# Patient Record
Sex: FEMALE | Race: WHITE | NOT HISPANIC OR LATINO | Employment: UNEMPLOYED | URBAN - METROPOLITAN AREA
[De-identification: names, ages, dates, MRNs, and addresses within clinical notes are randomized per-mention and may not be internally consistent; named-entity substitution may affect disease eponyms.]

---

## 2017-02-02 ENCOUNTER — TRANSCRIBE ORDERS (OUTPATIENT)
Dept: ADMINISTRATIVE | Facility: HOSPITAL | Age: 62
End: 2017-02-02

## 2017-02-02 ENCOUNTER — ALLSCRIPTS OFFICE VISIT (OUTPATIENT)
Dept: OTHER | Facility: OTHER | Age: 62
End: 2017-02-02

## 2017-02-02 DIAGNOSIS — D32.0 BENIGN NEOPLASM OF CEREBRAL MENINGES (HCC): Primary | ICD-10-CM

## 2018-01-10 NOTE — PROGRESS NOTES
Assessment   1  Meningioma (225 2) (D32 9)  2  Aftercare following surgery (V58 89) (Z48 89)    Plan     · * MRI CERVICAL SPINE W WO CONTRAST; Status:Need Information - Financial  Authorization; Requested ETIENNE:[id number]; Perform:[facility] Radiology; Order Comments:attention lower clivus to C2 region  Follow up s/p cervicomedullary region meningioma resection  sherrill 15 2018 8:15am Gifi -ba; IXA:[id number]; Last Updated   By:Pat Mckenzie; 2/2/2017 4:32:23 PM;Ordered; For:Aftercare following surgery,   Meningioma; Ordered By:Wagner Puga;    · (Q) BUN/CREATININE RATIO; Status:Active; Requested for:22Jan2018; Perform:[facility]; RXJ:[id number];ZSYXMNP; For:Aftercare following surgery, Meningioma,   Preprocedural examination; Ordered By:Wagner Puga; Follow up with PCP, Dr Dolores Malcolm / Dr Gayathri Hewitt regarding parotid findings  Follow-up visit in 1 year Evaluation and Treatment  Follow-up sovl / snplx Dr Chacho Brambila with C-spine MRI  Status: Hold For - Scheduling  Requested for: [id number]  Ordered; For: Aftercare following surgery, Meningioma; Ordered By: Rakan Navas  Performed:   Due: [id number]     Discussion/Summary    This is a 60year old lady who is approximately 4 1/2 years s/p left retromastoid and transcondylar approach and 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 preformed by Dr Chacho Barmbila on 6/18/2012 [ Final pathology Psammomatous meningioma grade 1]  Dr Chacho Brambila reviewed with Ms  Rody and her  result of MRI Brain/C-spine which demonstrates scar tissue and possibly residual tumor around upper cervical canal although this appears smaller then in comparison to MRI from 1/2016  There is no mass effect  She has degenerative changes of mid cervical spine  She has reports of stable bilateral cystic lesions throughout both parotid glands  Neurosurgical intervention is not indicated at this juncture  Continued surveillance in 1 year with MRI C-spine w/wo contrast is advised for follow up of residual meningoma / postoperative treatment changes  She is to have BUN/Creatinine completed a few days prior to MRI  Patient advised to follow up with her PCP / Dr Monique Moreno / Dr Sherine Apple regarding Sjogren disease and parotic cystic lesion findings  Patient was offered referral to Physical therapy but she declined  Patient is encouraged to incorporate regular walking / exercise into her lifestyle to maintain as active lifestyle and functional mobility as possible  Patient advised to contact our office with neurological change  Patient and her  expressed understanding and agreement  The patient has the current Goals: Review MRI brain/c-spine result  The patent has the current Barriers: None  Patient is able to Self-Care  The treatment plan was reviewed with the patient/guardian  The patient/guardian understands and agrees with the treatment plan   The patient, patient's family () was counseled regarding diagnostic results, instructions for management, impressions  Chief Complaint  Patient presents for 1 year f/u with MRI Brain/C-spine  History of Present Illness  This is a 60year old lady who is approximately 4 1/2 years s/p left retromastoid and transcondylar approach and 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 preformed by Dr Kane Khan on 6/18/2012 [ Final pathology Psammomatous meningioma grade 1 (WHO 2007) ]  She presents for routine follow up s/p MRI Brain/C-spine  She is accompanied with her   Ms Lilliam Gillette was last seen in our office 2/4/16 -- she was noted to have known residual left ventral craniocervical meningioma (~4mm in thickness) which was believed to be stable to prior imaging and she was recommended to follow up in 1 year with MRI Brain   Additionally she was advised to follow up with Dr Monique Moreno and Dr Sherine Apple regarding persistent multicystic changes of bilateral parotid glands  Patient denies headache or neck pain  She reports her surgical incision is well healed  She denies nausea or vomiting  She denies visual complaints or hearing dysfunction  She has history of short term memory difficulty but denies any changes in interval since visit last year  She denies pain, numbness, tingling, or weakness of her upper extremities  She denies any difficulty with fine motor function  She denies torso sensory change  She denies numbness, tingling, or weakness of her lower extremities  She ambulates independent  Patient reports during nicer weather she and her  take walks at a park  She reports not exercising much recently with the winter  They have a treadmill at home which  reports he has to fix  She denies bladder or bowel dysfunction  Patient reports she has followed with Dr Dolores Malcolm (Rheumatology) and Dr Gayathri Hewitt (ENT) regarding Sjogrens disease w/ history of enlargement of parotid glands  She denies any coughing or choking w/ eating  She denies waking up in AM with gag-like sensation  She reports she has next upcoming appointment w/ Dr Dolores Malcolm in March 2017  Of note -- she reports last year she was experiencing tremors so her PCP moved her MRIs that we requested for this appointment sooner  Patient reports PCP had her stopped Reglan and her tremors resolved  Review of Systems    Constitutional: no fever, not feeling poorly, no recent weight gain, no chills, not feeling tired and no recent weight loss  Eyes: no eye pain, no eyesight problems, no dryness of the eyes, eyes not red, no purulent discharge from the eyes and no itching of the eyes  ENT: no earache, no nosebleeds, no sore throat, no hearing loss, no nasal discharge and no hoarseness  Cardiovascular: the heart rate was not slow, no chest pain, the heart rate was not fast and no palpitations     Respiratory: no shortness of breath, no cough, no wheezing and no shortness of breath during exertion  Gastrointestinal: no abdominal pain, no nausea, no vomiting, no constipation, no diarrhea and no blood in stools  Genitourinary: no dysuria and no incontinence  Musculoskeletal: no joint swelling, no limb pain, no joint stiffness and no limb swelling  Integumentary: no rashes, no itching, no skin lesions and no skin wound  Neurological: no headache, no numbness, no tingling, no confusion, no dizziness, no limb weakness, no convulsions, no fainting and no difficulty walking  Psychiatric: anxiety, but no sleep disturbances and no depression  Hematologic/Lymphatic: no tendency for easy bleeding and no tendency for easy bruising  ROS reviewed  Active Problems   1  Aftercare following surgery (V58 89) (Z48 89)  2  Anxiety (300 00) (F41 9)  3  Arthritis (716 90) (M19 90)  4  Attention and concentration deficit (799 51) (R41 840)  5  Dry Skin (782 9)  6  Encounter for screening mammogram for malignant neoplasm of breast (V76 12)   (Z12 31)  7  Hypertension (401 9) (I10)  8  Hypochromic/Microcytic Thalassemia (282 40)  9  Lesion of parotid gland (527 8) (K11 8)  10  Memory Lapses Or Loss (780 93)  11  Meningioma (225 2) (D32 9)  12  Osteopenia (733 90) (M85 80)  13  Preprocedural examination (V72 84) (Z01 818)  14  Pre-procedure lab exam (V72 63) (Z01 812)  15  Routine Gynecological Exam With Cervical Pap Smear (V72 31)  16  Sjogren's syndrome (710 2) (M35 00)  17  Sleep disorder (780 50) (G47 9)  18  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)  19  Spondylosis of cervical region without myelopathy or radiculopathy (721 0) (M47 812)  20  Thyroid disorder (246 9) (E07 9)    Past Medical History   1  History of Alpha Thalassemia (282 43)  2  History of Birth History  3  History of Currently Wearing Eyeglasses  4  History of Encounter for screening mammogram for malignant neoplasm of breast   (V76 12) (Z12 31)  5   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) (Z86 69)  6  History of thyroid disease (V12 29) (Z86 39)  7  History of Routine Gynecological Exam With Cervical Pap Smear (V72 31)    The active problems and past medical history were reviewed and updated today  Surgical History   1  History of Biopsy Parotid  2  History of Brain Surgery  3  History of  Section  4  History of Gallbladder Surgery    The surgical history was reviewed and updated today  Family History  Mother   1  Family history of Cardiac Failure  Father   2  Family history of Suicide Completion  Sister   3  Family history of Thyroid Disorder (V18 19)  Maternal Grandfather   4  Family history of Bone Cancer  Family History   5  Family history of Family Health Status 2  Children Living  6  Family history of Family Health Status Of Mother -   9  Family history of Family Health Status Siblings 4  Living  8  Family history of Maternal Grandfather Is   5  Family history of Maternal Grandmother Is   8  Family history of Paternal Grandfather Is   6  Family history of Paternal Grandmother Is     The family history was reviewed and updated today  Social History    · Being A Social Drinker   · Denied: History of Drug Use   · Former smoker (V15 82) (W87 603)   · Living Independently With Spouse   · Marital History - Currently    · Sexual Activity Denied  The social history was reviewed and updated today  Current Meds  1  ALPRAZolam 2 MG Oral Tablet; 1 tablet daily; Therapy: (Recorded:75Bzl9389) to Recorded  2  AmLODIPine Besylate 2 5 MG Oral Tablet; TAKE 1 TAB AT BEDTIME; Therapy: (Recorded:04Zdj8929) to Recorded  3  Atenolol 50 MG Oral Tablet; 1 tab 2x daily; Therapy: (Recorded:51Iqz5987) to Recorded  4  Folic Acid 1 MG Oral Tablet; Take 1 tab in the morning; Therapy: (Recorded:35Bna3786) to Recorded  5  Levothyroxine Sodium 75 MCG Oral Tablet;  Take 1 tab in the morning; Therapy: (Recorded:64Nks3470) to Recorded  6  Linzess 145 MCG Oral Capsule; take 1 capsule daily; Therapy: 39BHW4670 to Recorded  7  Methotrexate 2 5 MG Oral Tablet; take 1 tab weekly on wednesday; Therapy: (Recorded:19Wkp7316) to Recorded    The medication list was reviewed and updated today  Allergies   1  TiZANidine HCl CAPS  Denied   2  Hydroxychloroquine Sulfate TABS    Vitals  Vital Signs    Recorded: 28JZK6699 03:01PM   Temperature 99 2 F   Heart Rate 74   Respiration 16   Systolic 323   Diastolic 64   Height 4 ft 10 in   Weight 130 lb    BMI Calculated 27 17   BSA Calculated 1 52     Physical Exam     Constitutional Patient appears healthy and well developed  No signs of acute distress present  Skin Maturely healed left retromastoid region scar  Neurologic - Mental Status: Alert and Oriented x3  Mood and affect: Affect is normal   Attention is WNL  Andrew Goody Speech is articulated and fluent  Knowledge and vocabulary consistent with education  Grossly nonfocal   Judgment and insight: Normal     Cranial Nerve Exam:  2nd CN: PERRLA  3rd, 4th, and 6th cranial nerves: Normal with no deficit  5th CN: Sensation to LT intact b/l V1-V3  Masseter intact b/l  7th cranial nerve: Face symmetrical at grimace and at rest  8th cranial nerve: Grossly intact to finger rub bilaterally  9th and 10th cranial nerves: Uvula is midline  the gag was present  11th cranial nerve: Shoulder shrug equal bilaterally  12th cranial nerve: Tongue mideline, no atrophy present  Motor System - Upper Extremities: Normal to inspection and palpation  Strength: Deltoids 5/5 bilaterally  Biceps 5/5 bilaterally  Triceps 5/5 bilaterally  Extensor carpi radials is 5/5 bilaterally  Extensor digitorum 5/5 bilaterally  Intrinsic 5/5 bilaterally   5/5 bilaterally  Motor System - Lower Extremities: Normal to inspection and palpation  Strength: iliopsoas 5/5 bilaterally  Quadriceps 5/5 bilaterally  Hamstrings 5/5 bilaterally  Gastrocnemius 5/5 bilaterally  Reflexes: Biceps reflexes are 2+ bilaterally  Triceps reflexes are 2+ bilaterally  Achilles reflexes are 2+ bilaterally  Babinski's reflex is 2+ down going bilaterally  Ankle clonus is absent bilaterally   (toes unequivocal left)  Deep tendon reflexes: no ankle clonus on the right and no ankle clonus on the left  Superficial/Primitive Reflexes: Babinski reflex absent on the right  Coordination: No pronator drift  Finger to nose intact b/l  GOSIA intact fingers  Involuntary movements: None   Sensory: Sensation grossly intact to light touch  Sensation grossly intact to light touch  Gait and Station: Routine gait intact independent although appears hesitant  She is unsteady with attempting tandem walk -- ie  side steps on 3rd stride  Results/Data  Diagnostic Studies Reviewed Neuro[facility]:   I personally reviewed the mri brain/c-spine result in detail with the patient  * MRI BRAIN W WO CONTRAST [id number] 07:26AM Paul Thomas     Test Name Result Flag Reference   MRI BRAIN W WO CONTRAST (Report)     This is a summary report  The complete report is available in the patient's medical record  If you cannot access the medical record, please contact the sending organization for a detailed fax or copy  MRI BRAIN WITH AND WITHOUT CONTRAST     INDICATION: Evaluate meningioma  Left arm weakness  Tremors  COMPARISON: 1/13/2016     TECHNIQUE:   Sagittal T1, axial T2, axial FLAIR, axial T1, axial [place], axial diffusion  Sagittal, axial and coronal T1 postcontrast  Axial BRAVO post contrast     IV Contrast: Gadobutrol injection (SINGLE-DOSE) SOLN 5 mL Note: (SINGLE DOSE/MULTI DOSE) information refers to the container from which the contrast was acquired  Contrast was injected one time intravenously without immediate complication  IMAGE QUALITY:  Diagnostic       FINDINGS:     BRAIN PARENCHYMA: A few periventricular white matter lesions in the right frontal region are stable  No new signal abnormality  Along the anterior aspect of the foramen magnum abutting the posterior margin of the clivus eccentrically to the left there   is a small sliver of extra-axial dural enhancement compatible with residual meningioma, measuring perhaps 9 mm in craniocaudal dimension x 3 mm maximal AP dimension  The appearance is stable  No significant mass effect  There is no diffusion restriction  There is no extra-axial fluid collection  Cerebellar tonsils are normally positioned  VENTRICLES: Normal      SELLA AND PITUITARY GLAND: Normal      ORBITS: Normal      PARANASAL SINUSES: Normal      VASCULATURE: Evaluation of the major intracranial vasculature demonstrates appropriate flow voids  CALVARIUM AND SKULL BASE: Normal      EXTRACRANIAL SOFT TISSUES: Normal        IMPRESSION:       1  Stable small amount of residual dural enhancement along the anterior aspect of the foramen magnum to left of midline compatible with a trace amount of residual meningioma versus reactive granulation tissue  No significant mass effect  2  Mild, chronic microangiopathy  3  No acute infarction, intracranial hemorrhage or new mass lesion  Workstation performed: [id number]     Signed by: Dorothy Hand MD   12/22/16     * MRI CERVICAL SPINE W WO CONTRAST [id number] 07:26AM EPIC, Provider   Test ordered by: Kaya Galo     Test Name Result Flag Reference   MRI CERVICAL SPINE W [address] (Report)     This is a summary report  The complete report is available in the patient's medical record  If you cannot access the medical record, please contact the sending organization for a detailed fax or copy  MRI CERVICAL SPINE WITH AND WITHOUT CONTRAST     INDICATION: Follow-up meningioma  Left arm weakness  COMPARISON: 12/3/2012     TECHNIQUE: Sagittal T1, sagittal T2, sagittal inversion recovery, axial 2D merge and axial T2         Sagittal T1 and axial T1 postcontrast     IV Contrast: Gadobutrol injection (SINGLE-DOSE) SOLN 5 mL Note: (SINGLE DOSE/MULTI DOSE) information refers to the container from which the contrast was acquired  Contrast was injected one time intravenously without immediate complication  IMAGE QUALITY: Diagnostic  FINDINGS:     ALIGNMENT: Grade 1 retrolisthesis of C5 on C6 is slightly progressed from the prior study  MARROW SIGNAL: Scattered degenerative endplate changes  No focally suspicious marrow lesions  No bone marrow edema or compression abnormality  CERVICAL AND VISUALIZED UPPER THORACIC CORD: Small amount of residual dural enhancement anteriorly in the upper cervical spinal canal seen on image 5, series 16, measuring approximately 11 mm craniocaudal dimension x 4 mm AP compatible with residual    meningioma  No significant mass effect or interval growth  PREVERTEBRAL AND PARASPINAL SOFT TISSUES: Numerous small bilateral cystic lesions throughout the parotid glands bilaterally retrospectively stable  VISUALIZED POSTERIOR FOSSA: The visualized posterior fossa demonstrates no abnormal signal      CERVICAL DISC SPACES:        C2-C3: Normal      C3-C4: Normal      C4-C5: Uncovering the intervertebral disc space noted  Mild tricompartmental narrowing  This level is similar to the prior study  C5-C6: There is a disc osteophyte complex with a superimposed left neural foraminal disc protrusion  Moderate central canal narrowing  Moderate to severe left neural foraminal narrowing  Mild right neural foraminal narrowing  This level is similar    to the prior study  C6-C7: There is a disc osteophyte complex with a superimposed right paracentral disc protrusion  Moderate right neural foraminal narrowing  Mild central canal narrowing  Left neural foramen patent  This level is similar to the prior study       C7-T1: Normal      UPPER THORACIC DISC SPACES: Normal      POSTCONTRAST IMAGING: Small amount of enhancement in the upper aspect of the cervical spinal canal anteriorly on the left, stable  IMPRESSION:       1  Small amount of residual meningioma versus treatment changes upper cervical canal  No mass effect or interval growth  2  Grade 1 retrolisthesis of C5 on C6 is minimally more pronounced on the prior study  No cord compression or cord signal abnormality  3  Stable bilateral cystic lesions throughout both parotid glands, a finding which has been associated with Sjogren's disease  Other etiologies such as lymphoepithelial cyst associated with HIV infection, Warthin tumor or sialoloceles are in the    differential diagnosis  Correlation with clinical history advised  Long-standing imaging stability favors a nonaggressive, benign etiology  Workstation performed: [id number]     Signed by: George Hector MD   12/22/16     Future Appointments    Date/Time Provider Specialty Site   01/23/2018 09:00 AM Jennifer Wilson, [address] [place] [address] [facility]   01/23/2018 09:30 AM NAINA Quinn   Neurosurgery [facility]     Signatures   Electronically signed by : Jossue Maharaj, [facility]; Feb 5 2017  9:36AM EST                       (Author)    Electronically signed by : NAINA Lezama ; Feb 6 2017  4:04PM EST                       (Author)

## 2018-01-10 NOTE — RESULT NOTES
Verified Results  * MRI BRAIN W WO CONTRAST [id number] 01:29PM Marnie Edge     Test Name Result Flag Reference   MRI BRAIN W WO CONTRAST (Report)     MRI BRAIN WITH AND WITHOUT CONTRAST     INDICATION: 69-year-old female, meningioma, follow-up   COMPARISON: January 2, 2015 MRI     TECHNIQUE:   Sagittal T1, axial T2, axial FLAIR, axial T1, axial gradient imaging, axial diffusion  Sagittal, axial and coronal T1 postcontrast       4 5 mL of Gadavist was injected intravenously without immediate consequence  IMAGE QUALITY:  Diagnostic  FINDINGS:     BRAIN PARENCHYMA:    Several tiny foci of chronic microangiopathic change involve the supratentorial white matter, similar to previous exam  No acute ischemic disease is identified  These findings are stable  Brainstem and cerebellum demonstrate normal signal  There is no intracranial hemorrhage  There is no evidence of acute infarction and diffusion imaging is unremarkable  VENTRICLES: Normal      POSTCONTRAST IMAGING:    Persistent thin relatively flat region of enhancement involves the left ventral aspect of the spinal canal from the level of the inferior clivus to mid C2  The abnormality remains most consistent with postoperative residual meningioma, unchanged  The    region measures up to 4 mm in thickness  No evidence of mass effect  SELLA AND PITUITARY GLAND: Normal      ORBITS: Normal      PARANASAL SINUSES: Normal      VASCULATURE: Evaluation of the major intracranial vasculature demonstrates appropriate flow voids  CALVARIUM AND SKULL BASE: Normal      EXTRACRANIAL SOFT TISSUES:    Multiple cystic changes both enlarged parotid glands, unchanged     Stable residual left ventral craniocervical meningioma measuring up to 4 mm in thickness  No significant mass effect on neural axis  Findings are unchanged       Persistent multicystic changes bilateral parotid glands, similar to previous study             4 5

## 2018-01-11 LAB
BUN SERPL-MCNC: 10 MG/DL (ref 7–25)
BUN/CREA RATIO (HISTORICAL): NORMAL (CALC) (ref 6–22)
CREAT SERPL-MCNC: 0.85 MG/DL (ref 0.5–0.99)
EGFR AFRICAN AMERICAN (HISTORICAL): 84 ML/MIN/1.73M2
EGFR-AMERICAN CALC (HISTORICAL): 72 ML/MIN/1.73M2

## 2018-01-12 VITALS
SYSTOLIC BLOOD PRESSURE: 110 MMHG | DIASTOLIC BLOOD PRESSURE: 64 MMHG | WEIGHT: 130 LBS | HEIGHT: 58 IN | RESPIRATION RATE: 16 BRPM | BODY MASS INDEX: 27.29 KG/M2 | TEMPERATURE: 99.2 F | HEART RATE: 74 BPM

## 2018-01-18 ENCOUNTER — HOSPITAL ENCOUNTER (OUTPATIENT)
Dept: MRI IMAGING | Facility: HOSPITAL | Age: 63
Discharge: HOME | End: 2018-01-18
Payer: COMMERCIAL

## 2018-01-18 DIAGNOSIS — D32.9 BENIGN NEOPLASM OF MENINGES (HCC): ICD-10-CM

## 2018-01-18 DIAGNOSIS — Z48.89 ENCOUNTER FOR OTHER SPECIFIED SURGICAL AFTERCARE (CODE): ICD-10-CM

## 2018-01-18 PROCEDURE — A9585 GADOBUTROL INJECTION: HCPCS | Performed by: NEUROLOGICAL SURGERY

## 2018-01-18 PROCEDURE — 72156 MRI NECK SPINE W/O & W/DYE: CPT

## 2018-01-18 RX ADMIN — GADOBUTROL 5 ML: 604.72 INJECTION INTRAVENOUS at 12:09

## 2018-01-23 ENCOUNTER — ALLSCRIPTS OFFICE VISIT (OUTPATIENT)
Dept: OTHER | Facility: OTHER | Age: 63
End: 2018-01-23

## 2018-01-24 DIAGNOSIS — M47.812 SPONDYLOSIS OF CERVICAL REGION WITHOUT MYELOPATHY OR RADICULOPATHY: ICD-10-CM

## 2018-01-24 DIAGNOSIS — Z48.89 ENCOUNTER FOR OTHER SPECIFIED SURGICAL AFTERCARE (CODE): ICD-10-CM

## 2018-01-24 DIAGNOSIS — M43.12 SPONDYLOLISTHESIS OF CERVICAL REGION: ICD-10-CM

## 2018-01-24 DIAGNOSIS — D32.9 BENIGN NEOPLASM OF MENINGES (HCC): ICD-10-CM

## 2018-01-24 NOTE — PROGRESS NOTES
Assessment   1  Meningioma (225 2) (D32 9)  2  Aftercare following surgery (V58 89) (Z48 89)    Plan    · * MRI CERVICAL SPINE W WO CONTRAST; Status:Need Information - Financial  Authorization; Requested for:Approx [id number];   Perform:[facility]; Order Comments:post-op follow up s/p meningioma   resection;Ordered; For:Aftercare following surgery, Meningioma; Ordered By:McFarland, Meryle Broker;   · Follow Up in 2 Years Evaluation and Treatment  Follow-up  sovl / snplx Dr Kymberly Carlisle with  MRI c-spine  Status: Hold For - Scheduling  Requested for: [id number]  Ordered; For: Aftercare following surgery, Meningioma; Ordered By: Dee Dee Berman    Performed:   Due: 95LTS8575    · (Q) BUN/CREATININE RATIO; Status:Hold For - Exact Date; Requested for:Approx  [id number];   Perform:[facility];Ordered; For:Aftercare following surgery, Meningioma, Pre-procedure lab   exam; Ordered By:McFarland, Meryle Broker;    · *1 - [facility] Co-Management  Eval and treat  Patient has history of left  retromastoid and transcondylar approach and C1 and C2 hemilaminectomy for  resection of intradural cervical medullary ventral mass preformed by Dr Kymberly Carlisle on  2012 [ Final pathology Psammomatous meningioma grade 1]  Status: Active   Requested for: [id number]  Ordered; For: Spondylolisthesis of cervical region, Spondylosis of cervical region without   myelopathy or radiculopathy;  Ordered By: Dee Dee Berman  Performed:     Due: 27ERH7860  (MU) Care Summary provided  : Yes   · * XR SPINE CERVICAL COMPLETE 4 OR 5 VW NON INJURY; Status:Active; Requested  EUE:[id number]; Perform:[facility]; Order Comments:Upright AP, Lateral, and Lateral   Flexion/Extension views; [id number];  For:Spondylolisthesis of cervical   region, Spondylosis of cervical region without myelopathy   or radiculopathy; Ordered By:McFarland, Meryle Broker;    Patient is to call our office after she has C-spine flex/ext Xray completed for   Morgan Case to review  Discussion/Summary    This is a 58year old lady who is approximately 5 1/2 years s/p left retromastoid and transcondylar approach and 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 preformed by Dr Morgan Robles on 6/18/2012 [ Final pathology Psammomatous meningioma grade 1]  She has known Sjogrens disease with persistent multicystic changes of bilateral parotid glands (Follows with Dr Liyah Andrade of Rheumatology and Dr Melissa Ballesteros of ENT)  Dr Morgan Robles reviewed the MRI C-spine ( 1/18/18) in detail and compared to prior MRI imaging (12/22/16, 12/3/12, and pre-op 5/22/12)  C-spine MRI imaging was reviewed with Ms  Rashelmi and her   Recent MRI C-spine demonstrates (1/18/18) demonstrates stability compared to previous MRI 12/22/16  There is stable enhancing and partially calcified lesion in the ventral cervicomedullary junction in keeping with residual/calcified meningioma  (This mass is significantly smaller compared to pre-op MRI imaging on 5/22/12 MRI brain)  In addition, she has cervical spondylosis / degenerative changes of mid cervical spine with increased straightening of cervical lordosis  There is 2mm of anterolisthesis of C4 on C5 and 1mm of retrolisthesis of C5 relatively to C6  There is no compression for spinal cord  There is normal cervical cord signal   There is note of bilateral nonenhancing cystic lesions seen within the parotid glands  The following is recommended as for:  1 ) Post-op s/p left retromastoid and transcondylar approach and 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 performed by Dr Morgan Robles on 6/18/2012 [ Final pathology Psammomatous meningioma grade 1]   Patient is recommended to follow up in 2 year with MRI C-spine w/wo contrast for continued surveillance of the residual enhancing/partially calcified lesion in the ventral cervicomedullary junction in keeping with residual / calcified meningioma  She is to have BUN/Creatinine completed a few days prior to the C-spine MRI  Dr Melissa Bangura discussed w/ patient and her  if there is findings of progression of meningioma over time then he would consider referral for radiation treatment  2 ) Cervical spondylosis / straightening of cervical lordosis / spondylolisthesis in cervical region -- She is advised to undergo cervical flexion/extension xray to evaluate dynamic stability of c-spine  Order for c-spine flex/ext xray placed and patient asked to have the cervical xray study completed in near future at [address]  Patient advised to call our office after she has c-spine xray completed so study may be reviewed  In addition, she is encouraged to pursue course of physical therapy  Referral for Physical therapy provided  3 ) Patient advised to follow up with PCP / Dr Johnie Fothergill / Dr Demetrius Almodovar regarding Sjogren disease and parotic cystic lesion findings  Additionally patient advised follow up with her PCP for history of dyspnea on exertion with walking  Patient advised to contact our office with neurological change  Ms Fei Ascencio and her  expressed understanding and agreement  The patient, patient's family () was counseled regarding diagnostic results, instructions for management, impressions  The patient has the current Goals: Review c-spine mri  The patent has the current Barriers: Sjogren disease ; history of dyspnea on exertion  Patient is able to Self-Care  The treatment plan was reviewed with the patient/guardian   The patient/guardian understands and agrees with the treatment plan      Chief Complaint  1 year follow up      History of Present Illness  This is a 58year old lady who is approximately 5 1/2 years s/p left retromastoid and transcondylar approach and 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 preformed by Dr Melissa Bangura on 6/18/2012 [ Final pathology Psammomatous meningioma grade 1 (WHO 2007) ]  She has known residual left ventral craniocervical meningioma  She presents for routine follow up s/p MRI C-spine  She is accompanied with her   She follows with Dr Gail Dao (Rheumatology) and Dr Judd Pruitt (ENT) regarding Sjogren's disease and persistent multicystic changes of bilateral parotid glands  Patient reports her cranial surgical incision is maturely healed  She denies nausea or vomiting  She denies visual disturbances or hearing dysfunction  She has history of short-term memory difficulty but denies any changes in the interval since last visit  Patient denies headache or neck pain  She does report she can experience some stiffness of her neck with right lateral rotation which she attributes to "arthritis"  She denies pain, numbness, tingling, or weakness of her upper extremities or lower extremities  She denies any fine motor dysfunction  She ambulates independently  During nicer weather she goes for walks although she reports she does not tolerate walking as far as she did in past due to getting "winded" from respiratory standpoint  She reports tolerance of walking to about the top of block  In more remote past she reported toleration of walking 3 miles  She reports occasionally feel âwobbly" when walking but denies any falls  Patient reports she continues to follow with Dr Gail Dao (Rheumatology) and Dr Judd Pruitt (ENT) for Sjogren disease with enlargement of parotid glands  She reports chronic dry mouth associated with Sjogrens disease  She reports occasional difficulty with swallowing which she attributes to the dry mouth with Sjogren's  She denies any choking when eating  She denies waking up in a m  with gag-like sensation  Patient reports she follows up with Dr Gail Dao approximately every 3-4 months with Dr Judd Pruitt yearly        Review of Systems    Constitutional: No fever, no chills, feels well, no tiredness, no recent weight gain or weight loss  Eyes: No complaints of eye pain, no red eyes, no eyesight problems, no discharge, no dry eyes, no itching of eyes  ENT: no complaints of earache, no loss of hearing, no nose bleeds, no nasal discharge, no sore throat, no hoarseness  Cardiovascular: No complaints of slow heart rate, no fast heart rate, no chest pain, no palpitations, no leg claudication, no lower extremity edema  Respiratory: shortness of breath during exertion, but no cough, no orthopnea and no PND  Gastrointestinal: No complaints of abdominal pain, no constipation, no nausea or vomiting, no diarrhea, no bloody stools  Genitourinary: No complaints of dysuria, no incontinence, no pelvic pain, no dysmenorrhea, no vaginal discharge or bleeding  Musculoskeletal: No complaints of arthralgias, no myalgias, no joint swelling or stiffness, no limb pain or swelling  Integumentary: No complaints of skin rash or lesions, no itching, no skin wounds, no breast pain or lump  Neurological: No complaints of headache, no confusion, no convulsions, no numbness, no dizziness or fainting, no tingling, no limb weakness, no difficulty walking  Psychiatric: Not suicidal, no sleep disturbance, no anxiety or depression, no change in personality, no emotional problems  Endocrine: No complaints of proptosis, no hot flashes, no muscle weakness, no deepening of the voice, no feelings of weakness  Hematologic/Lymphatic: No complaints of swollen glands, no swollen glands in the neck, does not bleed easily, does not bruise easily  ROS reviewed  Active Problems   1  Aftercare following surgery (V58 89) (Z48 89)  2  Anxiety (300 00) (F41 9)  3  Arthritis (716 90) (M19 90)  4  Attention and concentration deficit (799 51) (R41 840)  5  Dry Skin (782 9)  6  Encounter for screening mammogram for malignant neoplasm of breast (V76 12)   (Z12 31)  7  Hypertension (401 9) (I10)  8  Hypochromic/Microcytic Thalassemia (282 40)  9   Lesion of parotid gland (527 8) (K11 8)  10  Memory Lapses Or Loss (780 93)  11  Meningioma (225 2) (D32 9)  12  Osteopenia (733 90) (M85 80)  13  Preprocedural examination (V72 84) (Z01 818)  14  Pre-procedure lab exam (V72 63) (Z01 812)  15  Routine Gynecological Exam With Cervical Pap Smear (V72 31)  16  Sjogren's syndrome (710 2) (M35 00)  17  Sleep disorder (780 50) (G47 9)  18  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)  19  Spondylosis of cervical region without myelopathy or radiculopathy (721 0) (M47 812)  20  Thyroid disorder (246 9) (E07 9)    Past Medical History   1  History of Alpha Thalassemia (282 43)  2  History of Birth History  3  History of Currently Wearing Eyeglasses  4  History of Encounter for screening mammogram for malignant neoplasm of breast   (V76 12) (Z12 31)  5  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) (Z86 69)  6  History of thyroid disease (V12 29) (Z86 39)  7  History of Routine Gynecological Exam With Cervical Pap Smear (V72 31)    The active problems and past medical history were reviewed and updated today  Surgical History   1  History of Biopsy Parotid  2  History of Brain Surgery  3  History of  Section  4  History of Gallbladder Surgery    The surgical history was reviewed and updated today  Family History  Mother   1  Family history of Cardiac Failure  Father   2  Family history of Suicide Completion  Sister   3  Family history of Thyroid Disorder (V18 19)  Maternal Grandfather   4  Family history of Bone Cancer  Family History   5  Family history of Family Health Status 2  Children Living  6  Family history of Family Health Status Of Mother -   9  Family history of Family Health Status Siblings 4  Living  8  Family history of Maternal Grandfather Is   5  Family history of Maternal Grandmother Is   8  Family history of Paternal Grandfather Is   6   Family history of Paternal Grandmother Is     The family history was reviewed and updated today  Social History    · Being A Social Drinker   · Denied: History of Drug Use   · Former smoker (V15 82) (K44 938)   · Living Independently With Spouse   · Marital History - Currently    · Sexual Activity Denied  The social history was reviewed and updated today  Current Meds  1  ALPRAZolam 2 MG Oral Tablet; 1 tablet daily; Therapy: (Recorded:14Jcy6242) to Recorded  2  AmLODIPine Besylate 2 5 MG Oral Tablet; TAKE 1 TAB AT BEDTIME; Therapy: (Recorded:53Swp8746) to Recorded  3  Atenolol 50 MG Oral Tablet; 1 tab 2x daily; Therapy: (Recorded:37Tyl7208) to Recorded  4  Folic Acid 1 MG Oral Tablet; Take 1 tab in the morning; Therapy: (Recorded:16Rhe0237) to Recorded  5  Levothyroxine Sodium 75 MCG Oral Tablet; Take 1 tab in the morning; Therapy: (Recorded:53Weh5050) to Recorded  6  Linzess 145 MCG Oral Capsule; take 1 capsule daily; Therapy: 62KFA9794 to Recorded  7  Methotrexate 2 5 MG Oral Tablet; take 1 tab weekly on wednesday; Therapy: (Recorded:42Lvc2550) to Recorded    The medication list was reviewed and updated today  Allergies   1  TiZANidine HCl CAPS  Denied   2  Hydroxychloroquine Sulfate TABS    Vitals  Vital Signs    Recorded: 73USQ3592 09:05AM   Temperature 99 4 F   Respiration 11   Systolic 705   Diastolic 80   Height 4 ft 10 in   Weight 140 lb 6 oz   BMI Calculated 29 34   BSA Calculated 1 57   Pain Scale 6     Physical Exam     Constitutional Patient appears healthy and well developed  No signs of acute distress present  Head and Face   Head and face: Abnormal   Examination of the head and face revealed no abnormalities (except enlargement of parotid region b/l)  Respiratory Respiratory effort: Normal    Musculo: Spine Contour is normal  No tenderness of the spine billaterally      Cervical Spine examination demonstrates no visible abnormailities, normal lordosis and no spine tenderness on palpation Cervical Spine: Flexion was not restricted and was painless  Extension was not restricted and was painless  Mild restriction with left and right lateral rotation  Mild discomfort with right lateral rotation  Neurologic - Mental Status: Mood and affect: Affect is normal   Speech is articulated and fluent  Grossly nonfocal     Cranial Nerve Exam:  2nd CN: PERRLA  3rd, 4th, and 6th cranial nerves: Normal with no deficit  5th CN: Sensation to LT intact b/l V1-V3  Masseter intact b/l  7th cranial nerve: Face symmetrical at grimace and at rest  8th cranial nerve: Grossly intact to finger rub bilaterally  9th and 10th cranial nerves: Uvula is midline  11th cranial nerve: Shoulder shrug equal bilaterally  12th cranial nerve: Tongue mideline, no atrophy present  Motor System - Upper Extremities: Normal to inspection and palpation  Strength: Deltoids 5/5 bilaterally  Biceps 5/5 bilaterally  Triceps 5/5 bilaterally  Extensor carpi radials is 5/5 bilaterally  Extensor digitorum 5/5 bilaterally  Intrinsic 5/5 bilaterally   5/5 bilaterally  Motor System - Lower Extremities: Normal to inspection and palpation  Strength: iliopsoas 5/5 bilaterally  Quadriceps 5/5 bilaterally  Hamstrings 5/5 bilaterally  Gastrocnemius 5/5 bilaterally  Anterior tibialis 5/5 bilaterally  Reflexes: Reflexes: Biceps +2 bilaterally  Brachioradials / Triceps +1 bilaterally  Patellar +2 bilaterally  Achilles +1 bilaterally  No ankle clonus bilaterally  Toes muted b/l on Babinski  Sensory: Sensation grossly intact to light touch  Sensation grossly intact to light touch  Gait and Station: Gait - NA/NS  Routine gait intact  Independent  Unsteady with tandem walk -- sides steps  Results/Data  Diagnostic Studies Reviewed [facility]:   I personally reviewed the mri c-spine in detail with the patient    * MRI CERVICAL [facility] CONTRAST [id number] 10:54AM Marvin Briscoe Order Number: [id number]   Performing Comments: attention lower clivus to C2 region  Follow up s/p cervicomedullary region meningioma resection  sherrill 15 2018 8:15am [facility]   - Patient Instructions: To schedule this appointment, please contact Central    Scheduling at [id number] [phone]  Test Name Result Flag Reference   MRI CERVICAL SPINE W [address] (Report)     MRI CERVICAL SPINE WITH AND WITHOUT CONTRAST     INDICATION: Z48 89: Encounter for other specified surgical aftercare   D32 9: Benign neoplasm of meninges, unspecified  History taken directly from the electronic ordering system  COMPARISON: MRI performed on 12/3/2016     TECHNIQUE: Sagittal T1, sagittal T2, sagittal inversion recovery, axial 2D merge and axial T2  Sagittal T1 and axial T1 postcontrast       IV Contrast: 5 mL of gadobutrol injection (MULTI-DOSE)      IMAGE QUALITY: Diagnostic  FINDINGS:     ALIGNMENT: Reversal of the normal cervical lordosis  2 mm of anterolisthesis of C4 on C5 noted, 1 mm of retrolisthesis of C5 relative to C6  MARROW SIGNAL: Homogenous T1 hypointensity and vague STIR hyperintensity suggestive of red marrow reconversion  No discrete mass identified     CERVICAL AND VISUALIZED UPPER THORACIC CORD: See comments below  Otherwise, cervical cord is normal in caliber and signal      PREVERTEBRAL AND PARASPINAL SOFT TISSUES: Multiple bilateral nonenhancing cystic lesion seen within the parotid glands likely reflective of a manifestation of Scheuermann's disease  No pathologic adenopathy  VISUALIZED POSTERIOR FOSSA: Again noted is the partially enhancing lobulated mass along the left lateral aspect of the dorsal dens/transverse ligament region  There remains a slight protuberant/exophytic component measuring 5 mm in widest dimension  Areas of susceptibility may reflect only calcification/mineralization  This is unchanged from prior examination   Residual deformity of the cord at the cervicomedullary junction is noted where the cord appears slightly ptotic  Susceptibility seen in the posterior fossa from prior surgery  CERVICAL DISC SPACES:        C2-C3: No significant spinal canal stenosis  No right and no left neural foraminal stenosis  C3-C4: No significant spinal canal stenosis  No right and no left neural foraminal stenosis  C4-C5: Disc osteophyte complex, uncovertebral hypertrophy, and facet arthropathy  Flattening of the cord without compression  Mild right and mild left neural foraminal stenosis  C5-C6: Disc osteophyte complex with uncovertebral hypertrophy  No significant spinal canal stenosis  No right and mild left neural foraminal stenosis  C6-C7: Disc osteophyte complex with uncovertebral hypertrophy  No significant spinal canal stenosis  Mild right and no significant left neural foraminal stenosis  C7-T1: No significant spinal canal stenosis  No right and no left neural foraminal stenosis  UPPER THORACIC DISC SPACES: Normal      POSTCONTRAST IMAGING: Normal        IMPRESSION:     Stable MRI of the cervical spine when compared to 12/22/2016  Stable enhancing and partially calcified lesion in the ventral cervicomedullary junction in keeping with residual/calcified meningioma  Stable cervical spondylosis  No cord signal abnormality  Workstation performed: [id number]     Signed by:   Luciano Olivo MD   1/18/18     Attending Note  Collaborating Physician:1  I interviewed and examined the patient1 , I supervised the Advanced Practitioner1  and I agree with the Advanced Practitioner note1   Agree with Advanced Practitioner Note Except:1  There is no change in the residual enahancment aat the C1/2 junction ventral to the cord  There is an overall Change in the cervical spine alignment  This reversal of the normal lordosis can produce neck pain   It is for these reasons that I have recommended flexion-extension x-rays as well as repeat MRI of the occipital cervical junction in 2 years time1         1 Amended By: Lauri Sandoval; Jan 23 2018 2:28 PM EST    Signatures   Electronically signed by : Pricila Abel, [facility]; Jan 23 2018 12:40PM EST                       (Author)    Electronically signed by : NAINA Awan ; Jan 23 2018  2:28PM EST                       (Author)

## 2019-08-29 PROBLEM — K11.1 PAROTID GLAND ENLARGEMENT: Status: ACTIVE | Noted: 2019-08-29

## 2019-08-29 PROBLEM — M35.00 SJOGREN'S DISEASE (HCC): Status: ACTIVE | Noted: 2019-08-29

## 2021-02-13 DIAGNOSIS — Z23 ENCOUNTER FOR IMMUNIZATION: ICD-10-CM

## 2021-02-17 ENCOUNTER — IMMUNIZATIONS (OUTPATIENT)
Dept: FAMILY MEDICINE CLINIC | Facility: HOSPITAL | Age: 66
End: 2021-02-17

## 2021-02-17 DIAGNOSIS — Z23 ENCOUNTER FOR IMMUNIZATION: Primary | ICD-10-CM

## 2021-02-17 PROCEDURE — 91300 SARS-COV-2 / COVID-19 MRNA VACCINE (PFIZER-BIONTECH) 30 MCG: CPT

## 2021-02-17 PROCEDURE — 0001A SARS-COV-2 / COVID-19 MRNA VACCINE (PFIZER-BIONTECH) 30 MCG: CPT

## 2021-02-22 ENCOUNTER — HOSPITAL ENCOUNTER (INPATIENT)
Facility: HOSPITAL | Age: 66
LOS: 5 days | DRG: 535 | End: 2021-02-27
Attending: EMERGENCY MEDICINE | Admitting: SURGERY
Payer: COMMERCIAL

## 2021-02-22 ENCOUNTER — APPOINTMENT (EMERGENCY)
Dept: RADIOLOGY | Facility: HOSPITAL | Age: 66
DRG: 535 | End: 2021-02-22
Payer: COMMERCIAL

## 2021-02-22 ENCOUNTER — APPOINTMENT (EMERGENCY)
Dept: CT IMAGING | Facility: HOSPITAL | Age: 66
DRG: 535 | End: 2021-02-22
Payer: COMMERCIAL

## 2021-02-22 DIAGNOSIS — S32.414A CLOSED NONDISPLACED FRACTURE OF ANTERIOR WALL OF RIGHT ACETABULUM, INITIAL ENCOUNTER (HCC): ICD-10-CM

## 2021-02-22 DIAGNOSIS — S32.501A CLOSED NONDISPLACED FRACTURE OF RIGHT PUBIS, INITIAL ENCOUNTER (HCC): ICD-10-CM

## 2021-02-22 DIAGNOSIS — S32.82XA: Primary | ICD-10-CM
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
## 2021-02-22 LAB
ANION GAP SERPL CALCULATED.3IONS-SCNC: 11 MMOL/L (ref 4–13)
BASOPHILS # BLD AUTO: 0.04 THOUSANDS/ΜL (ref 0–0.1)
BASOPHILS NFR BLD AUTO: 0 % (ref 0–1)
BUN SERPL-MCNC: 20 MG/DL (ref 5–25)
CALCIUM SERPL-MCNC: 8.8 MG/DL (ref 8.3–10.1)
CHLORIDE SERPL-SCNC: 103 MMOL/L (ref 100–108)
CO2 SERPL-SCNC: 23 MMOL/L (ref 21–32)
CREAT SERPL-MCNC: 1.13 MG/DL (ref 0.6–1.3)
EOSINOPHIL # BLD AUTO: 0.06 THOUSAND/ΜL (ref 0–0.61)
EOSINOPHIL NFR BLD AUTO: 0 % (ref 0–6)
ERYTHROCYTE [DISTWIDTH] IN BLOOD BY AUTOMATED COUNT: 19.1 % (ref 11.6–15.1)
GFR SERPL CREATININE-BSD FRML MDRD: 50 ML/MIN/1.73SQ M
GLUCOSE SERPL-MCNC: 135 MG/DL (ref 65–140)
HCT VFR BLD AUTO: 36.7 % (ref 34.8–46.1)
HGB BLD-MCNC: 11.2 G/DL (ref 11.5–15.4)
IMM GRANULOCYTES # BLD AUTO: 0.16 THOUSAND/UL (ref 0–0.2)
IMM GRANULOCYTES NFR BLD AUTO: 1 % (ref 0–2)
LYMPHOCYTES # BLD AUTO: 0.72 THOUSANDS/ΜL (ref 0.6–4.47)
LYMPHOCYTES NFR BLD AUTO: 4 % (ref 14–44)
MCH RBC QN AUTO: 26.5 PG (ref 26.8–34.3)
MCHC RBC AUTO-ENTMCNC: 30.5 G/DL (ref 31.4–37.4)
MCV RBC AUTO: 87 FL (ref 82–98)
MONOCYTES # BLD AUTO: 0.98 THOUSAND/ΜL (ref 0.17–1.22)
MONOCYTES NFR BLD AUTO: 6 % (ref 4–12)
NEUTROPHILS # BLD AUTO: 14.59 THOUSANDS/ΜL (ref 1.85–7.62)
NEUTS SEG NFR BLD AUTO: 89 % (ref 43–75)
NRBC BLD AUTO-RTO: 0 /100 WBCS
PLATELET # BLD AUTO: 249 THOUSANDS/UL (ref 149–390)
PMV BLD AUTO: 9.6 FL (ref 8.9–12.7)
POTASSIUM SERPL-SCNC: 3.7 MMOL/L (ref 3.5–5.3)
RBC # BLD AUTO: 4.22 MILLION/UL (ref 3.81–5.12)
SODIUM SERPL-SCNC: 137 MMOL/L (ref 136–145)
WBC # BLD AUTO: 16.55 THOUSAND/UL (ref 4.31–10.16)

## 2021-02-22 PROCEDURE — 71260 CT THORAX DX C+: CPT

## 2021-02-22 PROCEDURE — 96375 TX/PRO/DX INJ NEW DRUG ADDON: CPT

## 2021-02-22 PROCEDURE — 73110 X-RAY EXAM OF WRIST: CPT

## 2021-02-22 PROCEDURE — 74177 CT ABD & PELVIS W/CONTRAST: CPT

## 2021-02-22 PROCEDURE — 99285 EMERGENCY DEPT VISIT HI MDM: CPT | Performed by: EMERGENCY MEDICINE

## 2021-02-22 PROCEDURE — 36415 COLL VENOUS BLD VENIPUNCTURE: CPT | Performed by: EMERGENCY MEDICINE

## 2021-02-22 PROCEDURE — 85025 COMPLETE CBC W/AUTO DIFF WBC: CPT | Performed by: EMERGENCY MEDICINE

## 2021-02-22 PROCEDURE — 96374 THER/PROPH/DIAG INJ IV PUSH: CPT

## 2021-02-22 PROCEDURE — 99222 1ST HOSP IP/OBS MODERATE 55: CPT | Performed by: PHYSICIAN ASSISTANT

## 2021-02-22 PROCEDURE — 99285 EMERGENCY DEPT VISIT HI MDM: CPT

## 2021-02-22 PROCEDURE — G1004 CDSM NDSC: HCPCS

## 2021-02-22 PROCEDURE — 80048 BASIC METABOLIC PNL TOTAL CA: CPT | Performed by: EMERGENCY MEDICINE

## 2021-02-22 PROCEDURE — 73030 X-RAY EXAM OF SHOULDER: CPT

## 2021-02-22 RX ORDER — LIDOCAINE 50 MG/G
2 PATCH TOPICAL DAILY
Status: DISCONTINUED | OUTPATIENT
Start: 2021-02-23 | End: 2021-02-27 | Stop reason: HOSPADM

## 2021-02-22 RX ORDER — ACETAMINOPHEN 325 MG/1
975 TABLET ORAL EVERY 8 HOURS SCHEDULED
Status: DISCONTINUED | OUTPATIENT
Start: 2021-02-22 | End: 2021-02-27 | Stop reason: HOSPADM

## 2021-02-22 RX ORDER — ACETAMINOPHEN 325 MG/1
975 TABLET ORAL ONCE
Status: COMPLETED | OUTPATIENT
Start: 2021-02-22 | End: 2021-02-22

## 2021-02-22 RX ORDER — METHOCARBAMOL 500 MG/1
500 TABLET, FILM COATED ORAL EVERY 6 HOURS PRN
Status: DISCONTINUED | OUTPATIENT
Start: 2021-02-22 | End: 2021-02-27 | Stop reason: HOSPADM

## 2021-02-22 RX ORDER — POLYETHYLENE GLYCOL 3350 17 G/17G
17 POWDER, FOR SOLUTION ORAL DAILY
Status: DISCONTINUED | OUTPATIENT
Start: 2021-02-23 | End: 2021-02-27 | Stop reason: HOSPADM

## 2021-02-22 RX ORDER — DOCUSATE SODIUM 100 MG/1
100 CAPSULE, LIQUID FILLED ORAL 2 TIMES DAILY
Status: DISCONTINUED | OUTPATIENT
Start: 2021-02-22 | End: 2021-02-27 | Stop reason: HOSPADM

## 2021-02-22 RX ORDER — LEVOTHYROXINE SODIUM 0.07 MG/1
75 TABLET ORAL
Status: DISCONTINUED | OUTPATIENT
Start: 2021-02-23 | End: 2021-02-26

## 2021-02-22 RX ORDER — ONDANSETRON 2 MG/ML
4 INJECTION INTRAMUSCULAR; INTRAVENOUS ONCE
Status: COMPLETED | OUTPATIENT
Start: 2021-02-22 | End: 2021-02-22

## 2021-02-22 RX ORDER — FOLIC ACID 1 MG/1
1 TABLET ORAL DAILY
Status: DISCONTINUED | OUTPATIENT
Start: 2021-02-23 | End: 2021-02-27 | Stop reason: HOSPADM

## 2021-02-22 RX ORDER — FENTANYL CITRATE 50 UG/ML
50 INJECTION, SOLUTION INTRAMUSCULAR; INTRAVENOUS ONCE
Status: COMPLETED | OUTPATIENT
Start: 2021-02-22 | End: 2021-02-22

## 2021-02-22 RX ORDER — HYDROMORPHONE HCL/PF 1 MG/ML
0.2 SYRINGE (ML) INJECTION EVERY 4 HOURS PRN
Status: DISCONTINUED | OUTPATIENT
Start: 2021-02-22 | End: 2021-02-27 | Stop reason: HOSPADM

## 2021-02-22 RX ORDER — FLUOXETINE HYDROCHLORIDE 20 MG/1
20 CAPSULE ORAL DAILY
Status: DISCONTINUED | OUTPATIENT
Start: 2021-02-23 | End: 2021-02-27 | Stop reason: HOSPADM

## 2021-02-22 RX ORDER — AMLODIPINE BESYLATE 2.5 MG/1
2.5 TABLET ORAL DAILY
Status: DISCONTINUED | OUTPATIENT
Start: 2021-02-22 | End: 2021-02-26

## 2021-02-22 RX ORDER — ATORVASTATIN CALCIUM 10 MG/1
10 TABLET, FILM COATED ORAL DAILY
Status: DISCONTINUED | OUTPATIENT
Start: 2021-02-23 | End: 2021-02-27 | Stop reason: HOSPADM

## 2021-02-22 RX ORDER — OXYCODONE HYDROCHLORIDE 5 MG/1
2.5 TABLET ORAL EVERY 4 HOURS PRN
Status: DISCONTINUED | OUTPATIENT
Start: 2021-02-22 | End: 2021-02-27 | Stop reason: HOSPADM

## 2021-02-22 RX ORDER — ATENOLOL 50 MG/1
50 TABLET ORAL DAILY
Status: DISCONTINUED | OUTPATIENT
Start: 2021-02-22 | End: 2021-02-26

## 2021-02-22 RX ORDER — AMLODIPINE BESYLATE 2.5 MG/1
2.5 TABLET ORAL DAILY
Status: DISCONTINUED | OUTPATIENT
Start: 2021-02-23 | End: 2021-02-22

## 2021-02-22 RX ORDER — ONDANSETRON 2 MG/ML
4 INJECTION INTRAMUSCULAR; INTRAVENOUS EVERY 6 HOURS PRN
Status: DISCONTINUED | OUTPATIENT
Start: 2021-02-22 | End: 2021-02-27 | Stop reason: HOSPADM

## 2021-02-22 RX ORDER — GABAPENTIN 100 MG/1
100 CAPSULE ORAL
Status: DISCONTINUED | OUTPATIENT
Start: 2021-02-22 | End: 2021-02-27 | Stop reason: HOSPADM

## 2021-02-22 RX ORDER — ATENOLOL 25 MG/1
50 TABLET ORAL DAILY
Status: DISCONTINUED | OUTPATIENT
Start: 2021-02-23 | End: 2021-02-22

## 2021-02-22 RX ORDER — OXYCODONE HYDROCHLORIDE 5 MG/1
5 TABLET ORAL EVERY 4 HOURS PRN
Status: DISCONTINUED | OUTPATIENT
Start: 2021-02-22 | End: 2021-02-27 | Stop reason: HOSPADM

## 2021-02-22 RX ORDER — METOCLOPRAMIDE 10 MG/1
10 TABLET ORAL
Status: DISCONTINUED | OUTPATIENT
Start: 2021-02-23 | End: 2021-02-26

## 2021-02-22 RX ADMIN — AMLODIPINE BESYLATE 2.5 MG: 2.5 TABLET ORAL at 21:41

## 2021-02-22 RX ADMIN — IOHEXOL 100 ML: 350 INJECTION, SOLUTION INTRAVENOUS at 18:31

## 2021-02-22 RX ADMIN — DICLOFENAC SODIUM 2 G: 10 GEL TOPICAL at 22:48

## 2021-02-22 RX ADMIN — ATENOLOL 50 MG: 25 TABLET ORAL at 21:38

## 2021-02-22 RX ADMIN — DOCUSATE SODIUM 100 MG: 100 CAPSULE, LIQUID FILLED ORAL at 21:38

## 2021-02-22 RX ADMIN — ONDANSETRON 4 MG: 2 INJECTION INTRAMUSCULAR; INTRAVENOUS at 19:37

## 2021-02-22 RX ADMIN — FENTANYL CITRATE 50 MCG: 50 INJECTION, SOLUTION INTRAMUSCULAR; INTRAVENOUS at 19:37

## 2021-02-22 RX ADMIN — ACETAMINOPHEN 975 MG: 325 TABLET, FILM COATED ORAL at 17:58

## 2021-02-22 RX ADMIN — GABAPENTIN 100 MG: 100 CAPSULE ORAL at 22:48

## 2021-02-22 RX ADMIN — ENOXAPARIN SODIUM 30 MG: 30 INJECTION SUBCUTANEOUS at 21:38

## 2021-02-22 RX ADMIN — METHOCARBAMOL TABLETS 500 MG: 500 TABLET, COATED ORAL at 21:38

## 2021-02-22 NOTE — ED PROVIDER NOTES
History  Chief Complaint   Patient presents with    Fall     Pt presents by ems from home after slipping down steps and falling onto R side  Denies head strike or LOC  Per ems, pt has chronic slurred speech d/t brain tumor removal, otherwise a/ox4  -thinners      78 y o  female presents via EMS after a fall at home  Patient reports she slipped and fell down 7 stairs  She denies striking her head or losing consciousness  No neck pain  Patient has pain in her right shoulder, right wrist, right chest wall and bilateral hips/pelvis  Patient does not take any blood thinners and does not take aspirin regularly  History provided by:  Patient   used: No    Fall  Mechanism of injury: fall    Injury location:  Shoulder/arm, pelvis and torso  Shoulder/arm injury location:  R shoulder and R elbow  Torso injury location:  R chest  Pelvic injury location:  L hip, R hip and pelvis  Time since incident:  1 hour  Arrived directly from scene: yes    Fall:     Fall occurred:  Down stairs    Impact surface:  Stairs    Entrapped after fall: no    Suspicion of alcohol use: no    Suspicion of drug use: no    Prior to arrival data:     Blood loss:  None    Responsiveness at scene:  Alert    Orientation at scene:  Person, place, situation and time    Loss of consciousness: no      Amnesic to event: no    Associated symptoms: chest pain (right sided chest wall pain)    Associated symptoms: no abdominal pain, no difficulty breathing, no headaches, no nausea, no neck pain and no vomiting    Risk factors: no anticoagulation therapy        Prior to Admission Medications   Prescriptions Last Dose Informant Patient Reported? Taking?    ALPRAZolam (XANAX) 1 mg tablet   Yes Yes   FLUoxetine (PROzac) 20 mg capsule   Yes Yes   Sig: Take 20 mg by mouth daily   amLODIPine (NORVASC) 2 5 mg tablet   Yes Yes   Sig: Take 1 tablet by mouth   atenolol (TENORMIN) 50 mg tablet   Yes Yes   Sig: Take by mouth   atorvastatin (LIPITOR) 10 mg tablet   Yes Yes   Sig: Take 10 mg by mouth daily   cyclobenzaprine (FLEXERIL) 10 mg tablet   Yes Yes   Sig: Take 10 mg by mouth daily at bedtime   folic acid (FOLVITE) 1 mg tablet   Yes No   Sig: Take by mouth   levothyroxine 75 mcg tablet   Yes Yes   Sig: Take 75 mcg by mouth daily   methotrexate 2 5 mg tablet   Yes Yes   Sig: TAKE 8 TABLETS BY MOUTH WEEKLY   metoclopramide (REGLAN) 10 mg tablet   Yes Yes   Sig: TAKE 1 TABLET BY MOUTH BEFORE MEALS 3 TIMES A DAY AND AT BEDTIME      Facility-Administered Medications: None       Past Medical History:   Diagnosis Date    Lymphadenitis, chronic     Sialadenitis     Sjogren's disease ([facility] [address] )     Stomach problems     Thyroid disorder     Xerostomia        Past Surgical History:   Procedure Laterality Date    CHOLECYSTECTOMY         Family History   Problem Relation Age of Onset    Diabetes Other     Hypertension Other     Heart disease Other     Arthritis Other      I have reviewed and agree with the history as documented  E-Cigarette/Vaping     E-Cigarette/Vaping Substances     Social History     Tobacco Use    Smoking status: Former Smoker    Smokeless tobacco: Never Used   Substance Use Topics    Alcohol use: Yes     Frequency: Monthly or less    Drug use: Never       Review of Systems   Constitutional: Negative for chills, diaphoresis and fever  Respiratory: Negative for shortness of breath  Cardiovascular: Positive for chest pain (right sided chest wall pain)  Negative for palpitations  Gastrointestinal: Negative for abdominal pain, diarrhea, nausea and vomiting  Genitourinary: Negative for dysuria and frequency  Musculoskeletal: Positive for arthralgias (right shoulder, right wrist, bilateral hips)  Negative for neck pain  Skin: Negative for rash  Neurological: Negative for headaches  All other systems reviewed and are negative  Physical Exam  Physical Exam  Vitals signs and nursing note reviewed  Constitutional:       General: She is in acute distress  Appearance: She is well-developed  HENT:      Head: Normocephalic and atraumatic  Eyes:      Pupils: Pupils are equal, round, and reactive to light  Neck:      Musculoskeletal: Normal range of motion  Vascular: No JVD  Cardiovascular:      Rate and Rhythm: Normal rate and regular rhythm  Heart sounds: Normal heart sounds  No murmur  No friction rub  No gallop  Pulmonary:      Effort: Pulmonary effort is normal  No respiratory distress  Breath sounds: Normal breath sounds  No wheezing or rales  Chest:      Chest wall: No tenderness  Musculoskeletal: Normal range of motion  General: Tenderness (right shoulder, right wrist, bilateral hips) present  No deformity  Skin:     General: Skin is warm and dry  Neurological:      General: No focal deficit present  Mental Status: She is alert and oriented to person, place, and time  Psychiatric:         Behavior: Behavior normal          Thought Content:  Thought content normal          Judgment: Judgment normal          Vital Signs  ED Triage Vitals [02/22/21 1731]   Temperature Pulse Respirations Blood Pressure SpO2   97 9 °F (36 6 °C) 74 18 90/68 92 %      Temp Source Heart Rate Source Patient Position - Orthostatic VS BP Location FiO2 (%)   Oral Monitor Sitting Left arm --      Pain Score       Worst Possible Pain           Vitals:    02/22/21 1731 02/22/21 1934   BP: 90/68 142/67   Pulse: 74 92   Patient Position - Orthostatic VS: Sitting Sitting         Visual Acuity      ED Medications  Medications   acetaminophen (TYLENOL) tablet 975 mg (975 mg Oral Given 2/22/21 1758)   iohexol (OMNIPAQUE) 350 MG/ML injection (SINGLE-DOSE) 100 mL (100 mL Intravenous Given 2/22/21 1831)   fentanyl citrate (PF) 100 MCG/2ML 50 mcg (50 mcg Intravenous Given 2/22/21 1937)   ondansetron (ZOFRAN) injection 4 mg (4 mg Intravenous Given 2/22/21 1937)       Diagnostic Studies  Results Reviewed     Procedure Component Value Units Date/Time    Basic metabolic panel [[id number]] Collected: 02/22/21 1752    Lab Status: Final result Specimen: Blood from Arm, Left Updated: 02/22/21 1848     Sodium 137 mmol/L      Potassium 3 7 mmol/L      Chloride 103 mmol/L      CO2 23 mmol/L      ANION GAP 11 mmol/L      BUN 20 mg/dL      Creatinine 1 13 mg/dL      Glucose 135 mg/dL      Calcium 8 8 mg/dL      eGFR 50 ml/min/1 73sq m     Narrative:      [place] guidelines for Chronic Kidney Disease (CKD):     Stage 1 with normal or high GFR (GFR > 90 mL/min/1 73 square meters)    Stage 2 Mild CKD (GFR = 60-89 mL/min/1 73 square meters)    Stage 3A Moderate CKD (GFR = 45-59 mL/min/1 73 square meters)    Stage 3B Moderate CKD (GFR = 30-44 mL/min/1 73 square meters)    Stage 4 Severe CKD (GFR = 15-29 mL/min/1 73 square meters)    Stage 5 End Stage CKD (GFR <15 mL/min/1 73 square meters)  Note: GFR calculation is accurate only with a steady state creatinine    CBC and differential [[id number]]  (Abnormal) Collected: 02/22/21 1752    Lab Status: Final result Specimen: Blood from Arm, Left Updated: 02/22/21 1811     WBC 16 55 Thousand/uL      RBC 4 22 Million/uL      Hemoglobin 11 2 g/dL      Hematocrit 36 7 %      MCV 87 fL      MCH 26 5 pg      MCHC 30 5 g/dL      RDW 19 1 %      MPV 9 6 fL      Platelets 372 Thousands/uL      nRBC 0 /100 WBCs      Neutrophils Relative 89 %      Immat GRANS % 1 %      Lymphocytes Relative 4 %      Monocytes Relative 6 %      Eosinophils Relative 0 %      Basophils Relative 0 %      Neutrophils Absolute 14 59 Thousands/µL      Immature Grans Absolute 0 16 Thousand/uL      Lymphocytes Absolute 0 72 Thousands/µL      Monocytes Absolute 0 98 Thousand/µL      Eosinophils Absolute 0 06 Thousand/µL      Basophils Absolute 0 04 Thousands/µL                  CT chest abdomen pelvis w contrast   Final Result by Ranjith Salas MD (02/22 1902)      Numerous bilateral pelvic fractures  No other signs of injury within the chest abdomen or pelvis  I personally discussed this study with Valerio Nicole on 2/22/2021 at 7:02 PM                 Workstation performed: [id number]         XR shoulder 2+ views RIGHT   ED Interpretation by Charu Shaw MD (02/22 1817)   This film was interpreted independently by me  No fracture or dislocation  Final Result by Kayden Rosenberg MD (02/22 1800)      No acute osseous abnormality  Workstation performed: [id number]         XR wrist 3+ vw right   ED Interpretation by Charu Shaw MD (02/22 1817)   This film was interpreted independently by me  No fracture or dislocation  Final Result by Kayden Rosenberg MD (02/22 1759)      No acute osseous abnormality  Workstation performed: [id number]                    Procedures  Procedures         ED Course                             SBIRT 20yo+      Most Recent Value   SBIRT (22 yo +)   In order to provide better care to our patients, we are screening all of our patients for alcohol and drug use  Would it be okay to ask you these screening questions? Yes Filed at: 02/22/2021 1738   Initial Alcohol Screen: US AUDIT-C    1  How often do you have a drink containing alcohol?  0 Filed at: 02/22/2021 1738   2  How many drinks containing alcohol do you have on a typical day you are drinking? 0 Filed at: 02/22/2021 1738   3a  Male UNDER 65: How often do you have five or more drinks on one occasion? 0 Filed at: 02/22/2021 1738   3b  FEMALE Any Age, or MALE 65+: How often do you have 4 or more drinks on one occassion? 0 Filed at: 02/22/2021 1738   Audit-C Score  0 Filed at: 02/22/2021 1738   HARDIK: How many times in the past year have you    Used an illegal drug or used a prescription medication for non-medical reasons?   Never Filed at: 02/22/2021 1738                    MDM  Number of Diagnoses or Management Options  Multiple closed fractures of pelvis without disruption of pelvic ring [facility]): new and requires workup  Diagnosis management comments: Background: 78 y o  female with right shoulder, wrist, chest wall and hips/pelvis pain after injury from a fall down 7 stairs    Differential DX includes but is not limited to: fractures vs contusions vs less likely solid organ injury    Plan: imaging, symptom control         Amount and/or Complexity of Data Reviewed  Clinical lab tests: ordered and reviewed  Tests in the radiology section of CPT®: ordered and reviewed  Independent visualization of images, tracings, or specimens: yes    Risk of Complications, Morbidity, and/or Mortality  Presenting problems: high  Diagnostic procedures: high  Management options: high    Patient Progress  Patient progress: stable      Disposition  Final diagnoses:   Multiple closed fractures of pelvis without disruption of pelvic ring ([address] )     Time reflects when diagnosis was documented in both MDM as applicable and the Disposition within this note     Time User Action Codes Description Comment    2/22/2021  7:52 PM Queenie Loving Add [S32 82XA] Multiple closed fractures of pelvis without disruption of pelvic ring [facility])       ED Disposition     ED Disposition Condition Date/Time Comment    Admit Stable Mon Feb 22, 2021  7:48 PM Case was discussed with Sally YEBOAH (Trauma AP) and the patient's admission status was agreed to be Admission Status: inpatient status to the service of Dr Layla Cortés   Follow-up Information    None         Patient's Medications   Discharge Prescriptions    No medications on file     No discharge procedures on file      PDMP Review     None          ED Provider  Electronically Signed by           Izabella Samuels MD  02/22/21 3145

## 2021-02-23 PROBLEM — S32.501A CLOSED NONDISPLACED FRACTURE OF RIGHT PUBIS (HCC): Status: ACTIVE | Noted: 2021-02-23

## 2021-02-23 PROBLEM — S32.414A CLOSED NONDISPLACED FRACTURE OF ANTERIOR WALL OF RIGHT ACETABULUM (HCC): Status: ACTIVE | Noted: 2021-02-23
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
## 2021-02-23 LAB
ANION GAP SERPL CALCULATED.3IONS-SCNC: 9 MMOL/L (ref 4–13)
BUN SERPL-MCNC: 15 MG/DL (ref 5–25)
CALCIUM SERPL-MCNC: 8.6 MG/DL (ref 8.3–10.1)
CHLORIDE SERPL-SCNC: 103 MMOL/L (ref 100–108)
CO2 SERPL-SCNC: 24 MMOL/L (ref 21–32)
CREAT SERPL-MCNC: 0.87 MG/DL (ref 0.6–1.3)
ERYTHROCYTE [DISTWIDTH] IN BLOOD BY AUTOMATED COUNT: 19.3 % (ref 11.6–15.1)
GFR SERPL CREATININE-BSD FRML MDRD: 69 ML/MIN/1.73SQ M
GLUCOSE SERPL-MCNC: 101 MG/DL (ref 65–140)
HCT VFR BLD AUTO: 35.4 % (ref 34.8–46.1)
HGB BLD-MCNC: 11.1 G/DL (ref 11.5–15.4)
MCH RBC QN AUTO: 26.7 PG (ref 26.8–34.3)
MCHC RBC AUTO-ENTMCNC: 31.4 G/DL (ref 31.4–37.4)
MCV RBC AUTO: 85 FL (ref 82–98)
PLATELET # BLD AUTO: 201 THOUSANDS/UL (ref 149–390)
PMV BLD AUTO: 9.8 FL (ref 8.9–12.7)
POTASSIUM SERPL-SCNC: 4.1 MMOL/L (ref 3.5–5.3)
RBC # BLD AUTO: 4.15 MILLION/UL (ref 3.81–5.12)
SODIUM SERPL-SCNC: 136 MMOL/L (ref 136–145)
WBC # BLD AUTO: 7.39 THOUSAND/UL (ref 4.31–10.16)

## 2021-02-23 PROCEDURE — 97167 OT EVAL HIGH COMPLEX 60 MIN: CPT

## 2021-02-23 PROCEDURE — 80048 BASIC METABOLIC PNL TOTAL CA: CPT | Performed by: PHYSICIAN ASSISTANT

## 2021-02-23 PROCEDURE — 97530 THERAPEUTIC ACTIVITIES: CPT

## 2021-02-23 PROCEDURE — 97163 PT EVAL HIGH COMPLEX 45 MIN: CPT

## 2021-02-23 PROCEDURE — 99222 1ST HOSP IP/OBS MODERATE 55: CPT | Performed by: INTERNAL MEDICINE

## 2021-02-23 PROCEDURE — 99232 SBSQ HOSP IP/OBS MODERATE 35: CPT | Performed by: SURGERY

## 2021-02-23 PROCEDURE — 85027 COMPLETE CBC AUTOMATED: CPT | Performed by: PHYSICIAN ASSISTANT

## 2021-02-23 PROCEDURE — 99254 IP/OBS CNSLTJ NEW/EST MOD 60: CPT | Performed by: PHYSICIAN ASSISTANT

## 2021-02-23 PROCEDURE — 97535 SELF CARE MNGMENT TRAINING: CPT

## 2021-02-23 RX ADMIN — FLUOXETINE 20 MG: 20 CAPSULE ORAL at 08:14

## 2021-02-23 RX ADMIN — METOCLOPRAMIDE 10 MG: 10 TABLET ORAL at 11:11

## 2021-02-23 RX ADMIN — OXYCODONE HYDROCHLORIDE 5 MG: 5 TABLET ORAL at 16:01

## 2021-02-23 RX ADMIN — METOCLOPRAMIDE 10 MG: 10 TABLET ORAL at 16:00

## 2021-02-23 RX ADMIN — ACETAMINOPHEN 975 MG: 325 TABLET, FILM COATED ORAL at 05:19

## 2021-02-23 RX ADMIN — METHOCARBAMOL TABLETS 500 MG: 500 TABLET, COATED ORAL at 11:10

## 2021-02-23 RX ADMIN — DICLOFENAC SODIUM 2 G: 10 GEL TOPICAL at 18:07

## 2021-02-23 RX ADMIN — GABAPENTIN 100 MG: 100 CAPSULE ORAL at 20:36

## 2021-02-23 RX ADMIN — OXYCODONE HYDROCHLORIDE 5 MG: 5 TABLET ORAL at 20:12

## 2021-02-23 RX ADMIN — DICLOFENAC SODIUM 2 G: 10 GEL TOPICAL at 08:19

## 2021-02-23 RX ADMIN — LIDOCAINE 5% 2 PATCH: 700 PATCH TOPICAL at 08:15

## 2021-02-23 RX ADMIN — AMLODIPINE BESYLATE 2.5 MG: 2.5 TABLET ORAL at 08:12

## 2021-02-23 RX ADMIN — ATENOLOL 50 MG: 25 TABLET ORAL at 08:13

## 2021-02-23 RX ADMIN — ENOXAPARIN SODIUM 30 MG: 30 INJECTION SUBCUTANEOUS at 20:36

## 2021-02-23 RX ADMIN — FOLIC ACID 1 MG: 1 TABLET ORAL at 08:13

## 2021-02-23 RX ADMIN — ONDANSETRON 4 MG: 2 INJECTION INTRAMUSCULAR; INTRAVENOUS at 20:37

## 2021-02-23 RX ADMIN — ACETAMINOPHEN 975 MG: 325 TABLET, FILM COATED ORAL at 20:37

## 2021-02-23 RX ADMIN — DICLOFENAC SODIUM 2 G: 10 GEL TOPICAL at 13:17

## 2021-02-23 RX ADMIN — METOCLOPRAMIDE 10 MG: 10 TABLET ORAL at 08:24

## 2021-02-23 RX ADMIN — ACETAMINOPHEN 975 MG: 325 TABLET, FILM COATED ORAL at 13:16

## 2021-02-23 RX ADMIN — LEVOTHYROXINE SODIUM 75 MCG: 75 TABLET ORAL at 05:20

## 2021-02-23 RX ADMIN — ENOXAPARIN SODIUM 30 MG: 30 INJECTION SUBCUTANEOUS at 08:14

## 2021-02-23 RX ADMIN — ATORVASTATIN CALCIUM 10 MG: 10 TABLET, FILM COATED ORAL at 08:12

## 2021-02-23 RX ADMIN — METHOCARBAMOL TABLETS 500 MG: 500 TABLET, COATED ORAL at 04:30

## 2021-02-23 NOTE — PHYSICAL THERAPY NOTE
PHYSICAL THERAPY NOTE  Patient Name: Layla Ventura  WMRNT'E Date: 2/23/2021 02/23/21 0816   PT Last Visit   PT Visit Date 02/23/21   Note Type   Note type Evaluation   Cancel Reasons Medical status   Assessment   Assessment Ortho pending for BLE's   Will follow Edward Childers, PT

## 2021-02-23 NOTE — PROGRESS NOTES
Progress Note - Eden Bejarano Viscomi 1955, 78 y o  female MRN: [id number]    Unit/Bed#: S -01 Encounter: [id number]    Primary Care Provider: Emerson Santos MD   Date and time admitted to hospital: 2/22/2021  5:25 PM        Closed nondisplaced fracture of right pubis [facility])  Assessment & Plan  Right inferior pubic ramus and right pubic symphysis   NWB pending ortho eval  PT/OT consult  DVT Prophylaxis       Closed nondisplaced fracture of anterior wall of right acetabulum (HCC)  Assessment & Plan  NWB bilateral lower extremity pending ortho eval  Pain management  DVT prophylaxis     Sjogren's disease ([facility] [address] )  Assessment & Plan  Resume home meds      * Fall  Assessment & Plan  · S/p fall down 7 stairs landing on right shoulder  · Above noted injuries - possible left acetabular fracture   · Right shoulder abrasion - local wound care  · Right shoulder/elbow/wrist pain without bony injury by xray - ice/heat elevate  · PT/OT eval  · Geriatric consult      TERTIARY TRAUMA SURVEY NOTE    Prophylaxis: Enoxaparin (Lovenox)    Disposition: pending pt/ot ortho evals    Code status:  Level 1 - Full Code    Consultants: Ortho, Geriatrics     Is the patient 72 years or older?: YES:    1  Before the illness or injury that brought you to the Emergency, did you need someone to help you on a regular basis? 0=No   2  Since the illness or injury that brought you to the Emergency, have you needed more help than usual to take care of yourself? 1=Yes   3  Have you been hospitalized for one or more nights during the past 6 months (excluding a stay in the Emergency Department)? 0=No   4  In general, do you see well? 0=Yes   5  In general, do you have serious problems with your memory? 0=No   6  Do you take more than three different medications everyday?  1=Yes   TOTAL   2     Did you order a geriatric consult if the score was 2 or greater?: yes          SUBJECTIVE:     Transfer from: home  Outside Films Received: not applicable  Tertiary Exam Due on: 2/23/21    Mechanism of Injury:Fall    Details related to Injury: Slip and fall down 7 stairs landing on right shoulder  Right anterior acetabular fracture, right inferior pubic ramus and right pubic symphysis fractures  Possible left anterior acetabular fracture    Chief Complaint: pelvic pain     HPI/Last 24 hour events: Reports ongoing pain with movement of legs, Continued pain in right wrist and elbow although improved from last night      Active medications:           Current Facility-Administered Medications:     acetaminophen (TYLENOL) tablet 975 mg, 975 mg, Oral, Q8H [address], 975 mg at 02/23/21 0519    amLODIPine (NORVASC) tablet 2 5 mg, 2 5 mg, Oral, Daily, 2 5 mg at 02/22/21 2141    atenolol (TENORMIN) tablet 50 mg, 50 mg, Oral, Daily, 50 mg at 02/22/21 2138    atorvastatin (LIPITOR) tablet 10 mg, 10 mg, Oral, Daily    Diclofenac Sodium (VOLTAREN) 1 % topical gel 2 g, 2 g, Topical, 4x Daily, 2 g at 02/22/21 2248    docusate sodium (COLACE) capsule 100 mg, 100 mg, Oral, BID, 100 mg at 02/22/21 2138    enoxaparin (LOVENOX) subcutaneous injection 30 mg, 30 mg, Subcutaneous, Q12H GLEN, 30 mg at 02/22/21 2138    FLUoxetine (PROzac) capsule 20 mg, 20 mg, Oral, Daily    folic acid (FOLVITE) tablet 1 mg, 1 mg, Oral, Daily    gabapentin (NEURONTIN) capsule 100 mg, 100 mg, Oral, HS, 100 mg at 02/22/21 2248    HYDROmorphone (DILAUDID) injection 0 2 mg, 0 2 mg, Intravenous, Q4H PRN    levothyroxine tablet 75 mcg, 75 mcg, Oral, Early Morning, 75 mcg at 02/23/21 0520    lidocaine (LIDODERM) 5 % patch 2 patch, 2 patch, Topical, Daily    methocarbamol (ROBAXIN) tablet 500 mg, 500 mg, Oral, Q6H PRN, 500 mg at 02/23/21 0430    [START ON 2/24/2021] methotrexate tablet 20 mg, 20 mg, Oral, Weekly    metoclopramide (REGLAN) tablet 10 mg, 10 mg, Oral, TID AC    naloxone (NARCAN) 0 04 mg/mL syringe 0 04 mg, 0 04 mg, Intravenous, Q1MIN PRN    ondansetron (ZOFRAN) injection 4 mg, 4 mg, Intravenous, Q6H PRN    oxyCODONE (ROXICODONE) IR tablet 2 5 mg, 2 5 mg, Oral, Q4H PRN    oxyCODONE (ROXICODONE) IR tablet 5 mg, 5 mg, Oral, Q4H PRN    polyethylene glycol (MIRALAX) packet 17 g, 17 g, Oral, Daily      OBJECTIVE:     Vitals:   Vitals:    02/23/21 0717   BP: 136/90   Pulse: 68   Resp: 18   Temp: 98 °F (36 7 °C)   SpO2: 95%       Physical Exam:   GENERAL APPEARANCE: Resting comfortably, no acute distress  NEURO: AAOX3 GCS 15, no focal neuro deficit  HEENT: PERRL, EOMI, mucus membranes moist  CV: RRR S1 S2 without murmur, rub, or gallop  LUNGS: Clear to ausc bilaterally without wheezes, crackles or rhonchi  GI: Soft, tender to palp over lower abdomen  : Voiding with purwick   MSK: non-tender no deformity x 4 extremities   SKIN: Abrasion Right shoulder    I/O:   I/O     None          Invasive Devices: Invasive Devices     Peripheral Intravenous Line            Peripheral IV 02/22/21 Left Antecubital less than 1 day                  Imaging:   Xr Shoulder 2+ Views Right    Result Date: 2/22/2021  Impression: No acute osseous abnormality  Workstation performed: [id number]     Xr Wrist 3+ Vw Right    Result Date: 2/22/2021  Impression: No acute osseous abnormality  Workstation performed: [id number]     Ct Chest Abdomen Pelvis W Contrast    Result Date: 2/22/2021  Impression: Numerous bilateral pelvic fractures  No other signs of injury within the chest abdomen or pelvis    I personally discussed this study with Antony Weathers on 2/22/2021 at 7:02 PM   Workstation performed: [id number]       Labs:   CBC:   Lab Results   Component Value Date    WBC 16 55 (H) 02/22/2021    HGB 11 2 (L) 02/22/2021    HCT 36 7 02/22/2021    MCV 87 02/22/2021     02/22/2021    MCH 26 5 (L) 02/22/2021    MCHC 30 5 (L) 02/22/2021    RDW 19 1 (H) 02/22/2021    MPV 9 6 02/22/2021    NRBC 0 02/22/2021     CMP:   Lab Results   Component Value Date     02/22/2021    CO2 23 02/22/2021    BUN 20 02/22/2021    CREATININE 1 13 02/22/2021    CALCIUM 8 8 02/22/2021    EGFR 50 02/22/2021

## 2021-02-23 NOTE — UTILIZATION REVIEW
Notification of Inpatient Admission/Inpatient Authorization Request   This is a Notification of Inpatient Admission for Angelica  Be advised that this patient was admitted to our facility under Inpatient Status  Contact Reinaldo Casillas at [phone] for additional admission information  [facility] DE[facility]  DEDICATED [phone]  Patient Name:   Karen Montelongo   YOB: 1955       [address]   [address]:   Alexi Alves  Tax ID: [id number]  NPI: [id number] Attending Provider/NPI:  Address:  Phone: [place], [place] [[id number]]  Same as the facility  [phone]   Place of Service Code: 24 Place of Service Name:  [address] [place] [address]   Start Date: 2/22/21 1953     Discharge Date & Time: No discharge date for patient encounter  Type of Admission: Inpatient Status Discharge Disposition   (if discharged): Final discharge disposition not confirmed   Patient Diagnoses: Hip pain [M25 559]  Multiple closed fractures of pelvis without disruption of pelvic ring ([address] ) [S32 82XA]  Fall, initial encounter [W19  XXXA]     Orders: Admission Orders (From admission, onward)     Ordered        02/22/21 1953  Inpatient Admission  Once                    Assigned Utilization Review Contact: Reinaldo Casillas  Utilization   Network Utilization Review Department  Phone: [phone]; Fax [phone]  Email: [email or web address]   ATTENTION PAYERS: Please call the assigned Utilization  directly with any questions or concerns ALL voicemails in the department are confidential  Send all requests for admission clinical reviews, approved or denied determinations and any other requests to dedicated fax number belonging to the campus where the patient is receiving treatment

## 2021-02-23 NOTE — ASSESSMENT & PLAN NOTE
· S/p fall down 7 stairs landing on right shoulder  · Above noted injuries - possible left acetabular fracture   · Right shoulder abrasion - local wound care  · Right shoulder/elbow/wrist pain without bony injury by xray - ice/heat elevate  · PT/OT eval  · Geriatric consult

## 2021-02-23 NOTE — UTILIZATION REVIEW
Initial Clinical Review    Admission: Date/Time/Statement:   Admission Orders (From admission, onward)     Ordered        02/22/21 1953  Inpatient Admission  Once                   Orders Placed This Encounter   Procedures    Inpatient Admission     Standing Status:   Standing     Number of Occurrences:   1     Order Specific Question:   Level of Care     Answer:   Med Surg [16]     Order Specific Question:   Estimated length of stay     Answer:   More than 2 Midnights     Order Specific Question:   Certification     Answer:   I certify that inpatient services are medically necessary for this patient for a duration of greater than two midnights  See H&P and MD Progress Notes for additional information about the patient's course of treatment  ED Arrival Information     Expected Arrival Acuity Means of Arrival Escorted By Service Admission Type    - 2/22/2021 17:24 Urgent Ambulance [facility] Trauma Urgent    Arrival Complaint    fall        Chief Complaint   Patient presents with    Fall     Pt presents by ems from home after slipping down steps and falling onto R side  Denies head strike or LOC  Per ems, pt has chronic slurred speech d/t brain tumor removal, otherwise a/ox4  -thinners      Assessment/Plan: 78 y o  female with PMH of HTN, Sjogren's and meningioma s/p resection presents to ED via EMS from home after falling down 7carpeted stairs after foot slopped  Pt denies LOC or head strike  On exam, pt has abdominal tenderness, worst in RUQ with mild to deep palplation, has r shoulder and r wrist tenderness with decreased ROM 2/2 pain  has abrasion r shoulder, is alert , oriented   Imaging shows- Right inferior pubic ramus and right pubic symphysis fractures, anterior right acetabular fracture with possible left anterior acetabular fracture  Pt admitted as Inpatient to  Trauma service with multiple pelvic fractures, ambulatory dysfunction    Plan is for ortho consult, gerontology consult, multimodal pain control,PT/OT eval, DVT ppx   2/23  Trauma-ortho recommends non operative mgmt and WBAT BLE,  awaiting PT/OT evals to determine if home d/c or rehab within next 24-48 hrs  Orthopedics-pain mostly in anterior hips, some pain prn radiating down back of legs, rated 10/10 by pt , constant and worse with attempted motion, denies numbness or tingling  Given that the acetabular fractures are outside of the weight-bearing dome, patient may bear weight as tolerated and no operative intervention is indicated  Ortho signing off of case  Gerontology- recommends Tylenol scheduled, Roxicodone prn and dilaudid 0 2 mg prn, continue gabapentin and Lido patch, ice , frequent repositioning, bowel regime   Recommend d/c reglan  Pt would like to return to home environment  PT/OT recommending post acute rehab  ED Triage Vitals [02/22/21 1731]   Temperature Pulse Respirations Blood Pressure SpO2   97 9 °F (36 6 °C) 74 18 90/68 92 %      Temp Source Heart Rate Source Patient Position - Orthostatic VS BP Location FiO2 (%)   Oral Monitor Sitting Left arm --      Pain Score       Worst Possible Pain          Wt Readings from Last 1 Encounters:   02/22/21 61 2 kg (135 lb)     Additional Vital Signs:   02/23/21 0717  98 °F (36 7 °C)  68  18  136/90  105  95 %  None (Room air)  Lying   02/23/21 0411  97 7 °F (36 5 °C)  71  18  143/67  --  94 %  None (Room air)  Lying   02/23/21 0031  --  78  17  145/77  --  93 %  None (Room air)  Sitting   02/22/21 2245  --  86  16  123/68  --  92 %  None (Room air)  Lying   02/22/21 2128  --  91  17  130/66  --  93 %  None (Room air)  Lying   02/22/21 1934  --  92  20  142/67  --  92 %  None (Room air)  Sitting       Pertinent Labs/Diagnostic Test Results:      2/22 XR R shoulder, XR R wrist  No acute osseous abnormality  2/22 CT chest abdomen  Pelvis  Numerous bilateral pelvic fractures    No other signs of injury within the chest abdomen or pelvis        Results from last 7 days   Lab Units 02/23/21  0806 02/22/21  1752   WBC Thousand/uL 7 39 16 55*   HEMOGLOBIN g/dL 11 1* 11 2*   HEMATOCRIT % 35 4 36 7   PLATELETS Thousands/uL 201 249   NEUTROS ABS Thousands/µL  --  14 59*         Results from last 7 days   Lab Units 02/23/21  0806 02/22/21  1752   SODIUM mmol/L 136 137   POTASSIUM mmol/L 4 1 3 7   CHLORIDE mmol/L 103 103   CO2 mmol/L 24 23   ANION GAP mmol/L 9 11   BUN mg/dL 15 20   CREATININE mg/dL 0 87 1 13   EGFR ml/min/1 73sq m 69 50   CALCIUM mg/dL 8 6 8 8             Results from last 7 days   Lab Units 02/23/21  0806 02/22/21  1752   GLUCOSE RANDOM mg/dL 101 135               ED Treatment:   Medication Administration from 02/22/2021 1724 to 02/23/2021 0408       Date/Time Order Dose Route Action     02/22/2021 1758 acetaminophen (TYLENOL) tablet 975 mg 975 mg Oral Given     02/22/2021 1831 iohexol (OMNIPAQUE) 350 MG/ML injection (SINGLE-DOSE) 100 mL 100 mL Intravenous Given     02/22/2021 1937 fentanyl citrate (PF) 100 MCG/2ML 50 mcg 50 mcg Intravenous Given     02/22/2021 1937 ondansetron (ZOFRAN) injection 4 mg 4 mg Intravenous Given     02/22/2021 2138 docusate sodium (COLACE) capsule 100 mg 100 mg Oral Given     02/22/2021 2138 enoxaparin (LOVENOX) subcutaneous injection 30 mg 30 mg Subcutaneous Given     02/22/2021 2248 gabapentin (NEURONTIN) capsule 100 mg 100 mg Oral Given     02/22/2021 2248 Diclofenac Sodium (VOLTAREN) 1 % topical gel 2 g 2 g Topical Given     02/22/2021 2138 methocarbamol (ROBAXIN) tablet 500 mg 500 mg Oral Given     02/22/2021 2141 amLODIPine (NORVASC) tablet 2 5 mg 2 5 mg Oral Given     02/22/2021 2138 atenolol (TENORMIN) tablet 50 mg 50 mg Oral Given        Past Medical History:   Diagnosis Date    Lymphadenitis, chronic     Sialadenitis     Sjogren's disease ([address] )     Stomach problems     Thyroid disorder     Xerostomia      Present on Admission:   Sjogren's disease ([facility] [address] )      Admitting Diagnosis: Hip pain [M25 559]  Multiple closed fractures of pelvis without disruption of pelvic ring ([facility] [address] ) Scotty Ramos, initial encounter [W19  XXXA]  Age/Sex: 78 y o  female  Admission Orders:  Scheduled Medications:  acetaminophen, 975 mg, Oral, Q8H [address]  amLODIPine, 2 5 mg, Oral, Daily  atenolol, 50 mg, Oral, Daily  atorvastatin, 10 mg, Oral, Daily  Diclofenac Sodium, 2 g, Topical, 4x Daily  docusate sodium, 100 mg, Oral, BID  enoxaparin, 30 mg, Subcutaneous, Q12H GLEN  FLUoxetine, 20 mg, Oral, Daily  folic acid, 1 mg, Oral, Daily  gabapentin, 100 mg, Oral, HS  levothyroxine, 75 mcg, Oral, Early Morning  lidocaine, 2 patch, Topical, Daily  [START ON 2/24/2021] methotrexate, 20 mg, Oral, Weekly  metoclopramide, 10 mg, Oral, TID AC  polyethylene glycol, 17 g, Oral, Daily      Continuous IV Infusions:nnoe     PRN Meds:  HYDROmorphone, 0 2 mg, Intravenous, Q4H PRN  methocarbamol, 500 mg, Oral, Q6H PRN x2 2/23  naloxone, 0 04 mg, Intravenous, Q1MIN PRN  ondansetron, 4 mg, Intravenous, Q6H PRN  oxyCODONE, 2 5 mg, Oral, Q4H PRN  oxyCODONE, 5 mg, Oral, Q4H PRN    WBAT BLE  SCD's  Spirometry  Cold application S1I with 30 min off  PT/OT    IP CONSULT TO ORTHOPEDIC SURGERY  IP CONSULT TO GERONTOLOGY    Network Utilization Review Department  ATTENTION: Please call with any questions or concerns to [phone] and carefully listen to the prompts so that you are directed to the right person  All voicemails are confidential   Sacha [facility]mark all requests for admission clinical reviews, approved or denied determinations and any other requests to dedicated fax number below belonging to the campus where the patient is receiving treatment   List of dedicated fax numbers for the Facilities:  [address] DENIALS (Administrative/Medical Necessity) [phone]   [address] [place] [address] (Maternity/NICU/Pediatrics) 261 [facility],[address]  200 [facility] Clara Ananthtony Saleh [address] ([facility]) [address] [place] [address] Citlali Sousa [facility] [phone]   [place] [address] [phone]

## 2021-02-23 NOTE — H&P
H&P Exam - Trauma   Trevon Fine 78 y o  female MRN: [id number]  Unit/Bed#: [place] [address] Encounter: [id number]    Assessment/Plan   Trauma Alert: Evaluation  Model of Arrival: Ambulance  Trauma Team: Attending Bashir Deleon and ELVIN Costello  Consultants: Orthopaedics: María Noriega  Time Called 2004, Returned call: Yes 2006    Trauma Active Problems: Right inferior pubic ramus and right pubic symphysis fractures, anterior right acetabular fracture with possible left anterior acetabular fracture  Acute pain  Ambulatory dysfunction    Trauma Plan: Admit to med/surg  Ortho consult  Geriatric consult  DVT prophylaxis   Multimodal pain control  PT/OT eval    Chief Complaint: Hip pain and right shoulder/elbow/wrist pain    History of Present Illness   HPI:  Albin Sagastume is a 78 y o  female with PMH of HTN, Sjogren's and meningioma s/p resection presents after slipping and falling down 7 carpeted stairs  She reports her foot slipped and she landed on her right shoulder and slid down the stairs  She denies striking her head or losing consciousness  She was unable to stand up after the fall and required EMS to get her out of the house  She denies frequent falls, nausea, headache, dizziness, shortness of breath, chest pain, abdominal pain, neck pain, numbness of tingling of her extremities  Mechanism:Fall    Review of Systems   Constitutional: Negative for activity change, appetite change, chills and fever  HENT: Negative for congestion, ear pain, nosebleeds, postnasal drip, rhinorrhea, sinus pressure, sinus pain and sore throat  Eyes: Negative  Respiratory: Negative for cough, chest tightness, shortness of breath and wheezing  Cardiovascular: Negative for chest pain and palpitations  Gastrointestinal: Positive for constipation  Negative for abdominal distention, abdominal pain (diffuse worse on right side), diarrhea, nausea and vomiting  Genitourinary: Positive for difficulty urinating and pelvic pain   Negative for dysuria, flank pain, frequency, hematuria and urgency  Musculoskeletal: Positive for arthralgias  Negative for back pain and neck pain  Skin: Negative  Neurological: Negative for dizziness, seizures, syncope, weakness, numbness and headaches  12-point, complete review of systems was reviewed and negative except as stated above  Historical Information     Past Medical History:   Diagnosis Date    Lymphadenitis, chronic     Sialadenitis     Sjogren's disease ([address] )     Stomach problems     Thyroid disorder     Xerostomia      Past Surgical History:   Procedure Laterality Date    CHOLECYSTECTOMY       Social History   Social History     Substance and Sexual Activity   Alcohol Use Yes    Frequency: Monthly or less     Social History     Substance and Sexual Activity   Drug Use Never     Social History     Tobacco Use   Smoking Status Former Smoker   Smokeless Tobacco Never Used     E-Cigarette/Vaping     E-Cigarette/Vaping Substances     Immunization History   Administered Date(s) Administered    SARS-CoV-2 / COVID-19 mRNA IM ([employer]) 02/17/2021     Last Tetanus: n/a  Family History: Non-contributory        Meds/Allergies   all current active meds have been reviewed    Allergies   Allergen Reactions    Hydroxyquinoline Sulfate [Oxyquinoline] Vomiting    Leucovorin Vomiting    Tizanidine Vomiting         PHYSICAL EXAM      Objective   Vitals:   First set: Temperature: 97 9 °F (36 6 °C) (02/22/21 1731)  Pulse: 74 (02/22/21 1731)  Respirations: 18 (02/22/21 1731)  Blood Pressure: 90/68 (02/22/21 1731)    Primary Survey:   (A) Airway: Intact  (B) Breathing: equal bilaterally  (C) Circulation: Pulses:   pedal  2/4 and radial  2/4  (D) Disabliity:  GCS Total:  15  (E) Expose:  Completed    Secondary Survey: (Click on Physical Exam tab above)  Physical Exam  Vitals signs and nursing note reviewed  Constitutional:       General: She is not in acute distress       Appearance: Normal appearance  She is not ill-appearing or toxic-appearing  HENT:      Head: Normocephalic and atraumatic  Right Ear: Tympanic membrane normal       Left Ear: Tympanic membrane normal       Nose: Nose normal  No congestion  Mouth/Throat:      Mouth: Mucous membranes are moist       Pharynx: No oropharyngeal exudate or posterior oropharyngeal erythema  Eyes:      Extraocular Movements: Extraocular movements intact  Pupils: Pupils are equal, round, and reactive to light  Neck:      Musculoskeletal: Normal range of motion  No neck rigidity or muscular tenderness  Cardiovascular:      Rate and Rhythm: Normal rate and regular rhythm  Heart sounds: No murmur  No friction rub  No gallop  Pulmonary:      Effort: No respiratory distress  Breath sounds: No wheezing, rhonchi or rales  Abdominal:      General: Abdomen is flat  There is no distension  Palpations: Abdomen is soft  Tenderness: There is abdominal tenderness (mild to deep palp, worst in RUQ)  There is no guarding or rebound  Musculoskeletal:         General: No deformity  Right shoulder: She exhibits tenderness  She exhibits no bony tenderness and no swelling  Right elbow: She exhibits decreased range of motion (secondary to pain)  She exhibits no swelling and no deformity  No tenderness found  Right wrist: She exhibits decreased range of motion (secondary to pain) and tenderness  She exhibits no bony tenderness, no swelling and no deformity  Skin:     General: Skin is warm and dry  Capillary Refill: Capillary refill takes less than 2 seconds  Findings: No bruising or erythema  Neurological:      General: No focal deficit present  Mental Status: She is oriented to person, place, and time           Invasive Devices     Peripheral Intravenous Line            Peripheral IV 02/22/21 Left Antecubital less than 1 day                Lab Results:   BMP/CMP:   Lab Results   Component Value Date    SODIUM 137 02/22/2021    K 3 7 02/22/2021     02/22/2021    CO2 23 02/22/2021    BUN 20 02/22/2021    CREATININE 1 13 02/22/2021    CALCIUM 8 8 02/22/2021    EGFR 50 02/22/2021    and CBC:   Lab Results   Component Value Date    WBC 16 55 (H) 02/22/2021    HGB 11 2 (L) 02/22/2021    HCT 36 7 02/22/2021    MCV 87 02/22/2021     02/22/2021    MCH 26 5 (L) 02/22/2021    MCHC 30 5 (L) 02/22/2021    RDW 19 1 (H) 02/22/2021    MPV 9 6 02/22/2021    NRBC 0 02/22/2021     Imaging/EKG Studies: Extremities: Right wrist/shoulder, no fractures, CT Chest: no acute injuries, CT Scan Abdomen/Pelvis:  Right inferior pubic ramus and right pubic symphysis fractures, right anterior acetabular fracture, possible left acetabular fracture  Other Studies: none    Code Status: No Order  Advance Directive and Living Will:      Power of :    POLST:

## 2021-02-23 NOTE — CASE MANAGEMENT
LOS: 1 DAY  PATIENT IS NOT A BUNDLE  PATIENT IS A READMISSION  UNPLANNED RISK OF READMISSION: 9     CM met with the Patient at the bedside; Patient was alert and oriented, laying in bed  Patient's spouse, Kaila Mg, was present at the bedside  CM introduced self and role  Patient is agreeable to completing assessment with her spouse present  Patient resides in a split-level home with her spouse and adult son  There are 7 steps to enter each level of their home  Prior to admission, Patient was independent with all ADLs and utilized no DME  Patient has access to a RW, Cane, BSC, and shower chair at home, if needed  Patient has no VNA history  Patient has been to [facility] in the past following a brain surgery  Patient utilizes [facility] in [facility] and is able to afford all of her prescriptions  Patient reports that she has a history of anxiety that is treated by her PCP, Dr Donney Bernheim  Patient has no IP  admissions and reports that her MH has been stable  Patient denies any substance use history or concerns  Patient sees her PCP, Dr Donney Bernheim, yearly  Patient identified her spouse, Kaila Mg, as her health care representative  Patient states that she does not have a POA, or AD/LW; Patient and spouse decline resources to obtain same at this time  Patient is retired and receives [place] as her main source of income  Patient is a community   CM explained the recommendation for acute rehab upon dc  Patient states that although she would prefer to return home, Patient understands the need for rehab and is requesting a referral to be completed  CM explained that a blanker referral can be completed to have more bed availability options  Patient verbalized her understanding and is requesting the same be completed  Patient states that her preference is for [facility]  CM sent blanket referral to Acute rehab via [facility] [address]  CM dept will continue to follow for accepting facility and review choices with Patient       [facility] is requesting PM&R consult  CM informed trauma of same; will order  CM reviewed discharge planning process including the following: identifying caregivers at home, preference for d/c planning needs, availability of treatment team to discuss questions or concerns patient and/or family may have regarding diagnosis, plan of care, old or new medications and discharge planning   CM will continue to follow for care coordination and update assessment as appropriate

## 2021-02-23 NOTE — CONSULTS
Orthopedics   Asiyawillian Fine 78 y o  female MRN: [id number]  Unit/Bed#: S -01      Chief Complaint:   bilateral anterior pelvis pain    HPI:   78 y o  female community ambulator status post mechanical fall down 7 stairs complaining of bilateral groin pain  Patient reports the pain is mostly in the anterior hips  She notes no back pain currently but does note some pain radiating down the back of the legs at times  Pain is currently rated at 10/10, the patient does appear comfortable in bed in no acute distress  She reports pain is constant  Made worse with attempted motion  Nothing makes it better currently  She denies any numbness or tingling currently in the bilateral lower extremities  She denies any bowel or bladder dysfunction        Review Of Systems:   · Skin: Normal  · Neuro: See HPI  · Musculoskeletal: See HPI  · 14 point review of systems negative except as stated above     Past Medical History:   Past Medical History:   Diagnosis Date    Lymphadenitis, chronic     Sialadenitis     Sjogren's disease ([address] )     Stomach problems     Thyroid disorder     Xerostomia        Past Surgical History:   Past Surgical History:   Procedure Laterality Date    CHOLECYSTECTOMY         Family History:  Family history reviewed and non-contributory  Family History   Problem Relation Age of Onset    Diabetes Other     Hypertension Other     Heart disease Other     Arthritis Other        Social History:  Social History     Socioeconomic History    Marital status: /Civil Union     Spouse name: None    Number of children: None    Years of education: None    Highest education level: None   Occupational History    None   Social Needs    Financial resource strain: None    Food insecurity     Worry: None     Inability: None    Transportation needs     Medical: None     Non-medical: None   Tobacco Use    Smoking status: Former Smoker    Smokeless tobacco: Never Used   Substance and Sexual Activity    Alcohol use: Yes     Frequency: Monthly or less    Drug use: Never    Sexual activity: None   Lifestyle    Physical activity     Days per week: None     Minutes per session: None    Stress: None   Relationships    Social connections     Talks on phone: None     Gets together: None     Attends Christian service: None     Active member of club or organization: None     Attends meetings of clubs or organizations: None     Relationship status: None    Intimate partner violence     Fear of current or ex partner: None     Emotionally abused: None     Physically abused: None     Forced sexual activity: None   Other Topics Concern    None   Social History Narrative    None       Allergies:    Allergies   Allergen Reactions    Hydroxyquinoline Sulfate [Oxyquinoline] Vomiting    Leucovorin Vomiting    Tizanidine Vomiting           Labs:  0   Lab Value Date/Time    HCT 35 4 02/23/2021 0806    HCT 36 7 02/22/2021 1752    HCT 37 3 01/08/2015 1012    HGB 11 1 (L) 02/23/2021 0806    HGB 11 2 (L) 02/22/2021 1752    HGB 12 2 01/08/2015 1012    WBC 7 39 02/23/2021 0806    WBC 16 55 (H) 02/22/2021 1752    WBC 5 13 01/08/2015 1012       Meds:    Current Facility-Administered Medications:     acetaminophen (TYLENOL) tablet 975 mg, 975 mg, Oral, Q8H [facility], Annabella Lindquist PA-C, 975 mg at 02/23/21 0519    amLODIPine (NORVASC) tablet 2 5 mg, 2 5 mg, Oral, Daily, Annabella Lindquist PA-C, 2 5 mg at 02/23/21 2440    atenolol (TENORMIN) tablet 50 mg, 50 mg, Oral, Daily, Annabella Lindquist PA-C, 50 mg at 02/23/21 0813    atorvastatin (LIPITOR) tablet 10 mg, 10 mg, Oral, Daily, Annabella Lindquist PA-C, 10 mg at 02/23/21 0826    Diclofenac Sodium (VOLTAREN) 1 % topical gel 2 g, 2 g, Topical, 4x Daily, Annabella Lindquist PA-C, 2 g at 02/23/21 8917    docusate sodium (COLACE) capsule 100 mg, 100 mg, Oral, BID, Annabella Lindquist PA-C, 100 mg at 02/22/21 2136    enoxaparin (LOVENOX) subcutaneous injection 30 mg, 30 mg, Subcutaneous, Q12H [address], Annabella Lindquist PA-C, 30 mg at 02/23/21 8832    FLUoxetine (PROzac) capsule 20 mg, 20 mg, Oral, Daily, Annabella Lindquist PA-C, 20 mg at 06/78/60 7003    folic acid (FOLVITE) tablet 1 mg, 1 mg, Oral, Daily, Annabella Lindquist PA-C, 1 mg at 02/23/21 0813    gabapentin (NEURONTIN) capsule 100 mg, 100 mg, Oral, HS, Annabella Lindquist PA-C, 100 mg at 02/22/21 2248    HYDROmorphone (DILAUDID) injection 0 2 mg, 0 2 mg, Intravenous, Q4H PRN, Maryuri Lindquist PA-C    levothyroxine tablet 75 mcg, 75 mcg, Oral, Early Morning, Annabella Lindquist PA-C, 75 mcg at 02/23/21 0520    lidocaine (LIDODERM) 5 % patch 2 patch, 2 patch, Topical, Daily, Annabella Lindquist PA-C, 2 patch at 02/23/21 0815    methocarbamol (ROBAXIN) tablet 500 mg, 500 mg, Oral, Q6H PRN, Annabella Lindquist PA-C, 500 mg at 02/23/21 0430    [START ON 2/24/2021] methotrexate tablet 20 mg, 20 mg, Oral, Weekly, Annabella Lindquist PA-C    metoclopramide (REGLAN) tablet 10 mg, 10 mg, Oral, TID AC, Annabella Lindquist PA-C, 10 mg at 02/23/21 0404    naloxone (NARCAN) 0 04 mg/mL syringe 0 04 mg, 0 04 mg, Intravenous, Q1MIN PRN, Annabella Lindquist PA-C    ondansetron (ZOFRAN) injection 4 mg, 4 mg, Intravenous, Q6H PRN, Annabella Lindquist PA-C    oxyCODONE (ROXICODONE) IR tablet 2 5 mg, 2 5 mg, Oral, Q4H PRN, Annabella A Lindenmoyer, PA-C    oxyCODONE (ROXICODONE) IR tablet 5 mg, 5 mg, Oral, Q4H PRN, Annabella Lindquist PA-C    polyethylene glycol (MIRALAX) packet 17 g, 17 g, Oral, Daily, Annabella Lindquist PA-C    Blood Culture:   No results found for: BLOODCX    Wound Culture:   No results found for: WOUNDCULT    Ins and Outs:  No intake/output data recorded            Physical Exam:   /90 (BP Location: Left arm)   Pulse 68   Temp 98 °F (36 7 °C) (Oral)   Resp 18   Ht 4' 11" (1 499 m)   Wt 61 2 kg (135 lb)   LMP 01/13/2005   SpO2 95%   BMI 27 27 kg/m²   Gen: Alert and oriented to person, place, time  HEENT: EOMI, eyes clear, moist mucus membranes, hearing intact  Respiratory: Bilateral chest rise  No audible wheezing found  Cardiovascular: Regular Rate and Rhythm  Abdomen: soft nontender/nondistended  Musculoskeletal: bilateral lower extremity  · Skin intact  · Tender to palpation over anterior pelvis right worse than left  · Leg lengths equal  · Painful hip motion  · Sensation intact L3-S1  · Positive knee flexion/extension, ankle dorsi/plantar flexion, EHL/FHL  Pt guarding due to pain  · Negative straight leg raise test  · 2+ DP pulse bilaterally      Radiology:   I personally reviewed the films  CT chest abdomen pelvis shows right superior pubic rami fracture, there are fractures of the bilateral anterior acetabular margins, minimally displaced on the right nondisplaced in the left outside of weight-bearing dome    _*_*_*_*_*_*_*_*_*_*_*_*_*_*_*_*_*_*_*_*_*_*_*_*_*_*_*_*_*_*_*_*_*_*_*_*_*_*_*_*_*    Assessment:  78 y o  female S/P fall down 7 stairs with right superior pubic rami fracture, right anterior acetabular fracture, left nondisplaced anterior acetabular fracture    Plan:   · Weight bearing as tolerated bilateral lower extremities  · Analgesics per primary team  · DVT ppx  · PT/OT  · Given that the acetabular fractures are outside of the weight-bearing dome, patient may bear weight as tolerated and no operative intervention is indicated  · Body mass index is 27 27 kg/m²      · Dispo: Ortho signing off, please call questions  · Follow-up in the office in 6 weeks with Dr Shawn Palma PA-C

## 2021-02-23 NOTE — CONSULTS
Consultation - Geriatric Medicine   Jake Fine 78 y o  female MRN: [id number]  Unit/Bed#: S -01 Encounter: [id number]        Inpatient consult to Gerontology for Molina Gray performed by: Jerson Wylie MD  Consult ordered by: Kaity Chris PA-C            Assessment/Plan   1  Fracture of r  Inferior pubic ramus, r  Pubic symphysis, R  Anterior acetabular, possible left anterior acetabular fx  Evaluated by orthopedics - no need for surgery at this time  Patient will need PT/OT, pain management using geriatric protocol  -continue pain control per Geriatric pain protocol:  Tylenol 975mg Q8H scheduled  Roxicodone 2 5mg Q4H PRN moderate pain  Roxicodone 5mg Q4H PRN severe pain  Dilaudid 0 2mg Q4H PRN  -continue adjuncts such as Gabapentin 100mg HS and lidocaine patch topically  -encourage addition of non-pharmacologic pain treatment including ice and frequent repositioning  -recommend  bowel regimen to prevent and treat constipation due to increased risk with acute pain and opiate pain medications  2  Sjogren's disease  - currently being followed by Dr Prasad Medina, on methotrexate 20 mg weekly  3  Hypothyroidism  - Currently on 75 mcg Levothyroxine PO QD  Has not had TSH done in long period of time  Recommend obtaining value  4  Depression  - Currently managed w/ 20 mg Prozac QD  5  Ambulatory Dysfunction  - Patient would like to return to home environment with assistance from  and sons if possible  Recommendations:   1  Obtain TSH  2  Pain management  3  Hydration  4  Monitor bowels to ensure regular BM  6  D/c metoclopramide - unsure as to why patient is on this medication - can cause extrapyramidal effects  7  Patient should be getting routine PFT due to methotrexate regimen         History of Present Illness   Physician Requesting Consult: Leah Becerra MD  Reason for Consult / Principal Problem: Fall  Hx and PE limited by: no limitations   Additional history obtained from: none      HPI: Julia Ventura is a 78y o  year old  female who had the misfortune of sustaining a mechanical fall down approximately 7 steps in her home  Patient sustained multiple fractures in this fall including b/l inferior pubic rami fx, R  anterior Acetabular fx, and possible L  Anterior acetabular fx  Evaluated by orthopedics, no surgical intervention at this time given the non-weight bearing nature of the fractures, especially acetabular fx  Patient would like to avoid inpatient rehab facility, would prefer to transition home w/ the help of her  and sons  Other comorbidities include a history of hypothyroidism, Sjogren's disease, depression, hyperlipidemia, and hypertension  Currently on methotrexate weekly  Review of Systems   Constitutional: Negative for chills and fever  Patient reports 10/10 pain in anterior groin  HENT: Negative for ear pain and sore throat  Dry mouth   Eyes: Negative for pain and visual disturbance  Respiratory: Negative for cough and shortness of breath  Cardiovascular: Negative for chest pain and palpitations  Gastrointestinal: Negative for abdominal pain and vomiting  Genitourinary: Negative for dysuria and hematuria  Musculoskeletal: Negative for arthralgias and back pain  Skin: Negative for color change and rash  Neurological: Negative for seizures and syncope  All other systems reviewed and are negative  Memory/Cognitive screening:  Patient acknowledges some decline in memory function s/p resection of meningioma  Mobility:  No previous issues with mobility  Falls: Before most recent fall, patient fell 3 years ago  Assistive Devices: Will need to use walker w/ front wheels     Fraility: No evidence of fraility   Nutrition/weight loss/grocery shopping/meal preparation: Good nutrition status  Vision impairment: No visual impairment, wears glasses  Hearing impairment: No hearing impairment  Incontinence: No history of urinary incontinence  Delirium: No history of episodes of delirium   Polypharmacy: Levothyroxine 75 mcg, Amlodipine 2 5 QD, Atenolol 50 mg QD, atorvastatin 10 mg QD, Voltaren gel 2 g topical 4x daily, Prozac 20 mg QD, folic acid 1 mg QD, gabapentin 100 mg QD, methotrexate 20 mg weekly, metoclopramide 10 mg TID  Patients primary residence:home Lives with: , son  iADL's:  No longer driving after meningioma resection,  has always managed finances  ADL's:  Patient enjoys ADLs independently  Historical Information   Past medical history: Sjogren's disease, sialadenitis, lymphadenitis, hypothyroidism  Past surgical history: cholecystectomy, parotid biopsy, meningioma resection   Social history: Never smoker, occasional beer drinker   with 2 sons from previous marriage, 28 and 39  10th grade education  Worked in a [employer] [address]  Family history: Parents both  - Father due to suicide, mother due to heart disease in 76s    Meds/Allergies   All current active meds have been reviewed  Allergies   Allergen Reactions    Hydroxyquinoline Sulfate [Oxyquinoline] Vomiting    Leucovorin Vomiting    Tizanidine Vomiting       Objective   Vitals:    21 0717   BP: 136/90   Pulse: 68   Resp: 18   Temp: 98 °F (36 7 °C)   SpO2: 95%     No intake or output data in the 24 hours ending 21 1012  Invasive Devices     Peripheral Intravenous Line            Peripheral IV 21 Left Antecubital less than 1 day                Physical Exam  Vitals signs and nursing note reviewed  Constitutional:       General: She is not in acute distress  Appearance: Normal appearance  She is well-developed  HENT:      Head: Normocephalic and atraumatic        Right Ear: Tympanic membrane, ear canal and external ear normal       Left Ear: Tympanic membrane, ear canal and external ear normal       Nose: Nose normal       Mouth/Throat: Mouth: Mucous membranes are moist    Eyes:      Extraocular Movements: Extraocular movements intact  Conjunctiva/sclera: Conjunctivae normal       Comments: Mydriatic pupils  PERRL   Neck:      Musculoskeletal: Neck supple  Cardiovascular:      Rate and Rhythm: Normal rate and regular rhythm  Pulses: Normal pulses  Heart sounds: Normal heart sounds  No murmur  Pulmonary:      Effort: Pulmonary effort is normal  No respiratory distress  Breath sounds: Normal breath sounds  Abdominal:      Palpations: Abdomen is soft  Tenderness: There is no abdominal tenderness  Musculoskeletal: Normal range of motion  Skin:     General: Skin is warm and dry  Neurological:      General: No focal deficit present  Mental Status: She is alert and oriented to person, place, and time  Mental status is at baseline  Psychiatric:         Mood and Affect: Mood normal          Behavior: Behavior normal          Thought Content: Thought content normal          Judgment: Judgment normal          Lab Results:   I have personally reviewed pertinent lab and imaging results       VTE Prophylaxis: Enoxaparin (Lovenox)    Code Status: Level 1 - Full Code  Advance Directive and Living Will:      Power of :    POLST:      Family and Social Support:  and two sons  Living Arrangements: Spouse/significant other  Support Systems: Spouse/significant other;Family members;Friends/neighbors  Assistance Needed: yes   Type of Current Residence: Private residence  Current Home Care Services: No

## 2021-02-23 NOTE — PLAN OF CARE
Problem: Potential for Falls  Goal: Patient will remain free of falls  Description: INTERVENTIONS:  - Assess patient frequently for physical needs  -  Identify cognitive and physical deficits and behaviors that affect risk of falls  -  [place] fall precautions as indicated by assessment   - Educate patient/family on patient safety including physical limitations  - Instruct patient to call for assistance with activity based on assessment  - Modify environment to reduce risk of injury  - Consider OT/PT consult to assist with strengthening/mobility  Outcome: Progressing     Problem: Prexisting or High Potential for Compromised Skin Integrity  Goal: Skin integrity is maintained or improved  Description: INTERVENTIONS:  - Identify patients at risk for skin breakdown  - Assess and monitor skin integrity  - Assess and monitor nutrition and hydration status  - Monitor labs   - Assess for incontinence   - Turn and reposition patient  - Assist with mobility/ambulation  - Relieve pressure over bony prominences  - Avoid friction and shearing  - Provide appropriate hygiene as needed including keeping skin clean and dry  - Evaluate need for skin moisturizer/barrier cream  - Collaborate with interdisciplinary team   - Patient/family teaching  - Consider wound care consult   Outcome: Progressing     Problem: Nutrition/Hydration-ADULT  Goal: Nutrient/Hydration intake appropriate for improving, restoring or maintaining nutritional needs  Description: Monitor and assess patient's nutrition/hydration status for malnutrition  Collaborate with interdisciplinary team and initiate plan and interventions as ordered  Monitor patient's weight and dietary intake as ordered or per policy  Utilize nutrition screening tool and intervene as necessary  Determine patient's food preferences and provide high-protein, high-caloric foods as appropriate       INTERVENTIONS:  - Monitor oral intake, urinary output, labs, and treatment plans  - Assess nutrition and hydration status and recommend course of action  - Evaluate amount of meals eaten  - Assist patient with eating if necessary   - Allow adequate time for meals  - Recommend/ encourage appropriate diets, oral nutritional supplements, and vitamin/mineral supplements  - Order, calculate, and assess calorie counts as needed  - Recommend, monitor, and adjust tube feedings and TPN/PPN based on assessed needs  - Assess need for intravenous fluids  - Provide specific nutrition/hydration education as appropriate  - Include patient/family/caregiver in decisions related to nutrition  Outcome: Progressing

## 2021-02-23 NOTE — PHYSICAL THERAPY NOTE
PHYSICAL THERAPY EVALUATION  NAME:  Monico Crigler Viscomi  DATE: 02/23/21    AGE:   78 y o  Mrn:   [id number]  ADMIT DX:  Hip pain [M25 [id number]]  Multiple closed fractures of pelvis without disruption of [facility] ([facility] ) Merari Swenson  Fall, initial encounter [W19  XXXA]    Past Medical History:   Diagnosis Date    Lymphadenitis, chronic     Sialadenitis     S[facility]ogren's disease ([facility] )     Stomach problems     Thyroid disorder     Xerostomia      Past Surgical History:   Procedure Laterality Date    CHOLECYSTECTOMY         Length Of Stay: 1  Performed at least 2 patient identifiers during session: Name and Birthday  PHYSICAL THERAPY EVALUATION :    02/23/21 1109   PT Last Visit   PT Visit Date 02/23/21   Note Type   Note type Evaluation   Pain Assessment   Pain Assessment Tool 0-10   Pain Score Worst Possible Pain   Pain Location/Orientation Orientation: Bilateral;Location: Hip;Location: Pelvis   Effect of Pain on Daily Activities limits speed and indep of mobility, limits AROM BLEs and RUE   Patient's Stated Pain Goal No pain   Hospital Pain Intervention(s) Repositioned; Ambulation/increased activity; Medication (See MAR)  (declined ice, nursing premedicated during session)   Multiple Pain Sites Yes  (R forearm /wrist>shoulder)   Home Living   Type of [facility] to enter with rails; Two level; Able to live on main level with bedroom/bathroom  (1-2 MICHAEL, 7 to 1st floor c HR)   Home Equipment Walker;Cane   Additional Comments no use of AD at baseline, reports sleeping on couch at baseline   Prior Function   Level of [place] Independent with ADLs and functional mobility   Lives With Spouse  (+ 2 sons)   Receives Help From Family   ADL Assistance Independent   IADLs Needs assistance  (reports  (A))   Falls in the last 6 months 1 to 4   Vocational Retired   Restrictions/Precautions   [place] Bearing Precautions Per Order Yes   RLE [place] Bearing Per Order WBAT   LLE Weight Bearing Per Order [employer] Other Precautions Bed Alarm; Chair Alarm; Fall Risk;Pain;Agitated   General   Additional Pertinent History Trauma Active Problems: Right inferior pubic ramus and right pubic symphysis fractures, anterior right acetabular fracture with possible left anterior acetabular fracture  Ortho impression and plan:Given that the acetabular fractures are outside of the weight-bearing dome, patient may bear weight as tolerated and no operative intervention is indicated   Family/Caregiver Present No   Cognition   Arousal/Participation Alert   Following Commands Follows one step commands with increased time or repetition   RUE Strength   RUE Overall Strength Deficits   LUE Strength   LUE Overall Strength Within Functional Limits - able to perform ADL tasks with strength   RLE Assessment   RLE Assessment   (ankle df to neutral PROM)   Strength RLE   R Hip Flexion 2-/5   R Hip ABduction   (1+)   R Hip ADduction 2-/5   R Knee Extension 3+/5   R Ankle Dorsiflexion   (<3)   LLE Assessment   LLE Assessment   (ankle Prom limited from neutral)   Strength LLE   L Hip Flexion 2/5   L Hip ABduction 2-/5   L Hip ADduction 2-/5   L Knee Extension 3+/5   L Ankle Dorsiflexion   (<3)   Tone LLE   LLE Tone   (? hypertonic)   Coordination   Movements are Fluid and Coordinated 0   Sensation WFL  (vision and hearing)   Light Touch   RLE Light Touch Grossly intact   LLE Light Touch Grossly intact   Bed Mobility   Supine to Sit 2  Maximal assistance   Additional items Assist x 2;HOB elevated; Bedrails; Increased time required;Verbal cues;LE management  (from supine->long sit and long sit->short sit)   Transfers   Sit to Stand 2  Maximal assistance   Additional items Assist x 2; Increased time required;Verbal cues   Stand to Sit 2  Maximal assistance   Additional items Assist x 2; Increased time required;Verbal cues   Additional Comments difficulty establishing level thigh @ edge of bed for sit<>stand   Ambulation/Elevation   Gait pattern Antalgic;Decreased R stance; Excessively slow; Step to;Short stride; Shuffling   Gait Assistance 2  Maximal assist   Additional items Assist x 2;Verbal cues   Assistive Device Rolling walker   Distance pregait only : advance-retreat LLE trial   Not further tested due to pain   Balance   Static Sitting Fair +   Static Standing Zero   Ambulatory Zero   Endurance Deficit   Endurance Deficit Yes   Endurance Deficit Description Pt requires therapeutic rests between mobility trials  Activity Tolerance   Activity Tolerance Patient limited by pain; Patient limited by fatigue   Medical Staff Made Aware care coordination Chelsey from OT, Gracy Moreno from trauma   Nurse Made Aware spoke to [employer] RN   Assessment   Prognosis Fair   Problem List Decreased strength;Decreased range of motion;Decreased endurance; Impaired balance;Decreased mobility; Decreased coordination;Decreased cognition; Impaired judgement;Decreased safety awareness; Obesity;Orthopedic restrictions;Pain  (gait deviations)   Barriers to Discharge Decreased caregiver support; Inaccessible home environment   Goals   Patient Goals to get back to walking   STG Expiration Date 03/05/21   PT Treatment Day 1  (treatment documented in note)   Plan   Treatment/Interventions Functional transfer training;LE strengthening/ROM; Therapeutic exercise; Endurance training;Equipment eval/education; Bed mobility;Gait training;Cognitive reorientation;Patient/family training;Continued evaluation;Spoke to nursing;Spoke to case management;Spoke to advanced practitioner   PT Frequency Other (Comment)  (5-6x/wk)   Recommendation   PT Discharge Recommendation Post-Acute Rehabilitation Services   Equipment Recommended   (trial quick move --> transition into short rolling walker)   AM-PAC Basic Mobility Inpatient   Turning in Bed Without Bedrails 1   Lying on Back to Sitting on Edge of Flat Bed 1   Moving Bed to Chair 1   Standing Up From Chair 1   Walk in Room 1   Climb 3-5 Stairs 1   Basic Mobility Inpatient Raw Score 6   Turning Head Towards Sound 3   Follow Simple Instructions 4   Low Function Basic Mobility Raw Score 13   Low Function Basic Mobility Standardized Score 20 14   Pt requires PT /OT co-treat and co-eval due to signficant assistance with mobility and cognitive-behavioral impairments  PT and OT goals addressed separately  (Please find full objective findings from PT assessment regarding body systems outlined above)  Assessment: Pt is a 78 y o  female seen for PT evaluation s/p admit to [facility] on 2/22/2021 w/ Fall  Ortho consulted--pt is now WBAT BLEs  Order placed for PT  Upon evaluation: Pt requires 2 person assistance for bed mobility and transfers and 2 person assistance for ambulation with rolling walker for pregait activities only  Pt's clinical presentation is currently unstable/unpredictable given the functional mobility deficits above, especially (but not limited to) weakness, decreased ROM, gait deviations and pain, coupled with fall risks including hx of falls, impaired balance and impaired coordination, and combined with medical complications of fear/retreat  Pt to benefit from continued skilled PT tx while in hospital and upon DC to address deficits as defined above and maximize level of functional mobility  Recommend trial with quick move next 1-2 sessions, ther ex next 1-2 sessions, case management consult and eventual progression to short rolling walker as pain management improves  Goals: Pt will: Perform bed mobility tasks with consistent min A of 1 to prepare for transfers and reposition in bed  Perform transfers with consistent min A of 1 to decrease burden of care and improve ease of transfers  Perform ambulation with LRAD (likely short rolling walker) for >50' with  consistent min A of 1  to increase Indep in home environment and promote proper use of assistive device with less pain Assess stairs and set goals to return to home with MICHAEL and return to [facility]  The patient's AM-PAC Basic Mobility Inpatient Short Form Raw Score is 13, Standardized Score is 20  14  A standardized score less than 42 9 suggests the patient may benefit from discharge to post-acute rehabilitation services, which coincides with above PT recommendations  However other factors may influence destination  Comorbidities affecting pt's physical performance at time of assessment include: obesity and Lymphadenitis, Sjogren's disease, s/p left retromastoid and transcondylar approach and 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    Personal factors affecting pt at time of IE include: steps to enter environment, multi-level environment, past experience, inability to perform IADLs, inability to perform ADLs, inability to ambulate household distances, inability to navigate community distances and recent fall(s)  PHYSICAL THERAPY TREATMENT NOTE  Time In:1136  Time Out:1153  Total Time: 17 min  S:  Pt agreeable for trials using quick move sit<>stand lift to get to target surface  O:  After demonstration with quick move, pt positioned and performed sit<>stand using quick move with A of 2 and L>R UE support  Pt able to perform upright standing with UE support for <20 seconds  Pt dependetly transitioned to recliner w/ cushion, additional time for promoting 90-90 positron in nancy  A:  Pt requires similar physical assistance to perform transfers, but was successful in getting to recliner  Additionally, using quick move assisted to minimize pt forward translation from edge of bed due to her short height and limited ability to lower deck height of current bed  P:  Recommend addition of therapeutic exercises to current functional mobility program, continued mobility trials w/quick move and eventually short rolling walker       Richi Calle, PT, DPT     Richi Calle, PT, DPT

## 2021-02-23 NOTE — ASSESSMENT & PLAN NOTE
Right inferior pubic ramus and right pubic symphysis   NWB pending ortho eval  PT/OT consult  DVT Prophylaxis

## 2021-02-23 NOTE — PLAN OF CARE
Problem: OCCUPATIONAL THERAPY ADULT  Goal: Performs self-care activities at highest level of function for planned discharge setting  See evaluation for individualized goals  Description: Treatment Interventions: ADL retraining, Functional transfer training, Endurance training, UE strengthening/ROM, Patient/family training, Equipment evaluation/education, Cognitive reorientation, Compensatory technique education, Energy conservation, Activityengagement  Equipment Recommended: Other (comment)(TBD in rehab)       See flowsheet documentation for full assessment, interventions and recommendations  Note: Limitation: Decreased ADL status, Decreased UE strength, Decreased Safe judgement during ADL, Decreased cognition, Decreased endurance, Decreased high-level ADLs, Decreased self-care trans  Prognosis: Good  Assessment: Patient is a 78 y o  female admitted to [address] [place] [address] on 2/22/2021 due to Fall  Pt admitted under trauma team s/p falling down 7 carpeted stairs at home  She landed on R shoulder and slid down stairs  CT showing: right superior pubic rami fracture, right anterior acetabular fracture, left nondisplaced anterior acetabular fracture  Per ortho pt is to be WBAT on BLE  No acute osseous abnormality in R shoulder and wrist  Comorbidities affecting pt's physical performance at time of assessment include sjogren's disease, closed nondisplaced fx of anterior wall of R acetabulum, closed nondisplaced fx of R pubis  Patient has active OT orders and activity orders for Up in chair  PTA pt living with  in [facility] with [address], pt (I) with ADLs and (A) with IADLs, no use of AD at baseline, (+)fall, (-)drives  Personal factors affecting pt at time of IE include:steps to enter environment, difficulty performing ADLS and difficulty performing IADLS    At the time of evaluation patient currently requires (S)-min A for UB ADLs, max-total A for LB ADLs, max A x2 for functional transfers, and max Ax2 for functional mobility  The following deficits affected patient's occupational performance weakness, decreased functional strength, decreased functional balance, decreased activity tolerance, decreased safety awareness, impaired [address], impaired [address], impaired memory, impaired sequencing, impaired problem solving, increased pain, orthopedic restrictions and decreased coping skills  Patient would benefit from skilled OT services while in the hospital to address above deficits  Occupational performance areas to be addressed include ADL retraining, bed mobility, functional transfer training, endurance training, cognitive reorientation, patient/family training, equipment evaluation/education, compensatory technique education, activity engagement and activity tolerance in order to maximize patient's level of function  The patient's raw score on the AM-PAC Daily Activity inpatient short form is 14, standardized score is 33 39, less than 39 4  Patients at this level are likely to benefit from DC to post-acute rehabilitation services  From OT standpoint recommend Post Acute Rehabilitation upon D/C  Will continue to follow pt 3-5x/week to address the goals listed below evaluation        OT Discharge Recommendation: Post-Acute Rehabilitation Services

## 2021-02-23 NOTE — OCCUPATIONAL THERAPY NOTE
Occupational Therapy Evaluation + Treatment     Patient Name: Merly GARYAGRIS Date: 2/23/2021  Problem List  Principal Problem:    Fall  Active Problems:    Sjogren's disease ([facility] [address] )    Closed nondisplaced fracture of anterior wall of right acetabulum (HCC)    Closed nondisplaced fracture of right pubis [facility])    Past Medical History  Past Medical History:   Diagnosis Date    Lymphadenitis, chronic     Sialadenitis     Sjogren's disease ([facility] [address] )     Stomach problems     Thyroid disorder     Xerostomia      Past Surgical History  Past Surgical History:   Procedure Laterality Date    CHOLECYSTECTOMY                  02/23/21 1108   OT Last Visit   OT Visit Date 02/23/21   Note Type   Note type Evaluation + Treatment   Restrictions/Precautions   Weight Bearing Precautions Per Order Yes   RLE Weight Bearing Per Order WBAT   LLE Weight Bearing Per Order WBAT   Other Precautions Bed Alarm; Chair Alarm; Fall Risk;Pain   Pain Assessment   Pain Assessment Tool 0-10   Pain Score Worst Possible Pain   Pain Location/Orientation Orientation: Bilateral;Location: Hip;Location: Pelvis   Home Living   Type of Home House   Home Layout Stairs to enter with rails; Two level; Able to live on main level with bedroom/bathroom  (1-2 MICHAEL, 7 to 1st floor c HR)   Bathroom Shower/Tub Tub/shower unit   Bathroom Toilet Standard   Bathroom Equipment Commode; Shower chair;Grab bars in shower   Bathroom Accessibility Not accessible  (upstairs on 2nd floor, will use commode on 1st)   Home Equipment Walker;Cane   Additional Comments no use of AD at baseline, reports sleeping on couch at baseline   Prior Function   Lives With Spouse  (+ 2 sons)   Receives Help From Family   ADL Assistance Independent   IADLs Needs assistance  (reports  (A))   Falls in the last 6 months 1 to 4  (reason for admission, fall down 7 stairs)   Vocational Retired   Comments (-)drives, reports that  and son drive since resection of meningioma Lifestyle   [facility] PTA [facility] [address], pt (I) with ADLs and (A) with IADLs, no use of AD at baseline, (+)fall, (-)drives   Reciprocal Relationships  and 2 sons (in their 35s)   Service to Others retired, used to [facility] used to go for walks, but recently out of breath   Subjective   Subjective "I feel better sitting up"   ADL   Eating Assistance 7  Independent   Grooming Assistance 5  Supervision/Setup   Grooming Deficit Brushing hair   UB Bathing Assistance 4  Minimal Assistance   LB [address] 2  Maximal [address] 1  Total Assistance   LB Dressing Deficit Increased time to complete;Supervision/safety;Verbal cueing; Don/doff R sock; Don/doff L sock; Thread RLE into underwear; Thread LLE into underwear;Pull up over hips   Toileting Assistance  1  Total Assistance   Bed Mobility   Supine to Sit 2  Maximal assistance   Additional items Assist x 2; Increased time required;Verbal cues;LE management  (from long sitting )   Additional Comments max A x2 to long sit   Transfers   Sit to Stand 2  Maximal assistance   Additional items Assist x 2; Increased time required;Verbal cues   Stand to Sit 2  Maximal assistance   Additional items Assist x 2; Increased time required;Verbal cues   Additional Comments Use of RW   Functional Mobility   Additional Comments Pt able to advance L foot forward and back, however with increased pain   Completed transfer to chair with quickmove   Balance   Static Sitting Fair +   Dynamic Sitting Fair   Static Standing Zero  (Ax2)   Ambulatory Zero  (Ax2)   Activity Tolerance   Activity Tolerance Patient tolerated treatment well;Patient limited by pain   Medical Staff Made Aware  [facility]   Nurse Made Aware RN [facility]    RUE Assessment   RUE Assessment X  (increased pain and slow movement, AROM, 3/5 strength)   LUE Assessment   LUE Assessment WFL  (4-/5 strength grossly)   Hand Function   Gross Motor Coordination Impaired  (slow movements)   Fine Motor Coordination Impaired  (slow movements)   Sensation   Light Touch No apparent deficits   Sharp/Dull No apparent deficits   Vision-Basic Assessment   Current Vision Wears glasses all the time   Vision - Complex Assessment   Acuity Able to read clock/calendar on wall without difficulty   Cognition   Overall Cognitive Status Impaired   Arousal/Participation Alert; Cooperative   Attention Attends with cues to redirect   Orientation Level Oriented X4   Memory Decreased recall of precautions;Decreased short term memory   Following Commands Follows one step commands with increased time or repetition   Comments Pt reports decreased cognition since resection of meningioma, pt with decreased word finding and problem solving   Assessment   Limitation Decreased ADL status; Decreased UE strength;Decreased Safe judgement during ADL;Decreased cognition;Decreased endurance;Decreased high-level ADLs; Decreased self-care trans   Prognosis Good   Assessment Patient is a 78 y o  female admitted to [address] [place] [address] on 2/22/2021 due to Fall  Pt admitted under trauma team s/p falling down 7 carpeted stairs at home  She landed on R shoulder and slid down stairs  CT showing: right superior pubic rami fracture, right anterior acetabular fracture, left nondisplaced anterior acetabular fracture  Per ortho pt is to be WBAT on BLE  No acute osseous abnormality in R shoulder and wrist  Comorbidities affecting pt's physical performance at time of assessment include sjogren's disease, closed nondisplaced fx of anterior wall of R acetabulum, closed nondisplaced fx of R pubis  Patient has active OT orders and activity orders for Up in chair  PTA pt living with  in [facility] with [address], pt (I) with ADLs and (A) with IADLs, no use of AD at baseline, (+)fall, (-)drives   Personal factors affecting pt at time of IE include:steps to enter environment, difficulty performing ADLS and difficulty performing IADLS   At the time of evaluation patient currently requires (S)-min A for UB ADLs, max-total A for LB ADLs, max A x2 for functional transfers, and max Ax2 for functional mobility  The following deficits affected patient's occupational performance weakness, decreased functional strength, decreased functional balance, decreased activity tolerance, decreased safety awareness, impaired [address], impaired [address], impaired memory, impaired sequencing, impaired problem solving, increased pain, orthopedic restrictions and decreased coping skills  Patient would benefit from skilled OT services while in the hospital to address above deficits  Occupational performance areas to be addressed include ADL retraining, bed mobility, functional transfer training, endurance training, cognitive reorientation, patient/family training, equipment evaluation/education, compensatory technique education, activity engagement and activity tolerance in order to maximize patient's level of function  The patient's raw score on the AM-PAC Daily Activity inpatient short form is 14, standardized score is 33 39, less than 39 4  Patients at this level are likely to benefit from DC to post-acute rehabilitation services  From OT standpoint recommend Post Acute Rehabilitation upon D/C  Will continue to follow pt 3-5x/week to address the goals listed below evaluation  Goals   Patient Goals to walk better, and put my own clothes on   LTG Time Frame 10-14   Long Term Goal see goals listed below   Plan   Treatment Interventions ADL retraining;Functional transfer training; Endurance training;UE strengthening/ROM; Patient/family training;Equipment evaluation/education;Cognitive reorientation; Compensatory technique education; Energy conservation; Activityengagement   Goal Expiration Date 03/09/21   OT Treatment Day 1   OT Frequency 3-5x/wk   Additional Treatment Session   Start Time 1117   End Time 9313 Treatment Assessment Pt agreeable to participate in skilled OT treatment session to address ADL retraining, functional transfer training, endurance training, activity engagement and activity tolerance  Pt long sitting in bed, requiring max A x2 for turning to sit EOB, legs moving together and trunk as a unit to reduce pain  Pt able to sit EOB ~ 10 min  Pt noted to be incontinent of urine sitting EOB  Pt requiring total A for LB dressing to don B socks and don briefs  Pt requiring total A for hygiene in standing and max A x2 to stand  Max A for pulling pants over hips  Pt noted with increased pain, limiting mobility and requiring increased rest breaks  Pt sitting EOB with R knee block due to short stature and elevated bed  Pt able to complete brushing hair sitting EOB with increased time and encouragement to participate in BUE  Pt is performing well below her baseline due to increased pain and orthopedic restrictions  Would continue to benefit from skilled OT treatment  Cont to recommend PAR      Additional Treatment Day 1   Recommendation   OT Discharge Recommendation Post-Acute Rehabilitation Services   Equipment Recommended Other (comment)  (TBD in rehab)   AM-PAC Daily Activity Inpatient   Lower Body Dressing 1   Bathing 2   Toileting 1   Upper Body Dressing 3   Grooming 3   Eating 4   Daily Activity Raw Score 14   Daily Activity Standardized Score (Calc for Raw Score >=11) 33 39   AM-PAC Applied Cognition Inpatient   Following a Speech/Presentation 3   Understanding Ordinary Conversation 3   Taking Medications 3   Remembering Where Things Are Placed or Put Away 2   Remembering List of 4-5 Errands 2   Taking Care of Complicated Tasks 2   Applied Cognition Raw Score 15   Applied Cognition Standardized Score 33 54     Goals    -Patient will complete UB ADLs w/ mod I using AE and AD as needed    -Patient will complete LB ADLs w/ mod I using AE and AD as needed    -Patient will complete toileting w/ mod I w/ G hygiene/thoroughness    -Patient will complete bed mobility with Mod I without use of bed rails    -Patient will tolerate therapeutic activities for greater than 15 min, in order to increase tolerance for functional activities      -Patient will perform functional transfers with Mod I to/from all surfaces using DME as needed    -Patient will complete functional mobility during ADL/IADL/leisure tasks with Mod I     -Patient will follow multistep instructions with no cuing to increase cognitive function and independence with tasks     -Patient will demonstrate 100% carryover of energy conservation techniques t/o functional I/ADL/leisure tasks w/o cues s/p skilled education to increase endurance during functional tasks    -Patient will increase BUE strength to 4+/5 via AROM/AAROM/PROM exercises to increase independence in ADLs and transfers    -Patient will be attentive 100% of the time during ongoing cognitive assessment w/ G participation to assist w/ safe d/c planning/recommendations     -Patient will increase dynamic standing balance to Fair- in order to complete functional mobility and ADL tasks safely    -Patient will increase seated tolerance to 15 min with Good dynamic seated balance to increase safety during participation in ADLs         At the end of the session, all needs met and pt seated in bedside chair, chair alarm activated, call bell within reach and stool placed under pt's feet    Lyubov Connor OTR/L

## 2021-02-23 NOTE — PLAN OF CARE
Problem: PHYSICAL THERAPY ADULT  Goal: Performs mobility at highest level of function for planned discharge setting  See evaluation for individualized goals  Description: Treatment/Interventions: Functional transfer training, LE strengthening/ROM, Therapeutic exercise, Endurance training, Equipment eval/education, Bed mobility, Gait training, Cognitive reorientation, Patient/family training, Continued evaluation, Spoke to nursing, Spoke to case management, Spoke to advanced practitioner  Equipment Recommended: (trial quick move --> transition into short rolling walker)       See flowsheet documentation for full assessment, interventions and recommendations  Note: Prognosis: Fair  Problem List: Decreased strength, Decreased range of motion, Decreased endurance, Impaired balance, Decreased mobility, Decreased coordination, Decreased cognition, Impaired judgement, Decreased safety awareness, Obesity, Orthopedic restrictions, Pain(gait deviations)  Assessment: Pt is a 78 y o  female seen for PT evaluation s/p admit to [facility] on 2/22/2021 w/ Fall  Ortho consulted--pt is now WBAT BLEs  Order placed for PT  Upon evaluation: Pt requires 2 person assistance for bed mobility and transfers and 2 person assistance for ambulation with rolling walker for pregait activities only  Pt's clinical presentation is currently unstable/unpredictable given the functional mobility deficits above, especially (but not limited to) weakness, decreased ROM, gait deviations and pain, coupled with fall risks including hx of falls, impaired balance and impaired coordination, and combined with medical complications of fear/retreat  Pt to benefit from continued skilled PT tx while in hospital and upon DC to address deficits as defined above and maximize level of functional mobility   Recommend trial with quick move next 1-2 sessions, ther ex next 1-2 sessions, case management consult and eventual progression to short rolling walker as pain management improves  Barriers to Discharge: Decreased caregiver support, Inaccessible home environment     PT Discharge Recommendation: [address]          See flowsheet documentation for full assessment

## 2021-02-24 ENCOUNTER — APPOINTMENT (INPATIENT)
Dept: RADIOLOGY | Facility: HOSPITAL | Age: 66
DRG: 535 | End: 2021-02-24
Payer: COMMERCIAL

## 2021-02-24 LAB
ATRIAL RATE: 64 BPM
ERYTHROCYTE [DISTWIDTH] IN BLOOD BY AUTOMATED COUNT: 19.7 % (ref 11.6–15.1)
FLUAV RNA RESP QL NAA+PROBE: NEGATIVE
FLUBV RNA RESP QL NAA+PROBE: NEGATIVE
HCT VFR BLD AUTO: 33.9 % (ref 34.8–46.1)
HGB BLD-MCNC: 10.4 G/DL (ref 11.5–15.4)
MCH RBC QN AUTO: 26.5 PG (ref 26.8–34.3)
MCHC RBC AUTO-ENTMCNC: 30.7 G/DL (ref 31.4–37.4)
MCV RBC AUTO: 87 FL (ref 82–98)
P AXIS: 22 DEGREES
PLATELET # BLD AUTO: 184 THOUSANDS/UL (ref 149–390)
PMV BLD AUTO: 10.1 FL (ref 8.9–12.7)
PR INTERVAL: 186 MS
QRS AXIS: -19 DEGREES
QRSD INTERVAL: 86 MS
QT INTERVAL: 412 MS
QTC INTERVAL: 425 MS
RBC # BLD AUTO: 3.92 MILLION/UL (ref 3.81–5.12)
RSV RNA RESP QL NAA+PROBE: NEGATIVE
SARS-COV-2 RNA RESP QL NAA+PROBE: NEGATIVE
T WAVE AXIS: 7 DEGREES
VENTRICULAR RATE: 64 BPM
WBC # BLD AUTO: 8.8 THOUSAND/UL (ref 4.31–10.16)

## 2021-02-24 PROCEDURE — 99232 SBSQ HOSP IP/OBS MODERATE 35: CPT | Performed by: SURGERY

## 2021-02-24 PROCEDURE — 97535 SELF CARE MNGMENT TRAINING: CPT

## 2021-02-24 PROCEDURE — 85027 COMPLETE CBC AUTOMATED: CPT | Performed by: PHYSICIAN ASSISTANT

## 2021-02-24 PROCEDURE — 0241U HB NFCT DS VIR RESP RNA 4 TRGT: CPT | Performed by: PHYSICIAN ASSISTANT

## 2021-02-24 PROCEDURE — 71045 X-RAY EXAM CHEST 1 VIEW: CPT

## 2021-02-24 PROCEDURE — 99254 IP/OBS CNSLTJ NEW/EST MOD 60: CPT | Performed by: PHYSICAL MEDICINE & REHABILITATION

## 2021-02-24 PROCEDURE — 93010 ELECTROCARDIOGRAM REPORT: CPT | Performed by: INTERNAL MEDICINE

## 2021-02-24 PROCEDURE — 97530 THERAPEUTIC ACTIVITIES: CPT

## 2021-02-24 PROCEDURE — 93005 ELECTROCARDIOGRAM TRACING: CPT

## 2021-02-24 PROCEDURE — 99232 SBSQ HOSP IP/OBS MODERATE 35: CPT | Performed by: INTERNAL MEDICINE

## 2021-02-24 PROCEDURE — 97116 GAIT TRAINING THERAPY: CPT

## 2021-02-24 PROCEDURE — C9113 INJ PANTOPRAZOLE SODIUM, VIA: HCPCS | Performed by: PHYSICIAN ASSISTANT

## 2021-02-24 RX ORDER — PANTOPRAZOLE SODIUM 40 MG/1
40 INJECTION, POWDER, FOR SOLUTION INTRAVENOUS
Status: DISCONTINUED | OUTPATIENT
Start: 2021-02-24 | End: 2021-02-24

## 2021-02-24 RX ORDER — PANTOPRAZOLE SODIUM 40 MG/1
40 TABLET, DELAYED RELEASE ORAL
Status: DISCONTINUED | OUTPATIENT
Start: 2021-02-25 | End: 2021-02-26

## 2021-02-24 RX ADMIN — ATORVASTATIN CALCIUM 10 MG: 10 TABLET, FILM COATED ORAL at 08:02

## 2021-02-24 RX ADMIN — ACETAMINOPHEN 975 MG: 325 TABLET, FILM COATED ORAL at 14:49

## 2021-02-24 RX ADMIN — ATENOLOL 50 MG: 25 TABLET ORAL at 08:01

## 2021-02-24 RX ADMIN — DOCUSATE SODIUM 100 MG: 100 CAPSULE, LIQUID FILLED ORAL at 08:01

## 2021-02-24 RX ADMIN — AMLODIPINE BESYLATE 2.5 MG: 2.5 TABLET ORAL at 08:01

## 2021-02-24 RX ADMIN — FLUOXETINE 20 MG: 20 CAPSULE ORAL at 08:01

## 2021-02-24 RX ADMIN — ENOXAPARIN SODIUM 30 MG: 30 INJECTION SUBCUTANEOUS at 21:04

## 2021-02-24 RX ADMIN — METOCLOPRAMIDE 10 MG: 10 TABLET ORAL at 06:20

## 2021-02-24 RX ADMIN — LEVOTHYROXINE SODIUM 75 MCG: 75 TABLET ORAL at 06:20

## 2021-02-24 RX ADMIN — ACETAMINOPHEN 975 MG: 325 TABLET, FILM COATED ORAL at 06:19

## 2021-02-24 RX ADMIN — DICLOFENAC SODIUM 2 G: 10 GEL TOPICAL at 12:01

## 2021-02-24 RX ADMIN — LIDOCAINE 5% 2 PATCH: 700 PATCH TOPICAL at 08:00

## 2021-02-24 RX ADMIN — DICLOFENAC SODIUM 2 G: 10 GEL TOPICAL at 17:27

## 2021-02-24 RX ADMIN — METOCLOPRAMIDE 10 MG: 10 TABLET ORAL at 12:02

## 2021-02-24 RX ADMIN — FOLIC ACID 1 MG: 1 TABLET ORAL at 08:01

## 2021-02-24 RX ADMIN — METHOTREXATE SODIUM 20 MG: 2.5 TABLET ORAL at 08:04

## 2021-02-24 RX ADMIN — METOCLOPRAMIDE 10 MG: 10 TABLET ORAL at 14:49

## 2021-02-24 RX ADMIN — PANTOPRAZOLE SODIUM 40 MG: 40 INJECTION, POWDER, FOR SOLUTION INTRAVENOUS at 12:02

## 2021-02-24 RX ADMIN — DICLOFENAC SODIUM 2 G: 10 GEL TOPICAL at 08:03

## 2021-02-24 RX ADMIN — ACETAMINOPHEN 975 MG: 325 TABLET, FILM COATED ORAL at 21:05

## 2021-02-24 RX ADMIN — GABAPENTIN 100 MG: 100 CAPSULE ORAL at 21:04

## 2021-02-24 RX ADMIN — DICLOFENAC SODIUM 2 G: 10 GEL TOPICAL at 21:02

## 2021-02-24 RX ADMIN — ENOXAPARIN SODIUM 30 MG: 30 INJECTION SUBCUTANEOUS at 08:00

## 2021-02-24 NOTE — CONSULTS
PHYSICAL MEDICINE AND REHABILITATION CONSULT NOTE  Kathy Kisermi 78 y o  female MRN: [id number]  Unit/Bed#: S -01 Encounter: [id number]    Requested by (Physician/Service): Sukh Hunt MD  Reason for Consultation:  Assessment of rehabilitation needs  Chief Complaint:  Pelvic pain, shortness of breath  Assessment:  Rehabilitation Diagnosis:    Pelvic Fractures    Impaired mobility and self care    Recommendations:  Rehabilitation Plan:   Continue PT/OT  while on acute care   I am a little bit concerned about tolerance related to pain with activity, but should be able to adjust pain regimen (barely using pain medications right now) to allow for participation  I am also concerned about her respiratory status today  Ongoing work-up   Would benefit from acute inpatient rehab once more medically stable   Has good support at home with potential for discharge back to the community   Functionally significantly below baseline with possibility of making significant/meaningful functional gains   Would benefit from acute inpatient rehab with 24/7 monitoring by rehab physician  o Monitor for signs of developing pelvic bleed  o Manage pain to allow for participation in therapy  o Prevention of ileus, VTE (high risk), and delirium, monitor respiratory status, she has sjogrens and arthritis, as well a history of deficits from meningioma resection, and altered sensation in her LLE that contribute to her medical complexity  o She could potentially tolerate 3-5 hours 5-7 days a week  · Covid-19 Testing: [facility] rehabilitation units require testing within 48 hours of potential admission for all general admissions  Please re-test if needed  *Re-testing is NOT required for patients recovering from COVID-19 infection if isolation has been discontinued per CDC criteria           Medical Co-morbidities Plan:  #Pain: Tylenol scheduled, Gabapentin 100mg QHS, Diclofenac gel, Lidoderm patches, Dilaudid IV, Oxy 2 5-5mg PRN   #Bowel: Miralax daily  No BMs recorded yet  Of note she has a history of gastroparesis and has been on reglan - however, in the past it has caused EPS  #Bladder: Voiding, but incontinent? #Skin/Pressure Injury Prevention: Turn Q2hr in bed, with weight shifts [id number] in wheelchair  #DVT Prophylaxis:Lovenox Q12hr - clarify length of treatment with orthopedics  #GI Prophylaxis: Protonix     Thank you for allowing the PM&R service to participate in the care of this patient  We will continue to follow Stewart Carrel A Viscomi's progress with you  Please do not hesitate to call with questions or concerns    History of Present Illness:  Karen Montelongo is a 78 y o  female with PMH of lymphadenitis, sialdenitis, Sjogren's, meningioma s/p resection, hypothyroidism, depression who presented to [address] [place] [address] on 2/22 after a fall down 7 carpeted stairs  Landed on her R shoulder and slid - denies head strike or LOS - had difficulty standing after fall and required EMS  In ER found to have a R inferior pubic ramus and R pubic symphysis fracture, R acetabular fracture with possible L anterior acetabular fracture  Orthopedics saw the patient nad recommended non-opreative management and WBAT  Ct CAP redemonstrated these fractures, with bilateral anterior acetabular margin fractures - minimally displaced on the R and nondisplaced on the L  XR R wrist/shoulder negative for fractures  Geriatrics saw the patient and recommended Tylenol, Roxicodone, and Dilaudid, with Gabapentin and Lidopatches as adjuncts  She is feeling ok right now, but needing oxygen  States she does not normally need oxygen, and feels a bit short of breath  Team got an XR today which is pending  Denies any chest pain  Her pelvis feels ok in bed, but hurts terribly with movement  Also noting R shoulder pain (due to bruise)   She states she is incontinent at baseline (mixed - both stress/urge) but does not take medication for it and does not follow with Urology  She is able to manage at home and use Depends  She states she has a history of gastroparesis for which she takes reglan, but otherwise does not have issues with bowel movements at home  R leg is weaker proximally compared to L leg  No new numbness/tingling  Review of Systems: 10 point ROS negative except for what is noted in HPI    CURRENT GAP IN FUNCTION     Prior to Admission:   Previously independent with ADLs and functional mobility  Requried assistance with IADLs   Retired  FUNCTIONAL STATUS:  Physical Therapy: max A x2 with bed mobility, maxA x2 with transfers, maxA x2 with rolling walker  Occupational Therapy: Ind eating, Supervision grooming, [employer] bathing/dressing, max-Total A LB bathing/dressing  Social History:    Robyn Thornton is  and lives with her spouse and 2 sons in their 35s  She lives in [facility]) single family home  The living area: can live on one level - can live on main level with bedroom/bathroom  Equipment in home: [address] and cane  There 2 steps to enter the home and then 7 to first floor  Patient/family's goals: Return to previous home/apartment  The patient has ample support available at home     Social History     Socioeconomic History    Marital status: /Civil Union     Spouse name: None    Number of children: None    Years of education: None    Highest education level: None   Occupational History    None   Social Needs    Financial resource strain: None    Food insecurity     Worry: None     Inability: None    Transportation needs     Medical: None     Non-medical: None   Tobacco Use    Smoking status: Former Smoker    Smokeless tobacco: Never Used   Substance and Sexual Activity    Alcohol use: Yes     Frequency: Monthly or less    Drug use: Never    Sexual activity: None   Lifestyle    Physical activity     Days per week: None     Minutes per session: None    Stress: None   Relationships    Social connections     Talks on phone: None     Gets together: None     Attends Quaker service: None     Active member of club or organization: None     Attends meetings of clubs or organizations: None     Relationship status: None    Intimate partner violence     Fear of current or ex partner: None     Emotionally abused: None     Physically abused: None     Forced sexual activity: None   Other Topics Concern    None   Social History Narrative    None        Family History:    Family History   Problem Relation Age of Onset    Diabetes Other     Hypertension Other     Heart disease Other     Arthritis Other          MEDICATIONS:     Current Facility-Administered Medications:     acetaminophen (TYLENOL) tablet 975 mg, 975 mg, Oral, Q8H [facility], Annabella Lindquist PA-C, 975 mg at 02/24/21 9419    amLODIPine (NORVASC) tablet 2 5 mg, 2 5 mg, Oral, Daily, Annabella Lindquist PA-C, 2 5 mg at 02/24/21 0801    atenolol (TENORMIN) tablet 50 mg, 50 mg, Oral, Daily, Annabella Lindquist PA-C, 50 mg at 02/24/21 0801    atorvastatin (LIPITOR) tablet 10 mg, 10 mg, Oral, Daily, Annabella Lindquist PA-C, 10 mg at 02/24/21 0802    Diclofenac Sodium (VOLTAREN) 1 % topical gel 2 g, 2 g, Topical, 4x Daily, Annabella Lindquist PA-C, 2 g at 02/24/21 0803    docusate sodium (COLACE) capsule 100 mg, 100 mg, Oral, BID, Annabella Lindquist PA-C, 100 mg at 02/24/21 0801    enoxaparin (LOVENOX) subcutaneous injection 30 mg, 30 mg, Subcutaneous, Q12H [facility], Annabella Lindquist PA-C, 30 mg at 02/24/21 0800    FLUoxetine (PROzac) capsule 20 mg, 20 mg, Oral, Daily, Annabella Lindquist PA-C, 20 mg at 02/78/86 4235    folic acid (FOLVITE) tablet 1 mg, 1 mg, Oral, Daily, Annabella Lindquist PA-C, 1 mg at 02/24/21 0801    gabapentin (NEURONTIN) capsule 100 mg, 100 mg, Oral, HS, Annabella Lindquist PA-C, 100 mg at 02/23/21 2036    HYDROmorphone (DILAUDID) injection 0 2 mg, 0 2 mg, Intravenous, Q4H PRN, Kenyatta Ravi PA-C   levothyroxine tablet 75 mcg, 75 mcg, Oral, Early Morning, Annabella Lindquist PA-C, 75 mcg at 02/24/21 0620    lidocaine (LIDODERM) 5 % patch 2 patch, 2 patch, Topical, Daily, Annabella Lindquist PA-C, 2 patch at 02/24/21 0800    methocarbamol (ROBAXIN) tablet 500 mg, 500 mg, Oral, Q6H PRN, Annabella Lindquist PA-C, 500 mg at 02/23/21 1110    methotrexate tablet 20 mg, 20 mg, Oral, Weekly, Annabella Lindquist PA-C, 20 mg at 02/24/21 0804    metoclopramide (REGLAN) tablet 10 mg, 10 mg, Oral, TID AC, Annabella Lindquist PA-C, 10 mg at 02/24/21 0620    naloxone (NARCAN) 0 04 mg/mL syringe 0 04 mg, 0 04 mg, Intravenous, Q1MIN PRN, Vikki Lindquist PA-C    ondansetron (ZOFRAN) injection 4 mg, 4 mg, Intravenous, Q6H PRN, Annabella Lindquist PA-C, 4 mg at 02/23/21 2037    oxyCODONE (ROXICODONE) IR tablet 2 5 mg, 2 5 mg, Oral, Q4H PRN, Annabella Lindquist PA-C    oxyCODONE (ROXICODONE) IR tablet 5 mg, 5 mg, Oral, Q4H PRN, Annabella Lindquist PA-C, 5 mg at 02/23/21 2012    pantoprazole (PROTONIX) injection 40 mg, 40 mg, Intravenous, Q24H [facility], Bertram Zayas PA-C    polyethylene glycol (MIRALAX) packet 17 g, 17 g, Oral, Daily, Annabelle Almeida PA-C    Past Medical History:     Past Medical History:   Diagnosis Date    Lymphadenitis, chronic     Sialadenitis     Sjogren's disease ([address] )     Stomach problems     Thyroid disorder     Xerostomia         Past Surgical History:     Past Surgical History:   Procedure Laterality Date    CHOLECYSTECTOMY           Allergies:      Allergies   Allergen Reactions    Hydroxyquinoline Sulfate [Oxyquinoline] Vomiting    Leucovorin Vomiting    Tizanidine Vomiting           Physical Exam:  /93 (BP Location: Right arm)   Pulse 71   Temp 98 3 °F (36 8 °C) (Oral)   Resp 20   Ht 4' 11" (1 499 m)   Wt 61 2 kg (135 lb)   LMP 01/13/2005   SpO2 94%   BMI 27 27 kg/m²        Intake/Output Summary (Last 24 hours) at 2/24/2021 1036  Last data filed at 2/24/2021 1789  Gross per 24 hour   Intake 360 ml   Output 650 ml   Net -290 ml       Body mass index is 27 27 kg/m²  Gen: No acute distress,  HEENT: Moist mucus membranes, Normocephalic/Atraumatic  On NC  Cardiovascular: Regular rate, rhythm, S1/S2  Distal pulses palpable  Heme/Extr: No edema  Pulmonary: Non-labored breathing  Lungs CTAB - but seems short of breath and slightly labored in conversation  : Purewick, urine quite dark  GI: Soft, non-tender, non-distended  BS+  MSK: Full PROM in bilateral UE - some difficulty with initiating abduction due to pain on the R shoulder  Decreased AROM proximally in her bilateral Hips, R moreso than L  Integumentary: Skin is warm, dry  Neuro: AAOx3, Speech is halting, a bit dysarthric  Follows commands, answering questions appropriately  Hyperesthesia in LLE    MMT:   Strength: - had to position R shoulder in abduction (difficulty initiating), but able to maintain against gravity and provide good resistance   Right  Left  Site  Right  Left  Site    4 5  S Ab: Shoulder Abductors  1 2+  HF: Hip Flexors    5 5  EF: Elbow Flexors  NT  2 KF: Knee Flexors    5  5  EE: Elbow Extensors  3-  2  KE: Knee Extensors    4+  5  WE: Wrist Extensors  5  5  DR: Dorsi Flexors    4  4  FF: Finger Flexors  5  5  PF: Plantar Flexors    4  4  HI: Hand Intrinsics  5  5  EHL: Extensor Hallucis Longus   Psych: Normal mood and affect         LABORATORY RESULTS:      Lab Results   Component Value Date    HGB 10 4 (L) 02/24/2021    HGB 12 2 01/08/2015    HCT 33 9 (L) 02/24/2021    HCT 37 3 01/08/2015    WBC 8 80 02/24/2021    WBC 5 13 01/08/2015     Lab Results   Component Value Date    BUN 15 02/23/2021    BUN 10 01/11/2018     01/08/2015    K 4 1 02/23/2021    K 3 4 (L) 01/08/2015     02/23/2021     01/08/2015    GLUCOSE 89 01/08/2015    CREATININE 0 87 02/23/2021    CREATININE 0 85 01/11/2018     No results found for: PROTIME, INR     DIAGNOSTIC STUDIES: Reviewed  Xr Shoulder 2+ Views Right    Result Date: 2/22/2021  Impression: No acute osseous abnormality  Workstation performed: [id number]     Xr Wrist 3+ Vw Right    Result Date: 2/22/2021  Impression: No acute osseous abnormality  Workstation performed: [id number]     Ct Chest Abdomen Pelvis W Contrast    Result Date: 2/22/2021  Impression: Numerous bilateral pelvic fractures  No other signs of injury within the chest abdomen or pelvis    I personally discussed this study with Samia Nayak on 2/22/2021 at 7:02 PM   Workstation performed: [id number]

## 2021-02-24 NOTE — PLAN OF CARE
Problem: PHYSICAL THERAPY ADULT  Goal: Performs mobility at highest level of function for planned discharge setting  See evaluation for individualized goals  Description: Treatment/Interventions: Functional transfer training, LE strengthening/ROM, Therapeutic exercise, Endurance training, Equipment eval/education, Bed mobility, Gait training, Cognitive reorientation, Patient/family training, Continued evaluation, Spoke to nursing, Spoke to case management, Spoke to advanced practitioner  Equipment Recommended: (trial quick move --> transition into short rolling walker)       See flowsheet documentation for full assessment, interventions and recommendations  Outcome: Progressing  Note: Prognosis: Fair  Problem List: Decreased strength, Decreased range of motion, Decreased endurance, Impaired balance, Decreased mobility, Decreased coordination, Decreased cognition, Impaired judgement, Decreased safety awareness, Obesity, Orthopedic restrictions, Pain  Assessment: Patient agreeable and motivated to participate in therapy session  Patient seen partial co-treat austin Mcarthur OT secondary to degree of assistance required for mobility tasks  Patient demonstrated improvement with functional mobility this session requiring less physical assistance  Supine to sit remains difficult with increased time and mod ax2 secondary to pain  Multiple sit<>stand transfers with min ax2 and increased time with instruction for body positioning and hand placement  Standing tolerance and standing balance improved with ability to perfom dynamic movements for lower body dressing  Pt able to ambulate few feet forward with min ax2, roller walker and close chair follow  Instruction provided for stepping sequence and walker advancement with good follow through and understanding  Continue to focus on OOB mobility with progression of transfers and ambulation as appropriate and able    Barriers to Discharge: Decreased caregiver support, Inaccessible home environment     PT Discharge Recommendation: [address]          See flowsheet documentation for full assessment

## 2021-02-24 NOTE — PHYSICAL THERAPY NOTE
PHYSICAL THERAPY NOTE    Patient Name: Rosi Haskins  ZXNNW'V Date: 21 1138   PT Last Visit   PT Visit Date 21   Note Type   Note Type Treatment   Pain Assessment   Pain Assessment Tool 0-10   Pain Score 8   Pain Location/Orientation Orientation: Right;Location: Hip   Restrictions/Precautions   Weight Bearing Precautions Per Order Yes   RLE Weight Bearing Per Order WBAT   LLE Weight Bearing Per Order WBAT   Other Precautions Bed Alarm; Chair Alarm; Fall Risk;Pain   General   Family/Caregiver Present No   Subjective   Subjective Patient supine in bed and is agreeable to participate in therapy session  Patient identifers obtained from name &   Bed Mobility   Supine to Sit 3  Moderate assistance   Additional items Assist x 2;HOB elevated; Bedrails; Increased time required;Verbal cues;LE management   Sit to Supine Unable to assess   Additional Comments Patient seated OOB in recliner post session with chair alarm engaged, call bell and belongings in reach  Transfers   Sit to Stand 4  Minimal assistance   Additional items Assist x 2;Armrests; Increased time required;Verbal cues   Stand to Sit 4  Minimal assistance   Additional items Assist x 2;Armrests; Increased time required;Verbal cues   Ambulation/Elevation   Gait pattern Antalgic;Decreased R stance; Excessively slow; Step to;Short stride; Shuffling   Gait Assistance 4  Minimal assist  (with chair follow)   Additional items Assist x 2;Verbal cues   Assistive Device Rolling walker   Distance 6 feet fwd   Balance   Static Sitting Fair +   Dynamic Sitting Fair   Static Standing Poor -   Ambulatory Poor -   Endurance Deficit   Endurance Deficit Yes   Endurance Deficit Description limited ambulation distance with frequent rest breaks   Activity Tolerance   Activity Tolerance Patient limited by fatigue;Patient limited by pain   Medical Staff Made Aware care coordination with Gavin Fournier OT   Nurse Made Aware Spoke to [employer], RN    Assessment   Prognosis Fair   Problem List Decreased strength;Decreased range of motion;Decreased endurance; Impaired balance;Decreased mobility; Decreased coordination;Decreased cognition; Impaired judgement;Decreased safety awareness; Obesity;Orthopedic restrictions;Pain   Assessment Patient agreeable and motivated to participate in therapy session  Patient seen partial co-treat austin Mcarthur OT secondary to degree of assistance required for mobility tasks  Patient demonstrated improvement with functional mobility this session requiring less physical assistance  Supine to sit remains difficult with increased time and mod ax2 secondary to pain  Multiple sit<>stand transfers with min ax2 and increased time with instruction for body positioning and hand placement  Standing tolerance and standing balance improved with ability to perfom dynamic movements for lower body dressing  Pt able to ambulate few feet forward with min ax2, roller walker and close chair follow  Instruction provided for stepping sequence and walker advancement with good follow through and understanding  Continue to focus on OOB mobility with progression of transfers and ambulation as appropriate and able  Barriers to Discharge Decreased caregiver support; Inaccessible home environment   Goals   Patient Goals to get moving   STG Expiration Date 03/05/21   PT Treatment Day 2   Plan   Treatment/Interventions Functional transfer training;LE strengthening/ROM; Therapeutic exercise; Endurance training;Equipment eval/education; Bed mobility;Gait training;Cognitive reorientation;Patient/family training;Continued evaluation;Spoke to nursing;Spoke to case management   PT Frequency Other (Comment)  (5-6x/week)   Recommendation   PT Discharge Recommendation Post-Acute Rehabilitation Services   Equipment Recommended Other (Comment)  (short rolling walker)       Lissa Koch, JOSH

## 2021-02-24 NOTE — CASE MANAGEMENT
CM met with the Patient to update on acceptance to [facility] and review that now an insurance auth would need to be completed  Patient verbalized her understanding  CM dept will continue to follow for completion of auth and follow up with Patient

## 2021-02-24 NOTE — CASE MANAGEMENT
CM informed by [facility] at [facility], that Patient has been accepted for acute rehab  Facility will begin insurance auth  CM dept will follow for completion of auth

## 2021-02-24 NOTE — PLAN OF CARE
Problem: OCCUPATIONAL THERAPY ADULT  Goal: Performs self-care activities at highest level of function for planned discharge setting  See evaluation for individualized goals  Description: Treatment Interventions: ADL retraining, Functional transfer training, Endurance training, UE strengthening/ROM, Patient/family training, Equipment evaluation/education, Cognitive reorientation, Compensatory technique education, Energy conservation, Activityengagement  Equipment Recommended: Other (comment)(TBD in rehab)       See flowsheet documentation for full assessment, interventions and recommendations  Outcome: Progressing  Note: Limitation: Decreased ADL status, Decreased UE strength, Decreased Safe judgement during ADL, Decreased cognition, Decreased endurance, Decreased high-level ADLs, Decreased self-care trans  Prognosis: Good  Assessment: Pt agreeable to participate in skilled OT treatment session to address ADL retraining, functional transfer training, endurance training, patient/family training, equipment evaluation/education, activity engagement and activity tolerance  Pt seated in chair upon arrival  Pt reports fatigue, however motivated to participate in therapy  Pt completing LB dressing with overall mod A, a notable improvement from previous session  Pt able to thread RLE into pants and min A with LLE due to socks sticking to fabric  Pt able to complete sit to stand with min A x2 and min VC for hand placement and safety  In standing able to pull pants up and min A to pull up in back  CGA in standing while using BUE for task  Pt completing functional mobility with min A x2 with chair follow for safety  Pt demonstrating vast improvements from previous session, pt highly motivated and would benefit from continued skilled OT treatment, cont to recommend PAR on d/c  The patient's raw score on the AM-PAC Daily Activity inpatient short form is 16, standardized score is 35 96, less than 39 4   Patients at this level are likely to benefit from DC to post-acute rehabilitation services        OT Discharge Recommendation: Post-Acute Rehabilitation Services

## 2021-02-24 NOTE — PROGRESS NOTES
The pantoprazole has  been converted to Oral per [facility] IV-to-PO Auto-Conversion Protocol for Adults as approved by the Pharmacy and Therapeutics Committee  The patient met all eligible criteria:  3 Age = 25years old   2) Received at least one dose of the IV form   3) Receiving at least one other scheduled oral/enteral medication   4) Tolerating an oral/enteral diet   and did not have any exclusions:   1) Critical care patient   2) Active GI bleed (IF assessing H2RAs or PPIs)   3) Continuous tube feeding (IF assessing cipro, doxycycline, levofloxacin, minocycline, rifampin, or voriconazole)   4) Receiving PO vancomycin (IF assessing metronidazole)   5) Persistent nausea and/or vomiting   6) Ileus or gastrointestinal obstruction   7) Ruddy/nasogastric tube set for continuous suction   8) Specific order not to automatically convert to PO (in the order's comments or if discussed in the most recent Infectious Disease or primary team's progress notes)

## 2021-02-24 NOTE — PROGRESS NOTES
Progress Note - Armand Villagran Viscomi 1955, 78 y o  female MRN: [id number]    Unit/Bed#: S -01 Encounter: [id number]    Primary Care Provider: Gaudencio Sullivan MD   Date and time admitted to hospital: 2/22/2021  5:25 PM        Closed nondisplaced fracture of right pubis [facility])  Assessment & Plan  Right inferior pubic ramus and right pubic symphysis   WBAT  PMR consult Pending  PT/OT consult  DVT Prophylaxis       Closed nondisplaced fracture of anterior wall of right acetabulum (HCC)  Assessment & Plan  NWB bilateral lower extremity pending ortho eval  Pain management  DVT prophylaxis     Sjogren's disease ([facility] [address] )  Assessment & Plan  Resume home meds      * Fall  Assessment & Plan  · S/p fall down 7 stairs landing on right shoulder  · Above noted injuries - possible left acetabular fracture   · Right shoulder abrasion - local wound care  · Right shoulder/elbow/wrist pain without bony injury by xray - ice/heat elevate  · PT/OT eval  · Geriatric consult    SSCP  Assessment & Plan  Resolved spontaneously  EKG reviewed by Dr Ju Ramirez  Will check chest x-ray  Wean oxygen as tolerated      Disposition: ARF       SUBJECTIVE:  Chief Complaint: no complaints    Subjective:   Had SSCP last night which has resolved  No SOB  On 2 L NC      OBJECTIVE:     Meds/Allergies     Current Facility-Administered Medications:     acetaminophen (TYLENOL) tablet 975 mg, 975 mg, Oral, Q8H [facility], Annabella Lindquist PA-C, 975 mg at 02/24/21 3364    amLODIPine (NORVASC) tablet 2 5 mg, 2 5 mg, Oral, Daily, Annabella Lindquist PA-C, 2 5 mg at 02/24/21 0801    atenolol (TENORMIN) tablet 50 mg, 50 mg, Oral, Daily, Annabella Lindquist PA-C, 50 mg at 02/24/21 0801    atorvastatin (LIPITOR) tablet 10 mg, 10 mg, Oral, Daily, Annabella Lindquist PA-C, 10 mg at 02/24/21 0802    Diclofenac Sodium (VOLTAREN) 1 % topical gel 2 g, 2 g, Topical, 4x Daily, Annabella Lindquist PA-C, 2 g at 02/24/21 1201    docusate sodium (COLACE) capsule 100 mg, 100 mg, Oral, BID, Annabella Lindquist PA-C, 100 mg at 02/24/21 0801    enoxaparin (LOVENOX) subcutaneous injection 30 mg, 30 mg, Subcutaneous, Q12H [address], Annabella Lindquist PA-C, 30 mg at 02/24/21 0800    FLUoxetine (PROzac) capsule 20 mg, 20 mg, Oral, Daily, Annabella Lindquist PA-C, 20 mg at 86/48/37 8688    folic acid (FOLVITE) tablet 1 mg, 1 mg, Oral, Daily, Annabella Lindquist PA-C, 1 mg at 02/24/21 0801    gabapentin (NEURONTIN) capsule 100 mg, 100 mg, Oral, HS, Annabella Lindquist PA-C, 100 mg at 02/23/21 2036    HYDROmorphone (DILAUDID) injection 0 2 mg, 0 2 mg, Intravenous, Q4H PRN, Shweta Lindquist PA-C    levothyroxine tablet 75 mcg, 75 mcg, Oral, Early Morning, Annabella Lindquist PA-C, 75 mcg at 02/24/21 0620    lidocaine (LIDODERM) 5 % patch 2 patch, 2 patch, Topical, Daily, Annabella Lindquist PA-C, 2 patch at 02/24/21 0800    methocarbamol (ROBAXIN) tablet 500 mg, 500 mg, Oral, Q6H PRN, Annabella Lindquist PA-C, 500 mg at 02/23/21 1110    methotrexate tablet 20 mg, 20 mg, Oral, Weekly, Annabella Lindquist PA-C, 20 mg at 02/24/21 0804    metoclopramide (REGLAN) tablet 10 mg, 10 mg, Oral, TID AC, Annabella Lindquist PA-C, 10 mg at 02/24/21 1202    naloxone (NARCAN) 0 04 mg/mL syringe 0 04 mg, 0 04 mg, Intravenous, Q1MIN PRN, Shweta Lindquist PA-C    ondansetron (ZOFRAN) injection 4 mg, 4 mg, Intravenous, Q6H PRN, Annabella Lindquist PA-C, 4 mg at 02/23/21 2037    oxyCODONE (ROXICODONE) IR tablet 2 5 mg, 2 5 mg, Oral, Q4H PRN, Annabella Lindquist PA-C    oxyCODONE (ROXICODONE) IR tablet 5 mg, 5 mg, Oral, Q4H PRN, Annabella Lindquist PA-C, 5 mg at 02/23/21 2012    pantoprazole (PROTONIX) injection 40 mg, 40 mg, Intravenous, Q24H [address], Eboni Rodriguez, CHARLES, 40 mg at 02/24/21 1202    polyethylene glycol (MIRALAX) packet 17 g, 17 g, Oral, Daily, Annabella Lindquist PA-C     Vitals:   Vitals:    02/24/21 0700   BP: 129/93   Pulse: 71   Resp: 20   Temp: 98 3 °F (36 8 °C)   SpO2: 94% Intake/Output:  I/O       02/22 0701 - 02/23 0700 02/23 0701 - 02/24 0700 02/24 0701 - 02/25 0700    P  O    360    Total Intake(mL/kg)   360 (5 9)    Urine (mL/kg/hr)  650 (0 4)     Total Output  650     Net  -650 +360                  Nutrition/GI Proph/Bowel Reg: Regualr/colace    Physical Exam:   GENERAL APPEARANCE:  NAD  NEURO:  GCS 15  HEENT:  NC/AT  CV:  RRR, nontender  LUNGS:  CTAB  GI:  Soft nontender  :  Voiding  MSK:  Neurovascularly intact x4 FA ROM  x4  SKIN:  Warm and dry    Invasive Devices     Peripheral Intravenous Line            Peripheral IV 02/22/21 Left Antecubital 1 day                 Lab Results:   BMP/CMP: No results found for: SODIUM, K, CL, CO2, ANIONGAP, BUN, CREATININE, GLUCOSE, CALCIUM, AST, ALT, ALKPHOS, PROT, BILITOT, EGFR and CBC:   Lab Results   Component Value Date    WBC 8 80 02/24/2021    HGB 10 4 (L) 02/24/2021    HCT 33 9 (L) 02/24/2021    MCV 87 02/24/2021     02/24/2021    MCH 26 5 (L) 02/24/2021    MCHC 30 7 (L) 02/24/2021    RDW 19 7 (H) 02/24/2021    MPV 10 1 02/24/2021     Imaging/EKG Studies: Results: I have personally reviewed pertinent reports      Other Studies:    VTE Prophylaxis: Sequential compression device (Venodyne)  and Enoxaparin (Lovenox)

## 2021-02-24 NOTE — OCCUPATIONAL THERAPY NOTE
Occupational Therapy Progress Note     Patient Name: Alessandra Mejía  HMKOY'P Date: 2/24/2021  Problem List  Principal Problem:    Fall  Active Problems:    Sjogren's disease ([facility] [address] )    Closed nondisplaced fracture of anterior wall of right acetabulum [facility])    Closed nondisplaced fracture of right pubis ([facility] [address] )            02/24/21 1212   OT Last Visit   OT Visit Date 02/24/21   Note Type   Note Type Treatment   Restrictions/Precautions   Weight Bearing Precautions Per Order Yes   RLE Weight Bearing Per Order WBAT   LLE Weight Bearing Per Order WBAT   Other Precautions Chair Alarm; Bed Alarm; Fall Risk;Pain   Pain Assessment   Pain Assessment Tool 0-10   Pain Score 8   Pain Location/Orientation Orientation: Right;Location: Hip   ADL   Grooming Assistance 5  Supervision/Setup   Grooming Deficit Increased time to complete;Supervision/safety;Verbal cueing;Brushing hair   Grooming Comments Pt is L handed, able to complete with (S)  Pt does report that earlier in the day the PCA assisted with brushing teeth and had to assist with opening toothpaste due to pain in R hand   UB Dressing Assistance 5  Supervision/Setup   UB Dressing Deficit Increased time to complete;Supervision/safety;Verbal cueing   UB Dressing Comments donning gown   LB Dressing Assistance 3  Moderate Assistance   LB Dressing Deficit Increased time to complete;Supervision/safety;Verbal cueing; Thread LLE into pants;Pull up over hips   LB Dressing Comments Pt able to thread RLE into pants A with LLE to reach over toes  CGA in standing, able to pull over hips with A to pull up in back  Bed Mobility   Additional Comments OOB and in chair upon arrival   Transfers   Sit to Stand 4  Minimal assistance   Additional items Assist x 2; Increased time required;Verbal cues   Stand to Sit 4  Minimal assistance   Additional items Assist x 2; Increased time required;Verbal cues   Additional Comments use of RW, VC for hand placement   Functional Mobility   Functional Mobility 4  Minimal assistance   Additional Comments Ax2, forward ~5 ft with chair follow, use of RW and A to steer RW    Cognition   Overall Cognitive Status [facility]   Arousal/Participation Alert; Cooperative   Attention Attends with cues to redirect   Orientation Level Oriented X4   Memory Decreased recall of precautions;Decreased short term memory   Following Commands Follows one step commands with increased time or repetition   Comments Pt reports decreased STM at her baseline,   Activity Tolerance   Activity Tolerance Patient tolerated treatment well;Patient limited by fatigue   Medical Staff Made Aware JOSH Vega RN Serenity   Assessment   Assessment Pt agreeable to participate in skilled OT treatment session to address ADL retraining, functional transfer training, endurance training, patient/family training, equipment evaluation/education, activity engagement and activity tolerance  Pt seated in chair upon arrival  Pt reports fatigue, however motivated to participate in therapy  Pt completing LB dressing with overall mod A, a notable improvement from previous session  Pt able to thread RLE into pants and min A with LLE due to socks sticking to fabric  Pt able to complete sit to stand with min A x2 and min VC for hand placement and safety  In standing able to pull pants up and min A to pull up in back  CGA in standing while using BUE for task  Pt completing functional mobility with min A x2 with chair follow for safety  Pt demonstrating vast improvements from previous session, pt highly motivated and would benefit from continued skilled OT treatment, cont to recommend PAR on d/c  The patient's raw score on the AM-PAC Daily Activity inpatient short form is 16, standardized score is 35 96, less than 39 4  Patients at this level are likely to benefit from DC to post-acute rehabilitation services      Plan   Goal Expiration Date 03/09/21   OT Treatment Day 2   OT Frequency 3-5x/wk   Recommendation   OT Discharge Recommendation Post-Acute Rehabilitation Services   AM-PAC Daily Activity Inpatient   Lower Body Dressing 2   Bathing 2   Toileting 2   Upper Body Dressing 3   Grooming 3   Eating 4   Daily Activity Raw Score 16   Daily Activity Standardized Score (Calc for Raw Score >=11) 35 96   AM-PAC Applied Cognition Inpatient   Following a Speech/Presentation 3   Understanding Ordinary Conversation 3   Taking Medications 3   Remembering Where Things Are Placed or Put Away 2   Remembering List of 4-5 Errands 2   Taking Care of Complicated Tasks 2   Applied Cognition Raw Score 15   Applied Cognition Standardized Score 33 54          At the end of the session, all needs met and pt seated in bedside chair, chair alarm activated, call bell within reach and stool placed under pt's feet    Lyubov Connor OTR/L

## 2021-02-24 NOTE — PROGRESS NOTES
Progress Note - Geriatric Medicine   Darrell Beaur Rashelmi 78 y o  female MRN: [id number]  Unit/Bed#: S -01 Encounter: [id number]      Assessment/Plan:  1  Pubic fractures  - Patient reports pain on her right side when attempting to move her leg rated as 8/10  She has taken Oxycodone with relief   2  Constipation  - Patient needs to follow Miralax bowel regimen   3  Hypothyroidism  - patient needs updated TSH  4  Sjogren's disease  - stable  Recommendations:  1) Bowel protocol   2) Obtain TSH   3) Titrate O2 and maintain stats > 89%  Subjective:   Patient was pleasant and moving around more today  She would prefer to go home, CM is investigating options for inpatient rehab as was recommended by PMR  Review of Systems   Constitutional: Negative for chills and fever  HENT: Negative for ear pain and sore throat  Eyes: Negative for pain and visual disturbance  Respiratory: Negative for cough and shortness of breath  Cardiovascular: Negative for chest pain and palpitations  Gastrointestinal: Positive for constipation  Negative for abdominal pain and vomiting  Genitourinary: Negative for dysuria and hematuria  Musculoskeletal: Positive for gait problem  Negative for arthralgias and back pain  Right leg pain   Skin: Negative for color change and rash  Neurological: Negative for seizures and syncope  All other systems reviewed and are negative  Objective:     Vitals: Blood pressure 129/93, pulse 71, temperature 98 3 °F (36 8 °C), temperature source Oral, resp  rate 20, height 4' 11" (1 499 m), weight 61 2 kg (135 lb), last menstrual period 01/13/2005, SpO2 94 %, not currently breastfeeding  ,Body mass index is 27 27 kg/m²        Intake/Output Summary (Last 24 hours) at 2/24/2021 1245  Last data filed at 2/24/2021 0956  Gross per 24 hour   Intake 360 ml   Output 650 ml   Net -290 ml       Current Medications: Reviewed    Physical Exam:   Physical Exam  Vitals signs and nursing note reviewed  Constitutional:       General: She is not in acute distress  Appearance: She is well-developed  HENT:      Head: Normocephalic and atraumatic  Right Ear: Ear canal and external ear normal       Left Ear: Ear canal and external ear normal       Nose: Nose normal       Mouth/Throat:      Mouth: Mucous membranes are moist    Eyes:      Conjunctiva/sclera: Conjunctivae normal    Neck:      Musculoskeletal: Neck supple  Cardiovascular:      Rate and Rhythm: Normal rate and regular rhythm  Heart sounds: Normal heart sounds  No murmur  Pulmonary:      Effort: Pulmonary effort is normal  No respiratory distress  Breath sounds: Normal breath sounds  Abdominal:      Palpations: Abdomen is soft  Tenderness: There is no abdominal tenderness  Musculoskeletal:      Comments: Decreased LE ROM   Skin:     General: Skin is warm and dry  Neurological:      Mental Status: She is alert and oriented to person, place, and time  Mental status is at baseline  Psychiatric:         Mood and Affect: Mood normal          Behavior: Behavior normal          Thought Content: Thought content normal          Judgment: Judgment normal           Invasive Devices     Peripheral Intravenous Line            Peripheral IV 02/22/21 Left Antecubital 1 day                Lab, Imaging and other studies: I have personally reviewed pertinent reports

## 2021-02-25 PROCEDURE — 99232 SBSQ HOSP IP/OBS MODERATE 35: CPT | Performed by: INTERNAL MEDICINE

## 2021-02-25 PROCEDURE — 99232 SBSQ HOSP IP/OBS MODERATE 35: CPT | Performed by: SURGERY

## 2021-02-25 RX ADMIN — ACETAMINOPHEN 975 MG: 325 TABLET, FILM COATED ORAL at 22:18

## 2021-02-25 RX ADMIN — ATORVASTATIN CALCIUM 10 MG: 10 TABLET, FILM COATED ORAL at 08:56

## 2021-02-25 RX ADMIN — DOCUSATE SODIUM 100 MG: 100 CAPSULE, LIQUID FILLED ORAL at 17:41

## 2021-02-25 RX ADMIN — METOCLOPRAMIDE 10 MG: 10 TABLET ORAL at 17:42

## 2021-02-25 RX ADMIN — FLUOXETINE 20 MG: 20 CAPSULE ORAL at 08:57

## 2021-02-25 RX ADMIN — DICLOFENAC SODIUM 2 G: 10 GEL TOPICAL at 17:42

## 2021-02-25 RX ADMIN — ONDANSETRON 4 MG: 2 INJECTION INTRAMUSCULAR; INTRAVENOUS at 20:21

## 2021-02-25 RX ADMIN — OXYCODONE HYDROCHLORIDE 5 MG: 5 TABLET ORAL at 07:16

## 2021-02-25 RX ADMIN — DICLOFENAC SODIUM 2 G: 10 GEL TOPICAL at 22:18

## 2021-02-25 RX ADMIN — ACETAMINOPHEN 975 MG: 325 TABLET, FILM COATED ORAL at 06:04

## 2021-02-25 RX ADMIN — OXYCODONE HYDROCHLORIDE 5 MG: 5 TABLET ORAL at 20:21

## 2021-02-25 RX ADMIN — ENOXAPARIN SODIUM 30 MG: 30 INJECTION SUBCUTANEOUS at 20:21

## 2021-02-25 RX ADMIN — DICLOFENAC SODIUM 2 G: 10 GEL TOPICAL at 08:58

## 2021-02-25 RX ADMIN — LEVOTHYROXINE SODIUM 75 MCG: 75 TABLET ORAL at 06:05

## 2021-02-25 RX ADMIN — AMLODIPINE BESYLATE 2.5 MG: 2.5 TABLET ORAL at 08:57

## 2021-02-25 RX ADMIN — METHOCARBAMOL TABLETS 500 MG: 500 TABLET, COATED ORAL at 17:42

## 2021-02-25 RX ADMIN — FOLIC ACID 1 MG: 1 TABLET ORAL at 08:57

## 2021-02-25 RX ADMIN — METOCLOPRAMIDE 10 MG: 10 TABLET ORAL at 06:04

## 2021-02-25 RX ADMIN — ONDANSETRON 4 MG: 2 INJECTION INTRAMUSCULAR; INTRAVENOUS at 07:17

## 2021-02-25 RX ADMIN — GABAPENTIN 100 MG: 100 CAPSULE ORAL at 22:18

## 2021-02-25 RX ADMIN — ENOXAPARIN SODIUM 30 MG: 30 INJECTION SUBCUTANEOUS at 08:58

## 2021-02-25 RX ADMIN — ATENOLOL 50 MG: 25 TABLET ORAL at 08:57

## 2021-02-25 RX ADMIN — LIDOCAINE 5% 2 PATCH: 700 PATCH TOPICAL at 08:58

## 2021-02-25 RX ADMIN — DICLOFENAC SODIUM 2 G: 10 GEL TOPICAL at 13:27

## 2021-02-25 RX ADMIN — METOCLOPRAMIDE 10 MG: 10 TABLET ORAL at 13:27

## 2021-02-25 RX ADMIN — PANTOPRAZOLE SODIUM 40 MG: 40 TABLET, DELAYED RELEASE ORAL at 06:05

## 2021-02-25 NOTE — ASSESSMENT & PLAN NOTE
· Non-operative management  · WBAT BLE  · Pain management  · DVT prophylaxis with Lovenox  · PT/OT - post acute rehabilitation   Pending auth for ARC  · Follow-up in the office in 6 weeks with Dr Sri Villagran

## 2021-02-25 NOTE — PROGRESS NOTES
Progress Note - Neris Katz Viscomi 1955, 78 y o  female MRN: [id number]    Unit/Bed#: S -01 Encounter: [id number]    Primary Care Provider: Reji Turpin MD   Date and time admitted to hospital: 2/22/2021  5:25 PM        Closed nondisplaced fracture of right pubis [facility])  Assessment & Plan  · Right inferior pubic ramus and right pubic symphysis fracture, Non-operative management  · WBAT  · Appreciate PMR consult  · PT/OT - rehab  Pending auth for ARC  · DVT Prophylaxis Lovenox  · Follow-up in the office in 6 weeks with Dr Armaan Cochran    Closed nondisplaced fracture of anterior wall of right acetabulum [facility])  Assessment & Plan  · Non-operative management  · WBAT BLE  · Pain management  · DVT prophylaxis with Lovenox  · PT/OT - post acute rehabilitation  Pending auth for ARC  · Follow-up in the office in 6 weeks with Dr Armaan Cochran    Sjogren's disease [facility])  Assessment & Plan  Resume home meds      * Fall  Assessment & Plan  · S/p fall down 7 stairs landing on right shoulder  · Above noted injuries - possible left acetabular fracture   · Right shoulder abrasion - local wound care  · Right shoulder/elbow/wrist pain without acute osseous injury - ice/heat elevate  · PT/OT evaluation and treatment  · Appreciate Geriatric consult  · Pending authorization for ARC    DVT PPT: Lovenox, SCD  PT and OT: Post-acute rehabilitation    Disposition:  Stable for discharge  Continue med surg level of care, pending authorization for ARC  Follow-up with Orthopedics outpatient in 6 weeks      SUBJECTIVE:  Chief Complaint: "I feel good"    Subjective:  Patient reports that she feels good today, her right sided leg injury causes pain with movement but stable at rest   She admits that she requires help to get up and go to the bathroom and she feels good about going to a rehab facility  She admits that she had chest pain yesterday that went away on its own and has not returned    Patient denies headaches, vision changes, neck pain, chest pain, shortness of breath, abdominal pain, numbness or tingling        OBJECTIVE:     Meds/Allergies     Current Facility-Administered Medications:     acetaminophen (TYLENOL) tablet 975 mg, 975 mg, Oral, Q8H [address], Annabella Lindquist PA-C, 975 mg at 02/25/21 0604    amLODIPine (NORVASC) tablet 2 5 mg, 2 5 mg, Oral, Daily, Annabella Lindquist PA-C, 2 5 mg at 02/25/21 0857    atenolol (TENORMIN) tablet 50 mg, 50 mg, Oral, Daily, Annabella Lindquist PA-C, 50 mg at 02/25/21 0857    atorvastatin (LIPITOR) tablet 10 mg, 10 mg, Oral, Daily, Annabella Lindquist PA-C, 10 mg at 02/25/21 2037    Diclofenac Sodium (VOLTAREN) 1 % topical gel 2 g, 2 g, Topical, 4x Daily, Annabella Lindquist PA-C, 2 g at 02/25/21 0858    docusate sodium (COLACE) capsule 100 mg, 100 mg, Oral, BID, Annabella Lindquist PA-C, 100 mg at 02/24/21 0801    enoxaparin (LOVENOX) subcutaneous injection 30 mg, 30 mg, Subcutaneous, Q12H [address], Annabella Lindquist PA-C, 30 mg at 02/25/21 4993    FLUoxetine (PROzac) capsule 20 mg, 20 mg, Oral, Daily, Annabella Lindquist PA-C, 20 mg at 04/84/31 8829    folic acid (FOLVITE) tablet 1 mg, 1 mg, Oral, Daily, Annabella Lindquist PA-C, 1 mg at 02/25/21 0857    gabapentin (NEURONTIN) capsule 100 mg, 100 mg, Oral, HS, Annabella Lindquist PA-C, 100 mg at 02/24/21 2104    HYDROmorphone (DILAUDID) injection 0 2 mg, 0 2 mg, Intravenous, Q4H PRN, Horacio Lindquist PA-C    levothyroxine tablet 75 mcg, 75 mcg, Oral, Early Morning, Annabella Lindquist PA-C, 75 mcg at 02/25/21 0605    lidocaine (LIDODERM) 5 % patch 2 patch, 2 patch, Topical, Daily, Annabella Lindquist PA-C, 2 patch at 02/25/21 0858    methocarbamol (ROBAXIN) tablet 500 mg, 500 mg, Oral, Q6H PRN, Annabella Lindquist PA-C, 500 mg at 02/23/21 1110    methotrexate tablet 20 mg, 20 mg, Oral, Weekly, Annabella Lindquist PA-C, 20 mg at 02/24/21 0804    metoclopramide (REGLAN) tablet 10 mg, 10 mg, Oral, TID AC, Annabella Lindquist PA-C, 10 mg at 02/25/21 0604    naloxone (NARCAN) 0 04 mg/mL syringe 0 04 mg, 0 04 mg, Intravenous, Q1MIN PRN, Annabella Lindquist PA-C    ondansetron (ZOFRAN) injection 4 mg, 4 mg, Intravenous, Q6H PRN, Annabella Lindquist PA-C, 4 mg at 02/25/21 0717    oxyCODONE (ROXICODONE) IR tablet 2 5 mg, 2 5 mg, Oral, Q4H PRN, Annabella Lindquist PA-C    oxyCODONE (ROXICODONE) IR tablet 5 mg, 5 mg, Oral, Q4H PRN, Annabella Lindquist PA-C, 5 mg at 02/25/21 0716    pantoprazole (PROTONIX) EC tablet 40 mg, 40 mg, Oral, Early Morning, Ponce Poe MD, 40 mg at 02/25/21 0605    polyethylene glycol (MIRALAX) packet 17 g, 17 g, Oral, Daily, Annabella Lindquist PA-C     Vitals:   Vitals:    02/25/21 0700   BP: 153/67   Pulse: 74   Resp: 18   Temp: 98 6 °F (37 °C)   SpO2: 96%       Intake/Output:  I/O       02/23 0701 - 02/24 0700 02/24 0701 - 02/25 0700 02/25 0701 - 02/26 0700    P  O   360     Total Intake(mL/kg)  360 (5 9)     Urine (mL/kg/hr) 650 (0 4) 950 (0 6)     Total Output 650 950     Net -650 -590                   Nutrition/GI Proph/Bowel Reg: Colace, Miralax    Physical Exam:   GENERAL APPEARANCE:  no acute distress, sitting in bed comfortably  NEURO:  GCS 15, alert and oriented x3, no focal deficits  HEENT:  Normocephalic, atraumatic  Bruise and tenderness on right temporal scalp  Visual aids in place, EOMI, PERRLA  CV:  Regular heart rate and rhythm, no murmurs, rubs, gallops  LUNGS:  CTAB, nonlabored breathing  GI:  Nontender, nondistended  :  Voiding, purewick in place  MSK:  Proximal right lower extremity tenderness to palpation, minimal swelling, no ecchymosis or lesions, Lidoderm patch in place   +2 DP/PT pulses bilaterally, neurovascularly intact  SKIN:  Pink warm dry    Invasive Devices     Peripheral Intravenous Line            Peripheral IV 02/22/21 Left Antecubital 2 days                 Lab Results: BMP/CMP: No results found for: SODIUM, K, CL, CO2, ANIONGAP, BUN, CREATININE, GLUCOSE, CALCIUM, AST, ALT, ALKPHOS, PROT, BILITOT, EGFR and CBC: No results found for: WBC, HGB, HCT, MCV, PLT, ADJUSTEDWBC, MCH, MCHC, RDW, MPV, NRBC  Imaging/EKG Studies:  XR chest portable   Final Result by Forrest Arvizu DO (02/24 1416)      No acute cardiopulmonary disease  Workstation performed: [id number]         CT chest abdomen pelvis w contrast   Final Result by Connor Belle MD (02/22 1902)      Numerous bilateral pelvic fractures  No other signs of injury within the chest abdomen or pelvis  I personally discussed this study with Eitan Eleni on 2/22/2021 at 7:02 PM                 Workstation performed: [id number]         XR shoulder 2+ views RIGHT   ED Interpretation by Elijah Roger MD (02/22 1817)   This film was interpreted independently by me  No fracture or dislocation  Final Result by Chance Dewitt MD (02/22 1800)      No acute osseous abnormality  Workstation performed: [id number]         XR wrist 3+ vw right   ED Interpretation by Elijah Roger MD (02/22 1817)   This film was interpreted independently by me  No fracture or dislocation  Final Result by Chance Dewitt MD (02/22 1759)      No acute osseous abnormality              Workstation performed: [id number]

## 2021-02-25 NOTE — PROGRESS NOTES
Progress Note - Geriatric Medicine   Tiny Heather Fine 78 y o  female MRN: [id number]  Unit/Bed#: S -01 Encounter: [id number]      Assessment/Plan:  1  Pubic Rami fractures  - Patient reports 8/10 pain in right leg today, aggravated by movement, though on exam she does not appear to be in that level of pain  2  Constipation  - Pt had bowel movement yesterday  3  Hypothyroidism  - Recommend updated TSH  4  Sjogren's disease  - stable  Recommendations  1  Obtain updated TSH  2  Awaiting rehab placement  Subjective:   Patient is reporting 8/10 r leg pain and difficulty sleeping due to noise at night  Review of Systems   Constitutional: Positive for fatigue  Negative for chills and fever  Difficulty sleeping  HENT: Negative for ear pain and sore throat  Eyes: Negative for pain and visual disturbance  Respiratory: Negative for cough and shortness of breath  Cardiovascular: Negative for chest pain and palpitations  Gastrointestinal: Positive for nausea  Negative for abdominal pain and vomiting  Genitourinary: Negative for dysuria and hematuria  Musculoskeletal: Negative for arthralgias and back pain  Skin: Negative for color change and rash  Neurological: Negative for seizures and syncope  All other systems reviewed and are negative  Objective:     Vitals: Blood pressure 153/67, pulse 74, temperature 98 6 °F (37 °C), temperature source Oral, resp  rate 18, height 4' 11" (1 499 m), weight 61 2 kg (135 lb), last menstrual period 01/13/2005, SpO2 96 %, not currently breastfeeding  ,Body mass index is 27 27 kg/m²  Intake/Output Summary (Last 24 hours) at 2/25/2021 1347  Last data filed at 2/25/2021 0601  Gross per 24 hour   Intake --   Output 950 ml   Net -950 ml       Current Medications: Reviewed    Physical Exam:   Physical Exam  Vitals signs and nursing note reviewed  Constitutional:       General: She is not in acute distress       Appearance: She is well-developed  HENT:      Head: Normocephalic and atraumatic  Right Ear: Ear canal and external ear normal       Left Ear: Ear canal and external ear normal       Nose: Nose normal       Mouth/Throat:      Mouth: Mucous membranes are moist    Eyes:      Conjunctiva/sclera: Conjunctivae normal    Neck:      Musculoskeletal: Neck supple  Cardiovascular:      Rate and Rhythm: Normal rate and regular rhythm  Heart sounds: Normal heart sounds  No murmur  Pulmonary:      Effort: Pulmonary effort is normal  No respiratory distress  Breath sounds: Normal breath sounds  Abdominal:      Palpations: Abdomen is soft  Tenderness: There is no abdominal tenderness  Skin:     General: Skin is warm and dry  Neurological:      General: No focal deficit present  Mental Status: She is alert and oriented to person, place, and time  Psychiatric:         Mood and Affect: Mood normal          Behavior: Behavior normal          Thought Content: Thought content normal          Judgment: Judgment normal           Invasive Devices     Peripheral Intravenous Line            Peripheral IV 02/22/21 Left Antecubital 2 days                Lab, Imaging and other studies: I have personally reviewed pertinent reports

## 2021-02-25 NOTE — CASE MANAGEMENT
CM informed by [facility] at [facility], that Patient's [place] is still being reviewed  Facility sent in updated clinicals  CM met with the Patient and her spouse to explain that pre-cert is still in review  CM explained that insurance has up to 72 hours to complete Auth  CM discussed the possibility of needing additional referral choices if insurance denies [place]  Patient and her spouse verbalize their understanding, but remain hopeful that [place] will be approved  CM dept will continue to follow and update Patient and family as able

## 2021-02-25 NOTE — ASSESSMENT & PLAN NOTE
· Right inferior pubic ramus and right pubic symphysis fracture, Non-operative management  · WBAT  · Appreciate PMR consult  · PT/OT - rehab   Pending auth for [facility]  · DVT Prophylaxis Lovenox  · Follow-up in the office in 6 weeks with Dr Rafa Cowart

## 2021-02-25 NOTE — ASSESSMENT & PLAN NOTE
· S/p fall down 7 stairs landing on right shoulder  · Above noted injuries - possible left acetabular fracture   · Right shoulder abrasion - local wound care  · Right shoulder/elbow/wrist pain without acute osseous injury - ice/heat elevate  · PT/OT evaluation and treatment  · Appreciate Geriatric consult  · Pending authorization for [facility]

## 2021-02-26 PROCEDURE — 97530 THERAPEUTIC ACTIVITIES: CPT

## 2021-02-26 PROCEDURE — 99232 SBSQ HOSP IP/OBS MODERATE 35: CPT | Performed by: INTERNAL MEDICINE

## 2021-02-26 PROCEDURE — 99232 SBSQ HOSP IP/OBS MODERATE 35: CPT | Performed by: SURGERY

## 2021-02-26 RX ORDER — AMLODIPINE BESYLATE 2.5 MG/1
2.5 TABLET ORAL DAILY
Status: DISCONTINUED | OUTPATIENT
Start: 2021-02-27 | End: 2021-02-27 | Stop reason: HOSPADM

## 2021-02-26 RX ORDER — LABETALOL 20 MG/4 ML (5 MG/ML) INTRAVENOUS SYRINGE
5 ONCE
Status: COMPLETED | OUTPATIENT
Start: 2021-02-26 | End: 2021-02-26

## 2021-02-26 RX ORDER — ALPRAZOLAM 0.5 MG/1
1 TABLET ORAL 3 TIMES DAILY PRN
Status: DISCONTINUED | OUTPATIENT
Start: 2021-02-26 | End: 2021-02-27 | Stop reason: HOSPADM

## 2021-02-26 RX ORDER — LEVOTHYROXINE SODIUM 88 UG/1
88 TABLET ORAL
Status: DISCONTINUED | OUTPATIENT
Start: 2021-02-27 | End: 2021-02-27 | Stop reason: HOSPADM

## 2021-02-26 RX ORDER — AMLODIPINE BESYLATE 5 MG/1
5 TABLET ORAL DAILY
Status: DISCONTINUED | OUTPATIENT
Start: 2021-02-27 | End: 2021-02-26

## 2021-02-26 RX ORDER — ATENOLOL 50 MG/1
50 TABLET ORAL 2 TIMES DAILY
Status: DISCONTINUED | OUTPATIENT
Start: 2021-02-26 | End: 2021-02-27 | Stop reason: HOSPADM

## 2021-02-26 RX ORDER — LABETALOL 20 MG/4 ML (5 MG/ML) INTRAVENOUS SYRINGE
10 EVERY 6 HOURS PRN
Status: DISCONTINUED | OUTPATIENT
Start: 2021-02-26 | End: 2021-02-27 | Stop reason: HOSPADM

## 2021-02-26 RX ADMIN — LEVOTHYROXINE SODIUM 75 MCG: 75 TABLET ORAL at 05:35

## 2021-02-26 RX ADMIN — FOLIC ACID 1 MG: 1 TABLET ORAL at 08:33

## 2021-02-26 RX ADMIN — AMLODIPINE BESYLATE 2.5 MG: 2.5 TABLET ORAL at 08:33

## 2021-02-26 RX ADMIN — ACETAMINOPHEN 975 MG: 325 TABLET, FILM COATED ORAL at 21:13

## 2021-02-26 RX ADMIN — FLUOXETINE 20 MG: 20 CAPSULE ORAL at 08:32

## 2021-02-26 RX ADMIN — PANTOPRAZOLE SODIUM 40 MG: 40 TABLET, DELAYED RELEASE ORAL at 05:35

## 2021-02-26 RX ADMIN — DICLOFENAC SODIUM 2 G: 10 GEL TOPICAL at 08:33

## 2021-02-26 RX ADMIN — OXYCODONE HYDROCHLORIDE 5 MG: 5 TABLET ORAL at 16:19

## 2021-02-26 RX ADMIN — METOCLOPRAMIDE 10 MG: 10 TABLET ORAL at 12:26

## 2021-02-26 RX ADMIN — ALPRAZOLAM 1 MG: 0.5 TABLET ORAL at 21:32

## 2021-02-26 RX ADMIN — DOCUSATE SODIUM 100 MG: 100 CAPSULE, LIQUID FILLED ORAL at 17:34

## 2021-02-26 RX ADMIN — ATORVASTATIN CALCIUM 10 MG: 10 TABLET, FILM COATED ORAL at 08:32

## 2021-02-26 RX ADMIN — DICLOFENAC SODIUM 2 G: 10 GEL TOPICAL at 12:26

## 2021-02-26 RX ADMIN — METOCLOPRAMIDE 10 MG: 10 TABLET ORAL at 05:35

## 2021-02-26 RX ADMIN — GABAPENTIN 100 MG: 100 CAPSULE ORAL at 21:12

## 2021-02-26 RX ADMIN — ATENOLOL 50 MG: 25 TABLET ORAL at 08:32

## 2021-02-26 RX ADMIN — LIDOCAINE 5% 2 PATCH: 700 PATCH TOPICAL at 08:33

## 2021-02-26 RX ADMIN — ENOXAPARIN SODIUM 30 MG: 30 INJECTION SUBCUTANEOUS at 20:16

## 2021-02-26 RX ADMIN — METOCLOPRAMIDE 10 MG: 10 TABLET ORAL at 16:18

## 2021-02-26 RX ADMIN — ATENOLOL 50 MG: 50 TABLET ORAL at 21:14

## 2021-02-26 RX ADMIN — ENOXAPARIN SODIUM 30 MG: 30 INJECTION SUBCUTANEOUS at 08:33

## 2021-02-26 RX ADMIN — LABETALOL 20 MG/4 ML (5 MG/ML) INTRAVENOUS SYRINGE 5 MG: at 16:53

## 2021-02-26 RX ADMIN — ACETAMINOPHEN 975 MG: 325 TABLET, FILM COATED ORAL at 05:35

## 2021-02-26 RX ADMIN — DICLOFENAC SODIUM 2 G: 10 GEL TOPICAL at 23:30

## 2021-02-26 RX ADMIN — DICLOFENAC SODIUM 2 G: 10 GEL TOPICAL at 17:28

## 2021-02-26 NOTE — ASSESSMENT & PLAN NOTE
· Right inferior pubic ramus and right pubic symphysis fracture, Non-operative management  · WBAT  · Appreciate PMR consult  · PT/OT - rehab   Pending auth for [facility]  · DVT Prophylaxis Lovenox  · Follow-up in the office in 6 weeks with Dr Mary Carney

## 2021-02-26 NOTE — PLAN OF CARE
Problem: PHYSICAL THERAPY ADULT  Goal: Performs mobility at highest level of function for planned discharge setting  See evaluation for individualized goals  Description: Treatment/Interventions: Functional transfer training, LE strengthening/ROM, Therapeutic exercise, Endurance training, Equipment eval/education, Bed mobility, Gait training, Cognitive reorientation, Patient/family training, Continued evaluation, Spoke to nursing, Spoke to case management, Spoke to advanced practitioner  Equipment Recommended: (trial quick move --> transition into short rolling walker)       See flowsheet documentation for full assessment, interventions and recommendations  Outcome: Progressing  Note: Prognosis: Fair  Problem List: Decreased strength, Decreased range of motion, Decreased endurance, Impaired balance, Decreased mobility, Decreased coordination, Decreased cognition, Impaired judgement, Decreased safety awareness, Obesity, Orthopedic restrictions, Pain  Assessment: Patient agreeable to participate in therapy session however requires some encouragement initally  Supine to sit with max ax2 and instruction for technique  Tolerated sitting EOB x 15 minutes with close supervision and times of CGA  Sit<>stand x 1 with min/CGA ax2  Pt becoming dizzy remained sitting EOB with /85, returned patient supine in bed with dependent x2  BP reassessed 193/84, nursing present  Continue to focus on OOB mobility with progression of transfers and ambulation to maximize functional independence as appropriate and able  Barriers to Discharge: Decreased caregiver support, Inaccessible home environment     PT Discharge Recommendation: [id number]          See flowsheet documentation for full assessment

## 2021-02-26 NOTE — PLAN OF CARE
Problem: Potential for Falls  Goal: Patient will remain free of falls  Description: INTERVENTIONS:  - Assess patient frequently for physical needs  -  Identify cognitive and physical deficits and behaviors that affect risk of falls  -  [place] fall precautions as indicated by assessment   - Educate patient/family on patient safety including physical limitations  - Instruct patient to call for assistance with activity based on assessment  - Modify environment to reduce risk of injury  - Consider OT/PT consult to assist with strengthening/mobility  Outcome: Progressing     Problem: Prexisting or High Potential for Compromised Skin Integrity  Goal: Skin integrity is maintained or improved  Description: INTERVENTIONS:  - Identify patients at risk for skin breakdown  - Assess and monitor skin integrity  - Assess and monitor nutrition and hydration status  - Monitor labs   - Assess for incontinence   - Turn and reposition patient  - Assist with mobility/ambulation  - Relieve pressure over bony prominences  - Avoid friction and shearing  - Provide appropriate hygiene as needed including keeping skin clean and dry  - Evaluate need for skin moisturizer/barrier cream  - Collaborate with interdisciplinary team   - Patient/family teaching  - Consider wound care consult   Outcome: Progressing     Problem: Nutrition/Hydration-ADULT  Goal: Nutrient/Hydration intake appropriate for improving, restoring or maintaining nutritional needs  Description: Monitor and assess patient's nutrition/hydration status for malnutrition  Collaborate with interdisciplinary team and initiate plan and interventions as ordered  Monitor patient's weight and dietary intake as ordered or per policy  Utilize nutrition screening tool and intervene as necessary  Determine patient's food preferences and provide high-protein, high-caloric foods as appropriate       INTERVENTIONS:  - Monitor oral intake, urinary output, labs, and treatment plans  - Assess nutrition and hydration status and recommend course of action  - Evaluate amount of meals eaten  - Assist patient with eating if necessary   - Allow adequate time for meals  - Recommend/ encourage appropriate diets, oral nutritional supplements, and vitamin/mineral supplements  - Order, calculate, and assess calorie counts as needed  - Recommend, monitor, and adjust tube feedings and TPN/PPN based on assessed needs  - Assess need for intravenous fluids  - Provide specific nutrition/hydration education as appropriate  - Include patient/family/caregiver in decisions related to nutrition  Outcome: Progressing     Problem: PAIN - ADULT  Goal: Verbalizes/displays adequate comfort level or baseline comfort level  Description: Interventions:  - Encourage patient to monitor pain and request assistance  - Assess pain using appropriate pain scale  - Administer analgesics based on type and severity of pain and evaluate response  - Implement non-pharmacological measures as appropriate and evaluate response  - Consider cultural and social influences on pain and pain management  - Notify physician/advanced practitioner if interventions unsuccessful or patient reports new pain  Outcome: Progressing     Problem: SAFETY ADULT  Goal: Patient will remain free of falls  Description: INTERVENTIONS:  - Assess patient frequently for physical needs  -  Identify cognitive and physical deficits and behaviors that affect risk of falls    -  [place] fall precautions as indicated by assessment   - Educate patient/family on patient safety including physical limitations  - Instruct patient to call for assistance with activity based on assessment  - Modify environment to reduce risk of injury  - Consider OT/PT consult to assist with strengthening/mobility  Outcome: Progressing  Goal: Maintain or return to baseline ADL function  Description: INTERVENTIONS:  -  Assess patient's ability to carry out ADLs; assess patient's baseline for ADL function and identify physical deficits which impact ability to perform ADLs (bathing, care of mouth/teeth, toileting, grooming, dressing, etc )  - Assess/evaluate cause of self-care deficits   - Assess range of motion  - Assess patient's mobility; develop plan if impaired  - Assess patient's need for assistive devices and provide as appropriate  - Encourage maximum independence but intervene and supervise when necessary  - Involve family in performance of ADLs  - Assess for home care needs following discharge   - Consider OT consult to assist with ADL evaluation and planning for discharge  - Provide patient education as appropriate  Outcome: Progressing  Goal: Maintain or return mobility status to optimal level  Description: INTERVENTIONS:  - Assess patient's baseline mobility status (ambulation, transfers, stairs, etc )    - Identify cognitive and physical deficits and behaviors that affect mobility  - Identify mobility aids required to assist with transfers and/or ambulation (gait belt, sit-to-stand, lift, walker, cane, etc )  - [place] fall precautions as indicated by assessment  - Record patient progress and toleration of activity level on Mobility SBAR; progress patient to next Phase/Stage  - Instruct patient to call for assistance with activity based on assessment  - Consider rehabilitation consult to assist with strengthening/weightbearing, etc   Outcome: Progressing     Problem: DISCHARGE PLANNING  Goal: Discharge to home or other facility with appropriate resources  Description: INTERVENTIONS:  - Identify barriers to discharge w/patient and caregiver  - Arrange for needed discharge resources and transportation as appropriate  - Identify discharge learning needs (meds, wound care, etc )  - Arrange for interpretive services to assist at discharge as needed  - Refer to Case Management Department for coordinating discharge planning if the patient needs post-hospital services based on physician/advanced practitioner order or complex needs related to functional status, cognitive ability, or social support system  Outcome: Progressing     Problem: MUSCULOSKELETAL - ADULT  Goal: Maintain or return mobility to safest level of function  Description: INTERVENTIONS:  - Assess patient's ability to carry out ADLs; assess patient's baseline for ADL function and identify physical deficits which impact ability to perform ADLs (bathing, care of mouth/teeth, toileting, grooming, dressing, etc )  - Assess/evaluate cause of self-care deficits   - Assess range of motion  - Assess patient's mobility  - Assess patient's need for assistive devices and provide as appropriate  - Encourage maximum independence but intervene and supervise when necessary  - Involve family in performance of ADLs  - Assess for home care needs following discharge   - Consider OT consult to assist with ADL evaluation and planning for discharge  - Provide patient education as appropriate  Outcome: Progressing  Goal: Maintain proper alignment of affected body part  Description: INTERVENTIONS:  - Support, maintain and protect limb and body alignment  - Provide patient/ family with appropriate education  Outcome: Progressing

## 2021-02-26 NOTE — PROGRESS NOTES
Pt lying in bed watching TV, HOB elevated, pt appears comfortable, no needs at this time, agree with prior nurse assessment, call bell within reach, will continue to monitor

## 2021-02-26 NOTE — ASSESSMENT & PLAN NOTE
· S/p fall down 7 stairs landing on right shoulder  · Above noted injuries - possible left acetabular fracture   · Right shoulder abrasion - local wound care  · Right shoulder/elbow/wrist pain without acute osseous injury - ice/heat elevate  · PT/OT evaluation and treatment  · Appreciate Gerontology consult  · Pending authorization for [facility]

## 2021-02-26 NOTE — QUICK NOTE
Called to floor, patient up out of bed and had episode of blurry vision and not feeling right  B/P 180 - 190/ 70's  Placed back in bed and call to trauma  Upon arriving to room patient GCS - 15, no complaints of headache, nausea or vomiting  Pulse is 68 - 72 and regular  B/P recheck and coming down to 171/70  Will continue to monitor and check B/P q2 hours x4  Dose of Labetalol 5 mg given

## 2021-02-26 NOTE — PROGRESS NOTES
Progress Note - Geriatric Medicine   Meri Fine 78 y o  female MRN: [id number]  Unit/Bed#: S -01 Encounter: [id number]      Assessment/Plan:  1 -hypertension  -patient's blood pressure has been trending upward the last few days today her systolic was in the 968 range  Patient was given labetalol  Patient had been taking amlodipine 2 5 mg orally daily and atenolol 50 mg a day  Would recommend increasing her amlodipine to 5 mg a day  2 -fracture of right inferior pubic ramus and right pubic symphysis also fracture of anterior acetabular possible left anterior acetabular fracture  Pain management appears to be adequate  3 -hypothyroidism -on levothyroxine 75 mcg daily please check TSH  4 -depression -currently managed with 20 mg of Prozac    5 -ambulatory dysfunction -continue physical and occupational therapy     Recommendations    1 -increase amlodipine to 5 mg orally daily  2  -obtain TSH   Subjective:   Patient states she has some mild headache  She is able to move her arms and legs without difficulty  Review of Systems   Constitutional: Negative  HENT: Negative  Eyes: Negative  Cardiovascular: Negative  Gastrointestinal: Negative  Endocrine: Negative  Genitourinary: Negative  Musculoskeletal: Positive for gait problem  Skin: Negative  Allergic/Immunologic: Negative  Neurological: Positive for headaches  Hematological: Negative  Psychiatric/Behavioral: Negative  Objective:     Vitals: Blood pressure (!) 171/71, pulse 71, temperature (!) 97 4 °F (36 3 °C), temperature source Oral, resp  rate 18, height 4' 11" (1 499 m), weight 61 2 kg (135 lb), last menstrual period 01/13/2005, SpO2 94 %, not currently breastfeeding  ,Body mass index is 27 27 kg/m²        Intake/Output Summary (Last 24 hours) at 2/26/2021 7058  Last data filed at 2/26/2021 1226  Gross per 24 hour   Intake --   Output 1100 ml   Net -1100 ml       Current Medications: Reviewed    Physical Exam:   Physical Exam  Vitals signs and nursing note reviewed  Constitutional:       Appearance: Normal appearance  HENT:      Head: Normocephalic and atraumatic  Right Ear: Tympanic membrane, ear canal and external ear normal       Left Ear: Tympanic membrane and ear canal normal       Nose: Nose normal       Mouth/Throat:      Mouth: Mucous membranes are moist       Pharynx: Oropharynx is clear  Eyes:      Pupils: Pupils are equal, round, and reactive to light  Neck:      Musculoskeletal: Normal range of motion  Cardiovascular:      Rate and Rhythm: Normal rate and regular rhythm  Pulmonary:      Effort: Pulmonary effort is normal    Abdominal:      General: Abdomen is flat  Musculoskeletal: Normal range of motion  Neurological:      General: No focal deficit present  Mental Status: She is alert  Psychiatric:         Mood and Affect: Mood normal          Behavior: Behavior normal          Thought Content: Thought content normal           Invasive Devices     Peripheral Intravenous Line            Peripheral IV 02/22/21 Left Antecubital 4 days                Lab, Imaging and other studies: I have personally reviewed pertinent reports

## 2021-02-26 NOTE — CASE MANAGEMENT
CM informed by Priscila at [facility] that Patient's insurance has approved acute rehab  At this time, [facility] does not have any bed availability  [facility] may have bed availability this weekend, if Patient is agreeable to that campus  CM met with the Patient and her spouse to review insurance approval and current bed availability  Patient and her spouse, while preferring [facility], will accept at bed at [facility] to get the Patient the rehab that she needs  CM informed Priscila of the same  [facility] is requesting Patient be seen by OT in the AM      CM notified PT/OT dept of same to make Patient a priority for tomorrow  CM updated trauma on approval for rehab and bed availability  Patient has been hypertensive with blurry vision and is not medically clear for dc today  CM dept will continue to follow for bed availability and transfer to [facility]

## 2021-02-26 NOTE — PROGRESS NOTES
Progress Note - Karen Fine 1955, 78 y o  female MRN: [id number]    Unit/Bed#: S -01 Encounter: [id number]    Primary Care Provider: Jovany Song MD   Date and time admitted to hospital: 2/22/2021  5:25 PM        Closed nondisplaced fracture of right pubis [facility])  Assessment & Plan  · Right inferior pubic ramus and right pubic symphysis fracture, Non-operative management  · WBAT  · Appreciate PMR consult  · PT/OT - rehab  Pending auth for [facility]  · DVT Prophylaxis Lovenox  · Follow-up in the office in 6 weeks with Dr Sri Villagran    Closed nondisplaced fracture of anterior wall of right acetabulum [facility])  Assessment & Plan  · Non-operative management  · WBAT BLE  · Pain management  · DVT prophylaxis with Lovenox  · PT/OT - post acute rehabilitation  Pending auth for [facility]  · Follow-up in the office in 6 weeks with Dr Sri Villagran    Sjogren's disease [facility])  Assessment & Plan  Resume home meds      * Fall  Assessment & Plan  · S/p fall down 7 stairs landing on right shoulder  · Above noted injuries - possible left acetabular fracture   · Right shoulder abrasion - local wound care  · Right shoulder/elbow/wrist pain without acute osseous injury - ice/heat elevate  · PT/OT evaluation and treatment  · Appreciate Gerontology consult  · Pending authorization for [facility]C        DVT PPX: Lovenox, SCD  PT and OT: post acute rehabilitation    Disposition:  Continue med surge level of care, pain management, PT and OT evaluation and treatment  Patient stable for discharge, disposition to [place] pending authorization  SUBJECTIVE:  Chief Complaint: "My leg hurts"    Subjective:  Patient reports that her right leg is bothering her  She admits that she was just up to go to the bathroom and has pain with bearing weight  She admits that rest and pain medications help her  She denies numbness, tingling, swelling, headaches, neck pain, chest pain, shortness of breath, abdominal pain        OBJECTIVE:     Meds/Allergies     Current Facility-Administered Medications:     acetaminophen (TYLENOL) tablet 975 mg, 975 mg, Oral, Q8H [facility], Annabella Lindquist PA-C, 975 mg at 02/26/21 0535    amLODIPine (NORVASC) tablet 2 5 mg, 2 5 mg, Oral, Daily, Annabella Lindquist PA-C, 2 5 mg at 02/26/21 4880    atenolol (TENORMIN) tablet 50 mg, 50 mg, Oral, Daily, Annabella Lindquist PA-C, 50 mg at 02/26/21 3201    atorvastatin (LIPITOR) tablet 10 mg, 10 mg, Oral, Daily, Annabella Lindquist PA-C, 10 mg at 02/26/21 9902    Diclofenac Sodium (VOLTAREN) 1 % topical gel 2 g, 2 g, Topical, 4x Daily, Annabella Lindquist PA-C, 2 g at 02/26/21 9048    docusate sodium (COLACE) capsule 100 mg, 100 mg, Oral, BID, Annabella Lindquist PA-C, 100 mg at 02/25/21 1741    enoxaparin (LOVENOX) subcutaneous injection 30 mg, 30 mg, Subcutaneous, Q12H [facility], Annabella Lindquist PA-C, 30 mg at 02/26/21 2485    FLUoxetine (PROzac) capsule 20 mg, 20 mg, Oral, Daily, Annabella Lindquist PA-C, 20 mg at 25/04/82 8488    folic acid (FOLVITE) tablet 1 mg, 1 mg, Oral, Daily, Annabella Lindquist PA-C, 1 mg at 02/26/21 7610    gabapentin (NEURONTIN) capsule 100 mg, 100 mg, Oral, HS, Annabella Lindquist PA-C, 100 mg at 02/25/21 2218    HYDROmorphone (DILAUDID) injection 0 2 mg, 0 2 mg, Intravenous, Q4H PRN, Naina Lindquist PA-C    levothyroxine tablet 75 mcg, 75 mcg, Oral, Early Morning, Annabella Lindquist PA-C, 75 mcg at 02/26/21 0535    lidocaine (LIDODERM) 5 % patch 2 patch, 2 patch, Topical, Daily, Annabella Lindquist PA-C, 2 patch at 02/26/21 3437    methocarbamol (ROBAXIN) tablet 500 mg, 500 mg, Oral, Q6H PRN, Annabella Lindquist PA-C, 500 mg at 02/25/21 1742    methotrexate tablet 20 mg, 20 mg, Oral, Weekly, Annabella Lindquist PA-C, 20 mg at 02/24/21 0804    metoclopramide (REGLAN) tablet 10 mg, 10 mg, Oral, TID AC, Annabella Lindquist PA-C, 10 mg at 02/26/21 0535    naloxone (NARCAN) 0 04 mg/mL syringe 0 04 mg, 0 04 mg, Intravenous, Q1MIN PRN, Gigi Anthony, CHARLES    ondansetron (ZOFRAN) injection 4 mg, 4 mg, Intravenous, Q6H PRN, Annabella Lindquist PA-C, 4 mg at 02/25/21 2021    oxyCODONE (ROXICODONE) IR tablet 2 5 mg, 2 5 mg, Oral, Q4H PRN, Annabella Lindquist PA-C    oxyCODONE (ROXICODONE) IR tablet 5 mg, 5 mg, Oral, Q4H PRN, Annabella Lindquist PA-C, 5 mg at 02/25/21 2021    pantoprazole (PROTONIX) EC tablet 40 mg, 40 mg, Oral, Early Morning, Janna Luke MD, 40 mg at 02/26/21 0535    polyethylene glycol (MIRALAX) packet 17 g, 17 g, Oral, Daily, Annabella Lindquist PA-C     Vitals:   Vitals:    02/26/21 0700   BP: (!) 164/107   Pulse: 74   Resp: 16   Temp: 98 5 °F (36 9 °C)   SpO2: 94%       Intake/Output:  I/O       02/24 0701 - 02/25 0700 02/25 0701 - 02/26 0700 02/26 0701 - 02/27 0700    P  O  360      Total Intake(mL/kg) 360 (5 9)      Urine (mL/kg/hr) 950 (0 6)      Total Output 950      Net -590                    Nutrition/GI Proph/Bowel Reg:  Colace, MiraLax    Physical Exam:   GENERAL APPEARANCE:  No acute distress, sitting in bed comfortably  NEURO:  GCS 15, alert and oriented x3, no focal deficits  HEENT:  Normocephalic atraumatic  CV:  Regular heart rate and rhythm  LUNGS:  CTAB, nonlabored breathing  GI:  Nontender, nondistended  Bowel sounds normal  :  Voiding, intermittently incontinent of urine  MSK:  Tenderness to palpation of bilateral hips, more tender on right lower extremity  Ecchymosis to right hip area  No lower extremity edema  +2 DP/PT pulses bilaterally  SKIN:  Pink warm dry    Invasive Devices     Peripheral Intravenous Line            Peripheral IV 02/22/21 Left Antecubital 3 days                 Lab Results: BMP/CMP: No results found for: SODIUM, K, CL, CO2, ANIONGAP, BUN, CREATININE, GLUCOSE, CALCIUM, AST, ALT, ALKPHOS, PROT, BILITOT, EGFR and CBC: No results found for: WBC, HGB, HCT, MCV, PLT, ADJUSTEDWBC, MCH, MCHC, RDW, MPV, NRBC  Imaging/EKG Studies: Results: I have personally reviewed pertinent reports      Other Studies:   XR chest portable   Final Result by Jensen 6, DO (02/24 1416)      No acute cardiopulmonary disease  Workstation performed: [id number]         CT chest abdomen pelvis w contrast   Final Result by Mera Escobedo MD (02/22 1902)      Numerous bilateral pelvic fractures  No other signs of injury within the chest abdomen or pelvis  I personally discussed this study with Antony Weathers on 2/22/2021 at 7:02 PM                 Workstation performed: [id number]         XR shoulder 2+ views RIGHT   ED Interpretation by Gilberto Malone MD (02/22 1817)   This film was interpreted independently by me  No fracture or dislocation  Final Result by Kiara Jennings MD (02/22 1800)      No acute osseous abnormality  Workstation performed: [id number]         XR wrist 3+ vw right   ED Interpretation by Gilberto Malone MD (02/22 1817)   This film was interpreted independently by me  No fracture or dislocation  Final Result by Kiara Jennings MD (02/22 1759)      No acute osseous abnormality              Workstation performed: [id number]

## 2021-02-26 NOTE — ASSESSMENT & PLAN NOTE
· Non-operative management  · WBAT BLE  · Pain management  · DVT prophylaxis with Lovenox  · PT/OT - post acute rehabilitation   Pending auth for [facility]  · Follow-up in the office in 6 weeks with Dr Jad Teague

## 2021-02-26 NOTE — PHYSICAL THERAPY NOTE
PHYSICAL THERAPY NOTE    Patient Name: Nancy Connolly  [id number] Date: 2/26/2021 02/26/21 1542   PT Last Visit   PT Visit Date 02/26/21   Note Type   Note Type Treatment   Pain Assessment   Pain Assessment Tool 0-10   Pain Score 8   Pain Location/Orientation Location: Leg;Orientation: Right   Restrictions/Precautions   Weight Bearing Precautions Per Order Yes   RLE Weight Bearing Per Order WBAT   LLE Weight Bearing Per Order WBAT   Other Precautions Chair Alarm; Bed Alarm; Fall Risk;Pain   General   Family/Caregiver Present No   Subjective   Subjective Patient supine in bed and is agreeable to participate in therapy session  Patient identifers obtained from name & ID bracelet  Bed Mobility   Supine to Sit 2  Maximal assistance   Additional items Assist x 2;HOB elevated; Bedrails; Increased time required;Verbal cues;LE management   Sit to Supine 1  Dependent   Additional items Assist x 2;LE management   Additional Comments Pt supine in bed post session with bed alarm engaged and nursing present  Transfers   Sit to Stand 4  Minimal assistance   Additional items Assist x 2;Armrests; Increased time required;Verbal cues  (CGA of 1)   Stand to Sit 4  Minimal assistance   Additional items Assist x 2; Increased time required;Verbal cues   Balance   Static Sitting Fair +   Dynamic Sitting Fair   Static Standing Poor -   Endurance Deficit   Endurance Deficit Yes   Endurance Deficit Description limtied activity tolerance   Activity Tolerance   Activity Tolerance Patient limited by fatigue;Patient limited by pain;Treatment limited secondary to medical complications (Comment)  (increased /85 initally)   Nurse Made Aware Spoke to JORGE Senior   Assessment   Prognosis Fair   Problem List Decreased strength;Decreased range of motion;Decreased endurance; Impaired balance;Decreased mobility; Decreased coordination;Decreased cognition; Impaired judgement;Decreased safety awareness; Obesity;Orthopedic restrictions;Pain   Assessment Patient agreeable to participate in therapy session however requires some encouragement initally  Supine to sit with max ax2 and instruction for technique  Tolerated sitting EOB x 15 minutes with close supervision and times of CGA  Sit<>stand x 1 with min/CGA ax2  Pt becoming dizzy remained sitting EOB with /85, returned patient supine in bed with dependent x2  BP reassessed 193/84, nursing present  Continue to focus on OOB mobility with progression of transfers and ambulation to maximize functional independence as appropriate and able  Barriers to Discharge Decreased caregiver support; Inaccessible home environment   Goals   STG Expiration Date 03/05/21   PT Treatment Day 3   Plan   Treatment/Interventions Functional transfer training;LE strengthening/ROM; Therapeutic exercise; Endurance training;Equipment eval/education; Bed mobility;Gait training;Cognitive reorientation;Patient/family training;Continued evaluation;Spoke to nursing   PT Frequency Other (Comment)  (5-6x/week)   Recommendation   PT Discharge Recommendation Post-Acute Rehabilitation Services   Equipment Recommended Other (Comment)  (trial quick move --> transition into short rolling walker)       Gilbert Clemons, PTA

## 2021-02-27 ENCOUNTER — HOSPITAL ENCOUNTER (INPATIENT)
Facility: HOSPITAL | Age: 66
LOS: 5 days | DRG: 561 | End: 2021-03-04
Attending: PHYSICAL MEDICINE & REHABILITATION | Admitting: PHYSICAL MEDICINE & REHABILITATION
Payer: COMMERCIAL

## 2021-02-27 VITALS
OXYGEN SATURATION: 95 % | DIASTOLIC BLOOD PRESSURE: 64 MMHG | TEMPERATURE: 99.1 F | WEIGHT: 135 LBS | BODY MASS INDEX: 27.21 KG/M2 | SYSTOLIC BLOOD PRESSURE: 132 MMHG | HEIGHT: 59 IN | HEART RATE: 69 BPM | RESPIRATION RATE: 18 BRPM

## 2021-02-27 DIAGNOSIS — I10 HYPERTENSION: Primary | ICD-10-CM

## 2021-02-27 PROBLEM — S32.82XA MULTIPLE CLOSED FRACTURES OF PELVIS (HCC): Status: ACTIVE | Noted: 2021-02-27

## 2021-02-27 PROBLEM — M79.601 PAIN OF RIGHT UPPER EXTREMITY: Status: ACTIVE | Noted: 2021-02-27

## 2021-02-27 LAB
FLUAV RNA RESP QL NAA+PROBE: NEGATIVE
FLUBV RNA RESP QL NAA+PROBE: NEGATIVE
RSV RNA RESP QL NAA+PROBE: NEGATIVE
SARS-COV-2 RNA RESP QL NAA+PROBE: NEGATIVE

## 2021-02-27 PROCEDURE — 99238 HOSP IP/OBS DSCHRG MGMT 30/<: CPT | Performed by: SURGERY

## 2021-02-27 PROCEDURE — 97535 SELF CARE MNGMENT TRAINING: CPT

## 2021-02-27 PROCEDURE — 0241U HB NFCT DS VIR RESP RNA 4 TRGT: CPT | Performed by: PHYSICIAN ASSISTANT

## 2021-02-27 PROCEDURE — 97530 THERAPEUTIC ACTIVITIES: CPT

## 2021-02-27 PROCEDURE — 97116 GAIT TRAINING THERAPY: CPT

## 2021-02-27 PROCEDURE — NC001 PR NO CHARGE

## 2021-02-27 PROCEDURE — 99223 1ST HOSP IP/OBS HIGH 75: CPT | Performed by: PHYSICAL MEDICINE & REHABILITATION

## 2021-02-27 PROCEDURE — NC001 PR NO CHARGE: Performed by: SURGERY

## 2021-02-27 RX ORDER — OXYCODONE HYDROCHLORIDE 5 MG/1
2.5 TABLET ORAL EVERY 4 HOURS PRN
Status: DISCONTINUED | OUTPATIENT
Start: 2021-02-27 | End: 2021-03-04

## 2021-02-27 RX ORDER — POLYETHYLENE GLYCOL 3350 17 G/17G
17 POWDER, FOR SOLUTION ORAL DAILY
Status: DISCONTINUED | OUTPATIENT
Start: 2021-02-28 | End: 2021-03-03

## 2021-02-27 RX ORDER — ATENOLOL 50 MG/1
50 TABLET ORAL 2 TIMES DAILY
Status: CANCELLED | OUTPATIENT
Start: 2021-02-27

## 2021-02-27 RX ORDER — LIDOCAINE 50 MG/G
2 PATCH TOPICAL DAILY
Status: CANCELLED | OUTPATIENT
Start: 2021-02-28

## 2021-02-27 RX ORDER — OXYCODONE HYDROCHLORIDE 5 MG/1
5 TABLET ORAL EVERY 4 HOURS PRN
Status: CANCELLED | OUTPATIENT
Start: 2021-02-27

## 2021-02-27 RX ORDER — FOLIC ACID 1 MG/1
1 TABLET ORAL DAILY
Status: DISCONTINUED | OUTPATIENT
Start: 2021-02-28 | End: 2021-03-04 | Stop reason: HOSPADM

## 2021-02-27 RX ORDER — OXYCODONE HYDROCHLORIDE 5 MG/1
5 TABLET ORAL EVERY 4 HOURS PRN
Status: DISCONTINUED | OUTPATIENT
Start: 2021-02-27 | End: 2021-03-04

## 2021-02-27 RX ORDER — LEVOTHYROXINE SODIUM 88 UG/1
88 TABLET ORAL
Status: CANCELLED | OUTPATIENT
Start: 2021-02-28

## 2021-02-27 RX ORDER — OXYCODONE HYDROCHLORIDE 5 MG/1
2.5 TABLET ORAL EVERY 4 HOURS PRN
Status: CANCELLED | OUTPATIENT
Start: 2021-02-27

## 2021-02-27 RX ORDER — ATORVASTATIN CALCIUM 10 MG/1
10 TABLET, FILM COATED ORAL DAILY
Status: CANCELLED | OUTPATIENT
Start: 2021-02-28

## 2021-02-27 RX ORDER — POLYETHYLENE GLYCOL 3350 17 G/17G
17 POWDER, FOR SOLUTION ORAL DAILY PRN
Status: DISCONTINUED | OUTPATIENT
Start: 2021-02-27 | End: 2021-03-03

## 2021-02-27 RX ORDER — GABAPENTIN 100 MG/1
100 CAPSULE ORAL
Status: DISCONTINUED | OUTPATIENT
Start: 2021-02-27 | End: 2021-03-04 | Stop reason: HOSPADM

## 2021-02-27 RX ORDER — ATENOLOL 50 MG/1
50 TABLET ORAL 2 TIMES DAILY
Status: DISCONTINUED | OUTPATIENT
Start: 2021-02-27 | End: 2021-03-04 | Stop reason: HOSPADM

## 2021-02-27 RX ORDER — GABAPENTIN 100 MG/1
100 CAPSULE ORAL
Status: CANCELLED | OUTPATIENT
Start: 2021-02-27

## 2021-02-27 RX ORDER — LEVOTHYROXINE SODIUM 88 UG/1
88 TABLET ORAL
Status: DISCONTINUED | OUTPATIENT
Start: 2021-02-28 | End: 2021-03-04 | Stop reason: HOSPADM

## 2021-02-27 RX ORDER — AMLODIPINE BESYLATE 2.5 MG/1
2.5 TABLET ORAL DAILY
Status: DISCONTINUED | OUTPATIENT
Start: 2021-02-28 | End: 2021-03-04

## 2021-02-27 RX ORDER — LIDOCAINE 50 MG/G
2 PATCH TOPICAL DAILY
Status: DISCONTINUED | OUTPATIENT
Start: 2021-02-28 | End: 2021-02-28

## 2021-02-27 RX ORDER — POLYETHYLENE GLYCOL 3350 17 G/17G
17 POWDER, FOR SOLUTION ORAL DAILY
Status: CANCELLED | OUTPATIENT
Start: 2021-02-28

## 2021-02-27 RX ORDER — ONDANSETRON 4 MG/1
4 TABLET, ORALLY DISINTEGRATING ORAL EVERY 6 HOURS PRN
Status: DISCONTINUED | OUTPATIENT
Start: 2021-02-27 | End: 2021-03-04 | Stop reason: HOSPADM

## 2021-02-27 RX ORDER — DOCUSATE SODIUM 100 MG/1
100 CAPSULE, LIQUID FILLED ORAL 2 TIMES DAILY
Status: DISCONTINUED | OUTPATIENT
Start: 2021-02-27 | End: 2021-03-04 | Stop reason: HOSPADM

## 2021-02-27 RX ORDER — METHOCARBAMOL 500 MG/1
500 TABLET, FILM COATED ORAL EVERY 6 HOURS PRN
Status: CANCELLED | OUTPATIENT
Start: 2021-02-27

## 2021-02-27 RX ORDER — FLUOXETINE HYDROCHLORIDE 20 MG/1
20 CAPSULE ORAL DAILY
Status: DISCONTINUED | OUTPATIENT
Start: 2021-02-28 | End: 2021-03-04 | Stop reason: HOSPADM

## 2021-02-27 RX ORDER — BISACODYL 10 MG
10 SUPPOSITORY, RECTAL RECTAL DAILY PRN
Status: DISCONTINUED | OUTPATIENT
Start: 2021-02-27 | End: 2021-02-28

## 2021-02-27 RX ORDER — ALPRAZOLAM 0.5 MG/1
1 TABLET ORAL 3 TIMES DAILY PRN
Status: DISCONTINUED | OUTPATIENT
Start: 2021-02-27 | End: 2021-02-28

## 2021-02-27 RX ORDER — METHOCARBAMOL 500 MG/1
500 TABLET, FILM COATED ORAL EVERY 6 HOURS PRN
Status: DISCONTINUED | OUTPATIENT
Start: 2021-02-27 | End: 2021-03-04 | Stop reason: HOSPADM

## 2021-02-27 RX ORDER — ACETAMINOPHEN 325 MG/1
975 TABLET ORAL EVERY 8 HOURS SCHEDULED
Status: CANCELLED | OUTPATIENT
Start: 2021-02-27

## 2021-02-27 RX ORDER — ALPRAZOLAM 0.5 MG/1
1 TABLET ORAL 3 TIMES DAILY PRN
Status: CANCELLED | OUTPATIENT
Start: 2021-02-27

## 2021-02-27 RX ORDER — ACETAMINOPHEN 325 MG/1
650 TABLET ORAL EVERY 6 HOURS SCHEDULED
Status: DISCONTINUED | OUTPATIENT
Start: 2021-02-27 | End: 2021-02-28

## 2021-02-27 RX ORDER — FLUOXETINE HYDROCHLORIDE 20 MG/1
20 CAPSULE ORAL DAILY
Status: CANCELLED | OUTPATIENT
Start: 2021-02-28

## 2021-02-27 RX ORDER — ATORVASTATIN CALCIUM 10 MG/1
10 TABLET, FILM COATED ORAL DAILY
Status: DISCONTINUED | OUTPATIENT
Start: 2021-02-28 | End: 2021-03-04 | Stop reason: HOSPADM

## 2021-02-27 RX ORDER — AMLODIPINE BESYLATE 2.5 MG/1
2.5 TABLET ORAL DAILY
Status: CANCELLED | OUTPATIENT
Start: 2021-02-28

## 2021-02-27 RX ORDER — FOLIC ACID 1 MG/1
1 TABLET ORAL DAILY
Status: CANCELLED | OUTPATIENT
Start: 2021-02-28

## 2021-02-27 RX ORDER — DOCUSATE SODIUM 100 MG/1
100 CAPSULE, LIQUID FILLED ORAL 2 TIMES DAILY
Status: CANCELLED | OUTPATIENT
Start: 2021-02-27

## 2021-02-27 RX ADMIN — ONDANSETRON 4 MG: 2 INJECTION INTRAMUSCULAR; INTRAVENOUS at 09:22

## 2021-02-27 RX ADMIN — FLUOXETINE 20 MG: 20 CAPSULE ORAL at 09:23

## 2021-02-27 RX ADMIN — DICLOFENAC SODIUM 2 G: 10 GEL TOPICAL at 13:31

## 2021-02-27 RX ADMIN — ENOXAPARIN SODIUM 30 MG: 30 INJECTION SUBCUTANEOUS at 20:07

## 2021-02-27 RX ADMIN — OXYCODONE HYDROCHLORIDE 5 MG: 5 TABLET ORAL at 09:21

## 2021-02-27 RX ADMIN — ACETAMINOPHEN 975 MG: 325 TABLET, FILM COATED ORAL at 06:14

## 2021-02-27 RX ADMIN — GABAPENTIN 100 MG: 100 CAPSULE ORAL at 21:24

## 2021-02-27 RX ADMIN — OXYCODONE HYDROCHLORIDE 5 MG: 5 TABLET ORAL at 20:05

## 2021-02-27 RX ADMIN — ENOXAPARIN SODIUM 30 MG: 30 INJECTION SUBCUTANEOUS at 09:26

## 2021-02-27 RX ADMIN — ACETAMINOPHEN 650 MG: 325 TABLET ORAL at 17:02

## 2021-02-27 RX ADMIN — DICLOFENAC SODIUM 2 G: 10 GEL TOPICAL at 17:03

## 2021-02-27 RX ADMIN — DICLOFENAC SODIUM 2 G: 10 GEL TOPICAL at 09:25

## 2021-02-27 RX ADMIN — LEVOTHYROXINE SODIUM 88 MCG: 88 TABLET ORAL at 06:14

## 2021-02-27 RX ADMIN — ATENOLOL 50 MG: 50 TABLET ORAL at 20:07

## 2021-02-27 RX ADMIN — FOLIC ACID 1 MG: 1 TABLET ORAL at 09:23

## 2021-02-27 RX ADMIN — LIDOCAINE 5% 2 PATCH: 700 PATCH TOPICAL at 09:25

## 2021-02-27 RX ADMIN — ALPRAZOLAM 1 MG: 0.5 TABLET ORAL at 21:24

## 2021-02-27 RX ADMIN — DOCUSATE SODIUM 100 MG: 100 CAPSULE ORAL at 17:02

## 2021-02-27 RX ADMIN — ATENOLOL 50 MG: 50 TABLET ORAL at 09:25

## 2021-02-27 RX ADMIN — DICLOFENAC SODIUM 2 G: 10 GEL TOPICAL at 21:24

## 2021-02-27 NOTE — ASSESSMENT & PLAN NOTE
· S/p fall down 7 stairs landing on right shoulder  · Above noted injuries - possible left acetabular fracture   · Right shoulder abrasion - local wound care  · Right shoulder/elbow/wrist pain without acute osseous injury - ice/heat elevate  · PT/OT evaluation and treatment  · Gerontology consulted and note appreciated    · Dispo pending [facility] approval

## 2021-02-27 NOTE — PROGRESS NOTES
Pt's /78 at 1800  WHIT [place] with Trauma service notified  Per Zen Short- "As long as pt is not symptomatic, just administer PRN labatolol if SBP >180 " Pt is asymptomatic  BP will be rechecked at 2000

## 2021-02-27 NOTE — ASSESSMENT & PLAN NOTE
· Non-operative management  · WBAT BLE  · Pain management  · DVT prophylaxis with Lovenox  · PT/OT - post acute rehabilitation   Pending auth for [facility]  · Follow-up in the office in 6 weeks with Dr Cheryl Alvarez

## 2021-02-27 NOTE — INCIDENTAL FINDINGS
The following findings require follow up:  Radiographic finding   Findin mm left lower lobe subpleural nodule versus focal consolidation  Similar more inferior 8mm and 14 mm opacities in the lung  One or more subcentimeter sharply circumscribed low-density hepatic lesion(s) are noted, too small to accurately characterize, but statistically most likely to represent subcentimeter hepatic cysts      Calcified right retroperitoneal node measuring 21 mm   Follow up required: family doctor   Follow up should be done within 2 week(s)

## 2021-02-27 NOTE — PHYSICAL THERAPY NOTE
Physical Therapy Progress Note     21 0857   Note Type   Note Type Treatment   Pain Assessment   Pain Assessment Tool 0-10   Pain Score 8   Pain Location/Orientation Orientation: Right;Location: Hip   Restrictions/Precautions   Weight Bearing Precautions Per Order Yes   RLE Weight Bearing Per Order WBAT   LLE Weight Bearing Per Order WBAT   Other Precautions Chair Alarm; Bed Alarm;Multiple lines; Fall Risk;Pain   General   Chart Reviewed Yes   Response to Previous Treatment Patient with no complaints from previous session  Family/Caregiver Present No   Cognition   Overall Cognitive Status WFL   Arousal/Participation Alert; Cooperative   Attention Attends with cues to redirect   Orientation Level Oriented X4   Memory Decreased recall of precautions;Decreased short term memory   Following Commands Follows one step commands with increased time or repetition   Comments Pt was identified by name and , agreeable to treatment  Bed Mobility   Supine to Sit 3  Moderate assistance   Additional items Assist x 2   Transfers   Sit to Stand 4  Minimal assistance   Additional items Assist x 1; Armrests; Increased time required;Verbal cues   Stand to Sit 4  Minimal assistance   Additional items Assist x 1; Armrests; Increased time required;Verbal cues   Stand pivot 4  Minimal assistance   Ambulation/Elevation   Gait pattern Decreased foot clearance; Excessively slow; Step to;Short stride;Decreased R stance; Forward Flexion;Narrow KAREEN; Antalgic   Gait Assistance 3  Moderate assist   Additional items Assist x 1   Assistive Device Rolling walker   Distance 20'   Balance   Static Sitting Fair +   Dynamic Sitting Fair   Static Standing Poor   Dynamic Standing Poor -   Ambulatory Poor -   Endurance Deficit   Endurance Deficit Yes   Endurance Deficit Description limitedactivity tolerance   Activity Tolerance   Activity Tolerance Patient limited by fatigue;Patient limited by pain;Treatment limited secondary to medical complications (Comment)   Nurse Made Aware yes, ok to see   Assessment   Prognosis Fair   Problem List Decreased strength;Decreased range of motion;Decreased endurance; Impaired balance;Decreased mobility; Decreased coordination;Decreased cognition; Impaired judgement;Decreased safety awareness; Obesity;Orthopedic restrictions;Pain   Assessment Pt was found supine in bed to begin session with OT  She was able to perform all bed mobility with mod Ax2 today and xfers with min Ax1  Pt was able to ambulate using the RW going 20' total in the room with a chair follow  She required verbal and tactile cuing to promote a more functional gait patteren  Her amb distance was limited by not being able to WB anymore on her R LLE  She was wheeled back to the side of the bed with all needs met and call bell in reach  Pt would benefit from continued PT in order to promote safe and functional mobility  Barriers to Discharge Decreased caregiver support; Inaccessible home environment   Goals   Patient Goals to sit in the chair   STG Expiration Date 03/05/21   Short Term Goal #1 Pt will: Perform bed mobility tasks with consistent min A of 1 to prepare for transfers and reposition in bed  Perform transfers with consistent min A of 1 to decrease burden of care and improve ease of transfers  Perform ambulation with LRAD (likely short rolling walker) for >50' with  consistent min A of 1  to increase Indep in home environment and promote proper use of assistive device with less pain Assess stairs and set goals to return to home with MICHAEL and return to multilevel home  PT Treatment Day 4   Plan   Treatment/Interventions Functional transfer training;LE strengthening/ROM; Therapeutic exercise; Endurance training;Equipment eval/education; Bed mobility;Gait training;Cognitive reorientation;Patient/family training;Continued evaluation;Spoke to nursing   PT Frequency Other (Comment)  (5-6x/wk)   Recommendation   PT Discharge Recommendation Post-Acute Rehabilitation [address] [facility] Basic Mobility Inpatient   Turning in Bed Without Bedrails 2   Lying on Back to Sitting on Edge of Flat Bed 1   Moving Bed to Chair 1   Standing Up From Chair 3   Walk in Room 2   Climb 3-5 Stairs 1   Basic Mobility Inpatient Raw Score 10   Turning Head Towards Sound 3   Follow Simple Instructions 4   Low Function Basic Mobility Raw Score 17   Low Function Basic Mobility Standardized Score 27 46     Elda Bejarano, PTA

## 2021-02-27 NOTE — PROGRESS NOTES
PHYSICAL MEDICINE AND REHABILITATION   PREADMISSION ASSESSMENT     Projected [facility] Diagnoses:  Impairment of mobility, safety and Activities of Daily Living (ADLs) due to Orthopedic Disorders:  08 3  Pelvic Fracture  Etiologic: Fall   Date of Onset: 2/22/21    Date of surgery: NA    PATIENT INFORMATION  Name: Merlin Lee Phone #: [phone] (home)   Address: [place] [address]  YOB: 1955 Age: 78 y o  SS#   Marital Status: /Civil Union  Ethnicity:    Employment Status: retired  Extended Emergency Contact Information  Primary Emergency Contact: Jayy Herron 430  Address: [address] Phone: [phone]  Relation: Spouse  Advance Directive: Full Code ( no ACP docs)     INSURANCE/COVERAGE:     Primary Payor: BLUE CROSS / Plan: TRUE linkswear / Product Type: Blue Fee for Service /   Secondary Payer:<NONE>   Payer Contact: Ivy Louise Payer Contact:   Contact Phone: [phone] Contact Phone:     Authorization #: [id number]  Coverage Dates:2/27-3/5  LCD: 3/5  MEDICARE #: n/a  Medicare Days: n/a  Medical Record #: [id number]     Per Daren Asif representative patient has $1000 Deductible ; 30% co-insurance ; OOP max - $6000 / year - then 100% insurance coverage ; 0$ copay    REFERRAL SOURCE:   Referring provider: Haven Ortega MD  Referring facility: [address] [place] [address]  Room: S /S -01  PCP: Sg Ayala MD PCP phone number: [phone]    MEDICAL INFORMATION  HPI: Patient is a 79 y/o female with hx of Sjogren's disease, meningioma s/p resection, hypertension, hypothyroidism, depression and ambulatory dysfunction who presented from home s/p fall down 7 steps  Patient with complaints of pain in right shoulder, right wrist and bilateral hip/pelvis pain   Patient found to have R pubic rami fracture, R pubic symphysis fracture and an anterior right acetabular fracture and a probable left anterior acetabular fracture  Patient was evaluated by orthopedics, non-surgical management was recommended along with WBAT to bilateral lower extremities  Patient also sustained right shoulder abrasion and ongoing complaints of right shoulder/elbow/wrist pain that is negative for osseous injury  During the course of her hospitalization patient's medical care was managed by Trauma service  On  patient had a hypertensive episode with systolic blood pressure as high as 193 in conjunction with episodic blurred vision  Patient's blood pressure was treated with labatalol  Patient's home medication list was reviewed with her pharmacy by Rogelio Julien, patient was supposed to be receiving 50 mg of atenolol BID, instead of daily  Patient's BID dosing of atenolo was re-initiated  Patient was also followed by Geriatric Medicine and Physical Medicine and Rehabilitation during hosptialization  Patient was evaluated by PT and OT , post-acute rehabilitation was recommended due to patient's decline in overall function  PM&R felt that patient was a good candidate for in-patient acute rehabilitation program as he required 24/7 monitoring by a rehabilitation physician and patient had good support at home with potential to discharge back to the community  COVID-19  Testin/24- negative, - negative       Past Medical History:   Past Surgical History:    Allergies:     Past Medical History:   Diagnosis Date    Lymphadenitis, chronic     Sialadenitis     Sjogren's disease ([address] )     Stomach problems     Thyroid disorder     Xerostomia     Past Surgical History:   Procedure Laterality Date    CHOLECYSTECTOMY       Allergies   Allergen Reactions    Hydroxyquinoline Sulfate [Oxyquinoline] Vomiting    Leucovorin Vomiting    Tizanidine Vomiting         Comorbidities/Surgeries in the last 100 days: Hypertension, Meningioma and Multiple Trauma s/p fall, Sjorgens, hypothyroidism, depression, ambulatory dysfunction     CURRENT VITAL SIGNS:   Temp:  [97 4 °F (36 3 °C)-98 3 °F (36 8 °C)] 98 3 °F (36 8 °C)  HR:  [62-73] 64  Resp:  [18] 18  BP: (144-193)/(71-91) 169/71   Intake/Output Summary (Last 24 hours) at 2/27/2021 0959  Last data filed at 2/27/2021 0615  Gross per 24 hour   Intake --   Output 1750 ml   Net -1750 ml        LABORATORY RESULTS:      Lab Results   Component Value Date    HGB 10 4 (L) 02/24/2021    HGB 12 2 01/08/2015    HCT 33 9 (L) 02/24/2021    HCT 37 3 01/08/2015    WBC 8 80 02/24/2021    WBC 5 13 01/08/2015     Lab Results   Component Value Date    BUN 15 02/23/2021    BUN 10 01/11/2018     01/08/2015    K 4 1 02/23/2021    K 3 4 (L) 01/08/2015     02/23/2021     01/08/2015    GLUCOSE 89 01/08/2015    CREATININE 0 87 02/23/2021    CREATININE 0 85 01/11/2018     No results found for: PROTIME, INR     DIAGNOSTIC STUDIES:  Xr Chest Portable    Result Date: 2/24/2021  Impression: No acute cardiopulmonary disease  Workstation performed: [id number]     Xr Shoulder 2+ Views Right    Result Date: 2/22/2021  Impression: No acute osseous abnormality  Workstation performed: [id number]     Xr Wrist 3+ Vw Right    Result Date: 2/22/2021  Impression: No acute osseous abnormality  Workstation performed: [id number]     Ct Chest Abdomen Pelvis W Contrast    Result Date: 2/22/2021  Impression: Numerous bilateral pelvic fractures  No other signs of injury within the chest abdomen or pelvis    I personally discussed this study with Arianne Herrera on 2/22/2021 at 7:02 PM   Workstation performed: [id number]       PRECAUTIONS/SPECIAL NEEDS:  Weight Bearing Precautions:  WBAT to bilateral LEs, Anticoagulation:  Lovenox and SCDs, Safety Concerns and Pain Management, Fall Risk, ice/heat /elevation to R shoulder/elbow/wrist for pain management     MEDICATIONS:     Current Facility-Administered Medications:     acetaminophen (TYLENOL) tablet 975 mg, 975 mg, Oral, Q8H [address], Annabella Lindquist PA-C, 975 mg at 02/27/21 6790    ALPRAZolam Lindsey Lint) tablet 1 mg, 1 mg, Oral, TID PRN, Shoal Sleek, CRNP, 1 mg at 02/26/21 2132    amLODIPine (NORVASC) tablet 2 5 mg, 2 5 mg, Oral, Daily, Shola Sleek, CRNP    atenolol (TENORMIN) tablet 50 mg, 50 mg, Oral, BID, Shola Sleek, CRNP, 50 mg at 02/27/21 5888    atorvastatin (LIPITOR) tablet 10 mg, 10 mg, Oral, Daily, Annabella Lindquist PA-C, 10 mg at 02/26/21 9391    Diclofenac Sodium (VOLTAREN) 1 % topical gel 2 g, 2 g, Topical, 4x Daily, Annabella Lindquist PA-C, 2 g at 02/27/21 2332    docusate sodium (COLACE) capsule 100 mg, 100 mg, Oral, BID, Annabella Lindquist PA-C, 100 mg at 02/26/21 1734    enoxaparin (LOVENOX) subcutaneous injection 30 mg, 30 mg, Subcutaneous, Q12H [address], Annabella Lindquist PA-C, 30 mg at 02/27/21 0657    FLUoxetine (PROzac) capsule 20 mg, 20 mg, Oral, Daily, Annabella Lindquist PA-C, 20 mg at 15/49/05 8129    folic acid (FOLVITE) tablet 1 mg, 1 mg, Oral, Daily, Annabella Lindquist PA-C, 1 mg at 02/27/21 2819    gabapentin (NEURONTIN) capsule 100 mg, 100 mg, Oral, HS, Annabella Lindquist PA-C, 100 mg at 02/26/21 2112    HYDROmorphone (DILAUDID) injection 0 2 mg, 0 2 mg, Intravenous, Q4H PRN, Zahida Lindquist PA-C    Labetalol HCl (NORMODYNE) injection 10 mg, 10 mg, Intravenous, Q6H PRN, Shola Everettk, CRNP    levothyroxine tablet 88 mcg, 88 mcg, Oral, Early Morning, DARIO Herring, 88 mcg at 02/27/21 4893    lidocaine (LIDODERM) 5 % patch 2 patch, 2 patch, Topical, Daily, Annabella Lindquist PA-C, 2 patch at 02/27/21 1176    methocarbamol (ROBAXIN) tablet 500 mg, 500 mg, Oral, Q6H PRN, Annabella Lindquist PA-C, 500 mg at 02/25/21 1742    methotrexate tablet 20 mg, 20 mg, Oral, Weekly, Annabella Lindquist PA-C, 20 mg at 02/24/21 0804    naloxone (NARCAN) 0 04 mg/mL syringe 0 04 mg, 0 04 mg, Intravenous, Q1MIN PRN, Annabella Lindquist PA-C    ondansetron (ZOFRAN) injection 4 mg, 4 mg, Intravenous, Q6H PRN, Annabella Lindquist PA-C, 4 mg at 02/27/21 3437    oxyCODONE (ROXICODONE) IR tablet 2 5 mg, 2 5 mg, Oral, Q4H PRN, Annabella Lindquist PA-C    oxyCODONE (ROXICODONE) IR tablet 5 mg, 5 mg, Oral, Q4H PRN, Annabella Lindquist PA-C, 5 mg at 02/27/21 5316    polyethylene glycol (MIRALAX) packet 17 g, 17 g, Oral, Daily, Annabella Lindquist PA-C    SKIN INTEGRITY:   Abrasions to right shoulder/elbow, Pressure injury prevention - Turn Q2hr in bed, weight shifts in wheelchair Q15-30 min    PRIOR LEVEL OF FUNCTION:  She lives in [facility] single family home  Rosi Haskins is  and resides with  and 2 sons who are in their 35s  Self Care: Independent with basic ADLs, family assists with IADLs, Indoor Mobility: Independent, Stairs (in/outdoor): Independent and Cognition: Independent    FALLS IN THE LAST 6 MONTHS: 1-4    HOME ENVIRONMENT:  The living area: can live on one level  There are 6 - 10 steps to enter the home  The patient will have 24 hour supervision/physical assistance available upon discharge  PREVIOUS DME:  Equipment in home (previous DME): Commode, Shower Chair, [address], Rolling Rashawn Vargas, [address] is on 2nd floor, will use commode on 1st floor  Has tub/shower combo    FUNCTIONAL STATUS:  Physical Therapy Occupational Therapy Speech Therapy   Per PT   02/27/21 3068    Note Type   Note Type Treatment   Pain Assessment   Pain Assessment Tool 0-10   Pain Score 8   Pain Location/Orientation Orientation: Right;Location: Hip   Restrictions/Precautions   Weight Bearing Precautions Per Order Yes   RLE Weight Bearing Per Order WBAT   LLE Weight Bearing Per Order WBAT   Other Precautions Chair Alarm; Bed Alarm;Multiple lines; Fall Risk;Pain   General   Chart Reviewed Yes   Response to Previous Treatment Patient with no complaints from previous session     Family/Caregiver Present No   Cognition   Overall Cognitive Status [facility]   Arousal/Participation Alert; Cooperative   Attention Attends with cues to redirect   Orientation Level Oriented X4   Memory Decreased recall of precautions;Decreased short term memory   Following Commands Follows one step commands with increased time or repetition   Comments Pt was identified by name and , agreeable to treatment  Bed Mobility   Supine to Sit 3  Moderate assistance   Additional items Assist x 2   Transfers   Sit to Stand 4  Minimal assistance   Additional items Assist x 1; Armrests; Increased time required;Verbal cues   Stand to Sit 4  Minimal assistance   Additional items Assist x 1; Armrests; Increased time required;Verbal cues   Stand pivot 4  Minimal assistance   Ambulation/Elevation   Gait pattern Decreased foot clearance; Excessively slow; Step to;Short stride;Decreased R stance; Forward Flexion;Narrow KAREEN; Antalgic   Gait Assistance 3  Moderate assist   Additional items Assist x 1   Assistive Device Rolling walker   Distance 20'   Balance   Static Sitting Fair +   Dynamic Sitting Fair   Static Standing Poor   Dynamic Standing Poor -   Ambulatory Poor -   Endurance Deficit   Endurance Deficit Yes   Endurance Deficit Description limitedactivity tolerance   Activity Tolerance   Activity Tolerance Patient limited by fatigue;Patient limited by pain;Treatment limited secondary to medical complications (Comment)   Nurse Made Aware yes, ok to see   Assessment   Prognosis Fair   Problem List Decreased strength;Decreased range of motion;Decreased endurance; Impaired balance;Decreased mobility; Decreased coordination;Decreased cognition; Impaired judgement;Decreased safety awareness; Obesity;Orthopedic restrictions;Pain   Assessment Pt was found supine in bed to begin session with OT  She was able to perform all bed mobility with mod Ax2 today and xfers with min Ax1  Pt was able to ambulate using the RW going 20' total in the room with a chair follow  She required verbal and tactile cuing to promote a more functional gait pathenry  Her amb distance was limited by not being able to WB anymore on her R LLE  She was wheeled back to the side of the bed with all needs met and call bell in reach  Pt would benefit from continued PT in order to promote safe and functional mobility  Barriers to Discharge Decreased caregiver support; Inaccessible home environment   Goals   Patient Goals to sit in the chair   STG Expiration Date 03/05/21   Short Term Goal #1 Pt will: Perform bed mobility tasks with consistent min A of 1 to prepare for transfers and reposition in bed  Perform transfers with consistent min A of 1 to decrease burden of care and improve ease of transfers  Perform ambulation with LRAD (likely short rolling walker) for >50' with  consistent min A of 1  to increase Indep in home environment and promote proper use of assistive device with less pain Assess stairs and set goals to return to home with MICHAEL and return to [facility] home  Per OT   02/27/21 0847    OT Last Visit   OT Visit Date 02/27/21   Note Type   Note Type Treatment   Restrictions/Precautions   RLE Weight Bearing Per Order WBAT   LLE Weight Bearing Per Order WBAT   Pain Assessment   Pain Assessment Tool 0-10   Pain Score 8   ADL   Eating Assistance 7  Independent   Grooming Assistance 5  Supervision/Setup   Grooming Deficit Increased time to complete;Wash/dry hands; Wash/dry face  (seated)   UB Bathing Assistance 5  Supervision/Setup   UB Bathing Deficit Right arm;Left arm  (seated)   LB Bathing Assistance 5  Supervision/Setup   UB Dressing Assistance 5  Supervision/Setup   UB Dressing Deficit Thread RUE; Thread LUE;Pull around back   LB Dressing Assistance 3  Moderate Assistance   LB Dressing Deficit Thread RLE into pants; Thread LLE into pants; Increased time to complete;Pull up over hips;Use of adaptive equipment   Toileting Assistance  3  Moderate Assistance   Bed Mobility   Supine to Sit 3  Moderate assistance   Additional items Assist x 2   Transfers   Sit to Stand 4 Minimal assistance   Additional items Assist x 1; Increased time required;Verbal cues   Stand to Sit 4  Minimal assistance   Additional items Assist x 1; Increased time required;Verbal cues   Stand pivot 4  Minimal assistance   Additional items Assist x 2   Cognition   Orientation Level Oriented X4   Following Commands Follows one step commands with increased time or repetition   Activity Tolerance   Activity Tolerance Patient limited by pain   Medical Staff Made Aware JORGE Salcido   Assessment   Assessment Pt was agreeable to therapy  Patient performed bed mobility at Mod X 2  Patient performed transfer STS Min X 1 at three attempts  and SPT at [facility] to recliner  Patiet performed grooming and UB /LB bathing seated in recliner at Sup  Patient performed UB dressing of don gown at Sup and LB dressing doff pants at [address] and Donning pants with use of dressing stick and reacher at Mod A  Patient required CGA when standing to hike pants up and verbal cues when performing stand to sit transfers with hand placement and safety  and toileting at Mod A  Patient was returned with all needs met and call bell with in reach  Continues skilled OT till DC and recommendation to post acute care  CURRENT GAP IN FUNCTION  Prior to Admission: Functional Status: Patient was independent with mobility/ambulation, transfers, ADL's, IADL's  Family provided assistance with most IADLs, patient does not drive  Estimated length of stay: 10 to 14 days    Anticipated Post-Discharge Disposition/Treatment  Disposition: Return to previous home/apartment    Outpatient Services: Physical Therapy (PT) and Occupational Therapy (OT)    BARRIERS TO DISCHARGE  Lovenox, Weakness, Pain, Balance Difficulty, Fatigue, Home Accessibility and Equipment Needs    INTERVENTIONS FOR DISCHARGE  Adaptive equipment, Patient/Family/Caregiver Education, [employer], [employer] DME needs, Home Modifcations, Therapy exercises, Energy conservation education  and Continued PT/OT interventions     REQUIRED THERAPY:  Patient will require PT and OT 90 minutes each per day, five days per week to achieve rehab goals  REQUIRED FUNCTIONAL AND MEDICAL MANAGEMENT FOR INPATIENT REHABILITATION:  Skin:  There are no pressure sores currently, Pain Management: Overall pain is well controlled, Deep Vein Thrombosis (DVT) Prophylaxis:  SCD's while in bed and Lovenox    RECOMMENDED LEVEL OF CARE:   Patient is a 79 y/o female with hx of Sjogren's disease, meningioma s/p resection, hypertension, hypothyroidism, depression and ambulatory dysfunction who presented from home s/p fall down 7 steps  Patient with complaints of pain in right shoulder, right wrist and bilateral hip/pelvis pain  Patient found to have R pubic rami fracture, R pubic symphysis fracture and an anterior right acetabular fracture and a probable left anterior acetabular fracture  Patient was evaluated by orthopedics, non-surgical management was recommended along with WBAT to bilateral lower extremities  Patient also sustained right shoulder abrasion and ongoing complaints of right shoulder/elbow/wrist pain that is negative for osseous injury  Prior to admission patient was independent with home and community functional mobility without the use of an assistive device  Patient was independent with basic ADLs but  managed IADLs, patient has not driven since her meningioma resection  Patient has two supportive sons who live with patient and   Son's are able to provide assistance as needed for patient  Patient currently able to complete bed mobility with Mod A x 2, move from sit to stand with Min A x 1, complete transfers with Min A x 2  however requires Mod A x 1 for ambulation up to 20 feet with roller walker  Patient is independent with feeding, requires set up and supervision for grooming, UB bathing/dressing and LB bathing but requires Mod A with LB dressing and toileting    Patient will require continued PT/OT intervention to improve functional mobility and self-care skills  Patient will require nursing education/traning and bowel/bladder management, internal medicine to monitor medical condtions, PM&R to maximize function and provide medical oversight  In-patient rehabilitation is required to maximize self-care, mobility, strength, endurance, balance and safety for discharge home with supportive family

## 2021-02-27 NOTE — ASSESSMENT & PLAN NOTE
· Patient has a history of hypertension which she takes atenolol and amlodipine  · Yesterday, patient was hypertensive with a systolic blood pressure as high as 193  Blood pressure was associated with associated episodic blurred vision  Blood pressure was treated with labetalol  · On re-evaluation, patient denied having any blurred vision, or other associated symptoms indicating hypertensive urgency  · I verified patient's home meds with her pharmacy, which revealed that patient is supposed to be receiving 50 mg of atenolol BID, instead of daily  BID dosing was re-initiated  Patient continues to be asymptomatic with a blood pressure WNL since receiving last night's evening dose of atenolol  · Will continue to monitor blood pressure, per unit protocol  · Geriatrics note appreciated  Will hold on increasing amlodipine dose from 2 5 to 5 mg, due to adjustment to correct home dosing of atenolol  · If patient continues to be asymptomatic, she is medically cleared to be discharged to our work when a bed is available  · Patient to follow-up with PCP for continued monitoring and management of her hypertension

## 2021-02-27 NOTE — DISCHARGE SUMMARY
Discharge- Evon Morejon 1955, 78 y o  female MRN: [id number]    Unit/Bed#: S -01 Encounter: [id number]    Primary Care Provider: Naheed Rosa MD   Date and time admitted to hospital: 2/22/2021  5:25 PM        * [address]  · S/p fall down 7 stairs landing on right shoulder  · Above noted injuries - possible left acetabular fracture   · Right shoulder abrasion - local wound care  · Right shoulder/elbow/wrist pain without acute osseous injury - ice/heat elevate  · PT/OT evaluation and treatment  · Gerontology consulted and note appreciated  D/C to [facility]  Closed nondisplaced fracture of anterior wall of right acetabulum (HCC)  Assessment & Plan  · Non-operative management  · WBAT BLE  · Pain management  · DVT prophylaxis with Lovenox  · PT/OT - post acute rehabilitation  D/C to [facility]  · Follow-up in the office in 6 weeks with Dr Yen Lund    Closed nondisplaced fracture of right pubis [facility])  Assessment & Plan  · Right inferior pubic ramus and right pubic symphysis fracture, Non-operative management  · WBAT  · Appreciate PMR consult  · PT/OT - rehab  D/C to [facility]  · DVT Prophylaxis Lovenox  · Follow-up in the office in 6 weeks with Dr Yen Lund    Pain of right upper extremity  Assessment & Plan  · Patient continues to complain of right shoulder and right wrist pain  · XR right shoulder and right wrist negative for acute traumatic injury  · Multimodal pain regimen  · PT/OT    Hypertension  Assessment & Plan  · Patient has a history of hypertension which she takes atenolol and amlodipine  · Yesterday, patient was hypertensive with a systolic blood pressure as high as 193  Blood pressure was associated with associated episodic blurred vision  Blood pressure was treated with labetalol  · On re-evaluation, patient denied having any blurred vision, or other associated symptoms indicating hypertensive urgency    · I verified patient's home meds with her pharmacy, which revealed that patient is supposed to be receiving 50 mg of atenolol BID, instead of daily  BID dosing was re-initiated and patient continues to be asymptomatic with a blood pressure well controlled since receiving last night's evening dose of atenolol  Completely neurologically intact today  · Geriatrics note appreciated  Will hold on increasing amlodipine dose from 2 5 to 5 mg, due to adjustment to correct home dosing of atenolol  · Patient is medically cleared for discharge today  l  · Patient to follow-up with PCP for continued monitoring and management of her hypertension  Sjogren's disease [facility])  Assessment & Plan  Continue home meds                  Resolved Problems  Date Reviewed: 2/27/2021    None          Admission Date:   Admission Orders (From admission, onward)     Ordered        02/22/21 1953  Inpatient Admission  Once                     Admitting Diagnosis: Hip pain [M25 559]  Multiple closed fractures of pelvis without disruption of pelvic ring ([facility] [address] ) [S32 82XA]  Fall, initial encounter [W19  XXXA]    HPI: As documented by Rita Arevalo PA-C who evaluated the patient on admission, "Rosi Haskins is a 78 y o  female with PMH of HTN, Sjogren's and meningioma s/p resection presents after slipping and falling down 7 carpeted stairs  She reports her foot slipped and she landed on her right shoulder and slid down the stairs  She denies striking her head or losing consciousness  She was unable to stand up after the fall and required EMS to get her out of the house  She denies frequent falls, nausea, headache, dizziness, shortness of breath, chest pain, abdominal pain, neck pain, numbness of tingling of her extremities  "    Procedures Performed: No orders of the defined types were placed in this encounter        Summary of Hospital Course:  Patient was placed on the trauma service following fall when she sustained a right inferior pubic ramus fracture and a right pubic symphysis fracture as well as a right acetabular fracture and left anterior acetabular fracture  She was seen by Orthopedics who recommended non operative management weightbear as tolerated on bilateral lower extremities  She was placed on Lovenox for DVT prophylaxis and was up and ambulatory with PT and OT  Physical therapy and occupational therapy recommended discharge to inpatient rehab  Patient was in agreement with this  Her pain was controlled  She is tolerating a diet  On 02/26 she developed some blurred vision in the setting of hypertension  She was neurologically intact in her medications were reviewed and she was noted to not be on her home dosing frequency of her atenolol  She was given a 1 time dose of IV labetalol and her atenolol dose was increased to her home dose of 50 mg b i d  Since that she will follow up with Orthopedics in 6 weeks and can follow up with Trauma on an as-needed basis  She was informed of her incidental findings including lung nodules time her blood pressure has been under adequate control and her symptoms have resolved  Repeat neuro assessments show no focal deficits  Today she is asymptomatic and completely neurologically intact  She is medically stable for discharge to [facility]  She will follow up with Orthopedics in 6 weeks and with Trauma on an as-needed basis  She should follow-up with her family doctor in 1-2 weeks for continuity of care as well as for follow-up of incidental findings including a pulmonary nodule versus focal consolidation in the left lower lobe as well as 2 other similar appearing areas in the lung, a hepatic lesion, will likely cyst, and a calcified right retroperitoneal lymph node  Significant Findings, Care, Treatment and Services Provided:   Xr Chest Portable    Result Date: 2/24/2021  Impression: No acute cardiopulmonary disease   Workstation performed: [id number]     Xr Shoulder 2+ Views Right    Result Date: 2/22/2021  Impression: No acute osseous abnormality  Workstation performed: [id number]     Xr Wrist 3+ Vw Right    Result Date: 2/22/2021  Impression: No acute osseous abnormality  Workstation performed: [id number]     Ct Chest Abdomen Pelvis W Contrast    Result Date: 2/22/2021  Impression: Numerous bilateral pelvic fractures  No other signs of injury within the chest abdomen or pelvis  I personally discussed this study with Sahbbir Lorin on 2/22/2021 at 7:02 PM   Workstation performed: [id number]       Complications: none    Condition at Discharge: good         Discharge instructions/Information to patient and family:   See after visit summary for information provided to patient and family  Provisions for Follow-Up Care:  See after visit summary for information related to follow-up care and any pertinent home health orders  PCP: Gilma Bailey MD    Disposition: Short-term rehab at [facility] Readmission: No    Discharge Statement   I spent 30 minutes discharging the patient  This time was spent on the day of discharge  I had direct contact with the patient on the day of discharge  Additional documentation is required if more than 30 minutes were spent on discharge  Discharge Medications:  See after visit summary for reconciled discharge medications provided to patient and family

## 2021-02-27 NOTE — PROGRESS NOTES
Progress Note - Bindu Fine 1955, 78 y o  female MRN: [id number]    Unit/Bed#: S -01 Encounter: [id number]    Primary Care Provider: Ricky Ortiz MD   Date and time admitted to hospital: 2/22/2021  5:25 PM        * [address]  · S/p fall down 7 stairs landing on right shoulder  · Above noted injuries - possible left acetabular fracture   · Right shoulder abrasion - local wound care  · Right shoulder/elbow/wrist pain without acute osseous injury - ice/heat elevate  · PT/OT evaluation and treatment  · Gerontology consulted and note appreciated  · Dispo pending ARC approval    Closed nondisplaced fracture of right pubis (HCC)  Assessment & Plan  · Right inferior pubic ramus and right pubic symphysis fracture, Non-operative management  · WBAT  · Appreciate PMR consult  · PT/OT - rehab  Pending auth for ARC  · DVT Prophylaxis Lovenox  · Follow-up in the office in 6 weeks with Dr Alistair Law    Closed nondisplaced fracture of anterior wall of right acetabulum [facility])  Assessment & Plan  · Non-operative management  · WBAT BLE  · Pain management  · DVT prophylaxis with Lovenox  · PT/OT - post acute rehabilitation  Pending auth for ARC  · Follow-up in the office in 6 weeks with Dr Alistair Law    Hypertension  Assessment & Plan  · Patient has a history of hypertension which she takes atenolol and amlodipine  · Yesterday, patient was hypertensive with a systolic blood pressure as high as 193  Blood pressure was associated with associated episodic blurred vision  Blood pressure was treated with labetalol  · On re-evaluation, patient denied having any blurred vision, or other associated symptoms indicating hypertensive urgency  · I verified patient's home meds with her pharmacy, which revealed that patient is supposed to be receiving 50 mg of atenolol BID, instead of daily  BID dosing was re-initiated    Patient continues to be asymptomatic with a blood pressure WNL since receiving last night's evening dose of atenolol  · Will continue to monitor blood pressure, per unit protocol  · Geriatrics note appreciated  Will hold on increasing amlodipine dose from 2 5 to 5 mg, due to adjustment to correct home dosing of atenolol  · If patient continues to be asymptomatic, she is medically cleared to be discharged to our work when a bed is available  · Patient to follow-up with PCP for continued monitoring and management of her hypertension  Sjogren's disease [facility])  Assessment & Plan  Resume home meds          Disposition:  Arc placement pending  SUBJECTIVE:  Chief Complaint:  Hip pain    Subjective:  Patient continues to complain of right hip and pelvic pain, worsened with movement  Patient also notes right shoulder and right wrist pain  Patient states that her right lower extremity and right upper extremity have been slightly weaker, since her fall where she landed on her right side  Patient denies any new weakness  She denies any headache, dizziness, lightheadedness, blurred vision  Patient notes sleeping better last night with addition of her home Xanax  Nursing reports that patient's blood pressure has been under control since she has received her evening dose of atenolol  Nursing also reports that patient has been doing well with assisting in her ADLs  No new weakness or or neurological deficits noted by nursing staff  A 10 point review of systems was completed and is negative if not otherwise mentioned above        OBJECTIVE:     Meds/Allergies     Current Facility-Administered Medications:     acetaminophen (TYLENOL) tablet 975 mg, 975 mg, Oral, Q8H [address], Annabella A CHARLES Lindquist, 975 mg at 02/26/21 2113    ALPRAZolam (XANAX) tablet 1 mg, 1 mg, Oral, TID PRN, DARIO Paul, 1 mg at 02/26/21 2132    amLODIPine (NORVASC) tablet 2 5 mg, 2 5 mg, Oral, Daily, DARIO Paul    atenolol (TENORMIN) tablet 50 mg, 50 mg, Oral, BID, DARIO Paul, 50 mg at 02/26/21 2114   atorvastatin (LIPITOR) tablet 10 mg, 10 mg, Oral, Daily, Annabella Lindquist PA-C, 10 mg at 02/26/21 9035    Diclofenac Sodium (VOLTAREN) 1 % topical gel 2 g, 2 g, Topical, 4x Daily, Annabella Lindquist PA-C, 2 g at 02/26/21 2330    docusate sodium (COLACE) capsule 100 mg, 100 mg, Oral, BID, Annabella Lindquist PA-C, 100 mg at 02/26/21 1734    enoxaparin (LOVENOX) subcutaneous injection 30 mg, 30 mg, Subcutaneous, Q12H [facility], Annabella Lindquist PA-C, 30 mg at 02/26/21 2016    FLUoxetine (PROzac) capsule 20 mg, 20 mg, Oral, Daily, Annabella Lindquist PA-C, 20 mg at 03/16/56 1957    folic acid (FOLVITE) tablet 1 mg, 1 mg, Oral, Daily, Annabella Lindquist PA-C, 1 mg at 02/26/21 6311    gabapentin (NEURONTIN) capsule 100 mg, 100 mg, Oral, HS, Annabella Lindquist PA-C, 100 mg at 02/26/21 2112    HYDROmorphone (DILAUDID) injection 0 2 mg, 0 2 mg, Intravenous, Q4H PRN, Bakari Lindquist PA-C    Labetalol HCl (NORMODYNE) injection 10 mg, 10 mg, Intravenous, Q6H PRN, DARIO Everett    levothyroxine tablet 88 mcg, 88 mcg, Oral, Early Morning, DARIO Everett    lidocaine (LIDODERM) 5 % patch 2 patch, 2 patch, Topical, Daily, Annabella Lindquist PA-C, 2 patch at 02/26/21 0692    methocarbamol (ROBAXIN) tablet 500 mg, 500 mg, Oral, Q6H PRN, Annabella Lindquist PA-C, 500 mg at 02/25/21 1742    methotrexate tablet 20 mg, 20 mg, Oral, Weekly, Annabella Lnidquist PA-C, 20 mg at 02/24/21 0804    naloxone (NARCAN) 0 04 mg/mL syringe 0 04 mg, 0 04 mg, Intravenous, Q1MIN PRN, Bakari Lindquist PA-C    ondansetron (ZOFRAN) injection 4 mg, 4 mg, Intravenous, Q6H PRN, Annabella Lindquist PA-C, 4 mg at 02/25/21 2021    oxyCODONE (ROXICODONE) IR tablet 2 5 mg, 2 5 mg, Oral, Q4H PRN, Annabella Lindquist PA-C    oxyCODONE (ROXICODONE) IR tablet 5 mg, 5 mg, Oral, Q4H PRN, Annabella Lindquist PA-C, 5 mg at 02/26/21 1619    polyethylene glycol (MIRALAX) packet 17 g, 17 g, Oral, Daily, Narcisa Hopkins PA-C Vitals:   Vitals:    02/27/21 0300   BP: 164/76   Pulse: 62   Resp: 18   Temp: 97 6 °F (36 4 °C)   SpO2: 93%       Intake/Output:  I/O       02/25 0701 - 02/26 0700 02/26 0701 - 02/27 0700    Urine (mL/kg/hr)  1550 (1 1)    Total Output  1550    Net  -1550                 Nutrition/GI Proph/Bowel Reg:  Continue current diet  MiraLax, Colace  Physical Exam:   GENERAL APPEARANCE:  No acute distress  NEURO:  GCS 15  Light touch sensation intact throughout  Speech is clear  Cranial nerves intact  HEENT:  Normocephalic  Atraumatic  PERRL  EOMI  CV:  Regular rate and rhythm  No murmur, no rub, no gallop  LUNGS:  Clear to auscultation, bilaterally  GI:  Abdomen is soft and nontender  Normal bowel sounds  :  Pelvis is stable  Purwick present  MSK:    LUE:  No deformity  5/5 strength  Nontender  RUE:  Right shoulder tender on palpation  Patient able to fully range shoulder, elbow, and wrist   Elbow strength 5/5  Shoulder strength 4/5  Wrist/ strength 4/5  LLE:  No deformity  4/5 strength  Nontender  RLE:  Right hip tender on palpation  Hip and knee flexion strength 3/5  Ankle flexion 4/5  SKIN:  Warm, dry, intact  Invasive Devices     Peripheral Intravenous Line            Peripheral IV 02/22/21 Left Antecubital 4 days                 Lab Results: Results: I have personally reviewed pertinent reports  Imaging/EKG Studies: Results: I have personally reviewed pertinent reports      Other Studies: none  VTE Prophylaxis: Sequential compression device (Venodyne)  and Enoxaparin (Lovenox)

## 2021-02-27 NOTE — CASE MANAGEMENT
CM updated by admissions liaison of [address]  Patient's insurance auth obtained/approved  [facility] [address] [facility]  [facility] requesting a  time by 1400  ARC requesting Covid swab  CM updated Trauma AP and nursing with plan of care  CM spoke with patient at the bedside  CM introduced self and role  Patient updated insurance authorization approved for acute rehab at [address]  CM discussed transportation at discharge with patient  Patient requesting transport at discharge  Patient stating she has pain when sitting out of bed  CM offered to update family  Patient stated her  is on his way to the hospital  CM will f/u with patient and  re:transport time and plan of care  CM sent referral to [facility]  Waiting for transport time

## 2021-02-27 NOTE — ASSESSMENT & PLAN NOTE
· Right inferior pubic ramus and right pubic symphysis fracture, Non-operative management  · WBAT  · Appreciate PMR consult  · PT/OT - rehab   Pending auth for ARC  · DVT Prophylaxis Lovenox  · Follow-up in the office in 6 weeks with Dr Dorinda Campbell

## 2021-02-27 NOTE — OCCUPATIONAL THERAPY NOTE
[address] Progress Note     Patient Name: Rollo Baumgarten  NYXDS'H Date: 2/27/2021  Problem List  Principal Problem:    Fall  Active Problems:    Sjogren's disease ([address] )    Closed nondisplaced fracture of anterior wall of right acetabulum (HCC)    Closed nondisplaced fracture of right pubis (HCC)    Hypertension    Pain of right upper extremity          02/27/21 0847   OT Last Visit   OT Visit Date 02/27/21   Note Type   Note Type Treatment   Restrictions/Precautions   RLE Weight Bearing Per Order WBAT   LLE Weight Bearing Per Order WBAT   Pain Assessment   Pain Assessment Tool 0-10   Pain Score 8   ADL   Eating Assistance 7  Independent   Grooming Assistance 5  Supervision/Setup   Grooming Deficit Increased time to complete;Wash/dry hands; Wash/dry face  (seated)   UB Bathing Assistance 5  Supervision/Setup   UB Bathing Deficit Right arm;Left arm  (seated)   LB Bathing Assistance 5  Supervision/Setup   UB Dressing Assistance 5  Supervision/Setup   UB Dressing Deficit Thread RUE; Thread LUE;Pull around back   LB Dressing Assistance 3  Moderate Assistance   LB Dressing Deficit Thread RLE into pants; Thread LLE into pants; Increased time to complete;Pull up over hips;Use of adaptive equipment   Toileting Assistance  3  Moderate Assistance   Bed Mobility   Supine to Sit 3  Moderate assistance   Additional items Assist x 2   Transfers   Sit to Stand 4  Minimal assistance   Additional items Assist x 1; Increased time required;Verbal cues   Stand to Sit 4  Minimal assistance   Additional items Assist x 1; Increased time required;Verbal cues   Stand pivot 4  Minimal assistance   Additional items Assist x 2   Cognition   Orientation Level Oriented X4   Following Commands Follows one step commands with increased time or repetition   Activity Tolerance   Activity Tolerance Patient limited by pain   Medical Staff Made Aware JORGE Zimmerman   Assessment   Assessment Pt was agreeable to therapy   Patient performed bed mobility at Mod X 2  Patient performed transfer STS Min X 1 at three attempts  and SPT at [facility] to recliner  Patiet performed grooming and UB /LB bathing seated in recliner at Sup  Patient performed UB dressing of don gown at Sup and LB dressing doff pants at [address] and Donning pants with use of dressing stick and reacher at Mod A  Patient required CGA when standing to hike pants up and verbal cues when performing stand to sit transfers with hand placement and safety  and toileting at Mod A  Patient was returned with all needs met and call bell with in reach  Continues skilled OT till DC and recommendation to post acute care  Plan   Treatment Interventions ADL retraining;Functional transfer training; Endurance training;Patient/family training; Compensatory technique education;Continued evaluation; Activityengagement; Energy conservation   Goal Expiration Date 03/09/21   OT Frequency 3-5x/wk   Recommendation   OT Discharge Recommendation Post-Acute Rehabilitation Services   AM-PAC Daily Activity Inpatient   Lower Body Dressing 2   Bathing 2   Toileting 3   Upper Body Dressing 3   Grooming 3   Eating 4   Daily Activity Raw Score 17   Daily Activity Standardized Score (Calc for Raw Score >=11) 37 26   AM-PAC Applied Cognition Inpatient   Following a Speech/Presentation 3   Understanding Ordinary Conversation 3   Taking Medications 3   Remembering Where Things Are Placed or Put Away 3   Remembering List of 4-5 Errands 3   Taking Care of Complicated Tasks 3   Applied Cognition Raw Score 18   Applied Cognition Standardized Score 38 07   Jose Ackerman, OT

## 2021-02-27 NOTE — DISCHARGE INSTRUCTIONS
Discharge Instructions - Orthopedics  Leslie Fine 78 y o  female MRN: [id number]  Unit/Bed#: S -01    Weight Bearing Status:                                           Weight bearing as tolerated bilateral lower extremities  DVT prophylaxis  Continue DVT prophylaxis as instructed    Pain:  Continue analgesics as directed        Appt Instructions: If you do not have your appointment, please call the clinic at [phone] to follow up in 6 weeks with Dr Kenia Hale  Otherwise followup as scheduled     Contact the office sooner if you experience any increased numbness/tingling in the extremities

## 2021-02-27 NOTE — ASSESSMENT & PLAN NOTE
· S/p fall down 7 stairs landing on right shoulder  · Above noted injuries - possible left acetabular fracture   · Right shoulder abrasion - local wound care  · Right shoulder/elbow/wrist pain without acute osseous injury - ice/heat elevate  · PT/OT evaluation and treatment  · Gerontology consulted and note appreciated  D/C to [facility]

## 2021-02-27 NOTE — PLAN OF CARE
Problem: PHYSICAL THERAPY ADULT  Goal: Performs mobility at highest level of function for planned discharge setting  See evaluation for individualized goals  Description: Treatment/Interventions: Functional transfer training, LE strengthening/ROM, Therapeutic exercise, Endurance training, Equipment eval/education, Bed mobility, Gait training, Cognitive reorientation, Patient/family training, Continued evaluation, Spoke to nursing, Spoke to case management, Spoke to advanced practitioner  Equipment Recommended: (trial quick move --> transition into short rolling walker)       See flowsheet documentation for full assessment, interventions and recommendations  Outcome: Progressing  Note: Prognosis: Fair  Problem List: Decreased strength, Decreased range of motion, Decreased endurance, Impaired balance, Decreased mobility, Decreased coordination, Decreased cognition, Impaired judgement, Decreased safety awareness, Obesity, Orthopedic restrictions, Pain  Assessment: Pt was found supine in bed to begin session with OT  She was able to perform all bed mobility with mod Ax2 today and xfers with min Ax1  Pt was able to ambulate using the RW going 20' total in the room with a chair follow  She required verbal and tactile cuing to promote a more functional gait patteren  Her amb distance was limited by not being able to WB anymore on her R LLE  She was wheeled back to the side of the bed with all needs met and call bell in reach  Pt would benefit from continued PT in order to promote safe and functional mobility  Barriers to Discharge: Decreased caregiver support, Inaccessible home environment     PT Discharge Recommendation: [address]          See flowsheet documentation for full assessment

## 2021-02-27 NOTE — PLAN OF CARE
Problem: PAIN - ADULT  Goal: Verbalizes/displays adequate comfort level or baseline comfort level  Description: Interventions:  - Encourage patient to monitor pain and request assistance  - Assess pain using appropriate pain scale  - Administer analgesics based on type and severity of pain and evaluate response  - Implement non-pharmacological measures as appropriate and evaluate response  - Consider cultural and social influences on pain and pain management  - Notify physician/advanced practitioner if interventions unsuccessful or patient reports new pain  Outcome: Progressing     Problem: INFECTION - ADULT  Goal: Absence or prevention of progression during hospitalization  Description: INTERVENTIONS:  - Assess and monitor for signs and symptoms of infection  - Monitor lab/diagnostic results  - Monitor all insertion sites, i e  indwelling lines, tubes, and drains  - Monitor endotracheal if appropriate and nasal secretions for changes in amount and color  - [place] appropriate cooling/warming therapies per order  - Administer medications as ordered  - Instruct and encourage patient and family to use good hand hygiene technique  - Identify and instruct in appropriate isolation precautions for identified infection/condition  Outcome: Progressing  Goal: Absence of fever/infection during neutropenic period  Description: INTERVENTIONS:  - Monitor WBC    Outcome: Progressing     Problem: SAFETY ADULT  Goal: Patient will remain free of falls  Description: INTERVENTIONS:  - Assess patient frequently for physical needs  -  Identify cognitive and physical deficits and behaviors that affect risk of falls    -  [place] fall precautions as indicated by assessment   - Educate patient/family on patient safety including physical limitations  - Instruct patient to call for assistance with activity based on assessment  - Modify environment to reduce risk of injury  - Consider OT/PT consult to assist with strengthening/mobility  Outcome: Progressing  Goal: Maintain or return to baseline ADL function  Description: INTERVENTIONS:  -  Assess patient's ability to carry out ADLs; assess patient's baseline for ADL function and identify physical deficits which impact ability to perform ADLs (bathing, care of mouth/teeth, toileting, grooming, dressing, etc )  - Assess/evaluate cause of self-care deficits   - Assess range of motion  - Assess patient's mobility; develop plan if impaired  - Assess patient's need for assistive devices and provide as appropriate  - Encourage maximum independence but intervene and supervise when necessary  - Involve family in performance of ADLs  - Assess for home care needs following discharge   - Consider OT consult to assist with ADL evaluation and planning for discharge  - Provide patient education as appropriate  Outcome: Progressing  Goal: Maintain or return mobility status to optimal level  Description: INTERVENTIONS:  - Assess patient's baseline mobility status (ambulation, transfers, stairs, etc )    - Identify cognitive and physical deficits and behaviors that affect mobility  - Identify mobility aids required to assist with transfers and/or ambulation (gait belt, sit-to-stand, lift, walker, cane, etc )  - [place] fall precautions as indicated by assessment  - Record patient progress and toleration of activity level on Mobility SBAR; progress patient to next Phase/Stage  - Instruct patient to call for assistance with activity based on assessment  - Consider rehabilitation consult to assist with strengthening/weightbearing, etc   Outcome: Progressing     Problem: DISCHARGE PLANNING  Goal: Discharge to home or other facility with appropriate resources  Description: INTERVENTIONS:  - Identify barriers to discharge w/patient and caregiver  - Arrange for needed discharge resources and transportation as appropriate  - Identify discharge learning needs (meds, wound care, etc )  - Arrange for interpretive services to assist at discharge as needed  - Refer to Case Management Department for coordinating discharge planning if the patient needs post-hospital services based on physician/advanced practitioner order or complex needs related to functional status, cognitive ability, or social support system  Outcome: Progressing     Problem: Knowledge Deficit  Goal: Patient/family/caregiver demonstrates understanding of disease process, treatment plan, medications, and discharge instructions  Description: Complete learning assessment and assess knowledge base    Interventions:  - Provide teaching at level of understanding  - Provide teaching via preferred learning methods  Outcome: Progressing

## 2021-02-27 NOTE — PLAN OF CARE
Problem: OCCUPATIONAL THERAPY ADULT  Goal: Performs self-care activities at highest level of function for planned discharge setting  See evaluation for individualized goals  Description: Treatment Interventions: ADL retraining, Functional transfer training, Endurance training, UE strengthening/ROM, Patient/family training, Equipment evaluation/education, Cognitive reorientation, Compensatory technique education, Energy conservation, Activityengagement  Equipment Recommended: Other (comment)(TBD in rehab)       See flowsheet documentation for full assessment, interventions and recommendations  Note: Limitation: Decreased ADL status, Decreased UE strength, Decreased Safe judgement during ADL, Decreased cognition, Decreased endurance, Decreased high-level ADLs, Decreased self-care trans  Prognosis: Good  Assessment: Pt was agreeable to therapy  Patient performed bed mobility at Mod X 2  Patient performed transfer STS Min X 1 at three attempts  and SPT at [facility] to recliner  Pat[place] performed grooming and UB /LB bathing seated in recliner at Sup  Patient performed UB dressing of don gown at Sup and LB dressing doff pants at [address] and Donning pants with use of dressing stick and reacher at Mod A  Patient required CGA when standing to hike pants up and verbal cues when performing stand to sit transfers with hand placement and safety  and toileting at Mod A  Patient was returned with all needs met and call bell with in reach  Continues skilled OT till DC and recommendation to post acute care       OT Discharge Recommendation: Post-Acute Rehabilitation Services

## 2021-02-27 NOTE — PLAN OF CARE
Problem: Potential for Falls  Goal: Patient will remain free of falls  Description: INTERVENTIONS:  - Assess patient frequently for physical needs  -  Identify cognitive and physical deficits and behaviors that affect risk of falls  -  [place] fall precautions as indicated by assessment   - Educate patient/family on patient safety including physical limitations  - Instruct patient to call for assistance with activity based on assessment  - Modify environment to reduce risk of injury  - Consider OT/PT consult to assist with strengthening/mobility  Outcome: Progressing     Problem: Prexisting or High Potential for Compromised Skin Integrity  Goal: Skin integrity is maintained or improved  Description: INTERVENTIONS:  - Identify patients at risk for skin breakdown  - Assess and monitor skin integrity  - Assess and monitor nutrition and hydration status  - Monitor labs   - Assess for incontinence   - Turn and reposition patient  - Assist with mobility/ambulation  - Relieve pressure over bony prominences  - Avoid friction and shearing  - Provide appropriate hygiene as needed including keeping skin clean and dry  - Evaluate need for skin moisturizer/barrier cream  - Collaborate with interdisciplinary team   - Patient/family teaching  - Consider wound care consult   Outcome: Progressing     Problem: Nutrition/Hydration-ADULT  Goal: Nutrient/Hydration intake appropriate for improving, restoring or maintaining nutritional needs  Description: Monitor and assess patient's nutrition/hydration status for malnutrition  Collaborate with interdisciplinary team and initiate plan and interventions as ordered  Monitor patient's weight and dietary intake as ordered or per policy  Utilize nutrition screening tool and intervene as necessary  Determine patient's food preferences and provide high-protein, high-caloric foods as appropriate       INTERVENTIONS:  - Monitor oral intake, urinary output, labs, and treatment plans  - Assess nutrition and hydration status and recommend course of action  - Evaluate amount of meals eaten  - Assist patient with eating if necessary   - Allow adequate time for meals  - Recommend/ encourage appropriate diets, oral nutritional supplements, and vitamin/mineral supplements  - Order, calculate, and assess calorie counts as needed  - Recommend, monitor, and adjust tube feedings and TPN/PPN based on assessed needs  - Assess need for intravenous fluids  - Provide specific nutrition/hydration education as appropriate  - Include patient/family/caregiver in decisions related to nutrition  Outcome: Progressing     Problem: PAIN - ADULT  Goal: Verbalizes/displays adequate comfort level or baseline comfort level  Description: Interventions:  - Encourage patient to monitor pain and request assistance  - Assess pain using appropriate pain scale  - Administer analgesics based on type and severity of pain and evaluate response  - Implement non-pharmacological measures as appropriate and evaluate response  - Consider cultural and social influences on pain and pain management  - Notify physician/advanced practitioner if interventions unsuccessful or patient reports new pain  Outcome: Progressing     Problem: SAFETY ADULT  Goal: Patient will remain free of falls  Description: INTERVENTIONS:  - Assess patient frequently for physical needs  -  Identify cognitive and physical deficits and behaviors that affect risk of falls    -  [place] fall precautions as indicated by assessment   - Educate patient/family on patient safety including physical limitations  - Instruct patient to call for assistance with activity based on assessment  - Modify environment to reduce risk of injury  - Consider OT/PT consult to assist with strengthening/mobility  Outcome: Progressing  Goal: Maintain or return to baseline ADL function  Description: INTERVENTIONS:  -  Assess patient's ability to carry out ADLs; assess patient's baseline for ADL function and identify physical deficits which impact ability to perform ADLs (bathing, care of mouth/teeth, toileting, grooming, dressing, etc )  - Assess/evaluate cause of self-care deficits   - Assess range of motion  - Assess patient's mobility; develop plan if impaired  - Assess patient's need for assistive devices and provide as appropriate  - Encourage maximum independence but intervene and supervise when necessary  - Involve family in performance of ADLs  - Assess for home care needs following discharge   - Consider OT consult to assist with ADL evaluation and planning for discharge  - Provide patient education as appropriate  Outcome: Progressing  Goal: Maintain or return mobility status to optimal level  Description: INTERVENTIONS:  - Assess patient's baseline mobility status (ambulation, transfers, stairs, etc )    - Identify cognitive and physical deficits and behaviors that affect mobility  - Identify mobility aids required to assist with transfers and/or ambulation (gait belt, sit-to-stand, lift, walker, cane, etc )  - [place] fall precautions as indicated by assessment  - Record patient progress and toleration of activity level on Mobility SBAR; progress patient to next Phase/Stage  - Instruct patient to call for assistance with activity based on assessment  - Consider rehabilitation consult to assist with strengthening/weightbearing, etc   Outcome: Progressing     Problem: DISCHARGE PLANNING  Goal: Discharge to home or other facility with appropriate resources  Description: INTERVENTIONS:  - Identify barriers to discharge w/patient and caregiver  - Arrange for needed discharge resources and transportation as appropriate  - Identify discharge learning needs (meds, wound care, etc )  - Arrange for interpretive services to assist at discharge as needed  - Refer to Case Management Department for coordinating discharge planning if the patient needs post-hospital services based on physician/advanced practitioner order or complex needs related to functional status, cognitive ability, or social support system  Outcome: Progressing     Problem: MUSCULOSKELETAL - ADULT  Goal: Maintain or return mobility to safest level of function  Description: INTERVENTIONS:  - Assess patient's ability to carry out ADLs; assess patient's baseline for ADL function and identify physical deficits which impact ability to perform ADLs (bathing, care of mouth/teeth, toileting, grooming, dressing, etc )  - Assess/evaluate cause of self-care deficits   - Assess range of motion  - Assess patient's mobility  - Assess patient's need for assistive devices and provide as appropriate  - Encourage maximum independence but intervene and supervise when necessary  - Involve family in performance of ADLs  - Assess for home care needs following discharge   - Consider OT consult to assist with ADL evaluation and planning for discharge  - Provide patient education as appropriate  Outcome: Progressing  Goal: Maintain proper alignment of affected body part  Description: INTERVENTIONS:  - Support, maintain and protect limb and body alignment  - Provide patient/ family with appropriate education  Outcome: Progressing

## 2021-02-27 NOTE — CASE MANAGEMENT
CM updated by ANETTE  Patient's transport time is 1330 with [facility]  CM updated nursing, SL[facility] and receiving facility  CM called patient on room phone  Patient updated transport set up for 1330 today to [phone]  rehab  Patient's  placed on phone  CM introduced self and role  CM reviewed with  conversation earlier between CM and his wife  Patient's  updated insurance authorization approved for acute rehab  Available bed is at [phone]  rehab today  Transport set up for 1330   provided contact number of facility and room number  No other questions/concerns noted at this time

## 2021-02-27 NOTE — ASSESSMENT & PLAN NOTE
· Right inferior pubic ramus and right pubic symphysis fracture, Non-operative management  · WBAT  · Appreciate PMR consult  · PT/OT - rehab  D/C to [facility]     · DVT Prophylaxis Lovenox  · Follow-up in the office in 6 weeks with Dr Alma Costa

## 2021-02-27 NOTE — ASSESSMENT & PLAN NOTE
· Patient continues to complain of right shoulder and right wrist pain  · XR right shoulder and right wrist negative for acute traumatic injury    · Multimodal pain regimen  · PT/OT

## 2021-02-27 NOTE — PLAN OF CARE
Problem: Potential for Falls  Goal: Patient will remain free of falls  Description: INTERVENTIONS:  - Assess patient frequently for physical needs  -  Identify cognitive and physical deficits and behaviors that affect risk of falls  -  [place] fall precautions as indicated by assessment   - Educate patient/family on patient safety including physical limitations  - Instruct patient to call for assistance with activity based on assessment  - Modify environment to reduce risk of injury  - Consider OT/PT consult to assist with strengthening/mobility  Outcome: Progressing     Problem: Prexisting or High Potential for Compromised Skin Integrity  Goal: Skin integrity is maintained or improved  Description: INTERVENTIONS:  - Identify patients at risk for skin breakdown  - Assess and monitor skin integrity  - Assess and monitor nutrition and hydration status  - Monitor labs   - Assess for incontinence   - Turn and reposition patient  - Assist with mobility/ambulation  - Relieve pressure over bony prominences  - Avoid friction and shearing  - Provide appropriate hygiene as needed including keeping skin clean and dry  - Evaluate need for skin moisturizer/barrier cream  - Collaborate with interdisciplinary team   - Patient/family teaching  - Consider wound care consult   Outcome: Progressing     Problem: Nutrition/Hydration-ADULT  Goal: Nutrient/Hydration intake appropriate for improving, restoring or maintaining nutritional needs  Description: Monitor and assess patient's nutrition/hydration status for malnutrition  Collaborate with interdisciplinary team and initiate plan and interventions as ordered  Monitor patient's weight and dietary intake as ordered or per policy  Utilize nutrition screening tool and intervene as necessary  Determine patient's food preferences and provide high-protein, high-caloric foods as appropriate       INTERVENTIONS:  - Monitor oral intake, urinary output, labs, and treatment plans  - Assess nutrition and hydration status and recommend course of action  - Evaluate amount of meals eaten  - Assist patient with eating if necessary   - Allow adequate time for meals  - Recommend/ encourage appropriate diets, oral nutritional supplements, and vitamin/mineral supplements  - Order, calculate, and assess calorie counts as needed  - Recommend, monitor, and adjust tube feedings and TPN/PPN based on assessed needs  - Assess need for intravenous fluids  - Provide specific nutrition/hydration education as appropriate  - Include patient/family/caregiver in decisions related to nutrition  Outcome: Progressing     Problem: PAIN - ADULT  Goal: Verbalizes/displays adequate comfort level or baseline comfort level  Description: Interventions:  - Encourage patient to monitor pain and request assistance  - Assess pain using appropriate pain scale  - Administer analgesics based on type and severity of pain and evaluate response  - Implement non-pharmacological measures as appropriate and evaluate response  - Consider cultural and social influences on pain and pain management  - Notify physician/advanced practitioner if interventions unsuccessful or patient reports new pain  Outcome: Progressing     Problem: SAFETY ADULT  Goal: Patient will remain free of falls  Description: INTERVENTIONS:  - Assess patient frequently for physical needs  -  Identify cognitive and physical deficits and behaviors that affect risk of falls    -  [place] fall precautions as indicated by assessment   - Educate patient/family on patient safety including physical limitations  - Instruct patient to call for assistance with activity based on assessment  - Modify environment to reduce risk of injury  - Consider OT/PT consult to assist with strengthening/mobility  Outcome: Progressing  Goal: Maintain or return to baseline ADL function  Description: INTERVENTIONS:  -  Assess patient's ability to carry out ADLs; assess patient's baseline for ADL function and identify physical deficits which impact ability to perform ADLs (bathing, care of mouth/teeth, toileting, grooming, dressing, etc )  - Assess/evaluate cause of self-care deficits   - Assess range of motion  - Assess patient's mobility; develop plan if impaired  - Assess patient's need for assistive devices and provide as appropriate  - Encourage maximum independence but intervene and supervise when necessary  - Involve family in performance of ADLs  - Assess for home care needs following discharge   - Consider OT consult to assist with ADL evaluation and planning for discharge  - Provide patient education as appropriate  Outcome: Progressing  Goal: Maintain or return mobility status to optimal level  Description: INTERVENTIONS:  - Assess patient's baseline mobility status (ambulation, transfers, stairs, etc )    - Identify cognitive and physical deficits and behaviors that affect mobility  - Identify mobility aids required to assist with transfers and/or ambulation (gait belt, sit-to-stand, lift, walker, cane, etc )  - [place] fall precautions as indicated by assessment  - Record patient progress and toleration of activity level on Mobility SBAR; progress patient to next Phase/Stage  - Instruct patient to call for assistance with activity based on assessment  - Consider rehabilitation consult to assist with strengthening/weightbearing, etc   Outcome: Progressing     Problem: DISCHARGE PLANNING  Goal: Discharge to home or other facility with appropriate resources  Description: INTERVENTIONS:  - Identify barriers to discharge w/patient and caregiver  - Arrange for needed discharge resources and transportation as appropriate  - Identify discharge learning needs (meds, wound care, etc )  - Arrange for interpretive services to assist at discharge as needed  - Refer to Case Management Department for coordinating discharge planning if the patient needs post-hospital services based on physician/advanced practitioner order or complex needs related to functional status, cognitive ability, or social support system  Outcome: Progressing     Problem: MUSCULOSKELETAL - ADULT  Goal: Maintain or return mobility to safest level of function  Description: INTERVENTIONS:  - Assess patient's ability to carry out ADLs; assess patient's baseline for ADL function and identify physical deficits which impact ability to perform ADLs (bathing, care of mouth/teeth, toileting, grooming, dressing, etc )  - Assess/evaluate cause of self-care deficits   - Assess range of motion  - Assess patient's mobility  - Assess patient's need for assistive devices and provide as appropriate  - Encourage maximum independence but intervene and supervise when necessary  - Involve family in performance of ADLs  - Assess for home care needs following discharge   - Consider OT consult to assist with ADL evaluation and planning for discharge  - Provide patient education as appropriate  Outcome: Progressing  Goal: Maintain proper alignment of affected body part  Description: INTERVENTIONS:  - Support, maintain and protect limb and body alignment  - Provide patient/ family with appropriate education  Outcome: Progressing

## 2021-02-27 NOTE — ASSESSMENT & PLAN NOTE
· Non-operative management  · WBAT BLE  · Pain management  · DVT prophylaxis with Lovenox  · PT/OT - post acute rehabilitation  D/C to [facility]     · Follow-up in the office in 6 weeks with Dr Jose Summers

## 2021-02-27 NOTE — TREATMENT PLAN
Individualized Plan of [address] [place] [address] A Viscomi 78 y o  female MRN: [id number]  Unit/Bed#: [facility] 321-11 Encounter: [id number]     PATIENT INFORMATION  ADMISSION DATE: 2/27/2021  3:06 PM JIMENEZ CATEGORY:pelvis fracture   ADMISSION DIAGNOSIS: Pelvic fracture ([facility] [address] ) [S32  [id number]]  EXPECTED LOS: 2-3 weeks      MEDICAL/FUNCTIONAL PROGNOSIS  Based on my assessment of the patient's medical conditions and current functional status, the prognosis for attaining medical and functional goals or the IRF stay is:  Fair    Medical Goals: Patient [facility] be medically [facility] and Patient will be able to manage medical conditions and comorbid conditions with medications and follow up upon completion of rehab program    [address]: Home - Assistance    ANTICIPATED FOLLOW-UP SERVICE:   Outpatient Therapy Services: PT and OT       DISCIPLINE SPECIFIC PLANS:  Required Disciplines & Services: Rehabillitation Nursing and Case Management    REQUIRED THERAPY:  Therapy Hours per Day Days per Week Total Days   Physical Therapy 1 5 5-6 7   Occupational Therapy 1 5 5-6 7   Speech/Language Therapy 0 0 0       ANTICIPATED FUNCTIONAL OUTCOMES:  ADL:  Supervision - Mod I with LRAD   Bladder/Bowel: Patient will return to premorbid level for bladder/bowel management upon completion of rehab program   Transfers:    Mod I with LRAD   Locomotion:   Mod I with LRAD   Cognitive:       DISCHARGE PLANNING NEEDS  Equipment needs: Discharge needs to be reviewed with team    REHAB ANTICIPATED PARTICIPATION RESTRICTIONS:  None

## 2021-02-27 NOTE — H&P
PHYSICAL MEDICINE AND REHABILITATION H&P/ADMISSION NOTE  Monie Fine 78 y o  female MRN: [id number]  Unit/Bed#: -01 Encounter: [id number]     Rehab Diagnosis: Impairment of mobility, safety and Activities of Daily Living (ADLs) due to Orthopedic Disorders:  08 3  Pelvic Fracture    History of Present Illness:   Sherie Sanchez is a 78 y o  female with Sjogren's disease, depression, hypothyroidism, HTN who presented to the [address] [place] [address] on 2/22/21 with fall down stairs  Work-up revealed right inferior pubic rami fracture, right pubic symphysis fracture, right anterior acetabular fracture, left anterior acetabular fracture  Patient also with right shoulder abrasion and brusing of right elbow  Patient evaluated by Dr Coty Cochran (orthopedics) and treated non-operatively  Deemed WBAT BLE  Patientn started on Lovenox for DVT ppx  Medically, transiently required oxygen and had leukocytosis, both which improved  Patient also treated for acute pain  After medical stabilization, patient found to have acute functional deficits from her baseline, therefore was admitted to [address] [place] [address] for acute inpatient rehabilitation  Plan:     Rehabilitation   Functional deficits: bilateral lower extremity weakness, decreased ROM, reduced endurance/strength, impaired mobility, self care   Begin PT/OT  Rehabilitation goals are to achieve a Mod I level with mobility and self care  Prognosis is fair  ELOS is 2-3 weeks  Estimated discharge is home       DVT prophylaxis   Managed on SQ Lovenox     Pain   R>L leg and pelvis pain: managed on scheduled Tylenol, low dose gabapentin, topical Voltaren gel, topical lidoderm patch, and PRN Oxycodone/Robaxin      Bladder plan   Continent    Bowel plan   Continent    Code Status   FULL       * Multiple closed fractures of pelvis (HCC)  Assessment & Plan  · Traumatic fall on stairs:  · Resultant right inferior pubic rami fracture, right pubic symphysis fracture, right anterior acetabular fracture, left anterior acetabular fracture  · Evaluated by Orthopedics - treated conservatively  · Deemed WBAT BLE  · Continue DVT ppx  · Follow-up with Trauma/Orthopedics as outpatient     Abnormal findings on imaging test  Assessment & Plan  · Seen on CT Abd/Pelvis  · LUNGS:  Mild hypoventilatory changes  14 mm left lower lobe subpleural nodule versus focal consolidation #4/54  Similar more inferior 8mm and 14 mm opacities # 4/82 and #4/95  · LIVER/BILIARY TREE:  One or more subcentimeter sharply circumscribed low-density hepatic lesion(s) are noted, too small to accurately characterize, but statistically most likely to represent subcentimeter hepatic cysts  No suspicious solid hepatic lesion is identified  Hepatic contours are normal   No biliary dilatation  · Patient will need follow-up with PCP    Hypothyroidism  Assessment & Plan  · Managed on Synthroid     Depression  Assessment & Plan  · Managed on Prozac (home dose) and Xanax low dose for anxiety  [place] - patient was not on Xanax prior  · Follow-up with PCP as outpatient     Hypertension  Assessment & Plan  · Managed on Norvasc, Atenolol  · Consulted internal medicine     Sjogren's disease ([facility] [address] )  Assessment & Plan  · Managed on weekly Methotrexate  · Follow-up with Dr Reggie Vargas    Parotid gland enlargement  Assessment & Plan  · Follows with Dr Sima Romero             Subjective:   Patient seen face to face  Feeling pain in her right groin states moderate  No other complaints  Motivated to get better  Review of Systems   Constitutional: Negative  HENT: Negative  Eyes: Negative  Respiratory: Negative  Cardiovascular: Negative  Gastrointestinal: Negative  Endocrine: Negative  Genitourinary: Negative  Musculoskeletal: Negative  Skin: Negative  Allergic/Immunologic: Negative  Neurological: Negative  Hematological: Negative  Psychiatric/Behavioral: Negative  Function:  Prior level of function and living situation:  Galileo Huertas is  and lives with her spouse and 2 sons in their 35s  She lives in [facility]) single family home  The living area: can live on one level - can live on main level with bedroom/bathroom  Equipment in home: [address] [facility] [employer] and cane  There 2 steps to enter the home and then 7 to first floor  Patient/family's goals: Return to previous home/apartment  The patient has ample support available at home    PLOF: Independent     Current level of function:  Physical Therapy: Mod-Max A   Occupational Therapy: Mod-Max A     Physical Exam:  /65 (BP Location: Left arm)   Pulse 65   Temp 98 5 °F (36 9 °C) (Tympanic)   Resp 18   Ht 4' 10" (1 473 m)   Wt 62 3 kg (137 lb 5 6 oz)   LMP 01/13/2005   SpO2 97%   BMI 28 71 kg/m²        Intake/Output Summary (Last 24 hours) at 2/28/2021 0713  Last data filed at 2/27/2021 1702  Gross per 24 hour   Intake 120 ml   Output --   Net 120 ml       Body mass index is 28 71 kg/m²  Physical Exam  Vitals signs and nursing note reviewed  Constitutional:       General: She is not in acute distress  HENT:      Head: Normocephalic and atraumatic  Nose: Nose normal       Mouth/Throat:      Mouth: Mucous membranes are moist    Eyes:      Conjunctiva/sclera: Conjunctivae normal    Neck:      Musculoskeletal: Neck supple  Cardiovascular:      Rate and Rhythm: Normal rate and regular rhythm  Pulses: Normal pulses  Pulmonary:      Effort: Pulmonary effort is normal       Breath sounds: Normal breath sounds  No wheezing or rales  Abdominal:      General: Bowel sounds are normal  There is no distension  Palpations: Abdomen is soft  Tenderness: There is no abdominal tenderness  Musculoskeletal:         General: No swelling  Comments: Decreased bilateral hip ROM   Skin:     General: Skin is warm  Findings: Bruising (right elbow) present        Comments: Abrasion on right shoulder   Neurological:      Mental Status: She is alert and oriented to person, place, and time  Motor: Weakness present  Comments: Strength exam: pain limited, BLE: 3-/5 HF, KE, KF, 4/5 DF, PF   Psychiatric:         Mood and Affect: Mood normal             Labs, medications, and imaging personally reviewed      Laboratory:    Lab Results   Component Value Date    SODIUM 136 02/23/2021    K 4 1 02/23/2021     02/23/2021    CO2 24 02/23/2021    BUN 15 02/23/2021    CREATININE 0 87 02/23/2021    GLUC 101 02/23/2021    CALCIUM 8 6 02/23/2021     Lab Results   Component Value Date    WBC 8 80 02/24/2021    HGB 10 4 (L) 02/24/2021    HCT 33 9 (L) 02/24/2021    MCV 87 02/24/2021     02/24/2021     No results found for: INR, PROTIME      Current Facility-Administered Medications:     acetaminophen (TYLENOL) tablet 650 mg, 650 mg, Oral, Q6H [address], Lynne Rees MD, 650 mg at 02/28/21 0612    ALPRAZolam (XANAX) tablet 0 25 mg, 0 25 mg, Oral, TID PRN, Lynne Rees MD    amLODIPine (NORVASC) tablet 2 5 mg, 2 5 mg, Oral, Daily, Lynne Rees MD    atenolol (TENORMIN) tablet 50 mg, 50 mg, Oral, BID, Lynne Rees MD, 50 mg at 02/27/21 2007    atorvastatin (LIPITOR) tablet 10 mg, 10 mg, Oral, Daily, Lynne Rees MD    bisacodyl (DULCOLAX) rectal suppository 10 mg, 10 mg, Rectal, Daily PRN, Lynne Rees MD    Diclofenac Sodium (VOLTAREN) 1 % topical gel 2 g, 2 g, Topical, 4x Daily, Lynne Rees MD, 2 g at 02/27/21 2124    docusate sodium (COLACE) capsule 100 mg, 100 mg, Oral, BID, Lynne Rees MD, 100 mg at 02/27/21 1702    enoxaparin (LOVENOX) subcutaneous injection 30 mg, 30 mg, Subcutaneous, Q12H [address], Lynne Rees MD, 30 mg at 02/27/21 2007    FLUoxetine (PROzac) capsule 20 mg, 20 mg, Oral, Daily, Lynne Rees MD    folic acid (FOLVITE) tablet 1 mg, 1 mg, Oral, Daily, Lynne Rees MD    gabapentin (NEURONTIN) capsule 100 mg, 100 mg, Oral, HS, Lynne Rees MD, 100 mg at 02/27/21 2124    levothyroxine tablet 88 mcg, 88 mcg, Oral, Early Morning, Yuly Shah MD, 88 mcg at 02/28/21 0612    lidocaine (LIDODERM) 5 % patch 2 patch, 2 patch, Topical, Daily, Yuly Shah MD    methocarbamol (ROBAXIN) tablet 500 mg, 500 mg, Oral, Q6H PRN, Yuly Shah MD    [START ON 3/3/2021] methotrexate tablet 20 mg, 20 mg, Oral, Weekly, Yuly Shah MD    ondansetron (ZOFRAN-ODT) dispersible tablet 4 mg, 4 mg, Oral, Q6H PRN, Yuly Shah MD    oxyCODONE (ROXICODONE) IR tablet 2 5 mg, 2 5 mg, Oral, Q4H PRN, Yuly Shah MD    oxyCODONE (ROXICODONE) IR tablet 5 mg, 5 mg, Oral, Q4H PRN, Yuly Shah MD, 5 mg at 02/27/21 2005    polyethylene glycol (MIRALAX) packet 17 g, 17 g, Oral, Daily, Yuly Shah MD    polyethylene glycol (MIRALAX) packet 17 g, 17 g, Oral, Daily PRN, Yuly Shah MD  Allergies   Allergen Reactions    Hydroxyquinoline Sulfate [Oxyquinoline] Vomiting    Leucovorin Vomiting    Tizanidine Vomiting      Patient Active Problem List    Diagnosis Date Noted    Multiple closed fractures of pelvis ([facility] ) 02/27/2021     Priority: High    Depression 02/28/2021    Hypothyroidism 02/28/2021    Abnormal findings on imaging test 02/28/2021    Hypertension 02/27/2021    Pain of right upper extremity 02/27/2021    Closed nondisplaced fracture of anterior wall of right acetabulum (HCC) 02/23/2021    Closed nondisplaced fracture of right pubis ([facility] ) 02/23/2021    Fall 02/23/2021    Parotid gland enlargement 08/29/2019    Sjogren's disease ([facility] ) 08/29/2019     Past Medical History:   Diagnosis Date    Lymphadenitis, chronic     Sialadenitis     Sjogren's disease ([facility] )     Stomach problems     Thyroid disorder     Xerostomia      Past Surgical History:   Procedure Laterality Date    CHOLECYSTECTOMY       Social History     Socioeconomic History    Marital status: /Civil Union     Spouse name: Not on file    Number of children: Not on file    Years of education: Not on file    Highest education level: Not on file   Occupational History    Not on file   Social Needs    Financial resource strain: Not on file    Food insecurity     Worry: Not on file     Inability: Not on file    Transportation needs     Medical: Not on file     Non-medical: Not on file   Tobacco Use    Smoking status: Former Smoker    Smokeless tobacco: Never Used   Substance and Sexual Activity    Alcohol use: Yes     Frequency: Monthly or less    Drug use: Never    Sexual activity: Not on file   Lifestyle    Physical activity     Days per week: Not on file     Minutes per session: Not on file    Stress: Not on file   Relationships    Social connections     Talks on phone: Not on file     Gets together: Not on file     Attends Gnosticist service: Not on file     Active member of club or organization: Not on file     Attends meetings of clubs or organizations: Not on file     Relationship status: Not on file    Intimate partner violence     Fear of current or ex partner: Not on file     Emotionally abused: Not on file     Physically abused: Not on file     Forced sexual activity: Not on file   Other Topics Concern    Not on file   Social History Narrative    Not on file     Social History     Tobacco Use   Smoking Status Former Smoker   Smokeless Tobacco Never Used     Social History     Substance and Sexual Activity   Alcohol Use Yes    Frequency: Monthly or less     Family History   Problem Relation Age of Onset    Diabetes Other     Hypertension Other     Heart disease Other     Arthritis Other          Medical Necessity Criteria for [facility] Admission: Bowel/Bladder Management, Leukocystosis and pain  In addition, the preadmission screen, post-admission physical evaluation, overall plan of care and admissions order demonstrate a reasonable expectation that the following criteria were met at the time of admission to the [place] [address]  The patient requires active and ongoing therapeutic intervention of multiple therapy disciplines (physical therapy, occupational therapy, speech-language pathology, or prosthetics/orthotics), one of which is physical or occupational therapy  2  Patient requires an intensive rehabilitation therapy program, as defined in Chapter 1, section 110 2 2 of the CMS Medicare Policy Manual  This intensive rehabilitation therapy program will consist of at least 3 hours of therapy per day at least 5 days per week or at least 15 hours of intensive rehabilitation therapy within a 7 consecutive day period, beginning with the date of admission to the [facility]  3  The patient is reasonably expected to actively participate in, and benefit significantly from, the intensive rehabilitation therapy program as defined in Chapter 1, section 110 2 2 of the CMS Medicare Policy Manual at this time of admission to the [facility]  She can reasonably be expected to make measurable improvement (that will be of practical value to improve the patients functional capacity or adaptation to impairments) as a result of the rehabilitation treatment, as defined in section 110 3, and such improvement can be expected to be made within the prescribed period of time  As noted in the CMS Medicare Policy Manual, the patient need not be expected to achieve complete independence in the domain of self-care nor be expected to return to his or her prior level of functioning in order to meet this standard  4  The patient must require physician supervision by a rehabilitation physician  As such, a rehabilitation physician will conduct face-to-face visits with the patient at least 3 days per week throughout the patients stay in the [facility] to assess the patient both medically and functionally, as well as to modify the course of treatment as needed to maximize the patients capacity to benefit from the rehabilitation process    5  The patient requires an intensive and coordinated interdisciplinary approach to providing rehabilitation, as defined in Chapter 1, section 110 2 5 of the CMS Medicare Policy Manual  This will be achieved through periodic team conferences, conducted at least once in a 7-day period, and comprising of an interdisciplinary team of medical professionals consisting of: a rehabilitation physician, registered nurse,  and/or , and a licensed/certified therapist from each therapy discipline involved in treating the patient  Changes Since Pre-admission Assessment: None -This patient's participation in rehab continues to be reasonable, necessary and appropriate  CMS Required Post-Admission Physician Evaluation Elements  History and Physical, including medical history, functional history and active comorbidities as in above text  Post-Admission Physician Evaluation:  The patient has the potential to make improvement and is in need of physical, occupational, and/or therapy services  The patient may also need nutritional services  Given the patient's complex medical condition and risk of further medical complications, rehabilitative services cannot be safely provided at a lower level of care, such as a skilled nursing facility  I have reviewed the patient's functional and medical status at the time of the preadmission screening and they are the same as on the day of this admission  I acknowledge that I have personally performed a full physical examination on this patient within 24 hours of admission  The patient and/or family demonstrated understanding the rehabilitation program and the discharge process after we discussed them  Agree in entirety: yes  Minor adaptions: none    Major changes: none    Verónica Weathers MD    ** Please Note: Fluency Direct voice to text software may have been used in the creation of this document   **

## 2021-02-27 NOTE — QUICK NOTE
At bedside to assess patient for reports of asymptomatic hypertension  On my arrival, patient denies having any complaints  Specifically she denies any headache, dizziness, lightheadedness, blurred vision, hearing changes, visual changes, weakness, decreased sensation, confusion  Patient states that she feels fine I am just a little tired, but it is eight o'clock  On my assessment, patient does have some weakness in her right wrist and right hip secondary to fall, but she does not display any gross extremity weakness and she states that this is been present since her fall  She continues to be able to range her right elbow and shoulder with full strength, as well as her right ankle  After further discussion, she states that she is not getting all of her medications correctly  I called and verified all of her current medications with her pharmacy:  [facility] in [place], [place]  Her current medications include:  · Atenolol 50 mg b i d   · Folic acid 1 mg daily  · Xanax 1 mg t i d   · Methotrexate 20 mg, weekly-Wednesday  · Prozac 20 mg daily  · Levothyroxine 88 mcg daily  · Lipitor 10 mg daily  · Flexeril 10 mg HS (patient not having back spasms at this time-will order as appropriate)  · The pharmacy verified that she is taking cevimeline or Reglan, but per patient these have been discontinued    All of these medications were correctly ordered  Since patient doesn't have any symptoms or display any new focal neurological deficits or signs,  we will continue to monitor  Correct dosing of atenolol was ordered which patient will receive at 9:00 p m  Patient also has labetalol 10 mg q 6 p r n  if her blood pressure is greater than 169 systolically  Plan of care was discussed nurse, Mario Tran, who confirmed understanding and will call with any questions/concerns or new findings

## 2021-02-28 PROBLEM — R93.89 ABNORMAL FINDINGS ON IMAGING TEST: Status: ACTIVE | Noted: 2021-02-28

## 2021-02-28 PROBLEM — E03.9 HYPOTHYROIDISM: Status: ACTIVE | Noted: 2021-02-28

## 2021-02-28 PROBLEM — F32.A DEPRESSION: Status: ACTIVE | Noted: 2021-02-28

## 2021-02-28 PROCEDURE — 97530 THERAPEUTIC ACTIVITIES: CPT

## 2021-02-28 PROCEDURE — 97535 SELF CARE MNGMENT TRAINING: CPT

## 2021-02-28 PROCEDURE — 97167 OT EVAL HIGH COMPLEX 60 MIN: CPT

## 2021-02-28 PROCEDURE — 97110 THERAPEUTIC EXERCISES: CPT

## 2021-02-28 PROCEDURE — 97163 PT EVAL HIGH COMPLEX 45 MIN: CPT

## 2021-02-28 PROCEDURE — 97116 GAIT TRAINING THERAPY: CPT

## 2021-02-28 RX ORDER — BISACODYL 10 MG
10 SUPPOSITORY, RECTAL RECTAL DAILY PRN
Status: DISCONTINUED | OUTPATIENT
Start: 2021-02-28 | End: 2021-03-04 | Stop reason: HOSPADM

## 2021-02-28 RX ORDER — ALPRAZOLAM 0.25 MG/1
0.25 TABLET ORAL 3 TIMES DAILY PRN
Status: DISCONTINUED | OUTPATIENT
Start: 2021-02-28 | End: 2021-03-04 | Stop reason: HOSPADM

## 2021-02-28 RX ORDER — ACETAMINOPHEN 325 MG/1
650 TABLET ORAL EVERY 8 HOURS SCHEDULED
Status: DISCONTINUED | OUTPATIENT
Start: 2021-02-28 | End: 2021-03-04 | Stop reason: HOSPADM

## 2021-02-28 RX ORDER — LIDOCAINE 50 MG/G
2 PATCH TOPICAL
Status: DISCONTINUED | OUTPATIENT
Start: 2021-02-28 | End: 2021-03-04 | Stop reason: HOSPADM

## 2021-02-28 RX ADMIN — LEVOTHYROXINE SODIUM 88 MCG: 88 TABLET ORAL at 06:12

## 2021-02-28 RX ADMIN — OXYCODONE HYDROCHLORIDE 5 MG: 5 TABLET ORAL at 08:33

## 2021-02-28 RX ADMIN — ALPRAZOLAM 0.25 MG: 0.25 TABLET ORAL at 21:06

## 2021-02-28 RX ADMIN — FOLIC ACID 1 MG: 1 TABLET ORAL at 08:01

## 2021-02-28 RX ADMIN — FLUOXETINE 20 MG: 20 CAPSULE ORAL at 08:01

## 2021-02-28 RX ADMIN — ENOXAPARIN SODIUM 30 MG: 30 INJECTION SUBCUTANEOUS at 08:00

## 2021-02-28 RX ADMIN — ACETAMINOPHEN 650 MG: 325 TABLET ORAL at 21:06

## 2021-02-28 RX ADMIN — AMLODIPINE BESYLATE 2.5 MG: 2.5 TABLET ORAL at 08:01

## 2021-02-28 RX ADMIN — OXYCODONE HYDROCHLORIDE 5 MG: 5 TABLET ORAL at 12:55

## 2021-02-28 RX ADMIN — DICLOFENAC SODIUM 2 G: 10 GEL TOPICAL at 17:17

## 2021-02-28 RX ADMIN — ATENOLOL 50 MG: 50 TABLET ORAL at 08:01

## 2021-02-28 RX ADMIN — ATENOLOL 50 MG: 50 TABLET ORAL at 21:08

## 2021-02-28 RX ADMIN — ENOXAPARIN SODIUM 30 MG: 30 INJECTION SUBCUTANEOUS at 21:06

## 2021-02-28 RX ADMIN — DICLOFENAC SODIUM 2 G: 10 GEL TOPICAL at 12:11

## 2021-02-28 RX ADMIN — DOCUSATE SODIUM 100 MG: 100 CAPSULE ORAL at 08:01

## 2021-02-28 RX ADMIN — ACETAMINOPHEN 650 MG: 325 TABLET ORAL at 06:12

## 2021-02-28 RX ADMIN — ACETAMINOPHEN 650 MG: 325 TABLET ORAL at 12:56

## 2021-02-28 RX ADMIN — DOCUSATE SODIUM 100 MG: 100 CAPSULE ORAL at 17:16

## 2021-02-28 RX ADMIN — GABAPENTIN 100 MG: 100 CAPSULE ORAL at 21:06

## 2021-02-28 RX ADMIN — LIDOCAINE 2 PATCH: 50 PATCH TOPICAL at 21:07

## 2021-02-28 RX ADMIN — ATORVASTATIN CALCIUM 10 MG: 10 TABLET, FILM COATED ORAL at 08:01

## 2021-02-28 RX ADMIN — DICLOFENAC SODIUM 2 G: 10 GEL TOPICAL at 21:08

## 2021-02-28 RX ADMIN — DICLOFENAC SODIUM 2 G: 10 GEL TOPICAL at 08:01

## 2021-02-28 NOTE — PROGRESS NOTES
OT LONG TERM GOALS       02/28/21 1230   Rehab Team Goals   ADL Team Goal Patient will require supervision with ADLs with least restrictive device upon completion of rehab program   Cognitive Team Goal Patient will require supervision for basic and complex tasks upon completion of rehab program   Eating Goal   Eating Goal 06  Independent - Patient completes the activity by him/herself with no assistance from a helper  Meal Complete All meals   Status Ongoing; Target goal - two weeks   Grooming Goal   Oral Hygiene Goal 06  Independent - Patient completes the activity by him/herself with no assistance from a helper  Task Wash/Dry Face;Wash/Dry Hands;Brush Teeth;Comb Hair;Complete Groom   Environment Stand at [employer]; Target goal - two weeks   Intervention Balance Work; Therapeutic Exercise; Tolerance Work   Tub/Shower Transfer Goal   Method Tub Shower  (MIN a)   Assist Device Seat with Back;Hand Held [employer]   Status Ongoing; Target goal - two weeks   Interventions ADL Training;Assistive Device   Bathing Goal   Shower/bathe self Goal 04  Supervision or touching assistance- [place] provides VERBAL CUES or supervision throughout activity  Environment Seated;Standing; Tub   Safety Precautions Safety Precaution   Status Ongoing; Target goal - two weeks   Intervention Assistive Device; ADL Training; Therapeutic Exercise   Upper Body Dressing Goal   Upper body dressing Goal 06  Independent - Patient completes the activity by him/herself with no assistance from a helper  Task Upper Body;Arms in/out; Over Head   Environment Seated   Status Ongoing; Target goal - two weeks   Intervention Balance Work; Therapeutic Exercise; Tolerance Work   Lower Body Dressing Goal   Lower body dressing Goal 04  Supervision or touching assistance- [place] provides VERBAL CUES or supervision throughout activity  Putting on/taking off footwear Goal 04   Supervision or touching assistance- [place] provides VERBAL CUES or supervision throughout activity  Task Lower Body;Socks;Shoe/Slipper;Pants; Undergarment   Adaptive Equipment Reacher;Sock Aide;Dressing Stick; Elastic Laces   Status Ongoing; Target goal - two weeks   Intervention Balance Work;Tolerance Work; Therapeutic Exercise;Assistive Device   Toileting Transfer Goal   Toilet transfer Goal 06  Independent - Patient completes the activity by him/herself with no assistance from a helper  Assistive Device [facility] Commode   Status Ongoing; Target goal - two weeks   Intervention Balance Work;Assistive Device; ADL Training   Toileting Goal   Toileting hygiene Goal 06  Independent - Patient completes the activity by him/herself with no assistance from a helper  Task Pants Up;Pants Down;Hygiene   Status Ongoing; Target goal - two weeks   Intervention ADL Training;Balance Work;Assistive Device   Comprehension Goal   Comprehension Assist Level Moderate Clothier   Status Ongoing; Target goal - two weeks   Expression Goal   Expression Assist Level Moderate Clothier   Status Ongoing; Target goal - two weeks   Social Interaction Goal   Social Interaction Assist Level Moderate Clothier   Status Ongoing; Target goal - two weeks   Problem Solving Goal   Problem Solving Assist Level Moderate Clothier   Basic Function Routine Solution;Problem Recognition   Status Ongoing; Target goal - two weeks   Intervention Cognitive Training; Safety Education   Memory Goal   Memory Assist Level Moderate Clothier   Short-Term Memory Orientation; Recent Recall   Long-Term Memory Past Events; Biography Information   Status Ongoing; Target goal - two weeks   Intervention Assistive Device;Memory Book;Precautions Review   Community Reintegration Goal   Goal SUPERVISION   Status Ongoing; Target - two weeks   Interventions Community Outing;Leisure Activity; Activity Tolerance   Meal Prep and Kitchen Mobility   Assist Level Independent   Status Ongoing; Target goal - two weeks   Medication Management Assist Level Independent   Status Ongoing; Target goal - two weeks   Laundry   Assist Level Minimum Assist   Status Ongoing; Target goal - two weeks

## 2021-02-28 NOTE — PLAN OF CARE
Problem: PAIN - ADULT  Goal: Verbalizes/displays adequate comfort level or baseline comfort level  Description: Interventions:  - Encourage patient to monitor pain and request assistance  - Assess pain using appropriate pain scale  - Administer analgesics based on type and severity of pain and evaluate response  - Implement non-pharmacological measures as appropriate and evaluate response  - Consider cultural and social influences on pain and pain management  - Notify physician/advanced practitioner if interventions unsuccessful or patient reports new pain  Outcome: Progressing     Problem: SAFETY ADULT  Goal: Patient will remain free of falls  Description: INTERVENTIONS:  - Assess patient frequently for physical needs  -  Identify cognitive and physical deficits and behaviors that affect risk of falls    -  [place] fall precautions as indicated by assessment   - Educate patient/family on patient safety including physical limitations  - Instruct patient to call for assistance with activity based on assessment  - Modify environment to reduce risk of injury  - Consider OT/PT consult to assist with strengthening/mobility  Outcome: Progressing

## 2021-02-28 NOTE — ASSESSMENT & PLAN NOTE
· Mood stable   · Managed on Prozac (home dose) and Xanax low dose for anxiety      Checked [place]MP - patient was not on Xanax prior  · Follow-up with PCP as outpatient

## 2021-02-28 NOTE — PROGRESS NOTES
[facility] PT EVAL   02/28/21 2130   Patient Data   Rehab Impairment pelvic fracture   Etiologic Diagnosis fall   Date of Onset 02/22/21   Support System   Name Cindy Castanon   Relationship spouse   Able to provide 24 hour supervision No   Able to provide physical help? Yes   Home Setup   Type of Home Multi Level   Method of Entry Stairs;Hand Rail Left  (1st step no HR)   Number of Stairs 8  (1+7)   Number of Stairs in Home   (full flight to second floor)   In Home Hand Rail Left   First Floor Bathroom None  (can use commode on FF)   Second Floor Bathroom Tub; Shower;Combo;Grab Bars   Second Floor Bathroom Accessibility Commode; Shower chair   First Floor Setup Available Yes  (pt reports she will sleep on couch and utilize commode)   Home Modifications Necessary?   (pending progress)   Available Equipment Roller Walker;Single [employer] [place] [employer]; Shower Chair;Bedside Commode  (x2 SPC's)   Baseline Information   Transportation Family/friends drive   Prior Device(s) Used Roller Walker  (RW occassionally)   Prior Level of Function   Indoor-Mobility (Ambulation) 3  Independent - Patient completed the activities by him/herself, with or without an assistive device, with no assistance from a helper  Stairs 3  Independent - Patient completed the activities by him/herself, with or without an assistive device, with no assistance from a helper  Functional Cognition 3  Independent - Patient completed the activities by him/herself, with or without an assistive device, with no assistance from a helper  Prior Assistance Needed for Driving;Household Chores/Cleaning;Meal Preparation;Money Management; Shopping   Prior Device Used D   Walker  (occassionally)   Falls in the Last Year   Number of falls in the past 12 months 2   Type of Injury Associated with Fall Major injury  (current injury)   Patient Preference   Nickname (Patient Preference) Sandhya   Psychosocial   Psychosocial (WDL) X   Patient Behaviors/Mood Flat affect   Restrictions/Precautions Precautions Fall Risk;Pain   Weight Bearing Restrictions Yes   RLE Weight Bearing Per Order WBAT   LLE Weight Bearing Per Order WBAT   ROM Restrictions No   Pain Assessment   Pain Assessment Tool 0-10   Pain Score 8   Pain Location/Orientation Orientation: Right;Location: Hip   Pain Radiating Towards R buttocks, down R leg   Effect of Pain on Daily Activities got pain meds from JORGE Rolon beginning of session  States she feels stiff when she sits for too [address] Pain Intervention(s) Cold applied  (to R hip/buttock end of session)   Multiple Pain Sites Yes   Pain 2   Pain Score 2 8   Pain Location/Orientation 2 Orientation: Right;Location: Shoulder  (and wrist)   Transfer Bed/Chair/Wheelchair   Positioning Concerns Skin Integrity   Limitations Noted In Balance;Confidence; Endurance;Problem Solving; Sequencing;UE Strength;LE Strength   Adaptive Equipment Roller Walker   Type of Assistance Needed Physical assistance   Amount of Physical Assistance Provided 25%-49%   Comment occ states she feels dizzy when standing but resolves  Per JORGE Rolon who took BPs from sit to stand no change in BP  Monitored throughout session  Pt mainly minAx1 with max vc's for hand placement during STS  Did better once provided with w/c with full length vs desk length arm rests   Needs cueing to push through b/l UEs to advance b/l LE's   Chair/Bed-to-Chair Transfer CARE Score 3   Roll Left and Right   Comment too much pain to complete   Reason if not Attempted Safety concerns   Roll Left and Right CARE Score 88   Sit to Lying   Type of Assistance Needed Physical assistance   Amount of Physical Assistance Provided 75% or more   Comment maxAx1 for trunk and LEs   Sit to Lying CARE Score 2   Lying to Sitting on Side of Bed   Comment to be assessed next session   Reason if not Attempted Safety concerns   Lying to Sitting on Side of Bed CARE Score 88   Sit to Stand   Type of Assistance Needed Physical assistance   Amount of Physical Assistance Provided 25%-49%   Comment minAx1 with RW, max vc's for hand placement   Sit to Stand CARE Score 3   Picking Up Object   Type of Assistance Needed Physical assistance   Amount of Physical Assistance Provided 25%-49%   Comment minAx1 with RW and reacher   Picking Up Object CARE Score 3   Car Transfer   Reason if not Attempted Environmental limitations   Car Transfer CARE Score 10   Ambulation   Primary Mode of Locomotion Prior to Admission Walk   Distance Walked (feet) 15 ft  (x2)   Assist Device Roller Walker   Gait Pattern Antalgic; Inconsistant [place]; Slow [place];Decreased foot clearance;R foot drag; Forward Flexion; Step to;Decreased R stance; Improper weight shift   Limitations Noted In Balance; Endurance; Heel Strike;Posture; Safety; Sequencing;Speed;Strength;Swing   Provided Assistance with: Balance;Weight Shift   Walk Assist Level Minimum Assist   Findings minAx1 with RW, CF by PT for safety, slow gait pattern, increased time to complete   Walk 10 Feet   Type of Assistance Needed Physical assistance   Amount of Physical Assistance Provided 25%-49%   Comment minAx1 with RW   Walk 10 Feet CARE Score 3   Walk 50 Feet with Two Turns   Reason if not Attempted Safety concerns   Walk 50 Feet with Two Turns CARE Score 88   Walk 150 Feet   Reason if not Attempted Safety concerns   Walk 150 Feet CARE Score 88   Walking 10 Feet on Uneven Surfaces   Reason if not Attempted Safety concerns   Walking 10 Feet on Uneven Surfaces CARE Score 88   Wheel 50 Feet with Two Turns   Reason if not Attempted Activity not applicable   Wheel 50 Feet with Two Turns CARE Score 9   Wheel 150 Feet   Reason if not Attempted Activity not applicable   Wheel 387 Feet CARE Score 9   Curb or Single Stair   Reason if not Attempted Safety concerns   1 Step (Curb) CARE Score 88   4 Steps   Reason if not Attempted Safety concerns   4 Steps CARE Score 88   12 Steps   Reason if not Attempted Safety concerns   12 Steps CARE Score 88   Comprehension QI: Comprehension 3  Usually Understands: Understands most conversations, but misses some part/intent of message  Requires cues at times to understand  Expression   QI: Expression 3  Exhibits some difficulty with expressing needs and ideas (e g , some words or finishing thoughts) or speech is not clear   RLE Assessment   RLE Assessment   (grossly 3-/5)   LLE Assessment   LLE Assessment   (3/5 grossly, did not apply overpressure due to pain)   Sensation   Light Touch No apparent deficits   Cognition   Overall Cognitive Status WFL   Arousal/Participation Alert; Cooperative   Attention Attends with cues to redirect   Orientation Level Oriented X4   Memory Decreased recall of precautions;Decreased short term memory   Following Commands Follows one step commands with increased time or repetition   Comments Unable to follow multistep directions, does best with one step direction   Objective Measure   PT Findings Educated pt on 3 hour therapy rule/ 5 days per week as well as ELOS and goals to be set for pt  Educated pt to ask for CPs as necessary for pain relief as well as to ask for pain meds 30-60 mins prior to therapy session for pain relief  B/L LE skin inspection performed, noted long toenails which could be hazard to pt harming self, alerted RN Ольга who stated podiatry consult would be placed for pt  Noted at rest pt has LUE tremors, she reports they come and go  Therapeutic Exercise   Therapeutic Exercise/Activity Educated pt on HEP in room x30 APs every hour, x10 LAQs b/l every hour, and x30 glute sets, pt able to complete  Educated not to perform if increased pain      Discharge Information   Vocational Plan Retired/not working   Patient's Discharge Plan return home with support from  and son, however, both work   Patient's Rehab Expectations "I hope to be able to go up and down the steps and be back in my house "   Barriers to Discharge Home Limited Family Support;Decreased Cognitive Function;Decreased Strength;Decreased Endurance;Pain; Safety Considerations   Impressions Pt is a 78 y o  female who presents to [facility] with dx of multiple closed fractures of pelvis after fall down steps at home on 2/22/21  MDs decided on non-surgical management for pt, she is WBAT to b/l LEs  Pt was seen for high complexity eval with comorbidities including Hypertension, Meningioma and Multiple Trauma s/p fall, Sjorgens, hypothyroidism, depression, ambulatory dysfunction  PTA pt reports she lives with  and son who both work full time  She states she occasionally utilized RW due to balance issues but not all the time  She lives in multi-level home with 1+7 MICHAEL house, no HR on first step, L HR on 7 steps to first floor  Pt states once on first floor she can have FF setup sleeping on couch and utilizing commode  Pt reports her bed/bath is on second floor with FF of steps with L HR  DME includes shower chair, commode, grab bars in 2nd floor bathroom, RW, and x2 SPCs  Upon evaluation pt presents with pain mostly in RLE from hip radiating into buttocks and down RLE, decreased strength, decreased activity tolerance, decreased righting reactions with occ retropulsion when standing, dizziness at times upon STS, decreased coordination, and decreased balance  Pt is currently functioning below her baseline and would benefit from skilled PT intervention to address above listed deficits to d/c home  Pt is good rehab candidate with ELOS 2 weeks with goals set at [place] for short household distances with RW, S for longer distances, CGA for step  POC to focus on gait and transfer trng with RW, bed mobility, LE strengthening and ROM, pain relief techniques, and balance interventions      PT Therapy Minutes   PT Time In 0830   PT Time Out 1000   PT Total Time (minutes) 90   PT Mode of treatment - Individual (minutes) 90   PT Mode of treatment - Concurrent (minutes) 0   PT Mode of treatment - Group (minutes) 0   PT Mode of treatment - Co-treat (minutes) 0   PT Mode of Treatment - Total time(minutes) 90 minutes   PT Cumulative Minutes 90   Cumulative Minutes   Cumulative therapy minutes 90

## 2021-02-28 NOTE — PLAN OF CARE
Problem: PAIN - ADULT  Goal: Verbalizes/displays adequate comfort level or baseline comfort level  Description: Interventions:  - Encourage patient to monitor pain and request assistance  - Assess pain using appropriate pain scale  - Administer analgesics based on type and severity of pain and evaluate response  - Implement non-pharmacological measures as appropriate and evaluate response  - Consider cultural and social influences on pain and pain management  - Notify physician/advanced practitioner if interventions unsuccessful or patient reports new pain  Outcome: Progressing     Problem: INFECTION - ADULT  Goal: Absence or prevention of progression during hospitalization  Description: INTERVENTIONS:  - Assess and monitor for signs and symptoms of infection  - Monitor lab/diagnostic results  - Monitor all insertion sites, i e  indwelling lines, tubes, and drains  - Monitor endotracheal if appropriate and nasal secretions for changes in amount and color  - [place] appropriate cooling/warming therapies per order  - Administer medications as ordered  - Instruct and encourage patient and family to use good hand hygiene technique  - Identify and instruct in appropriate isolation precautions for identified infection/condition  Outcome: Progressing     Problem: SAFETY ADULT  Goal: Patient will remain free of falls  Description: INTERVENTIONS:  - Assess patient frequently for physical needs  -  Identify cognitive and physical deficits and behaviors that affect risk of falls    -  [place] fall precautions as indicated by assessment   - Educate patient/family on patient safety including physical limitations  - Instruct patient to call for assistance with activity based on assessment  - Modify environment to reduce risk of injury  - Consider OT/PT consult to assist with strengthening/mobility  Outcome: Progressing  Goal: Maintain or return to baseline ADL function  Description: INTERVENTIONS:  -  Assess patient's ability to carry out ADLs; assess patient's baseline for ADL function and identify physical deficits which impact ability to perform ADLs (bathing, care of mouth/teeth, toileting, grooming, dressing, etc )  - Assess/evaluate cause of self-care deficits   - Assess range of motion  - Assess patient's mobility; develop plan if impaired  - Assess patient's need for assistive devices and provide as appropriate  - Encourage maximum independence but intervene and supervise when necessary  - Involve family in performance of ADLs  - Assess for home care needs following discharge   - Consider OT consult to assist with ADL evaluation and planning for discharge  - Provide patient education as appropriate  Outcome: Progressing  Goal: Maintain or return mobility status to optimal level  Description: INTERVENTIONS:  - Assess patient's baseline mobility status (ambulation, transfers, stairs, etc )    - Identify cognitive and physical deficits and behaviors that affect mobility  - Identify mobility aids required to assist with transfers and/or ambulation (gait belt, sit-to-stand, lift, walker, cane, etc )  - [place] fall precautions as indicated by assessment  - Record patient progress and toleration of activity level on Mobility SBAR; progress patient to next Phase/Stage  - Instruct patient to call for assistance with activity based on assessment  - Consider rehabilitation consult to assist with strengthening/weightbearing, etc   Outcome: Progressing     Problem: DISCHARGE PLANNING  Goal: Discharge to home or other facility with appropriate resources  Description: INTERVENTIONS:  - Identify barriers to discharge w/patient and caregiver  - Arrange for needed discharge resources and transportation as appropriate  - Identify discharge learning needs (meds, wound care, etc )  - Arrange for interpretive services to assist at discharge as needed  - Refer to Case Management Department for coordinating discharge planning if the patient needs post-hospital services based on physician/advanced practitioner order or complex needs related to functional status, cognitive ability, or social support system  Outcome: Progressing     Problem: Knowledge Deficit  Goal: Patient/family/caregiver demonstrates understanding of disease process, treatment plan, medications, and discharge instructions  Description: Complete learning assessment and assess knowledge base  Interventions:  - Provide teaching at level of understanding  - Provide teaching via preferred learning methods  Outcome: Progressing     Problem: Potential for Falls  Goal: Patient will remain free of falls  Description: INTERVENTIONS:  - Assess patient frequently for physical needs  -  Identify cognitive and physical deficits and behaviors that affect risk of falls    -  [place] fall precautions as indicated by assessment   - Educate patient/family on patient safety including physical limitations  - Instruct patient to call for assistance with activity based on assessment  - Modify environment to reduce risk of injury  - Consider OT/PT consult to assist with strengthening/mobility  Outcome: Progressing

## 2021-02-28 NOTE — PROGRESS NOTES
OT EVALUATION       02/28/21 1230   Patient Data   Rehab Impairment pelvic fracture   Etiologic Diagnosis fall   Date of Onset 02/22/21   Home Setup   Type of Home Multi Level   Method of Entry Stairs;Hand Rail Left  (1 MICHAEL )   Number of Stairs 8  (7+1)   Number of Stairs in Home   (full flight to 2nd floor)   In Home Hand Rail Left   First Floor Bathroom None   Second Floor Bathroom Tub; Shower;Combo;Grab Bars   Second Floor Bathroom Accessibility Commode; Shower chair   First Floor Setup Available Yes  (pt reports she will sleep on couch and use commode)   Available Equipment Roller Walker;Single [employer]; Shower Chair;Bedside Commode   Baseline Information   Transportation Family/friends drive   Prior Device(s) Used Roller Walker   Prior IADL Participation   Money Management Identify Money;Estimate Costs;Estimate Change;Combine Bills;Manage Checkbook   Meal Preparation Full Participation   Laundry Partial Participation   Home Cleaning Partial Participation   Prior Level of Function   Self-Care 3  Independent - Patient completed the activities by him/herself, with or without an assistive device, with no assistance from a helper  Indoor-Mobility (Ambulation) 3  Independent - Patient completed the activities by him/herself, with or without an assistive device, with no assistance from a helper  Stairs 3  Independent - Patient completed the activities by him/herself, with or without an assistive device, with no assistance from a helper  Functional Cognition 3  Independent - Patient completed the activities by him/herself, with or without an assistive device, with no assistance from a helper  Prior Assistance Needed for Driving;Household Chores/Cleaning   Prior Device Used OBDULIO [place]ccrack[place] in the Last Year   Number of falls in the past 12 months 2   Type of Injury Associated with Fall Major injury  (current injury)   Patient Preference   Harris (Patient Preference) Sandhya   Psychosocial   Psychosocial (WDL) X Patient Behaviors/Mood Flat affect   Restrictions/Precautions   Precautions Fall Risk;Pain   RLE Weight Bearing Per Order WBAT   LLE Weight Bearing Per Order WBAT   Pain Assessment   Pain Assessment Tool 0-10   Pain Score 8   Pain Location/Orientation Orientation: Right;Location: Hip   Hospital Pain Intervention(s)   (RN administered medication)   Oral Hygiene   Type of Assistance Needed Physical assistance   Amount of Physical Assistance Provided 50%-74%   Comment pt standing at sink to complete oral hygiene requires mod A to maintain standing balance  Pt retropulsive while letting go with b/l UE's to complete denture care   Oral Hygiene CARE Score 2   Tub/Shower Transfer   Reason Not Assessed Sponge Bath;Patient refusal   Shower/Bathe Self   Type of Assistance Needed Physical assistance   Amount of Physical Assistance Provided 50%-74%   Comment Pt completed sponge bathing as per her request  Pt washed UB while seated  Pt abelt ow lazaro upper thighs  Pt trialed reaching forward to wash LE's but was limited by decreased ROM and pain  Pt required assist to amintain balance while in stance to wash lorena and buttock  Pt retropulsion while in stnce  Shower/Bathe Self CARE Score 2   Bathing   Assessed Bath Style Sponge Bath   Anticipated D/C Bath Style Tub   Able to Gather/Transport Yes   Able to Adjust Water Temperature Yes   Able to Wash/Rinse/Dry (body part) Left Arm;Right Arm;L Upper Leg;R Upper Leg;Chest;Abdomen   Positioning Seated;Standing   Dressing/Undressing Clothing   Don UB Clothes Pullover Shirt   Type of Assistance Needed Physical assistance   Amount of Physical Assistance Provided 25%-49%   Comment pt requires assist to pull shirt overhead due to pain in R arm/shoudler   Upper Body Dressing CARE Score 3   Remove LB Clothes Pants; Undergarment   Don LB Clothes Pants; Undergarment   Type of Assistance Needed Physical assistance   Amount of Physical Assistance Provided Total assistance   Comment pt limited by pain with dynamic reach and ROM restrictions   Lower Body Dressing CARE Score 1   Limitations Noted In Balance; Endurance; Safety;Strength;ROM   Positioning Supported Sit;Standing   Putting On/Taking Off Footwear   Type of Assistance Needed Physical assistance   Amount of Physical Assistance Provided Total assistance   Putting On/Taking Off Footwear CARE Score 1   Toileting Hygiene   Type of Assistance Needed Physical assistance   Amount of Physical Assistance Provided Total assistance   Comment Pt unable to maintain balance while attempting to pull pants up/down and reuqires assist due to retropulsion  Toileting Hygiene CARE Score 1   Toilet Transfer   Surface Assessed Standard Commode   Transfer Technique Stand Pivot   Limitations Noted In Balance; Endurance;ROM;Safety;LE Strength   Type of Assistance Needed Physical assistance   Amount of Physical Assistance Provided 75% or more   Comment Pt is max A for sit to stand due to increased pain in b/l hips   Toilet Transfer CARE Score 2   Transfer Bed/Chair/Wheelchair   Limitations Noted In Balance;LE Strength;Pain Management   Type of Assistance Needed Physical assistance   Amount of Physical Assistance Provided 50%-74%   Comment Pt limited by pain this PM and required mod A for stand pivot transfers with RW  Pt with difficulty with putting weight through RLE due to pain in hip   Chair/Bed-to-Chair Transfer CARE Score 2   Sit to Stand   Type of Assistance Needed Physical assistance   Amount of Physical Assistance Provided 50%-74%   Comment pt min to mod A for sit to stand transfers  Pt requires increased assist due to pain in R hip   Sit to Stand CARE Score 2   Walk 10 Feet   Type of Assistance Needed Physical assistance   Amount of Physical Assistance Provided Total assistance   Comment Pt unable to complete walk for 10 feet due to pain in hip   Walk 10 Feet CARE Score 1   Comprehension   QI: Comprehension 3   Usually Understands: Understands most conversations, but misses some part/intent of message  Requires cues at times to understand  Comprehension (FIM) 4 - Understands basic info/conversation 75-90% of time   Expression   Verbal Complex   Intelligibility Sentence   QI: Expression 3  Exhibits some difficulty with expressing needs and ideas (e g , some words or finishing thoughts) or speech is not clear   Expression (FIM) 4 - Needs to repeat single words   Social Interaction   Cooperation with staff   Social Interaction (FIM) 6 - Interacts appropriately with others BUT requires extra  time   Problem Solving   Routine Manages call bell;[place] precautions;Manages ADL   Problem solving (FIM) 4 - Solves basic problems 75-89% of time   Memory   Recognize People Yes   Remember Routine Yes   Memory (FIM) 5 - Needs cueing reminders <10%   RUE Assessment   RUE Assessment WFL   LUE Assessment   LUE Assessment WFL   Coordination   Movements are Fluid and Coordinated 0   Coordination and Movement Description pt with antalgic gait   Cognition   Overall Cognitive Status WFL   Arousal/Participation Alert; Cooperative   Attention Attends with cues to redirect   Orientation Level Oriented X4   Memory Decreased short term memory;Decreased recall of precautions   Following Commands Follows one step commands with increased time or repetition   Discharge [address] Retired/not working   Patient's Discharge Plan return home with some assistance from  and son when they are not at work   Patient's Rehab Expectations I want to be able to take care of myself   Barriers to Discharge Home Limited Family Support;Decreased Strength;Decreased Endurance;Pain   Impressions Pt is 78 y o  M who presents s/p fall down stairs at home sustaining b/l pelvic fs  Pt had R pubic rami fx, R pubic symphsis fx, anterior R acetabular fx, and probable L anterior acetabular fx  Pt had no surgical intervention   Pt's comorbidities include Sjogren's, HTN, ambulatory dysfxn, depression, and h/o meningioma s/p resection  Prior to admission pt lives with  and son in bilevel home with 1+7 stairs to main level with family room, kitchen and no [place]  +7 steps up to bedroom and full bathroom with tub/shower  PTA pt was independent with all ADLs and fxnl mobility with RW  Upon time of evaluation pt was limtied by fatigue, pain in b/l hips/pelvis, decreased dynamic reach, retropulsion, decreased endurance  Additonal barriers include: limited family support,  and son work during day  Pt overall is total A for LB dressing and toileting and mod-max A for transfers pending upon pain  Pt would benefit from 2 week LOS to achieve supervision for bathing and dressing and mod I for toileting transfers and fxnl transfers      OT Therapy Minutes   OT Time In 1230   OT Time Out 1400   OT Total Time (minutes) 90   OT Mode of treatment - Individual (minutes) 90   OT Mode of treatment - Concurrent (minutes) 0   OT Mode of treatment - Group (minutes) 0   OT Mode of treatment - Co-treat (minutes) 0   OT Mode of Treatment - Total time(minutes) 90 minutes   OT Cumulative Minutes 90   Cumulative Minutes   Cumulative therapy minutes 90

## 2021-02-28 NOTE — ASSESSMENT & PLAN NOTE
· Seen on CT Abd/Pelvis   · ABDOMINOPELVIC CAVITY:  No ascites  No pneumoperitoneum  No lymphadenopathy  Calcified right retroperitoneal node measuring 21 mm  # 2/72  · LUNGS:  Mild hypoventilatory changes  14 mm left lower lobe subpleural nodule versus focal consolidation #4/54  Similar more inferior 8mm and 14 mm opacities # 4/82 and #4/95  · LIVER/BILIARY TREE:  One or more subcentimeter sharply circumscribed low-density hepatic lesion(s) are noted, too small to accurately characterize, but statistically most likely to represent subcentimeter hepatic cysts  No suspicious solid hepatic lesion is identified  Hepatic contours are normal   No biliary dilatation    · Patient will need follow-up with PCP

## 2021-03-01 PROBLEM — K31.84 GASTROPARESIS: Status: ACTIVE | Noted: 2021-03-01

## 2021-03-01 LAB
ANION GAP SERPL CALCULATED.3IONS-SCNC: 9 MMOL/L (ref 5–14)
BASOPHILS # BLD AUTO: 0 THOUSANDS/ΜL (ref 0–0.1)
BASOPHILS NFR BLD AUTO: 1 % (ref 0–1)
BUN SERPL-MCNC: 16 MG/DL (ref 5–25)
CALCIUM SERPL-MCNC: 9.4 MG/DL (ref 8.4–10.2)
CHLORIDE SERPL-SCNC: 104 MMOL/L (ref 97–108)
CO2 SERPL-SCNC: 26 MMOL/L (ref 22–30)
CREAT SERPL-MCNC: 0.69 MG/DL (ref 0.6–1.2)
EOSINOPHIL # BLD AUTO: 0.1 THOUSAND/ΜL (ref 0–0.4)
EOSINOPHIL NFR BLD AUTO: 2 % (ref 0–6)
ERYTHROCYTE [DISTWIDTH] IN BLOOD BY AUTOMATED COUNT: 20.1 %
GFR SERPL CREATININE-BSD FRML MDRD: 90 ML/MIN/1.73SQ M
GLUCOSE P FAST SERPL-MCNC: 100 MG/DL (ref 70–99)
GLUCOSE SERPL-MCNC: 100 MG/DL (ref 70–99)
HCT VFR BLD AUTO: 32.9 % (ref 36–46)
HGB BLD-MCNC: 10.6 G/DL (ref 12–16)
LYMPHOCYTES # BLD AUTO: 0.3 THOUSANDS/ΜL (ref 0.5–4)
LYMPHOCYTES NFR BLD AUTO: 6 % (ref 25–45)
MCH RBC QN AUTO: 27 PG (ref 26–34)
MCHC RBC AUTO-ENTMCNC: 32.2 G/DL (ref 31–36)
MCV RBC AUTO: 84 FL (ref 80–100)
MONOCYTES # BLD AUTO: 0.5 THOUSAND/ΜL (ref 0.2–0.9)
MONOCYTES NFR BLD AUTO: 9 % (ref 1–10)
NEUTROPHILS # BLD AUTO: 4.1 THOUSANDS/ΜL (ref 1.8–7.8)
NEUTS SEG NFR BLD AUTO: 82 % (ref 45–65)
PLATELET # BLD AUTO: 206 THOUSANDS/UL (ref 150–450)
PMV BLD AUTO: 7.9 FL (ref 8.9–12.7)
POTASSIUM SERPL-SCNC: 3.7 MMOL/L (ref 3.6–5)
RBC # BLD AUTO: 3.92 MILLION/UL (ref 4–5.2)
SODIUM SERPL-SCNC: 139 MMOL/L (ref 137–147)
WBC # BLD AUTO: 5 THOUSAND/UL (ref 4.5–11)

## 2021-03-01 PROCEDURE — 97530 THERAPEUTIC ACTIVITIES: CPT

## 2021-03-01 PROCEDURE — 80048 BASIC METABOLIC PNL TOTAL CA: CPT | Performed by: PHYSICAL MEDICINE & REHABILITATION

## 2021-03-01 PROCEDURE — 97110 THERAPEUTIC EXERCISES: CPT

## 2021-03-01 PROCEDURE — 99221 1ST HOSP IP/OBS SF/LOW 40: CPT | Performed by: INTERNAL MEDICINE

## 2021-03-01 PROCEDURE — 97535 SELF CARE MNGMENT TRAINING: CPT

## 2021-03-01 PROCEDURE — 85025 COMPLETE CBC W/AUTO DIFF WBC: CPT | Performed by: PHYSICAL MEDICINE & REHABILITATION

## 2021-03-01 PROCEDURE — 97116 GAIT TRAINING THERAPY: CPT

## 2021-03-01 PROCEDURE — 99232 SBSQ HOSP IP/OBS MODERATE 35: CPT

## 2021-03-01 RX ORDER — NYSTATIN 100000 [USP'U]/G
1 POWDER TOPICAL 2 TIMES DAILY
Status: DISCONTINUED | OUTPATIENT
Start: 2021-03-01 | End: 2021-03-04 | Stop reason: HOSPADM

## 2021-03-01 RX ORDER — SENNOSIDES 8.6 MG
1 TABLET ORAL 2 TIMES DAILY
Status: DISCONTINUED | OUTPATIENT
Start: 2021-03-01 | End: 2021-03-04 | Stop reason: HOSPADM

## 2021-03-01 RX ADMIN — DOCUSATE SODIUM 100 MG: 100 CAPSULE ORAL at 17:09

## 2021-03-01 RX ADMIN — ONDANSETRON 4 MG: 4 TABLET, ORALLY DISINTEGRATING ORAL at 14:13

## 2021-03-01 RX ADMIN — AMLODIPINE BESYLATE 2.5 MG: 2.5 TABLET ORAL at 08:28

## 2021-03-01 RX ADMIN — ENOXAPARIN SODIUM 30 MG: 30 INJECTION SUBCUTANEOUS at 21:27

## 2021-03-01 RX ADMIN — OXYCODONE HYDROCHLORIDE 5 MG: 5 TABLET ORAL at 19:56

## 2021-03-01 RX ADMIN — DICLOFENAC SODIUM 2 G: 10 GEL TOPICAL at 08:29

## 2021-03-01 RX ADMIN — DOCUSATE SODIUM 100 MG: 100 CAPSULE ORAL at 08:28

## 2021-03-01 RX ADMIN — LEVOTHYROXINE SODIUM 88 MCG: 88 TABLET ORAL at 05:13

## 2021-03-01 RX ADMIN — ATENOLOL 50 MG: 50 TABLET ORAL at 21:27

## 2021-03-01 RX ADMIN — ACETAMINOPHEN 650 MG: 325 TABLET ORAL at 13:37

## 2021-03-01 RX ADMIN — ACETAMINOPHEN 650 MG: 325 TABLET ORAL at 21:28

## 2021-03-01 RX ADMIN — DICLOFENAC SODIUM 2 G: 10 GEL TOPICAL at 21:29

## 2021-03-01 RX ADMIN — NYSTATIN 1 APPLICATION: 100000 POWDER TOPICAL at 17:09

## 2021-03-01 RX ADMIN — ATENOLOL 50 MG: 50 TABLET ORAL at 08:28

## 2021-03-01 RX ADMIN — OXYCODONE HYDROCHLORIDE 5 MG: 5 TABLET ORAL at 13:38

## 2021-03-01 RX ADMIN — ACETAMINOPHEN 650 MG: 325 TABLET ORAL at 05:13

## 2021-03-01 RX ADMIN — OXYCODONE HYDROCHLORIDE 5 MG: 5 TABLET ORAL at 08:30

## 2021-03-01 RX ADMIN — SENNOSIDES 8.6 MG: 8.6 TABLET, FILM COATED ORAL at 17:09

## 2021-03-01 RX ADMIN — FLUOXETINE 20 MG: 20 CAPSULE ORAL at 08:28

## 2021-03-01 RX ADMIN — ATORVASTATIN CALCIUM 10 MG: 10 TABLET, FILM COATED ORAL at 08:28

## 2021-03-01 RX ADMIN — LIDOCAINE 2 PATCH: 50 PATCH TOPICAL at 21:29

## 2021-03-01 RX ADMIN — DICLOFENAC SODIUM 2 G: 10 GEL TOPICAL at 17:09

## 2021-03-01 RX ADMIN — NYSTATIN 1 APPLICATION: 100000 POWDER TOPICAL at 11:19

## 2021-03-01 RX ADMIN — ALPRAZOLAM 0.25 MG: 0.25 TABLET ORAL at 21:27

## 2021-03-01 RX ADMIN — FOLIC ACID 1 MG: 1 TABLET ORAL at 08:28

## 2021-03-01 RX ADMIN — DICLOFENAC SODIUM 2 G: 10 GEL TOPICAL at 11:19

## 2021-03-01 RX ADMIN — GABAPENTIN 100 MG: 100 CAPSULE ORAL at 21:27

## 2021-03-01 RX ADMIN — ENOXAPARIN SODIUM 30 MG: 30 INJECTION SUBCUTANEOUS at 08:28

## 2021-03-01 NOTE — ASSESSMENT & PLAN NOTE
Formerly on Reglan - stopped in the acute setting due to concerns for extrapyramidal symptoms  Monitor for s/s of bowel obstruction  Continue bowel regimen

## 2021-03-01 NOTE — PLAN OF CARE
Problem: PAIN - ADULT  Goal: Verbalizes/displays adequate comfort level or baseline comfort level  Description: Interventions:  - Encourage patient to monitor pain and request assistance  - Assess pain using appropriate pain scale  - Administer analgesics based on type and severity of pain and evaluate response  - Implement non-pharmacological measures as appropriate and evaluate response  - Consider cultural and social influences on pain and pain management  - Notify physician/advanced practitioner if interventions unsuccessful or patient reports new pain  Outcome: Progressing     Problem: INFECTION - ADULT  Goal: Absence or prevention of progression during hospitalization  Description: INTERVENTIONS:  - Assess and monitor for signs and symptoms of infection  - Monitor lab/diagnostic results  - Monitor all insertion sites, i e  indwelling lines, tubes, and drains  - Monitor endotracheal if appropriate and nasal secretions for changes in amount and color  - [place] appropriate cooling/warming therapies per order  - Administer medications as ordered  - Instruct and encourage patient and family to use good hand hygiene technique  - Identify and instruct in appropriate isolation precautions for identified infection/condition  Outcome: Progressing     Problem: SAFETY ADULT  Goal: Patient will remain free of falls  Description: INTERVENTIONS:  - Assess patient frequently for physical needs  -  Identify cognitive and physical deficits and behaviors that affect risk of falls    -  [place] fall precautions as indicated by assessment   - Educate patient/family on patient safety including physical limitations  - Instruct patient to call for assistance with activity based on assessment  - Modify environment to reduce risk of injury  - Consider OT/PT consult to assist with strengthening/mobility  Outcome: Progressing  Goal: Maintain or return to baseline ADL function  Description: INTERVENTIONS:  -  Assess patient's ability to carry out ADLs; assess patient's baseline for ADL function and identify physical deficits which impact ability to perform ADLs (bathing, care of mouth/teeth, toileting, grooming, dressing, etc )  - Assess/evaluate cause of self-care deficits   - Assess range of motion  - Assess patient's mobility; develop plan if impaired  - Assess patient's need for assistive devices and provide as appropriate  - Encourage maximum independence but intervene and supervise when necessary  - Involve family in performance of ADLs  - Assess for home care needs following discharge   - Consider OT consult to assist with ADL evaluation and planning for discharge  - Provide patient education as appropriate  Outcome: Progressing  Goal: Maintain or return mobility status to optimal level  Description: INTERVENTIONS:  - Assess patient's baseline mobility status (ambulation, transfers, stairs, etc )    - Identify cognitive and physical deficits and behaviors that affect mobility  - Identify mobility aids required to assist with transfers and/or ambulation (gait belt, sit-to-stand, lift, walker, cane, etc )  - [place] fall precautions as indicated by assessment  - Record patient progress and toleration of activity level on Mobility SBAR; progress patient to next Phase/Stage  - Instruct patient to call for assistance with activity based on assessment  - Consider rehabilitation consult to assist with strengthening/weightbearing, etc   Outcome: Progressing     Problem: DISCHARGE PLANNING  Goal: Discharge to home or other facility with appropriate resources  Description: INTERVENTIONS:  - Identify barriers to discharge w/patient and caregiver  - Arrange for needed discharge resources and transportation as appropriate  - Identify discharge learning needs (meds, wound care, etc )  - Arrange for interpretive services to assist at discharge as needed  - Refer to Case Management Department for coordinating discharge planning if the patient needs post-hospital services based on physician/advanced practitioner order or complex needs related to functional status, cognitive ability, or social support system  Outcome: Progressing     Problem: Knowledge Deficit  Goal: Patient/family/caregiver demonstrates understanding of disease process, treatment plan, medications, and discharge instructions  Description: Complete learning assessment and assess knowledge base  Interventions:  - Provide teaching at level of understanding  - Provide teaching via preferred learning methods  Outcome: Progressing     Problem: Potential for Falls  Goal: Patient will remain free of falls  Description: INTERVENTIONS:  - Assess patient frequently for physical needs  -  Identify cognitive and physical deficits and behaviors that affect risk of falls    -  [place] fall precautions as indicated by assessment   - Educate patient/family on patient safety including physical limitations  - Instruct patient to call for assistance with activity based on assessment  - Modify environment to reduce risk of injury  - Consider OT/PT consult to assist with strengthening/mobility  Outcome: Progressing

## 2021-03-01 NOTE — CONSULTS
Consult- Trevon Allen Viscomi 1955, 78 y o  female MRN: [id number]    Unit/Bed#: [facility]-51 Encounter: [id number]    Primary Care Provider: Joy Elkins MD   Date and time admitted to hospital: 2/27/2021  3:06 PM      Inpatient consult to Internal Medicine  Consult performed by: DARIO Johnson  Consult ordered by: Eduardo Spicer MD          Gastroparesis  Assessment & Plan  Formerly on Reglan - stopped in the acute setting due to concerns for extrapyramidal symptoms  Monitor for s/s of bowel obstruction  Continue bowel regimen  Abnormal findings on imaging test  Assessment & Plan  CT on 2/22 - Calcified R retroperitoneal node measuring 21mm  Recommend follow-up in 2 weeks  Hypothyroidism  Assessment & Plan  Currently on levothyroxine 88mcg daily at home (States that she was increased 1 month ago from 75mcg)  Last TSH level unknown  Continue to follow-up with outpatient PCP  Depression  Assessment & Plan  Stable  Continue Prozac 20mg daily and Xanax PRN for anxiety  Supportive counseling as needed  Hypertension  Assessment & Plan  Home regimen: Norvasc 2 5mg daily and atenolol 50mg BID  Episode of hypertensive urgency while in acute setting  Monitor routine VS   Consider increasing Norvasc as needed  Sjogren's disease ([facility] [address] )  Assessment & Plan  Takes methotrexate 20mg weekly  Follows up with Dr Alissa Hollingsworth (Rheumatology) as outpatient  Parotid gland enlargement  Assessment & Plan  Stable  Yearly follow-up with Dr Yareli Duncan (ENT) as outpatient  * Multiple closed fractures of pelvis [facility])  Assessment & Plan  2/22 - Mechanical fall down the stairs predominately on the R side  Multiple pelvic fractures: Acute nondisplaced fracture of the right inferior pubic ramus and right pubic symphysis, anterior right acetabular fracture and probable nondisplaced left anterior acetabular fracture  Ortho consulted - no surgical intervention at this time  Monitor for s/s pelvic bleeding    Pain management  WBAT  DVT Prophylaxis - Lovenox  Follow-up with Dr Edwardo Boggs (Orthopedics) office in 6 weeks  PT/OT Therapies  Primary team following  History of Present Illness:    Julia Ventura is a 78 y o  female with a PMH of Sjogren's disease, depression, hypothyroidism, HTN and meningioma s/p resection in 2012 who originally presented to the [place] [id number] on 2/22/21 after a fall down the stairs  The patient had fallen down 7 steps, primarily landing on R side with no LOC or head trauma  CT pelvis revealed right inferior pubic rami fracture, right pubic symphysis fracture, right anterior acetabular fracture, left anterior acetabular fracture  Patient also with right shoulder abrasion and brusing of right elbow but x-ray showed no acute findings  Evaluated by Dr Jose Summers (Orthopedics) and decided on no surgical intervention  Plans to be WBAT, continue DVT prophylaxis of Lovenox injections, and follow-up with Dr Edwardo Boggs office in 6 weeks  Patient admitted to [facility] on 2/22/2021  We are consulted for medical clearance  Patient examined while sitting in WC in pt room  Currently feels that her pain in unmanageable, rates her R hip pain to be 7/10, throbbing, and only improving slightly with pain medication  States that she was waiting for an ice pack and hoping that it will assist more with the pain  States that she has been sleeping and therapy could be going better if she could just get her pain under control  Denies any incontinence of bowel or bladder and last BM was on 2/27  Lives at home with her  and son  Usually uses a walker or cane to assist with ambulation but states that she did not have either during the fall  Review of Systems:    Review of Systems   Constitutional: Negative for chills, fatigue and fever  Respiratory: Negative for cough and shortness of breath  Cardiovascular: Negative for chest pain, palpitations and leg swelling  Gastrointestinal: Negative for abdominal distention, abdominal pain, constipation, diarrhea, nausea and vomiting  LBM 2/27   Genitourinary: Negative for difficulty urinating, dysuria, frequency and urgency  Musculoskeletal: Positive for arthralgias (R hip pain, 7/10, throbbing, minimal improvement with pain medication)  Neurological: Positive for speech difficulty (Slurred speech at baseline - states that it is from her Sjogren's)  Negative for dizziness, light-headedness and headaches  Psychiatric/Behavioral: Negative for sleep disturbance  Past Medical and Surgical History:     Past Medical History:   Diagnosis Date    Lymphadenitis, chronic     Sialadenitis     Sjogren's disease ([address] )     Stomach problems     Thyroid disorder     Xerostomia        Past Surgical History:   Procedure Laterality Date    CHOLECYSTECTOMY         Meds/Allergies:    all medications and allergies reviewed    Allergies: Allergies   Allergen Reactions    Hydroxyquinoline Sulfate [Oxyquinoline] Vomiting    Leucovorin Vomiting    Tizanidine Vomiting       Social History:     Marital Status: /Civil Union    Substance Use History:   Social History     Substance and Sexual Activity   Alcohol Use Yes    Frequency: Monthly or less     Social History     Tobacco Use   Smoking Status Former Smoker   Smokeless Tobacco Never Used     Social History     Substance and Sexual Activity   Drug Use Never       Family History:  Reviewed    Physical Exam:     Vitals:   Blood Pressure: 122/58 (03/01/21 1538)  Pulse: 64 (03/01/21 1538)  Temperature: 98 4 °F (36 9 °C) (03/01/21 1538)  Temp Source: Tympanic (03/01/21 1538)  Respirations: 18 (03/01/21 1538)  Height: 4' 10" (147 3 cm) (02/27/21 1425)  Weight - Scale: 62 3 kg (137 lb 5 6 oz) (02/27/21 1425)  SpO2: 96 % (03/01/21 1538)    Physical Exam  Vitals signs reviewed  Constitutional:       Appearance: Normal appearance     HENT:      Head: Normocephalic and atraumatic  Neck:      Musculoskeletal: Edema (R side enlarged parathyroid) present  Cardiovascular:      Rate and Rhythm: Normal rate and regular rhythm  Pulses: Normal pulses  Heart sounds: Normal heart sounds  No murmur  No friction rub  Pulmonary:      Effort: Pulmonary effort is normal  No respiratory distress  Breath sounds: Normal breath sounds  No wheezing or rhonchi  Abdominal:      General: Abdomen is flat  Bowel sounds are normal  There is no distension  Palpations: Abdomen is soft  Tenderness: There is no abdominal tenderness  Musculoskeletal:      Right lower leg: No edema  Left lower leg: No edema  Skin:     General: Skin is warm and dry  Neurological:      Mental Status: She is alert and oriented to person, place, and time  Comments: Slurred speech - baseline  Dyskinesia present  Psychiatric:         Mood and Affect: Mood normal          Behavior: Behavior normal            Additional Data:     Labs and imaging reviewed  EKG Reviewed - last EKG on 2/24 showed NSR, QTc 425  M*Modal software was used to dictate this note  It may contain errors with dictating incorrect words or incorrect spelling  Please contact the provider directly with any questions

## 2021-03-01 NOTE — PROGRESS NOTES
OT Treatment Note       03/01/21 0831   Pain Assessment   Pain Assessment Tool 0-10   Pain Score 7   Pain Location/Orientation Orientation: Right;Location: Hip   Pain Onset/Description Onset: Ongoing   Hospital Pain Intervention(s) Repositioned; Rest  (declined ice, RN administered pain meds)   Restrictions/Precautions   Precautions Fall Risk;Pain   Weight Bearing Restrictions Yes   RLE Weight Bearing Per Order WBAT   LLE Weight Bearing Per Order WBAT   ROM Restrictions No   Lifestyle   Autonomy "I hate that bed," reporting difficulty sleeping last night; RN aware  Sit to Stand   Type of Assistance Needed Physical assistance   Amount of Physical Assistance Provided Less than 25%   Comment 10 reps; progressing to CGA using RW   Sit to Stand CARE Score 3   Bed-Chair Transfer   Type of Assistance Needed Physical assistance   Amount of Physical Assistance Provided Less than 25%   Comment progressing to CGA using RW   Chair/Bed-to-Chair Transfer CARE Score 3   Toilet Transfer   Type of Assistance Needed Physical assistance   Amount of Physical Assistance Provided Less than 25%   Comment Progressing to CGA using RW->commode  Pt requiring vc's and OT demo for proper technique and RW management during turn with fair carryover  Toilet Transfer CARE Score 3   Exercise Tools   Other Exercise Tool 1 Seated in WC, pt completing BUE therex using 3# therabar and 1#db to complete chest press, lat pullbacks, shoulder flexion (0-90degrees), and bicep curls 10x2 reps each for increased functional strength for transfers  Frequent rest breaks and requiring vc's and tactile cues for proper technique throughout  Cognition   Overall Cognitive Status WFL   Arousal/Participation Alert; Cooperative   Attention Attends with cues to redirect   Orientation Level Oriented X4   Memory Decreased short term memory;Decreased recall of precautions   Following Commands Follows one step commands with increased time or repetition   Additional Activities   Additional Activities Comments In prep for functional transfers, pt alternating stepping forward/backward each LE with BUE support on RW to facilitate weightshifting and weightbearing through RLE; CGA throughout with min vc's for technique  Activity Tolerance   Activity Tolerance Patient limited by pain   Medical Staff Made Aware PT made aware prior to next session  Assessment   Treatment Assessment Pt seen for 60 min skilled OT sessio focusing on functional transfers, weightshifting to RLE and BUE strengthening  Pt progressing from [address] [place] with SPT at initial eval to [place]/CGA using RW  Pt requiring vc's and OT demo with tasks with fair carryover  Pt requiring frequent extended rest breaks throughout session 2/2 pain  Pt would benefit from further OT services to address functional transfers, standing tolerance/balance, BUE strengthening and LB dressing tasks  Prognosis Good   Problem List Decreased strength;Decreased range of motion;Decreased endurance; Impaired balance;Decreased mobility; Decreased coordination;Decreased cognition;Decreased safety awareness;Orthopedic restrictions;Pain   Barriers to Discharge Inaccessible home environment;Decreased caregiver support   Plan   Treatment/Interventions ADL retraining;Functional transfer training; Therapeutic exercise; Endurance training;Cognitive reorientation;Patient/family training;Equipment eval/education; Bed mobility; Compensatory technique education;Continued evaluation;Spoke to nursing   Recommendation   OT Discharge Recommendation Return to previous environment with social support   Equipment Recommended   (TBD)   OT Therapy Minutes   OT Time In 0830   OT Time Out 0930   OT Total Time (minutes) 60   OT Mode of treatment - Individual (minutes) 60   OT Mode of treatment - Concurrent (minutes) 0   OT Mode of treatment - Group (minutes) 0   OT Mode of treatment - Co-treat (minutes) 0   OT Mode of Treatment - Total time(minutes) 60 minutes   OT Cumulative Minutes 150   Therapy Time missed   Time missed?  No     *Of note, treatment session took place from [id number]

## 2021-03-01 NOTE — ASSESSMENT & PLAN NOTE
2/22 - Mechanical fall down the stairs predominately on the R side  Multiple pelvic fractures: Acute nondisplaced fracture of the right inferior pubic ramus and right pubic symphysis, anterior right acetabular fracture and probable nondisplaced left anterior acetabular fracture  Ortho consulted - no surgical intervention at this time  Monitor for s/s pelvic bleeding  Pain management  WBAT  DVT Prophylaxis - Lovenox  Follow-up with Dr Erasto Duran (Orthopedics) office in 6 weeks  PT/OT Therapies  Primary team following

## 2021-03-01 NOTE — ASSESSMENT & PLAN NOTE
Currently on levothyroxine 88mcg daily at home (States that she was increased 1 month ago from 75mcg)  Last TSH level unknown  Continue to follow-up with outpatient PCP

## 2021-03-01 NOTE — ASSESSMENT & PLAN NOTE
- Did vomit once earlier today; denies N/V now  - Stooling adequately; oral intake acceptable; continue mgmt plan as outlined   - Common in Sjogrens - now off chronic Reglan with oral dyskinesia but need to closely follow to ensure adequate GI function  IM consulted and with overall management at their discretion during ARC course  Was on Reglan chronically but stopped in the acute setting due to concerns for extrapyramidal symptoms - she does have fair amount of oral dyskinesias which she feels is related to Sjogrens but may in part be related to this   - Monitor for s/s of obstruction > not currently   - Monitor closely for intake; constipation as she remains at risk  - Colace, senna  - Refuses miralax as she finding the texture difficulty and odd to swallow  - PRN Lactulose, dulcolax supp  -  patient on importance for stooling adequately

## 2021-03-01 NOTE — PROGRESS NOTES
03/01/21 0986   Pain Assessment   Pain Assessment Tool 0-10   Pain Score 7   Pain Location/Orientation Orientation: Right;Location: Hip   Restrictions/Precautions   Precautions Fall Risk;Pain;Supervision on toilet/commode   RLE Weight Bearing Per Order WBAT   LLE Weight Bearing Per Order WBAT   Sit to Lying   Type of Assistance Needed Physical assistance   Amount of Physical Assistance Provided 50%-74%   Comment A for trunk and LEs   Sit to Lying CARE Score 2   Lying to Sitting on Side of Bed   Type of Assistance Needed Physical assistance   Amount of Physical Assistance Provided 50%-74%   Comment BLE lift   Lying to Sitting on Side of Bed CARE Score 2   Sit to Stand   Type of Assistance Needed Physical assistance   Amount of Physical Assistance Provided Less than 25%   Comment CG/ Min A to RW   Sit to Stand CARE Score 3   Bed-Chair Transfer   Type of Assistance Needed Physical assistance   Amount of Physical Assistance Provided Less than 25%   Comment when inc pain appeared RLE wanted to buckle    Chair/Bed-to-Chair Transfer CARE Score 3   Walk 10 Feet   Type of Assistance Needed Physical assistance   Amount of Physical Assistance Provided 25%-49%   Comment RW   Walk 10 Feet CARE Score 3   Ambulation   Does the patient walk? 2  Yes   Primary Mode of Locomotion Prior to Admission Walk   Distance Walked (feet) 24 ft  (10x1)   Assist Device Roller Walker   Gait Pattern Antalgic; Slow [place]; Forward Flexion;Decreased L stance;Decreased R stance   Limitations Noted In Balance; Endurance; Heel Strike;Posture;Speed;Strength;Swing   Provided Assistance with: Balance   Walk Assist Level Minimum Assist   Findings Limited by pain,  at end of session appeared as R Hip wanted to buckle,  cues to push and unload through RW   Curb or Single Stair   Style negotiated Single stair   Type of Assistance Needed Physical assistance   Amount of Physical Assistance Provided 25%-49%   Comment up down 2'' box step in llbars   1 Step (Curb) CARE Score 3   Therapeutic Interventions   Strengthening LAQ arom on R and 1# on L,  Hip flex arom,  Hip add but inc pain so d/c,  ankle DF/ PF arom     Equipment Use   NuStep Pt stated i dont think i can do that yet   Assessment   Treatment Assessment 60 min session focused on Amb bout,  bed mobility,  seated TE,  and step trials in llbars as pre progression for actual steps  Pt is striving through therapy but does fatigue quickly and also inc pain with activity but pt is showing some progress compared to yesterday  Cont skilled PT toward LTGs   Barriers to Discharge Inaccessible home environment;Decreased caregiver support   PT Barriers   Physical Impairment Decreased strength;Decreased range of motion;Decreased endurance; Impaired balance;Pain   Functional Limitation Car transfers; Ramp negotiation;Stair negotiation;Standing;Transfers; Walking   Plan   Treatment/Interventions Functional transfer training;LE strengthening/ROM; Elevations; Therapeutic exercise; Bed mobility;Gait training   PT Therapy Minutes   PT Time In 0930   PT Time Out 1030   PT Total Time (minutes) 60   PT Mode of treatment - Individual (minutes) 45   PT Mode of treatment - Concurrent (minutes) 15   PT Mode of treatment - Group (minutes) 0   PT Mode of treatment - Co-treat (minutes) 0   PT Mode of Treatment - Total time(minutes) 60 minutes   PT Cumulative Minutes 150   Therapy Time missed   Time missed?  No

## 2021-03-01 NOTE — ASSESSMENT & PLAN NOTE
Home regimen: Norvasc 2 5mg daily and atenolol 50mg BID  Episode of hypertensive urgency while in acute setting  Monitor routine VS   Consider increasing Norvasc as needed

## 2021-03-01 NOTE — PROGRESS NOTES
Physical Medicine and Rehabilitation Progress Note  Merwin Boeck Viscomi 78 y o  female MRN: [id number]  Unit/Bed#: [facility] 269-01 Encounter: [id number]      Chief Complaints:  Pelvic/hip pain     Interval Events/Subjective:     Patient notes minimal pain  At rest and moderately severe pain with activity in the pelvis and hip but helped with current pain medications  Patient states she could tolerate therapy  She denies significant lightheadedness, shortness of breath  Patient notes a bowel movement about 2 or 3 days ago but feels like she will have 1 soon  She is passing gas  Patient denies abdominal pain  She reports some decreased appetite without nausea  She denies fever, chills, sweats, calf pain, shortness of breath, or other new complaints  ROS: A 10 point ROS was performed; negative except as noted above  Assessment & Plan:     * Multiple closed fractures of pelvis ([address] )  Assessment & Plan  · Traumatic fall on stairs:  · Resultant right inferior pubic rami fracture, right pubic symphysis fracture, right anterior acetabular fracture, left anterior acetabular fracture  · Evaluated by Orthopedics - treated conservatively  · Deemed WBAT BLE  · Continue DVT ppx - lovenox   · Follow-up with Trauma/Orthopedics as outpatient   · Pain fairly well controlled - Continue APAP TID; Gabapentin 100mg HS, PRN oxy 2 5-5mg Q4H PRN      Recommend acute comprehensive interdisciplinary inpatient rehabilitation to include intensive skilled therapies (PT, OT) as outlined with oversight and management by rehabilitation physician as well as inpatient rehab level nursing, case management and weekly interdisciplinary team meetings         Gastroparesis  Assessment & Plan  Was on Reglan chronically but stopped in the acute setting due to concerns for extrapyramidal symptoms - she does have fair amount of oral dyskinesias which she feels is related to Sjogrens but may in part be related to this   - Monitor closely for intake; constipation as she remains at risk  - Colace, senna  - PRN bowel meds     Abnormal findings on imaging test  Assessment & Plan  · Seen on CT Abd/Pelvis  · LUNGS:  Mild hypoventilatory changes  14 mm left lower lobe subpleural nodule versus focal consolidation #4/54  Similar more inferior 8mm and 14 mm opacities # 4/82 and #4/95  · LIVER/BILIARY TREE:  One or more subcentimeter sharply circumscribed low-density hepatic lesion(s) are noted, too small to accurately characterize, but statistically most likely to represent subcentimeter hepatic cysts  No suspicious solid hepatic lesion is identified  Hepatic contours are normal   No biliary dilatation  · Patient will need follow-up with PCP    Hypothyroidism  Assessment & Plan  · Managed on Synthroid     Depression  Assessment & Plan  · Managed on Prozac (home dose) and Xanax low dose for anxiety  [place] - patient was not on Xanax prior  · Follow-up with PCP as outpatient     Hypertension  Assessment & Plan  · Managed on Norvasc, Atenolol  · Consulted internal medicine     Sjogren's disease ([facility] [address] )  Assessment & Plan  · Managed on weekly Methotrexate  · Follow-up with Dr Kerrie Sterling    Parotid gland enlargement  Assessment & Plan  · Follows with Dr Elizabeth Green         Other Medical Issues:       Disposition    home with estimated length of stay of 2 weeks from admission    Follow-up providers and other issues to be followed up after discharge    PCP    rheumatology    orthopedics    possibly GI    CODE: Level 1: Full Code    Restrictions include:   Fall precautions    Objective:    Functional Update:   total assist toileting hygiene, bathing significant assist, upper body dressing moderate assist, lower body dressing total assist, transfers min assist, ambulation 10 ft moderate assist    Allergies per EMR    Physical Exam:  Temp:  [98 3 °F (36 8 °C)-99 °F (37 2 °C)] 99 °F (37 2 °C)  HR:  [60-72] 65  Resp:  [18-20] 20  BP: (128-161)/(61-68) 136/64  SpO2: [95 %-96 %] 95 %    GEN:  Sitting in NAD   HEENT/NECK: Fairly moist mucous membranes; oral dyskinesia which patient states is stable  CARDIAC: Regular rate rhythm, no murmers, no rubs, no gallops  LUNGS:  clear to auscultation, no wheezes, rales, or rhonchi  ABDOMEN: Soft, Nontender, nondistended, somewhat hypoactive bowel sounds  EXTREMITIES/SKIN:  no calf edema, no calf tenderness to palpation  NEURO:   MENTAL STATUS:  Appropriate wakefulness and interaction  and Strength/MMT:  Near full throughout  [facility]:  Affect:  Euthymic     Diagnostic Studies: Reviewed, no new imaging  No orders to display       Laboratory: Labs reviewed  Results from last 7 days   Lab Units 03/01/21  0518 02/24/21  0552 02/23/21  0806   HEMOGLOBIN g/dL 10 6* 10 4* 11 1*   HEMATOCRIT % 32 9* 33 9* 35 4   WBC Thousand/uL 5 00 8 80 7 39     Results from last 7 days   Lab Units 03/01/21  0518 02/23/21  0806 02/22/21  1752   BUN mg/dL 16 15 20   SODIUM mmol/L 139 136 137   POTASSIUM mmol/L 3 7 4 1 3 7   CHLORIDE mmol/L 104 103 103   CREATININE mg/dL 0 69 0 87 1 13            Drug regimen reviewed, all potential adverse effects identified and addressed:    Scheduled Meds:  Current Facility-Administered Medications   Medication Dose Route Frequency Provider Last Rate    acetaminophen  650 mg Oral Q8H [address] Milvia Prior, MD      ALPRAZolam  0 25 mg Oral TID PRN Milvia Prior, MD      amLODIPine  2 5 mg Oral Daily Milvia Prior, MD      atenolol  50 mg Oral BID Milvia Prior, MD      atorvastatin  10 mg Oral Daily Milvia Prior, MD      bisacodyl  10 mg Rectal Daily PRN Milvia Prior, MD      Diclofenac Sodium  2 g Topical 4x Daily Milvia Prior, MD      docusate sodium  100 mg Oral BID Milvia Prior, MD      enoxaparin  30 mg Subcutaneous Q12H [address] Milvia Prior, MD      FLUoxetine  20 mg Oral Daily Milvia Prior, MD      folic acid  1 mg Oral Daily Milvia Prior, MD      gabapentin  100 mg Oral HS Milvia Prior, MD      levothyroxine  88 mcg Oral Early Morning Tran Drummond MD      lidocaine  2 patch Topical HS Tran Drummond MD      methocarbamol  500 mg Oral Q6H PRN Tran Drummond MD      [START ON 3/3/2021] methotrexate  20 mg Oral Weekly Tran Drummond MD      nystatin  1 application Topical BID DARIO Perez      ondansetron  4 mg Oral Q6H PRN Tran Drummond MD      oxyCODONE  2 5 mg Oral Q4H PRN Tran Drummond MD      oxyCODONE  5 mg Oral Q4H PRN Tran Drummond MD      polyethylene glycol  17 g Oral Daily Tran Drummond MD      polyethylene glycol  17 g Oral Daily PRN Tran Drummond MD      senna  1 tablet Oral BID Arnaldo Solis MD         ** Please Note: Fluency Direct voice to text software may have been used in the creation of this document   **

## 2021-03-01 NOTE — CASE MANAGEMENT
Cm met with pt and reviewed rehab routine and cm role  Pt lives in a bi level house with her   She has 1 MICHAEL and 7 MICHAEL to the area she plans to stay on one level  She as tub shower and a shwoer chair  She has a walker, canes, and a commode  She has had outpt at [facility]  She uses [facility]  Discussed team meeting process and potential LOS  Following to assist with d/c planning needs

## 2021-03-02 PROBLEM — G24.4 ORAL DYSKINESIA: Status: ACTIVE | Noted: 2021-03-02

## 2021-03-02 PROCEDURE — 99232 SBSQ HOSP IP/OBS MODERATE 35: CPT | Performed by: INTERNAL MEDICINE

## 2021-03-02 PROCEDURE — 97116 GAIT TRAINING THERAPY: CPT

## 2021-03-02 PROCEDURE — 97530 THERAPEUTIC ACTIVITIES: CPT

## 2021-03-02 PROCEDURE — 97110 THERAPEUTIC EXERCISES: CPT

## 2021-03-02 PROCEDURE — 99232 SBSQ HOSP IP/OBS MODERATE 35: CPT

## 2021-03-02 PROCEDURE — 97535 SELF CARE MNGMENT TRAINING: CPT

## 2021-03-02 RX ADMIN — ACETAMINOPHEN 650 MG: 325 TABLET ORAL at 21:53

## 2021-03-02 RX ADMIN — ATORVASTATIN CALCIUM 10 MG: 10 TABLET, FILM COATED ORAL at 08:01

## 2021-03-02 RX ADMIN — DOCUSATE SODIUM 100 MG: 100 CAPSULE ORAL at 08:00

## 2021-03-02 RX ADMIN — FLUOXETINE 20 MG: 20 CAPSULE ORAL at 08:01

## 2021-03-02 RX ADMIN — NYSTATIN 1 APPLICATION: 100000 POWDER TOPICAL at 08:02

## 2021-03-02 RX ADMIN — OXYCODONE HYDROCHLORIDE 5 MG: 5 TABLET ORAL at 08:02

## 2021-03-02 RX ADMIN — POLYETHYLENE GLYCOL 3350 17 G: 17 POWDER, FOR SOLUTION ORAL at 08:01

## 2021-03-02 RX ADMIN — FOLIC ACID 1 MG: 1 TABLET ORAL at 08:00

## 2021-03-02 RX ADMIN — ACETAMINOPHEN 650 MG: 325 TABLET ORAL at 15:11

## 2021-03-02 RX ADMIN — DICLOFENAC SODIUM 2 G: 10 GEL TOPICAL at 12:05

## 2021-03-02 RX ADMIN — ENOXAPARIN SODIUM 30 MG: 30 INJECTION SUBCUTANEOUS at 21:52

## 2021-03-02 RX ADMIN — AMLODIPINE BESYLATE 2.5 MG: 2.5 TABLET ORAL at 08:01

## 2021-03-02 RX ADMIN — SENNOSIDES 8.6 MG: 8.6 TABLET, FILM COATED ORAL at 18:46

## 2021-03-02 RX ADMIN — LEVOTHYROXINE SODIUM 88 MCG: 88 TABLET ORAL at 05:53

## 2021-03-02 RX ADMIN — ENOXAPARIN SODIUM 30 MG: 30 INJECTION SUBCUTANEOUS at 08:01

## 2021-03-02 RX ADMIN — SENNOSIDES 8.6 MG: 8.6 TABLET, FILM COATED ORAL at 08:00

## 2021-03-02 RX ADMIN — GABAPENTIN 100 MG: 100 CAPSULE ORAL at 21:52

## 2021-03-02 RX ADMIN — DOCUSATE SODIUM 100 MG: 100 CAPSULE ORAL at 18:46

## 2021-03-02 RX ADMIN — ALPRAZOLAM 0.25 MG: 0.25 TABLET ORAL at 21:41

## 2021-03-02 RX ADMIN — DICLOFENAC SODIUM 2 G: 10 GEL TOPICAL at 08:01

## 2021-03-02 RX ADMIN — ATENOLOL 50 MG: 50 TABLET ORAL at 21:40

## 2021-03-02 RX ADMIN — ATENOLOL 50 MG: 50 TABLET ORAL at 08:01

## 2021-03-02 RX ADMIN — OXYCODONE HYDROCHLORIDE 5 MG: 5 TABLET ORAL at 12:43

## 2021-03-02 RX ADMIN — ACETAMINOPHEN 650 MG: 325 TABLET ORAL at 05:53

## 2021-03-02 RX ADMIN — NYSTATIN 1 APPLICATION: 100000 POWDER TOPICAL at 18:47

## 2021-03-02 RX ADMIN — DICLOFENAC SODIUM 2 G: 10 GEL TOPICAL at 18:46

## 2021-03-02 RX ADMIN — LIDOCAINE 2 PATCH: 50 PATCH TOPICAL at 21:41

## 2021-03-02 NOTE — PROGRESS NOTES
Progress Note - Timothy Fine 1955, 78 y o  female MRN: [id number]    Unit/Bed#: -49 Encounter: [id number]    Primary Care Provider: Yasmine Valle MD   Date and time admitted to hospital: 2/27/2021  3:06 PM        Gastroparesis  Assessment & Plan  Formerly on Reglan - stopped in the acute setting due to concerns for extrapyramidal symptoms  Monitor for s/s of bowel obstruction  Continue bowel regimen  Abnormal findings on imaging test  Assessment & Plan  CT on 2/22 - Calcified R retroperitoneal node measuring 21mm  Recommend follow-up in 2 weeks  Hypothyroidism  Assessment & Plan  Currently on levothyroxine 88mcg daily at home (States that she was increased 1 month ago from 75mcg)  Last TSH level unknown  Continue to follow-up with outpatient PCP  Depression  Assessment & Plan  Stable  Continue Prozac 20mg daily and Xanax PRN for anxiety  Supportive counseling as needed  Hypertension  Assessment & Plan  Home regimen: Norvasc 2 5mg daily and atenolol 50mg BID  Episode of hypertensive urgency while in acute setting  Monitor routine VS   Consider increasing Norvasc as needed  Sjogren's disease ([facility] [address] )  Assessment & Plan  Takes methotrexate 20mg weekly  Follows up with Dr Edis Lott (Rheumatology) as outpatient  Parotid gland enlargement  Assessment & Plan  Stable  Yearly follow-up with Dr Sourav Villalpando (ENT) as outpatient  * Multiple closed fractures of pelvis [facility])  Assessment & Plan  2/22 - Mechanical fall down the stairs predominately on the R side  Multiple pelvic fractures: Acute nondisplaced fracture of the right inferior pubic ramus and right pubic symphysis, anterior right acetabular fracture and probable nondisplaced left anterior acetabular fracture  Ortho consulted - no surgical intervention at this time  Monitor for s/s pelvic bleeding  Pain management  WBAT  DVT Prophylaxis - Lovenox  Follow-up with Dr Nolan Sanchez (Orthopedics) office in 6 weeks      PT/OT Therapies  Primary team following  VTE Pharmacologic Prophylaxis:   Pharmacologic: Enoxaparin (Lovenox)  Mechanical VTE Prophylaxis in Place: Yes - sequential compression devices  Current Length of Stay: 3 day(s)    Current Patient Status: Inpatient Rehab     Discharge Plan: As per primary team     Code Status: Level 1 - Full Code    Subjective:   Pt examined while sitting in WC in pt room  Proud of her accomplishments with therapy today, states that she did not think she would be able to accomplish steps this quickly  Complaints of B/L hip pain, rates 4/10, improves with pain medication, Voltaren gel, and ice  Had not taken pain medication since this morning but was advised to consider taking some medication before therapy starts this afternoon  Last large BM was on  - passing flatus, feels that she will be able to have a BM later today, otherwise she is willing to have a suppository  Nursing stated that she did have a small BM earlier today  States that she only has a BM every other day at home  Objective:     Vitals:   Temp (24hrs), Av 5 °F (36 9 °C), Min:98 1 °F (36 7 °C), Max:98 9 °F (37 2 °C)    Temp:  [98 1 °F (36 7 °C)-98 9 °F (37 2 °C)] 98 1 °F (36 7 °C)  HR:  [64-72] 64  Resp:  [18-20] 18  BP: (122-148)/(58-69) 136/63  SpO2:  [95 %-96 %] 96 %  Body mass index is 28 71 kg/m²  Review of Systems   Constitutional: Negative for chills, fatigue and fever  Respiratory: Negative for cough and shortness of breath  Cardiovascular: Negative for chest pain, palpitations and leg swelling  Gastrointestinal: Negative for abdominal distention, abdominal pain, constipation, diarrhea, nausea and vomiting  Last large BM on , small BM today  Passing flatus  Genitourinary: Negative for difficulty urinating  Musculoskeletal: Positive for arthralgias (B/L hip pain, 4-5/10, aching, improves with pain medication, Volteran gel, and ice)     Neurological: Negative for dizziness, light-headedness and numbness  Psychiatric/Behavioral: Negative for sleep disturbance  Input and Output Summary (last 24 hours): Intake/Output Summary (Last 24 hours) at 3/2/2021 1027  Last data filed at 3/2/2021 0900  Gross per 24 hour   Intake 480 ml   Output 700 ml   Net -220 ml       Physical Exam:     Physical Exam  Vitals signs reviewed  Constitutional:       Appearance: Normal appearance  HENT:      Head: Normocephalic and atraumatic  Cardiovascular:      Rate and Rhythm: Normal rate and regular rhythm  Pulses: Normal pulses  Heart sounds: Normal heart sounds  No murmur  No friction rub  Pulmonary:      Effort: Pulmonary effort is normal  No respiratory distress  Breath sounds: Normal breath sounds  No wheezing or rhonchi  Abdominal:      General: Abdomen is flat  Bowel sounds are normal  There is no distension  Palpations: Abdomen is soft  Tenderness: There is no abdominal tenderness  Musculoskeletal:      Right lower leg: No edema  Left lower leg: No edema  Skin:     General: Skin is warm and dry  Findings: Bruising (Small ecchymotic area below the R hip) present  Comments: Abrasion R shoulder - scabbed, healing  No erythema, edema, or drainage present  Neurological:      Mental Status: She is alert and oriented to person, place, and time  Comments: Slurred speech, oral dyskinesia     Psychiatric:         Mood and Affect: Mood normal          Behavior: Behavior normal          Additional Data:     Labs:    Results from last 7 days   Lab Units 03/01/21  0518   WBC Thousand/uL 5 00   HEMOGLOBIN g/dL 10 6*   HEMATOCRIT % 32 9*   PLATELETS Thousands/uL 206   NEUTROS PCT % 82*   LYMPHS PCT % 6*   MONOS PCT % 9   EOS PCT % 2     Results from last 7 days   Lab Units 03/01/21  0518   SODIUM mmol/L 139   POTASSIUM mmol/L 3 7   CHLORIDE mmol/L 104   CO2 mmol/L 26   BUN mg/dL 16   CREATININE mg/dL 0 69   ANION GAP mmol/L 9   CALCIUM mg/dL 9 4 GLUCOSE RANDOM mg/dL 100*                       Labs reviewed    Imaging:    Imaging reviewed    Recent Cultures (last 7 days):           Last 24 Hours Medication List:   Current Facility-Administered Medications   Medication Dose Route Frequency Provider Last Rate    acetaminophen  650 mg Oral Q8H Sadia Salgado MD      ALPRAZolam  0 25 mg Oral TID PRN Jefry Schwarz MD      amLODIPine  2 5 mg Oral Daily [place] [employer] [place] [employer], MD      atenolol  50 mg Oral BID [place] [employer] Brothlindsay, MD      atorvastatin  10 mg Oral Daily [place] [employer] [place] [employer], MD      bisacodyl  10 mg Rectal Daily PRN [employer] [place] [employer], MD      Diclofenac Sodium  2 g Topical 4x Daily [place] [employer] [place] [employer], MD      docusate sodium  100 mg Oral BID [place] [employer] [place] [employer], MD      enoxaparin  30 mg Subcutaneous Q12H [facility] [employer] [place] [employer], MD      FLUoxetine  20 mg Oral Daily [place] [employer] [place] [employer], MD      folic acid  1 mg Oral Daily [place] [employer] [place] [employer], MD      gabapentin  100 mg Oral HS [place] [employer] [place] [employer] BrothMD lindsay      levothyroxine  88 mcg Oral Early Morning [employer] [place] [employer], MD      lidocaine  2 patch Topical HS [place] [employer] [place] [employer], MD      methocarbamol  500 mg Oral Q6H PRN Jefry Schwarz MD      [START ON 3/3/2021] methotrexate  20 mg Oral Weekly [place] [employer] [place] Islander BrothMD lindsay      nystatin  1 application Topical BID Jose Solo, DARIO      ondansetron  4 mg Oral Q6H PRN Jefry Schwarz MD      oxyCODONE  2 5 mg Oral Q4H PRN Jefry Schwarz MD      oxyCODONE  5 mg Oral Q4H PRN Jefry Schwarz MD      polyethylene glycol  17 g Oral Daily Jefry Schwarz MD      polyethylene glycol  17 g Oral Daily PRN [place] Islander [facility] lindsay      senna  1 tablet Oral BID Pedro Luis Tijerina MD          M*Modal software was used to dictate this note  It may contain errors with dictating incorrect words or incorrect spelling  Please contact the provider directly with any questions

## 2021-03-02 NOTE — PROGRESS NOTES
Physical Medicine and Rehabilitation Progress Note  Adam Michel Viscomi 78 y o  female MRN: [id number]  Unit/Bed#: [facility] 269-01 Encounter: [id number]      Chief Complaints:  Pelvic pain     Interval Events/Subjective:     Patient reports therapy went fairly well but would like it more spread out during the day  She reports some soreness in pelvis and R ankle she Reports R ankle pain at times with movements but stable since fall  She reports occasional lightheadedness but not bad with activity  Patient reports having gas and denies abdominal pain, nausea, or impaired appetite  She denies cough, SOB, calf pain, fever, or other new complaints  ROS: A 10 point ROS was performed; negative except as noted above  Assessment & Plan:     * Multiple closed fractures of pelvis ([address] )  Assessment & Plan  · Traumatic fall on stairs:  · Resultant right inferior pubic rami fracture, right pubic symphysis fracture, right anterior acetabular fracture, left anterior acetabular fracture  · Evaluated by Orthopedics - treated conservatively  · Deemed WBAT BLE  · Function improving adequately - pt would like to try to spread out therapies more throughout the day - discussed with PT and we will try to accommodate   · Continue DVT ppx - lovenox   · Follow-up with Trauma/Orthopedics as outpatient   · Pain stable control - Continue APAP TID; Gabapentin 100mg HS, PRN oxy 2 5-5mg Q4H PRN      Continue plan as previously outlined    Recommend acute comprehensive interdisciplinary inpatient rehabilitation to include intensive skilled therapies (PT, OT) as outlined with oversight and management by rehabilitation physician as well as inpatient rehab level nursing, case management and weekly interdisciplinary team meetings         Gastroparesis  Assessment & Plan  Some constipation; but adequate PO intake  - Common in Sjogrens - now off chronic Reglan with oral dyskinesia but need to closely follow to ensure adequate GI function  IM consulted and with overall management at their discretion during ARC course  Was on Reglan chronically but stopped in the acute setting due to concerns for extrapyramidal symptoms - she does have fair amount of oral dyskinesias which she feels is related to Sjogrens but may in part be related to this   - Monitor for s/s of obstruction > not currently   - Monitor closely for intake; constipation as she remains at risk  - Colace, senna, miralax > refusing miralax at times >  on importance of stooling with pt; dulcolax supp if no BM by later > pt states not today but maybe tomorrow   - PRN bowel meds     Oral dyskinesia  Assessment & Plan  Stable on exam   Patient feels this is related to Sjogrens but movements are more consistent with EPS and likely TD in context of long-standing metoclopramide   - Comgmt with IM  - Metoclopramide discontinued prior to [facility] which is first line mgmt (holding DA blockers)   - This may be difficult to improve at this point  - Monitor during ARC course  - Follow-up with PCP - consider pharmacologic tx such at tetrabenazine if needed in future     Abnormal findings on imaging test  Assessment & Plan  · Seen on CT Abd/Pelvis   · ABDOMINOPELVIC CAVITY:  No ascites  No pneumoperitoneum  No lymphadenopathy  Calcified right retroperitoneal node measuring 21 mm  # 2/72  · LUNGS:  Mild hypoventilatory changes  14 mm left lower lobe subpleural nodule versus focal consolidation #4/54  Similar more inferior 8mm and 14 mm opacities # 4/82 and #4/95  · LIVER/BILIARY TREE:  One or more subcentimeter sharply circumscribed low-density hepatic lesion(s) are noted, too small to accurately characterize, but statistically most likely to represent subcentimeter hepatic cysts  No suspicious solid hepatic lesion is identified  Hepatic contours are normal   No biliary dilatation    · Patient will need follow-up with PCP    Hypothyroidism  Assessment & Plan  · Managed on Synthroid per IM Depression  Assessment & Plan  · Mood stable   · Managed on Prozac (home dose) and Xanax low dose for anxiety  Checked [place]MP - patient was not on Xanax prior  · Follow-up with PCP as outpatient     Hypertension  Assessment & Plan  · Managed on Norvasc, Atenolol  · Consulted internal medicine     Sjogren's disease ([facility] [address] )  Assessment & Plan  · Managed on weekly Methotrexate  · Follow-up with Dr Milagros Gramajo    Parotid gland enlargement  Assessment & Plan  · Follows with Dr Swapnil Salgado       Other Medical Issues:       Disposition    home with estimated length of stay of 2 weeks from admission    Follow-up providers and other issues to be followed up after discharge    PCP    rheumatology    orthopedics    possibly GI    CODE: Level 1: Full Code    Restrictions include:   Fall precautions    Objective:    Functional Update:   significant assist bathing, total assist lower body dressing, Min assist upper body dressing,   transfers Min to mod assist, ambulation 10 ft contact guard, 48 ft Min assist    Allergies per EMR    Physical Exam:  Temp:  [98 1 °F (36 7 °C)-98 9 °F (37 2 °C)] 98 1 °F (36 7 °C)  HR:  [64-72] 64  Resp:  [18] 18  BP: (122-148)/(58-69) 136/63  SpO2:  [96 %] 96 %    GEN:  Sitting in NAD   HEENT/NECK: Stable oral dyskinesia; somewhat moist MM  CARDIAC: Regular rate rhythm, no murmers, no rubs, no gallops  LUNGS:  clear to auscultation, no wheezes, rales, or rhonchi  ABDOMEN: Soft, Nontender, nondistended, normoactive bowel sounds  EXTREMITIES/SKIN:  no calf edema, no calf tenderness to palpation  NEURO:   MENTAL STATUS:  Appropriate wakefulness and interaction  and Strength/MMT:  Near full throughout again today  PSYCH:  Affect:  Euthymic     Diagnostic Studies: Reviewed, no new imaging  No orders to display       Laboratory: Labs reviewed  Results from last 7 days   Lab Units 03/01/21  0518 02/24/21  0552   HEMOGLOBIN g/dL 10 6* 10 4*   HEMATOCRIT % 32 9* 33 9*   WBC Thousand/uL 5 00 8 80 Results from last 7 days   Lab Units 03/01/21  0518   BUN mg/dL 16   SODIUM mmol/L 139   POTASSIUM mmol/L 3 7   CHLORIDE mmol/L 104   CREATININE mg/dL 0 69            Drug regimen reviewed, all potential adverse effects identified and addressed:    Scheduled Meds:  Current Facility-Administered Medications   Medication Dose Route Frequency Provider Last Rate    acetaminophen  650 mg Oral Q8H Prashant Stewart MD      ALPRAZolam  0 25 mg Oral TID PRN Bryce May MD      amLODIPine  2 5 mg Oral Daily Bryce May MD      atenolol  50 mg Oral BID Bryce May MD      atorvastatin  10 mg Oral Daily Bryce May MD      bisacodyl  10 mg Rectal Daily PRN Bryce May MD      Diclofenac Sodium  2 g Topical 4x Daily Bryce May MD      docusate sodium  100 mg Oral BID Bryce May MD      enoxaparin  30 mg Subcutaneous Q12H [facility] Bryce May MD      FLUoxetine  20 mg Oral Daily Bryce May MD      folic acid  1 mg Oral Daily Bryce May MD      gabapentin  100 mg Oral HS Bryce May MD      levothyroxine  88 mcg Oral Early Morning Bryce May MD      lidocaine  2 patch Topical HS Bryce May MD      methocarbamol  500 mg Oral Q6H PRN Bryce May MD      [START ON 3/3/2021] methotrexate  20 mg Oral Weekly Bryce May MD      nystatin  1 application Topical BID DARIO Marcial      ondansetron  4 mg Oral Q6H PRN Bryce May MD      oxyCODONE  2 5 mg Oral Q4H PRN Bryce May MD      oxyCODONE  5 mg Oral Q4H PRN Bryce May MD      polyethylene glycol  17 g Oral Daily Bryce May MD      polyethylene glycol  17 g Oral Daily PRN Bryce May MD      senna  1 tablet Oral BID Bashir Hoyos MD         ** Please Note: Fluency Direct voice to text software may have been used in the creation of this document   **

## 2021-03-02 NOTE — PROGRESS NOTES
03/02/21 1030   Pain Assessment   Pain Assessment Tool 0-10   Pain Score 8  (7/10 start of session)   Pain Location/Orientation Orientation: Right;Location: Buttocks; Location: Hip   Pain Onset/Description Onset: Ongoing   Effect of Pain on Daily Activities increased diff with ambulation and transfers   Hospital Pain Intervention(s) Cold applied;Repositioned; Rest   Subjective   Subjective "I feel stiff "   Sit to Stand   Type of Assistance Needed Incidental touching   Amount of Physical Assistance Provided No physical assistance   Comment CGA with RW, cues for hand placement   Sit to Stand CARE Score 4   Bed-Chair Transfer   Type of Assistance Needed Physical assistance   Amount of Physical Assistance Provided Less than 25%   Comment Ebony/CGA with RW this session due to fatigue and occ R knee buckle   Chair/Bed-to-Chair Transfer CARE Score 3   Walk 10 Feet   Comment only performed 7' amb to bathroom with RW this session, minAx1 due to R knee buckling slightly, chair pulled behind pt  Due to inability to amb to/from bathroom, did not update pt's transfer board to allow her to amb to bathroom with staff   Reason if not Attempted Safety concerns   Walk 10 Feet CARE Score 88   Toilet Transfer   Type of Assistance Needed Physical assistance   Amount of Physical Assistance Provided 25%-49%   Comment minAx1 with grab bar on first trial, Ebony/CGA on second trial with RW due to fatigue and R knee buckle  Able to perform pants management with CGA  PT performed perihygiene  Very small loose BM first trial, alerted JORGE James, unable to void second trial at end of session   Toilet Transfer CARE Score 3   Toilet Transfer   Surface Assessed Bedside Commode  (over toilet in bathroom)   Limitations Noted In Balance;Confidence; Endurance; Sequencing;UE Strength;LE Strength   Adaptive Equipment Grab Bar  (walker)   Therapeutic Interventions   Strengthening seated TE with no weight as pt unable to perform exercises with weight due to fatigue and inc pain: 3x5 of hip flex, LAQs, hip abd  x30 APs, one foot performed at a time  Knee flex RTB 2x10  Flexibility seated b/l manual hamstring/gastroc stretch 3x30 sec hold   Assessment   Treatment Assessment Pt participated in 60 min skilled PT session focusing on transfers and LE strengthening/ROM  During last session set pt up in recliner prior to leaving pt's room  Upon PTs arrival she reports recliner uncomfortable, does not like to utilize  Requested by PCA  Alissa to toilet pt, had pt practice amb to bathroom, however, due to fatigue, pain and stiffness, unable to amb to bathroom, only made it 7' before pt fatigued needing to rest  Due to this, did not update transfer status in room, left pt at RUST's with RW  Toileting performed on 2 occassions this session (beginning and end), pt did better with SPTs with use of RW due to R knee beginning to buckle which pt reports is due to mm soreness and fatigue  Then performed seated TE which pt requires increased time to complete, only tolerated 5 reps this session  End of session applied CP to R buttock/hip for pain relief, all needs in reach  At this time cont with POC as outlined in a m note  Plan   Treatment/Interventions ADL retraining;LE strengthening/ROM; Therapeutic exercise;Gait training   Progress Progressing toward goals   Recommendation   PT Discharge Recommendation Home with skilled therapy  (home PT)   Equipment Recommended Walker   PT - OK to Discharge No   PT Therapy Minutes   PT Time In 1030   PT Time Out 1130   PT Total Time (minutes) 60   PT Mode of treatment - Individual (minutes) 60   PT Mode of treatment - Concurrent (minutes) 0   PT Mode of treatment - Group (minutes) 0   PT Mode of treatment - Co-treat (minutes) 0   PT Mode of Treatment - Total time(minutes) 60 minutes   PT Cumulative Minutes 270   Therapy Time missed   Time missed?  No

## 2021-03-02 NOTE — PROGRESS NOTES
03/02/21 0830   Pain Assessment   Pain Assessment Tool 0-10   Pain Score 8  (3/10 start of session)   Pain Location/Orientation Orientation: Right;Location: Buttocks; Location: Hip   Pain Onset/Description Onset: Ongoing   Effect of Pain on Daily Activities tolerated amb, stairs, and curb well despite pain   Hospital Pain Intervention(s) Cold applied;Repositioned; Rest   Restrictions/Precautions   Precautions Fall Risk;Cognitive;Pain   Weight Bearing Restrictions Yes   RLE Weight Bearing Per Order WBAT   LLE Weight Bearing Per Order WBAT   ROM Restrictions No   Cognition   Overall Cognitive Status WFL   Arousal/Participation Alert; Cooperative   Attention Attends with cues to redirect   Orientation Level Oriented X4   Memory Decreased short term memory;Decreased recall of precautions   Following Commands Follows one step commands with increased time or repetition   Subjective   Subjective Agreeable to PT session   States she just received pain meds approx 1/2 hour before therapy   Sit to Stand   Type of Assistance Needed Incidental touching   Amount of Physical Assistance Provided No physical assistance   Comment CGA with RW, needs vc's for hand placement approx 90% of time   Sit to Stand CARE Score 4   Bed-Chair Transfer   Type of Assistance Needed Incidental touching   Amount of Physical Assistance Provided No physical assistance   Comment CGA SPT with RW   Chair/Bed-to-Chair Transfer CARE Score 4   Car Transfer   Reason if not Attempted Environmental limitations   Car Transfer CARE Score 10   Walk 10 Feet   Type of Assistance Needed Incidental touching   Amount of Physical Assistance Provided No physical assistance   Comment CGA with RW   Walk 10 Feet CARE Score 4   Walk 50 Feet with Two Turns   Type of Assistance Needed Physical assistance   Amount of Physical Assistance Provided Less than 25%   Comment Ebony/CGA with RW   Walk 50 Feet with Two Turns CARE Score 3   Walk 150 Feet   Comment fatigued at 128'   Reason if not Attempted Safety concerns   Walk 150 Feet CARE Score 88   Walking 10 Feet on Uneven Surfaces   Type of Assistance Needed Physical assistance   Amount of Physical Assistance Provided Less than 25%   Comment minAx1 on descent to control walker and for balance, CG/Ebony ascending ramp with RW   Walking 10 Feet on Uneven Surfaces CARE Score 3   Ambulation   Does the patient walk? 2  Yes   Primary Mode of Locomotion Prior to Admission Walk   Distance Walked (feet) 128 ft  (x1, 42'x1)   Assist Device Roller Walker   Gait Pattern [place]algic; Inconsistant [place]; Slow [place];Decreased foot clearance; Forward Flexion; Step to; Step through; Decreased R stance; Improper weight shift   Limitations Noted In Balance; Endurance; Heel Strike;Posture; Safety;Speed;Strength;Swing   Provided Assistance with: Balance   Walk Assist Level Minimum Assist;Contact Guard   Findings able to recognize when standing rest break needed during amb  With amb, pain increased to 8/10, mostly in R buttocks and at hip but pt tolerated well  CF by PT for safety   Wheel 50 Feet with Two Turns   Reason if not Attempted Activity not applicable   Wheel 50 Feet with Two Turns CARE Score 9   Wheel 150 Feet   Reason if not Attempted Activity not applicable   Wheel 284 Feet CARE Score 9   Wheelchair mobility   Does the patient use a wheelchair? 0  No   Curb or Single Stair   Style negotiated Curb   Type of Assistance Needed Physical assistance   Amount of Physical Assistance Provided Less than 25%   Comment on 4" curb step with RW, demo'd for pt prior to performing, however, once pt stands she asks "what am I doing?" Needs step by step cues to complete curb step but completed at Ebony/CGA   Also trialed  steps this session, pt Ebony/CGA, cues for sequencing, able to complete x2 steps   1 Step (Curb) CARE Score 3   4 Steps   Reason if not Attempted Safety concerns   4 Steps CARE Score 88   12 Steps   Reason if not Attempted Safety concerns   12 Steps CARE Score 88   Stairs   Type Stairs;Curb;Ramp   # of Steps 1  (x1 4" curb step; 6"  steps x2)   Weight Bearing Precautions WBAT;RLE;LLE;Fall Risk   Assist Devices Bilateral Rail;Roller Walker   Assessment   Treatment Assessment Pt engaged in skilled PT session focusing on gait trng, curb step trng, and stair trng this session  Pain initially only 3/10, with ambulation and stairs increased to 8/10 but pt tolerated well  Able to increase amb distances greatly this session to 128', however, still needs CGA for safety due to pt's risk for falls  She was able to perform 2 steps with b/l HRs this session, pt however has 7 steps with only L HR, will need to trial this in upcoming sessions as tolerated by pt  Plan to assess pt in next session to see if she would be able to amb to/from bathroom with nsg staff  At this time pt cont to make great gains towards LTGs  POC to cont to focus on pain relief techniques, transfer trng, gait trng, curb step trng (pt reports step at home to enter is approx 4" will confirm with family), stair trng with progression to single HR, LE strengthening and ROM, balance trng, and improving pt's endurance  Family/Caregiver Present no   Problem List Decreased strength;Decreased range of motion;Decreased endurance; Impaired balance;Decreased mobility; Decreased coordination;Decreased cognition;Decreased safety awareness;Orthopedic restrictions;Pain   Barriers to Discharge Decreased caregiver support; Inaccessible home environment   Barriers to Discharge Comments pt will be alone during day, 1+7 MICHAEL, pain, decreased strength, decreased activity tolerance   PT Barriers   Functional Limitation Car transfers; Ramp negotiation;Stair negotiation;Standing;Transfers; Walking   Plan   Treatment/Interventions Functional transfer training; Endurance training;Gait training   Progress Progressing toward goals   Recommendation   PT Discharge Recommendation Home with skilled therapy  (home PT)   Equipment Recommended Rodolfo   PT - OK to Discharge No   PT Therapy Minutes   PT Time In 0830   PT Time Out 0930   PT Total Time (minutes) 60   PT Mode of treatment - Individual (minutes) 60   PT Mode of treatment - Concurrent (minutes) 0   PT Mode of treatment - Group (minutes) 0   PT Mode of treatment - Co-treat (minutes) 0   PT Mode of Treatment - Total time(minutes) 60 minutes   PT Cumulative Minutes 210   Therapy Time missed   Time missed?  No

## 2021-03-02 NOTE — ASSESSMENT & PLAN NOTE
2/22 - Mechanical fall down the stairs predominately on the R side  Multiple pelvic fractures: Acute nondisplaced fracture of the right inferior pubic ramus and right pubic symphysis, anterior right acetabular fracture and probable nondisplaced left anterior acetabular fracture  Ortho consulted - no surgical intervention at this time  Monitor for s/s pelvic bleeding  Pain management  WBAT  DVT Prophylaxis - Lovenox  Follow-up with Dr Wale Dove (Orthopedics) office in 6 weeks  PT/OT Therapies  Primary team following

## 2021-03-02 NOTE — PROGRESS NOTES
03/02/21 1230   Pain Assessment   Pain Assessment Tool 0-10   Pain Score 8   Pain Location/Orientation Orientation: Right;Location: Leg  (thigh/groin)   Hospital Pain Intervention(s) Cold applied;Repositioned  (RN administered medication at begining of session)   Restrictions/Precautions   Precautions Fall Risk;Cognitive   Lower Body Dressing   Type of Assistance Needed Physical assistance   Amount of Physical Assistance Provided 25%-49%   Comment Pt completed LB dressing with use of LHAE pt able to use LH reacher to don pants with some assist to untangle pants from foot and to remove reacher from pants when it got caught  Pt with decreased problem solving at times  Pt ablet o stand with encouragement and pull pants up over hips with increased time  Continue with use of LHAE   Lower Body Dressing CARE Score 3   Putting On/Taking Off Footwear   Type of Assistance Needed Physical assistance   Amount of Physical Assistance Provided 25%-49%   Comment Pt utlized sock aid and dressing stick to don/doff socks  Pt able to doff socks with increased time and effort due to 8/10 pain in pelvis specifically on R side  Pt able tod on socks on sock aid with cueing for positioning sock aid correctly  pt did require assist to support RLE during donnign of sock and did require assist to remove sock aid once don donning      Putting On/Taking Off Footwear CARE Score 3   Sit to Stand   Type of Assistance Needed Incidental touching   Amount of Physical Assistance Provided No physical assistance   Comment CGA with RW cues for hand placement    Sit to Stand CARE Score 4   Bed-Chair Transfer   Type of Assistance Needed Physical assistance   Amount of Physical Assistance Provided 25%-49%   Comment min A with RW   Chair/Bed-to-Chair Transfer CARE Score 3   Therapeutic Excerise-Strength   UE Strength Yes   Right Upper Extremity- Strength   R Weight/Reps/Sets 2 sets 10/12 reps   RUE Strength Comment Pt engaged in UE strengthening to improve strength and fxnl transfers  Pt required guiding assistance to maintain proper positioning throughout UE strengthening  Pt utlized 1# weight was able to do only max of 12 reps for bicep curl and chest press, and 10 reps for [facility] press, and lateral front raises   Left Upper Extremity-Strength   L Weights/Reps/Sets 2 sets 10/12 reps   LUE Strength Comment Pt engaged in UE strengthening to improve strength and fxnl transfers  Pt required guiding assistance to maintain proper positioning throughout UE strengthening  Pt utlized 1# weight was able to do only max of 12 reps for bicep curl and chest press, and 10 reps for [facility] press, and lateral front raises   Cognition   Overall Cognitive Status Impaired   Arousal/Participation Alert; Cooperative   Attention Attends with cues to redirect   Orientation Level Oriented X4   Memory Decreased short term memory   Following Commands Follows one step commands inconsistently   Comments pt would benefit from cognitive assessment to determine if any baseline cognitive deficits   Assessment   Treatment Assessment Pt participated in 60 minute skilled OT session with focus on LB dressing, fxnl transfers, and UE strength  Pt did not take pain medication initially stating she thougth we were just working on arms while sitting down  Pt did take pain medication after encouraged but pain was 8/10 and did not subsided throughout session  Pt provided with education on taking pain medication prior to all therapies and pt now in understanding  Discussed keeping 60 minute sessions as pt does not feel she can tolerate 90 minute session  Will attempt to encorporate longer rest breaks to increase tolerance during therapy sessions  Pt noted with improved sit ot stand transfers and with encouragement is able to complete intermittent b/l hand release for clothing management up/down  Pt continues to require signficant cueing with use of LHAE   Pt does demonstrate some decreased carryover with new learning and may benefit from cognitive assessment to determine if any deficits are present due to fall  Continue with POC with focus on balance, endurance, fxnl cognition, standing tolerance, and simple meal prep  Problem List Impaired balance;Decreased mobility; Decreased strength;Decreased endurance;Pain   Barriers to Discharge Decreased caregiver support   Plan   Treatment/Interventions ADL retraining;Functional transfer training;LE strengthening/ROM; Endurance training;Equipment eval/education; Compensatory technique education   Progress Progressing toward goals   OT Therapy Minutes   OT Time In 1230   OT Time Out 1330   OT Total Time (minutes) 60   OT Mode of treatment - Individual (minutes) 60   OT Mode of treatment - Concurrent (minutes) 0   OT Mode of treatment - Group (minutes) 0   OT Mode of treatment - Co-treat (minutes) 0   OT Mode of Treatment - Total time(minutes) 60 minutes   OT Cumulative Minutes 270   Therapy Time missed   Time missed?  No

## 2021-03-02 NOTE — PLAN OF CARE
Problem: PAIN - ADULT  Goal: Verbalizes/displays adequate comfort level or baseline comfort level  Description: Interventions:  - Encourage patient to monitor pain and request assistance  - Assess pain using appropriate pain scale  - Administer analgesics based on type and severity of pain and evaluate response  - Implement non-pharmacological measures as appropriate and evaluate response  - Consider cultural and social influences on pain and pain management  - Notify physician/advanced practitioner if interventions unsuccessful or patient reports new pain  Outcome: Progressing     Problem: INFECTION - ADULT  Goal: Absence or prevention of progression during hospitalization  Description: INTERVENTIONS:  - Assess and monitor for signs and symptoms of infection  - Monitor lab/diagnostic results  - Monitor all insertion sites, i e  indwelling lines, tubes, and drains  - Monitor endotracheal if appropriate and nasal secretions for changes in amount and color  - [place] appropriate cooling/warming therapies per order  - Administer medications as ordered  - Instruct and encourage patient and family to use good hand hygiene technique  - Identify and instruct in appropriate isolation precautions for identified infection/condition  Outcome: Progressing     Problem: SAFETY ADULT  Goal: Patient will remain free of falls  Description: INTERVENTIONS:  - Assess patient frequently for physical needs  -  Identify cognitive and physical deficits and behaviors that affect risk of falls    -  [place] fall precautions as indicated by assessment   - Educate patient/family on patient safety including physical limitations  - Instruct patient to call for assistance with activity based on assessment  - Modify environment to reduce risk of injury  - Consider OT/PT consult to assist with strengthening/mobility  Outcome: Progressing  Goal: Maintain or return to baseline ADL function  Description: INTERVENTIONS:  -  Assess patient's ability to carry out ADLs; assess patient's baseline for ADL function and identify physical deficits which impact ability to perform ADLs (bathing, care of mouth/teeth, toileting, grooming, dressing, etc )  - Assess/evaluate cause of self-care deficits   - Assess range of motion  - Assess patient's mobility; develop plan if impaired  - Assess patient's need for assistive devices and provide as appropriate  - Encourage maximum independence but intervene and supervise when necessary  - Involve family in performance of ADLs  - Assess for home care needs following discharge   - Consider OT consult to assist with ADL evaluation and planning for discharge  - Provide patient education as appropriate  Outcome: Progressing  Goal: Maintain or return mobility status to optimal level  Description: INTERVENTIONS:  - Assess patient's baseline mobility status (ambulation, transfers, stairs, etc )    - Identify cognitive and physical deficits and behaviors that affect mobility  - Identify mobility aids required to assist with transfers and/or ambulation (gait belt, sit-to-stand, lift, walker, cane, etc )  - [place] fall precautions as indicated by assessment  - Record patient progress and toleration of activity level on Mobility SBAR; progress patient to next Phase/Stage  - Instruct patient to call for assistance with activity based on assessment  - Consider rehabilitation consult to assist with strengthening/weightbearing, etc   Outcome: Progressing     Problem: DISCHARGE PLANNING  Goal: Discharge to home or other facility with appropriate resources  Description: INTERVENTIONS:  - Identify barriers to discharge w/patient and caregiver  - Arrange for needed discharge resources and transportation as appropriate  - Identify discharge learning needs (meds, wound care, etc )  - Arrange for interpretive services to assist at discharge as needed  - Refer to Case Management Department for coordinating discharge planning if the patient needs post-hospital services based on physician/advanced practitioner order or complex needs related to functional status, cognitive ability, or social support system  Outcome: Progressing     Problem: Knowledge Deficit  Goal: Patient/family/caregiver demonstrates understanding of disease process, treatment plan, medications, and discharge instructions  Description: Complete learning assessment and assess knowledge base  Interventions:  - Provide teaching at level of understanding  - Provide teaching via preferred learning methods  Outcome: Progressing     Problem: Potential for Falls  Goal: Patient will remain free of falls  Description: INTERVENTIONS:  - Assess patient frequently for physical needs  -  Identify cognitive and physical deficits and behaviors that affect risk of falls    -  [place] fall precautions as indicated by assessment   - Educate patient/family on patient safety including physical limitations  - Instruct patient to call for assistance with activity based on assessment  - Modify environment to reduce risk of injury  - Consider OT/PT consult to assist with strengthening/mobility  Outcome: Progressing

## 2021-03-02 NOTE — ASSESSMENT & PLAN NOTE
Stable again  Patient feels this is related to [place] but movements are more consistent with EPS and likely TD in context of long-standing metoclopramide   - Comgmt with IM  - Metoclopramide discontinued prior to [place] which is first line mgmt (holding DA blockers)   - This may be difficult to improve at this point  - Monitor during ARC course  - Follow-up with PCP - consider pharmacologic tx such at tetrabenazine if needed in future

## 2021-03-03 PROBLEM — Z91.89 POTENTIAL FOR COGNITIVE IMPAIRMENT: Status: ACTIVE | Noted: 2021-03-03

## 2021-03-03 PROCEDURE — 99232 SBSQ HOSP IP/OBS MODERATE 35: CPT | Performed by: INTERNAL MEDICINE

## 2021-03-03 PROCEDURE — 97535 SELF CARE MNGMENT TRAINING: CPT

## 2021-03-03 PROCEDURE — 97116 GAIT TRAINING THERAPY: CPT

## 2021-03-03 PROCEDURE — 97530 THERAPEUTIC ACTIVITIES: CPT

## 2021-03-03 PROCEDURE — 97110 THERAPEUTIC EXERCISES: CPT

## 2021-03-03 PROCEDURE — 99232 SBSQ HOSP IP/OBS MODERATE 35: CPT

## 2021-03-03 RX ORDER — LACTULOSE 20 G/30ML
20 SOLUTION ORAL 2 TIMES DAILY PRN
Status: DISCONTINUED | OUTPATIENT
Start: 2021-03-03 | End: 2021-03-04 | Stop reason: HOSPADM

## 2021-03-03 RX ADMIN — ATORVASTATIN CALCIUM 10 MG: 10 TABLET, FILM COATED ORAL at 08:05

## 2021-03-03 RX ADMIN — NYSTATIN 1 APPLICATION: 100000 POWDER TOPICAL at 17:12

## 2021-03-03 RX ADMIN — ENOXAPARIN SODIUM 30 MG: 30 INJECTION SUBCUTANEOUS at 08:05

## 2021-03-03 RX ADMIN — ACETAMINOPHEN 650 MG: 325 TABLET ORAL at 06:21

## 2021-03-03 RX ADMIN — DICLOFENAC SODIUM 2 G: 10 GEL TOPICAL at 08:06

## 2021-03-03 RX ADMIN — METHOTREXATE 20 MG: 2.5 TABLET ORAL at 08:06

## 2021-03-03 RX ADMIN — ATENOLOL 50 MG: 50 TABLET ORAL at 08:06

## 2021-03-03 RX ADMIN — DOCUSATE SODIUM 100 MG: 100 CAPSULE ORAL at 17:12

## 2021-03-03 RX ADMIN — DOCUSATE SODIUM 100 MG: 100 CAPSULE ORAL at 08:05

## 2021-03-03 RX ADMIN — ATENOLOL 50 MG: 50 TABLET ORAL at 20:46

## 2021-03-03 RX ADMIN — FLUOXETINE 20 MG: 20 CAPSULE ORAL at 08:05

## 2021-03-03 RX ADMIN — LIDOCAINE 2 PATCH: 50 PATCH TOPICAL at 21:16

## 2021-03-03 RX ADMIN — GABAPENTIN 100 MG: 100 CAPSULE ORAL at 21:16

## 2021-03-03 RX ADMIN — DICLOFENAC SODIUM 2 G: 10 GEL TOPICAL at 11:52

## 2021-03-03 RX ADMIN — OXYCODONE HYDROCHLORIDE 5 MG: 5 TABLET ORAL at 12:53

## 2021-03-03 RX ADMIN — SENNOSIDES 8.6 MG: 8.6 TABLET, FILM COATED ORAL at 17:12

## 2021-03-03 RX ADMIN — DICLOFENAC SODIUM 2 G: 10 GEL TOPICAL at 17:12

## 2021-03-03 RX ADMIN — ACETAMINOPHEN 650 MG: 325 TABLET ORAL at 12:55

## 2021-03-03 RX ADMIN — FOLIC ACID 1 MG: 1 TABLET ORAL at 08:05

## 2021-03-03 RX ADMIN — OXYCODONE HYDROCHLORIDE 5 MG: 5 TABLET ORAL at 08:06

## 2021-03-03 RX ADMIN — ALPRAZOLAM 0.25 MG: 0.25 TABLET ORAL at 20:46

## 2021-03-03 RX ADMIN — SENNOSIDES 8.6 MG: 8.6 TABLET, FILM COATED ORAL at 08:05

## 2021-03-03 RX ADMIN — ENOXAPARIN SODIUM 30 MG: 30 INJECTION SUBCUTANEOUS at 20:45

## 2021-03-03 RX ADMIN — LEVOTHYROXINE SODIUM 88 MCG: 88 TABLET ORAL at 06:22

## 2021-03-03 RX ADMIN — METHOCARBAMOL TABLETS 500 MG: 500 TABLET, COATED ORAL at 17:17

## 2021-03-03 RX ADMIN — AMLODIPINE BESYLATE 2.5 MG: 2.5 TABLET ORAL at 08:05

## 2021-03-03 RX ADMIN — ACETAMINOPHEN 650 MG: 325 TABLET ORAL at 21:16

## 2021-03-03 NOTE — PCC PHYSICAL THERAPY
3/3/21  Barriers to d/c home include 1+7 MICHAEL, limited family support as  and son work, decreased LE strength, pain which limits pt from tolerating tx at times, decreased activity tolerance, and decreased balance  POC has focused on gait trng, transfer trng, LE strengthening and ROM, balance trng, and conditioning as well as stair and curb step trng  Pain limiting progress however pt would cont to benefit from skilled PT intervention to address barriers to d/c home  Will also focus on FT next week to have  come in and practice steps with pt

## 2021-03-03 NOTE — ASSESSMENT & PLAN NOTE
Therapy noting some concern with memory and comprehension  - Pt states she is at her baseline; she is oriented x4 able to follow simple commands; pt on occasional oxycodone PRN for pain but does not appear overly sedated  - Obtain MOCA   - Compensatory strategies

## 2021-03-03 NOTE — PROGRESS NOTES
Progress Note - Stephanie Wright Viscomi 1955, 78 y o  female MRN: [id number]    Unit/Bed#: [facility] 272-21 Encounter: [id number]    Primary Care Provider: Brian Jeffrey MD   Date and time admitted to hospital: 2/27/2021  3:06 PM        Gastroparesis  Assessment & Plan  Formerly on Reglan - stopped in the acute setting due to concerns for extrapyramidal symptoms  Monitor for s/s of bowel obstruction  Continue bowel regimen  Abnormal findings on imaging test  Assessment & Plan  CT on 2/22 - Calcified R retroperitoneal node measuring 21mm  Recommend follow-up in 2 weeks  Hypothyroidism  Assessment & Plan  Currently on levothyroxine 88mcg daily at home (States that she was increased 1 month ago from 75mcg)  Last TSH level unknown  Continue to follow-up with outpatient PCP  Depression  Assessment & Plan  Stable  Continue Prozac 20mg daily and Xanax PRN for anxiety  Supportive counseling as needed  Hypertension  Assessment & Plan  Home regimen: Norvasc 2 5mg daily and atenolol 50mg BID  Episode of hypertensive urgency while in acute setting  Monitor routine VS   Consider increasing Norvasc as needed  Sjogren's disease ([facility] [address] )  Assessment & Plan  Takes methotrexate 20mg weekly  Follows up with Dr Omari Garcia (Rheumatology) as outpatient  Parotid gland enlargement  Assessment & Plan  Stable  Yearly follow-up with Dr Colonel Crooks (ENT) as outpatient  * Multiple closed fractures of pelvis [facility])  Assessment & Plan  2/22 - Mechanical fall down the stairs predominately on the R side  Multiple pelvic fractures: Acute nondisplaced fracture of the right inferior pubic ramus and right pubic symphysis, anterior right acetabular fracture and probable nondisplaced left anterior acetabular fracture  Ortho consulted - no surgical intervention at this time  Monitor for s/s pelvic bleeding  Pain management  WBAT  DVT Prophylaxis - Lovenox  Follow-up with Dr Wilfred Erickson (Orthopedics) office in 6 weeks      PT/OT Therapies  Primary team following  VTE Pharmacologic Prophylaxis:   Pharmacologic: Enoxaparin (Lovenox)  Mechanical VTE Prophylaxis in Place: Yes - sequential compression devices  Current Length of Stay: 4 day(s)    Current Patient Status: Inpatient Rehab     Discharge Plan: As per primary team     Code Status: Level 1 - Full Code    Subjective:   Examined while sitting in WC in pt room  Complaints of B/L groin pain, 7/10, constant, improves slightly with pain medication and ice  States that she was working very hard during therapy today and did multiple steps  Slept well last night  LBM this morning with no issues  One episode of lightheadedness today while brushing her teeth, resolved within minutes on own  Advised to let us know if this continues  Objective:     Vitals:   Temp (24hrs), Av 9 °F (36 6 °C), Min:97 5 °F (36 4 °C), Max:98 1 °F (36 7 °C)    Temp:  [97 5 °F (36 4 °C)-98 1 °F (36 7 °C)] 98 1 °F (36 7 °C)  HR:  [60-70] 70  Resp:  [18] 18  BP: (139-152)/(61-74) 139/65  SpO2:  [95 %-100 %] 95 %  Body mass index is 28 71 kg/m²  Review of Systems   Constitutional: Negative for chills, fatigue and fever  Respiratory: Negative for cough and shortness of breath  Cardiovascular: Negative for chest pain, palpitations and leg swelling  Gastrointestinal: Negative for abdominal distention, abdominal pain, constipation, diarrhea, nausea and vomiting  LBM 3/3   Genitourinary: Negative for difficulty urinating  Musculoskeletal: Positive for arthralgias (B/L groin pain, constant, 7/10, improves with pain medication and ice)  Neurological: Positive for light-headedness (One episode of lightheadedness, resolved on own within minutes)  Negative for dizziness and numbness  Psychiatric/Behavioral: Negative for sleep disturbance  Input and Output Summary (last 24 hours):        Intake/Output Summary (Last 24 hours) at 3/3/2021 0959  Last data filed at 3/3/2021 0600  Gross per 24 hour   Intake 360 ml   Output 1300 ml   Net -940 ml       Physical Exam:     Physical Exam  Vitals signs reviewed  Constitutional:       Appearance: Normal appearance  HENT:      Head: Normocephalic and atraumatic  Cardiovascular:      Rate and Rhythm: Normal rate and regular rhythm  Pulses: Normal pulses  Heart sounds: Normal heart sounds  No murmur  No friction rub  Pulmonary:      Effort: Pulmonary effort is normal  No respiratory distress  Breath sounds: Normal breath sounds  No wheezing or rhonchi  Abdominal:      General: Abdomen is flat  Bowel sounds are normal  There is no distension  Palpations: Abdomen is soft  Tenderness: There is no abdominal tenderness  Musculoskeletal:      Right lower leg: No edema  Left lower leg: No edema  Skin:     General: Skin is warm and dry  Findings: Bruising (Small area of ecchymosis below R hip) present  Comments: Scabbed area on R shoulder - healing  Neurological:      Mental Status: She is alert and oriented to person, place, and time  Comments: Slurred speech, oral dyskinesia     Psychiatric:         Mood and Affect: Mood normal          Behavior: Behavior normal          Additional Data:     Labs:    Results from last 7 days   Lab Units 03/01/21  0518   WBC Thousand/uL 5 00   HEMOGLOBIN g/dL 10 6*   HEMATOCRIT % 32 9*   PLATELETS Thousands/uL 206   NEUTROS PCT % 82*   LYMPHS PCT % 6*   MONOS PCT % 9   EOS PCT % 2     Results from last 7 days   Lab Units 03/01/21  0518   SODIUM mmol/L 139   POTASSIUM mmol/L 3 7   CHLORIDE mmol/L 104   CO2 mmol/L 26   BUN mg/dL 16   CREATININE mg/dL 0 69   ANION GAP mmol/L 9   CALCIUM mg/dL 9 4   GLUCOSE RANDOM mg/dL 100*                       Labs reviewed    Imaging:    Imaging reviewed    Recent Cultures (last 7 days):           Last 24 Hours Medication List:   Current Facility-Administered Medications   Medication Dose Route Frequency Provider Last Rate    acetaminophen 650 mg Oral Q8H Prashant Stewart MD      ALPRAZolam  0 25 mg Oral TID PRN Verónica Weathers MD      amLODIPine  2 5 mg Oral Daily Verónica Weathers MD      atenolol  50 mg Oral BID Verónica Weathers MD      atorvastatin  10 mg Oral Daily Verónica Weathers MD      bisacodyl  10 mg Rectal Daily PRN Verónica Weathers MD      Diclofenac Sodium  2 g Topical 4x Daily Verónica Weathers MD      docusate sodium  100 mg Oral BID Verónica Weathers MD      enoxaparin  30 mg Subcutaneous Q12H [address] Verónica Weathers MD      FLUoxetine  20 mg Oral Daily Verónica Weathers MD      folic acid  1 mg Oral Daily Verónica Weathers MD      gabapentin  100 mg Oral HS Verónica Weathers MD      levothyroxine  88 mcg Oral Early Morning Verónica Weathers MD      lidocaine  2 patch Topical HS Verónica Weathers MD      methocarbamol  500 mg Oral Q6H PRN Verónica Weathers MD      methotrexate  20 mg Oral Weekly Verónica Weathers MD      nystatin  1 application Topical BID DARIO Azevedo      ondansetron  4 mg Oral Q6H PRN Verónica Weathers MD      oxyCODONE  2 5 mg Oral Q4H PRN Verónica Weathers MD      oxyCODONE  5 mg Oral Q4H PRN Verónica Weathers MD      polyethylene glycol  17 g Oral Daily Verónica Weathers MD      polyethylene glycol  17 g Oral Daily PRN Verónica Weathers MD      senna  1 tablet Oral BID Tian Britton MD          M*Modal software was used to dictate this note  It may contain errors with dictating incorrect words or incorrect spelling  Please contact the provider directly with any questions

## 2021-03-03 NOTE — PROGRESS NOTES
Physical Medicine and Rehabilitation Progress Note  Chuckie Shaikh Viscomi 78 y o  female MRN: [id number]  Unit/Bed#: [facility] 269-01 Encounter: [id number]      Chief Complaints:  Pelvic pain     Interval Events/Subjective:     Patient  Reports some slight increase in pelvic pain today but did more in therapies yesterday  She feels current pain regiment still adequate  Patient did have 1 firm stool and 1 smaller stool  Patient states she does not want to take MiraLax good she does not like its texture  She feels she is stooling adequately  Patient denies fever, chills, sweats, shortness of breath  She reports some chronic occasional lightheadedness but not so far today  She denies other new complaints  ROS: A 10 point ROS was performed; negative except as noted above  Assessment & Plan:     * Multiple closed fractures of pelvis ([facility] [address] )  Assessment & Plan  · Traumatic fall on stairs:  · Resultant right inferior pubic rami fracture, right pubic symphysis fracture, right anterior acetabular fracture, left anterior acetabular fracture  · Evaluated by Orthopedics - treat conservatively  · WBAT BLE  · Continue DVT ppx - lovenox   · Follow-up with Trauma/Orthopedics as outpatient   · Pain slightly increased today - continue APAP TID; Gabapentin 100mg HS, PRN oxy 2 5-5mg Q4H PRN      Function continues to improve, Continue plan as previously outlined    Recommend acute comprehensive interdisciplinary inpatient rehabilitation to include intensive skilled therapies (PT, OT) as outlined with oversight and management by rehabilitation physician as well as inpatient rehab level nursing, case management and weekly interdisciplinary team meetings         Gastroparesis  Assessment & Plan  - Stooling today 3/3   - Common in Sjogrens - now off chronic Reglan with oral dyskinesia but need to closely follow to ensure adequate GI function  IM consulted and with overall management at their discretion during ARC course  Was on Reglan chronically but stopped in the acute setting due to concerns for extrapyramidal symptoms - she does have fair amount of oral dyskinesias which she feels is related to Sjogrens but may in part be related to this   - Monitor for s/s of obstruction > not currently   - Monitor closely for intake; constipation as she remains at risk  - Colace, senna  - Refuses miralax as she finding the texture difficulty and odd to swallow  - PRN Lactulose, dulcolax supp  -  patient on importance for stooling adequately     Potential for cognitive impairment  Assessment & Plan  Therapy noting some concern with memory and comprehension  - Pt states she is at her baseline; she is oriented x4 able to follow simple commands; pt on occasional oxycodone PRN for pain but does not appear overly sedated  - Obtain MOCA in next few days  - Compensatory strategies    Oral dyskinesia  Assessment & Plan  Stable again  Patient feels this is related to Sjogrens but movements are more consistent with EPS and likely TD in context of long-standing metoclopramide   - Comgmt with IM  - Metoclopramide discontinued prior to [facility] which is first line mgmt (holding DA blockers)   - This may be difficult to improve at this point  - Monitor during ARC course  - Follow-up with PCP - consider pharmacologic tx such at tetrabenazine if needed in future     Abnormal findings on imaging test  Assessment & Plan  · Seen on CT Abd/Pelvis   · ABDOMINOPELVIC CAVITY:  No ascites  No pneumoperitoneum  No lymphadenopathy  Calcified right retroperitoneal node measuring 21 mm  # 2/72  · LUNGS:  Mild hypoventilatory changes  14 mm left lower lobe subpleural nodule versus focal consolidation #4/54  Similar more inferior 8mm and 14 mm opacities # 4/82 and #4/95    · LIVER/BILIARY TREE:  One or more subcentimeter sharply circumscribed low-density hepatic lesion(s) are noted, too small to accurately characterize, but statistically most likely to represent subcentimeter hepatic cysts  No suspicious solid hepatic lesion is identified  Hepatic contours are normal   No biliary dilatation  · Patient will need follow-up with PCP    Hypothyroidism  Assessment & Plan  · Managed on Synthroid per IM     Depression  Assessment & Plan  · Mood stable   · Managed on Prozac (home dose) and Xanax low dose for anxiety  Checked the [place]MP - patient was not on Xanax prior  · Follow-up with PCP as outpatient     Hypertension  Assessment & Plan  · Managed on Norvasc, Atenolol  · Consulted internal medicine     Sjogren's disease ([facility] [address] )  Assessment & Plan  · Managed on weekly Methotrexate  · Follow-up with Dr Pattie Crouch    Parotid gland enlargement  Assessment & Plan  · Follows with Dr Devora Nunez       Other Medical Issues:       Disposition    home with estimated length of stay of 2 weeks from admission    Follow-up providers and other issues to be followed up after discharge    PCP    rheumatology    orthopedics    possibly GI    CODE: Level 1: Full Code    Restrictions include:   Fall precautions    Objective:    Functional Update:    New scores pending    Allergies per EMR    Physical Exam:  Temp:  [97 5 °F (36 4 °C)-98 1 °F (36 7 °C)] 98 1 °F (36 7 °C)  HR:  [60-70] 70  Resp:  [18] 18  BP: (139-152)/(61-74) 139/65  SpO2:  [95 %-100 %] 95 %    GEN:  Sitting in NAD   HEENT/NECK: Moist mucous membranes, stable oral dyskinesia  CARDIAC: Regular rate rhythm, no murmers, no rubs, no gallops  LUNGS:  clear to auscultation, no wheezes, rales, or rhonchi  ABDOMEN: Soft, Nontender, nondistended, normoactive bowel soundsAgain today  EXTREMITIES/SKIN:  no calf edema, no calf tenderness to palpation  NEURO:   MENTAL STATUS:  Appropriate wakefulness and interaction   PSYCH:  Affect:  Euthymic     Diagnostic Studies: Reviewed, no new imaging  No orders to display       Laboratory: Labs reviewed  Results from last 7 days   Lab Units 03/01/21  0518   HEMOGLOBIN g/dL 10 6*   HEMATOCRIT % 32 9*   WBC Thousand/uL 5 00 Results from last 7 days   Lab Units 03/01/21  0518   BUN mg/dL 16   SODIUM mmol/L 139   POTASSIUM mmol/L 3 7   CHLORIDE mmol/L 104   CREATININE mg/dL 0 69            Drug regimen reviewed, all potential adverse effects identified and addressed:    Scheduled Meds:  Current Facility-Administered Medications   Medication Dose Route Frequency Provider Last Rate    acetaminophen  650 mg Oral Q8H Prashant Stewart MD      ALPRAZolam  0 25 mg Oral TID PRN Brandie Thompson, MD      amLODIPine  2 5 mg Oral Daily Brandie Thompson, MD      atenolol  50 mg Oral BID Pepper Cart, MD      atorvastatin  10 mg Oral Daily Brandie Cart, MD      bisacodyl  10 mg Rectal Daily PRN Brandie Cart, MD      Diclofenac Sodium  2 g Topical 4x Daily Brandie Cart, MD      docusate sodium  100 mg Oral BID Brandie Cart, MD      enoxaparin  30 mg Subcutaneous Q12H [address] Brandie Thompson, MD      FLUoxetine  20 mg Oral Daily Brandie Cart, MD      folic acid  1 mg Oral Daily Brandie Cart, MD      gabapentin  100 mg Oral HS Brandie Thompson, MD      lactulose  20 g Oral BID PRN Mark Howard MD      levothyroxine  88 mcg Oral Early Morning Pepper Cart, MD      lidocaine  2 patch Topical HS Brandie Thompson, MD      methocarbamol  500 mg Oral Q6H PRN Pepper Cart, MD      methotrexate  20 mg Oral Weekly Brandie Cart, MD      nystatin  1 application Topical BID DARIO Menchaca      ondansetron  4 mg Oral Q6H PRN Brandie Cart, MD      oxyCODONE  2 5 mg Oral Q4H PRN Brandie Cart, MD      oxyCODONE  5 mg Oral Q4H PRN Pepper Cart, MD      senna  1 tablet Oral BID Mark Howard MD         ** Please Note: Fluency Direct voice to text software may have been used in the creation of this document   **

## 2021-03-03 NOTE — PLAN OF CARE
Problem: SAFETY ADULT  Goal: Patient will remain free of falls  Description: INTERVENTIONS:  - Assess patient frequently for physical needs  -  Identify cognitive and physical deficits and behaviors that affect risk of falls    -  [place] fall precautions as indicated by assessment   - Educate patient/family on patient safety including physical limitations  - Instruct patient to call for assistance with activity based on assessment  - Modify environment to reduce risk of injury  - Consider OT/PT consult to assist with strengthening/mobility  Outcome: Progressing     Problem: INFECTION - ADULT  Goal: Absence of fever/infection during neutropenic period  Description: INTERVENTIONS:  - Monitor WBC    Outcome: Completed

## 2021-03-03 NOTE — PROGRESS NOTES
03/03/21 1340   Pain Assessment   Pain Assessment Tool 0-10   Pain Score 8   Pain Location/Orientation Orientation: Right;Location: Hip   Pain Radiating Towards buttocks and groin   Pain Onset/Description Onset: Ongoing   Effect of Pain on Daily Activities limited reps performed during seated TE and ability to perform transfers and amb, pt denied CP post session   Restrictions/Precautions   Precautions Fall Risk;Cognitive;Pain   Weight Bearing Restrictions Yes   RLE Weight Bearing Per Order WBAT   LLE Weight Bearing Per Order WBAT   ROM Restrictions No   Cognition   Overall Cognitive Status Impaired   Arousal/Participation Alert; Cooperative   Attention Attends with cues to redirect   Orientation Level Oriented X4   Memory Decreased short term memory   Following Commands Follows one step commands with increased time or repetition   Subjective   Subjective "I'm getting tired and I feel stiff "   Sit to Stand   Type of Assistance Needed Physical assistance   Amount of Physical Assistance Provided Less than 25%   Comment at times needing minAx1 to complete transfers due to c/o pain and stiffness, othertimes CGA   Sit to Stand CARE Score 3   Bed-Chair Transfer   Type of Assistance Needed Physical assistance   Amount of Physical Assistance Provided Less than 25%   Comment CG/minAx1 SPT with RW   Chair/Bed-to-Chair Transfer CARE Score 3   Walk 10 Feet   Type of Assistance Needed Physical assistance   Amount of Physical Assistance Provided Less than 25%   Comment Practiced 15' amb distances x2 to simulate household distances, increase in antalgic gait pattern noted compared to a m  session, minAx1 at times due to near R knee buckle   Walk 10 Feet CARE Score 3   Walk 50 Feet with Two Turns   Reason if not Attempted Safety concerns   Walk 50 Feet with Two Turns CARE Score 88   Walk 150 Feet   Reason if not Attempted Safety concerns   Walk 150 Feet CARE Score 88   Ambulation   Does the patient walk? 2   Yes   Toilet Transfer Type of Assistance Needed Physical assistance   Amount of Physical Assistance Provided Less than 25%   Comment CG/minAx1 due to fatigue, performed at end of session with RW   Toilet Transfer CARE Score 3   Therapeutic Interventions   Strengthening seated TE, no added weight performed b/l: x30 APs, 3x5 of hip flex, LAQs, hip abd (YTB), hip add performed as well as 3x10 of hamstring curls with YTB  Performed to improve pt's ease with completing transfes and amb  Flexibility seated b/l manual hamstring/gastroc stretch beginning of session x5 mins total to alleviate c/o "stiffness"   Assessment   Treatment Assessment Session focusing on short distance amb to try and get pt more consistent with amb distances as well as transfer trng, LE strengthening and LE ROM  Pt cont to be limited in tolerance to session due to pain despite recently being given pain meds prior to session  She reported 8/10 pain throughout with all mobilities and at rest, therefore elected to perform only short distance amb  At times R knee does appear as though it's going to buckle but when pt given target to amb to she is able to complete (CF by PT performed for safety)  Seated TE not tolerated well due to pain, therefore only 5 reps of each set performed  Did offer CP to pt post session, pt denied wanting for pain relief  At this time will focus on stair trng as it cont to be one of biggest barriers to d/c as well as getting pt to be consistent with amb household distances  Also plan to follow POC outlined in a m  note  Progress limited due to pain this session  Plan   Treatment/Interventions ADL retraining;Functional transfer training;LE strengthening/ROM; Therapeutic exercise;Gait training   Progress Slow progress, decreased activity tolerance  (pain)   Recommendation   PT - OK to Discharge No   PT Therapy Minutes   PT Time In 1340   PT Time Out 1445   PT Total Time (minutes) 65   PT Mode of treatment - Individual (minutes) 65   PT Mode of treatment - Concurrent (minutes) 0   PT Mode of treatment - Group (minutes) 0   PT Mode of treatment - Co-treat (minutes) 0   PT Mode of Treatment - Total time(minutes) 65 minutes   PT Cumulative Minutes 365   Therapy Time missed   Time missed?  No

## 2021-03-03 NOTE — PROGRESS NOTES
03/03/21 1100   Pain Assessment   Pain Assessment Tool 0-10   Pain Score 8   Pain Location/Orientation Orientation: Right;Orientation: Left; Location: Buttocks   Pain Radiating Towards R hip/groin   Effect of Pain on Daily Activities with increased mobility gait pattern improves however pt reports feeling "stiff"  Limits tolerance to performing steps this session   Hospital Pain Intervention(s) Cold applied;Repositioned   Restrictions/Precautions   Precautions Fall Risk;Cognitive   Weight Bearing Restrictions Yes   RLE Weight Bearing Per Order WBAT   LLE Weight Bearing Per Order WBAT   ROM Restrictions No   Cognition   Overall Cognitive Status Impaired   Arousal/Participation Alert; Cooperative   Attention Attends with cues to redirect   Orientation Level Oriented X4   Memory Decreased short term memory   Following Commands Follows one step commands with increased time or repetition   Subjective   Subjective "I've got more pain today "   Sit to Stand   Type of Assistance Needed Incidental touching   Amount of Physical Assistance Provided No physical assistance   Comment CGA with RW, max cues for hand placement   Sit to Stand CARE Score 4   Bed-Chair Transfer   Type of Assistance Needed Physical assistance   Amount of Physical Assistance Provided Less than 25%   Comment mainly CGA with RW but could be minAx1 at times, needs cues to turn fully before sitting   Chair/Bed-to-Chair Transfer CARE Score 3   Car Transfer   Reason if not Attempted Environmental limitations   Car Transfer CARE Score 10   Walk 10 Feet   Type of Assistance Needed Incidental touching   Amount of Physical Assistance Provided No physical assistance   Comment CGA with RW   Walk 10 Feet CARE Score 4   Walk 50 Feet with Two Turns   Type of Assistance Needed Incidental touching   Amount of Physical Assistance Provided No physical assistance   Comment CGA with RW   Walk 50 Feet with Two Turns CARE Score 4   Walk 150 Feet   Comment fatigued at 125' needing seated rest break   Reason if not Attempted Safety concerns   Walk 150 Feet CARE Score 88   Walking 10 Feet on Uneven Surfaces   Type of Assistance Needed Incidental touching   Amount of Physical Assistance Provided No physical assistance   Comment CGA on inside ramp with RW   Walking 10 Feet on Uneven Surfaces CARE Score 4   Ambulation   Does the patient walk? 2  Yes   Primary Mode of Locomotion Prior to Admission Walk   Distance Walked (feet) 125 ft  (x1x, 30'x1)   Assist Device Roller Walker   Gait Pattern Antalgic; Inconsistant [place]; Slow [place];Decreased foot clearance; Forward Flexion;Narrow KAREEN;Step to; Step through; Decreased R stance; Improper weight shift   Limitations Noted In Balance; Endurance; Heel Strike; Safety;Speed;Strength;Swing   Provided Assistance with: Balance   Walk Assist Level Contact Guard   Findings CGA for amb which at first is very slow, gait speed improvements once pt loosens up  Needs cues to look up and for posture  Increased time to complete amb due to slow gait speed  Took standing rest breaks as needed to tolerate amb  At end of session only able to tolerate 30' of amb before requesting to sit   Wheel 50 Feet with Two Turns   Reason if not Attempted Activity not applicable   Wheel 50 Feet with Two Turns CARE Score 9   Wheel 150 Feet   Reason if not Attempted Activity not applicable   Wheel 348 Feet CARE Score 9   Wheelchair mobility   Does the patient use a wheelchair? 0  No   Curb or Single Stair   Style negotiated Single stair   Type of Assistance Needed Incidental touching   Amount of Physical Assistance Provided No physical assistance   Comment CGA on  steps with b/l HRs, pt only able to complete 1 step this session due to pain   1 Step (Curb) CARE Score 4   4 Steps   Reason if not Attempted Safety concerns   4 Steps CARE Score 88   12 Steps   Reason if not Attempted Safety concerns   12 Steps CARE Score 88   Stairs   Type Stairs; Ramp   # of Steps 1   Weight Bearing Precautions WBAT;RLE;LLE;Fall Risk   Assist Devices Bilateral Rail   Findings only able to complete 1 step this session due to increase in pain, retro descent   Assessment   Treatment Assessment Pt participated in skilled PT session focusing on gait trng with RW as well as stair trng  Pt able to perform 1 long distance amb this session (125') with standing rest breaks as needed, however, due to pain, limited ability to complete any further amb trials as well as stairs  Did trial  steps with b/l HRs, only able to complete 1 step despite encouragement to cont with more steps  Pt reports she hadn't received pain meds since 8 a m  this a m , per RN Ольга, pt not due for more pain meds therefore provided pt with CP to b/l buttocks where pt c/o pain post session  Cont with POC focusing on gait trng, stair trng, transfer trng, bed mobility, LE strengthening and ROM, balance trng and improving pt's endurance to decrease her risk of falls and caregiver burden  Family/Caregiver Present no   Problem List Decreased strength;Decreased range of motion;Decreased endurance; Impaired balance;Decreased mobility;Pain;Decreased cognition;Decreased safety awareness   Barriers to Discharge Decreased caregiver support; Inaccessible home environment   PT Barriers   Functional Limitation Car transfers; Ramp negotiation;Stair negotiation;Standing;Transfers; Walking   Plan   Treatment/Interventions Functional transfer training; Endurance training;Gait training   Progress Slow progress, decreased activity tolerance   Recommendation   PT Discharge Recommendation Home with skilled therapy  (home PT likely)   Equipment Recommended Walker  (possible transport chair)   PT - OK to Discharge No   PT Therapy Minutes   PT Time In 1100   PT Time Out 1130   PT Total Time (minutes) 30   PT Mode of treatment - Individual (minutes) 30   PT Mode of treatment - Concurrent (minutes) 0   PT Mode of treatment - Group (minutes) 0   PT Mode of treatment - Co-treat (minutes) 0   PT Mode of Treatment - Total time(minutes) 30 minutes   PT Cumulative Minutes 300   Therapy Time missed   Time missed?  No

## 2021-03-03 NOTE — PCC OCCUPATIONAL THERAPY
Pt is making slow gains towards OT goals  ADL Status: S UB dressing, Ebony LB dressing, Ebony bathing (SB), Ebony toilet transfer, Ebony CM/Hyg  Transfer's: CG/Ebony SPT with RW  D/C Plan: Pt lives with  and son in bilevel home with 1+7 stairs to main level with family room, kitchen and no GA  +7 steps up to bedroom   and full bathroom with tub/shower  Reports  typically works 10a-200p and son's work schedule varies  Pt barriers include decreased activity tolerance, decreased UB strength, decreased standing tolerance, decreased functional standing balance, pain  Anticipated plan to Re-team with OT plan to address above noted barriers and schedule family training

## 2021-03-03 NOTE — ASSESSMENT & PLAN NOTE
2/22 - Mechanical fall down the stairs predominately on the R side  Multiple pelvic fractures: Acute nondisplaced fracture of the right inferior pubic ramus and right pubic symphysis, anterior right acetabular fracture and probable nondisplaced left anterior acetabular fracture  Ortho consulted - no surgical intervention at this time  Monitor for s/s pelvic bleeding  Pain management  WBAT  DVT Prophylaxis - Lovenox  Follow-up with Dr Osborn (Orthopedics) office in 6 weeks  PT/OT Therapies  Primary team following

## 2021-03-03 NOTE — PCC NURSING
Evon Morejon is a 78 y o  female with Sjogren's disease, depression, hypothyroidism, HTN who presented to the [address] on 2/22/21 with fall down stairs  Work-up revealed right inferior pubic rami fracture, right pubic symphysis fracture, right anterior acetabular fracture, left anterior acetabular fracture  Patient also with right shoulder abrasion and brusing of right elbow  Patient evaluated by Dr Yen Lund (orthopedics) and treated non-operatively  Deemed WBAT BLE  Patientn started on Lovenox for DVT ppx  Medically, transiently required oxygen and had leukocytosis, both which improved  Patient also treated for acute pain  After medical stabilization, patient found to have acute functional deficits from her baseline  Hypothyroidism is managed by synthroid  Depression is managed by Managed on Prozac (home dose) and Xanax low dose for anxiety  Hypertension is managed by Managed on Norvasc, Atenolol  Sjogren's Disease is Managed on weekly Methotrexate  Multiple closed fractures of pelvis Deemed WBAT BLE  Pain is managed by APAP TID; Gabapentin 100mg HS, PRN oxy 2 5-5mg Q4H PRN  Lovenox for DVT ppx  This week we will monitor vital signs and lab values  We will educate patient on the importance of offloading pressure to prevent skin breakdown  Pt will have adequate pain relief to fully participate in therapies  We will keep patient safe from falls by continuing with hourly rounding and making sure call bell is within reach  We will promote independence with ADLs

## 2021-03-03 NOTE — PROGRESS NOTES
03/03/21 0830   Pain Assessment   Pain Assessment Tool 0-10   Pain Score 8   Pain Location/Orientation Orientation: Right;Location: Groin   Pain Radiating Towards RLE   Pain Onset/Description Onset: Ongoing   Effect of Pain on Daily Activities Tolerated   Patient's Stated Pain Goal No pain   Hospital Pain Intervention(s) Shower/Bath;Repositioned   Multiple Pain Sites No   Restrictions/Precautions   Precautions Fall Risk;Cognitive;Pain   Weight Bearing Restrictions Yes   RLE Weight Bearing Per Order WBAT   LLE Weight Bearing Per Order WBAT   ROM Restrictions No   Oral Hygiene   Type of Assistance Needed Incidental touching   Amount of Physical Assistance Provided No physical assistance   Comment Pt in stance at sink with CG to perform oral hyg routine   Oral Hygiene CARE Score 4   Shower/Bathe Self   Type of Assistance Needed Physical assistance   Amount of Physical Assistance Provided 25%-49%   Comment Pt performed SB routine seated at sink to bathe 8/10 parts  Encouraged to trial dynamic reach to bathe BLE's to ankle  Required assist to bathe B feet  Edu provided on benefit of utilizing LH sponge to increase I w/ LB self care  In stance at sink to bathe lorena/buttock with no LOB or retropulsion  Pt reports performing SB routine at home and only occassionally completing showers  Shower/Bathe Self CARE Score 3   Tub/Shower Transfer   Reason Not Assessed Sponge Bath;Patient refusal   Upper Body Dressing   Type of Assistance Needed Supervision   Amount of Physical Assistance Provided No physical assistance   Comment Seated to don [place] shirt   Upper Body Dressing CARE Score 4   Lower Body Dressing   Type of Assistance Needed Physical assistance   Amount of Physical Assistance Provided Less than 25%   Comment Pt seated to perform dynamic reach to thread BLE's into loose fitting pants  In stance at sink to perform CM up req A for steadying  Pt did not need to don new pull up 2* to Nursing completing prior to ADL  Lower Body Dressing CARE Score 3   Putting On/Taking Off Footwear   Type of Assistance Needed Physical assistance; Adaptive equipment   Amount of Physical Assistance Provided Less than 25%   Comment Seated demo G carryover of LHAE technique, utilizing sock aide to don slipper socks on B feet  Assist to fully manuever up around ankles   Putting On/Taking Off Footwear CARE Score 3   Sit to Stand   Type of Assistance Needed Incidental touching; Adaptive equipment   Amount of Physical Assistance Provided No physical assistance   Comment CG with RW   Sit to Stand CARE Score 4   Bed-Chair Transfer   Type of Assistance Needed Physical assistance; Adaptive equipment;Verbal cues   Amount of Physical Assistance Provided Less than 25%   Comment CG/Ebony SPT with RW and cues for safe positioning on RW   Chair/Bed-to-Chair Transfer CARE Score 3   Toileting Hygiene   Type of Assistance Needed Physical assistance; Adaptive equipment   Amount of Physical Assistance Provided Less than 25%   Comment Pt in stance at RW to perform CM down/up and lorena hyg  Assist required for steadying  No LOB or retropulsion  Toileting Hygiene CARE Score 3   Toilet Transfer   Type of Assistance Needed Physical assistance; Adaptive equipment   Amount of Physical Assistance Provided Less than 25%   Comment CG/Ebony SPT with RW and use of over the toilet commode   Toilet Transfer CARE Score 3   Cognition   Overall Cognitive Status Impaired   Arousal/Participation Alert; Cooperative   Attention Attends with cues to redirect   Orientation Level Oriented X4   Memory Decreased short term memory   Following Commands Follows one step commands with increased time or repetition   Activity Tolerance   Activity Tolerance Patient tolerated treatment well   Assessment   Treatment Assessment Pt participated in 90 min skilled OT Tx session focusing on ADL Retraining and ADL transfer's  See above for further Tx details   Upon entering pt's room, pt requesting to complete SB routine, stating she typically performs SB in stance at home  Pt performed SB routine seated at sink with Ebony  Edu provided on benefit of utilizing LH sponge to increase I w/ LB bathing  S for UB dressing, CG/Ebony for LB dressing, req A for steadying while in stance at sink for CM task  Pt demo G carryover with use of sockaide to don B/L slipper socks  Pt dmeo improvement with ADL transfer's, performing at CG/Ebony with RW, no LOB or retropulsion this session  Vitals checked following ADL performance, /65, HR 68 BPM, O2 96%  No reports of dizziness throughout Tx sesison  Plan to engage pt in functional balance activities, activity tolerance, and simple meal prep and kitchen mobility in upcoming Tx session  Continue POC  Prognosis Good   Problem List Decreased strength;Decreased range of motion;Decreased endurance; Impaired balance;Decreased mobility;Pain   Barriers to Discharge Decreased caregiver support   Plan   Treatment/Interventions ADL retraining;Functional transfer training; Therapeutic exercise; Endurance training;Patient/family training   Progress Progressing toward goals   Recommendation   OT Discharge Recommendation   (Pending pt progress)   Equipment Recommended   (pending progress)   OT Therapy Minutes   OT Time In 0830   OT Time Out 1000   OT Total Time (minutes) 90   OT Mode of treatment - Individual (minutes) 90   OT Mode of treatment - Concurrent (minutes) 0   OT Mode of treatment - Group (minutes) 0   OT Mode of treatment - Co-treat (minutes) 0   OT Mode of Treatment - Total time(minutes) 90 minutes   OT Cumulative Minutes 360   Therapy Time missed   Time missed?  No

## 2021-03-04 ENCOUNTER — HOSPITAL ENCOUNTER (INPATIENT)
Facility: HOSPITAL | Age: 66
LOS: 3 days | DRG: 313 | End: 2021-03-09
Attending: INTERNAL MEDICINE | Admitting: INTERNAL MEDICINE
Payer: COMMERCIAL

## 2021-03-04 ENCOUNTER — APPOINTMENT (INPATIENT)
Dept: RADIOLOGY | Facility: HOSPITAL | Age: 66
DRG: 561 | End: 2021-03-04
Payer: COMMERCIAL

## 2021-03-04 ENCOUNTER — APPOINTMENT (INPATIENT)
Dept: CT IMAGING | Facility: HOSPITAL | Age: 66
DRG: 561 | End: 2021-03-04
Payer: COMMERCIAL

## 2021-03-04 VITALS
RESPIRATION RATE: 18 BRPM | WEIGHT: 137.35 LBS | BODY MASS INDEX: 28.83 KG/M2 | HEART RATE: 67 BPM | HEIGHT: 58 IN | OXYGEN SATURATION: 97 % | DIASTOLIC BLOOD PRESSURE: 70 MMHG | TEMPERATURE: 98 F | SYSTOLIC BLOOD PRESSURE: 158 MMHG

## 2021-03-04 DIAGNOSIS — R94.31 EKG ABNORMALITIES: Primary | ICD-10-CM

## 2021-03-04 PROBLEM — R07.9 CHEST PAIN: Status: ACTIVE | Noted: 2021-03-04

## 2021-03-04 PROBLEM — R06.00 DYSPNEA: Status: ACTIVE | Noted: 2021-03-04

## 2021-03-04 PROBLEM — I10 HYPERTENSION: Chronic | Status: ACTIVE | Noted: 2021-02-27

## 2021-03-04 PROBLEM — E87.6 HYPOKALEMIA: Status: ACTIVE | Noted: 2021-03-04

## 2021-03-04 PROBLEM — J90 PLEURAL EFFUSION: Status: ACTIVE | Noted: 2021-03-04

## 2021-03-04 PROBLEM — J98.11 ATELECTASIS OF RIGHT LUNG: Status: ACTIVE | Noted: 2021-03-04

## 2021-03-04 PROBLEM — D64.9 ANEMIA: Status: ACTIVE | Noted: 2021-03-04

## 2021-03-04 PROBLEM — R42 LIGHTHEADEDNESS: Status: ACTIVE | Noted: 2021-03-04

## 2021-03-04 PROBLEM — R52 PAIN: Status: ACTIVE | Noted: 2021-03-04

## 2021-03-04 PROBLEM — R10.11 RUQ PAIN: Status: ACTIVE | Noted: 2021-03-04

## 2021-03-04 PROBLEM — E03.9 HYPOTHYROIDISM: Chronic | Status: ACTIVE | Noted: 2021-02-28

## 2021-03-04 PROBLEM — M35.00 SJOGREN'S DISEASE (HCC): Chronic | Status: ACTIVE | Noted: 2019-08-29

## 2021-03-04 LAB
ALBUMIN SERPL BCP-MCNC: 4 G/DL (ref 3–5.2)
ALP SERPL-CCNC: 172 U/L (ref 43–122)
ALT SERPL W P-5'-P-CCNC: 32 U/L (ref 9–52)
ANION GAP SERPL CALCULATED.3IONS-SCNC: 8 MMOL/L (ref 5–14)
ANION GAP SERPL CALCULATED.3IONS-SCNC: 9 MMOL/L (ref 5–14)
AST SERPL W P-5'-P-CCNC: 69 U/L (ref 14–36)
BASOPHILS # BLD AUTO: 0 THOUSANDS/ΜL (ref 0–0.1)
BASOPHILS # BLD AUTO: 0 THOUSANDS/ΜL (ref 0–0.1)
BASOPHILS NFR BLD AUTO: 0 % (ref 0–1)
BASOPHILS NFR BLD AUTO: 0 % (ref 0–1)
BILIRUB SERPL-MCNC: 0.8 MG/DL
BUN SERPL-MCNC: 13 MG/DL (ref 5–25)
BUN SERPL-MCNC: 13 MG/DL (ref 5–25)
CALCIUM SERPL-MCNC: 8.9 MG/DL (ref 8.4–10.2)
CALCIUM SERPL-MCNC: 9.2 MG/DL (ref 8.4–10.2)
CHLORIDE SERPL-SCNC: 101 MMOL/L (ref 97–108)
CHLORIDE SERPL-SCNC: 98 MMOL/L (ref 97–108)
CO2 SERPL-SCNC: 25 MMOL/L (ref 22–30)
CO2 SERPL-SCNC: 27 MMOL/L (ref 22–30)
CREAT SERPL-MCNC: 0.57 MG/DL (ref 0.6–1.2)
CREAT SERPL-MCNC: 0.6 MG/DL (ref 0.6–1.2)
EOSINOPHIL # BLD AUTO: 0 THOUSAND/ΜL (ref 0–0.4)
EOSINOPHIL # BLD AUTO: 0.1 THOUSAND/ΜL (ref 0–0.4)
EOSINOPHIL NFR BLD AUTO: 0 % (ref 0–6)
EOSINOPHIL NFR BLD AUTO: 1 % (ref 0–6)
ERYTHROCYTE [DISTWIDTH] IN BLOOD BY AUTOMATED COUNT: 20.8 %
ERYTHROCYTE [DISTWIDTH] IN BLOOD BY AUTOMATED COUNT: 21 %
GFR SERPL CREATININE-BSD FRML MDRD: 95 ML/MIN/1.73SQ M
GFR SERPL CREATININE-BSD FRML MDRD: 96 ML/MIN/1.73SQ M
GLUCOSE P FAST SERPL-MCNC: 101 MG/DL (ref 70–99)
GLUCOSE SERPL-MCNC: 101 MG/DL (ref 70–99)
GLUCOSE SERPL-MCNC: 107 MG/DL (ref 70–99)
HCT VFR BLD AUTO: 29.6 % (ref 36–46)
HCT VFR BLD AUTO: 29.8 % (ref 36–46)
HGB BLD-MCNC: 9.6 G/DL (ref 12–16)
HGB BLD-MCNC: 9.7 G/DL (ref 12–16)
LYMPHOCYTES # BLD AUTO: 0.4 THOUSANDS/ΜL (ref 0.5–4)
LYMPHOCYTES # BLD AUTO: 0.5 THOUSANDS/ΜL (ref 0.5–4)
LYMPHOCYTES NFR BLD AUTO: 5 % (ref 25–45)
LYMPHOCYTES NFR BLD AUTO: 6 % (ref 25–45)
MCH RBC QN AUTO: 27.2 PG (ref 26–34)
MCH RBC QN AUTO: 27.4 PG (ref 26–34)
MCHC RBC AUTO-ENTMCNC: 32.3 G/DL (ref 31–36)
MCHC RBC AUTO-ENTMCNC: 32.7 G/DL (ref 31–36)
MCV RBC AUTO: 84 FL (ref 80–100)
MCV RBC AUTO: 84 FL (ref 80–100)
MONOCYTES # BLD AUTO: 0.5 THOUSAND/ΜL (ref 0.2–0.9)
MONOCYTES # BLD AUTO: 0.5 THOUSAND/ΜL (ref 0.2–0.9)
MONOCYTES NFR BLD AUTO: 5 % (ref 1–10)
MONOCYTES NFR BLD AUTO: 8 % (ref 1–10)
NEUTROPHILS # BLD AUTO: 5.4 THOUSANDS/ΜL (ref 1.8–7.8)
NEUTROPHILS # BLD AUTO: 9.1 THOUSANDS/ΜL (ref 1.8–7.8)
NEUTS SEG NFR BLD AUTO: 85 % (ref 45–65)
NEUTS SEG NFR BLD AUTO: 90 % (ref 45–65)
PLATELET # BLD AUTO: 227 THOUSANDS/UL (ref 150–450)
PLATELET # BLD AUTO: 244 THOUSANDS/UL (ref 150–450)
PMV BLD AUTO: 7.6 FL (ref 8.9–12.7)
PMV BLD AUTO: 8.1 FL (ref 8.9–12.7)
POTASSIUM SERPL-SCNC: 3.2 MMOL/L (ref 3.6–5)
POTASSIUM SERPL-SCNC: 3.5 MMOL/L (ref 3.6–5)
PROT SERPL-MCNC: 8.2 G/DL (ref 5.9–8.4)
RBC # BLD AUTO: 3.53 MILLION/UL (ref 4–5.2)
RBC # BLD AUTO: 3.54 MILLION/UL (ref 4–5.2)
SODIUM SERPL-SCNC: 132 MMOL/L (ref 137–147)
SODIUM SERPL-SCNC: 136 MMOL/L (ref 137–147)
TROPONIN I SERPL-MCNC: <0.01 NG/ML (ref 0–0.03)
TROPONIN I SERPL-MCNC: <0.01 NG/ML (ref 0–0.03)
WBC # BLD AUTO: 10.1 THOUSAND/UL (ref 4.5–11)
WBC # BLD AUTO: 6.4 THOUSAND/UL (ref 4.5–11)

## 2021-03-04 PROCEDURE — 97535 SELF CARE MNGMENT TRAINING: CPT

## 2021-03-04 PROCEDURE — 85025 COMPLETE CBC W/AUTO DIFF WBC: CPT | Performed by: NURSE PRACTITIONER

## 2021-03-04 PROCEDURE — G0379 DIRECT REFER HOSPITAL OBSERV: HCPCS

## 2021-03-04 PROCEDURE — NC001 PR NO CHARGE

## 2021-03-04 PROCEDURE — 80048 BASIC METABOLIC PNL TOTAL CA: CPT | Performed by: NURSE PRACTITIONER

## 2021-03-04 PROCEDURE — 99232 SBSQ HOSP IP/OBS MODERATE 35: CPT | Performed by: INTERNAL MEDICINE

## 2021-03-04 PROCEDURE — 99220 PR INITIAL OBSERVATION CARE/DAY 70 MINUTES: CPT | Performed by: PHYSICIAN ASSISTANT

## 2021-03-04 PROCEDURE — 93005 ELECTROCARDIOGRAM TRACING: CPT

## 2021-03-04 PROCEDURE — 74177 CT ABD & PELVIS W/CONTRAST: CPT

## 2021-03-04 PROCEDURE — 84484 ASSAY OF TROPONIN QUANT: CPT | Performed by: PHYSICIAN ASSISTANT

## 2021-03-04 PROCEDURE — 97116 GAIT TRAINING THERAPY: CPT

## 2021-03-04 PROCEDURE — 99239 HOSP IP/OBS DSCHRG MGMT >30: CPT

## 2021-03-04 PROCEDURE — G1004 CDSM NDSC: HCPCS

## 2021-03-04 PROCEDURE — 84484 ASSAY OF TROPONIN QUANT: CPT | Performed by: NURSE PRACTITIONER

## 2021-03-04 PROCEDURE — 97530 THERAPEUTIC ACTIVITIES: CPT

## 2021-03-04 PROCEDURE — 71275 CT ANGIOGRAPHY CHEST: CPT

## 2021-03-04 PROCEDURE — 97110 THERAPEUTIC EXERCISES: CPT

## 2021-03-04 PROCEDURE — 71045 X-RAY EXAM CHEST 1 VIEW: CPT

## 2021-03-04 PROCEDURE — 73502 X-RAY EXAM HIP UNI 2-3 VIEWS: CPT

## 2021-03-04 PROCEDURE — 80053 COMPREHEN METABOLIC PANEL: CPT | Performed by: NURSE PRACTITIONER

## 2021-03-04 RX ORDER — POTASSIUM CHLORIDE 20 MEQ/1
40 TABLET, EXTENDED RELEASE ORAL ONCE
Status: COMPLETED | OUTPATIENT
Start: 2021-03-04 | End: 2021-03-04

## 2021-03-04 RX ORDER — OXYCODONE HYDROCHLORIDE 5 MG/1
5 TABLET ORAL EVERY 4 HOURS PRN
Status: CANCELLED | OUTPATIENT
Start: 2021-03-04

## 2021-03-04 RX ORDER — SENNOSIDES 8.6 MG
1 TABLET ORAL 2 TIMES DAILY
Status: CANCELLED | OUTPATIENT
Start: 2021-03-05

## 2021-03-04 RX ORDER — ATORVASTATIN CALCIUM 10 MG/1
10 TABLET, FILM COATED ORAL DAILY
Status: DISCONTINUED | OUTPATIENT
Start: 2021-03-05 | End: 2021-03-09 | Stop reason: HOSPADM

## 2021-03-04 RX ORDER — ONDANSETRON 2 MG/ML
4 INJECTION INTRAMUSCULAR; INTRAVENOUS EVERY 6 HOURS PRN
Status: DISCONTINUED | OUTPATIENT
Start: 2021-03-04 | End: 2021-03-05

## 2021-03-04 RX ORDER — ALPRAZOLAM 0.25 MG/1
0.25 TABLET ORAL 3 TIMES DAILY PRN
Status: CANCELLED | OUTPATIENT
Start: 2021-03-04

## 2021-03-04 RX ORDER — DOCUSATE SODIUM 100 MG/1
100 CAPSULE, LIQUID FILLED ORAL 2 TIMES DAILY
Status: CANCELLED | OUTPATIENT
Start: 2021-03-05

## 2021-03-04 RX ORDER — NITROGLYCERIN 0.4 MG/1
0.4 TABLET SUBLINGUAL
Status: DISCONTINUED | OUTPATIENT
Start: 2021-03-04 | End: 2021-03-09 | Stop reason: HOSPADM

## 2021-03-04 RX ORDER — OXYCODONE HYDROCHLORIDE 5 MG/1
5 TABLET ORAL EVERY 4 HOURS PRN
Status: DISCONTINUED | OUTPATIENT
Start: 2021-03-04 | End: 2021-03-05

## 2021-03-04 RX ORDER — FOLIC ACID 1 MG/1
1 TABLET ORAL DAILY
Status: CANCELLED | OUTPATIENT
Start: 2021-03-05

## 2021-03-04 RX ORDER — LEVOTHYROXINE SODIUM 88 UG/1
88 TABLET ORAL
Status: CANCELLED | OUTPATIENT
Start: 2021-03-05

## 2021-03-04 RX ORDER — ATORVASTATIN CALCIUM 10 MG/1
10 TABLET, FILM COATED ORAL DAILY
Status: CANCELLED | OUTPATIENT
Start: 2021-03-05

## 2021-03-04 RX ORDER — GABAPENTIN 100 MG/1
100 CAPSULE ORAL
Status: CANCELLED | OUTPATIENT
Start: 2021-03-04

## 2021-03-04 RX ORDER — BISACODYL 10 MG
10 SUPPOSITORY, RECTAL RECTAL DAILY PRN
Status: DISCONTINUED | OUTPATIENT
Start: 2021-03-04 | End: 2021-03-09 | Stop reason: HOSPADM

## 2021-03-04 RX ORDER — SENNOSIDES 8.6 MG
1 TABLET ORAL 2 TIMES DAILY
Status: DISCONTINUED | OUTPATIENT
Start: 2021-03-05 | End: 2021-03-09 | Stop reason: HOSPADM

## 2021-03-04 RX ORDER — AMLODIPINE BESYLATE 5 MG/1
5 TABLET ORAL DAILY
Status: DISCONTINUED | OUTPATIENT
Start: 2021-03-05 | End: 2021-03-04 | Stop reason: HOSPADM

## 2021-03-04 RX ORDER — FLUOXETINE HYDROCHLORIDE 20 MG/1
20 CAPSULE ORAL DAILY
Status: DISCONTINUED | OUTPATIENT
Start: 2021-03-05 | End: 2021-03-09 | Stop reason: HOSPADM

## 2021-03-04 RX ORDER — OXYCODONE HYDROCHLORIDE 5 MG/1
7.5 TABLET ORAL EVERY 4 HOURS PRN
Status: DISCONTINUED | OUTPATIENT
Start: 2021-03-04 | End: 2021-03-09 | Stop reason: HOSPADM

## 2021-03-04 RX ORDER — BISACODYL 10 MG
10 SUPPOSITORY, RECTAL RECTAL DAILY PRN
Status: CANCELLED | OUTPATIENT
Start: 2021-03-04

## 2021-03-04 RX ORDER — ACETAMINOPHEN 325 MG/1
650 TABLET ORAL EVERY 8 HOURS SCHEDULED
Status: DISCONTINUED | OUTPATIENT
Start: 2021-03-04 | End: 2021-03-09 | Stop reason: HOSPADM

## 2021-03-04 RX ORDER — LIDOCAINE 50 MG/G
2 PATCH TOPICAL
Status: CANCELLED | OUTPATIENT
Start: 2021-03-04

## 2021-03-04 RX ORDER — POTASSIUM CHLORIDE 20 MEQ/1
40 TABLET, EXTENDED RELEASE ORAL ONCE
Status: CANCELLED | OUTPATIENT
Start: 2021-03-04 | End: 2021-03-04

## 2021-03-04 RX ORDER — FUROSEMIDE 10 MG/ML
40 INJECTION INTRAMUSCULAR; INTRAVENOUS ONCE
Status: COMPLETED | OUTPATIENT
Start: 2021-03-04 | End: 2021-03-04

## 2021-03-04 RX ORDER — OXYCODONE HYDROCHLORIDE 5 MG/1
7.5 TABLET ORAL EVERY 4 HOURS PRN
Status: CANCELLED | OUTPATIENT
Start: 2021-03-04

## 2021-03-04 RX ORDER — FLUOXETINE HYDROCHLORIDE 20 MG/1
20 CAPSULE ORAL DAILY
Status: CANCELLED | OUTPATIENT
Start: 2021-03-05

## 2021-03-04 RX ORDER — LACTULOSE 20 G/30ML
20 SOLUTION ORAL 2 TIMES DAILY PRN
Status: CANCELLED | OUTPATIENT
Start: 2021-03-04

## 2021-03-04 RX ORDER — AMLODIPINE BESYLATE 5 MG/1
5 TABLET ORAL DAILY
Status: DISCONTINUED | OUTPATIENT
Start: 2021-03-05 | End: 2021-03-09 | Stop reason: HOSPADM

## 2021-03-04 RX ORDER — GABAPENTIN 100 MG/1
100 CAPSULE ORAL
Status: DISCONTINUED | OUTPATIENT
Start: 2021-03-04 | End: 2021-03-09 | Stop reason: HOSPADM

## 2021-03-04 RX ORDER — ONDANSETRON 4 MG/1
4 TABLET, ORALLY DISINTEGRATING ORAL EVERY 6 HOURS PRN
Status: CANCELLED | OUTPATIENT
Start: 2021-03-04

## 2021-03-04 RX ORDER — LEVOTHYROXINE SODIUM 88 UG/1
88 TABLET ORAL
Status: DISCONTINUED | OUTPATIENT
Start: 2021-03-05 | End: 2021-03-09 | Stop reason: HOSPADM

## 2021-03-04 RX ORDER — ATENOLOL 50 MG/1
50 TABLET ORAL 2 TIMES DAILY
Status: DISCONTINUED | OUTPATIENT
Start: 2021-03-04 | End: 2021-03-09 | Stop reason: HOSPADM

## 2021-03-04 RX ORDER — OXYCODONE HYDROCHLORIDE 5 MG/1
5 TABLET ORAL EVERY 4 HOURS PRN
Status: DISCONTINUED | OUTPATIENT
Start: 2021-03-04 | End: 2021-03-04 | Stop reason: HOSPADM

## 2021-03-04 RX ORDER — DOCUSATE SODIUM 100 MG/1
100 CAPSULE, LIQUID FILLED ORAL 2 TIMES DAILY
Status: DISCONTINUED | OUTPATIENT
Start: 2021-03-05 | End: 2021-03-09 | Stop reason: HOSPADM

## 2021-03-04 RX ORDER — ATENOLOL 50 MG/1
50 TABLET ORAL 2 TIMES DAILY
Status: CANCELLED | OUTPATIENT
Start: 2021-03-04

## 2021-03-04 RX ORDER — ACETAMINOPHEN 325 MG/1
650 TABLET ORAL EVERY 8 HOURS SCHEDULED
Status: CANCELLED | OUTPATIENT
Start: 2021-03-04

## 2021-03-04 RX ORDER — ALPRAZOLAM 0.25 MG/1
0.25 TABLET ORAL 3 TIMES DAILY PRN
Status: DISCONTINUED | OUTPATIENT
Start: 2021-03-04 | End: 2021-03-09 | Stop reason: HOSPADM

## 2021-03-04 RX ORDER — FOLIC ACID 1 MG/1
1 TABLET ORAL DAILY
Status: DISCONTINUED | OUTPATIENT
Start: 2021-03-05 | End: 2021-03-09 | Stop reason: HOSPADM

## 2021-03-04 RX ORDER — LACTULOSE 20 G/30ML
20 SOLUTION ORAL 2 TIMES DAILY PRN
Status: DISCONTINUED | OUTPATIENT
Start: 2021-03-04 | End: 2021-03-09 | Stop reason: HOSPADM

## 2021-03-04 RX ORDER — ACETAMINOPHEN 325 MG/1
650 TABLET ORAL EVERY 4 HOURS PRN
Status: DISCONTINUED | OUTPATIENT
Start: 2021-03-04 | End: 2021-03-09 | Stop reason: HOSPADM

## 2021-03-04 RX ORDER — LIDOCAINE 50 MG/G
2 PATCH TOPICAL
Status: DISCONTINUED | OUTPATIENT
Start: 2021-03-04 | End: 2021-03-09 | Stop reason: HOSPADM

## 2021-03-04 RX ORDER — METHOCARBAMOL 500 MG/1
500 TABLET, FILM COATED ORAL EVERY 6 HOURS PRN
Status: CANCELLED | OUTPATIENT
Start: 2021-03-04

## 2021-03-04 RX ORDER — METHOCARBAMOL 500 MG/1
500 TABLET, FILM COATED ORAL EVERY 6 HOURS PRN
Status: DISCONTINUED | OUTPATIENT
Start: 2021-03-04 | End: 2021-03-09 | Stop reason: HOSPADM

## 2021-03-04 RX ORDER — NYSTATIN 100000 [USP'U]/G
1 POWDER TOPICAL 2 TIMES DAILY
Status: CANCELLED | OUTPATIENT
Start: 2021-03-05

## 2021-03-04 RX ORDER — OXYCODONE HYDROCHLORIDE 5 MG/1
7.5 TABLET ORAL EVERY 4 HOURS PRN
Status: DISCONTINUED | OUTPATIENT
Start: 2021-03-04 | End: 2021-03-04 | Stop reason: HOSPADM

## 2021-03-04 RX ORDER — ONDANSETRON 4 MG/1
4 TABLET, ORALLY DISINTEGRATING ORAL EVERY 6 HOURS PRN
Status: DISCONTINUED | OUTPATIENT
Start: 2021-03-04 | End: 2021-03-09 | Stop reason: HOSPADM

## 2021-03-04 RX ORDER — NYSTATIN 100000 [USP'U]/G
1 POWDER TOPICAL 2 TIMES DAILY
Status: DISCONTINUED | OUTPATIENT
Start: 2021-03-05 | End: 2021-03-09 | Stop reason: HOSPADM

## 2021-03-04 RX ORDER — OXYCODONE HYDROCHLORIDE 10 MG/1
10 TABLET ORAL EVERY 6 HOURS PRN
Status: DISCONTINUED | OUTPATIENT
Start: 2021-03-04 | End: 2021-03-09 | Stop reason: HOSPADM

## 2021-03-04 RX ORDER — AMLODIPINE BESYLATE 5 MG/1
5 TABLET ORAL DAILY
Status: CANCELLED | OUTPATIENT
Start: 2021-03-05

## 2021-03-04 RX ADMIN — LIDOCAINE 2 PATCH: 50 PATCH TOPICAL at 21:45

## 2021-03-04 RX ADMIN — OXYCODONE HYDROCHLORIDE 10 MG: 10 TABLET ORAL at 21:18

## 2021-03-04 RX ADMIN — ATORVASTATIN CALCIUM 10 MG: 10 TABLET, FILM COATED ORAL at 08:01

## 2021-03-04 RX ADMIN — ATENOLOL 50 MG: 50 TABLET ORAL at 08:01

## 2021-03-04 RX ADMIN — GABAPENTIN 100 MG: 100 CAPSULE ORAL at 21:46

## 2021-03-04 RX ADMIN — FOLIC ACID 1 MG: 1 TABLET ORAL at 08:00

## 2021-03-04 RX ADMIN — FUROSEMIDE 40 MG: 10 INJECTION, SOLUTION INTRAVENOUS at 22:07

## 2021-03-04 RX ADMIN — FLUOXETINE 20 MG: 20 CAPSULE ORAL at 08:01

## 2021-03-04 RX ADMIN — ENOXAPARIN SODIUM 30 MG: 30 INJECTION SUBCUTANEOUS at 21:47

## 2021-03-04 RX ADMIN — ATENOLOL 50 MG: 50 TABLET ORAL at 21:46

## 2021-03-04 RX ADMIN — AMLODIPINE BESYLATE 2.5 MG: 2.5 TABLET ORAL at 08:01

## 2021-03-04 RX ADMIN — NYSTATIN 1 APPLICATION: 100000 POWDER TOPICAL at 08:01

## 2021-03-04 RX ADMIN — ONDANSETRON 4 MG: 2 INJECTION INTRAMUSCULAR; INTRAVENOUS at 22:14

## 2021-03-04 RX ADMIN — OXYCODONE HYDROCHLORIDE 5 MG: 5 TABLET ORAL at 08:03

## 2021-03-04 RX ADMIN — POTASSIUM CHLORIDE 40 MEQ: 1500 TABLET, EXTENDED RELEASE ORAL at 21:46

## 2021-03-04 RX ADMIN — LEVOTHYROXINE SODIUM 88 MCG: 88 TABLET ORAL at 06:27

## 2021-03-04 RX ADMIN — IOHEXOL 100 ML: 350 INJECTION, SOLUTION INTRAVENOUS at 19:01

## 2021-03-04 RX ADMIN — DICLOFENAC SODIUM 2 G: 10 GEL TOPICAL at 11:57

## 2021-03-04 RX ADMIN — ACETAMINOPHEN 650 MG: 325 TABLET ORAL at 06:27

## 2021-03-04 RX ADMIN — POTASSIUM CHLORIDE 40 MEQ: 1500 TABLET, EXTENDED RELEASE ORAL at 11:56

## 2021-03-04 RX ADMIN — ENOXAPARIN SODIUM 30 MG: 30 INJECTION SUBCUTANEOUS at 08:01

## 2021-03-04 RX ADMIN — ALPRAZOLAM 0.25 MG: 0.25 TABLET ORAL at 21:46

## 2021-03-04 RX ADMIN — ALPRAZOLAM 0.25 MG: 0.25 TABLET ORAL at 14:24

## 2021-03-04 RX ADMIN — OXYCODONE HYDROCHLORIDE 7.5 MG: 5 TABLET ORAL at 12:25

## 2021-03-04 RX ADMIN — DICLOFENAC SODIUM 2 G: 10 GEL TOPICAL at 08:02

## 2021-03-04 NOTE — ASSESSMENT & PLAN NOTE
IM consulted, monitoring, repletion, and overall management at their discretion during [facility] course

## 2021-03-04 NOTE — PROGRESS NOTES
03/04/21 0830   Pain Assessment   Pain Assessment Tool 0-10   Pain Score 8   Pain Location/Orientation Orientation: Right;Location: Groin   Pain Radiating Towards R thigh   Pain Onset/Description Onset: Ongoing   Effect of Pain on Daily Activities Limits standing tolerance   Patient's Stated Pain Goal No pain   Hospital Pain Intervention(s) Rest  (RN notified)   Restrictions/Precautions   Precautions Fall Risk;Cognitive;Pain   Weight Bearing Restrictions Yes   RLE Weight Bearing Per Order WBAT   LLE Weight Bearing Per Order WBAT   ROM Restrictions No   Sit to Stand   Type of Assistance Needed Incidental touching; Adaptive equipment   Amount of Physical Assistance Provided No physical assistance   Comment CG with RW   Sit to Stand CARE Score 4   Bed-Chair Transfer   Type of Assistance Needed Physical assistance; Adaptive equipment;Verbal cues   Amount of Physical Assistance Provided Less than 25%   Comment CG with RW and vc's for safe position of RW   Chair/Bed-to-Chair Transfer CARE Score 3   Toileting Hygiene   Type of Assistance Needed Physical assistance   Amount of Physical Assistance Provided Less than 25%   Comment Pt in stance at RW performing unilateral release to complete CM down/up and lorena hyg  Occassional assist for steadying while in stance  Toileting Hygiene CARE Score 3   Toilet Transfer   Type of Assistance Needed Physical assistance; Adaptive equipment   Amount of Physical Assistance Provided Less than 25%   Comment CG/Ebony with RW and use of over the toilet commode   Toilet Transfer CARE Score 3   Kitchen Mobility   Kitchen-Mobility Level Walker   Kitchen Activity Retrieve items;Transport items   Kitchen Mobility Comments Pt engaged in kitchen mobility task completing fxnl amb with RW to retrieve items from around kitchen environment  Prior to activity, FUENTES introduced folding RW basket for safe item transport   FUENTES also demo safe positioning of RW during kitchen mobility while utilizing folding RW tray  Pt initially completed task at CG level, retrieving items form [place] cabinet, fridge, and microwave enviornment  However, upon 2nd attempt, pt requiring Ebony for steadying 2* to increased pain in R groin/RLE  Communicated to RN  Functional Standing Tolerance   Time 5 mins 20 sec   Activity standing tolerance   Comments Pt engaged in standing tolerance activity in stance at table top req pt to visually scan table top and retrieve individual scrabble letters to spell out family member's names  Focus of activity to improve functional standing tolerance, as well as encourage unilateral release to improve functional standing balance for improved participation in ADL performance  Pt tolerated activity in stance x 5 mins 20 sec with no LOB, however, continues to report 8/10 pain in R groin/RLE  Cognition   Overall Cognitive Status Impaired   Arousal/Participation Alert; Cooperative   Attention Attends with cues to redirect   Orientation Level Oriented X4   Memory Decreased short term memory   Following Commands Follows one step commands with increased time or repetition   Activity Tolerance   Activity Tolerance Patient limited by pain   Assessment   Treatment Assessment Pt engaged in 60 min skilled OT Tx session focusing on functional standing tolerance and engaging in kitchen mobility task  See above for further Tx details  Pt engaged in standing tolerance activity at table top, req pt to perform unilateral release to engage in Ricardo like game  Pt tolerated activity in stance x 5 mins 20 sec with no LOB  FUENTES introduced folding RW tray during session, explaining benefit of utilizing upon D/C home, as well as demonstrating safe position of RW while folding RW tray is donned  Pt engaged in kitchen mobility task utilizing RW and folding RW tray, initially req CG  However, pt req Ebony for steadying upon 2nd attempt due to increased pain in R groin/RLE area  RN notified   OT plan to trial dry tub xfer during PM OT session  Pt would continue to benefit from skilled OT Tx session to continue progressing towards OT goals to prepare for D/C home with support  Prognosis Good   Problem List Decreased strength;Decreased range of motion;Decreased endurance; Impaired balance;Decreased mobility; Decreased cognition;Decreased safety awareness;Pain   Barriers to Discharge Inaccessible home environment;Decreased caregiver support   Plan   Treatment/Interventions ADL retraining;Functional transfer training; Therapeutic exercise; Endurance training;Patient/family training   Progress Slow progress, decreased activity tolerance  (increased pain)   Recommendation   OT Discharge Recommendation   (pending pt progress)   Equipment Recommended   (folding RW tray)   OT Therapy Minutes   OT Time In 0830   OT Time Out 0930   OT Total Time (minutes) 60   OT Mode of treatment - Individual (minutes) 60   OT Mode of treatment - Concurrent (minutes) 0   OT Mode of treatment - Group (minutes) 0   OT Mode of treatment - Co-treat (minutes) 0   OT Mode of Treatment - Total time(minutes) 60 minutes   OT Cumulative Minutes 420   Therapy Time missed   Time missed?  No

## 2021-03-04 NOTE — PROGRESS NOTES
Physical Medicine and Rehabilitation Progress Note  Tg Kisermi 78 y o  female MRN: [id number]  Unit/Bed#: [facility] 269-01 Encounter: [id number]      Chief Complaints:   Increased right hip and pelvic pain    Interval Events/Subjective:      patient notes some increased right hip and pelvic pain over the last 2 days  Patient noted some transient tingling in her right lateral hip and upper thigh now resolved  She denies other sensory changes or acute changes in strength  Patient denies arm complaints  She reports occasional right rib pain since the time of the fall  Patient  Reports some chronic lightheadedness even prior to hospitalization which is at about her baseline  She also notes some chronic shortness of breath also unchanged from prior to hospitalization  She denies increased cough, fever, chills, sweats, chest pain, calf pain, or other new complaints  And reports stooling adequately  ROS: A 10 point ROS was performed; negative except as noted above       Assessment & Plan:     * Multiple closed fractures of pelvis ([address] )  Assessment & Plan  · Traumatic fall on stairs:  · Resultant right inferior pubic rami fracture, right pubic symphysis fracture, right anterior acetabular fracture, left anterior acetabular fracture  · Evaluated by Orthopedics - treat conservatively  · WBAT BLE  · Continue DVT ppx - lovenox   · Follow-up with Trauma/Orthopedics as outpatient   · R hip and pelvis pain increased today again - neurovascularly intact RLE   · Recommend R hip/pelvis x-ray to monitor for new or worsening fractures;   · Continue APAP TID; Gabapentin 100mg HS  · Change to oxy 5-7 5 mg Q4H PRN   · PRN Robaxin 500mg Q6H PRN   · Holding parameters on sedating meds      Acceptable participation; continue plan as outlined    Recommend acute comprehensive interdisciplinary inpatient rehabilitation to include intensive skilled therapies (PT, OT) as outlined with oversight and management by rehabilitation physician as well as inpatient rehab level nursing, case management and weekly interdisciplinary team meetings  Gastroparesis  Assessment & Plan  - Stooling adequately; oral intake acceptable; continue mgmt plan as outlined   - Common in [place] - now off chronic Reglan with oral dyskinesia but need to closely follow to ensure adequate GI function  IM consulted and with overall management at their discretion during ARC course  Was on Reglan chronically but stopped in the acute setting due to concerns for extrapyramidal symptoms - she does have fair amount of oral dyskinesias which she feels is related to Sjogrens but may in part be related to this   - Monitor for s/s of obstruction > not currently   - Monitor closely for intake; constipation as she remains at risk  - Colace, senna  - Refuses miralax as she finding the texture difficulty and odd to swallow  - PRN Lactulose, dulcolax supp  -  patient on importance for stooling adequately     Lightheadedness  Assessment & Plan  Chronic and stable per patient  Monitor vitals and ortostatics  Basic labs acceptable  Comgmt with IM       Dyspnea  Assessment & Plan  Chronic and stable per patient  Comgmt with IM service in [place]   - Saturating well on RA with and without activity - continue to monitor  Recent CT chest  - LUNGS:  Mild hypoventilatory changes  14 mm left lower lobe subpleural nodule versus focal consolidation #4/54  Similar more inferior 8mm and 14 mm opacities # 4/82 and #4/95  PLEURA:  Unremarkable  HEART/GREAT VESSELS:  Unremarkable for patient's age  [de-identified] AND ROBERTA:  Unremarkable    - OP follow-up of incidental findings above  - Follow-up with OP PCP      Hypokalemia  Assessment & Plan  IM consulted, monitoring, repletion, and overall management at their discretion during ARC course      Anemia  Assessment & Plan  Hb stable  Monitor     Potential for cognitive impairment  Assessment & Plan  Therapy noting some concern with memory and comprehension  - Pt states she is at her baseline; she is oriented x4 able to follow simple commands; pt on occasional oxycodone PRN for pain but does not appear overly sedated  - Obtain MOCA   - Compensatory strategies    Oral dyskinesia  Assessment & Plan  Stable again  Patient feels this is related to [place] but movements are more consistent with EPS and likely TD in context of long-standing metoclopramide   - Comgmt with IM  - Metoclopramide discontinued prior to [facility] which is first line mgmt (holding DA blockers)   - This may be difficult to improve at this point  - Monitor during ARC course  - Follow-up with PCP - consider pharmacologic tx such at tetrabenazine if needed in future     Abnormal findings on imaging test  Assessment & Plan  · Seen on CT Abd/Pelvis   · ABDOMINOPELVIC CAVITY:  No ascites  No pneumoperitoneum  No lymphadenopathy  Calcified right retroperitoneal node measuring 21 mm  # 2/72  · LUNGS:  Mild hypoventilatory changes  14 mm left lower lobe subpleural nodule versus focal consolidation #4/54  Similar more inferior 8mm and 14 mm opacities # 4/82 and #4/95  · LIVER/BILIARY TREE:  One or more subcentimeter sharply circumscribed low-density hepatic lesion(s) are noted, too small to accurately characterize, but statistically most likely to represent subcentimeter hepatic cysts  No suspicious solid hepatic lesion is identified  Hepatic contours are normal   No biliary dilatation  · Patient will need follow-up with PCP    Hypothyroidism  Assessment & Plan  · Managed on Synthroid per IM     Depression  Assessment & Plan  · Mood stable   · Managed on Prozac (home dose) and Xanax low dose for anxiety      Checked [place]MP - patient was not on Xanax prior  · Follow-up with PCP as outpatient     Hypertension  Assessment & Plan  · Managed on Norvasc, Atenolol  · Consulted internal medicine     Sjogren's disease ([address] )  Assessment & Plan  · Managed on weekly Methotrexate  · Follow-up with   [facility]    Parotid gland enlargement  Assessment & Plan  · Follows with Dr Aristeo Toure       Other Medical Issues:       Disposition    home with estimated length of stay of 2 weeks from admission    Follow-up providers and other issues to be followed up after discharge    PCP    rheumatology    orthopedics    possibly GI    CODE: Level 1: Full Code    Restrictions include: Fall precautions    Objective:    Functional Update:     Toileting hygiene Min assist, transfers min assist to contact guard, ambulation 50 ft contact guard    Allergies per EMR    Physical Exam:  Temp:  [97 8 °F (36 6 °C)-98 5 °F (36 9 °C)] 98 5 °F (36 9 °C)  HR:  [61-69] 65  Resp:  [18] 18  BP: (104-161)/(59-75) 104/61  SpO2:  [95 %-100 %] 100 %    GEN:  Sitting in NAD   HEENT/NECK: MMM  CARDIAC: Regular rate rhythm, no murmers, no rubs, no gallops  LUNGS:  clear to auscultation, no wheezes, rales, or rhonchi  ABDOMEN: Soft, nondistended, nontender, normoactive  EXTREMITIES/SKIN:  no calf edema, no calf tenderness to palpation and MSK:  Patient with some pain when extending right knee and with hip flexion  NEURO:   MENTAL STATUS:  Appropriate wakefulness and interaction  and Strength/MMT:  Near full throughout with some slight weakness in right proximal lower extremity with pain in hip and pelvis; sensation intact to light touch  PSYCH:  Affect:  Euthymic     Diagnostic Studies: Reviewed, no new imaging  XR hip/pelv 2-3 vws right if performed    (Results Pending)       Laboratory: Labs reviewed  Results from last 7 days   Lab Units 03/04/21  0448 03/01/21  0518   HEMOGLOBIN g/dL 9 6* 10 6*   HEMATOCRIT % 29 8* 32 9*   WBC Thousand/uL 6 40 5 00     Results from last 7 days   Lab Units 03/04/21  0448 03/01/21  0518   BUN mg/dL 13 16   SODIUM mmol/L 136* 139   POTASSIUM mmol/L 3 2* 3 7   CHLORIDE mmol/L 101 104   CREATININE mg/dL 0 60 0 69            Drug regimen reviewed, all potential adverse effects identified and addressed:    Scheduled Meds:  Current Facility-Administered Medications   Medication Dose Route Frequency Provider Last Rate    acetaminophen  650 mg Oral Q8H Nicolas Daniels MD      ALPRAZolam  0 25 mg Oral TID PRN Bonny Burroughs MD      [START ON 3/5/2021] amLODIPine  5 mg Oral Daily DARIO Agustin      atenolol  50 mg Oral BID Bonny Burroughs MD      atorvastatin  10 mg Oral Daily Bonny Burroughs MD      bisacodyl  10 mg Rectal Daily PRN Bonny Burroughs MD      Diclofenac Sodium  2 g Topical 4x Daily Bonny Burroughs MD      docusate sodium  100 mg Oral BID Bonny Bruroughs MD      enoxaparin  30 mg Subcutaneous Q12H [facility] Bonny Burroughs MD      FLUoxetine  20 mg Oral Daily Bonny Burroughs MD      folic acid  1 mg Oral Daily Bonny Burroughs MD      gabapentin  100 mg Oral HS Bonny Burroughs MD      lactulose  20 g Oral BID PRN Adri Finn MD      levothyroxine  88 mcg Oral Early Morning Bonny Burroughs MD      lidocaine  2 patch Topical HS Bonny Burroughs MD      methocarbamol  500 mg Oral Q6H PRN Adri Finn MD      methotrexate  20 mg Oral Weekly Bonny Burroughs MD      nystatin  1 application Topical BID DARIO Agustin      ondansetron  4 mg Oral Q6H PRN Bonny Burroughs MD      oxyCODONE  5 mg Oral Q4H PRN Adri Finn MD      oxyCODONE  7 5 mg Oral Q4H PRN Adri Finn MD      senna  1 tablet Oral BID Adri Finn MD         ** Please Note: Fluency Direct voice to text software may have been used in the creation of this document  **    Total time spent:  35 minutes, with more than 50% spent counseling/coordinating care  Counseling includes discussion with patient re: progress in therapies, functional issues observed by therapy staff, and discussion with patient his/her current medical state/wellbeing  Coordination of patient's care was performed in conjunction with Internal Medicine service to monitor patient's labs, vitals, and management of their comorbidities   In addition, this patient was discussed by the interdisciplinary team in weekly case conference today  The care of the patient was extensively discussed with all care providers and an appropriate rehabilitation plan was formulated unique for this patient  Barriers were identified preventing progression of therapy and appropriate interventions were discussed with each discipline  Please see the team note for input from all disciplines regarding barriers, intervention, and discharge planning

## 2021-03-04 NOTE — PROGRESS NOTES
03/04/21 1015   Pain Assessment   Pain Assessment Tool 0-10   Pain Score 9   Pain Location/Orientation Orientation: Right;Location: Hip   Pain Radiating Towards groin, buttocks   Pain Onset/Description Onset: Ongoing; Descriptor: Aching;Descriptor: Throbbing  (numbness in R groin)   Effect of Pain on Daily Activities gait initially slow, improves with increased distance  Gait pattern [facility] Pain Intervention(s)   (declined CP for pain relief)   Restrictions/Precautions   Precautions Fall Risk;Cognitive   Weight Bearing Restrictions Yes   RLE Weight Bearing Per Order WBAT   LLE Weight Bearing Per Order WBAT   ROM Restrictions No   Cognition   Overall Cognitive Status Impaired   Arousal/Participation Alert; Cooperative   Attention Attends with cues to redirect   Orientation Level Oriented X4   Memory Decreased short term memory   Following Commands Follows one step commands with increased time or repetition   Subjective   Subjective "I have a different pain this morning and I slept on and off   My R groin is numb "   Sit to Stand   Type of Assistance Needed Incidental touching   Amount of Physical Assistance Provided No physical assistance   Comment CG with RW, cont to require cues with hand placement, poor carryover from session to session   Sit to Stand CARE Score 4   Bed-Chair Transfer   Type of Assistance Needed Incidental touching   Amount of Physical Assistance Provided No physical assistance   Comment CG with RW   Chair/Bed-to-Chair Transfer CARE Score 4   Car Transfer   Reason if not Attempted Environmental limitations   Car Transfer CARE Score 10   Walk 10 Feet   Type of Assistance Needed Incidental touching   Amount of Physical Assistance Provided No physical assistance   Comment CGA with RW   Walk 10 Feet CARE Score 4   Walk 50 Feet with Two Turns   Type of Assistance Needed Incidental touching   Amount of Physical Assistance Provided No physical assistance   Comment CGA with RW   Walk 50 Feet with Two Turns CARE Score 4   Walk 150 Feet   Reason if not Attempted Safety concerns   Walk 150 Feet CARE Score 88   Walking 10 Feet on Uneven Surfaces   Comment not performed this session   Ambulation   Does the patient walk? 2  Yes   Primary Mode of Locomotion Prior to Admission Walk   Distance Walked (feet) 50 ft  (x3)   Assist Device Roller Walker   Gait Pattern Antalgic; Inconsistant [place]; Slow [place];Decreased foot clearance; Step to; Step through; Decreased R stance; Improper weight shift   Limitations Noted In Balance; Endurance; Heel Strike;Speed;Strength;Swing   Walk Assist Level Contact Guard   Findings focused on 48' household amb distances this session with CF by PT for safety  Gait pattern initially very slow, antalgic, some improvements in gait speed noted once pt cont with amb  Cues to decrease UE w/b as pt heavily relies on UE  Wheel 50 Feet with Two Turns   Reason if not Attempted Activity not applicable   Wheel 50 Feet with Two Turns CARE Score 9   Wheel 150 Feet   Reason if not Attempted Activity not applicable   Wheel 480 Feet CARE Score 9   Wheelchair mobility   Does the patient use a wheelchair? 0  No   Stairs   Findings focus on steps in pm session as tolerated by pt   Toilet Transfer   Type of Assistance Needed Incidental touching   Amount of Physical Assistance Provided No physical assistance   Comment CGA SPT with RW to commode over toilet   Toilet Transfer CARE Score 4   Therapeutic Interventions   Flexibility beginning of session performed seated b/l gentle hamstring/gastroc/hip add stretch to reduce stiffness, x10 mins total   Assessment   Treatment Assessment Pt participated in 30 min skilled PT tx focus on LE stretching to help reduce stiffness and gait trials with RW focusing on household distances  Pt cont with high c/o pain as 9/10 this session, mostly in RLE especially in buttock and groin   During session also reports she had "electric shock" through head last night which lasted briefly, JUSTIN Ivy present and states she will pass along to Dr Sri Kelly  Pt denies any numbness/tingling through either side of body though and denies any changes in vision  Performed household distance amb this session to try and improve pt's consistency with amb shorter distances  Completed 3 walks at [facility], will again perform in pm session to determine if staff can begin to amb pt to/from bathroom  At this time POC to focus on stair trng, curb step trng, gait trng, transfer trng, bed mobility, LE strengthening and ROM, and balance interventions to decrease pt's risk for falls  Pt denying CP post session as she reports she does not like CPs as they make her cold  Family/Caregiver Present no   Problem List Decreased strength;Decreased range of motion;Decreased endurance; Impaired balance;Decreased mobility; Decreased cognition;Decreased safety awareness;Pain   Barriers to Discharge Inaccessible home environment;Decreased caregiver support   PT Barriers   Functional Limitation Car transfers; Ramp negotiation;Stair negotiation;Standing;Transfers; Walking   Plan   Treatment/Interventions ADL retraining;Functional transfer training;Gait training;LE strengthening/ROM   Progress Progressing toward goals   Recommendation   PT Discharge Recommendation Home with skilled therapy  (home PT)   Equipment Recommended Walker   PT - OK to Discharge No   PT Therapy Minutes   PT Time In 1015   PT Time Out 1045   PT Total Time (minutes) 30   PT Mode of treatment - Individual (minutes) 30   PT Mode of treatment - Concurrent (minutes) 0   PT Mode of treatment - Group (minutes) 0   PT Mode of treatment - Co-treat (minutes) 0   PT Mode of Treatment - Total time(minutes) 30 minutes   PT Cumulative Minutes 395   Therapy Time missed   Time missed?  No

## 2021-03-04 NOTE — NURSING NOTE
Pt had xray of R hip-she arrived on the unit at 1410  Pt stated that she was nauseous and vomited downstairs while in xray, pt denied nausea at this time and declined need for zofran  Pt put in the chair in her room, MD is aware

## 2021-03-04 NOTE — ASSESSMENT & PLAN NOTE
2/22 - Mechanical fall down the stairs predominately on the R side  Multiple pelvic fractures: Acute nondisplaced fracture of the right inferior pubic ramus and right pubic symphysis, anterior right acetabular fracture and probable nondisplaced left anterior acetabular fracture  Ortho consulted - no surgical intervention at this time  Monitor for s/s pelvic bleeding  Pain management  WBAT  DVT Prophylaxis - Lovenox  Follow-up with Dr Janeen Escobar (Orthopedics) office in 6 weeks  PT/OT Therapies  Primary team following

## 2021-03-04 NOTE — ASSESSMENT & PLAN NOTE
Home regimen: Norvasc 2 5mg daily and atenolol 50mg BID  Episode of hypertensive urgency while in acute setting  Monitor routine VS   Increase Norvasc to 5mg daily on 3/4

## 2021-03-04 NOTE — PROGRESS NOTES
03/04/21 1200   Pain Assessment   Pain Assessment Tool 0-10   Pain Score Worst Possible Pain   Pain Location/Orientation Orientation: Right;Location: Rib Cage   Pain Onset/Description Onset: Sudden   Effect of Pain on Daily Activities Effects functional transfer's   Patient's Stated Pain Goal No pain   Hospital Pain Intervention(s)   (RN notified for pain medication)   Multiple Pain Sites Yes   Pain 2   Pain Score 2 Worst Possible Pain   Pain Location/Orientation 2 Orientation: Bilateral;Location: Groin   Pain Radiating Towards 2 BLE's   Pain Onset/Description 2 Onset: Ongoing   Hospital Pain Intervention(s) 2   (RN notified)   Restrictions/Precautions   Precautions Fall Risk;Cognitive;Pain   Weight Bearing Restrictions Yes   RLE Weight Bearing Per Order WBAT   LLE Weight Bearing Per Order WBAT   ROM Restrictions No   Lifestyle   Autonomy "What are we doing again?"   Tub/Shower Transfer   Limitations Noted In Balance; Endurance; Safety;LE Strength   Adaptive Equipment Transfer Bench   Assessed Tub/shower combo  (dry)   Findings Pt initiated a dry tub xfer utilizing tub bench  Pt able to decsend to tub bench with Ebony  Pt reporting 10/10 pain and declining to lift BLE's into bath tub environment  Pt reports having shower seat inside of tub, however, unable to recall if she has any grab bars  Ebony to perform STS from side of tub bench with RW  Plan to re try in upcoming Tx session  Sit to Stand   Type of Assistance Needed Incidental touching; Adaptive equipment   Amount of Physical Assistance Provided No physical assistance   Comment CG with RW   Sit to Stand CARE Score 4   Bed-Chair Transfer   Type of Assistance Needed Physical assistance; Adaptive equipment   Amount of Physical Assistance Provided Less than 25%   Comment CG/Ebony with RW   Chair/Bed-to-Chair Transfer CARE Score 3   Cognition   Overall Cognitive Status Impaired   Arousal/Participation Alert; Cooperative   Attention Attends with cues to redirect Orientation Level Oriented X4   Memory Decreased short term memory   Following Commands Follows one step commands with increased time or repetition   Activity Tolerance   Activity Tolerance Patient limited by pain   Assessment   Treatment Assessment Pt seen x 30 min skilled OT Tx session focusing on completing a dry tub xfer  See above for further Tx details  Pt limited by pain this heber, reporting increased pain of 10/10 in R rib cage area, as well as 10/10 pain in B/L groin radiating towards BLE's  Reported to RN to administer pain medication  Pt agrreable to trial dry tub xfer, however, unable to fully complete 2* to increased pain  Pt initially reported having shower chair positioned inside of tub at home, however, unable to communicate if she had any grab bars installed  Plan to re try during upcoming Tx session  Pt assisted back to room for RN to administer pain medication  Vitals checked at end of Tx heber  /61, HR 65 BPM, O2 96%  Plan to engage pt in light housekeeping task of dishwashing and medication management in upcoming Tx session  Continue POC  Prognosis Good   Problem List Decreased strength;Decreased range of motion;Decreased endurance; Impaired balance;Decreased mobility; Decreased cognition;Decreased safety awareness;Pain   Barriers to Discharge Inaccessible home environment;Decreased caregiver support   Plan   Treatment/Interventions ADL retraining;Functional transfer training; Therapeutic exercise; Endurance training;Patient/family training   Progress Progressing toward goals   Recommendation   OT Discharge Recommendation   (pending pt progress)   OT Therapy Minutes   OT Time In 1200   OT Time Out 1230   OT Total Time (minutes) 30   OT Mode of treatment - Individual (minutes) 30   OT Mode of treatment - Concurrent (minutes) 0   OT Mode of treatment - Group (minutes) 0   OT Mode of treatment - Co-treat (minutes) 0   OT Mode of Treatment - Total time(minutes) 30 minutes   OT Cumulative Minutes 450   Therapy Time missed   Time missed?  No

## 2021-03-04 NOTE — ASSESSMENT & PLAN NOTE
3/4 - later in day notified patient having R chest wall pain even up to 10 out of 10 at times somewhat reproducible in R chest wall but also has some RUQ; Vitals stable on room air - may be component of MSK as pt did fall on R side but other more urgent etiologies remains fairly high on differential and must be assessed  - IM has been following with overall management at their discretion during ARC course  - Dr Kaden Melton, medicine and medicine PA evaluated     - CTA chest to rule out PE and other pathologies  - CT Abdomen with RUQ pain recommended     - CBC and CMP - slight increase in WBC but not leukocytosis;  Hb stable; AST and Alk phos slightly elevated    - EKG recommended - interpretation per IM - they report flip in T wave in V2; troponin x1 negative   - IM recommends transfer to telemetry > [facility] accepting to [address]

## 2021-03-04 NOTE — ASSESSMENT & PLAN NOTE
IM reports - EKG  flip in T wave in V2; troponin x1 negative   - Recommend telemetry transfer to acute > cardiology can consult there

## 2021-03-04 NOTE — ASSESSMENT & PLAN NOTE
Denies significant SOB currently     CTA chest as outlined above     Chronic and stable per patient  Comgmt with IM service in [place]   - Saturating well on RA with and without activity - continue to monitor  Recent CT chest  - LUNGS:  Mild hypoventilatory changes  14 mm left lower lobe subpleural nodule versus focal consolidation #4/54  Similar more inferior 8mm and 14 mm opacities # 4/82 and #4/95  PLEURA:  Unremarkable  HEART/GREAT VESSELS:  Unremarkable for patient's age  [de-identified] AND ROBERTA:  Unremarkable    - OP follow-up of incidental findings above  - Follow-up with OP PCP

## 2021-03-04 NOTE — TEAM CONFERENCE
Acute RehabilitationTeam Conference Note  Date: 3/4/2021   Time: 1:21 PM       Patient Name:  Leslie Oquendo       Medical Record Number: [id number]   YOB: 1955  Sex: Female          Room/Bed:  /[facility] 269-01  Payor Info:  Payor: [employer] / Plan: [employer] / Product Type: Blue Fee for Service /      Admitting Diagnosis: Pelvic fracture ([facility] ) [S32  9XXA]   Admit Date/Time:  2/27/2021  3:06 PM  Admission Comments: No comment available     Primary Diagnosis:  Multiple closed fractures of pelvis ([facility] )  Principal Problem: Multiple closed fractures of pelvis [facility])    Patient Active Problem List    Diagnosis Date Noted    Potential for cognitive impairment 03/03/2021    Oral dyskinesia 03/02/2021    Gastroparesis 03/01/2021    Depression 02/28/2021    Hypothyroidism 02/28/2021    Abnormal findings on imaging test 02/28/2021    Hypertension 02/27/2021    Pain of right upper extremity 02/27/2021    Multiple closed fractures of pelvis ([facility] ) 02/27/2021    Closed nondisplaced fracture of anterior wall of right acetabulum ([facility] ) 02/23/2021    Closed nondisplaced fracture of right pubis ([facility] ) 02/23/2021    Fall 02/23/2021    Parotid gland enlargement 08/29/2019    Sjogren's disease ([facility] ) 08/29/2019       Physical Therapy:    Weight Bearing Status: Weight Bearing as Tolerated(b/l LEs)  Transfers: Minimal Assistance  Bed Mobility: Maximum Assistance  Amulation Distance (ft): 125 feet  Ambulation: Incidental Touching  Assistive Device for Ambulation: Roller Walker  Number of Stairs: 2  Assistive Device for Stairs: Bilateral [employer]  Stair Assistance: Minimal Assistance  Ramp: Incidental Touching  Assistive Device for Ramp: Roller Walker  Discharge Recommendations: Home with:  [address] with[de-identified] Family Support, Home Physical Therapy, First Floor Setup    3/3/21  Barriers to d/c home include 1+7 MICHAEL, limited family support as  and son work, decreased LE strength, pain which limits pt from tolerating tx at times, decreased activity tolerance, and decreased balance  POC has focused on gait trng, transfer trng, LE strengthening and ROM, balance trng, and conditioning as well as stair and curb step trng  Pain limiting progress however pt would cont to benefit from skilled PT intervention to address barriers to d/c home  Will also focus on FT next week to have  come in and practice steps with pt  Occupational Therapy:  Eating: Independent  Grooming: Incidental Touching  Bathing: Minimal Assistance  Bathing: Minimal Assistance  Upper Body Dressing: Supervision  Lower Body Dressing: Minimal Assistance  Toileting: Minimal Assistance  Tub/Shower Transfer: (SB at this time)  Toilet Transfer: Minimal Assistance  Cognition: Exceptions to WNL  Cognition: Decreased Memory  Orientation: Person, Place, Time, Situation  Discharge Recommendations: (pending progress)       Pt is making slow gains towards OT goals  ADL Status: S UB dressing, Ebony LB dressing, Ebony bathing (SB), Ebony toilet transfer, Ebony CM/Hyg  Transfer's: CG/Ebony SPT with RW  D/C Plan: Pt lives with  and son in bilevel home with 1+7 stairs to main level with family room, kitchen and no IN  +7 steps up to bedroom   and full bathroom with tub/shower  Reports  typically works 10a-200p and son's work schedule varies  Pt barriers include decreased activity tolerance, decreased UB strength, decreased standing tolerance, decreased functional standing balance, pain  Anticipated plan to Re-team with OT plan to address above noted barriers and schedule family training      Speech Therapy:           No notes on file    Nursing Notes:  Appetite: Good  Diet Type: Regular/House                                                                     Pain Location/Orientation: Orientation: Right, Location: Hip, Location: Rib Cage  Pain Score: 8  Pain 2  Pain Score 2: 8  Pain Location/Orientation 2: Orientation: Right, Location: Shoulder(and wrist)                    Hospital Pain Intervention(s): (declined CP for pain relief)          Eunice Mcdermott is a 78 y o  female with Sjogren's disease, depression, hypothyroidism, HTN who presented to the [address] on 2/22/21 with fall down stairs  Work-up revealed right inferior pubic rami fracture, right pubic symphysis fracture, right anterior acetabular fracture, left anterior acetabular fracture  Patient also with right shoulder abrasion and brusing of right elbow  Patient evaluated by Dr Jackson Woodard (orthopedics) and treated non-operatively  Deemed WBAT BLE  Patientn started on Lovenox for DVT ppx  Medically, transiently required oxygen and had leukocytosis, both which improved  Patient also treated for acute pain  After medical stabilization, patient found to have acute functional deficits from her baseline  Hypothyroidism is managed by synthroid  Depression is managed by Managed on Prozac (home dose) and Xanax low dose for anxiety  Hypertension is managed by Managed on Norvasc, Atenolol  Sjogren's Disease is Managed on weekly Methotrexate  Multiple closed fractures of pelvis Deemed WBAT BLE  Pain is managed by APAP TID; Gabapentin 100mg HS, PRN oxy 2 5-5mg Q4H PRN  Lovenox for DVT ppx  This week we will monitor vital signs and lab values  We will educate patient on the importance of offloading pressure to prevent skin breakdown  Pt will have adequate pain relief to fully participate in therapies  We will keep patient safe from falls by continuing with hourly rounding and making sure call bell is within reach  We will promote independence with ADLs  Case Management:     Discharge Planning  Living Arrangements: Spouse/significant other  Support Systems: Spouse/significant other  Assistance Needed: tbd  Type of Current Residence: Private residence  Current Home Care Services: No  No notes on file    CM met with pt to discuss rehab program and CM role  Will work with pt and team to identify needed resources for safe DC home  Is the patient actively participating in therapies? yes  List any modifications to the treatment plan: No    Barriers Interventions   Pain (R rib) Modifications to meds, adding topical gel   Steps to enter Stair training, leg strengthening   Decreased memory Will complete MoCA to help target specific deficits  Will consider need for family support with self care tasks  Decreased endurance Energy conservation/ short term compensation, increase activity level as appropriate         Is the patient making expected progress toward goals? yes  List any update or changes to goals: No    Medical Goals: Patient will be medically stable for discharge to [facility] restrictive envrionment upon completion of rehab program    Weekly Team Goals:   Rehab Team Goals  ADL Team Goal: Patient will require supervision with ADLs with least restrictive device upon completion of rehab program  Bowel/Bladder Team Goal: Patient will return to premorbid level for bladder/bowel management upon completion of rehab program  Transfer Team Goal: Patient will be independent with transfers with least restrictive device upon completion of rehab program  Locomotion Team Goal: Patient will be independent with locomotion with least restrictive device upon completion of rehab program(S for longer distance amb)  Cognitive Team Goal: Patient will require supervision for basic and complex tasks upon completion of rehab program    Discussion: Pt is making good progress toward her rehab goals however does present with the above barriers which the team will be working on addressing as noted  Over the next week the team will focus on progressing functionally  The team will also be bringing in the family to initiate training over the next week,    Anticipated Discharge Date:  [facility] Team Members Present:   The following team members are supervising care for this patient and were present during this Weekly Team Conference      Physician: Dr Ellis Qureshi MD  : Sadia Shelton DPT  Registered Nurse: Hudson Buckner, RN  Physical Therapist: STEPHANIE DeweyT  Occupational Therapist: Trish Tran MS, OTR/L

## 2021-03-04 NOTE — QUICK NOTE
Patient had gone for pelvic x-ray around 1400  Upon return patient had told nursing that she experienced nausea/emesis down in x-ray along with increase in R sided rib pain  Examined the patient upon arrival around 1445, physical exam WNL besides tenderness to palpation on R thoracic area of back  States that the pain is 10/10 and that it feels like "bones rubbing together "  Pointed to the area of pain and pointed right below the R breast radiating laterally towards the side  Asked if she had this pain before and stated that she has had this pain since the fall but it has not felt this severe until now  Feels that she is more short of breath, but feels that it is because the pain is so severe and hurts more when she breathes, making it harder to take larger breaths  States that it was there all day but after getting on the x-ray table it became severe and then she had a brief moment of nausea and emesis  At the time of the exam she stated that she no longer had nausea  Denied any chest pain, palpitations, or abdominal pain at this time  Nursing was in the room and offered the patient xanax and pain medication  Ordered portable CXR to rule out rib fracture, and to avoid sending patient back down to x-ray room  Reviewed case with attending provider and primary provider, who then both examined the patient, and decided to order EKG, troponin, CMP, CBCD, and CT PE scan  Nursing and patient made aware of new orders

## 2021-03-04 NOTE — ASSESSMENT & PLAN NOTE
Chronic and stable per patient  Monitor vitals and ortostatics  Basic labs acceptable  Comgmt with IM

## 2021-03-04 NOTE — DISCHARGE INSTRUCTIONS
DISCHARGE INSTRUCTIONS: Mikayla Rodarte 64 22    Bring these instructions with you to your Outpatient Physician appointments so they can order and follow-up any additional lab work or imaging recommended at time of discharge  IT IS YOU OR YOUR FAMILY'S RESPONSIBILITY TO OBTAIN FOLLOW-UP MEDICATION REFILLS AS INDICATED THROUGH YOUR PCP OR OTHER OUTPATIENT SPECIALTY PROVIDER AFTER DISCHARGE  PLEASE FOLLOW-UP WITH YOUR PCP AS SOON AS POSSIBLE AFTER DISCHARGE TO SET-UP FOLLOW-UP MANAGEMENT AND WHEN APPROPRIATE REFILLS    If you have any questions or concerns regarding your acute rehabilitation stay including issues with medications, rehabilitation, and follow-up plan, please call:          [facility]'s Acute Rehabilitation Unit at [facility] at [phone]    Should you develop fevers, chills, new weakness, changes in sensation, difficulty speaking, facial weakness, confusion, shortness of breath, chest pain, or other concerning symptoms please call 911 and/or obtain transportation to nearest ER immediately  PHYSICIANS to see:  Please see your doctors listed in the follow up providers section of your discharge paperwork, and take the discharge paperwork with you to your appointments  ADDITIONAL FINDINGS and ISSUES to follow-up:    INCIDENTAL FINDINGS ON RECENT CT SCAN:  · "ABDOMINOPELVIC CAVITY:  No ascites  No pneumoperitoneum  No lymphadenopathy  Calcified right retroperitoneal node measuring 21 mm  # 2/72  · LUNGS:  Mild hypoventilatory changes  4/82 and #4/ 14 mm left lower lobe subpleural nodule versus focal consolidation #4/54  Similar more inferior 8mm and 14 mm opacities #95  · LIVER/BILIARY TREE:  One or more subcentimeter sharply circumscribed low-density hepatic lesion(s) are noted, too small to accurately characterize, but statistically most likely to represent subcentimeter hepatic cysts  No suspicious solid hepatic lesion is identified    Hepatic contours are normal   No biliary dilatation  > Discuss with your primary care physician at next visit within about 1 week    Check under the "DISCHARGE PROVIDER" section of these [address] for provider specific issues as well  Excessive delay or lack of appropriate follow-up could potentially increase your risk of complications which could be severe and even life-threatening  It is you or your caregiver's responsibility to ensure these tests are ordered by your outpatient providers and followed up with accordingly

## 2021-03-04 NOTE — DISCHARGE SUMMARY
Discharge Summary - PMR   Leslie Fine 78 y o  female MRN: [id number]  Unit/Bed#: -58 Encounter: [id number]    Admission Date: 2/27/2021     Discharge Date: 3/4/2021    Rehabilitation/Etiologic Diagnosis:   Orthopedic Disorders:  08 3  Pelvic Fracture    Discharge Diagnoses:      Patient Active Problem List   Diagnosis    Parotid gland enlargement    Sjogren's disease ([facility] [address] [facility]    Closed nondisplaced fracture of anterior wall of right acetabulum ([facility] [address] [facility]    Closed nondisplaced fracture of right pubis ([facility] [address] [facility]    Fall    Hypertension    Pain of right upper extremity    Multiple closed fractures of pelvis ([facility] [address] [facility]    Depression    Hypothyroidism    Abnormal findings on imaging test    Gastroparesis    Oral dyskinesia    Potential for cognitive impairment    Anemia    Hypokalemia    Dyspnea    Lightheadedness    Pain    EKG abnormalities       Acute Rehabilitation Center Course:   (with significant findings, care, complications, treatment, and services provided):      Patient was participated adequately in a comprehensive interdisciplinary inpatient rehabilitation program which included involvment of MD, therapies (PT, OT), RN, CM/SW, dietary  She was making reasonable functional gains rehabilitating from recent pelvic fractures  On 3/4 early am, patient noted some increased R hip and pelvic pain and x-ray showed stable fractures  Then, later in day on 3/4/21 patient reported significant R chest wall pain which was in part reproducible as well as some RUQ pain  Her vitals remained stable on room air  Patient was evaluated by our internal medicine team with recommendation for EKG, CTA chest, CT abdomen, and labs as outlined below  Medicine noted possible flipped T wave in V2 lead and recommended transfer to telemetry  SLIM service was contacted and reviewed case and agree to acceptance at [address] telemetry at this time who can consult cardiology as active work-up is ongoing    See below for additional details  EKG abnormalities  Assessment & Plan  IM reports - EKG  flip in T wave in V2; troponin x1 negative   - Recommend telemetry transfer to acute > cardiology can consult there     Pain  Assessment & Plan  3/4 - later in day notified patient having R chest wall pain even up to 10 out of 10 at times somewhat reproducible in R chest wall but also has some RUQ; Vitals stable on room air - may be component of MSK as pt did fall on R side but other more urgent etiologies remains fairly high on differential and must be assessed  - IM has been following with overall management at their discretion during ARC course  - Dr Kian Ramirez, medicine and medicine PA evaluated     - CTA chest to rule out PE and other pathologies  - CT Abdomen with RUQ pain recommended     - CBC and CMP - slight increase in WBC but not leukocytosis; Hb stable; AST and Alk phos slightly elevated    - EKG recommended - interpretation per IM - they report flip in T wave in V2; troponin x1 negative   - IM recommends transfer to telemetry > [facility]IM accepting to [address] [facility]        * Multiple closed fractures of pelvis ([facility] [address] )  Assessment & Plan  · Traumatic fall on stairs:  · Resultant right inferior pubic rami fracture, right pubic symphysis fracture, right anterior acetabular fracture, left anterior acetabular fracture  · Evaluated by Orthopedics - treat conservatively  · WBAT BLE  · Continue DVT ppx - lovenox   · Follow-up with Trauma/Orthopedics as outpatient   · R hip and pelvis pain increased today again - neurovascularly intact RLE   · R hip/pelvis x-ray 3/4 stable fractures Subtle right pelvic fractures as noted above  These correspond approximately to the CT findings  No new or displaced pelvic fractures are demonstrated    · Continue APAP TID; Gabapentin 100mg HS  · Change to oxy 5-7 5 mg Q4H PRN   · PRN Robaxin 500mg Q6H PRN   · Holding parameters on sedating meds      Acceptable participation; continue plan as outlined    Recommend acute comprehensive interdisciplinary inpatient rehabilitation to include intensive skilled therapies (PT, OT) as outlined with oversight and management by rehabilitation physician as well as inpatient rehab level nursing, case management and weekly interdisciplinary team meetings  Gastroparesis  Assessment & Plan  - Did vomit once earlier today; denies N/V now  - Stooling adequately; oral intake acceptable; continue mgmt plan as outlined   - Common in Sjogrens - now off chronic Reglan with oral dyskinesia but need to closely follow to ensure adequate GI function  IM consulted and with overall management at their discretion during ARC course  Was on Reglan chronically but stopped in the acute setting due to concerns for extrapyramidal symptoms - she does have fair amount of oral dyskinesias which she feels is related to Sjogrens but may in part be related to this   - Monitor for s/s of obstruction > not currently   - Monitor closely for intake; constipation as she remains at risk  - Colace, senna  - Refuses miralax as she finding the texture difficulty and odd to swallow  - PRN Lactulose, dulcolax supp  -  patient on importance for stooling adequately     Lightheadedness  Assessment & Plan  Chronic and stable per patient  Monitor vitals and ortostatics  Basic labs acceptable  Comgmt with IM       Dyspnea  Assessment & Plan  Denies significant SOB currently     CTA chest as outlined above     Chronic and stable per patient  Comgmt with IM service in [place]   - Saturating well on RA with and without activity - continue to monitor  Recent CT chest  - LUNGS:  Mild hypoventilatory changes  14 mm left lower lobe subpleural nodule versus focal consolidation #4/54  Similar more inferior 8mm and 14 mm opacities # 4/82 and #4/95  PLEURA:  Unremarkable  HEART/GREAT VESSELS:  Unremarkable for patient's age  [de-identified] AND ROBERTA:  Unremarkable    - OP follow-up of incidental findings above  - Follow-up with OP PCP      Hypokalemia  Assessment & Plan  IM consulted, monitoring, repletion, and overall management at their discretion during ARC course      Anemia  Assessment & Plan  Hb stable  Monitor     Potential for cognitive impairment  Assessment & Plan  Therapy noting some concern with memory and comprehension  - Pt states she is at her baseline; she is oriented x4 able to follow simple commands; pt on occasional oxycodone PRN for pain but does not appear overly sedated  - Obtain MOCA   - Compensatory strategies    Oral dyskinesia  Assessment & Plan  Stable again  Patient feels this is related to Sjogrens but movements are more consistent with EPS and likely TD in context of long-standing metoclopramide   - Comgmt with IM  - Metoclopramide discontinued prior to [facility] which is first line mgmt (holding DA blockers)   - This may be difficult to improve at this point  - Monitor during ARC course  - Follow-up with PCP - consider pharmacologic tx such at tetrabenazine if needed in future     Abnormal findings on imaging test  Assessment & Plan  · Seen on CT Abd/Pelvis   · ABDOMINOPELVIC CAVITY:  No ascites  No pneumoperitoneum  No lymphadenopathy  Calcified right retroperitoneal node measuring 21 mm  # 2/72  · LUNGS:  Mild hypoventilatory changes  14 mm left lower lobe subpleural nodule versus focal consolidation #4/54  Similar more inferior 8mm and 14 mm opacities # 4/82 and #4/95  · LIVER/BILIARY TREE:  One or more subcentimeter sharply circumscribed low-density hepatic lesion(s) are noted, too small to accurately characterize, but statistically most likely to represent subcentimeter hepatic cysts  No suspicious solid hepatic lesion is identified  Hepatic contours are normal   No biliary dilatation  · Patient will need follow-up with PCP    Hypothyroidism  Assessment & Plan  · Managed on Synthroid per IM     Depression  Assessment & Plan  · Mood stable   · Managed on Prozac (home dose) and Xanax low dose for anxiety  [place] - patient was not on Xanax prior  · Follow-up with PCP as outpatient     Hypertension  Assessment & Plan  · Managed on Norvasc, Atenolol  · Consulted internal medicine     Sjogren's disease ([facility] [address] )  Assessment & Plan  · Managed on weekly Methotrexate  · Follow-up with Dr Oglesby Failing    Parotid gland enlargement  Assessment & Plan  · Follows with Dr Jerrica Foster >  [address] service; cards consult     Follow-up providers and other issues to be followed up after discharge  ·  PCP  ·  rheumatology  ·  orthopedics  ·  possibly GI    - See AVS (After visit summary) with discharge instructions, discharge medications, and other details  Imaging (See above as well):  Xr Hip/pelv 2-3 Vws Right If Performed    Result Date: 3/4/2021  Impression: Subtle right pelvic fractures as noted above  These correspond approximately to the CT findings  No new or displaced pelvic fractures are demonstrated  Workstation performed: [id number]     CTA chest and CT abdomen pending     Pertinent Recent Labs (See Course above, EPIC EMR, and if needed request additional records):  Results for Cleo Rivers (MRN [id number]) as of 3/4/2021 18:09   Ref   Range 3/4/2021 16:24 3/4/2021 16:25 3/4/2021 16:40   Sodium Latest Ref Range: 137 - 147 mmol/L 132 (L)     Potassium Latest Ref Range: 3 6 - 5 0 mmol/L 3 5 (L)     Chloride Latest Ref Range: 97 - 108 mmol/L 98     CO2 Latest Ref Range: 22 - 30 mmol/L 25     Anion Gap Latest Ref Range: 5 - 14 mmol/L 9     BUN Latest Ref Range: 5 - 25 mg/dL 13     Creatinine Latest Ref Range: 0 60 - 1 20 mg/dL 0 57 (L)     Glucose, Random Latest Ref Range: 70 - 99 mg/dL 107 (H)     Calcium Latest Ref Range: 8 4 - 10 2 mg/dL 9 2     AST Latest Ref Range: 14 - 36 U/L 69 (H)     ALT Latest Ref Range: 9 - 52 U/L 32     Alkaline Phosphatase Latest Ref Range: 43 - 122 U/L 172 (H)     Total Protein Latest Ref Range: 5 9 - 8 4 g/dL 8 2     Albumin Latest Ref Range: 3 0 - 5 2 g/dL 4 0     TOTAL BILIRUBIN Latest Ref Range: <1 30 mg/dL 0 80     eGFR Latest Ref Range: >60 ml/min/1 73sq m 96     Troponin I Latest Ref Range: 0 00 - 0 03 ng/mL  <0 01    WBC Latest Ref Range: 4 50 - 11 00 Thousand/uL   10 10   Red Blood Cell Count Latest Ref Range: 4 00 - 5 20 Million/uL   3 53 (L)   Hemoglobin Latest Ref Range: 12 0 - 16 0 g/dL   9 7 (L)   HCT Latest Ref Range: 36 0 - 46 0 %   29 6 (L)   MCV Latest Ref Range: 80 - 100 fL   84   MCH Latest Ref Range: 26 0 - 34 0 pg   27 4   MCHC Latest Ref Range: 31 0 - 36 0 g/dL   32 7   RDW Latest Ref Range: <15 3 %   20 8 (H)   Platelet Count Latest Ref Range: 150 - 450 Thousands/uL   244       Procedures Performed During ARC Admission: None    Discharge Physical Examination:  Temp:  [97 8 °F (36 6 °C)-98 5 °F (36 9 °C)] 98 °F (36 7 °C)  HR:  [61-69] 67  Resp:  [18] 18  BP: (104-161)/(61-75) 158/70  SpO2:  [95 %-100 %] 97 %  GEN:  Lying in bed in some discomfort at times  HEENT/NECK: Normocephalic, atraumatic, moist mucous membranes   CARDIAC: Regular rate rhythm, no murmers, no rubs, no gallops  CHEST: some tenderness to palpation to R anterior lower chest pain both rib and intercostal spaces  LUNGS:  Trace decreased sounds at R>L base otherwise fairly clear  ABDOMEN: Some RUQ pain to palpation; soft; nondistended normoactive sounds  EXTREMITIES/SKIN:  no calf edema, no calf tenderness to palpation  NEURO:   MENTAL STATUS:  Appropriate wakefulness and interaction  and Strength/MMT:  Unchanged; Near full throughout with slight prox RLE weakness from recent fx likely  PSYCH:  Affect:  Euthymic     HPI:   Per Dr Johnny Castorena  "Sherie Sanchez is a 78 y o  female with Sjogren's disease, depression, hypothyroidism, HTN who presented to the [employer] [facility] on 2/22/21 with fall down stairs    Work-up revealed right inferior pubic rami fracture, right pubic symphysis fracture, right anterior acetabular fracture, left anterior acetabular fracture  Patient also with right shoulder abrasion and brusing of right elbow  Patient evaluated by Dr Jad Teague (orthopedics) and treated non-operatively  Deemed WBAT BLE  Patientn started on Lovenox for DVT ppx  Medically, transiently required oxygen and had leukocytosis, both which improved  Patient also treated for acute pain  After medical stabilization, patient found to have acute functional deficits from her baseline, therefore was admitted to [address] [place] [address] for acute inpatient rehabilitation  "     Condition at Discharge: serious     Discharge instructions/Information to patient and family:   See after visit summary for information provided to patient and family  Provisions for Follow-Up Care:  See after visit summary for information related to follow-up care and any pertinent home health orders  Disposition: - Acute Care Telemetry     Planned Readmission:  No    Discharge Statement   Total time spent examining patient, counseling patient (or patient/family) on condition, medication, rehabilitation/medical plan, and coordinating care on day of discharge: at least 45 minutes  Greater than 50% of the total time was spent examining patient, answering all questions, directly discussing plan of care and post-discharge instructions with patient (or patient and family)  Additional time spent on coordinating care and other discharge activities  Discharge Medications:  See after visit summary for reconciled discharge medications provided to patient and family

## 2021-03-04 NOTE — PROGRESS NOTES
03/04/21 1500   Assessment   Treatment Assessment First attempted to see pt for scheduled 60 min PT session at 1430, pt declining stating "I'm in so much pain and I've been throwing up " Educated pt on benefits of participating in therapy pt still declining stating "one day won't do anything " Offered to perform seated TE pt stating "I don't want to move unless I have to " Checked in again on pt at 1500, pt still declining therapy stating pain too much  Therapy Time missed   Time missed?  Yes   Amount of time missed 60   Reason for time missed Extreme fatigue  (pain)   Time(s) multiple attempts made yes

## 2021-03-04 NOTE — PROGRESS NOTES
Progress Note - Vera Freed Viscomi 1955, 78 y o  female MRN: [id number]    Unit/Bed#: [facility] 752-20 Encounter: [id number]    Primary Care Provider: Nadyne Castleman, MD   Date and time admitted to hospital: 2/27/2021  3:06 PM        Gastroparesis  Assessment & Plan  Formerly on Reglan - stopped in the acute setting due to concerns for extrapyramidal symptoms  Monitor for s/s of bowel obstruction  Continue bowel regimen  Abnormal findings on imaging test  Assessment & Plan  CT on 2/22 - Calcified R retroperitoneal node measuring 21mm  Recommend follow-up in 2 weeks  Hypothyroidism  Assessment & Plan  Currently on levothyroxine 88mcg daily at home (States that she was increased 1 month ago from 75mcg)  Last TSH level unknown  Continue to follow-up with outpatient PCP  Depression  Assessment & Plan  Stable  Continue Prozac 20mg daily and Xanax PRN for anxiety  Supportive counseling as needed  Hypertension  Assessment & Plan  Home regimen: Norvasc 2 5mg daily and atenolol 50mg BID  Episode of hypertensive urgency while in acute setting  Monitor routine VS   Increase Norvasc to 5mg daily on 3/4  Sjogren's disease ([facility] [address] )  Assessment & Plan  Takes methotrexate 20mg weekly  Follows up with Dr Naveen Waller (Rheumatology) as outpatient  Parotid gland enlargement  Assessment & Plan  Stable  Yearly follow-up with Dr Harpreet Moscoso (ENT) as outpatient  * Multiple closed fractures of pelvis [facility])  Assessment & Plan  2/22 - Mechanical fall down the stairs predominately on the R side  Multiple pelvic fractures: Acute nondisplaced fracture of the right inferior pubic ramus and right pubic symphysis, anterior right acetabular fracture and probable nondisplaced left anterior acetabular fracture  Ortho consulted - no surgical intervention at this time  Monitor for s/s pelvic bleeding  Pain management  WBAT  DVT Prophylaxis - Lovenox  Follow-up with Dr Akiko Prather (Orthopedics) office in 6 weeks      PT/OT Therapies  Primary team following  VTE Pharmacologic Prophylaxis:   Pharmacologic: Enoxaparin (Lovenox)  Mechanical VTE Prophylaxis in Place: Yes - sequential compression devices  Current Length of Stay: 5 day(s)    Current Patient Status: Inpatient Rehab     Discharge Plan: As per primary team     Code Status: Level 1 - Full Code    Subjective:   Pt examined while sitting in WC in pt room  Complaints of R groin pain that radiates to the buttocks that she rates 8/10, throbbing/stabbing, improves only slightly with pain medication  Has been using voltaren gel and ice as well with very little improvement  Denies any lethargy after taking oxycodone  Discussed with Primary to increase pain medication  Slept well last night  Therapy has been going well but concerned about making it through her next session with the pain she is experiencing  LBM this morning - states that it was loose along with the BM she had yesterday afternoon  Spoke with nursing who had also held her stool softeners this morning and will hold again this afternoon  Objective:     Vitals:   Temp (24hrs), Av 2 °F (36 8 °C), Min:97 8 °F (36 6 °C), Max:98 5 °F (36 9 °C)    Temp:  [97 8 °F (36 6 °C)-98 5 °F (36 9 °C)] 98 5 °F (36 9 °C)  HR:  [61-69] 69  Resp:  [18] 18  BP: (121-161)/(59-75) 161/75  SpO2:  [95 %-100 %] 100 %  Body mass index is 28 71 kg/m²  Review of Systems   Constitutional: Negative for chills, fatigue and fever  Respiratory: Negative for cough and shortness of breath  Cardiovascular: Negative for chest pain, palpitations and leg swelling  Gastrointestinal: Negative for abdominal distention, abdominal pain, constipation, diarrhea, nausea and vomiting  LBM 3/4 - loose   Genitourinary: Negative for difficulty urinating  Musculoskeletal: Positive for arthralgias (8/10 R groin pain, radiates to buttocks, throbbing/stabbing, mild improvement with pain medication)     Neurological: Negative for dizziness, light-headedness and numbness  Psychiatric/Behavioral: Negative for sleep disturbance  Input and Output Summary (last 24 hours): Intake/Output Summary (Last 24 hours) at 3/4/2021 0920  Last data filed at 3/4/2021 0846  Gross per 24 hour   Intake 1240 ml   Output 700 ml   Net 540 ml       Physical Exam:     Physical Exam  Vitals signs and nursing note reviewed  Constitutional:       Appearance: Normal appearance  HENT:      Head: Normocephalic and atraumatic  Cardiovascular:      Rate and Rhythm: Normal rate and regular rhythm  Pulses: Normal pulses  Heart sounds: Normal heart sounds  No murmur  No friction rub  Pulmonary:      Effort: Pulmonary effort is normal  No respiratory distress  Breath sounds: Normal breath sounds  No wheezing or rhonchi  Abdominal:      General: Abdomen is flat  Bowel sounds are normal  There is no distension  Palpations: Abdomen is soft  Tenderness: There is no abdominal tenderness  Musculoskeletal:      Right lower leg: No edema  Left lower leg: No edema  Skin:     General: Skin is warm and dry  Comments: Scabbed area on R shoulder - no erythema, edema, or drainage present  Neurological:      Mental Status: She is alert and oriented to person, place, and time  Comments: Slurred speech, oral dyskinesia     Psychiatric:         Mood and Affect: Mood normal          Behavior: Behavior normal          Additional Data:     Labs:    Results from last 7 days   Lab Units 03/04/21  0448   WBC Thousand/uL 6 40   HEMOGLOBIN g/dL 9 6*   HEMATOCRIT % 29 8*   PLATELETS Thousands/uL 227   NEUTROS PCT % 85*   LYMPHS PCT % 6*   MONOS PCT % 8   EOS PCT % 1     Results from last 7 days   Lab Units 03/04/21  0448   SODIUM mmol/L 136*   POTASSIUM mmol/L 3 2*   CHLORIDE mmol/L 101   CO2 mmol/L 27   BUN mg/dL 13   CREATININE mg/dL 0 60   ANION GAP mmol/L 8   CALCIUM mg/dL 8 9   GLUCOSE RANDOM mg/dL 101*                       Labs reviewed    Imaging:    Imaging reviewed    Recent Cultures (last 7 days):           Last 24 Hours Medication List:   Current Facility-Administered Medications   Medication Dose Route Frequency Provider Last Rate    acetaminophen  650 mg Oral Q8H Jocelynn Cash MD      ALPRAZolam  0 25 mg Oral TID PRN Monae Velazquez MD      [START ON 3/5/2021] amLODIPine  5 mg Oral Daily DARIO Spring      atenolol  50 mg Oral BID Monae Velazquez MD      atorvastatin  10 mg Oral Daily Monae Velazquez MD      bisacodyl  10 mg Rectal Daily PRN Monae Velazquez MD      Diclofenac Sodium  2 g Topical 4x Daily Monae Velazquez MD      docusate sodium  100 mg Oral BID Monae Velazquez MD      enoxaparin  30 mg Subcutaneous Q12H [address] Monae Velazquez MD      FLUoxetine  20 mg Oral Daily Monae Velazquez MD      folic acid  1 mg Oral Daily Monae Velazquez MD      gabapentin  100 mg Oral HS Monae Velazquez MD      lactulose  20 g Oral BID PRN Casie Sagastume MD      levothyroxine  88 mcg Oral Early Morning Monae Velazquez MD      lidocaine  2 patch Topical HS Monae Velazquez MD      methocarbamol  500 mg Oral Q6H PRN Monae Velazquez MD      methotrexate  20 mg Oral Weekly Monae Velazquez MD      nystatin  1 application Topical BID DARIO Spring      ondansetron  4 mg Oral Q6H PRN Monae Velazquez MD      oxyCODONE  2 5 mg Oral Q4H PRN Monae Velazquez MD      oxyCODONE  5 mg Oral Q4H PRN Monae Velazquez MD      potassium chloride  40 mEq Oral Once DARIO Spring      senna  1 tablet Oral BID Casie Sagastume MD          M*Modal software was used to dictate this note  It may contain errors with dictating incorrect words or incorrect spelling  Please contact the provider directly with any questions

## 2021-03-05 ENCOUNTER — APPOINTMENT (OUTPATIENT)
Dept: NON INVASIVE DIAGNOSTICS | Facility: HOSPITAL | Age: 66
DRG: 313 | End: 2021-03-05
Payer: COMMERCIAL

## 2021-03-05 PROBLEM — R06.09 DOE (DYSPNEA ON EXERTION): Status: ACTIVE | Noted: 2021-03-04

## 2021-03-05 LAB
ALBUMIN SERPL BCP-MCNC: 3.9 G/DL (ref 3–5.2)
ALP SERPL-CCNC: 169 U/L (ref 43–122)
ALT SERPL W P-5'-P-CCNC: 37 U/L (ref 9–52)
ANION GAP SERPL CALCULATED.3IONS-SCNC: 7 MMOL/L (ref 5–14)
AST SERPL W P-5'-P-CCNC: 64 U/L (ref 14–36)
ATRIAL RATE: 62 BPM
ATRIAL RATE: 63 BPM
ATRIAL RATE: 67 BPM
ATRIAL RATE: 70 BPM
BILIRUB SERPL-MCNC: 0.8 MG/DL
BUN SERPL-MCNC: 13 MG/DL (ref 5–25)
CALCIUM SERPL-MCNC: 9 MG/DL (ref 8.4–10.2)
CHLORIDE SERPL-SCNC: 100 MMOL/L (ref 97–108)
CHOLEST SERPL-MCNC: 154 MG/DL
CO2 SERPL-SCNC: 28 MMOL/L (ref 22–30)
CREAT SERPL-MCNC: 0.66 MG/DL (ref 0.6–1.2)
GFR SERPL CREATININE-BSD FRML MDRD: 92 ML/MIN/1.73SQ M
GLUCOSE SERPL-MCNC: 96 MG/DL (ref 70–99)
HDLC SERPL-MCNC: 17 MG/DL
LDLC SERPL CALC-MCNC: 84 MG/DL
MAGNESIUM SERPL-MCNC: 2 MG/DL (ref 1.6–2.3)
NONHDLC SERPL-MCNC: 137 MG/DL
NT-PROBNP SERPL-MCNC: 382 PG/ML (ref 0–299)
P AXIS: 22 DEGREES
P AXIS: 3 DEGREES
P AXIS: 45 DEGREES
POTASSIUM SERPL-SCNC: 4.3 MMOL/L (ref 3.6–5)
PR INTERVAL: 170 MS
PR INTERVAL: 174 MS
PR INTERVAL: 190 MS
PROT SERPL-MCNC: 7.9 G/DL (ref 5.9–8.4)
QRS AXIS: -12 DEGREES
QRS AXIS: -18 DEGREES
QRS AXIS: -21 DEGREES
QRS AXIS: -7 DEGREES
QRSD INTERVAL: 102 MS
QRSD INTERVAL: 82 MS
QRSD INTERVAL: 84 MS
QRSD INTERVAL: 94 MS
QT INTERVAL: 474 MS
QT INTERVAL: 476 MS
QT INTERVAL: 484 MS
QT INTERVAL: 494 MS
QTC INTERVAL: 483 MS
QTC INTERVAL: 495 MS
QTC INTERVAL: 500 MS
QTC INTERVAL: 533 MS
SODIUM SERPL-SCNC: 135 MMOL/L (ref 137–147)
T WAVE AXIS: -35 DEGREES
T WAVE AXIS: 35 DEGREES
T WAVE AXIS: 36 DEGREES
T WAVE AXIS: 38 DEGREES
TRIGL SERPL-MCNC: 263 MG/DL
TROPONIN I SERPL-MCNC: <0.01 NG/ML (ref 0–0.03)
VENTRICULAR RATE: 62 BPM
VENTRICULAR RATE: 63 BPM
VENTRICULAR RATE: 67 BPM
VENTRICULAR RATE: 70 BPM

## 2021-03-05 PROCEDURE — 99244 OFF/OP CNSLTJ NEW/EST MOD 40: CPT | Performed by: INTERNAL MEDICINE

## 2021-03-05 PROCEDURE — 80061 LIPID PANEL: CPT | Performed by: PHYSICIAN ASSISTANT

## 2021-03-05 PROCEDURE — 97535 SELF CARE MNGMENT TRAINING: CPT

## 2021-03-05 PROCEDURE — 93005 ELECTROCARDIOGRAM TRACING: CPT

## 2021-03-05 PROCEDURE — 93010 ELECTROCARDIOGRAM REPORT: CPT | Performed by: INTERNAL MEDICINE

## 2021-03-05 PROCEDURE — 99226 PR SBSQ OBSERVATION CARE/DAY 35 MINUTES: CPT | Performed by: INTERNAL MEDICINE

## 2021-03-05 PROCEDURE — 83735 ASSAY OF MAGNESIUM: CPT | Performed by: PHYSICIAN ASSISTANT

## 2021-03-05 PROCEDURE — 93010 ELECTROCARDIOGRAM REPORT: CPT

## 2021-03-05 PROCEDURE — 97163 PT EVAL HIGH COMPLEX 45 MIN: CPT

## 2021-03-05 PROCEDURE — 80053 COMPREHEN METABOLIC PANEL: CPT | Performed by: PHYSICIAN ASSISTANT

## 2021-03-05 PROCEDURE — 97167 OT EVAL HIGH COMPLEX 60 MIN: CPT

## 2021-03-05 PROCEDURE — 93306 TTE W/DOPPLER COMPLETE: CPT | Performed by: INTERNAL MEDICINE

## 2021-03-05 PROCEDURE — 84484 ASSAY OF TROPONIN QUANT: CPT | Performed by: PHYSICIAN ASSISTANT

## 2021-03-05 PROCEDURE — 83880 ASSAY OF NATRIURETIC PEPTIDE: CPT | Performed by: INTERNAL MEDICINE

## 2021-03-05 PROCEDURE — 93306 TTE W/DOPPLER COMPLETE: CPT

## 2021-03-05 RX ORDER — ONDANSETRON 2 MG/ML
4 INJECTION INTRAMUSCULAR; INTRAVENOUS EVERY 6 HOURS PRN
Status: DISCONTINUED | OUTPATIENT
Start: 2021-03-05 | End: 2021-03-09 | Stop reason: HOSPADM

## 2021-03-05 RX ORDER — OXYCODONE HYDROCHLORIDE 5 MG/1
5 TABLET ORAL EVERY 4 HOURS PRN
Status: DISCONTINUED | OUTPATIENT
Start: 2021-03-05 | End: 2021-03-09 | Stop reason: HOSPADM

## 2021-03-05 RX ADMIN — ACETAMINOPHEN 650 MG: 325 TABLET, FILM COATED ORAL at 21:23

## 2021-03-05 RX ADMIN — DOCUSATE SODIUM 100 MG: 100 CAPSULE ORAL at 09:07

## 2021-03-05 RX ADMIN — AMLODIPINE BESYLATE 5 MG: 5 TABLET ORAL at 09:07

## 2021-03-05 RX ADMIN — FLUOXETINE 20 MG: 20 CAPSULE ORAL at 09:07

## 2021-03-05 RX ADMIN — SENNOSIDES 8.6 MG: 8.6 TABLET, FILM COATED ORAL at 17:01

## 2021-03-05 RX ADMIN — ALPRAZOLAM 0.25 MG: 0.25 TABLET ORAL at 21:23

## 2021-03-05 RX ADMIN — ENOXAPARIN SODIUM 30 MG: 30 INJECTION SUBCUTANEOUS at 09:05

## 2021-03-05 RX ADMIN — NYSTATIN 1 APPLICATION: 100000 POWDER TOPICAL at 17:00

## 2021-03-05 RX ADMIN — ACETAMINOPHEN 650 MG: 325 TABLET, FILM COATED ORAL at 13:35

## 2021-03-05 RX ADMIN — LEVOTHYROXINE SODIUM 88 MCG: 0.09 TABLET ORAL at 05:14

## 2021-03-05 RX ADMIN — LIDOCAINE 2 PATCH: 50 PATCH TOPICAL at 21:23

## 2021-03-05 RX ADMIN — SENNOSIDES 8.6 MG: 8.6 TABLET, FILM COATED ORAL at 09:07

## 2021-03-05 RX ADMIN — DICLOFENAC SODIUM 2 G: 10 GEL TOPICAL at 09:07

## 2021-03-05 RX ADMIN — OXYCODONE HYDROCHLORIDE 10 MG: 10 TABLET ORAL at 09:05

## 2021-03-05 RX ADMIN — OXYCODONE HYDROCHLORIDE 5 MG: 5 TABLET ORAL at 21:22

## 2021-03-05 RX ADMIN — ATENOLOL 50 MG: 50 TABLET ORAL at 09:07

## 2021-03-05 RX ADMIN — FOLIC ACID 1 MG: 1 TABLET ORAL at 09:07

## 2021-03-05 RX ADMIN — ATENOLOL 50 MG: 50 TABLET ORAL at 21:23

## 2021-03-05 RX ADMIN — DICLOFENAC SODIUM 2 G: 10 GEL TOPICAL at 21:26

## 2021-03-05 RX ADMIN — DOCUSATE SODIUM 100 MG: 100 CAPSULE ORAL at 17:01

## 2021-03-05 RX ADMIN — GABAPENTIN 100 MG: 100 CAPSULE ORAL at 21:23

## 2021-03-05 RX ADMIN — ATORVASTATIN CALCIUM 10 MG: 10 TABLET, FILM COATED ORAL at 09:07

## 2021-03-05 RX ADMIN — ENOXAPARIN SODIUM 30 MG: 30 INJECTION SUBCUTANEOUS at 21:22

## 2021-03-05 RX ADMIN — NYSTATIN 1 APPLICATION: 100000 POWDER TOPICAL at 09:04

## 2021-03-05 NOTE — PHYSICAL THERAPY NOTE
PT ARC DISCHARGE SUMMARY  Pt was admitted to [address] [place] [address]  with dx of multiple closed fractures of pelvis  During pt's rehab stay she was making slow progress towards LTGs  She was currently functioning at minAx1 with RW at time of d/c  Pt d/c'd to acute for tele monitoring due to abnormal EKG  Pt would benefit from cont inpatient rehab once medically stable

## 2021-03-05 NOTE — PLAN OF CARE
Problem: Prexisting or High Potential for Compromised Skin Integrity  Goal: Skin integrity is maintained or improved  Description: INTERVENTIONS:  - Identify patients at risk for skin breakdown  - Assess and monitor skin integrity  - Assess and monitor nutrition and hydration status  - Monitor labs   - Assess for incontinence   - Turn and reposition patient  - Assist with mobility/ambulation  - Relieve pressure over bony prominences  - Avoid friction and shearing  - Provide appropriate hygiene as needed including keeping skin clean and dry  - Evaluate need for skin moisturizer/barrier cream  - Collaborate with interdisciplinary team   - Patient/family teaching  - Consider wound care consult   Outcome: Progressing     Problem: Potential for Falls  Goal: Patient will remain free of falls  Description: INTERVENTIONS:  - Assess patient frequently for physical needs  -  Identify cognitive and physical deficits and behaviors that affect risk of falls    -  [place] fall precautions as indicated by assessment   - Educate patient/family on patient safety including physical limitations  - Instruct patient to call for assistance with activity based on assessment  - Modify environment to reduce risk of injury  - Consider OT/PT consult to assist with strengthening/mobility  Outcome: Progressing     Problem: PAIN - ADULT  Goal: Verbalizes/displays adequate comfort level or baseline comfort level  Description: Interventions:  - Encourage patient to monitor pain and request assistance  - Assess pain using appropriate pain scale  - Administer analgesics based on type and severity of pain and evaluate response  - Implement non-pharmacological measures as appropriate and evaluate response  - Consider cultural and social influences on pain and pain management  - Notify physician/advanced practitioner if interventions unsuccessful or patient reports new pain  Outcome: Progressing     Problem: INFECTION - ADULT  Goal: Absence or prevention of progression during hospitalization  Description: INTERVENTIONS:  - Assess and monitor for signs and symptoms of infection  - Monitor lab/diagnostic results  - Monitor all insertion sites, i e  indwelling lines, tubes, and drains  - Monitor endotracheal if appropriate and nasal secretions for changes in amount and color  - [place] appropriate cooling/warming therapies per order  - Administer medications as ordered  - Instruct and encourage patient and family to use good hand hygiene technique  - Identify and instruct in appropriate isolation precautions for identified infection/condition  Outcome: Progressing  Goal: Absence of fever/infection during neutropenic period  Description: INTERVENTIONS:  - Monitor WBC    Outcome: Progressing     Problem: SAFETY ADULT  Goal: Patient will remain free of falls  Description: INTERVENTIONS:  - Assess patient frequently for physical needs  -  Identify cognitive and physical deficits and behaviors that affect risk of falls    -  [place] fall precautions as indicated by assessment   - Educate patient/family on patient safety including physical limitations  - Instruct patient to call for assistance with activity based on assessment  - Modify environment to reduce risk of injury  - Consider OT/PT consult to assist with strengthening/mobility  Outcome: Progressing  Goal: Maintain or return to baseline ADL function  Description: INTERVENTIONS:  -  Assess patient's ability to carry out ADLs; assess patient's baseline for ADL function and identify physical deficits which impact ability to perform ADLs (bathing, care of mouth/teeth, toileting, grooming, dressing, etc )  - Assess/evaluate cause of self-care deficits   - Assess range of motion  - Assess patient's mobility; develop plan if impaired  - Assess patient's need for assistive devices and provide as appropriate  - Encourage maximum independence but intervene and supervise when necessary  - Involve family in performance of ADLs  - Assess for home care needs following discharge   - Consider OT consult to assist with ADL evaluation and planning for discharge  - Provide patient education as appropriate  Outcome: Progressing  Goal: Maintain or return mobility status to optimal level  Description: INTERVENTIONS:  - Assess patient's baseline mobility status (ambulation, transfers, stairs, etc )    - Identify cognitive and physical deficits and behaviors that affect mobility  - Identify mobility aids required to assist with transfers and/or ambulation (gait belt, sit-to-stand, lift, walker, cane, etc )  - [place] fall precautions as indicated by assessment  - Record patient progress and toleration of activity level on Mobility SBAR; progress patient to next Phase/Stage  - Instruct patient to call for assistance with activity based on assessment  - Consider rehabilitation consult to assist with strengthening/weightbearing, etc   Outcome: Progressing     Problem: DISCHARGE PLANNING  Goal: Discharge to home or other facility with appropriate resources  Description: INTERVENTIONS:  - Identify barriers to discharge w/patient and caregiver  - Arrange for needed discharge resources and transportation as appropriate  - Identify discharge learning needs (meds, wound care, etc )  - Arrange for interpretive services to assist at discharge as needed  - Refer to Case Management Department for coordinating discharge planning if the patient needs post-hospital services based on physician/advanced practitioner order or complex needs related to functional status, cognitive ability, or social support system  Outcome: Progressing     Problem: Knowledge Deficit  Goal: Patient/family/caregiver demonstrates understanding of disease process, treatment plan, medications, and discharge instructions  Description: Complete learning assessment and assess knowledge base    Interventions:  - Provide teaching at level of understanding  - Provide teaching via preferred learning methods  Outcome: Progressing     Problem: CARDIOVASCULAR - ADULT  Goal: Maintains optimal cardiac output and hemodynamic stability  Description: INTERVENTIONS:  - Monitor I/O, vital signs and rhythm  - Monitor for S/S and trends of decreased cardiac output  - Administer and titrate ordered vasoactive medications to optimize hemodynamic stability  - Assess quality of pulses, skin color and temperature  - Assess for signs of decreased coronary artery perfusion  - Instruct patient to report change in severity of symptoms  Outcome: Progressing  Goal: Absence of cardiac dysrhythmias or at baseline rhythm  Description: INTERVENTIONS:  - Continuous cardiac monitoring, vital signs, obtain 12 lead EKG if ordered  - Administer antiarrhythmic and heart rate control medications as ordered  - Monitor electrolytes and administer replacement therapy as ordered  Outcome: Progressing     Problem: MUSCULOSKELETAL - ADULT  Goal: Maintain or return mobility to safest level of function  Description: INTERVENTIONS:  - Assess patient's ability to carry out ADLs; assess patient's baseline for ADL function and identify physical deficits which impact ability to perform ADLs (bathing, care of mouth/teeth, toileting, grooming, dressing, etc )  - Assess/evaluate cause of self-care deficits   - Assess range of motion  - Assess patient's mobility  - Assess patient's need for assistive devices and provide as appropriate  - Encourage maximum independence but intervene and supervise when necessary  - Involve family in performance of ADLs  - Assess for home care needs following discharge   - Consider OT consult to assist with ADL evaluation and planning for discharge  - Provide patient education as appropriate  Outcome: Progressing  Goal: Maintain proper alignment of affected body part  Description: INTERVENTIONS:  - Support, maintain and protect limb and body alignment  - Provide patient/ family with appropriate education  Outcome: Progressing

## 2021-03-05 NOTE — ASSESSMENT & PLAN NOTE
· R/o ACS  · Breath negative x3  · No events on tele  · CTA - no PE  · Cardiology following  · Echo pending-if some evidence of wall motion abnormalities will consider stress test in the future  · Continue statin  · No role for aspirin at this time

## 2021-03-05 NOTE — CASE MANAGEMENT
CM met with pt to issue observation notice  Pt gave verbal understanding, signed, and denied a copy  Original placed in chart for scanning  CM was notified by María Sheth from [facility], that pt will have a bed available soon once PT/OT evaluation is completed and authorization is approved       CM department will continue to follow through pt's D/C

## 2021-03-05 NOTE — OCCUPATIONAL THERAPY NOTE
Occupational Therapy Evaluation & Treatment Note      Patient Name: Merly Melgar  HLGWY'B Date: 3/5/2021  Problem List  Principal Problem:    Chest pain  Active Problems:    Sjogren's disease ([facility] [address] )    Hypertension    Multiple closed fractures of pelvis (HCC)    Depression    Hypothyroidism    Gastroparesis    WELLS (dyspnea on exertion)    Abnormal ECG    Pleural effusion on right    Atelectasis of right lung    RUQ pain    Low HDL (under 40)    Past Medical History  Past Medical History:   Diagnosis Date    Lymphadenitis, chronic     Sialadenitis     Sjogren's disease ([facility] [address] )     Stomach problems     Thyroid disorder     Xerostomia      Past Surgical History  Past Surgical History:   Procedure Laterality Date    BRAIN SURGERY  2019    tumor removed Dr Alex Sosa             03/05/21 1059   OT Last Visit   OT Visit Date 03/05/21   Note Type   Note type Evaluation/Treatment    Restrictions/Precautions   Weight Bearing Precautions Per Order Yes   RLE Weight Bearing Per Order WBAT   LLE Weight Bearing Per Order WBAT   Other Precautions Chair Alarm; Bed Alarm; Fall Risk;Pain   Pain Assessment   Pain Assessment Tool 0-10   Pain Score 7   Pain Location/Orientation Location: Pelvis   Hospital Pain Intervention(s) Medication (See MAR); Repositioned   Home Living   Type of [facility] to enter with rails; Two level; Able to live on main level with bedroom/bathroom   Bathroom Shower/Tub Tub/shower unit   Bathroom Toilet Standard   Bathroom Equipment Commode; Shower chair;Grab bars around toilet   Home Equipment Walker;Cane   Prior Function   Level of [place] Independent with ADLs and functional mobility   Lives With Spouse; Family   Receives Help From Family   ADL Assistance Independent   IADLs Needs assistance   Falls in the last 6 months 1 to 4   Psychosocial   Psychosocial (WDL) WDL   ADL   Eating Assistance 7  Independent   Grooming Assistance 6  Modified Independent   UB Bathing Assistance 5  Supervision/Setup   LB Bathing Assistance 3  Moderate Assistance   LB Bathing Deficit Right lower leg including foot; Left lower leg including foot   UB Dressing Assistance 5  Supervision/Setup   LB Dressing Assistance 3  Moderate Assistance   LB Dressing Deficit Thread RLE into underwear; Thread LLE into underwear;Pull up over hips   Toileting Assistance  3  Moderate Assistance   Toileting Deficit Clothing management up;Clothing management down;Perineal hygiene   Transfers   Sit to Stand 5  Supervision   Additional items Increased time required   Stand to Sit 5  Supervision   Additional items Increased time required   Toilet transfer 5  Supervision   Additional items Increased time required   Functional Mobility   Functional Mobility 5  Supervision   Additional items Rolling walker   Balance   Static Sitting Good   Dynamic Sitting Fair +   Static Standing Fair   Dynamic Standing Fair -   Ambulatory Fair -   Activity Tolerance   Activity Tolerance Patient limited by pain   RUE Assessment   RUE Assessment WFL   LUE Assessment   LUE Assessment WFL   Sensation   Light Touch No apparent deficits   Proprioception   Proprioception No apparent deficits   Cognition   Arousal/Participation Alert; Cooperative   Attention Within functional limits   Orientation Level Oriented X4   Memory Decreased recall of recent events   Following Commands Follows one step commands without difficulty   Assessment   Limitation Decreased ADL status; Decreased UE strength;Decreased endurance;Decreased cognition;Decreased high-level ADLs   Prognosis Good   Assessment   Pt is a 78 y o  female seen for OT evaluation s/p admit to [facility] on 3/4/2021 w/ Chest pain from the ARU where she was completing a rehab stay due to fall resulting in pelvic fracture  See above for extensive list of comorbidities affecting Pt's functional performance at time of assessment   Personal factors affecting Pt at time of IE include:difficulty performing ADLS, difficulty performing IADLS , health management  and environment  Upon evaluation: Pt requires supervision for transfers, CGA for ambulation with RW  Pt limited by pain requiring assist with bed mobility  Pt received with ern[place] in and saturated brief, assist for hygiene and to replace brief  The following deficits impact occupational performance: weakness, decreased strength, decreased balance, decreased tolerance, impaired problem solving, increased pain and orthopedic restrictions  Pt to benefit from continued skilled OT services while in the hospital to address deficits as defined above and maximize level of functional independence w ADL's and functional mobility  Occupational performance areas to address include: grooming, bathing/shower, toilet hygiene, dressing, health maintenance, functional mobility and clothing management  From OT standpoint, recommendation at time of d/c would be post acute rehab  Goals   STG Time Frame   (1 week)   Short Term Goals  1  Pt will complete commode transfer Darci  2  Pt will complete functional ambulation Darci  Plan   Treatment Interventions ADL retraining;Functional transfer training;UE strengthening/ROM; Endurance training;UE splinting; Activityengagement; Energy conservation   Goal Expiration Date 03/12/21   OT Treatment Day 1   OT Frequency 2-3x/wk   Additional Treatment Session   Start Time 1150   End Time 0276   Treatment Assessment Pt seen for OT txt following initial evaluation  (split session from evaluation)  Pt demonstrated fair activity tolerance with ambulation to the bathroom as well as good walker navigation  Pt remains limited by decreased activity tolerance, standing balance, endurance, and safety  No c/o chest pain throughout session and HR ranging 68-73   Pt would benefit from continued OT services while in the hospital     Recommendation   OT Discharge Recommendation Post-Acute Rehabilitation Services

## 2021-03-05 NOTE — ASSESSMENT & PLAN NOTE
· Recommend small meals  · Patient is regular was discontinued secondary to oral dyskinesia  · Monitor for signs symptoms of obstruction  · Continue bowel medications

## 2021-03-05 NOTE — PLAN OF CARE
Problem: OCCUPATIONAL THERAPY ADULT  Goal: Performs self-care activities at highest level of function for planned discharge setting  See evaluation for individualized goals  Description: Treatment Interventions: ADL retraining, Functional transfer training, UE strengthening/ROM, Endurance training, UE splinting, Activityengagement, Energy conservation          See flowsheet documentation for full assessment, interventions and recommendations  Note: Limitation: Decreased ADL status, Decreased UE strength, Decreased endurance, Decreased cognition, Decreased high-level ADLs  Prognosis: Good  Assessment: Pt is a 78 y o  female seen for OT evaluation s/p admit to [facility] on 3/4/2021 w/ Chest pain from the ARU where she was completing a rehab stay due to fall resulting in pelvic fracture  See above for extensive list of comorbidities affecting Pt's functional performance at time of assessment  Personal factors affecting Pt at time of IE include:difficulty performing ADLS, difficulty performing IADLS , health management  and environment  Upon evaluation: Pt requires supervision for transfers, CGA for ambulation with RW  Pt limited by pain requiring assist with bed mobility  Pt received with [place] in and saturated brief, assist for hygiene and to replace brief  The following deficits impact occupational performance: weakness, decreased strength, decreased balance, decreased tolerance, impaired problem solving, increased pain and orthopedic restrictions  Pt to benefit from continued skilled OT services while in the hospital to address deficits as defined above and maximize level of functional independence w ADL's and functional mobility  Occupational performance areas to address include: grooming, bathing/shower, toilet hygiene, dressing, health maintenance, functional mobility and clothing management  From OT standpoint, recommendation at time of d/c would be post acute rehab         OT Discharge Recommendation: Post-Acute Rehabilitation Services

## 2021-03-05 NOTE — ASSESSMENT & PLAN NOTE
· Status post traumatic fall with right inferior pubic rami fracture right pubic symphysis fracture, right anterior acetabular fracture left anterior acetabular fracture  · Seen by Orthopedics conservative management  · Weight-bearing as tolerated  · DVT prophylaxis Lovenox  · Up outpatient with trauma/orthopedics  · Pain control  · PT OT eval for DC planning question back to [facility]

## 2021-03-05 NOTE — NURSING NOTE
Pt had complaints of R upper rib cage pain today  Pt had chest xray, EKG, troponin, CT scan was also done  Pt transferred to acute for tele monitoring, report was called  Message was left for the

## 2021-03-05 NOTE — PHYSICAL THERAPY NOTE
Physical Therapy Evaluation    Patient's Name: Nyla Cole    Admitting Diagnosis  Multiple closed fractures of pelvis ([facility] ) [S32 [id number]]    Problem List  Patient Active Problem List   Diagnosis    Parotid gland enlargement    Sjogren's disease ([facility] )    Closed nondisplaced fracture of anterior wall of right acetabulum ([facility] )    Closed nondisplaced fracture of right pubis ([facility] )    Fall    Hypertension    Pain of right upper extremity    Multiple closed fractures of pelvis ([facility] )    Depression    Hypothyroidism    Abnormal findings on imaging test    Gastroparesis    Oral dyskinesia    Potential for cognitive impairment    Anemia    Hypokalemia    WELLS (dyspnea on exertion)    Lightheadedness    Pain    Abnormal ECG    Chest pain    Pleural effusion on right    Atelectasis of right lung    RUQ pain    Low HDL (under 40)       Past Medical History  Past Medical History:   Diagnosis Date    Lymphadenitis, chronic     Sialadenitis     Sjogren's disease ([facility] )     Stomach problems     Thyroid disorder     Xerostomia        Past Surgical History  Past Surgical History:   Procedure Laterality Date    BRAIN SURGERY  2019    tumor removed Dr Asad Mejia         Recent Imaging  No orders to display       Recent Vital Signs  Vitals:    03/04/21 2145 03/04/21 2307 03/05/21 0716 03/05/21 1100   BP:  151/76 140/74 133/67   BP Location:  Left arm Left arm Left arm   Pulse: 71 66 63 65   Resp:  20 20 18   Temp:  97 5 °F (36 4 °C) (!) 97 4 °F (36 3 °C) (!) 97 4 °F (36 3 °C)   TempSrc:  Temporal Temporal Temporal   SpO2:  90% 92% 93%   Weight:       Height:            03/05/21 1140   PT Last Visit   PT Visit Date 03/05/21   Note Type   Note type Evaluation   Pain Assessment   Pain Assessment Tool 0-10   Pain Score 7   Pain Location/Orientation Location: Pelvis   Home Living   Type of Home House   Home Layout Stairs to enter with rails; Two level; Able to live on main level with bedroom/bathroom  (2 MICHAEL; 7 to 1st floor with HR)   Bathroom Shower/Tub Tub/shower unit   Bathroom Toilet Standard   Bathroom Equipment Commode; Shower chair;Grab bars around toilet   Home Equipment Walker;Cane   Prior Function   Level of [place] Independent with ADLs and functional mobility   Lives With Spouse; Family  (sons)   Receives Help From Family   ADL Assistance Independent   IADLs Needs assistance   Falls in the last 6 months 1 to 4   Vocational Retired   Restrictions/Precautions   RLE [place] Bearing Per Order WBAT   LLE [place] Bearing Per Order WBAT   Other Precautions Chair Alarm; Bed Alarm; Fall Risk;Pain   General   Family/Caregiver Present No   Cognition   Arousal/Participation Alert   Attention Attends with cues to redirect   Orientation Level Oriented X4   Memory Decreased recall of recent events   Following Commands Follows one step commands without difficulty   Strength RLE   RLE Overall Strength 2/5   Strength LLE   LLE Overall Strength 3/5   Coordination   Movements are Fluid and Coordinated 0   Coordination and Movement Description very antalgic gait/movement with RLE   Sensation WFL   Light Touch   RLE Light Touch Grossly intact   LLE Light Touch Grossly intact   Transfers   Sit to Stand 5  Supervision   Additional items Armrests; Increased time required;Verbal cues   Stand to Sit 5  Supervision   Additional items Armrests; Increased time required;Verbal cues   Additional Comments with RW   Ambulation/Elevation   Gait pattern Excessively slow; Short stride; Foward flexed;Decreased R stance;Decreased foot clearance; Antalgic; Improper Weight shift   Gait Assistance 5  Supervision   Additional items Verbal cues   Assistive Device Rolling walker   Distance 25ft   Balance   Static Sitting Fair +   Dynamic Sitting Fair   Static Standing Fair   Dynamic Standing Fair -   Ambulatory Fair -   Endurance Deficit   Endurance Deficit Yes   Endurance Deficit Description RLE pain   Activity Tolerance   Activity Tolerance Patient limited by pain   Medical Staff Made Aware spoke to [facility]   Nurse Made Aware spoke to RN   Assessment   Prognosis Good   Problem List Decreased strength;Decreased range of motion;Decreased endurance; Impaired balance;Decreased mobility; Decreased cognition;Decreased safety awareness;Pain   Barriers to Discharge Inaccessible home environment;Decreased caregiver support   Goals   Patient Goals to get back to the [facility]   STG Expiration Date 03/15/21   Short Term Goal #1 Pt will complete bed mobility mod I, transfers with RW mod I, ambulation with RW 150ft mod I and 7 stairs mod I with approrpriate device to return home  PT Treatment Day 0   Plan   Treatment/Interventions Functional transfer training;LE strengthening/ROM; Elevations; Therapeutic exercise; Endurance training;Patient/family training;Equipment eval/education; Bed mobility;Gait training;Spoke to nursing;Spoke to case management;OT   PT Frequency   (3-5x/wk)   Recommendation   PT Discharge Recommendation Post-Acute Rehabilitation Services   Equipment Recommended [address] [facility] Recommended Wheeled walker   PT - OK to Discharge Yes   Additional Comments to rehab when medically cleared   [address] Inpatient   Turning in Bed Without Bedrails 3   Lying on Back to Sitting on Edge of Flat Bed 2   Moving Bed to Chair 2   Standing Up From Chair 3   Walk in Room 3   Climb 3-5 Stairs 2   Basic Mobility Inpatient Raw Score 15   Basic Mobility Standardized Score 36 97       ASSESSMENT                                                                                                                     Petar Arrington is a 78 y o  female admitted [facility] on 3/4/2021 for Chest pain, transfer from [facility]  Pt  has a past medical history of Lymphadenitis, chronic, Sialadenitis, Sjogren's disease ([address] ), Stomach problems, Thyroid disorder, and Xerostomia  PT was consulted and pt was seen on 3/5/2021 for mobility assessment and d/c planning   Pt presents seated in recliner alert and agreeable to therapy  She is reporting pain in the pelvis more with weight bearing on the R vs L, also with continued pain in the upper right abdominal quadrant  She has BLE weakness, intact sensation to light touch BLE  Pain limits ambulation tolerance  Pt is currently functioning at a supervision assistance x1 level for transfers, supervision assistance x1 level for ambulation with Rolling Walker  The patient's AM-PAC Basic Mobility Inpatient Short Form Raw Score is 15, Standardized Score is 36 97  A standardized score less than 42 9 suggests the patient may benefit from discharge to post-acute rehabilitation services  Please also refer to the recommendation of the Physical Therapist for safe discharge planning  Recommendations                                                                                                              Pt will benefit from continued skilled IP PT to address the above mentioned impairments in order to maximize recovery and increase functional independence when completing mobility and ADLs  See flow sheet for goals and POC       DME:  rolling walker    Discharge Disposition:  Post Acute Rehab Services      Stephen Donnelly PT, DPT

## 2021-03-05 NOTE — PROGRESS NOTES
Progress Note - Nieves Mitchell Viscomi 1955, 78 y o  female MRN: [id number]    Unit/Bed#: 7T [facility] 705-02 Encounter: [id number]    Primary Care Provider: Haresh Garcia MD   Date and time admitted to hospital: 3/4/2021  7:50 PM        Low HDL (under 40)  Assessment & Plan  Continue atorvastatin 10 mg daily    RUQ pain  Assessment & Plan  · S/p cholecystectomy  · No acute findings on CT  · Liver enzymes normal  · Likely referred pain from pelvic fracture    Pleural effusion on right  Assessment & Plan  · Finding on CT: New right pleural effusion with right basilar atelectasis of uncertain etiology    · Patient 94-97% on RA  · Patient did report increased shortness of breath at [facility] - status post 1 dose of Lasix  · Echo pending    Abnormal ECG  Assessment & Plan  · No anginal symptoms, dyspnea exertion, palpitations etc   EKG lipid T-wave in V2 initial has been negative continue to trend  · Consult cardiology  · Recommending echo    Gastroparesis  Assessment & Plan  · Recommend small meals  · Patient Reglan was discontinued secondary to oral dyskinesia  · Monitor for signs symptoms of obstruction  · Continue bowel medications      Hypothyroidism  Assessment & Plan  · Continue levothyroxine    Depression  Assessment & Plan  · Continue fluoxetine and Xanax for anxiety    Multiple closed fractures of pelvis (HCC)  Assessment & Plan  · Status post traumatic fall with right inferior pubic rami fracture right pubic symphysis fracture, right anterior acetabular fracture left anterior acetabular fracture  · Seen by Orthopedics conservative management  · Weight-bearing as tolerated  · DVT prophylaxis Lovenox  · Pain control  · PT OT eval for DC planning back to [facility]    Sjogren's disease ([facility] )  Assessment & Plan  · Weekly methotrexate  · Follow-up rheumatology as an outpatient    * Chest pain  Assessment & Plan  · R/o ACS  · Breath negative x3  · No events on tele  · CTA - no PE  · Cardiology following  · Echo pending-if some evidence of wall motion abnormalities will consider stress test in the future  · Continue statin  · No role for aspirin at this time          VTE Pharmacologic Prophylaxis:   Pharmacologic: Enoxaparin (Lovenox)  Mechanical VTE Prophylaxis in Place: Yes    Patient Centered Rounds: I have performed bedside rounds with nursing staff today  Discussions with Specialists or Other Care Team Provider:  Cardiology, PM&R    Education and Discussions with Family / Patient:  Discussed with patient,     Time Spent for Care: 20 minutes  More than 50% of total time spent on counseling and coordination of care as described above  Current Length of Stay: 1 day(s)    Current Patient Status: Observation   Certification Statement: The patient will continue to require additional inpatient hospital stay due to Pending authorization for discharge back to [facility]     Discharge Plan:  Continue inpatient treatment, patient will likely stay over the weekend anticipation for admission of authorization on Monday for rehabilitation back to work    Code Status: Level 1 - Full Code      Subjective:   Patient seen examined at bedside  Patient currently complaining still of right extremity pain  Patient unsure whether it is coming from beneath the rib or behind  Patient denies any chest pain or shortness of breath cough palpitations, nausea vomiting    Objective:     Vitals:   Temp (24hrs), Av 9 °F (36 6 °C), Min:97 4 °F (36 3 °C), Max:99 1 °F (37 3 °C)    Temp:  [97 4 °F (36 3 °C)-99 1 °F (37 3 °C)] 97 4 °F (36 3 °C)  HR:  [63-73] 65  Resp:  [18-20] 18  BP: (133-158)/(67-76) 133/67  SpO2:  [90 %-97 %] 93 %  Body mass index is 28 15 kg/m²  Input and Output Summary (last 24 hours):        Intake/Output Summary (Last 24 hours) at 3/5/2021 1437  Last data filed at 3/5/2021 1300  Gross per 24 hour   Intake 240 ml   Output 1450 ml   Net -1210 ml       Physical Exam:     Physical Exam  Constitutional:       General: She is not in acute distress  Appearance: Normal appearance  HENT:      Head: Normocephalic and atraumatic  Eyes:      General:         Right eye: No discharge  Left eye: No discharge  Cardiovascular:      Rate and Rhythm: Normal rate and regular rhythm  Pulses: Normal pulses  Heart sounds: No murmur  Pulmonary:      Effort: Pulmonary effort is normal  No respiratory distress  Breath sounds: Normal breath sounds  No wheezing  Abdominal:      General: There is no distension  Palpations: Abdomen is soft  Tenderness: There is no abdominal tenderness (NO ruq tenderness)  Musculoskeletal:      Right lower leg: No edema  Left lower leg: No edema  Skin:     General: Skin is warm and dry  Neurological:      General: No focal deficit present  Mental Status: She is alert and oriented to person, place, and time  Mental status is at baseline  Psychiatric:         Mood and Affect: Mood normal            Additional Data:     Labs:    Results from last 7 days   Lab Units 03/04/21  1640   WBC Thousand/uL 10 10   HEMOGLOBIN g/dL 9 7*   HEMATOCRIT % 29 6*   PLATELETS Thousands/uL 244   NEUTROS PCT % 90*   LYMPHS PCT % 5*   MONOS PCT % 5   EOS PCT % 0     Results from last 7 days   Lab Units 03/05/21  0510   SODIUM mmol/L 135*   POTASSIUM mmol/L 4 3   CHLORIDE mmol/L 100   CO2 mmol/L 28   BUN mg/dL 13   CREATININE mg/dL 0 66   ANION GAP mmol/L 7   CALCIUM mg/dL 9 0   ALBUMIN g/dL 3 9   TOTAL BILIRUBIN mg/dL 0 80   ALK PHOS U/L 169*   ALT U/L 37   AST U/L 64*   GLUCOSE RANDOM mg/dL 96                           * I Have Reviewed All Lab Data Listed Above  * Additional Pertinent Lab Tests Reviewed:  All Labs Within Last 24 Hours Reviewed    Imaging:    Imaging Reports Reviewed Today Include: none    Recent Cultures (last 7 days):           Last 24 Hours Medication List:   Current Facility-Administered Medications   Medication Dose Route Frequency Provider Last Rate    acetaminophen  650 mg Oral Q4H PRN Joyce Nilesh, PA-C      acetaminophen  650 mg Oral [facility] Joyce Nilesh, PA-C      ALPRAZolam  0 25 mg Oral TID PRN Joyce Nilesh, PA-C      amLODIPine  5 mg Oral Daily Joyce Nilesh, PA-C      atenolol  50 mg Oral BID Joyce Nilesh, PA-C      atorvastatin  10 mg Oral Daily [place] Nilesh, PA-C      bisacodyl  10 mg Rectal Daily PRN [place] Nilesh, PA-C      Diclofenac Sodium  2 g Topical 4x Daily [place] Nilesh, PA-C      docusate sodium  100 mg Oral BID Joyce Nilesh, PA-C      enoxaparin  30 mg Subcutaneous Q12H [address] [place] Nilesh, PA-C      FLUoxetine  20 mg Oral Daily Joyce Nilesh, PA-C      folic acid  1 mg Oral Daily [place] Nilesh, PA-C      gabapentin  100 mg Oral HS [place] Nilesh, PA-C      lactulose  20 g Oral BID PRN Joyce Nilesh, PA-C      levothyroxine  88 mcg Oral Early Morning Joyce Nilesh, PA-C      lidocaine  2 patch Topical HS [place] Nilesh, PA-C      methocarbamol  500 mg Oral Q6H PRN Joyce Nilesh, PA-C      [START ON 3/10/2021] methotrexate  20 mg Oral Weekly [place] Nilesh, PA-C      nitroglycerin  0 4 mg Sublingual Q5 Min PRN Joyce Nilesh, PA-C      nystatin  1 application Topical BID Joyce Nilesh, PA-C      ondansetron  4 mg Intravenous Q6H PRN Jadyn Duncan MD      ondansetron  4 mg Oral Q6H PRN Joyce Nilesh, PA-C      oxyCODONE  10 mg Oral Q6H PRN [place] Nilesh, PA-C      oxyCODONE  5 mg Oral Q4H PRN Jadyn Duncan MD      oxyCODONE  7 5 mg Oral Q4H PRN [place] Nilesh, PA-C      senna  1 tablet Oral BID Joyce Nilesh, PA-C          Today, Patient Was Seen By: Jadyn Duncan MD    ** Please Note: Dictation voice to text software may have been used in the creation of this document   **

## 2021-03-05 NOTE — ASSESSMENT & PLAN NOTE
· Status post traumatic fall with right inferior pubic rami fracture right pubic symphysis fracture, right anterior acetabular fracture left anterior acetabular fracture  · Seen by Orthopedics conservative management  · Weight-bearing as tolerated  · DVT prophylaxis Lovenox  · Pain control  · PT OT eval for DC planning back to [facility]

## 2021-03-05 NOTE — NURSING NOTE
Report from Josefa  Pt arrived to floor from [facility] on stretcher and transferred to bed  Pt VSS, no complaints of SOB, complaints of 10/10 pain in pelvis but mostly R ribs, and feeling anxious  SLIM AP notified of arrival  Pts belongings brought to floor   called asking for status but did not wish to talk to wife tonight, said he would call back tomorrow  Due to Hx of Sjogrens syndrome pt reports dry mouth with constant lip smacking and difficulty swallowing but handled swallowing water well when upright  Call bell is within reach

## 2021-03-05 NOTE — PROGRESS NOTES
OT [facility] DISCHARGE SUMMARY    Pt presented to [facility] on 2/28 s/p fall down stairs at home sustaining b/l pelvic fx  Pt was making slow but steady progress until 3/4 when patient began to have increased pelvic pain and RUQ pain  Medical team ordered CT pelvic, Chest Xray, and EKG  Pt was found to have abnormal EKG and transferred back to [facility] for tele monitoring  Pt's CT pelvis was still pending at the time  Pt was also found to have R pleural effusion  Pt would benefit from continued inpatient rehab once medically stable  At time of d/c patient was min A for transfers, min A for bathing, and min A for LB dressing with use of SIERRA Newell OT

## 2021-03-05 NOTE — ASSESSMENT & PLAN NOTE
· Recommend small meals  · Patient Reglan was discontinued secondary to oral dyskinesia  · Monitor for signs symptoms of obstruction  · Continue bowel medications

## 2021-03-05 NOTE — CASE MANAGEMENT
Discharge planning:     MEMO was notified at morning rounds that pt is medically cleared to get D/C today, pt came from [facility] and will like to return back to the same facility  MEMO sent referral through [address] [place] [address] to [facility], pending response of bed availability    CM department will continue to follow through pt's D/C

## 2021-03-05 NOTE — UTILIZATION REVIEW
Initial Clinical Review    Admission: Date/Time/Statement:   Admission Orders (From admission, onward)     Ordered        03/04/21 2050  Place in Observation  Once                   Orders Placed This Encounter   Procedures    Place in Observation     Standing Status:   Standing     Number of Occurrences:   1     Order Specific Question:   Level of Care     Answer:   Med Surg [16]       Assessment/Plan:  79 yo female Admitted to Observation from  [place] [address] due to chest pain and RUQ pain , R/O ACS  PMH of Sjogren's disease, depression, hypothyroidism, htn, meningioma s/p resection in 2012, gastroparesis, oral dyskinesia  And  status post fall on 02/22/2021 with multiple pelvic fractures for which she was @ rehab  EKG  w/ possible flipped T-wave in V2 and SOB with pain  -recommended for transfer with telemetry  Initial trop negative  CT neg for PE, New right pleural effusion with right basilar atelectasis of uncertain etiology  Plan Telemetry, acs r/o,Cardio consult,  Lasix x 1, DVT prophylaxis, PT/OT    3/5:Per Cardio: Abnormal EKG  Suspicion of possible inferior infarction  Nonspecific ST T wave abnormalities  Does not appear to have active coronary ischemia and her symptoms clearly noncardiac  To support this is that she has had continuous symptoms with negative troponins and the quality of the pain would be most unusual for angina  At this point however I would do an echocardiogram to see if indeed she has had an inferior infarction  When her current symptoms settle down I think she should have a pharmacologic stress  This can be done Monday of her symptoms are improved but would be difficult now with her distress to perform this  Doubt congestive heart failure  Add proBNP to next blood work      The patient will continue to require additional inpatient hospital stay due to Pending authorization for discharge back to [facility]         Admission  Vitals   Temperature Pulse Respirations Blood Pressure SpO2   03/04/21 1915 03/04/21 1915 03/04/21 1915 03/04/21 1915 03/04/21 1915   99 1 °F (37 3 °C) 73 18 136/76 94 %      Temp Source Heart Rate Source Patient Position - Orthostatic VS BP Location FiO2 (%)   03/04/21 1915 03/04/21 2307 03/04/21 1915 03/04/21 1915 --   Temporal Monitor Lying Right arm       Pain Score       03/04/21 2019       Worst Possible Pain          Wt Readings from Last 1 Encounters:   03/04/21 61 1 kg (134 lb 11 2 oz)     Additional Vital Signs:   03/05/21 0716  97 4 °F (36 3 °C)Abnormal   63  20  140/74  --  92 %  None (Room air)  Lying   03/04/21 2307  97 5 °F (36 4 °C)  66  20  151/76  --  90 %  None (Room air)  Lying   03/04/21 2145  --  71  --  --  --  --  --  --   03/04/21 2118  --  --  --  142/75  --  --  --  Lying       Pertinent Labs/Diagnostic Test Results:   Results from last 7 days   Lab Units 02/27/21  1125   SARS-COV-2  Negative     Results from last 7 days   Lab Units 03/04/21  1640 03/04/21 0448 03/01/21  0518   WBC Thousand/uL 10 10 6 40 5 00   HEMOGLOBIN g/dL 9 7* 9 6* 10 6*   HEMATOCRIT % 29 6* 29 8* 32 9*   PLATELETS Thousands/uL 244 227 206   NEUTROS ABS Thousands/µL 9 10* 5 40 4 10     Results from last 7 days   Lab Units 03/05/21  0510 03/04/21  1624 03/04/21 0448 03/01/21  0518   SODIUM mmol/L 135* 132* 136* 139   POTASSIUM mmol/L 4 3 3 5* 3 2* 3 7   CHLORIDE mmol/L 100 98 101 104   CO2 mmol/L 28 25 27 26   ANION GAP mmol/L 7 9 8 9   BUN mg/dL 13 13 13 16   CREATININE mg/dL 0 66 0 57* 0 60 0 69   EGFR ml/min/1 73sq m 92 96 95 90   CALCIUM mg/dL 9 0 9 2 8 9 9 4   MAGNESIUM mg/dL 2 0  --   --   --      Results from last 7 days   Lab Units 03/05/21  0510 03/04/21  1624   AST U/L 64* 69*   ALT U/L 37 32   ALK PHOS U/L 169* 172*   TOTAL PROTEIN g/dL 7 9 8 2   ALBUMIN g/dL 3 9 4 0   TOTAL BILIRUBIN mg/dL 0 80 0 80     Results from last 7 days   Lab Units 03/05/21  0510 03/04/21  1624 03/04/21  0448 03/01/21  0518   GLUCOSE RANDOM mg/dL 96 107* 101* 100*     Results from last 7 days   Lab Units 03/05/21  0046 03/04/21  2119 03/04/21  1625   TROPONIN I ng/mL <0 01 <0 01 <0 01     Results from last 7 days   Lab Units 02/27/21  1125   INFLUENZA A PCR  Negative   INFLUENZA B PCR  Negative   RSV PCR  Negative     3/4 CXR: No radiographic evidence of acute intrathoracic process or significant interval change  3/4 CTA C/A/P: No pulmonary embolism  New right pleural effusion with right basilar atelectasis of uncertain etiology  Stable distal paraesophageal and gastrohepatic ligament adenopathy of uncertain etiology  Thickening of the distal rectal wall  Correlate for proctitis  3/4: R Hip/Pelvis Xray: Subtle right pelvic fractures as noted above  These correspond approximately to the CT findings  No new or displaced pelvic fractures are demonstrated      ECHO  Pending    ED Treatment:   Medication Administration - No Administrations Displayed (No Start Event Found)     None        Past Medical History:   Diagnosis Date    Lymphadenitis, chronic     Sialadenitis     Sjogren's disease ([facility] [address] )     Stomach problems     Thyroid disorder     Xerostomia      Present on Admission:   Sjogren's disease ([facility] [address] )   Hypertension   Hypothyroidism   Depression   Multiple closed fractures of pelvis (HCC)   Gastroparesis   Chest pain   Pleural effusion   Atelectasis of right lung   RUQ pain   EKG abnormalities      Admitting Diagnosis: Multiple closed fractures of pelvis (HCC) [S32 [id number]]  Age/Sex: 78 y o  female  Admission Orders:  Scheduled Medications:  acetaminophen, 650 mg, Oral, Q8H GLEN  amLODIPine, 5 mg, Oral, Daily  atenolol, 50 mg, Oral, BID  atorvastatin, 10 mg, Oral, Daily  Diclofenac Sodium, 2 g, Topical, 4x Daily  docusate sodium, 100 mg, Oral, BID  enoxaparin, 30 mg, Subcutaneous, Q12H GLEN  FLUoxetine, 20 mg, Oral, Daily  folic acid, 1 mg, Oral, Daily  gabapentin, 100 mg, Oral, HS  levothyroxine, 88 mcg, Oral, Early Morning  lidocaine, 2 patch, Topical, HS  [START ON 3/10/2021] methotrexate, 20 mg, Oral, Weekly  nystatin, 1 application, Topical, BID  senna, 1 tablet, Oral, BID  Lasix 40 mg IV x 1 3/4  KDur 40 mEq po x 1 3/4    Continuous IV Infusions:     PRN Meds:  acetaminophen, 650 mg, Oral, Q4H PRN  ALPRAZolam, 0 25 mg, Oral, TID PRN x 1 3/4  bisacodyl, 10 mg, Rectal, Daily PRN  lactulose, 20 g, Oral, BID PRN  methocarbamol, 500 mg, Oral, Q6H PRN  nitroglycerin, 0 4 mg, Sublingual, Q5 Min PRN  ondansetron, 4 mg, Intravenous, Q6H PRN x 1 3/4  ondansetron, 4 mg, Oral, Q6H PRN  oxyCODONE, 10 mg, Oral, Q6H PRN x 1 3/4  oxyCODONE, 5 mg, Oral, Q4H PRN  oxyCODONE, 7 5 mg, Oral, Q4H PRN    TELEMETRY  SCDs  IP CONSULT TO CARDIOLOGY    Network Utilization Review Department  ATTENTION: Please call with any questions or concerns to [phone] and carefully listen to the prompts so that you are directed to the right person  All voicemails are confidential   Clare Clement all requests for admission clinical reviews, approved or denied determinations and any other requests to dedicated fax number below belonging to the campus where the patient is receiving treatment   List of dedicated fax numbers for the Facilities:  [address] DENIALS (Administrative/Medical Necessity) [phone]   [address] (Maternity/NICU/Pediatrics) [phone]   [address] Prashant Saleh 2705 (  [address]) [address] [place] [address] [address] [place] [address] [phone]

## 2021-03-05 NOTE — H&P
H&P- Meri Bernstein Viscomi 1955, 78 y o  female MRN: [id number]    Unit/Bed#: 7T [facility] 705-02 Encounter: [id number]    Primary Care Provider: Girma Lobato MD   Date and time admitted to hospital: 3/4/2021  7:50 PM        * Chest pain  Assessment & Plan  · R/o ACS  · Initial troponin negative, continue to monitor  · Monitor telemetry  · CTA - no PE    RUQ pain  Assessment & Plan  · S/p cholecystectomy  · No acute findings on CT  · Liver enzymes normal  · monitor    Pleural effusion  Assessment & Plan  · Finding on CT: New right pleural effusion with right basilar atelectasis of uncertain etiology    · Patient 94-97% on RA  · Patient did report increased shortness of breath at [facility] - will give 1 dose of Lasix    Atelectasis of right lung  Assessment & Plan  · Noted on CT  · Encourage incentive spirometer    EKG abnormalities  Assessment & Plan  · EKG lipid T-wave in V2 initial has been negative continue to trend  · Consult cardiology    Gastroparesis  Assessment & Plan  · Recommend small meals  · Patient is regular was discontinued secondary to oral dyskinesia  · Monitor for signs symptoms of obstruction  · Continue bowel medications      Hypothyroidism  Assessment & Plan  · Continue levothyroxine    Depression  Assessment & Plan  · Continue and Xanax for anxiety    Multiple closed fractures of pelvis (HCC)  Assessment & Plan  · Status post traumatic fall with right inferior pubic rami fracture right pubic symphysis fracture, right anterior acetabular fracture left anterior acetabular fracture  · Seen by Orthopedics conservative management  · Weight-bearing as tolerated  · DVT prophylaxis Lovenox  · Up outpatient with trauma/orthopedics  · Pain control  · PT OT eval for DC planning question back to [facility]    Hypertension  Assessment & Plan  · Continue Norvasc and atenolol  · Monitor blood pressure    Sjogren's disease ([facility] [address] )  Assessment & Plan  · Weekly methotrexate  · Follow-up rheumatology as an outpatient    TeleMedicine H&P - [facility] Internal Medicine    Patient Information: Nieves Fine 78 y o  female MRN: [id number]  Unit/Bed#: 7T [facility] 705-02 Encounter: [id number]  Admitting Physician: Anjelica Estrada PA-C  PCP: Haresh Garcia MD  Date of Admission:  03/04/21    REQUIRED DOCUMENTATION:     1  This service was provided via Telemedicine  2  Provider located at [facility]  3  TeleMed provider: Anjelica Estrada PA-C   4  Identify all parties in room with patient during tele consult:  Stefano Shay RN  5  After connecting through [employer], patient was identified by name and date of birth and assistant checked wristband  Patient was then informed that this was a Telemedicine visit and that the exam was being conducted confidentially over secure lines  My office door was closed  No one else was in the room  Patient acknowledged consent and understanding of privacy and security of the Telemedicine visit, and gave us permission to have the assistant stay in the room in order to assist with the history and to conduct the exam   I informed the patient that I have reviewed their record in Epic and presented the opportunity for them to ask any questions regarding the visit today  The patient agreed to participate  VTE Prophylaxis: Enoxaparin (Lovenox)  / sequential compression device   Code Status: full code  Discussion with patient    Anticipated Length of Stay:  Patient will be admitted on an Observation basis with an anticipated length of stay of  < 2 midnights  Justification for Hospital Stay: ACS rule out    Chief Complaint:   Chest Pain and right upper quadrant pain    History of Present Illness:    Robina Hinds is a 78 y o  female who presents with chest pain and right upper quadrant pain    Patient with past medical history of Sjogren's disease, depression, hypothyroidism, hypertension, meningioma status post resection in 2012, gastroparesis, oral dyskinesia is status post fall on 02/22/2021 with multiple pelvic fractures  She has been at the acute rehab unit and developed chest pain and right upper quadrant pain  Patient was transferred from [facility] for ACS rule out  She was evaluated by the hospitalist service there and there was concern of possible flipped T-wave in V2 and was recommended for transfer with telemetry  Patient reports pain in ribs on right side she also noted more SOB today with pain  Review of Systems:    Review of Systems   Constitutional: Negative for chills and fever  HENT: Positive for trouble swallowing  Negative for rhinorrhea and sore throat  Eyes: Negative for discharge and redness  Respiratory: Positive for cough and shortness of breath  Cardiovascular: Positive for chest pain  Negative for palpitations and leg swelling  Gastrointestinal: Positive for nausea and vomiting  Negative for diarrhea  Genitourinary: Negative for dysuria and hematuria  Musculoskeletal: Positive for arthralgias  Negative for back pain and neck pain  Skin: Positive for color change  Neurological: Positive for dizziness, weakness and headaches  Psychiatric/Behavioral: Negative for agitation and confusion  Past Medical and Surgical History:     Past Medical History:   Diagnosis Date    Lymphadenitis, chronic     Sialadenitis     Sjogren's disease ([address] )     Stomach problems     Thyroid disorder     Xerostomia        Past Surgical History:   Procedure Laterality Date    BRAIN SURGERY  2019    tumor removed Dr Meneses Griffin         Meds/Allergies:    Prior to Admission medications    Medication Sig Start Date End Date Taking?  Authorizing Provider   amLODIPine (NORVASC) 2 5 mg tablet Take 1 tablet by mouth daily at bedtime     Historical Provider, MD   atenolol (TENORMIN) 50 mg tablet Take 50 mg by mouth 2 (two) times a day     Historical Provider, MD   atorvastatin (LIPITOR) 10 mg tablet Take 10 mg by mouth daily 7/6/19   Historical Provider, MD   cyclobenzaprine (FLEXERIL) 10 mg tablet Take 10 mg by mouth daily at bedtime 7/8/19   Historical Provider, MD   FLUoxetine (PROzac) 20 mg capsule Take 20 mg by mouth daily 8/8/19   Historical Provider, MD   folic acid (FOLVITE) 1 mg tablet Take 1 mg by mouth daily     Historical Provider, MD   levothyroxine 88 mcg tablet Take 75 mcg by mouth daily  6/21/19   Historical Provider, MD   methotrexate 2 5 mg tablet  8/1/19   Historical Provider, MD   ALPRAZolam Edwin Jay) 1 mg tablet  8/27/19 3/4/21  Historical Provider, MD   metoclopramide (REGLAN) 10 mg tablet TAKE 1 TABLET BY MOUTH BEFORE MEALS 3 TIMES A DAY AND AT BEDTIME 7/2/19 3/4/21  Historical Provider, MD     all medications and allergies reviewed    Allergies: Allergies   Allergen Reactions    Codeine Vomiting    Hydroxyquinoline Sulfate [Oxyquinoline] Vomiting    Leucovorin Vomiting    Tizanidine Vomiting       Social History:     Marital Status: /Civil Union   Occupation:  Retired  Patient Pre-hospital Living Situation:  Home  Patient Pre-hospital Level of Mobility: has a cane and walker at home  Patient Pre-hospital Diet Restrictions: none  Substance Use History:   Social History     Substance and Sexual Activity   Alcohol Use Yes    Frequency: Monthly or less     Social History     Tobacco Use   Smoking Status Former Smoker   Smokeless Tobacco Never Used     Social History     Substance and Sexual Activity   Drug Use Never       Family History:  I have reviewed the patients family history     Physical Exam:     Vitals:   Blood Pressure: 142/75 (03/04/21 2118)  Pulse: 71 (03/04/21 2145)  Temperature: 99 1 °F (37 3 °C) (03/04/21 1915)  Temp Source: Temporal (03/04/21 1915)  Respirations: 18 (03/04/21 1915)  Height: 4' 10" (147 3 cm) (03/04/21 1915)  Weight - Scale: 61 1 kg (134 lb 11 2 oz) (03/04/21 1915)  SpO2: 94 % (03/04/21 1915)    Physical Exam  Vitals signs reviewed  Constitutional:       Appearance: Normal appearance  HENT:      Head: Normocephalic and atraumatic  Nose: Nose normal    Eyes:      General:         Right eye: No discharge  Left eye: No discharge  Extraocular Movements: Extraocular movements intact  Conjunctiva/sclera: Conjunctivae normal       Comments: Glasses in place   Neck:      Musculoskeletal: Normal range of motion  Cardiovascular:      Comments: RRR per nursing  Pulmonary:      Comments: Clear but decreased per nursing  Abdominal:      Comments: Soft, Pain reported RUQ per nursing   Musculoskeletal:      Comments: No edema per nursing   Skin:     General: Skin is warm and dry  Capillary Refill: Capillary refill takes less than 2 seconds  Comments: +bruising under right breast area per nursing, scab on right shoulder, multiple areas of bruising throughout   Neurological:      Mental Status: She is alert and oriented to person, place, and time  Mental status is at baseline  Comments: +weakness on right side per nursing secondary to pain  Psychiatric:         Mood and Affect: Mood normal          Behavior: Behavior normal            Additional Data:     Lab Results: I have personally reviewed pertinent reports  Results from last 7 days   Lab Units 03/04/21  1640   WBC Thousand/uL 10 10   HEMOGLOBIN g/dL 9 7*   HEMATOCRIT % 29 6*   PLATELETS Thousands/uL 244   NEUTROS PCT % 90*   LYMPHS PCT % 5*   MONOS PCT % 5   EOS PCT % 0     Results from last 7 days   Lab Units 03/04/21  1624   SODIUM mmol/L 132*   POTASSIUM mmol/L 3 5*   CHLORIDE mmol/L 98   CO2 mmol/L 25   BUN mg/dL 13   CREATININE mg/dL 0 57*   ANION GAP mmol/L 9   CALCIUM mg/dL 9 2   ALBUMIN g/dL 4 0   TOTAL BILIRUBIN mg/dL 0 80   ALK PHOS U/L 172*   ALT U/L 32   AST U/L 69*   GLUCOSE RANDOM mg/dL 107*     Imaging: CTA reviewed    No orders to display       Epic / Care Everywhere Records Reviewed: Yes    ** Please Note: This note has been constructed using a voice recognition system  **

## 2021-03-05 NOTE — ASSESSMENT & PLAN NOTE
· Finding on CT: New right pleural effusion with right basilar atelectasis of uncertain etiology    · Patient 94-97% on RA  · Patient did report increased shortness of breath at [facility] - status post 1 dose of Lasix  · Echo pending

## 2021-03-05 NOTE — ASSESSMENT & PLAN NOTE
· Finding on CT: New right pleural effusion with right basilar atelectasis of uncertain etiology    · Patient 94-97% on RA  · Patient did report increased shortness of breath at [facility] - will give 1 dose of Lasix

## 2021-03-05 NOTE — ASSESSMENT & PLAN NOTE
· No anginal symptoms, dyspnea exertion, palpitations etc   EKG lipid T-wave in V2 initial has been negative continue to trend  · Consult cardiology  · Recommending echo

## 2021-03-05 NOTE — ASSESSMENT & PLAN NOTE
· S/p cholecystectomy  · No acute findings on CT  · Liver enzymes normal  · Likely referred pain from pelvic fracture

## 2021-03-05 NOTE — QUICK NOTE
Called 's cell and went to  which he identified himself  Notified him that wife was transferred to [facility] and results of CT scan are still pending  He was instructed to call  to get directed to her room and nurses station

## 2021-03-05 NOTE — PLAN OF CARE
Problem: PHYSICAL THERAPY ADULT  Goal: Performs mobility at highest level of function for planned discharge setting  See evaluation for individualized goals  Description: Treatment/Interventions: Functional transfer training, LE strengthening/ROM, Elevations, Therapeutic exercise, Endurance training, Patient/family training, Equipment eval/education, Bed mobility, Gait training, Spoke to nursing, Spoke to case management, OT  Equipment Recommended: Hernandez Mclaughlin       See flowsheet documentation for full assessment, interventions and recommendations  Note: Prognosis: Good  Problem List: Decreased strength, Decreased range of motion, Decreased endurance, Impaired balance, Decreased mobility, Decreased cognition, Decreased safety awareness, Pain     Barriers to Discharge: Inaccessible home environment, Decreased caregiver support     PT Discharge Recommendation: [address]     PT - OK to Discharge: Yes    See flowsheet documentation for full assessment

## 2021-03-06 PROCEDURE — 97530 THERAPEUTIC ACTIVITIES: CPT

## 2021-03-06 PROCEDURE — 99225 PR SBSQ OBSERVATION CARE/DAY 25 MINUTES: CPT | Performed by: INTERNAL MEDICINE

## 2021-03-06 PROCEDURE — 97112 NEUROMUSCULAR REEDUCATION: CPT

## 2021-03-06 PROCEDURE — 97116 GAIT TRAINING THERAPY: CPT

## 2021-03-06 RX ADMIN — OXYCODONE HYDROCHLORIDE 5 MG: 5 TABLET ORAL at 20:09

## 2021-03-06 RX ADMIN — DICLOFENAC SODIUM 2 G: 10 GEL TOPICAL at 08:44

## 2021-03-06 RX ADMIN — ACETAMINOPHEN 650 MG: 325 TABLET, FILM COATED ORAL at 05:23

## 2021-03-06 RX ADMIN — NYSTATIN 1 APPLICATION: 100000 POWDER TOPICAL at 08:45

## 2021-03-06 RX ADMIN — SENNOSIDES 8.6 MG: 8.6 TABLET, FILM COATED ORAL at 17:07

## 2021-03-06 RX ADMIN — NYSTATIN 1 APPLICATION: 100000 POWDER TOPICAL at 17:09

## 2021-03-06 RX ADMIN — ENOXAPARIN SODIUM 30 MG: 30 INJECTION SUBCUTANEOUS at 21:06

## 2021-03-06 RX ADMIN — ALPRAZOLAM 0.25 MG: 0.25 TABLET ORAL at 20:09

## 2021-03-06 RX ADMIN — FOLIC ACID 1 MG: 1 TABLET ORAL at 08:44

## 2021-03-06 RX ADMIN — AMLODIPINE BESYLATE 5 MG: 5 TABLET ORAL at 08:44

## 2021-03-06 RX ADMIN — ATENOLOL 50 MG: 50 TABLET ORAL at 21:07

## 2021-03-06 RX ADMIN — GABAPENTIN 100 MG: 100 CAPSULE ORAL at 21:06

## 2021-03-06 RX ADMIN — ACETAMINOPHEN 650 MG: 325 TABLET, FILM COATED ORAL at 14:05

## 2021-03-06 RX ADMIN — DOCUSATE SODIUM 100 MG: 100 CAPSULE ORAL at 08:44

## 2021-03-06 RX ADMIN — ATORVASTATIN CALCIUM 10 MG: 10 TABLET, FILM COATED ORAL at 08:44

## 2021-03-06 RX ADMIN — ENOXAPARIN SODIUM 30 MG: 30 INJECTION SUBCUTANEOUS at 08:44

## 2021-03-06 RX ADMIN — SENNOSIDES 8.6 MG: 8.6 TABLET, FILM COATED ORAL at 08:44

## 2021-03-06 RX ADMIN — LEVOTHYROXINE SODIUM 88 MCG: 0.09 TABLET ORAL at 05:23

## 2021-03-06 RX ADMIN — FLUOXETINE 20 MG: 20 CAPSULE ORAL at 08:44

## 2021-03-06 RX ADMIN — ATENOLOL 50 MG: 50 TABLET ORAL at 08:44

## 2021-03-06 NOTE — ASSESSMENT & PLAN NOTE
· Status post traumatic fall with right inferior pubic rami fracture right pubic symphysis fracture, right anterior acetabular fracture left anterior acetabular fracture  · Seen by Orthopedics conservative management  · Weight-bearing as tolerated  · DVT prophylaxis Lovenox  · Pain control  · PT OT eval for DC planning back to [facility]

## 2021-03-06 NOTE — PLAN OF CARE
Problem: Prexisting or High Potential for Compromised Skin Integrity  Goal: Skin integrity is maintained or improved  Description: INTERVENTIONS:  - Identify patients at risk for skin breakdown  - Assess and monitor skin integrity  - Assess and monitor nutrition and hydration status  - Monitor labs   - Assess for incontinence   - Turn and reposition patient  - Assist with mobility/ambulation  - Relieve pressure over bony prominences  - Avoid friction and shearing  - Provide appropriate hygiene as needed including keeping skin clean and dry  - Evaluate need for skin moisturizer/barrier cream  - Collaborate with interdisciplinary team   - Patient/family teaching  - Consider wound care consult   Outcome: Progressing     Problem: Potential for Falls  Goal: Patient will remain free of falls  Description: INTERVENTIONS:  - Assess patient frequently for physical needs  -  Identify cognitive and physical deficits and behaviors that affect risk of falls    -  [place] fall precautions as indicated by assessment   - Educate patient/family on patient safety including physical limitations  - Instruct patient to call for assistance with activity based on assessment  - Modify environment to reduce risk of injury  - Consider OT/PT consult to assist with strengthening/mobility  Outcome: Progressing     Problem: PAIN - ADULT  Goal: Verbalizes/displays adequate comfort level or baseline comfort level  Description: Interventions:  - Encourage patient to monitor pain and request assistance  - Assess pain using appropriate pain scale  - Administer analgesics based on type and severity of pain and evaluate response  - Implement non-pharmacological measures as appropriate and evaluate response  - Consider cultural and social influences on pain and pain management  - Notify physician/advanced practitioner if interventions unsuccessful or patient reports new pain  Outcome: Progressing     Problem: INFECTION - ADULT  Goal: Absence or prevention of progression during hospitalization  Description: INTERVENTIONS:  - Assess and monitor for signs and symptoms of infection  - Monitor lab/diagnostic results  - Monitor all insertion sites, i e  indwelling lines, tubes, and drains  - Monitor endotracheal if appropriate and nasal secretions for changes in amount and color  - [place] appropriate cooling/warming therapies per order  - Administer medications as ordered  - Instruct and encourage patient and family to use good hand hygiene technique  - Identify and instruct in appropriate isolation precautions for identified infection/condition  Outcome: Progressing  Goal: Absence of fever/infection during neutropenic period  Description: INTERVENTIONS:  - Monitor WBC    Outcome: Progressing     Problem: SAFETY ADULT  Goal: Patient will remain free of falls  Description: INTERVENTIONS:  - Assess patient frequently for physical needs  -  Identify cognitive and physical deficits and behaviors that affect risk of falls    -  [place] fall precautions as indicated by assessment   - Educate patient/family on patient safety including physical limitations  - Instruct patient to call for assistance with activity based on assessment  - Modify environment to reduce risk of injury  - Consider OT/PT consult to assist with strengthening/mobility  Outcome: Progressing  Goal: Maintain or return to baseline ADL function  Description: INTERVENTIONS:  -  Assess patient's ability to carry out ADLs; assess patient's baseline for ADL function and identify physical deficits which impact ability to perform ADLs (bathing, care of mouth/teeth, toileting, grooming, dressing, etc )  - Assess/evaluate cause of self-care deficits   - Assess range of motion  - Assess patient's mobility; develop plan if impaired  - Assess patient's need for assistive devices and provide as appropriate  - Encourage maximum independence but intervene and supervise when necessary  - Involve family in performance of ADLs  - Assess for home care needs following discharge   - Consider OT consult to assist with ADL evaluation and planning for discharge  - Provide patient education as appropriate  Outcome: Progressing  Goal: Maintain or return mobility status to optimal level  Description: INTERVENTIONS:  - Assess patient's baseline mobility status (ambulation, transfers, stairs, etc )    - Identify cognitive and physical deficits and behaviors that affect mobility  - Identify mobility aids required to assist with transfers and/or ambulation (gait belt, sit-to-stand, lift, walker, cane, etc )  - [place] fall precautions as indicated by assessment  - Record patient progress and toleration of activity level on Mobility SBAR; progress patient to next Phase/Stage  - Instruct patient to call for assistance with activity based on assessment  - Consider rehabilitation consult to assist with strengthening/weightbearing, etc   Outcome: Progressing     Problem: DISCHARGE PLANNING  Goal: Discharge to home or other facility with appropriate resources  Description: INTERVENTIONS:  - Identify barriers to discharge w/patient and caregiver  - Arrange for needed discharge resources and transportation as appropriate  - Identify discharge learning needs (meds, wound care, etc )  - Arrange for interpretive services to assist at discharge as needed  - Refer to Case Management Department for coordinating discharge planning if the patient needs post-hospital services based on physician/advanced practitioner order or complex needs related to functional status, cognitive ability, or social support system  Outcome: Progressing     Problem: Knowledge Deficit  Goal: Patient/family/caregiver demonstrates understanding of disease process, treatment plan, medications, and discharge instructions  Description: Complete learning assessment and assess knowledge base    Interventions:  - Provide teaching at level of understanding  - Provide teaching via preferred learning methods  Outcome: Progressing     Problem: CARDIOVASCULAR - ADULT  Goal: Maintains optimal cardiac output and hemodynamic stability  Description: INTERVENTIONS:  - Monitor I/O, vital signs and rhythm  - Monitor for S/S and trends of decreased cardiac output  - Administer and titrate ordered vasoactive medications to optimize hemodynamic stability  - Assess quality of pulses, skin color and temperature  - Assess for signs of decreased coronary artery perfusion  - Instruct patient to report change in severity of symptoms  Outcome: Progressing  Goal: Absence of cardiac dysrhythmias or at baseline rhythm  Description: INTERVENTIONS:  - Continuous cardiac monitoring, vital signs, obtain 12 lead EKG if ordered  - Administer antiarrhythmic and heart rate control medications as ordered  - Monitor electrolytes and administer replacement therapy as ordered  Outcome: Progressing     Problem: MUSCULOSKELETAL - ADULT  Goal: Maintain or return mobility to safest level of function  Description: INTERVENTIONS:  - Assess patient's ability to carry out ADLs; assess patient's baseline for ADL function and identify physical deficits which impact ability to perform ADLs (bathing, care of mouth/teeth, toileting, grooming, dressing, etc )  - Assess/evaluate cause of self-care deficits   - Assess range of motion  - Assess patient's mobility  - Assess patient's need for assistive devices and provide as appropriate  - Encourage maximum independence but intervene and supervise when necessary  - Involve family in performance of ADLs  - Assess for home care needs following discharge   - Consider OT consult to assist with ADL evaluation and planning for discharge  - Provide patient education as appropriate  Outcome: Progressing  Goal: Maintain proper alignment of affected body part  Description: INTERVENTIONS:  - Support, maintain and protect limb and body alignment  - Provide patient/ family with appropriate education  Outcome: Progressing

## 2021-03-06 NOTE — ASSESSMENT & PLAN NOTE
· No anginal symptoms, dyspnea exertion, palpitations etc   EKG lipid T-wave in V2 initial has been negative continue to trend  · Consult cardiology  · Recommending echo- normal ef, no regional wall motion abnormality

## 2021-03-06 NOTE — ASSESSMENT & PLAN NOTE
· Finding on CT: New right pleural effusion with right basilar atelectasis of uncertain etiology    · Patient 94-97% on RA  · Patient did report increased shortness of breath at [facility] - status post 1 dose of Lasix  · Echo - normal EF, no RWMA

## 2021-03-06 NOTE — ASSESSMENT & PLAN NOTE
· Resolved   R/o ACS  · Breath negative x3  · No events on tele  · CTA - no PE  · Cardiology following  · Echo pending-normal wall motion, normal EF  · Continue statin  · No role for aspirin at this time

## 2021-03-06 NOTE — PLAN OF CARE
Problem: PHYSICAL THERAPY ADULT  Goal: Performs mobility at highest level of function for planned discharge setting  See evaluation for individualized goals  Description: Treatment/Interventions: Functional transfer training, LE strengthening/ROM, Elevations, Therapeutic exercise, Endurance training, Patient/family training, Equipment eval/education, Bed mobility, Gait training, Spoke to nursing, Spoke to case management, OT  Equipment Recommended: Liv Fair       See flowsheet documentation for full assessment, interventions and recommendations  Outcome: Progressing  Note: Prognosis: Good  Problem List: Decreased strength, Decreased range of motion, Decreased endurance, Impaired balance, Decreased mobility, Decreased cognition, Decreased safety awareness, Pain  Assessment: Pt seen today for PT treatment found supine in bed alert and agreeable to therapy  She is reporting pain in the RLE but improved from day before  She is tolerating more activity today and able to ambulate short household distances with supervision  Attempted stairs today pt with no LOB limited due to RLE pain and fatigue  Did well with standing balance during ADL task one LOB near end of time likely due to fatigue from extended time on feet  Complete bowel and bladder hygiene without assistance  At end of treatment pt left seated in w/c with cushion as she reports this is more comfortable vs recliner  Barriers to Discharge: Inaccessible home environment, Decreased caregiver support     PT Discharge Recommendation: [id number]     PT - OK to Discharge: Yes    See flowsheet documentation for full assessment

## 2021-03-06 NOTE — UTILIZATION REVIEW
Continued Stay Review    Date: 3/6/2021                        Current Patient Class:  Observation status  Current Level of Care:  Med Surg     HPI:66 y o  female initially admitted on 3/4/2021 from  rehab for chest pain and RUQ pain, with an abnormal ECG, nonspecific ST & T waves, per cardio symptoms not cardiac in nature  She was in rehab  S/p fall  Which resulted in a pelvic fracture, R anterior acetabular fx and L anterior acetabular fx ,      Assessment/Plan: 3/6/2021 - Pt written as medically stable to return to rehab on 3/54 await insurance authorization, and an available bed       Pertinent Labs/Diagnostic Results:   Results from last 7 days   Lab Units 02/27/21  1125   SARS-COV-2  Negative     Results from last 7 days   Lab Units 03/04/21  1640 03/04/21  0448 03/01/21  0518   WBC Thousand/uL 10 10 6 40 5 00   HEMOGLOBIN g/dL 9 7* 9 6* 10 6*   HEMATOCRIT % 29 6* 29 8* 32 9*   PLATELETS Thousands/uL 244 227 206   NEUTROS ABS Thousands/µL 9 10* 5 40 4 10         Results from last 7 days   Lab Units 03/05/21  0510 03/04/21  1624 03/04/21 0448 03/01/21  0518   SODIUM mmol/L 135* 132* 136* 139   POTASSIUM mmol/L 4 3 3 5* 3 2* 3 7   CHLORIDE mmol/L 100 98 101 104   CO2 mmol/L 28 25 27 26   ANION GAP mmol/L 7 9 8 9   BUN mg/dL 13 13 13 16   CREATININE mg/dL 0 66 0 57* 0 60 0 69   EGFR ml/min/1 73sq m 92 96 95 90   CALCIUM mg/dL 9 0 9 2 8 9 9 4   MAGNESIUM mg/dL 2 0  --   --   --      Results from last 7 days   Lab Units 03/05/21  0510 03/04/21  1624   AST U/L 64* 69*   ALT U/L 37 32   ALK PHOS U/L 169* 172*   TOTAL PROTEIN g/dL 7 9 8 2   ALBUMIN g/dL 3 9 4 0   TOTAL BILIRUBIN mg/dL 0 80 0 80         Results from last 7 days   Lab Units 03/05/21  0510 03/04/21  1624 03/04/21  0448 03/01/21  0518   GLUCOSE RANDOM mg/dL 96 107* 101* 100*     Results from last 7 days   Lab Units 03/05/21  0046 03/04/21  2119 03/04/21  1625   TROPONIN I ng/mL <0 01 <0 01 <0 01       Results from last 7 days   Lab Units 03/05/21  0510 NT-PRO BNP pg/mL 382*     Results from last 7 days   Lab Units 02/27/21  1125   INFLUENZA A PCR  Negative   INFLUENZA B PCR  Negative   RSV PCR  Negative       CTA Chest - abd & Pelvis - 3/4 - No pulmonary embolism  New right pleural effusion with right basilar atelectasis of uncertain etiology  Stable distal paraesophageal and gastrohepatic ligament adenopathy of uncertain etiology  Thickening of the distal rectal wall   Correlate for proctitis     CXR 3/5 - no acute      Vital Signs:   Date/Time  Temp  Pulse  Resp  BP  MAP (mmHg)  SpO2  O2 Device  Patient Position - Orthostatic VS   03/06/21 0745  96 9 °F (36 1 °C)Abnormal   60  20  156/67  --  94 %  None (Room air)  Lying   03/05/21 2316  97 6 °F (36 4 °C)  56  19  147/56  --  94 %  None (Room air)  Lying   03/05/21 1936  98 5 °F (36 9 °C)  60  20  142/68  87  93 %  None (Room air)  Lying   03/05/21 1528  99 °F (37 2 °C)  62  18  137/70  84  94 %  None (Room air)  Lying   03/05/21 1100  97 4 °F (36 3 °C)Abnormal   65  18  133/67  --  93 %  None (Room air)  Lying   03/05/21 0716  97 4 °F (36 3 °C)Abnormal   63  20  140/74  --  92 %  None (Room air)  Lying         Medications:   Scheduled Medications:  acetaminophen, 650 mg, Oral, Q8H GLEN  amLODIPine, 5 mg, Oral, Daily  atenolol, 50 mg, Oral, BID  atorvastatin, 10 mg, Oral, Daily  Diclofenac Sodium, 2 g, Topical, 4x Daily  docusate sodium, 100 mg, Oral, BID  enoxaparin, 30 mg, Subcutaneous, Q12H GLEN  FLUoxetine, 20 mg, Oral, Daily  folic acid, 1 mg, Oral, Daily  gabapentin, 100 mg, Oral, HS  levothyroxine, 88 mcg, Oral, Early Morning  lidocaine, 2 patch, Topical, HS  [START ON 3/10/2021] methotrexate, 20 mg, Oral, Weekly  nystatin, 1 application, Topical, BID  senna, 1 tablet, Oral, BID      Continuous IV Infusions:     PRN Meds:  acetaminophen, 650 mg, Oral, Q4H PRN  ALPRAZolam, 0 25 mg, Oral, TID PRN - 3/4 x 1 - 3/5 x 1   bisacodyl, 10 mg, Rectal, Daily PRN  lactulose, 20 g, Oral, BID PRN  methocarbamol, 500 mg, Oral, Q6H PRN  nitroglycerin, 0 4 mg, Sublingual, Q5 Min PRN  ondansetron, 4 mg, Intravenous, Q6H PRN - 3/4 x 1   ondansetron, 4 mg, Oral, Q6H PRN  oxyCODONE, 10 mg, Oral, Q6H PRN - 3/4 x 1 - 3/5 x 1   oxyCODONE, 5 mg, Oral, Q4H PRN - 3/5 x 1   oxyCODONE, 7 5 mg, Oral, Q4H PRN    Nursing orders -  Fall precautions- Turn q 2 - SCD's to le's- up & OOB as tolerated  -diet reg house     Discharge Plan: TBD     Network Utilization Review Department  ATTENTION: Please call with any questions or concerns to [phone] and carefully listen to the prompts so that you are directed to the right person  All voicemails are confidential   [facility] [place] all requests for admission clinical reviews, approved or denied determinations and any other requests to dedicated fax number below belonging to the campus where the patient is receiving treatment   List of dedicated fax numbers for the Facilities:  [address] DENIALS (Administrative/Medical Necessity) [phone]   [address] (Maternity/NICU/Pediatrics) [phone] [address] Prashant Saleh [address] ([address]) [address] [place] [address] [address] [place] [address] [phone]

## 2021-03-06 NOTE — PHYSICAL THERAPY NOTE
Physical TherapyTreatment Note    [facility]    Admitting Diagnosis  Multiple closed fractures of pelvis ([facility] ) [S32 [id number]]    Problem List  Patient Active Problem List   Diagnosis    Parotid gland enlargement    Sjogren's disease ([facility] )    Closed nondisplaced fracture of anterior wall of right acetabulum ([facility] )    Closed nondisplaced fracture of right pubis ([facility] )    Fall    Hypertension    Pain of right upper extremity    Multiple closed fractures of pelvis ([facility] )    Depression    Hypothyroidism    Abnormal findings on imaging test    Gastroparesis    Oral dyskinesia    Potential for cognitive impairment    Anemia    Hypokalemia    WELLS (dyspnea on exertion)    Lightheadedness    Pain    Abnormal ECG    Chest pain    Pleural effusion on right    Atelectasis of right lung    RUQ pain    Low HDL (under 40)       Past Medical History  Past Medical History:   Diagnosis Date    Lymphadenitis, chronic     Sialadenitis     Sjogren's disease ([facility] )     Stomach problems     Thyroid disorder     Xerostomia        Past Surgical History  Past Surgical History:   Procedure Laterality Date    BRAIN SURGERY  2019    tumor removed Dr Summer Quach  No orders to display       Recent Vital Signs  Vitals:    03/05/21 1528 03/05/21 1936 03/05/21 2316 03/06/21 0745   BP: 137/70 142/68 147/56 156/67   BP Location: Left arm Left arm Left arm Right arm   Pulse: 62 60 56 60   Resp: 18 20 19 20   Temp: 99 °F (37 2 °C) 98 5 °F (36 9 °C) 97 6 °F (36 4 °C) (!) 96 9 °F (36 1 °C)   TempSrc: Temporal Temporal Temporal Temporal   SpO2: 94% 93% 94% 94%   Weight:       Height:           PT Treatment Time: 55 minutes       03/06/21 0925   PT Last Visit   PT Visit Date 03/06/21   Note Type   Note Type Treatment   Pain Assessment   Pain Assessment Tool Pain Assessment not indicated - pt denies pain   Pain Score No Pain   Restrictions/Precautions   Other Precautions Fall Risk;Pain   General   Chart Reviewed Yes   Response to Previous Treatment Patient with no complaints from previous session  Family/Caregiver Present No   Cognition   Arousal/Participation Alert; Cooperative   Attention Within functional limits   Orientation Level Oriented X4   Memory Within functional limits   Following Commands Follows all commands and directions without difficulty   Subjective   Subjective Pain is better today vs Yesterday   Bed Mobility   Supine to Sit 4  Minimal assistance   Additional items Assist x 1;HOB elevated; Increased time required;Verbal cues   Transfers   Sit to Stand 5  Supervision   Additional items Assist x 1; Armrests; Increased time required;Verbal cues   Stand to Sit 5  Supervision   Additional items Assist x 1; Armrests; Increased time required;Verbal cues   Toilet transfer 5  Supervision   Additional items Assist x 1; Armrests; Increased time required;Verbal cues;Standard toilet   Additional Comments with RW   Ambulation/Elevation   Gait pattern Excessively slow; Short stride; Foward flexed;Decreased foot clearance   Gait Assistance 5  Supervision   Additional items Verbal cues   Assistive Device Rolling walker   Distance 25ft x2, 100ft   Stair Management Assistance 4  Minimal assist   Additional items Assist x 1;Verbal cues; Tactile cues; Increased time required   Stair Management Technique One rail L;Step to pattern; Foreward  (down backwards leads with LLE)   Number of Stairs 2   Balance   Static Sitting Fair +   Dynamic Sitting Fair   Static Standing Fair   Dynamic Standing Fair -  (one LOB posteriorly during standing dynamic ADL task)   Ambulatory Fair -   Endurance Deficit   Endurance Deficit Yes   Endurance Deficit Description RLE pain and fatigue   Activity Tolerance   Activity Tolerance Patient tolerated treatment well   Medical Staff Made Aware spoke to MD   Nurse Made Aware spoke to RN   Exercises   Balance training  Standing at sink with no UE support) x8 min while completing oral care   Assessment   Prognosis Good   Problem List Decreased strength;Decreased range of motion;Decreased endurance; Impaired balance;Decreased mobility; Decreased cognition;Decreased safety awareness;Pain   Assessment Pt seen today for PT treatment found supine in bed alert and agreeable to therapy  She is reporting pain in the RLE but improved from day before  She is tolerating more activity today and able to ambulate short household distances with supervision  Attempted stairs today pt with no LOB limited due to RLE pain and fatigue  Did well with standing balance during ADL task one LOB near end of time likely due to fatigue from extended time on feet  Complete bowel and bladder hygiene without assistance  At end of treatment pt left seated in w/c with cushion as she reports this is more comfortable vs recliner  Barriers to Discharge Inaccessible home environment;Decreased caregiver support   Goals   Patient Goals to get home   STG Expiration Date 03/15/21   PT Treatment Day 1   Plan   Treatment/Interventions Functional transfer training;LE strengthening/ROM; Elevations; Therapeutic exercise; Endurance training;Patient/family training;Equipment eval/education; Bed mobility;Gait training;Spoke to MD;Spoke to nursing;Spoke to case management;OT   Progress Progressing toward goals   PT Frequency   (3-5x/wk)   Recommendation   PT Discharge Recommendation Post-Acute Rehabilitation Services   Equipment Recommended [facility] Recommended Wheeled walker   PT - OK to Discharge Yes   Additional Comments to rehab when medically cleared   [address] Inpatient   Turning in Bed Without Bedrails 3   Lying on Back to Sitting on Edge of Flat Bed 3   Moving Bed to Chair 3   Standing Up From Chair 3   Walk in Room 3   Climb 3-5 Stairs 2   Basic Mobility Inpatient Raw Score 17   Basic Mobility Standardized Score 39 67       Cassius Rosado PT, DPT

## 2021-03-06 NOTE — ASSESSMENT & PLAN NOTE
· Resolved   S/p cholecystectomy  · No acute findings on CT  · Liver enzymes normal  · Likely referred pain from pelvic fracture

## 2021-03-06 NOTE — PROGRESS NOTES
Progress Note - Monico Crigler Viscomi 1955, 78 y o  female MRN: [id number]    Unit/Bed#: 7T [facility] 705-02 Encounter: [id number]    Primary Care Provider: Jose Carlos Dunne MD   Date and time admitted to hospital: 3/4/2021  7:50 PM        Low HDL (under 40)  Assessment & Plan  Continue atorvastatin 10 mg daily    RUQ pain  Assessment & Plan  · Resolved  S/p cholecystectomy  · No acute findings on CT  · Liver enzymes normal  · Likely referred pain from pelvic fracture    Pleural effusion on right  Assessment & Plan  · Finding on CT: New right pleural effusion with right basilar atelectasis of uncertain etiology  · Patient 94-97% on RA  · Patient did report increased shortness of breath at [facility] - status post 1 dose of Lasix  · Echo - normal EF, no RWMA    Abnormal ECG  Assessment & Plan  · No anginal symptoms, dyspnea exertion, palpitations etc   EKG lipid T-wave in V2 initial has been negative continue to trend  · Consult cardiology  · Recommending echo- normal ef, no regional wall motion abnormality    Gastroparesis  Assessment & Plan  · Recommend small meals  · Patient Reglan was discontinued secondary to oral dyskinesia  · Monitor for signs symptoms of obstruction  · Continue bowel medications      Hypothyroidism  Assessment & Plan  · Continue levothyroxine    Multiple closed fractures of pelvis (HCC)  Assessment & Plan  · Status post traumatic fall with right inferior pubic rami fracture right pubic symphysis fracture, right anterior acetabular fracture left anterior acetabular fracture  · Seen by Orthopedics conservative management  · Weight-bearing as tolerated  · DVT prophylaxis Lovenox  · Pain control  · PT OT eval for DC planning back to [facility]    Hypertension  Assessment & Plan  · Continue Norvasc and atenolol  · Monitor blood pressure    Sjogren's disease (HCC)  Assessment & Plan  · Weekly methotrexate  · Follow-up rheumatology as an outpatient    * Chest pain  Assessment & Plan  · Resolved   R/o ACS  · Breath negative x3  · No events on tele  · CTA - no PE  · Cardiology following  · Echo pending-normal wall motion, normal EF  · Continue statin  · No role for aspirin at this time          VTE Pharmacologic Prophylaxis:   Pharmacologic: Enoxaparin (Lovenox)  Mechanical VTE Prophylaxis in Place: Yes    Patient Centered Rounds: I have performed bedside rounds with nursing staff today  Discussions with Specialists or Other Care Team Provider: none    Education and Discussions with Family / Patient: patient     Time Spent for Care: 20 minutes  More than 50% of total time spent on counseling and coordination of care as described above  Current Length of Stay: 1 day(s)    Current Patient Status: Observation   Certification Statement: The patient will continue to require additional inpatient hospital stay due to continue inpatient stay waiting on monday to go back to [facility]    Discharge Plan: [facility] on Monday, once they submit for auth    Code Status: Level 1 - Full Code      Subjective:   Patient seen and examined, no acute complaints  Objective:     Vitals:   Temp (24hrs), Av 9 °F (36 6 °C), Min:96 9 °F (36 1 °C), Max:99 °F (37 2 °C)    Temp:  [96 9 °F (36 1 °C)-99 °F (37 2 °C)] 96 9 °F (36 1 °C)  HR:  [56-65] 60  Resp:  [18-20] 20  BP: (133-156)/(56-70) 156/67  SpO2:  [93 %-94 %] 94 %  Body mass index is 28 15 kg/m²  Input and Output Summary (last 24 hours): Intake/Output Summary (Last 24 hours) at 3/6/2021 0856  Last data filed at 3/6/2021 0546  Gross per 24 hour   Intake 720 ml   Output 1200 ml   Net -480 ml       Physical Exam:     Physical Exam  Constitutional:       General: She is not in acute distress  Appearance: Normal appearance  HENT:      Head: Normocephalic and atraumatic  Eyes:      General:         Right eye: No discharge  Left eye: No discharge  Cardiovascular:      Rate and Rhythm: Normal rate and regular rhythm  Pulses: Normal pulses  Heart sounds: No murmur  Pulmonary:      Effort: Pulmonary effort is normal  No respiratory distress  Breath sounds: Normal breath sounds  No wheezing  Abdominal:      General: There is no distension  Musculoskeletal:      Right lower leg: No edema  Left lower leg: No edema  Skin:     General: Skin is warm and dry  Neurological:      General: No focal deficit present  Mental Status: She is alert and oriented to person, place, and time  Mental status is at baseline  Psychiatric:         Mood and Affect: Mood normal          Additional Data:     Labs:    Results from last 7 days   Lab Units 03/04/21  1640   WBC Thousand/uL 10 10   HEMOGLOBIN g/dL 9 7*   HEMATOCRIT % 29 6*   PLATELETS Thousands/uL 244   NEUTROS PCT % 90*   LYMPHS PCT % 5*   MONOS PCT % 5   EOS PCT % 0     Results from last 7 days   Lab Units 03/05/21  0510   SODIUM mmol/L 135*   POTASSIUM mmol/L 4 3   CHLORIDE mmol/L 100   CO2 mmol/L 28   BUN mg/dL 13   CREATININE mg/dL 0 66   ANION GAP mmol/L 7   CALCIUM mg/dL 9 0   ALBUMIN g/dL 3 9   TOTAL BILIRUBIN mg/dL 0 80   ALK PHOS U/L 169*   ALT U/L 37   AST U/L 64*   GLUCOSE RANDOM mg/dL 96                           * I Have Reviewed All Lab Data Listed Above  * Additional Pertinent Lab Tests Reviewed:  All Labs Within Last 24 Hours Reviewed    Imaging:    Imaging Reports Reviewed Today Include: none    Recent Cultures (last 7 days):           Last 24 Hours Medication List:   Current Facility-Administered Medications   Medication Dose Route Frequency Provider Last Rate    acetaminophen  650 mg Oral Q4H PRN Kae Veras PA-C      acetaminophen  650 mg Oral [facility] Kae Veras PA-C      ALPRAZolam  0 25 mg Oral TID PRN Kae Veras PA-C      amLODIPine  5 mg Oral Daily Kae Veras PA-C      atenolol  50 mg Oral BID Kae Veras PA-C      atorvastatin  10 mg Oral Daily Kae Veras PA-C      bisacodyl  10 mg Rectal Daily PRN Kae Veras PA-C      Diclofenac Sodium 2 g Topical 4x Daily [place] Filter, PA-C      docusate sodium  100 mg Oral BID [place] Filter, PA-C      enoxaparin  30 mg Subcutaneous Q12H [address] [place] Filter, PA-C      FLUoxetine  20 mg Oral Daily Tanya Filter, PA-C      folic acid  1 mg Oral Daily Tanya Filter, PA-C      gabapentin  100 mg Oral HS Tanya Filter, PA-C      lactulose  20 g Oral BID PRN [place] Filter, PA-C      levothyroxine  88 mcg Oral Early Morning Tanya Filter, PA-C      lidocaine  2 patch Topical HS [place] Filter, PA-C      methocarbamol  500 mg Oral Q6H PRN Tanya Filter, PA-C      [START ON 3/10/2021] methotrexate  20 mg Oral Weekly [place] Filter, PA-C      nitroglycerin  0 4 mg Sublingual Q5 Min PRN [place] Filter, PA-C      nystatin  1 application Topical BID [place] Filter, PA-C      ondansetron  4 mg Intravenous Q6H PRN Carmelina Gonzalez MD      ondansetron  4 mg Oral Q6H PRN [place] Filter, PA-C      oxyCODONE  10 mg Oral Q6H PRN [place] Filter, PA-C      oxyCODONE  5 mg Oral Q4H PRN Carmelina Gonzalez MD      oxyCODONE  7 5 mg Oral Q4H PRN [place] Filter, PA-C      senna  1 tablet Oral BID [place] Filter, PA-C          Today, Patient Was Seen By: Carmelina Gonzalez MD    ** Please Note: Dictation voice to text software may have been used in the creation of this document   **

## 2021-03-07 PROBLEM — R10.11 RUQ PAIN: Status: RESOLVED | Noted: 2021-03-04 | Resolved: 2021-03-07

## 2021-03-07 PROCEDURE — 99232 SBSQ HOSP IP/OBS MODERATE 35: CPT | Performed by: INTERNAL MEDICINE

## 2021-03-07 RX ADMIN — ENOXAPARIN SODIUM 30 MG: 30 INJECTION SUBCUTANEOUS at 21:15

## 2021-03-07 RX ADMIN — ALPRAZOLAM 0.25 MG: 0.25 TABLET ORAL at 20:10

## 2021-03-07 RX ADMIN — ATORVASTATIN CALCIUM 10 MG: 10 TABLET, FILM COATED ORAL at 08:44

## 2021-03-07 RX ADMIN — ATENOLOL 50 MG: 50 TABLET ORAL at 08:44

## 2021-03-07 RX ADMIN — FOLIC ACID 1 MG: 1 TABLET ORAL at 08:44

## 2021-03-07 RX ADMIN — LEVOTHYROXINE SODIUM 88 MCG: 0.09 TABLET ORAL at 05:44

## 2021-03-07 RX ADMIN — ENOXAPARIN SODIUM 30 MG: 30 INJECTION SUBCUTANEOUS at 08:43

## 2021-03-07 RX ADMIN — OXYCODONE HYDROCHLORIDE 5 MG: 5 TABLET ORAL at 20:10

## 2021-03-07 RX ADMIN — ATENOLOL 50 MG: 50 TABLET ORAL at 21:15

## 2021-03-07 RX ADMIN — ACETAMINOPHEN 650 MG: 325 TABLET ORAL at 08:46

## 2021-03-07 RX ADMIN — NYSTATIN 1 APPLICATION: 100000 POWDER TOPICAL at 17:15

## 2021-03-07 RX ADMIN — ACETAMINOPHEN 650 MG: 325 TABLET, FILM COATED ORAL at 13:05

## 2021-03-07 RX ADMIN — FLUOXETINE 20 MG: 20 CAPSULE ORAL at 08:44

## 2021-03-07 RX ADMIN — AMLODIPINE BESYLATE 5 MG: 5 TABLET ORAL at 08:44

## 2021-03-07 RX ADMIN — SENNOSIDES 8.6 MG: 8.6 TABLET, FILM COATED ORAL at 08:44

## 2021-03-07 RX ADMIN — NYSTATIN 1 APPLICATION: 100000 POWDER TOPICAL at 08:45

## 2021-03-07 RX ADMIN — SENNOSIDES 8.6 MG: 8.6 TABLET, FILM COATED ORAL at 17:15

## 2021-03-07 RX ADMIN — GABAPENTIN 100 MG: 100 CAPSULE ORAL at 21:15

## 2021-03-07 NOTE — UTILIZATION REVIEW
Notification of Inpatient Admission/Inpatient Authorization Request   This is a Notification of Inpatient Admission for [address]  Be advised that this patient was admitted to our facility under Inpatient Status  Contact Geni Robles at [phone] for additional admission information  [facility] DEPT DEDICATED [facility] [phone]  Patient Name:   Karen Montelongo   YOB: 1955       [address]   [address]:   [address]  Tax ID: [id number]  NPI: [id number] Attending Provider/NPI:  Phone:  Address: Ankit Pfeiffer, Jake Maynard [[id number]]  [phone]  Same as Facility   Place of Service Code: 24 Place of Service Name: [address] [place] [address]   Start Date: 3/4/21 1950 Discharge Date & Time: No discharge date for patient encounter  Type of Admission: Inpatient Status Discharge Disposition (if discharged): [facility]   Patient Diagnoses: Multiple closed fractures of pelvis ([facility] [address] ) [[id number]]   Orders: Admission Orders (From admission, onward)     Ordered        03/07/21 1030  Inpatient Admission  Once         03/04/21 2050  Place in Observation  Once                    Assigned Utilization Review Contact: Geni Robles  Utilization   Network Utilization Review Department  Phone: [phone]; Fax [phone]  Email: [email or web address]   ATTENTION PAYERS: Please call the assigned Utilization  directly with any questions or concerns ALL voicemails in the department are confidential  Send all requests for admission clinical reviews, approved or denied determinations and any other requests to dedicated fax number belonging to the campus where the patient is receiving treatment

## 2021-03-07 NOTE — UTILIZATION REVIEW
Continued Stay Review    Date: 3/7/2021                          Current Patient Class:  Observation   Current Level of Care:  Med surg     Patient placed in observation status  On 3/4  changed to inpatient admission on 3/7 due to a need for continued monitoring  While awaiting  Rehab placement  03/07/21 1031  Inpatient Admission    Question Answer   Level of Care Med Surg   Estimated length of stay More than 2 Midnights   Certification I certify that inpatient services are medically necessary for this patient for a duration of greater than two midnights  See H&P and MD Progress Notes for additional information about the patient's course of treatment             HPI:66 y o  female initially admitted on 3/4/2021 from  rehab for chest pain and RUQ pain, with an abnormal ECG, nonspecific ST & T waves, per cardio symptoms not cardiac in nature  She was in rehab  S/p fall  Which resulted in a pelvic fracture, R anterior acetabular fx and L anterior acetabular fx ,      Assessment/Plan: 3/7 -  Pt is medically stable - Plan is to return to Rehab when Authorization obtained and a bed available      Pertinent Labs/Diagnostic Results:       Results from last 7 days   Lab Units 03/04/21  1640 03/04/21 0448 03/01/21  0518   WBC Thousand/uL 10 10 6 40 5 00   HEMOGLOBIN g/dL 9 7* 9 6* 10 6*   HEMATOCRIT % 29 6* 29 8* 32 9*   PLATELETS Thousands/uL 244 227 206   NEUTROS ABS Thousands/µL 9 10* 5 40 4 10         Results from last 7 days   Lab Units 03/05/21  0510 03/04/21  1624 03/04/21  0448 03/01/21  0518   SODIUM mmol/L 135* 132* 136* 139   POTASSIUM mmol/L 4 3 3 5* 3 2* 3 7   CHLORIDE mmol/L 100 98 101 104   CO2 mmol/L 28 25 27 26   ANION GAP mmol/L 7 9 8 9   BUN mg/dL 13 13 13 16   CREATININE mg/dL 0 66 0 57* 0 60 0 69   EGFR ml/min/1 73sq m 92 96 95 90   CALCIUM mg/dL 9 0 9 2 8 9 9 4   MAGNESIUM mg/dL 2 0  --   --   --      Results from last 7 days   Lab Units 03/05/21  0510 03/04/21  1624   AST U/L 64* 69*   ALT U/L 37 32   ALK PHOS U/L 169* 172*   TOTAL PROTEIN g/dL 7 9 8 2   ALBUMIN g/dL 3 9 4 0   TOTAL BILIRUBIN mg/dL 0 80 0 80     Results from last 7 days   Lab Units 03/05/21  0510 03/04/21  1624 03/04/21  0448 03/01/21  0518   GLUCOSE RANDOM mg/dL 96 107* 101* 100*       Results from last 7 days   Lab Units 03/05/21  0046 03/04/21  2119 03/04/21  1625   TROPONIN I ng/mL <0 01 <0 01 <0 01       Results from last 7 days   Lab Units 03/05/21  0510   NT-PRO BNP pg/mL 382*       Vital Signs:   Date/Time  Temp  Pulse  Resp  BP  MAP (mmHg)  SpO2  O2 Device  Patient Position - Orthostatic VS   03/07/21 0727  96 6 °F (35 9 °C)Abnormal   63  20  158/76  --  96 %  None (Room air)  Lying   03/06/21 2339  98 8 °F (37 1 °C)  59  20  163/82  --  95 %  None (Room air)  Lying   03/06/21 1513  98 °F (36 7 °C)  60  18  146/71  90  96 %  None (Room air)  Lying   03/06/21 0745  96 9 °F (36 1 °C)Abnormal   60  20  156/67  --  94 %  None (Room air)  Lying           Medications:   Scheduled Medications:  acetaminophen, 650 mg, Oral, Q8H GLEN  amLODIPine, 5 mg, Oral, Daily  atenolol, 50 mg, Oral, BID  atorvastatin, 10 mg, Oral, Daily  Diclofenac Sodium, 2 g, Topical, 4x Daily  docusate sodium, 100 mg, Oral, BID  enoxaparin, 30 mg, Subcutaneous, Q12H GLEN  FLUoxetine, 20 mg, Oral, Daily  folic acid, 1 mg, Oral, Daily  gabapentin, 100 mg, Oral, HS  levothyroxine, 88 mcg, Oral, Early Morning  lidocaine, 2 patch, Topical, HS  [START ON 3/10/2021] methotrexate, 20 mg, Oral, Weekly  nystatin, 1 application, Topical, BID  senna, 1 tablet, Oral, BID      Continuous IV Infusions:     PRN Meds:  acetaminophen, 650 mg, Oral, Q4H PRN  ALPRAZolam, 0 25 mg, Oral, TID PRN  bisacodyl, 10 mg, Rectal, Daily PRN  lactulose, 20 g, Oral, BID PRN  methocarbamol, 500 mg, Oral, Q6H PRN  nitroglycerin, 0 4 mg, Sublingual, Q5 Min PRN  ondansetron, 4 mg, Intravenous, Q6H PRN  ondansetron, 4 mg, Oral, Q6H PRN  oxyCODONE, 10 mg, Oral, Q6H PRN  oxyCODONE, 5 mg, Oral, Q4H PRN  oxyCODONE, 7 5 mg, Oral, Q4H PRN        Discharge Plan: TBD     Network Utilization Review Department  ATTENTION: Please call with any questions or concerns to [phone] and carefully listen to the prompts so that you are directed to the right person  All voicemails are confidential   Paul Howie all requests for admission clinical reviews, approved or denied determinations and any other requests to dedicated fax number below belonging to the campus where the patient is receiving treatment   List of dedicated fax numbers for the Facilities:  [address] DENIALS (Administrative/Medical Necessity) [phone]   [address] (Maternity/NICU/Pediatrics) [phone] [address] [address] [address] [facility] [address] Prashant Saleh 4971 (  Remberto Reina "[place]) [address] [place] [address] [place] Rhonda Sousa [address] [place] [address] [phone]

## 2021-03-07 NOTE — PHYSICAL THERAPY NOTE
PHYSICAL THERAPY NOTE    Patient Name: Julia HENDERSON Date: 3/7/2021     Attempted to see pt for PT treatment today  Upon speaking with pt she reports increased pain and fatigue today requesting to rest  Will continue to follow pt for skilled PT treatment as able and appropriate, recommendation continues to be D/C to inpt rehab      Marquita Alonzo PT, DPT

## 2021-03-07 NOTE — PLAN OF CARE
Problem: Prexisting or High Potential for Compromised Skin Integrity  Goal: Skin integrity is maintained or improved  Description: INTERVENTIONS:  - Identify patients at risk for skin breakdown  - Assess and monitor skin integrity  - Assess and monitor nutrition and hydration status  - Monitor labs   - Assess for incontinence   - Turn and reposition patient  - Assist with mobility/ambulation  - Relieve pressure over bony prominences  - Avoid friction and shearing  - Provide appropriate hygiene as needed including keeping skin clean and dry  - Evaluate need for skin moisturizer/barrier cream  - Collaborate with interdisciplinary team   - Patient/family teaching  - Consider wound care consult   Outcome: Progressing     Problem: Potential for Falls  Goal: Patient will remain free of falls  Description: INTERVENTIONS:  - Assess patient frequently for physical needs  -  Identify cognitive and physical deficits and behaviors that affect risk of falls    -  [place] fall precautions as indicated by assessment   - Educate patient/family on patient safety including physical limitations  - Instruct patient to call for assistance with activity based on assessment  - Modify environment to reduce risk of injury  - Consider OT/PT consult to assist with strengthening/mobility  Outcome: Progressing     Problem: PAIN - ADULT  Goal: Verbalizes/displays adequate comfort level or baseline comfort level  Description: Interventions:  - Encourage patient to monitor pain and request assistance  - Assess pain using appropriate pain scale  - Administer analgesics based on type and severity of pain and evaluate response  - Implement non-pharmacological measures as appropriate and evaluate response  - Consider cultural and social influences on pain and pain management  - Notify physician/advanced practitioner if interventions unsuccessful or patient reports new pain  Outcome: Progressing     Problem: INFECTION - ADULT  Goal: Absence or prevention of progression during hospitalization  Description: INTERVENTIONS:  - Assess and monitor for signs and symptoms of infection  - Monitor lab/diagnostic results  - Monitor all insertion sites, i e  indwelling lines, tubes, and drains  - Monitor endotracheal if appropriate and nasal secretions for changes in amount and color  - [place] appropriate cooling/warming therapies per order  - Administer medications as ordered  - Instruct and encourage patient and family to use good hand hygiene technique  - Identify and instruct in appropriate isolation precautions for identified infection/condition  Outcome: Progressing  Goal: Absence of fever/infection during neutropenic period  Description: INTERVENTIONS:  - Monitor WBC    Outcome: Progressing     Problem: SAFETY ADULT  Goal: Patient will remain free of falls  Description: INTERVENTIONS:  - Assess patient frequently for physical needs  -  Identify cognitive and physical deficits and behaviors that affect risk of falls    -  [place] fall precautions as indicated by assessment   - Educate patient/family on patient safety including physical limitations  - Instruct patient to call for assistance with activity based on assessment  - Modify environment to reduce risk of injury  - Consider OT/PT consult to assist with strengthening/mobility  Outcome: Progressing  Goal: Maintain or return to baseline ADL function  Description: INTERVENTIONS:  -  Assess patient's ability to carry out ADLs; assess patient's baseline for ADL function and identify physical deficits which impact ability to perform ADLs (bathing, care of mouth/teeth, toileting, grooming, dressing, etc )  - Assess/evaluate cause of self-care deficits   - Assess range of motion  - Assess patient's mobility; develop plan if impaired  - Assess patient's need for assistive devices and provide as appropriate  - Encourage maximum independence but intervene and supervise when necessary  - Involve family in performance of ADLs  - Assess for home care needs following discharge   - Consider OT consult to assist with ADL evaluation and planning for discharge  - Provide patient education as appropriate  Outcome: Progressing  Goal: Maintain or return mobility status to optimal level  Description: INTERVENTIONS:  - Assess patient's baseline mobility status (ambulation, transfers, stairs, etc )    - Identify cognitive and physical deficits and behaviors that affect mobility  - Identify mobility aids required to assist with transfers and/or ambulation (gait belt, sit-to-stand, lift, walker, cane, etc )  - [place] fall precautions as indicated by assessment  - Record patient progress and toleration of activity level on Mobility SBAR; progress patient to next Phase/Stage  - Instruct patient to call for assistance with activity based on assessment  - Consider rehabilitation consult to assist with strengthening/weightbearing, etc   Outcome: Progressing     Problem: DISCHARGE PLANNING  Goal: Discharge to home or other facility with appropriate resources  Description: INTERVENTIONS:  - Identify barriers to discharge w/patient and caregiver  - Arrange for needed discharge resources and transportation as appropriate  - Identify discharge learning needs (meds, wound care, etc )  - Arrange for interpretive services to assist at discharge as needed  - Refer to Case Management Department for coordinating discharge planning if the patient needs post-hospital services based on physician/advanced practitioner order or complex needs related to functional status, cognitive ability, or social support system  Outcome: Progressing     Problem: Knowledge Deficit  Goal: Patient/family/caregiver demonstrates understanding of disease process, treatment plan, medications, and discharge instructions  Description: Complete learning assessment and assess knowledge base    Interventions:  - Provide teaching at level of understanding  - Provide teaching via preferred learning methods  Outcome: Progressing     Problem: CARDIOVASCULAR - ADULT  Goal: Maintains optimal cardiac output and hemodynamic stability  Description: INTERVENTIONS:  - Monitor I/O, vital signs and rhythm  - Monitor for S/S and trends of decreased cardiac output  - Administer and titrate ordered vasoactive medications to optimize hemodynamic stability  - Assess quality of pulses, skin color and temperature  - Assess for signs of decreased coronary artery perfusion  - Instruct patient to report change in severity of symptoms  Outcome: Progressing  Goal: Absence of cardiac dysrhythmias or at baseline rhythm  Description: INTERVENTIONS:  - Continuous cardiac monitoring, vital signs, obtain 12 lead EKG if ordered  - Administer antiarrhythmic and heart rate control medications as ordered  - Monitor electrolytes and administer replacement therapy as ordered  Outcome: Progressing     Problem: MUSCULOSKELETAL - ADULT  Goal: Maintain or return mobility to safest level of function  Description: INTERVENTIONS:  - Assess patient's ability to carry out ADLs; assess patient's baseline for ADL function and identify physical deficits which impact ability to perform ADLs (bathing, care of mouth/teeth, toileting, grooming, dressing, etc )  - Assess/evaluate cause of self-care deficits   - Assess range of motion  - Assess patient's mobility  - Assess patient's need for assistive devices and provide as appropriate  - Encourage maximum independence but intervene and supervise when necessary  - Involve family in performance of ADLs  - Assess for home care needs following discharge   - Consider OT consult to assist with ADL evaluation and planning for discharge  - Provide patient education as appropriate  Outcome: Progressing  Goal: Maintain proper alignment of affected body part  Description: INTERVENTIONS:  - Support, maintain and protect limb and body alignment  - Provide patient/ family with appropriate education  Outcome: Progressing

## 2021-03-07 NOTE — ASSESSMENT & PLAN NOTE
· Finding on CT: New right pleural effusion with right basilar atelectasis of uncertain etiology    · Patient 94-97% on RA  · Patient did report increased shortness of breath at [facility] - status post 1 dose of Lasix  · Echo - normal EF, no RWMA

## 2021-03-07 NOTE — PROGRESS NOTES
Progress Note - Ardine Laser Viscomi 1955, 78 y o  female MRN: [id number]    Unit/Bed#: 7T [facility] 705-02 Encounter: [id number]    Primary Care Provider: Naheed Rosa MD   Date and time admitted to hospital: 3/4/2021  7:50 PM        Low HDL (under 40)  Assessment & Plan  Continue atorvastatin 10 mg daily    Atelectasis of right lung  Assessment & Plan  · Noted on CT  · Encourage incentive spirometer    Pleural effusion on right  Assessment & Plan  · Finding on CT: New right pleural effusion with right basilar atelectasis of uncertain etiology    · Patient 94-97% on RA  · Patient did report increased shortness of breath at [facility] - status post 1 dose of Lasix  · Echo - normal EF, no RWMA    Abnormal ECG  Assessment & Plan  · No anginal symptoms, dyspnea exertion, palpitations etc   EKG lipid T-wave in V2 initial has been negative continue to trend  · Consult cardiology  · Recommending echo- normal ef, no regional wall motion abnormality    Gastroparesis  Assessment & Plan  · Recommend small meals  · Patient Reglan was discontinued secondary to oral dyskinesia  · Monitor for signs symptoms of obstruction  · Continue bowel medications      Hypothyroidism  Assessment & Plan  · Continue levothyroxine    Depression  Assessment & Plan  · Continue fluoxetine and Xanax for anxiety    Multiple closed fractures of pelvis (HCC)  Assessment & Plan  · Status post traumatic fall with right inferior pubic rami fracture right pubic symphysis fracture, right anterior acetabular fracture left anterior acetabular fracture  · Seen by Orthopedics conservative management  · Weight-bearing as tolerated  · DVT prophylaxis Lovenox  · Pain control  · PT OT eval for DC planning back to [facility] on Monday 3/8    Hypertension  Assessment & Plan  · Continue Norvasc and atenolol  · Monitor blood pressure    Sjogren's disease (HCC)  Assessment & Plan  · Weekly methotrexate  · Follow-up rheumatology as an outpatient    * Chest pain  Assessment & Plan  · Resolved  R/o ACS  · Breath negative x3  · No events on tele  · CTA - no PE  · Cardiology following  · Echo pending-normal wall motion, normal EF  · Continue statin  · No role for aspirin at this time    RUQ pain-resolved as of 3/7/2021  Assessment & Plan  · Resolved  S/p cholecystectomy  · No acute findings on CT  · Liver enzymes normal  · Likely referred pain from pelvic fracture          VTE Pharmacologic Prophylaxis:   Pharmacologic: Enoxaparin (Lovenox)  Mechanical VTE Prophylaxis in Place: Yes    Patient Centered Rounds: I have performed bedside rounds with nursing staff today  Discussions with Specialists or Other Care Team Provider: none    Education and Discussions with Family / Patient:patient     Time Spent for Care: 20 minutes  More than 50% of total time spent on counseling and coordination of care as described above  Current Length of Stay: 1 day(s)    Current Patient Status: Inpatient   Certification Statement: The patient will continue to require additional inpatient hospital stay due to continue inpatient management, pendign [facility] [email or web address]    Discharge Plan: dc tomorrow pending placement to arc     Code Status: Level 1 - Full Code      Subjective:   Patient seen and examined, currently no acute complaints  Objective:     Vitals:   Temp (24hrs), Av 8 °F (36 6 °C), Min:96 6 °F (35 9 °C), Max:98 8 °F (37 1 °C)    Temp:  [96 6 °F (35 9 °C)-98 8 °F (37 1 °C)] 96 6 °F (35 9 °C)  HR:  [59-63] 63  Resp:  [18-20] 20  BP: (146-163)/(71-82) 158/76  SpO2:  [95 %-96 %] 96 %  Body mass index is 28 15 kg/m²  Input and Output Summary (last 24 hours): Intake/Output Summary (Last 24 hours) at 3/7/2021 1032  Last data filed at 3/7/2021 0827  Gross per 24 hour   Intake 840 ml   Output 1600 ml   Net -760 ml       Physical Exam:     Physical Exam  Constitutional:       General: She is not in acute distress  Appearance: Normal appearance  HENT:      Head: Normocephalic and atraumatic  Mouth/Throat:      Mouth: Mucous membranes are moist    Eyes:      General:         Right eye: No discharge  Left eye: No discharge  Cardiovascular:      Rate and Rhythm: Normal rate and regular rhythm  Pulses: Normal pulses  Heart sounds: No murmur  Pulmonary:      Effort: Pulmonary effort is normal  No respiratory distress  Breath sounds: Normal breath sounds  No wheezing  Abdominal:      General: There is no distension  Musculoskeletal:      Right lower leg: No edema  Left lower leg: No edema  Skin:     General: Skin is warm and dry  Neurological:      General: No focal deficit present  Mental Status: She is alert and oriented to person, place, and time  Mental status is at baseline  Psychiatric:         Mood and Affect: Mood normal          Additional Data:     Labs:    Results from last 7 days   Lab Units 03/04/21  1640   WBC Thousand/uL 10 10   HEMOGLOBIN g/dL 9 7*   HEMATOCRIT % 29 6*   PLATELETS Thousands/uL 244   NEUTROS PCT % 90*   LYMPHS PCT % 5*   MONOS PCT % 5   EOS PCT % 0     Results from last 7 days   Lab Units 03/05/21  0510   SODIUM mmol/L 135*   POTASSIUM mmol/L 4 3   CHLORIDE mmol/L 100   CO2 mmol/L 28   BUN mg/dL 13   CREATININE mg/dL 0 66   ANION GAP mmol/L 7   CALCIUM mg/dL 9 0   ALBUMIN g/dL 3 9   TOTAL BILIRUBIN mg/dL 0 80   ALK PHOS U/L 169*   ALT U/L 37   AST U/L 64*   GLUCOSE RANDOM mg/dL 96                           * I Have Reviewed All Lab Data Listed Above  * Additional Pertinent Lab Tests Reviewed:  All Labs Within Last 24 Hours Reviewed    Imaging:    Imaging Reports Reviewed Today Include: none    Recent Cultures (last 7 days):           Last 24 Hours Medication List:   Current Facility-Administered Medications   Medication Dose Route Frequency Provider Last Rate    acetaminophen  650 mg Oral Q4H PRN Al Og PA-C      acetaminophen  650 mg Oral Q8H [facility] Nadia Carr PA-C      ALPRAZolam  0 25 mg Oral TID PRN [place] Filter, PA-C      amLODIPine  5 mg Oral Daily [place] Filter, PA-C      atenolol  50 mg Oral BID Tanya Filter, PA-C      atorvastatin  10 mg Oral Daily [place] Filter, PA-C      bisacodyl  10 mg Rectal Daily PRN Tanya Filter, PA-C      Diclofenac Sodium  2 g Topical 4x Daily Tanya Filter, PA-C      docusate sodium  100 mg Oral BID [place] Filter, PA-C      enoxaparin  30 mg Subcutaneous Q12H [address] [place] Filter, PA-C      FLUoxetine  20 mg Oral Daily [place] Filter, PA-C      folic acid  1 mg Oral Daily Tanya Filter, PA-C      gabapentin  100 mg Oral HS [place] Filter, PA-C      lactulose  20 g Oral BID PRN [place] Filter, PA-C      levothyroxine  88 mcg Oral Early Morning [place] Filter, PA-C      lidocaine  2 patch Topical HS Tanya Filter, PA-C      methocarbamol  500 mg Oral Q6H PRN Tanya Filter, PA-C      [START ON 3/10/2021] methotrexate  20 mg Oral Weekly [place] Filter, PA-C      nitroglycerin  0 4 mg Sublingual Q5 Min PRN [place] Filter, PA-C      nystatin  1 application Topical BID Tanya Filter, PA-C      ondansetron  4 mg Intravenous Q6H PRN Carmelina Gonzalez MD      ondansetron  4 mg Oral Q6H PRN Tanya Filter, PA-C      oxyCODONE  10 mg Oral Q6H PRN [place] Filter, PA-C      oxyCODONE  5 mg Oral Q4H PRN Carmelina Gonzalez MD      oxyCODONE  7 5 mg Oral Q4H PRN [place] Filter, PA-C      senna  1 tablet Oral BID [place] Filter, PA-C          Today, Patient Was Seen By: Carmelina Gonzalez MD    ** Please Note: Dictation voice to text software may have been used in the creation of this document   **

## 2021-03-08 PROCEDURE — 97110 THERAPEUTIC EXERCISES: CPT

## 2021-03-08 PROCEDURE — 99232 SBSQ HOSP IP/OBS MODERATE 35: CPT | Performed by: INTERNAL MEDICINE

## 2021-03-08 PROCEDURE — 97535 SELF CARE MNGMENT TRAINING: CPT

## 2021-03-08 PROCEDURE — 97116 GAIT TRAINING THERAPY: CPT

## 2021-03-08 RX ADMIN — DICLOFENAC SODIUM 2 G: 10 GEL TOPICAL at 22:00

## 2021-03-08 RX ADMIN — ATENOLOL 50 MG: 50 TABLET ORAL at 21:59

## 2021-03-08 RX ADMIN — ENOXAPARIN SODIUM 30 MG: 30 INJECTION SUBCUTANEOUS at 22:00

## 2021-03-08 RX ADMIN — FOLIC ACID 1 MG: 1 TABLET ORAL at 08:45

## 2021-03-08 RX ADMIN — DICLOFENAC SODIUM 2 G: 10 GEL TOPICAL at 13:02

## 2021-03-08 RX ADMIN — NYSTATIN 1 APPLICATION: 100000 POWDER TOPICAL at 08:46

## 2021-03-08 RX ADMIN — DICLOFENAC SODIUM 2 G: 10 GEL TOPICAL at 17:55

## 2021-03-08 RX ADMIN — ATORVASTATIN CALCIUM 10 MG: 10 TABLET, FILM COATED ORAL at 08:44

## 2021-03-08 RX ADMIN — ENOXAPARIN SODIUM 30 MG: 30 INJECTION SUBCUTANEOUS at 08:44

## 2021-03-08 RX ADMIN — LEVOTHYROXINE SODIUM 88 MCG: 0.09 TABLET ORAL at 05:13

## 2021-03-08 RX ADMIN — ATENOLOL 50 MG: 50 TABLET ORAL at 08:45

## 2021-03-08 RX ADMIN — OXYCODONE HYDROCHLORIDE 10 MG: 10 TABLET ORAL at 22:07

## 2021-03-08 RX ADMIN — OXYCODONE HYDROCHLORIDE 10 MG: 10 TABLET ORAL at 08:50

## 2021-03-08 RX ADMIN — AMLODIPINE BESYLATE 5 MG: 5 TABLET ORAL at 08:45

## 2021-03-08 RX ADMIN — NYSTATIN 1 APPLICATION: 100000 POWDER TOPICAL at 17:54

## 2021-03-08 RX ADMIN — FLUOXETINE 20 MG: 20 CAPSULE ORAL at 08:45

## 2021-03-08 RX ADMIN — ACETAMINOPHEN 650 MG: 325 TABLET, FILM COATED ORAL at 13:01

## 2021-03-08 RX ADMIN — ALPRAZOLAM 0.25 MG: 0.25 TABLET ORAL at 22:07

## 2021-03-08 RX ADMIN — GABAPENTIN 100 MG: 100 CAPSULE ORAL at 21:59

## 2021-03-08 RX ADMIN — LIDOCAINE 2 PATCH: 50 PATCH TOPICAL at 22:00

## 2021-03-08 NOTE — PROGRESS NOTES
PHYSICAL MEDICINE AND REHABILITATION   PREADMISSION ASSESSMENT     Projected [facility] Diagnoses:  Impairment of mobility, safety and Activities of Daily Living (ADLs) due to Debility:  16  Debility (Non-cardiac/Non-pulmonary)  Etiologic: Chest Pain   Date of Onset:  Date of surgery: N/A    PATIENT INFORMATION  Name: Sherie Sanchez Phone #: [phone] (home)   Address: [place] [address]  YOB: 1955 Age: 78 y o  SS#   Marital Status: /Civil Union  Ethnicity:    Employment Status: retired  Extended Emergency Contact Information  Primary Emergency Contact: Jayy Herron [facility]  Address: [address] Phone: [phone]  Mobile Phone: [phone]  Relation: Spouse  Advance Directive: full code     INSURANCE/COVERAGE:     Primary Payor: [address] [place] [address] / Plan: [employer] / Product Type: Blue Fee for Service /   Secondary Payer:<NONE>   Payer Contact:  Payer Contact:   Contact Phone:  Contact Phone:     Authorization #: [id number]   Coverage Dates:3//9- 3/15  LCD: next review date  3/15, please fax clinical information to [facility] Clinical Review: [phone] #: Medicare Days:   Medical Record #: [id number]    REFERRAL SOURCE:   Referring provider: Zen Mayer MD  Referring facility: [address] [place] [address]   Room: 7T /7T PCU [facility]  PCP: Zo De La Paz MD PCP phone number: [phone] Testing: 3/9/21 - negative     MEDICAL INFORMATION  HPI: Sherie Sanchez is a 78 y o  female who presents to [address] from [facility] with complaints of chest pain and right upper quadrant pain  Patient with past medical history of Sjogren's disease, depression, hypothyroidism, hypertension, meningioma status post resection in 2012, gastroparesis, oral dyskinesia is status post fall on 02/22/2021 with multiple pelvic fractures  She has been at the acute rehab unit and developed chest pain and right upper quadrant pain  Patient was transferred from [facility] for ACS rule out  She was evaluated by the hospitalist service there and there was concern of possible flipped T-wave in V2 and was recommended for transfer with telemetry  Patient was evaluated by cardiology due to abnormal EKG to r/o ACS  A CTA was negative for PE, LAMONT found normal wall motion and normal EF, RUE quadrant pain resoled, there were no acute findings on CT, liver enzymes were normal and it was felt that patient's pain was likely referred pain from pelvic fractures  Patient has been  encouraged to use her incentive spirometer and she is currently saturating at 94-97% on room air  Patient was evaluated by PT and OT, recommendation was made for patient to return to acute in-patient rehabilitation to continue her rehabilitation for her recent pelvic fractures  Patient remains WBAT to bilateral lower extremities as per ortho  Patient is medically stable and ready to return to [facility] today  Past Medical History:   Past Surgical History:    Allergies:     Past Medical History:   Diagnosis Date    Lymphadenitis, chronic     Sialadenitis     Sjogren's disease ([facility] [address] )     Stomach problems     Thyroid disorder     Xerostomia     Past Surgical History:   Procedure Laterality Date    BRAIN SURGERY  2019    tumor removed Dr Gideon Chan       Allergies   Allergen Reactions    Codeine Vomiting    Hydroxyquinoline Sulfate [Oxyquinoline] Vomiting    Leucovorin Vomiting    Tizanidine Vomiting         Comorbidities/Surgeries in the last 100 days: Aetlectasis of right lung, right pleural effusion, gastropareisis, hypothyroidism, depression, multiple pelvic fractures, hypertension, Sjoren's disease, chest pain, RUQ Pain     CURRENT VITAL SIGNS:   Temp:  [97 2 °F (36 2 °C)-98 °F (36 7 °C)] 97 2 °F (36 2 °C)  HR:  [58-61] 61  Resp:  [18-20] 20  BP: (146174)/(65-82) 150/72   Intake/Output Summary (Last 24 hours) at 3/9/2021 1122  Last data filed at 3/9/2021 0855  Gross per 24 hour   Intake 480 ml   Output 700 ml   Net -220 ml        LABORATORY RESULTS:      Lab Results   Component Value Date    HGB 9 7 (L) 03/04/2021    HGB 12 2 01/08/2015    HCT 29 6 (L) 03/04/2021    HCT 37 3 01/08/2015    WBC 10 10 03/04/2021    WBC 5 13 01/08/2015     Lab Results   Component Value Date    BUN 13 03/05/2021    BUN 10 01/11/2018     01/08/2015    K 4 3 03/05/2021    K 3 4 (L) 01/08/2015     03/05/2021     01/08/2015    GLUCOSE 89 01/08/2015    CREATININE 0 66 03/05/2021    CREATININE 0 85 01/11/2018     No results found for: PROTIME, INR     DIAGNOSTIC STUDIES:  Xr Chest Portable    Result Date: 3/5/2021  Impression: No radiographic evidence of acute intrathoracic process or significant interval change  Workstation performed: [id number]     Xr Hip/pelv 2-3 Vws Right If Performed    Result Date: 3/4/2021  Impression: Subtle right pelvic fractures as noted above  These correspond approximately to the CT findings  No new or displaced pelvic fractures are demonstrated  Workstation performed: [id number]     Cta Chest Ct Abdomen Pelvis W Contrast    Result Date: 3/4/2021  Impression: No pulmonary embolism  New right pleural effusion with right basilar atelectasis of uncertain etiology  Stable distal paraesophageal and gastrohepatic ligament adenopathy of uncertain etiology  Thickening of the distal rectal wall  Correlate for proctitis   Workstation performed: [id number]       PRECAUTIONS/SPECIAL NEEDS:Weight Bearing Precautions:  WBAT to bilateral LEs, Anticoagulation:  Lovenox and SCDs, Safety Concerns and Pain Management      MEDICATIONS:     Current Facility-Administered Medications:     acetaminophen (TYLENOL) tablet 650 mg, 650 mg, Oral, Q4H PRN, Vergia Pellet, PA-C, 650 mg at 03/07/21 0846    acetaminophen (TYLENOL) tablet 650 mg, 650 mg, Oral, Q8H [facility], Josefina Plata PA-C, 650 mg at 03/08/21 1301    ALPRAZolam [place]) tablet 0 25 mg, 0 25 mg, Oral, TID PRN, Josefina Plata PA-C, 0 25 mg at 03/08/21 2207    amLODIPine (NORVASC) tablet 5 mg, 5 mg, Oral, Daily, Josefina Plata PA-C, 5 mg at 03/09/21 0806    atenolol (TENORMIN) tablet 50 mg, 50 mg, Oral, BID, Josefina Plata PA-C, 50 mg at 03/09/21 0806    atorvastatin (LIPITOR) tablet 10 mg, 10 mg, Oral, Daily, Josefina Plata PA-C, 10 mg at 03/09/21 7302    bisacodyl (DULCOLAX) rectal suppository 10 mg, 10 mg, Rectal, Daily PRN, Josefina Plata PA-C    Diclofenac Sodium (VOLTAREN) 1 % topical gel 2 g, 2 g, Topical, 4x Daily, Josefina Plata PA-C, 2 g at 03/09/21 0914    docusate sodium (COLACE) capsule 100 mg, 100 mg, Oral, BID, Josefina Plata PA-C, 100 mg at 03/06/21 0844    enoxaparin (LOVENOX) subcutaneous injection 30 mg, 30 mg, Subcutaneous, Q12H [facility], Nadia Carr PA-C, 30 mg at 03/09/21 0806    FLUoxetine (PROzac) capsule 20 mg, 20 mg, Oral, Daily, Josefina Plata PA-C, 20 mg at 83/59/47 8062    folic acid (FOLVITE) tablet 1 mg, 1 mg, Oral, Daily, Josefian Plata PA-C, 1 mg at 03/09/21 0807    gabapentin (NEURONTIN) capsule 100 mg, 100 mg, Oral, HS, WHIT [place]-ARNIE, 100 mg at 03/08/21 2159    lactulose oral solution 20 g, 20 g, Oral, BID PRN, Josefina Plata PA-C    levothyroxine tablet 88 mcg, 88 mcg, Oral, Early Morning, Nadia Carr PA-C, 88 mcg at 03/09/21 0540    lidocaine (LIDODERM) 5 % patch 2 patch, 2 patch, Topical, HS, Josefina Plata PA-C, 2 patch at 03/08/21 2200    methocarbamol (ROBAXIN) tablet 500 mg, 500 mg, Oral, Q6H PRN, Josefina Plata PA-C    [START ON 3/10/2021] methotrexate tablet 20 mg, 20 mg, Oral, Weekly, Josefina Plata PA-C    nitroglycerin (NITROSTAT) SL tablet 0 4 mg, 0 4 mg, Sublingual, Q5 Min PRN, Josefina Plata PA-C    nystatin (MYCOSTATIN) powder 1 application, 1 application, Topical, BID, Emilee Reid CHARLES Carr, 1 application at 62/08/44 0807    ondansetron (ZOFRAN) injection 4 mg, 4 mg, Intravenous, Q6H PRN, Lj Hernandez MD    ondansetron (ZOFRAN-ODT) dispersible tablet 4 mg, 4 mg, Oral, Q6H PRN, Al Og PA-C    oxyCODONE (ROXICODONE) immediate release tablet 10 mg, 10 mg, Oral, Q6H PRN, Al Og PA-C, 10 mg at 03/08/21 2207    oxyCODONE (ROXICODONE) IR tablet 5 mg, 5 mg, Oral, Q4H PRN, Lj Hernandez MD, 5 mg at 03/09/21 8824    oxyCODONE (ROXICODONE) IR tablet 7 5 mg, 7 5 mg, Oral, Q4H PRN, Al Og PA-C    senna (SENOKOT) tablet 8 6 mg, 1 tablet, Oral, BID, Al Og PA-C, 8 6 mg at 03/09/21 0806    SKIN INTEGRITY:   Redness, Abrasions under right shoulder and under breasts     PRIOR LEVEL OF FUNCTION:  She lives in a(n) single family home  Wilfred Ritter is  and lives with their spouse  Has 2 sons who are able to assist   Self Care: Independent, Indoor Mobility: Independent, Stairs (in/outdoor): Independent and Cognition: Independent    FALLS IN THE LAST 6 MONTHS: 1-4    HOME ENVIRONMENT:  The living area: can live on one level  There are 6 - 10 steps to enter the home  The patient will have 24 hour supervision/physical assistance available upon discharge  PREVIOUS DME:  Equipment in home (previous DME): Commode, Shower Chair, Grab Bars, [employer] and [employer] is on 2nd floor, will use commode on 1st floor    Has tub/shower combo    FUNCTIONAL STATUS:  Physical Therapy Occupational Therapy Speech Therapy   03/08/21 1345    PT Last Visit   PT Visit Date 03/08/21   Note Type   Note Type Treatment   Pain Assessment   Pain Assessment Tool 0-10   Pain Score 6   Pain Location/Orientation Location: Pelvis   Restrictions/Precautions   Weight Bearing Precautions Per Order Yes   RLE Weight Bearing Per Order WBAT   LLE Weight Bearing Per Order WBAT   Other Precautions Fall Risk;Pain   General   Chart Reviewed Yes Response to Previous Treatment Patient with no complaints from previous session  Family/Caregiver Present No   Cognition   Arousal/Participation Alert; Cooperative   Attention Within functional limits   Orientation Level Oriented X4   Memory Within functional limits   Following Commands Follows all commands and directions without difficulty   Transfers   Sit to Stand 5  Supervision   Additional items Increased time required;Verbal cues   Stand to Sit 5  Supervision   Additional items Increased time required;Verbal cues   Toilet transfer 5  Supervision   Additional items Increased time required;Verbal cues   Additional Comments with RW   Ambulation/Elevation   Gait pattern Excessively slow; Short stride; Foward flexed;Decreased foot clearance; Improper Weight shift; Antalgic   Gait Assistance 5  Supervision   Additional items Verbal cues   Assistive Device Rolling walker   Distance 50ft, 20ftx2   Balance   Static Sitting Fair +   Dynamic Sitting Fair   Static Standing Fair   Dynamic Standing Fair -   Ambulatory Fair -   Endurance Deficit   Endurance Deficit Yes   Activity Tolerance   Activity Tolerance Patient tolerated treatment well   Nurse Made Aware spoke to RN   Exercises   Heelslides Sitting;10 reps;AROM; Bilateral   Hip Flexion Sitting;10 reps;AROM; Bilateral   Hip Abduction Sitting;10 reps;AROM; Bilateral   Knee AROM Long Arc Quad Sitting;10 reps;AROM; Bilateral   Assessment   Prognosis Good   Problem List Decreased strength;Decreased range of motion;Decreased endurance; Impaired balance;Decreased mobility; Decreased safety awareness;Pain   Assessment Pt seen today for PT treatment found seated in recliner  Increased pain and stiffness today  Overall steady gait although not tolerating longer distances today  Declined stairs  Left seated in recliner with needs in reach     Barriers to Discharge Inaccessible home environment;Decreased caregiver support   Goals   Patient Goals get back to rehab   STG Expiration Date 03/15/21   PT Treatment Day 2   Plan   Treatment/Interventions Functional transfer training;LE strengthening/ROM; Elevations; Therapeutic exercise; Endurance training;Patient/family training;Equipment eval/education; Bed mobility;Gait training;Spoke to nursing;Spoke to case management;OT      03/08/21 1605     OT Last Visit   OT Visit Date 03/08/21   Note Type   Note Type Treatment   Restrictions/Precautions   Weight Bearing Precautions Per Order Yes   RLE Weight Bearing Per Order WBAT   LLE Weight Bearing Per Order WBAT   Other Precautions Fall Risk;Pain   Pain Assessment   Pain Assessment Tool 0-10   Pain Score 7   Pain Location/Orientation Location: Pelvis   Hospital Pain Intervention(s) Repositioned;Medication (See MAR)   ADL   UB Dressing Assistance 5  Supervision/Setup   UB Dressing Deficit Thread RUE; Thread LUE;Pull around back;Pull down in back   LB Dressing Assistance 4  Minimal Assistance   LB Dressing Deficit Thread RLE into underwear; Thread LLE into underwear;Pull up over hips   Toileting Assistance  4  Minimal Assistance   Toileting Deficit Clothing management up;Clothing management down;Perineal hygiene   Functional Standing Tolerance   Comments ~5 minutes   Transfers   Sit to Stand 5  Supervision   Additional items Increased time required;Verbal cues   Stand to Sit 5  Supervision   Additional items Increased time required;Verbal cues   Toilet transfer 5  Supervision   Additional items Increased time required;Verbal cues   Functional Mobility   Functional Mobility 5  Supervision   Additional items Rolling walker   Cognition   Arousal/Participation Alert; Cooperative   Attention Within functional limits   Orientation Level Oriented X4   Memory Within functional limits   Following Commands Follows all commands and directions without difficulty   Activity Tolerance   Activity Tolerance Patient limited by pain   Assessment   Assessment Pt seen for OT txt  Pt received calling for assist to use the bathroom    Pt continues to be limited by pain which increased with activity  Pt assisted to the bathroom, not able to tolerate prolonged standing to complete grooming tasks at the sink due to poor endurance and pain, Ebony to manage gown and hygiene  Pt continues to require assist for bed mobility due to impaired BLE management with sit-->supine transitions  Pt progressing, however remains limited by decreased activity tolerance, endurance, functional strength, and pain management  Pt would benefit from continued OT services to further address deficits with transfers, ambulation, and ADLs in order to maximize functional independence  CURRENT GAP IN FUNCTION  Prior to Admission: Patient was independent with mobility, ADLsIADLs until recent fall with pelvic fractures  Estimated length of stay: 10 to 14 days    Anticipated Post-Discharge Disposition/Treatment  Disposition: Return to previous home/apartment  Outpatient Services: Physical Therapy (PT) and Occupational Therapy (OT)    BARRIERS TO DISCHARGE  Lovenox, Pain, Balance Difficulty, Dizziness, Fatigue, Home Accessibility and Equipment Needs    INTERVENTIONS FOR DISCHARGE  Adaptive equipment, Arrange DME needs, Home Modifcations, Therapy exercises and Energy conservation education     REQUIRED THERAPY:  Patient will require PT and OT 90 minutes each per day, five days per week to achieve rehab goals  REQUIRED FUNCTIONAL AND MEDICAL MANAGEMENT FOR INPATIENT REHABILITATION:  Pain Management: Overall pain is well controlled, Deep Vein Thrombosis (DVT) Prophylaxis:  SCD's while in bed, continued PT/OT interventions, nursing education and training, bowel/bladder management, Internal Medicine to monitor and manage medical conditions, PM&R to maximize function and provide medical oversight of rehabilitation program, patient and family education and training, additional consults as needed  RECOMMENDED LEVEL OF CARE:     HPI: Selena Roger is a 78 y o  female who presents to [address] from [facility] with complaints of chest pain and right upper quadrant pain  Patient with past medical history of Sjogren's disease, depression, hypothyroidism, hypertension, meningioma status post resection in 2012, gastroparesis, oral dyskinesia is status post fall on 02/22/2021 with multiple pelvic fractures  She has been at the acute rehab unit and developed chest pain and right upper quadrant pain  Patient was transferred from [facility] rule out  She was evaluated by the hospitalist service there and there was concern of possible flipped T-wave in V2 and was recommended for transfer with telemetry  Patient was evaluated by cardiology due to abnormal EKG to r/o ACS  A CTA was negative for PE, LAMONT found normal wall motion and normal EF, RUE quadrant pain resoled, there were no acute findings on CT, liver enzymes were normal and it was felt that patient's pain was likely referred pain from pelvic fractures  Prior to recent fall with pelvic fractures, patient was independent with home and community functional mobility without the use of an assistive device  Patient was independent with basic ADLs but  managed IADLs, patient has not driven since her meningioma resection  Patient has two supportive sons who live with patient and   Son's are able to provide assistance as needed for patient  Patient currently requires supervision for transfers and ambulation up to 50 feet with use of RW  Patient also requires supervision/set-up with grooming and ADL's and Min A with LB ADLs and toileting  Patient will require continued PT/OT intervention to improve functional mobility and self-care skills  Patient will require nursing education/traning and bowel/bladder management, internal medicine to monitor medical condtions, PM&R to maximize function and provide medical oversight    In-patient rehabilitation is required to maximize self-care, mobility, strength, endurance, balance and safety for discharge home with supportive family

## 2021-03-08 NOTE — PLAN OF CARE
Problem: Prexisting or High Potential for Compromised Skin Integrity  Goal: Skin integrity is maintained or improved  Description: INTERVENTIONS:  - Identify patients at risk for skin breakdown  - Assess and monitor skin integrity  - Assess and monitor nutrition and hydration status  - Monitor labs   - Assess for incontinence   - Turn and reposition patient  - Assist with mobility/ambulation  - Relieve pressure over bony prominences  - Avoid friction and shearing  - Provide appropriate hygiene as needed including keeping skin clean and dry  - Evaluate need for skin moisturizer/barrier cream  - Collaborate with interdisciplinary team   - Patient/family teaching  - Consider wound care consult   Outcome: Progressing     Problem: Potential for Falls  Goal: Patient will remain free of falls  Description: INTERVENTIONS:  - Assess patient frequently for physical needs  -  Identify cognitive and physical deficits and behaviors that affect risk of falls    -  [place] fall precautions as indicated by assessment   - Educate patient/family on patient safety including physical limitations  - Instruct patient to call for assistance with activity based on assessment  - Modify environment to reduce risk of injury  - Consider OT/PT consult to assist with strengthening/mobility  Outcome: Progressing     Problem: PAIN - ADULT  Goal: Verbalizes/displays adequate comfort level or baseline comfort level  Description: Interventions:  - Encourage patient to monitor pain and request assistance  - Assess pain using appropriate pain scale  - Administer analgesics based on type and severity of pain and evaluate response  - Implement non-pharmacological measures as appropriate and evaluate response  - Consider cultural and social influences on pain and pain management  - Notify physician/advanced practitioner if interventions unsuccessful or patient reports new pain  Outcome: Progressing     Problem: INFECTION - ADULT  Goal: Absence or prevention of progression during hospitalization  Description: INTERVENTIONS:  - Assess and monitor for signs and symptoms of infection  - Monitor lab/diagnostic results  - Monitor all insertion sites, i e  indwelling lines, tubes, and drains  - Monitor endotracheal if appropriate and nasal secretions for changes in amount and color  - [place] appropriate cooling/warming therapies per order  - Administer medications as ordered  - Instruct and encourage patient and family to use good hand hygiene technique  - Identify and instruct in appropriate isolation precautions for identified infection/condition  Outcome: Progressing  Goal: Absence of fever/infection during neutropenic period  Description: INTERVENTIONS:  - Monitor WBC    Outcome: Progressing     Problem: SAFETY ADULT  Goal: Patient will remain free of falls  Description: INTERVENTIONS:  - Assess patient frequently for physical needs  -  Identify cognitive and physical deficits and behaviors that affect risk of falls    -  [place] fall precautions as indicated by assessment   - Educate patient/family on patient safety including physical limitations  - Instruct patient to call for assistance with activity based on assessment  - Modify environment to reduce risk of injury  - Consider OT/PT consult to assist with strengthening/mobility  Outcome: Progressing  Goal: Maintain or return to baseline ADL function  Description: INTERVENTIONS:  -  Assess patient's ability to carry out ADLs; assess patient's baseline for ADL function and identify physical deficits which impact ability to perform ADLs (bathing, care of mouth/teeth, toileting, grooming, dressing, etc )  - Assess/evaluate cause of self-care deficits   - Assess range of motion  - Assess patient's mobility; develop plan if impaired  - Assess patient's need for assistive devices and provide as appropriate  - Encourage maximum independence but intervene and supervise when necessary  - Involve family in performance of ADLs  - Assess for home care needs following discharge   - Consider OT consult to assist with ADL evaluation and planning for discharge  - Provide patient education as appropriate  Outcome: Progressing  Goal: Maintain or return mobility status to optimal level  Description: INTERVENTIONS:  - Assess patient's baseline mobility status (ambulation, transfers, stairs, etc )    - Identify cognitive and physical deficits and behaviors that affect mobility  - Identify mobility aids required to assist with transfers and/or ambulation (gait belt, sit-to-stand, lift, walker, cane, etc )  - [place] fall precautions as indicated by assessment  - Record patient progress and toleration of activity level on Mobility SBAR; progress patient to next Phase/Stage  - Instruct patient to call for assistance with activity based on assessment  - Consider rehabilitation consult to assist with strengthening/weightbearing, etc   Outcome: Progressing     Problem: DISCHARGE PLANNING  Goal: Discharge to home or other facility with appropriate resources  Description: INTERVENTIONS:  - Identify barriers to discharge w/patient and caregiver  - Arrange for needed discharge resources and transportation as appropriate  - Identify discharge learning needs (meds, wound care, etc )  - Arrange for interpretive services to assist at discharge as needed  - Refer to Case Management Department for coordinating discharge planning if the patient needs post-hospital services based on physician/advanced practitioner order or complex needs related to functional status, cognitive ability, or social support system  Outcome: Progressing     Problem: Knowledge Deficit  Goal: Patient/family/caregiver demonstrates understanding of disease process, treatment plan, medications, and discharge instructions  Description: Complete learning assessment and assess knowledge base    Interventions:  - Provide teaching at level of understanding  - Provide teaching via preferred learning methods  Outcome: Progressing     Problem: CARDIOVASCULAR - ADULT  Goal: Maintains optimal cardiac output and hemodynamic stability  Description: INTERVENTIONS:  - Monitor I/O, vital signs and rhythm  - Monitor for S/S and trends of decreased cardiac output  - Administer and titrate ordered vasoactive medications to optimize hemodynamic stability  - Assess quality of pulses, skin color and temperature  - Assess for signs of decreased coronary artery perfusion  - Instruct patient to report change in severity of symptoms  Outcome: Progressing  Goal: Absence of cardiac dysrhythmias or at baseline rhythm  Description: INTERVENTIONS:  - Continuous cardiac monitoring, vital signs, obtain 12 lead EKG if ordered  - Administer antiarrhythmic and heart rate control medications as ordered  - Monitor electrolytes and administer replacement therapy as ordered  Outcome: Progressing     Problem: MUSCULOSKELETAL - ADULT  Goal: Maintain or return mobility to safest level of function  Description: INTERVENTIONS:  - Assess patient's ability to carry out ADLs; assess patient's baseline for ADL function and identify physical deficits which impact ability to perform ADLs (bathing, care of mouth/teeth, toileting, grooming, dressing, etc )  - Assess/evaluate cause of self-care deficits   - Assess range of motion  - Assess patient's mobility  - Assess patient's need for assistive devices and provide as appropriate  - Encourage maximum independence but intervene and supervise when necessary  - Involve family in performance of ADLs  - Assess for home care needs following discharge   - Consider OT consult to assist with ADL evaluation and planning for discharge  - Provide patient education as appropriate  Outcome: Progressing  Goal: Maintain proper alignment of affected body part  Description: INTERVENTIONS:  - Support, maintain and protect limb and body alignment  - Provide patient/ family with appropriate education  Outcome: Progressing

## 2021-03-08 NOTE — CASE MANAGEMENT
Discharge planning:    CM was notified at rounds that pt is medically cleared to get Discharged today  CM contacted [facility] through [employer] and received message that Patient was approved for [facility] on 3/5 and insurance auth was submitted  [facility] have not yet heard back with determination  They will follow back with CM soon, once approval/determination is received      CM department will continue to follow through pt's D/C

## 2021-03-08 NOTE — PROGRESS NOTES
Progress Note - [place] Viscom[place] 1955, 78 y o  female MRN: [id number]    Unit/Bed#: 7T [facility] 705-02 Encounter: [id number]    Primary Care Provider: Sg Ayala MD   Date and time admitted to hospital: 3/4/2021  7:50 PM        Low HDL (under 40)  Assessment & Plan  Continue atorvastatin 10 mg daily    Atelectasis of right lung  Assessment & Plan  · Noted on CT  · Encourage incentive spirometer    Pleural effusion on right  Assessment & Plan  · Finding on CT: New right pleural effusion with right basilar atelectasis of uncertain etiology  · Patient 94-97% on RA  · Patient did report increased shortness of breath at [facility] - status post 1 dose of Lasix  · Echo - normal EF, no RWMA  · No further plans since asymptomatic     Abnormal ECG  Assessment & Plan  · No anginal symptoms, dyspnea exertion, palpitations etc   EKG lipid T-wave in V2 initial has been negative continue to trend  · Consult cardiology  · Recommending echo- normal ef, no regional wall motion abnormality  · No plans to stress test, no active cp, completely asymptomatic  WELLS (dyspnea on exertion)  Assessment & Plan  Deconditioning and fractures/ambulatory dysfunction   Denies any orthopnea leg swelling etc      Gastroparesis  Assessment & Plan  · Recommend small meals  · Patient Reglan was discontinued secondary to oral dyskinesia  · Monitor for signs symptoms of obstruction  · Continue bowel medications      Hypothyroidism  Assessment & Plan  · Continue levothyroxine    Depression  Assessment & Plan  · Continue fluoxetine and Xanax for anxiety    Multiple closed fractures of pelvis (HCC)  Assessment & Plan  · Status post traumatic fall with right inferior pubic rami fracture right pubic symphysis fracture, right anterior acetabular fracture left anterior acetabular fracture  · Seen by Orthopedics conservative management  · Weight-bearing as tolerated  · DVT prophylaxis Lovenox  · Pain control  · PT OT eval for DC planning back to [facility] on Tuesday     Hypertension  Assessment & Plan  · Continue Norvasc and atenolol  · Monitor blood pressure    Sjogren's disease ([address] )  Assessment & Plan  · Weekly methotrexate  · Follow-up rheumatology as an outpatient    * Chest pain  Assessment & Plan  · Resolved  R/o ACS  · Breath negative x3  · No events on tele  · CTA - no PE  · Cardiology following  · Echo pending-normal wall motion, normal EF   · No plans to stress test at this time  · Continue statin  · No role for aspirin at this time      VTE Pharmacologic Prophylaxis:   Pharmacologic: Enoxaparin (Lovenox)  Mechanical VTE Prophylaxis in Place: Yes    Patient Centered Rounds: I have performed bedside rounds with nursing staff today  Discussions with Specialists or Other Care Team Provider:  CM    Education and Discussions with Family / Patient: None    Time Spent for Care: 20 minutes  More than 50% of total time spent on counseling and coordination of care as described above  Current Length of Stay: 2 day(s)    Current Patient Status: Inpatient   Certification Statement: The patient will continue to require additional inpatient hospital stay due to pending dc to [facility] tomorrow    Discharge Plan: pending auth for [facility]  Possibly DC tomorrow     Code Status: Level 1 - Full Code      Subjective:   Patient seen and examined at bedside  No acute complaints, no cp or sob    Objective:     Vitals:   Temp (24hrs), Av 3 °F (36 8 °C), Min:97 8 °F (36 6 °C), Max:99 2 °F (37 3 °C)    Temp:  [97 8 °F (36 6 °C)-99 2 °F (37 3 °C)] 97 8 °F (36 6 °C)  HR:  [58-63] 59  Resp:  [18-20] 18  BP: (146-164)/(65-79) 164/79  SpO2:  [95 %-98 %] 95 %  Body mass index is 28 15 kg/m²  Input and Output Summary (last 24 hours): Intake/Output Summary (Last 24 hours) at 3/8/2021 2212  Last data filed at 3/8/2021 1900  Gross per 24 hour   Intake 660 ml   Output 200 ml   Net 460 ml       Physical Exam:     Physical Exam  Constitutional:       Appearance: She is well-developed  HENT:      Head: Normocephalic and atraumatic  Comments: Tardive dyskinesia of face     Mouth/Throat:      Pharynx: No oropharyngeal exudate  Eyes:      Pupils: Pupils are equal, round, and reactive to light  Neck:      Musculoskeletal: Normal range of motion and neck supple  Cardiovascular:      Rate and Rhythm: Normal rate and regular rhythm  Heart sounds: Normal heart sounds  Pulmonary:      Effort: Pulmonary effort is normal  No respiratory distress  Breath sounds: Normal breath sounds  Abdominal:      General: Bowel sounds are normal       Palpations: Abdomen is soft  Tenderness: There is no abdominal tenderness  Skin:     General: Skin is warm and dry  Neurological:      Mental Status: She is alert and oriented to person, place, and time  Additional Data:     Labs:    Results from last 7 days   Lab Units 03/04/21  1640   WBC Thousand/uL 10 10   HEMOGLOBIN g/dL 9 7*   HEMATOCRIT % 29 6*   PLATELETS Thousands/uL 244   NEUTROS PCT % 90*   LYMPHS PCT % 5*   MONOS PCT % 5   EOS PCT % 0     Results from last 7 days   Lab Units 03/05/21  0510   SODIUM mmol/L 135*   POTASSIUM mmol/L 4 3   CHLORIDE mmol/L 100   CO2 mmol/L 28   BUN mg/dL 13   CREATININE mg/dL 0 66   ANION GAP mmol/L 7   CALCIUM mg/dL 9 0   ALBUMIN g/dL 3 9   TOTAL BILIRUBIN mg/dL 0 80   ALK PHOS U/L 169*   ALT U/L 37   AST U/L 64*   GLUCOSE RANDOM mg/dL 96                           * I Have Reviewed All Lab Data Listed Above  * Additional Pertinent Lab Tests Reviewed:  All Labs Within Last 24 Hours Reviewed    Imaging:    Imaging Reports Reviewed Today Include: none    Recent Cultures (last 7 days):           Last 24 Hours Medication List:   Current Facility-Administered Medications   Medication Dose Route Frequency Provider Last Rate    acetaminophen  650 mg Oral Q4H PRN Tanya Wilson PA-C      acetaminophen  650 mg Oral Q8H [address] Nadia Carr PA-C      ALPRAZolam  0 25 mg Oral TID PRN Bushra Monterroso Iasiello, PA-C      amLODIPine  5 mg Oral Daily Argie Fallow, PA-C      atenolol  50 mg Oral BID Argie Fallow, PA-C      atorvastatin  10 mg Oral Daily Argie Fallow, PA-C      bisacodyl  10 mg Rectal Daily PRN Argie Fallow, PA-C      Diclofenac Sodium  2 g Topical 4x Daily Argie Fallow, PA-C      docusate sodium  100 mg Oral BID Argie Fallow, PA-C      enoxaparin  30 mg Subcutaneous Q12H [facility] Argie Fallow, PA-C      FLUoxetine  20 mg Oral Daily Argie Fallow, PA-C      folic acid  1 mg Oral Daily Argie Fallow, PA-C      gabapentin  100 mg Oral HS Argie Fallow, PA-C      lactulose  20 g Oral BID PRN Argie Fallow, PA-C      levothyroxine  88 mcg Oral Early Morning Argie Fallow, PA-C      lidocaine  2 patch Topical HS Argie Fallow, PA-C      methocarbamol  500 mg Oral Q6H PRN Argie Fallow, PA-C      [START ON 3/10/2021] methotrexate  20 mg Oral Weekly Argie Fallow, PA-C      nitroglycerin  0 4 mg Sublingual Q5 Min PRN Argie Fallow, PA-C      nystatin  1 application Topical BID Argie Fallow, PA-C      ondansetron  4 mg Intravenous Q6H PRN Maribel Mills MD      ondansetron  4 mg Oral Q6H PRN Argie Fallow, PA-C      oxyCODONE  10 mg Oral Q6H PRN Argie Fallow, PA-C      oxyCODONE  5 mg Oral Q4H PRN Maribel Mills MD      oxyCODONE  7 5 mg Oral Q4H PRN Argie Fallow, PA-C      senna  1 tablet Oral BID Argie Fallow, PA-C          Today, Patient Was Seen By: Maribel Mills MD    ** Please Note: Dictation voice to text software may have been used in the creation of this document   **

## 2021-03-09 ENCOUNTER — HOSPITAL ENCOUNTER (INPATIENT)
Facility: HOSPITAL | Age: 66
LOS: 8 days | Discharge: HOME HEALTH | DRG: 560 | End: 2021-03-17
Payer: COMMERCIAL

## 2021-03-09 VITALS
HEART RATE: 61 BPM | TEMPERATURE: 97.2 F | BODY MASS INDEX: 28.28 KG/M2 | SYSTOLIC BLOOD PRESSURE: 150 MMHG | DIASTOLIC BLOOD PRESSURE: 72 MMHG | OXYGEN SATURATION: 96 % | RESPIRATION RATE: 20 BRPM | WEIGHT: 134.7 LBS | HEIGHT: 58 IN

## 2021-03-09 DIAGNOSIS — K31.84 GASTROPARESIS: ICD-10-CM

## 2021-03-09 DIAGNOSIS — R52 PAIN: ICD-10-CM

## 2021-03-09 DIAGNOSIS — B36.9 FUNGAL SKIN INFECTION: ICD-10-CM

## 2021-03-09 DIAGNOSIS — D64.9 ANEMIA: ICD-10-CM

## 2021-03-09 DIAGNOSIS — R68.2 DRY MOUTH: ICD-10-CM

## 2021-03-09 DIAGNOSIS — K59.00 CONSTIPATION: ICD-10-CM

## 2021-03-09 DIAGNOSIS — M35.00 SJOGREN'S SYNDROME, WITH UNSPECIFIED ORGAN INVOLVEMENT (HCC): Primary | Chronic | ICD-10-CM

## 2021-03-09 DIAGNOSIS — S32.501A CLOSED NONDISPLACED FRACTURE OF RIGHT PUBIS, INITIAL ENCOUNTER (HCC): ICD-10-CM

## 2021-03-09 DIAGNOSIS — B35.1 ONYCHOMYCOSIS: ICD-10-CM

## 2021-03-09 DIAGNOSIS — E03.4 HYPOTHYROIDISM DUE TO ACQUIRED ATROPHY OF THYROID: Chronic | ICD-10-CM

## 2021-03-09 DIAGNOSIS — I10 ESSENTIAL HYPERTENSION: Chronic | ICD-10-CM

## 2021-03-09 DIAGNOSIS — S32.82XD MULTIPLE CLOSED FRACTURES OF PELVIS WITH ROUTINE HEALING, SUBSEQUENT ENCOUNTER: ICD-10-CM

## 2021-03-09 DIAGNOSIS — G31.84 MILD COGNITIVE IMPAIRMENT: ICD-10-CM

## 2021-03-09 DIAGNOSIS — Z91.89 AT RISK FOR VENOUS THROMBOEMBOLISM (VTE): ICD-10-CM

## 2021-03-09 DIAGNOSIS — Q84.5 ENLARGED AND HYPERTROPHIC NAILS: ICD-10-CM

## 2021-03-09 PROBLEM — L30.8 DERMATITIS ASSOCIATED WITH MOISTURE: Status: ACTIVE | Noted: 2021-03-09

## 2021-03-09 PROCEDURE — 99223 1ST HOSP IP/OBS HIGH 75: CPT

## 2021-03-09 PROCEDURE — NC001 PR NO CHARGE

## 2021-03-09 PROCEDURE — 99254 IP/OBS CNSLTJ NEW/EST MOD 60: CPT | Performed by: NURSE PRACTITIONER

## 2021-03-09 PROCEDURE — 0241U HB NFCT DS VIR RESP RNA 4 TRGT: CPT | Performed by: INTERNAL MEDICINE

## 2021-03-09 PROCEDURE — 99238 HOSP IP/OBS DSCHRG MGMT 30/<: CPT | Performed by: INTERNAL MEDICINE

## 2021-03-09 RX ORDER — OXYCODONE HYDROCHLORIDE 10 MG/1
10 TABLET ORAL EVERY 6 HOURS PRN
Status: CANCELLED | OUTPATIENT
Start: 2021-03-09

## 2021-03-09 RX ORDER — LEVOTHYROXINE SODIUM 88 UG/1
88 TABLET ORAL
Status: DISCONTINUED | OUTPATIENT
Start: 2021-03-10 | End: 2021-03-17 | Stop reason: HOSPADM

## 2021-03-09 RX ORDER — METHOCARBAMOL 500 MG/1
500 TABLET, FILM COATED ORAL EVERY 6 HOURS PRN
Status: CANCELLED | OUTPATIENT
Start: 2021-03-09

## 2021-03-09 RX ORDER — NYSTATIN 100000 [USP'U]/G
1 POWDER TOPICAL 2 TIMES DAILY
Status: DISCONTINUED | OUTPATIENT
Start: 2021-03-09 | End: 2021-03-17 | Stop reason: HOSPADM

## 2021-03-09 RX ORDER — ACETAMINOPHEN 325 MG/1
650 TABLET ORAL EVERY 8 HOURS SCHEDULED
Status: CANCELLED | OUTPATIENT
Start: 2021-03-09

## 2021-03-09 RX ORDER — ATENOLOL 50 MG/1
50 TABLET ORAL 2 TIMES DAILY
Status: CANCELLED | OUTPATIENT
Start: 2021-03-09

## 2021-03-09 RX ORDER — AMLODIPINE BESYLATE 5 MG/1
5 TABLET ORAL DAILY
Status: CANCELLED | OUTPATIENT
Start: 2021-03-10

## 2021-03-09 RX ORDER — LIDOCAINE 50 MG/G
2 PATCH TOPICAL
Status: CANCELLED | OUTPATIENT
Start: 2021-03-09

## 2021-03-09 RX ORDER — ALPRAZOLAM 0.25 MG/1
0.25 TABLET ORAL 3 TIMES DAILY PRN
Status: DISCONTINUED | OUTPATIENT
Start: 2021-03-09 | End: 2021-03-17 | Stop reason: HOSPADM

## 2021-03-09 RX ORDER — SENNOSIDES 8.6 MG
1 TABLET ORAL 2 TIMES DAILY
Status: DISCONTINUED | OUTPATIENT
Start: 2021-03-09 | End: 2021-03-17 | Stop reason: HOSPADM

## 2021-03-09 RX ORDER — FOLIC ACID 1 MG/1
1 TABLET ORAL DAILY
Status: CANCELLED | OUTPATIENT
Start: 2021-03-10

## 2021-03-09 RX ORDER — ATORVASTATIN CALCIUM 10 MG/1
10 TABLET, FILM COATED ORAL DAILY
Status: CANCELLED | OUTPATIENT
Start: 2021-03-10

## 2021-03-09 RX ORDER — BISACODYL 10 MG
10 SUPPOSITORY, RECTAL RECTAL DAILY PRN
Status: DISCONTINUED | OUTPATIENT
Start: 2021-03-09 | End: 2021-03-17 | Stop reason: HOSPADM

## 2021-03-09 RX ORDER — ATORVASTATIN CALCIUM 10 MG/1
10 TABLET, FILM COATED ORAL DAILY
Status: DISCONTINUED | OUTPATIENT
Start: 2021-03-10 | End: 2021-03-17 | Stop reason: HOSPADM

## 2021-03-09 RX ORDER — ALPRAZOLAM 0.25 MG/1
0.25 TABLET ORAL 3 TIMES DAILY PRN
Status: CANCELLED | OUTPATIENT
Start: 2021-03-09

## 2021-03-09 RX ORDER — NYSTATIN 100000 [USP'U]/G
1 POWDER TOPICAL 2 TIMES DAILY
Status: CANCELLED | OUTPATIENT
Start: 2021-03-09

## 2021-03-09 RX ORDER — SENNOSIDES 8.6 MG
1 TABLET ORAL 2 TIMES DAILY
Status: CANCELLED | OUTPATIENT
Start: 2021-03-09

## 2021-03-09 RX ORDER — ACETAMINOPHEN 325 MG/1
650 TABLET ORAL EVERY 4 HOURS PRN
Status: CANCELLED | OUTPATIENT
Start: 2021-03-09

## 2021-03-09 RX ORDER — FOLIC ACID 1 MG/1
1 TABLET ORAL DAILY
Status: DISCONTINUED | OUTPATIENT
Start: 2021-03-10 | End: 2021-03-17 | Stop reason: HOSPADM

## 2021-03-09 RX ORDER — ACETAMINOPHEN 325 MG/1
650 TABLET ORAL EVERY 8 HOURS SCHEDULED
Status: DISCONTINUED | OUTPATIENT
Start: 2021-03-09 | End: 2021-03-17 | Stop reason: HOSPADM

## 2021-03-09 RX ORDER — OXYCODONE HYDROCHLORIDE 5 MG/1
7.5 TABLET ORAL EVERY 4 HOURS PRN
Status: CANCELLED | OUTPATIENT
Start: 2021-03-09

## 2021-03-09 RX ORDER — OXYCODONE HYDROCHLORIDE 5 MG/1
7.5 TABLET ORAL EVERY 4 HOURS PRN
Status: DISCONTINUED | OUTPATIENT
Start: 2021-03-09 | End: 2021-03-09

## 2021-03-09 RX ORDER — FLUOXETINE HYDROCHLORIDE 20 MG/1
20 CAPSULE ORAL DAILY
Status: CANCELLED | OUTPATIENT
Start: 2021-03-10

## 2021-03-09 RX ORDER — GABAPENTIN 100 MG/1
100 CAPSULE ORAL
Status: CANCELLED | OUTPATIENT
Start: 2021-03-09

## 2021-03-09 RX ORDER — DOCUSATE SODIUM 100 MG/1
100 CAPSULE, LIQUID FILLED ORAL 2 TIMES DAILY
Status: DISCONTINUED | OUTPATIENT
Start: 2021-03-09 | End: 2021-03-09

## 2021-03-09 RX ORDER — NITROGLYCERIN 0.4 MG/1
0.4 TABLET SUBLINGUAL
Status: CANCELLED | OUTPATIENT
Start: 2021-03-09

## 2021-03-09 RX ORDER — NITROGLYCERIN 0.4 MG/1
0.4 TABLET SUBLINGUAL
Status: DISCONTINUED | OUTPATIENT
Start: 2021-03-09 | End: 2021-03-17 | Stop reason: HOSPADM

## 2021-03-09 RX ORDER — METHOCARBAMOL 500 MG/1
500 TABLET, FILM COATED ORAL EVERY 6 HOURS PRN
Status: DISCONTINUED | OUTPATIENT
Start: 2021-03-09 | End: 2021-03-17 | Stop reason: HOSPADM

## 2021-03-09 RX ORDER — AMLODIPINE BESYLATE 5 MG/1
5 TABLET ORAL DAILY
Status: DISCONTINUED | OUTPATIENT
Start: 2021-03-10 | End: 2021-03-17 | Stop reason: HOSPADM

## 2021-03-09 RX ORDER — LACTULOSE 20 G/30ML
20 SOLUTION ORAL 2 TIMES DAILY PRN
Status: DISCONTINUED | OUTPATIENT
Start: 2021-03-09 | End: 2021-03-17 | Stop reason: HOSPADM

## 2021-03-09 RX ORDER — OXYCODONE HYDROCHLORIDE 5 MG/1
5 TABLET ORAL EVERY 4 HOURS PRN
Status: DISCONTINUED | OUTPATIENT
Start: 2021-03-09 | End: 2021-03-09

## 2021-03-09 RX ORDER — OXYCODONE HYDROCHLORIDE 5 MG/1
5 TABLET ORAL EVERY 4 HOURS PRN
Status: DISCONTINUED | OUTPATIENT
Start: 2021-03-09 | End: 2021-03-17 | Stop reason: HOSPADM

## 2021-03-09 RX ORDER — ONDANSETRON 4 MG/1
4 TABLET, ORALLY DISINTEGRATING ORAL EVERY 6 HOURS PRN
Status: CANCELLED | OUTPATIENT
Start: 2021-03-09

## 2021-03-09 RX ORDER — BISACODYL 10 MG
10 SUPPOSITORY, RECTAL RECTAL DAILY PRN
Status: CANCELLED | OUTPATIENT
Start: 2021-03-09

## 2021-03-09 RX ORDER — LACTULOSE 20 G/30ML
20 SOLUTION ORAL 2 TIMES DAILY PRN
Status: CANCELLED | OUTPATIENT
Start: 2021-03-09

## 2021-03-09 RX ORDER — MICONAZOLE NITRATE 20 MG/G
1 CREAM TOPICAL 2 TIMES DAILY
Status: DISCONTINUED | OUTPATIENT
Start: 2021-03-09 | End: 2021-03-17 | Stop reason: HOSPADM

## 2021-03-09 RX ORDER — DOCUSATE SODIUM 100 MG/1
100 CAPSULE, LIQUID FILLED ORAL 2 TIMES DAILY
Status: CANCELLED | OUTPATIENT
Start: 2021-03-09

## 2021-03-09 RX ORDER — OXYCODONE HYDROCHLORIDE 5 MG/1
5 TABLET ORAL EVERY 4 HOURS PRN
Status: CANCELLED | OUTPATIENT
Start: 2021-03-09

## 2021-03-09 RX ORDER — FLUOXETINE HYDROCHLORIDE 20 MG/1
20 CAPSULE ORAL DAILY
Status: DISCONTINUED | OUTPATIENT
Start: 2021-03-10 | End: 2021-03-17 | Stop reason: HOSPADM

## 2021-03-09 RX ORDER — ONDANSETRON 2 MG/ML
4 INJECTION INTRAMUSCULAR; INTRAVENOUS EVERY 6 HOURS PRN
Status: CANCELLED | OUTPATIENT
Start: 2021-03-09

## 2021-03-09 RX ORDER — GABAPENTIN 100 MG/1
100 CAPSULE ORAL
Status: DISCONTINUED | OUTPATIENT
Start: 2021-03-09 | End: 2021-03-17 | Stop reason: HOSPADM

## 2021-03-09 RX ORDER — LEVOTHYROXINE SODIUM 88 UG/1
88 TABLET ORAL
Status: CANCELLED | OUTPATIENT
Start: 2021-03-10

## 2021-03-09 RX ORDER — ATENOLOL 50 MG/1
50 TABLET ORAL 2 TIMES DAILY
Status: DISCONTINUED | OUTPATIENT
Start: 2021-03-09 | End: 2021-03-15

## 2021-03-09 RX ORDER — LIDOCAINE 50 MG/G
2 PATCH TOPICAL
Status: DISCONTINUED | OUTPATIENT
Start: 2021-03-09 | End: 2021-03-17 | Stop reason: HOSPADM

## 2021-03-09 RX ADMIN — DICLOFENAC SODIUM 2 G: 10 GEL TOPICAL at 21:22

## 2021-03-09 RX ADMIN — ACETAMINOPHEN 650 MG: 325 TABLET, FILM COATED ORAL at 14:08

## 2021-03-09 RX ADMIN — AMLODIPINE BESYLATE 5 MG: 5 TABLET ORAL at 08:06

## 2021-03-09 RX ADMIN — ATENOLOL 50 MG: 50 TABLET ORAL at 21:11

## 2021-03-09 RX ADMIN — ENOXAPARIN SODIUM 30 MG: 30 INJECTION SUBCUTANEOUS at 21:13

## 2021-03-09 RX ADMIN — DICLOFENAC SODIUM 2 G: 10 GEL TOPICAL at 17:39

## 2021-03-09 RX ADMIN — DICLOFENAC SODIUM 2 G: 10 GEL TOPICAL at 14:08

## 2021-03-09 RX ADMIN — SENNOSIDES 8.6 MG: 8.6 TABLET ORAL at 17:39

## 2021-03-09 RX ADMIN — GABAPENTIN 100 MG: 100 CAPSULE ORAL at 21:11

## 2021-03-09 RX ADMIN — ACETAMINOPHEN 650 MG: 325 TABLET ORAL at 21:11

## 2021-03-09 RX ADMIN — DICLOFENAC SODIUM 2 G: 10 GEL TOPICAL at 09:14

## 2021-03-09 RX ADMIN — MICONAZOLE NITRATE 1 APPLICATION: 20 CREAM TOPICAL at 21:21

## 2021-03-09 RX ADMIN — LIDOCAINE 2 PATCH: 50 PATCH CUTANEOUS at 21:13

## 2021-03-09 RX ADMIN — OXYCODONE HYDROCHLORIDE 5 MG: 5 TABLET ORAL at 08:11

## 2021-03-09 RX ADMIN — ATENOLOL 50 MG: 50 TABLET ORAL at 08:06

## 2021-03-09 RX ADMIN — ATORVASTATIN CALCIUM 10 MG: 10 TABLET, FILM COATED ORAL at 08:07

## 2021-03-09 RX ADMIN — NYSTATIN 1 APPLICATION: 100000 POWDER TOPICAL at 08:07

## 2021-03-09 RX ADMIN — SENNOSIDES 8.6 MG: 8.6 TABLET, FILM COATED ORAL at 08:06

## 2021-03-09 RX ADMIN — FLUOXETINE 20 MG: 20 CAPSULE ORAL at 08:06

## 2021-03-09 RX ADMIN — OXYCODONE HYDROCHLORIDE 5 MG: 5 TABLET ORAL at 21:14

## 2021-03-09 RX ADMIN — NYSTATIN 1 APPLICATION: 100000 POWDER TOPICAL at 17:39

## 2021-03-09 RX ADMIN — DOCUSATE SODIUM 100 MG: 100 CAPSULE, LIQUID FILLED ORAL at 17:39

## 2021-03-09 RX ADMIN — ALPRAZOLAM 0.25 MG: 0.25 TABLET ORAL at 21:12

## 2021-03-09 RX ADMIN — FOLIC ACID 1 MG: 1 TABLET ORAL at 08:07

## 2021-03-09 RX ADMIN — LEVOTHYROXINE SODIUM 88 MCG: 0.09 TABLET ORAL at 05:40

## 2021-03-09 RX ADMIN — ENOXAPARIN SODIUM 30 MG: 30 INJECTION SUBCUTANEOUS at 08:06

## 2021-03-09 NOTE — CONSULTS
[address] [facility] 1955, 78 y o  female MRN: [id number]  Unit/Bed#: [facility] 265-01 Encounter: [id number]  Primary Care Provider: Girma Lobato MD   Date and time admitted to hospital: 3/9/2021  2:50 PM    Inpatient consult to Internal Medicine  Consult performed by: DARIO Crabtree  Consult ordered by: Belinda Barksdale MD          Abnormal ECG  Assessment & Plan  EKG completed on 3/4 that showed possible T-wave inversion on V2 lead  Sent to acute care setting for formal ACS workup  Following EKGs and troponins were negative  Cardiology was consulted - ECHO completed  Showed no motion wall abnormalities  EF was 64%  No plans for further cardiac workup at this time  CTA showed no PE  Showed small R pleural effusion and basilar atelectasis - given a 1 time dose of IV 40mg lasix  Gastroparesis  Assessment & Plan  Formerly on Reglan - stopped in the acute setting due to concerns for extrapyramidal symptoms  Monitor for s/s of bowel obstruction  Continue bowel regimen  Hypothyroidism  Assessment & Plan  Currently on levothyroxine 88mcg daily at home (States that she was increased 1 month ago from 75mcg)  Last TSH level unknown  Continue to follow-up with outpatient PCP  Depression  Assessment & Plan  Stable  Continue Prozac 20mg daily and Xanax PRN for anxiety  Supportive counseling as needed  Hypertension  Assessment & Plan  Home regimen: Norvasc 2 5mg daily and atenolol 50mg BID  Episode of hypertensive urgency while in acute setting      Monitor routine VS   Continue atenolol 50mg BID  Increased Norvasc to 5mg daily on 3/4  Sjogren's disease ([facility] [address] )  Assessment & Plan  Takes methotrexate 20mg weekly  Follows up with Dr Gabriella Diaz (Rheumatology) as outpatient  Parotid gland enlargement  Assessment & Plan  Stable  Yearly follow-up with Dr Mariluz Vega (ENT) as outpatient      * Multiple closed fractures of pelvis [facility])  Assessment & Plan  2/22 - Mechanical fall down the stairs predominately on the R side  Multiple pelvic fractures: Acute nondisplaced fracture of the right inferior pubic ramus and right pubic symphysis, anterior right acetabular fracture and probable nondisplaced left anterior acetabular fracture  Ortho consulted - no surgical intervention at this time      Monitor for s/s pelvic bleeding  Pain management  WBAT  DVT Prophylaxis - Lovenox  Follow-up with Dr Sonya Perez (Orthopedics) office in 6 weeks      PT/OT Therapies  Primary team following  History of Present Illness:    Preston Shankar is a 78 y o  female with a PMH of of Sjogren's disease, depression, hypothyroidism, HTN and meningioma s/p resection in 2012 who originally presented to the [address] [place] [address] on 2/22/21 after a fall down the stairs  The patient had fallen down 7 steps, primarily landing on R side with no LOC or head trauma  CT pelvis revealed right inferior pubic rami fracture, right pubic symphysis fracture, right anterior acetabular fracture, left anterior acetabular fracture  Patient also with right shoulder abrasion and bruising of right elbow but x-ray showed no acute findings  Evaluated by Dr Fernie Thomas (Orthopedics) and decided on no surgical intervention  Patient was admitted to [facility] for acute rehab on 2/27/21  On 3/4/21 the patient developed right upper quadrant pain and was transferred to [address] [place] [address] acute setting for ACS rule-out  Patient was then evaluated by cardiology where CTA was negative for PE and ECHO found no motion wall abnormalities and normal EF   RUQ pain had resolved and liver enzymes were normal   Felt that patient was most likely having referred pain from pelvic fractures  Patient re-admitted to [facility] on 3/9/2021  We are consulted for medical clearance  Pt examined while sitting in WC in pt room    Complaints of 6/10 B/L hip pain that radiates to the buttocks, aching/throbbing, improves with rest, ice, and pain medication  Still occasionally has soreness to right lateral side of her ribs, no pain currently  States that she did not sleep well last night because she was concerned about a room situation with her prior room  LBM was this morning - states that it was loose and that her stool softeners were already decreased  States that she occasionally has lightheadedness without dizziness with position changes that resolves quickly on its own  Will place order for orthostatic BPs  Review of Systems:    Review of Systems   Constitutional: Negative for chills, fatigue and fever  Respiratory: Negative for cough and shortness of breath  Cardiovascular: Negative for chest pain, palpitations and leg swelling  Gastrointestinal: Negative for abdominal distention, abdominal pain, constipation, diarrhea, nausea and vomiting  Genitourinary: Negative for difficulty urinating  Musculoskeletal: Positive for arthralgias (B/L hip pain that radiates to the buttocks, 6/10, aching/throbbing, improves with rest, ice, and pain medication  )  Neurological: Positive for light-headedness (Occasional lightheadedness that occurs with position changes, resolves on own  Denies dizziness)  Negative for dizziness, numbness and headaches  Psychiatric/Behavioral: Negative for sleep disturbance  Past Medical and Surgical History:     Past Medical History:   Diagnosis Date    Lymphadenitis, chronic     Sialadenitis     Sjogren's disease ([address] )     Stomach problems     Thyroid disorder     Xerostomia        Past Surgical History:   Procedure Laterality Date    BRAIN SURGERY  2019    tumor removed Dr Asad Mejia         Meds/Allergies:    all medications and allergies reviewed    Allergies:    Allergies   Allergen Reactions    Codeine Vomiting    Hydroxyquinoline Sulfate [Oxyquinoline] Vomiting    Leucovorin Vomiting    Tizanidine Vomiting       Social History:     Marital Status: /Civil Union    Substance Use History:   Social History     Substance and Sexual Activity   Alcohol Use Yes    Frequency: Monthly or less     Social History     Tobacco Use   Smoking Status Former Smoker   Smokeless Tobacco Never Used     Social History     Substance and Sexual Activity   Drug Use Never       Family History:  Reviewed    Physical Exam:     Vitals:   Blood Pressure: 154/72 (03/09/21 1506)  Pulse: 64 (03/09/21 1506)  Temperature: 98 7 °F (37 1 °C) (03/09/21 1506)  Temp Source: Tympanic (03/09/21 1506)  Respirations: 20 (03/09/21 1506)  Height: 4' 10" (147 3 cm) (03/09/21 1506)  Weight - Scale: 59 kg (130 lb 1 6 oz) (03/09/21 1506)  SpO2: 97 % (03/09/21 1506)    Physical Exam  Vitals signs and nursing note reviewed  Constitutional:       Appearance: Normal appearance  HENT:      Head: Normocephalic and atraumatic  Cardiovascular:      Rate and Rhythm: Normal rate and regular rhythm  Pulses: Normal pulses  Heart sounds: Normal heart sounds  No murmur  No friction rub  Pulmonary:      Effort: Pulmonary effort is normal  No respiratory distress  Breath sounds: Normal breath sounds  No wheezing or rhonchi  Abdominal:      General: Abdomen is flat  Bowel sounds are normal  There is no distension  Palpations: Abdomen is soft  Tenderness: There is no abdominal tenderness  Musculoskeletal:      Right lower leg: No edema  Left lower leg: No edema  Skin:     General: Skin is warm and dry  Neurological:      Mental Status: She is alert and oriented to person, place, and time  Comments: Slurred speech, oral dyskinesia  Psychiatric:         Mood and Affect: Mood normal          Behavior: Behavior normal            Additional Data:     Labs and imaging reviewed  EKG Reviewed - last EKG was NSR with possible old inferior infarct on 3/5/2021  M*Modal software was used to dictate this note    It may contain errors with dictating incorrect words or incorrect spelling  Please contact the provider directly with any questions

## 2021-03-09 NOTE — ASSESSMENT & PLAN NOTE
· Resolved  R/o ACS  · Breath negative x3  · No events on tele  · CTA - no PE  · Cardiology following  · Echo pending-normal wall motion, normal EF   · No plans to stress test at this time     · Continue statin  · No role for aspirin at this time

## 2021-03-09 NOTE — CASE MANAGEMENT
Discharge planning:    CM was notified at morning rounds that pt is medically cleared to get discharged today  CM contacted [facility] for admitting pt  Carlos Aguirre stated, pt can be discharged today to 2nd floor [facility] at 13:30 to room 265       CM  Notified nurse, Staff, Dr and family for pt's D/C    CM department will continue to follow through pt's D/C

## 2021-03-09 NOTE — PLAN OF CARE
Problem: Prexisting or High Potential for Compromised Skin Integrity  Goal: Skin integrity is maintained or improved  Description: INTERVENTIONS:  - Identify patients at risk for skin breakdown  - Assess and monitor skin integrity  - Assess and monitor nutrition and hydration status  - Monitor labs   - Assess for incontinence   - Turn and reposition patient  - Assist with mobility/ambulation  - Relieve pressure over bony prominences  - Avoid friction and shearing  - Provide appropriate hygiene as needed including keeping skin clean and dry  - Evaluate need for skin moisturizer/barrier cream  - Collaborate with interdisciplinary team   - Patient/family teaching  - Consider wound care consult   Outcome: Progressing     Problem: Potential for Falls  Goal: Patient will remain free of falls  Description: INTERVENTIONS:  - Assess patient frequently for physical needs  -  Identify cognitive and physical deficits and behaviors that affect risk of falls    -  [place] fall precautions as indicated by assessment   - Educate patient/family on patient safety including physical limitations  - Instruct patient to call for assistance with activity based on assessment  - Modify environment to reduce risk of injury  - Consider OT/PT consult to assist with strengthening/mobility  Outcome: Progressing     Problem: PAIN - ADULT  Goal: Verbalizes/displays adequate comfort level or baseline comfort level  Description: Interventions:  - Encourage patient to monitor pain and request assistance  - Assess pain using appropriate pain scale  - Administer analgesics based on type and severity of pain and evaluate response  - Implement non-pharmacological measures as appropriate and evaluate response  - Consider cultural and social influences on pain and pain management  - Notify physician/advanced practitioner if interventions unsuccessful or patient reports new pain  Outcome: Progressing     Problem: INFECTION - ADULT  Goal: Absence or prevention of progression during hospitalization  Description: INTERVENTIONS:  - Assess and monitor for signs and symptoms of infection  - Monitor lab/diagnostic results  - Monitor all insertion sites, i e  indwelling lines, tubes, and drains  - Monitor endotracheal if appropriate and nasal secretions for changes in amount and color  - [place] appropriate cooling/warming therapies per order  - Administer medications as ordered  - Instruct and encourage patient and family to use good hand hygiene technique  - Identify and instruct in appropriate isolation precautions for identified infection/condition  Outcome: Progressing  Goal: Absence of fever/infection during neutropenic period  Description: INTERVENTIONS:  - Monitor WBC    Outcome: Progressing     Problem: SAFETY ADULT  Goal: Patient will remain free of falls  Description: INTERVENTIONS:  - Assess patient frequently for physical needs  -  Identify cognitive and physical deficits and behaviors that affect risk of falls    -  [place] fall precautions as indicated by assessment   - Educate patient/family on patient safety including physical limitations  - Instruct patient to call for assistance with activity based on assessment  - Modify environment to reduce risk of injury  - Consider OT/PT consult to assist with strengthening/mobility  Outcome: Progressing  Goal: Maintain or return to baseline ADL function  Description: INTERVENTIONS:  -  Assess patient's ability to carry out ADLs; assess patient's baseline for ADL function and identify physical deficits which impact ability to perform ADLs (bathing, care of mouth/teeth, toileting, grooming, dressing, etc )  - Assess/evaluate cause of self-care deficits   - Assess range of motion  - Assess patient's mobility; develop plan if impaired  - Assess patient's need for assistive devices and provide as appropriate  - Encourage maximum independence but intervene and supervise when necessary  - Involve family in performance of ADLs  - Assess for home care needs following discharge   - Consider OT consult to assist with ADL evaluation and planning for discharge  - Provide patient education as appropriate  Outcome: Progressing  Goal: Maintain or return mobility status to optimal level  Description: INTERVENTIONS:  - Assess patient's baseline mobility status (ambulation, transfers, stairs, etc )    - Identify cognitive and physical deficits and behaviors that affect mobility  - Identify mobility aids required to assist with transfers and/or ambulation (gait belt, sit-to-stand, lift, walker, cane, etc )  - [place] fall precautions as indicated by assessment  - Record patient progress and toleration of activity level on Mobility SBAR; progress patient to next Phase/Stage  - Instruct patient to call for assistance with activity based on assessment  - Consider rehabilitation consult to assist with strengthening/weightbearing, etc   Outcome: Progressing     Problem: DISCHARGE PLANNING  Goal: Discharge to home or other facility with appropriate resources  Description: INTERVENTIONS:  - Identify barriers to discharge w/patient and caregiver  - Arrange for needed discharge resources and transportation as appropriate  - Identify discharge learning needs (meds, wound care, etc )  - Arrange for interpretive services to assist at discharge as needed  - Refer to Case Management Department for coordinating discharge planning if the patient needs post-hospital services based on physician/advanced practitioner order or complex needs related to functional status, cognitive ability, or social support system  Outcome: Progressing     Problem: Knowledge Deficit  Goal: Patient/family/caregiver demonstrates understanding of disease process, treatment plan, medications, and discharge instructions  Description: Complete learning assessment and assess knowledge base    Interventions:  - Provide teaching at level of understanding  - Provide teaching via preferred learning methods  Outcome: Progressing     Problem: CARDIOVASCULAR - ADULT  Goal: Maintains optimal cardiac output and hemodynamic stability  Description: INTERVENTIONS:  - Monitor I/O, vital signs and rhythm  - Monitor for S/S and trends of decreased cardiac output  - Administer and titrate ordered vasoactive medications to optimize hemodynamic stability  - Assess quality of pulses, skin color and temperature  - Assess for signs of decreased coronary artery perfusion  - Instruct patient to report change in severity of symptoms  Outcome: Progressing  Goal: Absence of cardiac dysrhythmias or at baseline rhythm  Description: INTERVENTIONS:  - Continuous cardiac monitoring, vital signs, obtain 12 lead EKG if ordered  - Administer antiarrhythmic and heart rate control medications as ordered  - Monitor electrolytes and administer replacement therapy as ordered  Outcome: Progressing     Problem: MUSCULOSKELETAL - ADULT  Goal: Maintain or return mobility to safest level of function  Description: INTERVENTIONS:  - Assess patient's ability to carry out ADLs; assess patient's baseline for ADL function and identify physical deficits which impact ability to perform ADLs (bathing, care of mouth/teeth, toileting, grooming, dressing, etc )  - Assess/evaluate cause of self-care deficits   - Assess range of motion  - Assess patient's mobility  - Assess patient's need for assistive devices and provide as appropriate  - Encourage maximum independence but intervene and supervise when necessary  - Involve family in performance of ADLs  - Assess for home care needs following discharge   - Consider OT consult to assist with ADL evaluation and planning for discharge  - Provide patient education as appropriate  Outcome: Progressing  Goal: Maintain proper alignment of affected body part  Description: INTERVENTIONS:  - Support, maintain and protect limb and body alignment  - Provide patient/ family with appropriate education  Outcome: Progressing

## 2021-03-09 NOTE — PLAN OF CARE
Problem: Prexisting or High Potential for Compromised Skin Integrity  Goal: Skin integrity is maintained or improved  Description: INTERVENTIONS:  - Identify patients at risk for skin breakdown  - Assess and monitor skin integrity  - Assess and monitor nutrition and hydration status  - Monitor labs   - Assess for incontinence   - Turn and reposition patient  - Assist with mobility/ambulation  - Relieve pressure over bony prominences  - Avoid friction and shearing  - Provide appropriate hygiene as needed including keeping skin clean and dry  - Evaluate need for skin moisturizer/barrier cream  - Collaborate with interdisciplinary team   - Patient/family teaching  - Consider wound care consult   Outcome: Progressing     Problem: Potential for Falls  Goal: Patient will remain free of falls  Description: INTERVENTIONS:  - Assess patient frequently for physical needs  -  Identify cognitive and physical deficits and behaviors that affect risk of falls    -  [place] fall precautions as indicated by assessment   - Educate patient/family on patient safety including physical limitations  - Instruct patient to call for assistance with activity based on assessment  - Modify environment to reduce risk of injury  - Consider OT/PT consult to assist with strengthening/mobility  Outcome: Progressing     Problem: PAIN - ADULT  Goal: Verbalizes/displays adequate comfort level or baseline comfort level  Description: Interventions:  - Encourage patient to monitor pain and request assistance  - Assess pain using appropriate pain scale  - Administer analgesics based on type and severity of pain and evaluate response  - Implement non-pharmacological measures as appropriate and evaluate response  - Consider cultural and social influences on pain and pain management  - Notify physician/advanced practitioner if interventions unsuccessful or patient reports new pain  Outcome: Progressing     Problem: INFECTION - ADULT  Goal: Absence or prevention of progression during hospitalization  Description: INTERVENTIONS:  - Assess and monitor for signs and symptoms of infection  - Monitor lab/diagnostic results  - Monitor all insertion sites, i e  indwelling lines, tubes, and drains  - Monitor endotracheal if appropriate and nasal secretions for changes in amount and color  - [place] appropriate cooling/warming therapies per order  - Administer medications as ordered  - Instruct and encourage patient and family to use good hand hygiene technique  - Identify and instruct in appropriate isolation precautions for identified infection/condition  Outcome: Progressing  Goal: Absence of fever/infection during neutropenic period  Description: INTERVENTIONS:  - Monitor WBC    Outcome: Progressing     Problem: SAFETY ADULT  Goal: Patient will remain free of falls  Description: INTERVENTIONS:  - Assess patient frequently for physical needs  -  Identify cognitive and physical deficits and behaviors that affect risk of falls    -  [place] fall precautions as indicated by assessment   - Educate patient/family on patient safety including physical limitations  - Instruct patient to call for assistance with activity based on assessment  - Modify environment to reduce risk of injury  - Consider OT/PT consult to assist with strengthening/mobility  Outcome: Progressing  Goal: Maintain or return to baseline ADL function  Description: INTERVENTIONS:  -  Assess patient's ability to carry out ADLs; assess patient's baseline for ADL function and identify physical deficits which impact ability to perform ADLs (bathing, care of mouth/teeth, toileting, grooming, dressing, etc )  - Assess/evaluate cause of self-care deficits   - Assess range of motion  - Assess patient's mobility; develop plan if impaired  - Assess patient's need for assistive devices and provide as appropriate  - Encourage maximum independence but intervene and supervise when necessary  - Involve family in performance of ADLs  - Assess for home care needs following discharge   - Consider OT consult to assist with ADL evaluation and planning for discharge  - Provide patient education as appropriate  Outcome: Progressing  Goal: Maintain or return mobility status to optimal level  Description: INTERVENTIONS:  - Assess patient's baseline mobility status (ambulation, transfers, stairs, etc )    - Identify cognitive and physical deficits and behaviors that affect mobility  - Identify mobility aids required to assist with transfers and/or ambulation (gait belt, sit-to-stand, lift, walker, cane, etc )  - [place] fall precautions as indicated by assessment  - Record patient progress and toleration of activity level on Mobility SBAR; progress patient to next Phase/Stage  - Instruct patient to call for assistance with activity based on assessment  - Consider rehabilitation consult to assist with strengthening/weightbearing, etc   Outcome: Progressing     Problem: DISCHARGE PLANNING  Goal: Discharge to home or other facility with appropriate resources  Description: INTERVENTIONS:  - Identify barriers to discharge w/patient and caregiver  - Arrange for needed discharge resources and transportation as appropriate  - Identify discharge learning needs (meds, wound care, etc )  - Arrange for interpretive services to assist at discharge as needed  - Refer to Case Management Department for coordinating discharge planning if the patient needs post-hospital services based on physician/advanced practitioner order or complex needs related to functional status, cognitive ability, or social support system  Outcome: Progressing     Problem: Knowledge Deficit  Goal: Patient/family/caregiver demonstrates understanding of disease process, treatment plan, medications, and discharge instructions  Description: Complete learning assessment and assess knowledge base    Interventions:  - Provide teaching at level of understanding  - Provide teaching via preferred learning methods  Outcome: Progressing     Problem: CARDIOVASCULAR - ADULT  Goal: Maintains optimal cardiac output and hemodynamic stability  Description: INTERVENTIONS:  - Monitor I/O, vital signs and rhythm  - Monitor for S/S and trends of decreased cardiac output  - Administer and titrate ordered vasoactive medications to optimize hemodynamic stability  - Assess quality of pulses, skin color and temperature  - Assess for signs of decreased coronary artery perfusion  - Instruct patient to report change in severity of symptoms  Outcome: Progressing  Goal: Absence of cardiac dysrhythmias or at baseline rhythm  Description: INTERVENTIONS:  - Continuous cardiac monitoring, vital signs, obtain 12 lead EKG if ordered  - Administer antiarrhythmic and heart rate control medications as ordered  - Monitor electrolytes and administer replacement therapy as ordered  Outcome: Progressing     Problem: MUSCULOSKELETAL - ADULT  Goal: Maintain or return mobility to safest level of function  Description: INTERVENTIONS:  - Assess patient's ability to carry out ADLs; assess patient's baseline for ADL function and identify physical deficits which impact ability to perform ADLs (bathing, care of mouth/teeth, toileting, grooming, dressing, etc )  - Assess/evaluate cause of self-care deficits   - Assess range of motion  - Assess patient's mobility  - Assess patient's need for assistive devices and provide as appropriate  - Encourage maximum independence but intervene and supervise when necessary  - Involve family in performance of ADLs  - Assess for home care needs following discharge   - Consider OT consult to assist with ADL evaluation and planning for discharge  - Provide patient education as appropriate  Outcome: Progressing  Goal: Maintain proper alignment of affected body part  Description: INTERVENTIONS:  - Support, maintain and protect limb and body alignment  - Provide patient/ family with appropriate education  Outcome: Progressing

## 2021-03-09 NOTE — ASSESSMENT & PLAN NOTE
Home regimen: Norvasc 2 5mg daily and atenolol 50mg BID  Episode of hypertensive urgency while in acute setting      Monitor routine VS   Continue atenolol 50mg BID  Increased Norvasc to 5mg daily on 3/4

## 2021-03-09 NOTE — ASSESSMENT & PLAN NOTE
2/22 - Mechanical fall down the stairs predominately on the R side  Multiple pelvic fractures: Acute nondisplaced fracture of the right inferior pubic ramus and right pubic symphysis, anterior right acetabular fracture and probable nondisplaced left anterior acetabular fracture  Ortho consulted - no surgical intervention at this time      Monitor for s/s pelvic bleeding  Pain management  WBAT  DVT Prophylaxis - Lovenox  Follow-up with Dr Honey Machado (Orthopedics) office in 6 weeks      PT/OT Therapies  Primary team following

## 2021-03-09 NOTE — DISCHARGE SUMMARY
[facility]  Discharge- Bindu Dunne Viscomi 1955, 78 y o  female MRN: [id number]  Unit/Bed#: 7T [facility] 705-02 Encounter: [id number]  Primary Care Provider: Ricky Ortiz MD   Date and time admitted to hospital: 3/4/2021  7:50 PM    * Chest pain  Assessment & Plan  · Resolved  R/o ACS  · Troponin negative x3  · No events on tele  · CTA - no PE  · Cardiology consulted  · Echo normal wall motion, normal EF   · No plans to stress test at this time  · Continue statin  · Can consider aspirin baby dose 81 mg    Atelectasis of right lung  Assessment & Plan  · Noted on CT within right-sided pleural effusion  · Encourage incentive spirometer    Pleural effusion on right  Assessment & Plan  · Finding on CT: New small right pleural effusion with right basilar atelectasis of uncertain etiology  · Patient 94-97% on RA  · Patient did report increased shortness of breath at [facility] [place] [place] - status post 1 dose of Lasix can try another dose of IV Lasix if needed  · Echo - normal EF, no RWMA  · No further plans since asymptomatic     Abnormal ECG  Assessment & Plan  · No anginal symptoms, dyspnea exertion, palpitations etc   EKG lipid T-wave in V2 initial has been negative continue to trend  · Consult cardiology  · Recommending echo- normal ef, no regional wall motion abnormality  · No plans to stress test, no active cp, completely asymptomatic  WELLS (dyspnea on exertion)  Assessment & Plan  In the setting of small right pleural effusion, Deconditioning and pelvic fractures/ambulatory dysfunction   Denies any orthopnea leg swelling etc      Gastroparesis  Assessment & Plan  · Recommend small meals  · Patient Reglan was discontinued secondary to oral dyskinesia  · Monitor for signs symptoms of obstruction  · Continue bowel medications      Hypothyroidism  Assessment & Plan  · Continue levothyroxine    Depression  Assessment & Plan  · Continue fluoxetine and Xanax for anxiety    Multiple closed fractures of pelvis [facility])  Assessment & Plan  · Status post traumatic fall with right inferior pubic rami fracture right pubic symphysis fracture, right anterior acetabular fracture left anterior acetabular fracture  · Seen by Orthopedics conservative management  · Weight-bearing as tolerated  · DVT prophylaxis Lovenox  · Pain control  · PT OT eval for DC planning back to [facility] on today  Hypertension  Assessment & Plan  · Continue Norvasc and atenolol  · Monitor blood pressure    Sjogren's disease ([address] )  Assessment & Plan  · Weekly methotrexate  · Follow-up rheumatology as an outpatient        Discharging Physician / Practitioner: Liliam Melendez MD  PCP: Sg Ayala MD  Admission Date:   Admission Orders (From admission, onward)     Ordered        03/07/21 1030  Inpatient Admission  Once         03/04/21 2050  Place in Observation  Once                   Discharge Date: 03/09/21    Resolved Problems  Date Reviewed: 3/9/2021          Resolved    RUQ pain 3/7/2021     Resolved by  Diana Luo MD          Consultations During Hospital Stay:  · Cardiology    Procedures Performed:   · CT AP study  · Echocardiogram    Significant Findings / Test Results:   · Systolic function was normal  Ejection fraction was estimated to be 64 %  · There were no regional wall motion abnormalities  · Wall thickness was at the upper limits of normal   No pulmonary embolism  New right pleural effusion with right basilar atelectasis of uncertain etiology  Stable distal paraesophageal and gastrohepatic ligament adenopathy of uncertain etiology  Thickening of the distal rectal wall  Correlate for proctitis  Incidental Findings:   · None     Test Results Pending at Discharge (will require follow up):    · None     Outpatient Tests Requested:  · None    Complications:  None    Reason for Admission:  Chest pain    Hospital Course:     Merlin Lee is a 78 y o  female patient who originally presented to the hospital on 3/4/2021 due to chest pain  Her EKG and troponin did not support new acute coronary syndrome  Cardiology consulted I recommended echocardiogram which showed no regional wall motion abnormalities EF 64%  She will be discharged today back to acute rehab at [facility]  She is interested and eager to go back to rehab  Please see above list of diagnoses and related plan for additional information  Condition at Discharge: fair     Discharge Day Visit / Exam:     Subjective:  Patient seen examined at bedside  She is asking when she can be discharged to rehab  She is eager to start therapy  No further chest pain  Vitals: Blood Pressure: 150/72 (03/09/21 1040)  Pulse: 61 (03/09/21 0723)  Temperature: (!) 97 2 °F (36 2 °C) (03/09/21 0723)  Temp Source: Temporal (03/09/21 0723)  Respirations: 20 (03/09/21 0723)  Height: 4' 10" (147 3 cm) (03/04/21 1915)  Weight - Scale: 61 1 kg (134 lb 11 2 oz) (03/04/21 1915)  SpO2: 96 % (03/09/21 0723)  Exam:   Physical Exam  Vitals signs reviewed  Constitutional:       General: She is not in acute distress  Appearance: She is not ill-appearing, toxic-appearing or diaphoretic  HENT:      Mouth/Throat:      Mouth: Mucous membranes are dry  Eyes:      General:         Right eye: No discharge  Left eye: No discharge  Cardiovascular:      Rate and Rhythm: Normal rate and regular rhythm  Heart sounds: Normal heart sounds  No murmur  Pulmonary:      Effort: Pulmonary effort is normal  No respiratory distress  Breath sounds: Normal breath sounds  No wheezing  Abdominal:      General: Bowel sounds are normal  There is no distension  Palpations: Abdomen is soft  Tenderness: There is no abdominal tenderness  Neurological:      Mental Status: She is alert and oriented to person, place, and time     Psychiatric:         Behavior: Behavior normal          Discussion with Family:  Discussed with patient at bedside will be discharged to [facility]     Discharge instructions/Information to patient and family:   See after visit summary for information provided to patient and family  Provisions for Follow-Up Care:  See after visit summary for information related to follow-up care and any pertinent home health orders  Disposition:     Acute Rehab at [facility]  For Discharges to [facility]F:   · Not Applicable to this Patient - Not Applicable to this Patient    Planned Readmission:  None     Discharge Statement:  I spent 20 minutes discharging the patient  This time was spent on the day of discharge  I had direct contact with the patient on the day of discharge  Greater than 50% of the total time was spent examining patient, answering all patient questions, arranging and discussing plan of care with patient as well as directly providing post-discharge instructions  Additional time then spent on discharge activities  Discharge Medications:  See after visit summary for reconciled discharge medications provided to patient and family        ** Please Note: This note has been constructed using a voice recognition system **

## 2021-03-09 NOTE — PLAN OF CARE
Problem: Potential for Falls  Goal: Patient will remain free of falls  Description: INTERVENTIONS:  - Assess patient frequently for physical needs  -  Identify cognitive and physical deficits and behaviors that affect risk of falls  -  [place] fall precautions as indicated by assessment   - Educate patient/family on patient safety including physical limitations  - Instruct patient to call for assistance with activity based on assessment  - Modify environment to reduce risk of injury  - Consider OT/PT consult to assist with strengthening/mobility  Outcome: Progressing     Problem: Prexisting or High Potential for Compromised Skin Integrity  Goal: Skin integrity is maintained or improved  Description: INTERVENTIONS:  - Identify patients at risk for skin breakdown  - Assess and monitor skin integrity  - Assess and monitor nutrition and hydration status  - Monitor labs   - Assess for incontinence   - Turn and reposition patient  - Assist with mobility/ambulation  - Relieve pressure over bony prominences  - Avoid friction and shearing  - Provide appropriate hygiene as needed including keeping skin clean and dry  - Evaluate need for skin moisturizer/barrier cream  - Collaborate with interdisciplinary team   - Patient/family teaching  - Consider wound care consult   Outcome: Progressing     Problem: Nutrition/Hydration-ADULT  Goal: Nutrient/Hydration intake appropriate for improving, restoring or maintaining nutritional needs  Description: Monitor and assess patient's nutrition/hydration status for malnutrition  Collaborate with interdisciplinary team and initiate plan and interventions as ordered  Monitor patient's weight and dietary intake as ordered or per policy  Utilize nutrition screening tool and intervene as necessary  Determine patient's food preferences and provide high-protein, high-caloric foods as appropriate       INTERVENTIONS:  - Monitor oral intake, urinary output, labs, and treatment plans  - Assess nutrition and hydration status and recommend course of action  - Evaluate amount of meals eaten  - Assist patient with eating if necessary   - Allow adequate time for meals  - Recommend/ encourage appropriate diets, oral nutritional supplements, and vitamin/mineral supplements  - Order, calculate, and assess calorie counts as needed  - Recommend, monitor, and adjust tube feedings and TPN/PPN based on assessed needs  - Assess need for intravenous fluids  - Provide specific nutrition/hydration education as appropriate  - Include patient/family/caregiver in decisions related to nutrition  Outcome: Progressing     Problem: PAIN - ADULT  Goal: Verbalizes/displays adequate comfort level or baseline comfort level  Description: Interventions:  - Encourage patient to monitor pain and request assistance  - Assess pain using appropriate pain scale  - Administer analgesics based on type and severity of pain and evaluate response  - Implement non-pharmacological measures as appropriate and evaluate response  - Consider cultural and social influences on pain and pain management  - Notify physician/advanced practitioner if interventions unsuccessful or patient reports new pain  Outcome: Progressing     Problem: SAFETY ADULT  Goal: Patient will remain free of falls  Description: INTERVENTIONS:  - Assess patient frequently for physical needs  -  Identify cognitive and physical deficits and behaviors that affect risk of falls    -  [place] fall precautions as indicated by assessment   - Educate patient/family on patient safety including physical limitations  - Instruct patient to call for assistance with activity based on assessment  - Modify environment to reduce risk of injury  - Consider OT/PT consult to assist with strengthening/mobility  Outcome: Progressing  Goal: Maintain or return to baseline ADL function  Description: INTERVENTIONS:  -  Assess patient's ability to carry out ADLs; assess patient's baseline for ADL function and identify physical deficits which impact ability to perform ADLs (bathing, care of mouth/teeth, toileting, grooming, dressing, etc )  - Assess/evaluate cause of self-care deficits   - Assess range of motion  - Assess patient's mobility; develop plan if impaired  - Assess patient's need for assistive devices and provide as appropriate  - Encourage maximum independence but intervene and supervise when necessary  - Involve family in performance of ADLs  - Assess for home care needs following discharge   - Consider OT consult to assist with ADL evaluation and planning for discharge  - Provide patient education as appropriate  Outcome: Progressing  Goal: Maintain or return mobility status to optimal level  Description: INTERVENTIONS:  - Assess patient's baseline mobility status (ambulation, transfers, stairs, etc )    - Identify cognitive and physical deficits and behaviors that affect mobility  - Identify mobility aids required to assist with transfers and/or ambulation (gait belt, sit-to-stand, lift, walker, cane, etc )  - [place] fall precautions as indicated by assessment  - Record patient progress and toleration of activity level on Mobility SBAR; progress patient to next Phase/Stage  - Instruct patient to call for assistance with activity based on assessment  - Consider rehabilitation consult to assist with strengthening/weightbearing, etc   Outcome: Progressing     Problem: DISCHARGE PLANNING  Goal: Discharge to home or other facility with appropriate resources  Description: INTERVENTIONS:  - Identify barriers to discharge w/patient and caregiver  - Arrange for needed discharge resources and transportation as appropriate  - Identify discharge learning needs (meds, wound care, etc )  - Arrange for interpretive services to assist at discharge as needed  - Refer to Case Management Department for coordinating discharge planning if the patient needs post-hospital services based on physician/advanced practitioner order or complex needs related to functional status, cognitive ability, or social support system  Outcome: Progressing     Problem: Knowledge Deficit  Goal: Patient/family/caregiver demonstrates understanding of disease process, treatment plan, medications, and discharge instructions  Description: Complete learning assessment and assess knowledge base    Interventions:  - Provide teaching at level of understanding  - Provide teaching via preferred learning methods  Outcome: Progressing     Problem: GASTROINTESTINAL - ADULT  Goal: Minimal or absence of nausea and/or vomiting  Description: INTERVENTIONS:  - Administer IV fluids if ordered to ensure adequate hydration  - Maintain NPO status until nausea and vomiting are resolved  - Nasogastric tube if ordered  - Administer ordered antiemetic medications as needed  - Provide nonpharmacologic comfort measures as appropriate  - Advance diet as tolerated, if ordered  - Consider nutrition services referral to assist patient with adequate nutrition and appropriate food choices  Outcome: Progressing  Goal: Maintains or returns to baseline bowel function  Description: INTERVENTIONS:  - Assess bowel function  - Encourage oral fluids to ensure adequate hydration  - Administer IV fluids if ordered to ensure adequate hydration  - Administer ordered medications as needed  - Encourage mobilization and activity  - Consider nutritional services referral to assist patient with adequate nutrition and appropriate food choices  Outcome: Progressing  Goal: Maintains adequate nutritional intake  Description: INTERVENTIONS:  - Monitor percentage of each meal consumed  - Identify factors contributing to decreased intake, treat as appropriate  - Assist with meals as needed  - Monitor I&O, weight, and lab values if indicated  - Obtain nutrition services referral as needed  Outcome: Progressing     Problem: GENITOURINARY - ADULT  Goal: Maintains or returns to baseline urinary function  Description: INTERVENTIONS:  - Assess urinary function  - Encourage oral fluids to ensure adequate hydration if ordered  - Administer IV fluids as ordered to ensure adequate hydration  - Administer ordered medications as needed  - Offer frequent toileting  - Follow urinary retention protocol if ordered  Outcome: Progressing  Goal: Absence of urinary retention  Description: INTERVENTIONS:  - Assess patients ability to void and empty bladder  - Monitor I/O  - Bladder scan as needed  - Discuss with physician/AP medications to alleviate retention as needed  - Discuss catheterization for long term situations as appropriate  Outcome: Progressing     Problem: SKIN/TISSUE INTEGRITY - ADULT  Goal: Skin integrity remains intact  Description: INTERVENTIONS  - Identify patients at risk for skin breakdown  - Assess and monitor skin integrity  - Assess and monitor nutrition and hydration status  - Monitor labs (i e  albumin)  - Assess for incontinence   - Turn and reposition patient  - Assist with mobility/ambulation  - Relieve pressure over bony prominences  - Avoid friction and shearing  - Provide appropriate hygiene as needed including keeping skin clean and dry  - Evaluate need for skin moisturizer/barrier cream  - Collaborate with interdisciplinary team (i e  Nutrition, Rehabilitation, etc )   - Patient/family teaching  Outcome: Progressing  Goal: Incision(s), wounds(s) or drain site(s) healing without S/S of infection  Description: INTERVENTIONS  - Assess and document risk factors for skin impairment   - Assess and document dressing, incision, wound bed, drain sites and surrounding tissue  - Consider nutrition services referral as needed  - Oral mucous membranes remain intact  - Provide patient/ family education  Outcome: Progressing  Goal: Oral mucous membranes remain intact  Description: INTERVENTIONS  - Assess oral mucosa and hygiene practices  - Implement preventative oral hygiene regimen  - Implement oral medicated treatments as ordered  - Initiate Nutrition services referral as needed  Outcome: Progressing     Problem: HEMATOLOGIC - ADULT  Goal: Maintains hematologic stability  Description: INTERVENTIONS  - Assess for signs and symptoms of bleeding or hemorrhage  - Monitor labs  - Administer supportive blood products/factors as ordered and appropriate  Outcome: Progressing

## 2021-03-09 NOTE — ASSESSMENT & PLAN NOTE
In the setting of small right pleural effusion, Deconditioning and pelvic fractures/ambulatory dysfunction   Denies any orthopnea leg swelling etc

## 2021-03-09 NOTE — OCCUPATIONAL THERAPY NOTE
Occupational Therapy Treatment Note     Patient Name: Rollo Baumgarten  [id number] Date: 3/9/2021  Problem List  Principal Problem:    Chest pain  Active Problems:    Sjogren's disease ([facility] [address] )    Hypertension    Multiple closed fractures of pelvis ([facility] [address] )    Depression    Hypothyroidism    Gastroparesis    WELLS (dyspnea on exertion)    Abnormal ECG    Pleural effusion on right    Atelectasis of right lung    Low HDL (under 40)            03/08/21 1605    OT Last Visit   OT Visit Date 03/08/21   Note Type   Note Type Treatment   Restrictions/Precautions   Weight Bearing Precautions Per Order Yes   RLE Weight Bearing Per Order WBAT   LLE Weight Bearing Per Order WBAT   Other Precautions Fall Risk;Pain   Pain Assessment   Pain Assessment Tool 0-10   Pain Score 7   Pain Location/Orientation Location: Pelvis   Hospital Pain Intervention(s) Repositioned;Medication (See MAR)   ADL   UB Dressing Assistance 5  Supervision/Setup   UB Dressing Deficit Thread RUE; Thread LUE;Pull around back;Pull down in back   LB Dressing Assistance 4  Minimal Assistance   LB Dressing Deficit Thread RLE into underwear; Thread LLE into underwear;Pull up over hips   Toileting Assistance  4  Minimal Assistance   Toileting Deficit Clothing management up;Clothing management down;Perineal hygiene   Functional Standing Tolerance   Comments ~5 minutes   Transfers   Sit to Stand 5  Supervision   Additional items Increased time required;Verbal cues   Stand to Sit 5  Supervision   Additional items Increased time required;Verbal cues   Toilet transfer 5  Supervision   Additional items Increased time required;Verbal cues   Functional Mobility   Functional Mobility 5  Supervision   Additional items Rolling walker   Cognition   Arousal/Participation Alert; Cooperative   Attention Within functional limits   Orientation Level Oriented X4   Memory Within functional limits   Following Commands Follows all commands and directions without difficulty   Activity Tolerance Activity Tolerance Patient limited by pain   Assessment   Assessment Pt seen for OT txt  Pt received calling for assist to use the bathroom  Pt continues to be limited by pain which increased with activity  Pt assisted to the bathroom, not able to tolerate prolonged standing to complete grooming tasks at the sink due to poor endurance and pain, Ebony to manage gown and hygiene  Pt continues to require assist for bed mobility due to impaired BLE management with sit-->supine transitions  Pt progressing, however remains limited by decreased activity tolerance, endurance, functional strength, and pain management  Pt would benefit from continued OT services to further address deficits with transfers, ambulation, and ADLs in order to maximize functional independence  Plan   Treatment Interventions ADL retraining;Functional transfer training;UE strengthening/ROM; Continued evaluation; Activityengagement; Energy conservation; Endurance training   Goal Expiration Date 03/12/21   OT Treatment Day 2   OT Frequency 2-3x/wk   Recommendation   OT Discharge Recommendation Post-Acute Rehabilitation Services

## 2021-03-09 NOTE — PROGRESS NOTES
PHYSICAL MEDICINE AND REHABILITATION   PREADMISSION ASSESSMENT     Projected [facility] Diagnoses:  Impairment of mobility, safety and Activities of Daily Living (ADLs) due to Orthopedic Disorders:  08 3  Pelvic Fracture  Etiologic: Pelvic Fractures   Date of Onset:  Date of surgery: N/A    PATIENT INFORMATION  Name: Selena Duran Phone #: [phone] (home)   Address: [place] [address]  YOB: 1955 Age: 78 y o  SS#   Marital Status: /Civil Union  Ethnicity:    Employment Status: retired  Extended Emergency Contact Information  Primary Emergency Contact: Jayy Herron [facility]  Address: [address] Phone: [phone]  Mobile Phone: [phone]  Relation: Spouse  Advance Directive: full code     INSURANCE/COVERAGE:     Primary Payor: [address] [place] [address] / Plan: Viri Pope / Product Type: Blue Fee for Service /   Secondary Payer:<NONE>   Payer Contact:  Payer Contact:   Contact Phone:  Contact Phone:     Authorization #: [id number]   Coverage Dates:3//9- 3/15  LCD: next review date  3/15, please fax clinical information to [employer] Clinical Review: [phone] #: Medicare Days:   Medical Record #: [id number]    REFERRAL SOURCE:   Referring provider: Ran Gramajo MD  Referring facility: [address] [place] [address]   Room: 7T U 705/7T [facility]  PCP: Jacque Teixeira MD PCP phone number: [phone] Testing: 3/9/21 - negative     MEDICAL INFORMATION  HPI: Selena Duran is a 78 y o  female who presents to [address] [place] [address] from [facility] with complaints of chest pain and right upper quadrant pain  Patient with past medical history of Sjogren's disease, depression, hypothyroidism, hypertension, meningioma status post resection in 2012, gastroparesis, oral dyskinesia is status post fall on 02/22/2021 with multiple pelvic fractures  She has been at the acute rehab unit and developed chest pain and right upper quadrant pain  Patient was transferred from [facility] for ACS rule out  She was evaluated by the hospitalist service there and there was concern of possible flipped T-wave in V2 and was recommended for transfer with telemetry  Patient was evaluated by cardiology due to abnormal EKG to r/o ACS  A CTA was negative for PE, LAMONT found normal wall motion and normal EF, RUE quadrant pain resoled, there were no acute findings on CT, liver enzymes were normal and it was felt that patient's pain was likely referred pain from pelvic fractures  Patient has been  encouraged to use her incentive spirometer and she is currently saturating at 94-97% on room air  Patient was evaluated by PT and OT, recommendation was made for patient to return to acute in-patient rehabilitation to continue her rehabilitation for her recent pelvic fractures  Patient remains WBAT to bilateral lower extremities as per ortho  Patient is medically stable and ready to return to [facility] today  Past Medical History:   Past Surgical History:    Allergies:     Past Medical History:   Diagnosis Date    Lymphadenitis, chronic     Sialadenitis     Sjogren's disease ([facility] [address] )     Stomach problems     Thyroid disorder     Xerostomia     Past Surgical History:   Procedure Laterality Date    BRAIN SURGERY  2019    tumor removed Dr Ricketts Expose       Allergies   Allergen Reactions    Codeine Vomiting    Hydroxyquinoline Sulfate [Oxyquinoline] Vomiting    Leucovorin Vomiting    Tizanidine Vomiting         Comorbidities/Surgeries in the last 100 days: Aetlectasis of right lung, right pleural effusion, gastropareisis, hypothyroidism, depression, multiple pelvic fractures, hypertension, Sjoren's disease, chest pain, RUQ Pain     CURRENT VITAL SIGNS:   Temp:  [97 2 °F (36 2 °C)-98 °F (36 7 °C)] 97 2 °F (36 2 °C)  HR:  [58-61] 61  Resp:  [18-20] 20  BP: (146-174)/(65-82) 150/72   Intake/Output Summary (Last 24 hours) at 3/9/2021 1133  Last data filed at 3/9/2021 0855  Gross per 24 hour   Intake 480 ml   Output 700 ml   Net -220 ml        LABORATORY RESULTS:      Lab Results   Component Value Date    HGB 9 7 (L) 03/04/2021    HGB 12 2 01/08/2015    HCT 29 6 (L) 03/04/2021    HCT 37 3 01/08/2015    WBC 10 10 03/04/2021    WBC 5 13 01/08/2015     Lab Results   Component Value Date    BUN 13 03/05/2021    BUN 10 01/11/2018     01/08/2015    K 4 3 03/05/2021    K 3 4 (L) 01/08/2015     03/05/2021     01/08/2015    GLUCOSE 89 01/08/2015    CREATININE 0 66 03/05/2021    CREATININE 0 85 01/11/2018     No results found for: PROTIME, INR     DIAGNOSTIC STUDIES:  Xr Chest Portable    Result Date: 3/5/2021  Impression: No radiographic evidence of acute intrathoracic process or significant interval change  Workstation performed: [id number]     Xr Hip/pelv 2-3 Vws Right If Performed    Result Date: 3/4/2021  Impression: Subtle right pelvic fractures as noted above  These correspond approximately to the CT findings  No new or displaced pelvic fractures are demonstrated  Workstation performed: [id number]     Cta Chest Ct Abdomen Pelvis W Contrast    Result Date: 3/4/2021  Impression: No pulmonary embolism  New right pleural effusion with right basilar atelectasis of uncertain etiology  Stable distal paraesophageal and gastrohepatic ligament adenopathy of uncertain etiology  Thickening of the distal rectal wall  Correlate for proctitis   Workstation performed: [id number]       PRECAUTIONS/SPECIAL NEEDS:Weight Bearing Precautions:  WBAT to bilateral LEs, Anticoagulation:  Lovenox and SCDs, Safety Concerns and Pain Management      MEDICATIONS:     Current Facility-Administered Medications:     acetaminophen (TYLENOL) tablet 650 mg, 650 mg, Oral, Q4H PRN, Renetta Nicole PA-C, 650 mg at 03/07/21 0846    acetaminophen (TYLENOL) tablet 650 mg, 650 mg, Oral, Q8H [address], WHIT [place]-C, 650 mg at 03/08/21 1301    ALPRAZolam [place]) tablet 0 25 mg, 0 25 mg, Oral, TID PRN, Josefina Plata PA-C, 0 25 mg at 03/08/21 2207    amLODIPine (NORVASC) tablet 5 mg, 5 mg, Oral, Daily, Josefina Plata PA-C, 5 mg at 03/09/21 0806    atenolol (TENORMIN) tablet 50 mg, 50 mg, Oral, BID, WHIT [place]-C, 50 mg at 03/09/21 0806    atorvastatin (LIPITOR) tablet 10 mg, 10 mg, Oral, Daily, WHIT [place]-ARNIE, 10 mg at 03/09/21 0504    bisacodyl (DULCOLAX) rectal suppository 10 mg, 10 mg, Rectal, Daily PRN, Josefina Plata PA-C    Diclofenac Sodium (VOLTAREN) 1 % topical gel 2 g, 2 g, Topical, 4x Daily, WHIT [place]-C, 2 g at 03/09/21 0914    docusate sodium (COLACE) capsule 100 mg, 100 mg, Oral, BID, WHIT [place]-ARNIE, 100 mg at 03/06/21 0844    enoxaparin (LOVENOX) subcutaneous injection 30 mg, 30 mg, Subcutaneous, Q12H [address], Nadia Carr PA-C, 30 mg at 03/09/21 0806    FLUoxetine (PROzac) capsule 20 mg, 20 mg, Oral, Daily, WHIT [place]-C, 20 mg at 75/89/52 5810    folic acid (FOLVITE) tablet 1 mg, 1 mg, Oral, Daily, Josefina Plata PA-C, 1 mg at 03/09/21 0807    gabapentin (NEURONTIN) capsule 100 mg, 100 mg, Oral, HS, WHIT [place]-C, 100 mg at 03/08/21 2159    lactulose oral solution 20 g, 20 g, Oral, BID PRN, Josefina Plata PA-C    levothyroxine tablet 88 mcg, 88 mcg, Oral, Early Morning, Nadia Carr PA-C, 88 mcg at 03/09/21 0540    lidocaine (LIDODERM) 5 % patch 2 patch, 2 patch, Topical, HS, Josefina Plata PA-C, 2 patch at 03/08/21 2200    methocarbamol (ROBAXIN) tablet 500 mg, 500 mg, Oral, Q6H PRN, Josefina Plata PA-C    [START ON 3/10/2021] methotrexate tablet 20 mg, 20 mg, Oral, Weekly, Josefina Plata PA-C    nitroglycerin (NITROSTAT) SL tablet 0 4 mg, 0 4 mg, Sublingual, Q5 Min PRN, Josefina Plata PA-C    nystatin (MYCOSTATIN) powder 1 application, 1 application, Topical, BID, Emilee Reid CHARLES Carr, 1 application at 13/17/21 0807    ondansetron (ZOFRAN) injection 4 mg, 4 mg, Intravenous, Q6H PRN, Gena Pitts MD    ondansetron (ZOFRAN-ODT) dispersible tablet 4 mg, 4 mg, Oral, Q6H PRN, Gloria Espino PA-C    oxyCODONE (ROXICODONE) immediate release tablet 10 mg, 10 mg, Oral, Q6H PRN, Gloria Espino PA-C, 10 mg at 03/08/21 2207    oxyCODONE (ROXICODONE) IR tablet 5 mg, 5 mg, Oral, Q4H PRN, Gena Pitts MD, 5 mg at 03/09/21 3373    oxyCODONE (ROXICODONE) IR tablet 7 5 mg, 7 5 mg, Oral, Q4H PRN, Gloria Espino PA-C    senna (SENOKOT) tablet 8 6 mg, 1 tablet, Oral, BID, Gloria Espino PA-C, 8 6 mg at 03/09/21 0806    SKIN INTEGRITY:   Redness, Abrasions under right shoulder and under breasts     PRIOR LEVEL OF FUNCTION:  She lives in a(n) single family home  Yvonne Vegas is  and lives with their spouse  Has 2 sons who are able to assist   Self Care: Independent, Indoor Mobility: Independent, Stairs (in/outdoor): Independent and Cognition: Independent    FALLS IN THE LAST 6 MONTHS: 1-4    HOME ENVIRONMENT:  The living area: can live on one level  There are 6 - 10 steps to enter the home  The patient will have 24 hour supervision/physical assistance available upon discharge  PREVIOUS DME:  Equipment in home (previous DME): Commode, Shower Chair, Grab Bars, [employer] and [employer] is on 2nd floor, will use commode on 1st floor    Has tub/shower combo    FUNCTIONAL STATUS:  Physical Therapy Occupational Therapy Speech Therapy   03/08/21 1345    PT Last Visit   PT Visit Date 03/08/21   Note Type   Note Type Treatment   Pain Assessment   Pain Assessment Tool 0-10   Pain Score 6   Pain Location/Orientation Location: Pelvis   Restrictions/Precautions   Weight Bearing Precautions Per Order Yes   RLE Weight Bearing Per Order WBAT   LLE Weight Bearing Per Order WBAT   Other Precautions Fall Risk;Pain   General   Chart Reviewed Yes Response to Previous Treatment Patient with no complaints from previous session  Family/Caregiver Present No   Cognition   Arousal/Participation Alert; Cooperative   Attention Within functional limits   Orientation Level Oriented X4   Memory Within functional limits   Following Commands Follows all commands and directions without difficulty   Transfers   Sit to Stand 5  Supervision   Additional items Increased time required;Verbal cues   Stand to Sit 5  Supervision   Additional items Increased time required;Verbal cues   Toilet transfer 5  Supervision   Additional items Increased time required;Verbal cues   Additional Comments with RW   Ambulation/Elevation   Gait pattern Excessively slow; Short stride; Foward flexed;Decreased foot clearance; Improper Weight shift; Antalgic   Gait Assistance 5  Supervision   Additional items Verbal cues   Assistive Device Rolling walker   Distance 50ft, 20ftx2   Balance   Static Sitting Fair +   Dynamic Sitting Fair   Static Standing Fair   Dynamic Standing Fair -   Ambulatory Fair -   Endurance Deficit   Endurance Deficit Yes   Activity Tolerance   Activity Tolerance Patient tolerated treatment well   Nurse Made Aware spoke to RN   Exercises   Heelslides Sitting;10 reps;AROM; Bilateral   Hip Flexion Sitting;10 reps;AROM; Bilateral   Hip Abduction Sitting;10 reps;AROM; Bilateral   Knee AROM Long Arc Quad Sitting;10 reps;AROM; Bilateral   Assessment   Prognosis Good   Problem List Decreased strength;Decreased range of motion;Decreased endurance; Impaired balance;Decreased mobility; Decreased safety awareness;Pain   Assessment Pt seen today for PT treatment found seated in recliner  Increased pain and stiffness today  Overall steady gait although not tolerating longer distances today  Declined stairs  Left seated in recliner with needs in reach     Barriers to Discharge Inaccessible home environment;Decreased caregiver support   Goals   Patient Goals get back to rehab   STG Expiration Date 03/15/21   PT Treatment Day 2   Plan   Treatment/Interventions Functional transfer training;LE strengthening/ROM; Elevations; Therapeutic exercise; Endurance training;Patient/family training;Equipment eval/education; Bed mobility;Gait training;Spoke to nursing;Spoke to case management;OT      03/08/21 1605     OT Last Visit   OT Visit Date 03/08/21   Note Type   Note Type Treatment   Restrictions/Precautions   Weight Bearing Precautions Per Order Yes   RLE Weight Bearing Per Order WBAT   LLE Weight Bearing Per Order WBAT   Other Precautions Fall Risk;Pain   Pain Assessment   Pain Assessment Tool 0-10   Pain Score 7   Pain Location/Orientation Location: Pelvis   Hospital Pain Intervention(s) Repositioned;Medication (See MAR)   ADL   UB Dressing Assistance 5  Supervision/Setup   UB Dressing Deficit Thread RUE; Thread LUE;Pull around back;Pull down in back   LB Dressing Assistance 4  Minimal Assistance   LB Dressing Deficit Thread RLE into underwear; Thread LLE into underwear;Pull up over hips   Toileting Assistance  4  Minimal Assistance   Toileting Deficit Clothing management up;Clothing management down;Perineal hygiene   Functional Standing Tolerance   Comments ~5 minutes   Transfers   Sit to Stand 5  Supervision   Additional items Increased time required;Verbal cues   Stand to Sit 5  Supervision   Additional items Increased time required;Verbal cues   Toilet transfer 5  Supervision   Additional items Increased time required;Verbal cues   Functional Mobility   Functional Mobility 5  Supervision   Additional items Rolling walker   Cognition   Arousal/Participation Alert; Cooperative   Attention Within functional limits   Orientation Level Oriented X4   Memory Within functional limits   Following Commands Follows all commands and directions without difficulty   Activity Tolerance   Activity Tolerance Patient limited by pain   Assessment   Assessment Pt seen for OT txt  Pt received calling for assist to use the bathroom    Pt continues to be limited by pain which increased with activity  Pt assisted to the bathroom, not able to tolerate prolonged standing to complete grooming tasks at the sink due to poor endurance and pain, Ebony to manage gown and hygiene  Pt continues to require assist for bed mobility due to impaired BLE management with sit-->supine transitions  Pt progressing, however remains limited by decreased activity tolerance, endurance, functional strength, and pain management  Pt would benefit from continued OT services to further address deficits with transfers, ambulation, and ADLs in order to maximize functional independence  CURRENT GAP IN FUNCTION  Prior to Admission: Patient was independent with mobility, ADLsIADLs until recent fall with pelvic fractures  Estimated length of stay: 10 to 14 days    Anticipated Post-Discharge Disposition/Treatment  Disposition: Return to previous home/apartment  Outpatient Services: Physical Therapy (PT) and Occupational Therapy (OT)    BARRIERS TO DISCHARGE  Lovenox, Pain, Balance Difficulty, Dizziness, Fatigue, Home Accessibility and Equipment Needs    INTERVENTIONS FOR DISCHARGE  Adaptive equipment, Arrange DME needs, Home Modifcations, Therapy exercises and Energy conservation education     REQUIRED THERAPY:  Patient will require PT and OT 90 minutes each per day, five days per week to achieve rehab goals  REQUIRED FUNCTIONAL AND MEDICAL MANAGEMENT FOR INPATIENT REHABILITATION:  Pain Management: Overall pain is well controlled, Deep Vein Thrombosis (DVT) Prophylaxis:  SCD's while in bed, continued PT/OT interventions, nursing education and training, bowel/bladder management, Internal Medicine to monitor and manage medical conditions, PM&R to maximize function and provide medical oversight of rehabilitation program, patient and family education and training, additional consults as needed  RECOMMENDED LEVEL OF CARE:     HPI: Fito Mckenna is a 78 y o  female who presents to [facility] from [facility] with complaints of chest pain and right upper quadrant pain  Patient with past medical history of Sjogren's disease, depression, hypothyroidism, hypertension, meningioma status post resection in 2012, gastroparesis, oral dyskinesia is status post fall on 02/22/2021 with multiple pelvic fractures  She has been at the acute rehab unit and developed chest pain and right upper quadrant pain  Patient was transferred from [facility] for ACS rule out  She was evaluated by the hospitalist service there and there was concern of possible flipped T-wave in V2 and was recommended for transfer with telemetry  Patient was evaluated by cardiology due to abnormal EKG to r/o ACS  A CTA was negative for PE, LAMONT found normal wall motion and normal EF, RUE quadrant pain resoled, there were no acute findings on CT, liver enzymes were normal and it was felt that patient's pain was likely referred pain from pelvic fractures  Prior to recent fall with pelvic fractures, patient was independent with home and community functional mobility without the use of an assistive device  Patient was independent with basic ADLs but  managed IADLs, patient has not driven since her meningioma resection  Patient has two supportive sons who live with patient and   Son's are able to provide assistance as needed for patient  Patient currently requires supervision for transfers and ambulation up to 50 feet with use of RW  Patient also requires supervision/set-up with grooming and ADL's and Min A with LB ADLs and toileting  Patient will require continued PT/OT intervention to improve functional mobility and self-care skills  Patient will require nursing education/traning and bowel/bladder management, internal medicine to monitor medical condtions, PM&R to maximize function and provide medical oversight    In-patient rehabilitation is required to maximize self-care, mobility, strength, endurance, balance and safety for discharge home with supportive family

## 2021-03-09 NOTE — H&P
PHYSICAL MEDICINE AND REHABILITATION H&P/ADMISSION NOTE  Merwin Boeck Viscomi 78 y o  female MRN: [id number]  Unit/Bed#: [facility] 265-01 Encounter: [id number]     Rehab Diagnosis: Orthopedic Disorders:  08 3  Pelvic Fracture  Etiologic Diagnosis:  Multiple pelvic fractures    History of Present Illness:   Keanu Horner is a 78 y o  female with a history of Sjogren's, depression,  Hypertension, hypothyroidism, anxiety and depression, parotid gland enlargement, gastroparesis who had recent fall down stairs in which she sustained right inferior pubic rami fracture, right pubic symphysis fracture, right anterior acetabular fracture, left anterior acetabular fracture  Patient also had right shoulder abrasion and brusing of right elbow  Patient evaluated by Dr Didier Collins (orthopedics) and treated non-operatively, deemed WBAT BLE  Patient was transferred to acute rehab on 2/27 and was recovering adequately until she developed atypical CP on 3/4/21 prompting additional work-up and transfer to acute care  Patient had CTA C/A/P with PE protocol which did not show PE but did show new right pleural effusion with right basilar atelectasis of uncertain etiology, stable distal paraesophageal and gastrohepatic ligament adenopathy of uncertain etiology, and thickening of the distal rectal wall "correlate for proctitis "  Patient received adjustments in pain meds  EKG showed possible T-wave inversion on V2 lead  ACS was ruled out with negative troponins  Cards consulted  Patient received 1 dose of lasix  TTE showed possible T-wave inversion on V2 lead  TTE showed EF64% without regional wall motion abnormalities or significant valvular disease (mild TR)  Cardiology did not recommend stress echo or additional work-up at this time  Patient evaluated by skilled therapies and continues to have significant functional decline in ADLs and ambulation and is appropriate for readmission to [facility]        Chief complaint:   Pelvic pain    Subjective:   Patient notes mild to moderate pelvic pain worse with activity slowly improving  Patient reports prior chest wall discomfort much improved as well  She denies lightheadedness, shortness of breath, fever, chills, sweats constipation, bladder problems, calf pain, or other new complaints  Review of Systems: A 10-point review of systems was performed  Negative except as listed above  Plan:    * Multiple closed fractures of pelvis ([address] )  Assessment & Plan  · Traumatic fall on stairs :  · Resultant right inferior pubic rami fracture, right pubic symphysis fracture, right anterior acetabular fracture, left anterior acetabular fracture  · Evaluated by Orthopedics - treat conservatively  · WBAT BLE  · Continue DVT ppx - lovenox   · Follow-up with Trauma/Orthopedics as outpatient   · R hip/pelvis x-ray 3/ stable fractures Subtle right pelvic fractures as noted above   These correspond approximately to the CT findings   No new or displaced pelvic fractures are demonstrated  · Patient discharged back to acute for atypical chest pain, new pleural effusion and has stabilized   · Recommend resuming acute comprehensive interdisciplinary inpatient rehabilitation to include intensive skilled therapies (PT, OT) as outlined with oversight and management by rehabilitation physician as well as inpatient rehab level nursing, case management and weekly interdisciplinary team meetings       Pain  Assessment & Plan  APAP TID  Gabapentin 100mg HS  LD patches  Robaxin 500mg Q6H PRN spasms  Oxy 5mg Q4H PRN  Counseled on and continue to encourage deep breathing/relaxation/behavioral pain management techniques:     Deep breathin seconds in, 5 seconds out, 5 times per hour when awake and PRN when in pain or anticipate pain; avoid holding breath and tightening muscles and instead breathe slowly and deeply  Monitor for oversedation, AMS/delirium, and respiratory depression   If being administered - hold opiates, muscle relaxants, benzodiazepines, and gabapentin for oversedation, AMS, or RR<12       Gastroparesis  Assessment & Plan  From Sjogren's   Stooling adequately; monitor closely especially with recent discontinuation of chronic reglan in context of oral dyskinesias  Senna BID  PRN bowel regimen   Follow-up with OP GI     Onychomycosis  Assessment & Plan  Pods c/s    Dermatitis associated with moisture  Assessment & Plan  Keep clean and dry  - CHRISTIE cream BID  - Optimal b/b mgmt     Chest pain  Assessment & Plan  Atypical - improved  ACS ruled out; CTA no PE, Cards TTE normal EF without RWMA; no stress test needed at this time    Abnormal ECG  Assessment & Plan  Per IM  "EKG completed on 3/4 that showed possible T-wave inversion on V2 lead  Sent to acute care setting for formal ACS workup      Following EKGs and troponins were negative  Cardiology was consulted - ECHO completed  Showed no motion wall abnormalities  EF was 64%  No plans for further cardiac workup at this time  CTA showed no PE    Showed small R pleural effusion and basilar atelectasis - given a 1 time dose of IV 40mg lasix "    Anemia  Assessment & Plan  IM consulted, with overall monitoring, and management at their discretion during ARC course  Monitor H/H, vitals, signs/symptoms of acute bleeding      Oral dyskinesia  Assessment & Plan  Stable   Patient feels this is related to Sjogrens but movements are more consistent with EPS and likely TD in context of long-standing metoclopramide   - Comgmt with IM  - Metoclopramide discontinued prior to [facility] which is first line mgmt (holding DA blockers)   - This may be difficult to improve at this point  - Monitor during ARC course  - Follow-up with PCP - consider pharmacologic tx such at tetrabenazine if needed in future     Hypothyroidism  Assessment & Plan  Levothyroxine     Anxiety and depression  Assessment & Plan  Prozac  PRN xanax  Supportive counseling  Consider Psychology consult while in ARC   Counseled on and continue to encourage deep breathing/relaxation/behavioral management techniques:         Hypertension  Assessment & Plan  Internal medicine consulted and management at their discretion  Monitor vitals with and without activity; monitor for orthostasis  Monitor hemoglobin, electrolytes, kidney function, hydration status   Current meds: Atenolol, Novasc       Sjogren's disease (HCC)  Assessment & Plan  Takes methotrexate 20mg weekly  Follows up with Dr Pattie Crouch (Rheumatology) as outpatient  Parotid gland enlargement  Assessment & Plan  OP follow-up with ENT Dr Devora Nunez       Other Medical Issues:       Disposition    home with estimated length of stay of 7 days from admission    Follow-up providers and other issues to be followed up after discharge    PCP    Orthopedics    Rheumatology    follow-up thickening of distal rectal wall with PCP as well as stable distal paraesophageal in gastrohepatic ligament adenopathy    CODE: Level 1: Full Code    Restrictions include: Fall precautions    Social History and Prior Level of Function   patient  lives with spouse and children in multilevel home but can live on 1 main level with 2 steps to enter and then 7 steps up to 1st floor  Independent with ADLs, ambulation      Current Level of Function:      Transfers and ambulation up to supervision at times, lower body dressing and toileting Min assist    Potential Barriers to Discharge:  Co-morbidities (see above), new functional deficits,  Pain deconditioning      Physical Exam:  Temp:  [97 2 °F (36 2 °C)-98 7 °F (37 1 °C)] 98 7 °F (37 1 °C)  HR:  [58-64] 64  Resp:  [18-20] 20  BP: (146-174)/(72-82) 154/72  SpO2:  [92 %-97 %] 97 %    GEN:  Lying in bed in NAD   HEENT/NECK: Somewhat dry mucous membranes; oral dyskinesias  CARDIAC: Regular rate rhythm, no murmers, no rubs, no gallops  LUNGS:  clear to auscultation, no wheezes, rales, or rhonchi  ABDOMEN: Soft, non-tender, non-distended, normal active bowel sounds  EXTREMITIES/SKIN:  no calf edema, no calf tenderness to palpation  NEURO:   MENTAL STATUS: awake, oriented to person, place, time, and situation, MENTAL STATUS:  Appropriate wakefulness and interaction  and Strength/MMT:  Near full throughout  [facility]:  Affect:  Euthymic     Laboratory:    Results from last 7 days   Lab Units 03/04/21  1640 03/04/21  0448   HEMOGLOBIN g/dL 9 7* 9 6*   HEMATOCRIT % 29 6* 29 8*   WBC Thousand/uL 10 10 6 40     Results from last 7 days   Lab Units 03/05/21  0510 03/04/21  1624 03/04/21  0448   BUN mg/dL 13 13 13   SODIUM mmol/L 135* 132* 136*   POTASSIUM mmol/L 4 3 3 5* 3 2*   CHLORIDE mmol/L 100 98 101   CREATININE mg/dL 0 66 0 57* 0 60   AST U/L 64* 69*  --    ALT U/L 37 32  --             Wt Readings from Last 1 Encounters:   03/09/21 59 kg (130 lb 1 6 oz)     Estimated body mass index is 27 19 kg/m² as calculated from the following:    Height as of this encounter: 4' 10" (1 473 m)  Weight as of this encounter: 59 kg (130 lb 1 6 oz)  Imaging: reviewed    Tolerance for three hours of therapy per therapy day: adequate     Rehabilitation Prognosis: good       Rehabilitation Plan/Therapy Interventions: This patient will have close medical and rehabilitation oversight from a physical medicine and rehabilitation physician and if needed an internal medicine physician to manage the complexity of medical issues to optimize health, ability to participate in therapy, quality of life, and functional outcomes  This patient requires 24 hour rehabilitation nursing to address bowel and bladder management, (pain management if listed below), medication administration, positioning/skin monitoring, fall/injury prevention, and VTE prophylaxis  Physical and occupational therapy: Patient requires PT, OT to improve functional mobility, transfers, upper and lower body strengthening, conditioning, balance, and gait training with appropriate assistive device   PT and OT will be provided approximately 5 times per week for 90 minutes per day  Skilled therapists and nursing will also provide patient/family safety education and training  / will work to ensure proper communication between patient (+/- family) and staff regarding the overall rehabilitation and medical process while patient is in the acute rehabilitation center  / will work with patient (and if applicable family and community resources) to optimize safe discharge  Discharge Planning:    Estimated length of stay:   7 days  Family/Patient Goals:  Patient/family's goals: Return to previous home/apartment  Mobility Goals:   Modified inde    Activities of Daily Living (ADLs) Goals:  Largely modified independent    Rehabilitation and discharge goals discussed with the patient and/or family  Case Managment and Social Work to review patient/family resources and to coordinate Discharge Planning  Patient and Family Education and Training:  Rehabilitation and discharge goals discussed with the patient and/or family  Patient/family education/training needs to be discussed in weekly team meeting  Other equipment:  To be determined    Drug regimen reviewed, all potential adverse effects identified and addressed:    Scheduled Meds:  Current Facility-Administered Medications   Medication Dose Route Frequency Provider Last Rate    acetaminophen  650 mg Oral [facility] Darin Villalpando MD      ALPRAZolam  0 25 mg Oral TID PRN MD Chey Huang ON 3/10/2021] amLODIPine  5 mg Oral Daily Darin Villalpando MD      atenolol  50 mg Oral BID Darin Villalpando MD      [START ON 3/10/2021] atorvastatin  10 mg Oral Daily Darin Villalpando MD      bisacodyl  10 mg Rectal Daily PRN Darin Villalpando MD      Diclofenac Sodium  2 g Topical 4x Daily Darin Villalpando MD      docusate sodium  100 mg Oral BID Darin Villalpando MD      enoxaparin  30 mg Subcutaneous Q12H [address] Darin Villalpando MD  [START ON 3/10/2021] FLUoxetine  20 mg Oral Daily Pedro uLis Tijerina MD      [START ON 7/44/1165] folic acid  1 mg Oral Daily Pedro Luis Tijerina MD      gabapentin  100 mg Oral HS Pedro Luis Tijerina MD      lactulose  20 g Oral BID PRN Pedro Luis Tijerina MD      [START ON 3/10/2021] levothyroxine  88 mcg Oral Early Morning Pedro Luis Tijerina MD      lidocaine  2 patch Topical HS Pedro Luis Tijerina MD      methocarbamol  500 mg Oral Q6H PRN Pedro Luis Tijerina MD      [START ON 3/10/2021] methotrexate  20 mg Oral Weekly Pedro Luis Tijerina MD      nitroglycerin  0 4 mg Sublingual Q5 Min PRN Pedro Luis Tijerina MD      nystatin  1 application Topical BID Pedro Luis Tijerina MD      oxyCODONE  5 mg Oral Q4H PRN Pedro Luis Tijerina MD      senna  1 tablet Oral BID Pedro Luis Tijerina MD         Past Medical History:   Past Surgical History:   Family History:   Social history:   Past Medical History:   Diagnosis Date    Lymphadenitis, chronic     Sialadenitis     Sjogren's disease ([address] )     Stomach problems     Thyroid disorder     Xerostomia     Past Surgical History:   Procedure Laterality Date    BRAIN SURGERY  2019    tumor removed Dr Moris Argueta       Family History   Problem Relation Age of Onset    Diabetes Other     Hypertension Other     Heart disease Other     Arthritis Other     Heart disease Mother     Kidney disease Mother     Alcohol abuse Father       Social History     Socioeconomic History    Marital status: /Civil Union     Spouse name: None    Number of children: None    Years of education: None    Highest education level: None   Occupational History    None   Social Needs    Financial resource strain: None    Food insecurity     Worry: None     Inability: None    Transportation needs     Medical: None     Non-medical: None   Tobacco Use    Smoking status: Former Smoker    Smokeless tobacco: Never Used   Substance and Sexual Activity    Alcohol use: Yes     Frequency: Monthly or less    Drug use: Never    Sexual activity: Not Currently     Partners: Male   Lifestyle    Physical activity     Days per week: None     Minutes per session: None    Stress: None   Relationships    Social connections     Talks on phone: None     Gets together: None     Attends [employer] witness service: None     Active member of club or organization: None     Attends meetings of clubs or organizations: None     Relationship status: None    Intimate partner violence     Fear of current or ex partner: None     Emotionally abused: None     Physically abused: None     Forced sexual activity: None   Other Topics Concern    None   Social History Narrative    None          Current Medical Diagnosis Allergies   Patient Active Problem List   Diagnosis    Parotid gland enlargement    Sjogren's disease ([address] )    Closed nondisplaced fracture of anterior wall of right acetabulum ([address]    Closed nondisplaced fracture of right pubis ([address] )    Fall    Hypertension    Pain of right upper extremity    Multiple closed fractures of pelvis ([address] )    Anxiety and depression    Hypothyroidism    Abnormal findings on imaging test    Gastroparesis    Oral dyskinesia    Potential for cognitive impairment    Anemia    Hypokalemia    WELLS (dyspnea on exertion)    Lightheadedness    Pain    Abnormal ECG    Chest pain    Pleural effusion on right    Atelectasis of right lung    Low HDL (under 40)    Dermatitis associated with moisture    Onychomycosis    Enlarged and hypertrophic nails    Allergies   Allergen Reactions    Codeine Vomiting    Hydroxyquinoline Sulfate [Oxyquinoline] Vomiting    Leucovorin Vomiting    Tizanidine Vomiting           Medical Necessity Criteria for ARC Admission: See below  In addition, the preadmission screen, post-admission physical evaluation, overall plan of care and admissions order demonstrate a reasonable expectation that the following criteria were met at the time of admission to the [facility]    1  The patient requires active and ongoing therapeutic intervention of multiple therapy disciplines (physical therapy, occupational therapy, speech-language pathology, or prosthetics/orthotics), one of which is physical or occupational therapy  2  Patient requires an intensive rehabilitation therapy program, as defined in Chapter 1, section 110 2 2 of the CMS Medicare Policy Manual  This intensive rehabilitation therapy program will consist of at least 3 hours of therapy per day at least 5 days per week or at least 15 hours of intensive rehabilitation therapy within a 7 consecutive day period, beginning with the date of admission to the [facility]  3  The patient is reasonably expected to actively participate in, and benefit significantly from, the intensive rehabilitation therapy program as defined in Chapter 1, section 110 2 2 of the CMS Medicare Policy Manual at this time of admission to the [facility]  She can reasonably be expected to make measurable improvement (that will be of practical value to improve the patients functional capacity or adaptation to impairments) as a result of the rehabilitation treatment, as defined in section 110 3, and such improvement can be expected to be made within the prescribed period of time  As noted in the CMS Medicare Policy Manual, the patient need not be expected to achieve complete independence in the domain of self-care nor be expected to return to his or her prior level of functioning in order to meet this standard  4  The patient must require physician supervision by a rehabilitation physician  As such, a rehabilitation physician will conduct face-to-face visits with the patient at least 3 days per week throughout the patients stay in the [facility] to assess the patient both medically and functionally, as well as to modify the course of treatment as needed to maximize the patients capacity to benefit from the rehabilitation process    5  The patient requires an intensive and coordinated interdisciplinary approach to providing rehabilitation, as defined in Chapter 1, section 110 2 5 of the CMS Medicare Policy Manual  This will be achieved through periodic team conferences, conducted at least once in a 7-day period, and comprising of an interdisciplinary team of medical professionals consisting of: a rehabilitation physician, registered nurse,  and/or , and a licensed/certified therapist from each therapy discipline involved in treating the patient  Changes Since Pre-admission Assessment: None -This patient's participation in rehab continues to be reasonable, necessary and appropriate  CMS Required Post-Admission Physician Evaluation Elements  History and Physical, including medical history, functional history and active comorbidities as in above text  Post-Admission Physician Evaluation:  The patient has the potential to make improvement and is in need of physical, occupational, and/or therapy services  The patient may also need nutritional services  Given the patient's complex medical condition and risk of further medical complications, rehabilitative services cannot be safely provided at a lower level of care, such as a skilled nursing facility  I have reviewed the patient's functional and medical status at the time of the preadmission screening and they are the same as on the day of this admission  I acknowledge that I have personally performed a full physical examination on this patient within 24 hours of admission  The patient and/or family demonstrated understanding the rehabilitation program and the discharge process after we discussed them       Agree in entirety: yes  Minor adaptions: none    Major changes: none    Specific areas of management and oversight in ARC setting:  Cardiopulmonary function/Anemia: Ensure cardiopulmonary stability and optimize cardiopulmonary function not only at rest but with activity as patient's activity level significantly increases in acute rehab compared with prior to transfer in preparation for safe discharge from [facility]  Must closely and frequently monitor blood pressure, HR, anemia to ensure adequate cardiac output during ADLs and ambulation as patient is at increased risk for orthostatic hypotension/syncope and potential injury if not monitored for and managed adequately  Blood pressure management:    Frequent monitoring of blood pressure with appropriate adjustments in blood pressure medication management to optimize blood pressure control and prevent/limit renal complications  Monitoring impact of blood pressure and side-effects of blood pressure medications at rest and with activity  Pain management:  Pain will improve with frequent evaluation of pain, careful adjustments in medications, frequent re-evaluation of patient's pain and medical/neurologic status to ensure optimal pain control, avoidance of potential serious and even life-threatening side-effects and drug interactions, as well as weaning pain medications as soon as possible to decrease risk of short and long-term use  Inpatient rehabilitation education/teaching: To be provided to patient and typically family/caregiver (if able to be identified) by all skilled therapists, rehab nursing, case management, and rehab specialized physician to ensure optimal recovery and decrease risks of complications in both acute rehabilitation setting as well as after discharge  Anxiety (and/or Depression): Patient's mood and it's impact on therapy participation and functional recovery will improve during course with supportive counseling, relaxation/breathing techniques and if necessary medication management  Requires frequent re-assessment and close management to ensure anxiety/depression management during acute rehab course with planning for appropriate outpatient management to ensure optimal mental health and functional recovery      Bowel dysfunction: Appropriate bowel management with appropriate toileting program from rehab nursing and staff with oversight management by rehabilitation physicians which include adjustments in medications to optimize appropriate bowel function and prevent/decrease risk of constipation and bowel obstruction  Jasen Beltran MD, [facility]  Physical Medicine and Rehabilitation  Brain Injury Medicine    ** Please Note: Fluency Direct voice to text software may have been used in the creation of this document   **

## 2021-03-09 NOTE — ASSESSMENT & PLAN NOTE
· Status post traumatic fall with right inferior pubic rami fracture right pubic symphysis fracture, right anterior acetabular fracture left anterior acetabular fracture  · Seen by Orthopedics conservative management  · Weight-bearing as tolerated  · DVT prophylaxis Lovenox  · Pain control  · PT OT eval for DC planning back to [facility] on Tuesday March 9th

## 2021-03-09 NOTE — PHYSICAL THERAPY NOTE
Physical TherapyTreatment Note    Patient's Name: Galileo Huertas    Admitting Diagnosis  Multiple closed fractures of pelvis ([facility] ) [S32 [id number]]    Problem List  Patient Active Problem List   Diagnosis    Parotid gland enlargement    Sjogren's disease ([facility] )    Closed nondisplaced fracture of anterior wall of right acetabulum ([facility] )    Closed nondisplaced fracture of right pubis ([facility] )    Fall    Hypertension    Pain of right upper extremity    Multiple closed fractures of pelvis ([facility] )    Depression    Hypothyroidism    Abnormal findings on imaging test    Gastroparesis    Oral dyskinesia    Potential for cognitive impairment    Anemia    Hypokalemia    WELLS (dyspnea on exertion)    Lightheadedness    Pain    Abnormal ECG    Chest pain    Pleural effusion on right    Atelectasis of right lung    Low HDL (under 40)       Past Medical History  Past Medical History:   Diagnosis Date    Lymphadenitis, chronic     Sialadenitis     Sjogren's disease ([facility] )     Stomach problems     Thyroid disorder     Xerostomia        Past Surgical History  Past Surgical History:   Procedure Laterality Date    BRAIN SURGERY  2019    tumor removed Dr Dewayne Thrasher  No orders to display       Recent Vital Signs  Vitals:    03/07/21 1500 03/07/21 2324 03/08/21 0743 03/08/21 1510   BP: 167/73 160/76 158/71 146/65   BP Location: Left arm Left arm Left arm Left arm   Pulse: 61 58 63 58   Resp: 20 20 20 18   Temp: 99 2 °F (37 3 °C) 99 2 °F (37 3 °C) 97 9 °F (36 6 °C) 97 8 °F (36 6 °C)   TempSrc: Temporal Temporal Temporal Temporal   SpO2: 98% 96% 98% 95%   Weight:       Height:           PT Treatment Time: 30 min       03/08/21 1345   PT Last Visit   PT Visit Date 03/08/21   Note Type   Note Type Treatment   Pain Assessment   Pain Assessment Tool 0-10   Pain Score 6   Pain Location/Orientation Location: Pelvis   Restrictions/Precautions   Weight Bearing Precautions Per Order Yes RLE Weight Bearing Per Order WBAT   LLE Weight Bearing Per Order WBAT   Other Precautions Fall Risk;Pain   General   Chart Reviewed Yes   Response to Previous Treatment Patient with no complaints from previous session  Family/Caregiver Present No   Cognition   Arousal/Participation Alert; Cooperative   Attention Within functional limits   Orientation Level Oriented X4   Memory Within functional limits   Following Commands Follows all commands and directions without difficulty   Transfers   Sit to Stand 5  Supervision   Additional items Increased time required;Verbal cues   Stand to Sit 5  Supervision   Additional items Increased time required;Verbal cues   Toilet transfer 5  Supervision   Additional items Increased time required;Verbal cues   Additional Comments with RW   Ambulation/Elevation   Gait pattern Excessively slow; Short stride; Foward flexed;Decreased foot clearance; Improper Weight shift; Antalgic   Gait Assistance 5  Supervision   Additional items Verbal cues   Assistive Device Rolling walker   Distance 50ft, 20ftx2   Balance   Static Sitting Fair +   Dynamic Sitting Fair   Static Standing Fair   Dynamic Standing Fair -   Ambulatory Fair -   Endurance Deficit   Endurance Deficit Yes   Activity Tolerance   Activity Tolerance Patient tolerated treatment well   Nurse Made Aware spoke to RN   Exercises   Heelslides Sitting;10 reps;AROM; Bilateral   Hip Flexion Sitting;10 reps;AROM; Bilateral   Hip Abduction Sitting;10 reps;AROM; Bilateral   Knee AROM Long Arc Quad Sitting;10 reps;AROM; Bilateral   Assessment   Prognosis Good   Problem List Decreased strength;Decreased range of motion;Decreased endurance; Impaired balance;Decreased mobility; Decreased safety awareness;Pain   Assessment Pt seen today for PT treatment found seated in recliner  Increased pain and stiffness today  Overall steady gait although not tolerating longer distances today  Declined stairs  Left seated in recliner with needs in reach  Barriers to Discharge Inaccessible home environment;Decreased caregiver support   Goals   Patient Goals get back to rehab   STG Expiration Date 03/15/21   PT Treatment Day 2   Plan   Treatment/Interventions Functional transfer training;LE strengthening/ROM; Elevations; Therapeutic exercise; Endurance training;Patient/family training;Equipment eval/education; Bed mobility;Gait training;Spoke to nursing;Spoke to case management;OT   Progress Progressing toward goals   PT Frequency   (3-5x/wk)   Recommendation   PT Discharge Recommendation Post-Acute Rehabilitation Services   Equipment Recommended [facility] Recommended Wheeled walker   PT - OK to Discharge Yes   AM-PAC Basic Mobility Inpatient   Turning in Bed Without Bedrails 3   Lying on Back to Sitting on Edge of Flat Bed 3   Moving Bed to Chair 4   Standing Up From Chair 4   Walk in Room 3   Climb 3-5 Stairs 2   Basic Mobility Inpatient Raw Score 19   Basic Mobility Standardized Score 42 48       Ernestina Coles PT, DPT

## 2021-03-09 NOTE — PLAN OF CARE
Problem: PHYSICAL THERAPY ADULT  Goal: Performs mobility at highest level of function for planned discharge setting  See evaluation for individualized goals  Description: Treatment/Interventions: Functional transfer training, LE strengthening/ROM, Elevations, Therapeutic exercise, Endurance training, Patient/family training, Equipment eval/education, Bed mobility, Gait training, Spoke to nursing, Spoke to case management, OT  Equipment Recommended: Na Wheeler       See flowsheet documentation for full assessment, interventions and recommendations  Outcome: Progressing  Note: Prognosis: Good  Problem List: Decreased strength, Decreased range of motion, Decreased endurance, Impaired balance, Decreased mobility, Decreased safety awareness, Pain  Assessment: Pt seen today for PT treatment found seated in recliner  Increased pain and stiffness today  Overall steady gait although not tolerating longer distances today  Declined stairs  Left seated in recliner with needs in reach  Barriers to Discharge: Inaccessible home environment, Decreased caregiver support     PT Discharge Recommendation: [address]     PT - OK to Discharge: Yes    See flowsheet documentation for full assessment

## 2021-03-09 NOTE — ASSESSMENT & PLAN NOTE
· Finding on CT: New right pleural effusion with right basilar atelectasis of uncertain etiology    · Patient 94-97% on RA  · Patient did report increased shortness of breath at [facility] - status post 1 dose of Lasix  · Echo - normal EF, no RWMA  · No further plans since asymptomatic

## 2021-03-09 NOTE — ASSESSMENT & PLAN NOTE
· Resolved  R/o ACS  · Troponin negative x3  · No events on tele  · CTA - no PE  · Cardiology consulted  · Echo normal wall motion, normal EF   · No plans to stress test at this time     · Continue statin  · Can consider aspirin baby dose 81 mg

## 2021-03-09 NOTE — ASSESSMENT & PLAN NOTE
· Finding on CT: New small right pleural effusion with right basilar atelectasis of uncertain etiology  · Patient 94-97% on RA  · Patient did report increased shortness of breath at [facility] - status post 1 dose of Lasix can try another dose of IV Lasix if needed    · Echo - normal EF, no RWMA  · No further plans since asymptomatic

## 2021-03-09 NOTE — ASSESSMENT & PLAN NOTE
· Status post traumatic fall with right inferior pubic rami fracture right pubic symphysis fracture, right anterior acetabular fracture left anterior acetabular fracture  · Seen by Orthopedics conservative management  · Weight-bearing as tolerated  · DVT prophylaxis Lovenox  · Pain control  · PT OT eval for DC planning back to [facility] on today

## 2021-03-09 NOTE — PLAN OF CARE
Problem: OCCUPATIONAL THERAPY ADULT  Goal: Performs self-care activities at highest level of function for planned discharge setting  See evaluation for individualized goals  Description: Treatment Interventions: ADL retraining, Functional transfer training, UE strengthening/ROM, Endurance training, UE splinting, Activityengagement, Energy conservation          See flowsheet documentation for full assessment, interventions and recommendations  Outcome: Progressing  Note: Limitation: Decreased ADL status, Decreased UE strength, Decreased endurance, Decreased cognition, Decreased high-level ADLs  Prognosis: Good  Assessment: Pt seen for OT txt  Pt received calling for assist to use the bathroom  Pt continues to be limited by pain which increased with activity  Pt assisted to the bathroom, not able to tolerate prolonged standing to complete grooming tasks at the sink due to poor endurance and pain, Ebony to manage gown and hygiene  Pt continues to require assist for bed mobility due to impaired BLE management with sit-->supine transitions  Pt progressing, however remains limited by decreased activity tolerance, endurance, functional strength, and pain management  Pt would benefit from continued OT services to further address deficits with transfers, ambulation, and ADLs in order to maximize functional independence        OT Discharge Recommendation: Post-Acute Rehabilitation Services

## 2021-03-09 NOTE — ASSESSMENT & PLAN NOTE
· No anginal symptoms, dyspnea exertion, palpitations etc   EKG lipid T-wave in V2 initial has been negative continue to trend  · Consult cardiology  · Recommending echo- normal ef, no regional wall motion abnormality  · No plans to stress test, no active cp, completely asymptomatic

## 2021-03-09 NOTE — ASSESSMENT & PLAN NOTE
EKG completed on 3/4 that showed possible T-wave inversion on V2 lead  Sent to acute care setting for formal ACS workup  Following EKGs and troponins were negative  Cardiology was consulted - ECHO completed  Showed no motion wall abnormalities  EF was 64%  No plans for further cardiac workup at this time  CTA showed no PE  Showed small R pleural effusion and basilar atelectasis - given a 1 time dose of IV 40mg lasix

## 2021-03-10 PROBLEM — F41.9 ANXIETY AND DEPRESSION: Status: ACTIVE | Noted: 2021-02-28

## 2021-03-10 PROBLEM — R93.3 ABNORMAL CT SCAN, GASTROINTESTINAL TRACT: Status: ACTIVE | Noted: 2021-03-10

## 2021-03-10 PROBLEM — R59.9 ADENOPATHY: Status: ACTIVE | Noted: 2021-03-10

## 2021-03-10 LAB
ANION GAP SERPL CALCULATED.3IONS-SCNC: 9 MMOL/L (ref 5–14)
BASOPHILS # BLD AUTO: 0 THOUSANDS/ΜL (ref 0–0.1)
BASOPHILS NFR BLD AUTO: 1 % (ref 0–1)
BUN SERPL-MCNC: 13 MG/DL (ref 5–25)
CALCIUM SERPL-MCNC: 9.1 MG/DL (ref 8.4–10.2)
CHLORIDE SERPL-SCNC: 100 MMOL/L (ref 97–108)
CO2 SERPL-SCNC: 26 MMOL/L (ref 22–30)
CREAT SERPL-MCNC: 0.63 MG/DL (ref 0.6–1.2)
EOSINOPHIL # BLD AUTO: 0.1 THOUSAND/ΜL (ref 0–0.4)
EOSINOPHIL NFR BLD AUTO: 2 % (ref 0–6)
ERYTHROCYTE [DISTWIDTH] IN BLOOD BY AUTOMATED COUNT: 21.6 %
GFR SERPL CREATININE-BSD FRML MDRD: 93 ML/MIN/1.73SQ M
GLUCOSE P FAST SERPL-MCNC: 95 MG/DL (ref 70–99)
GLUCOSE SERPL-MCNC: 95 MG/DL (ref 70–99)
HCT VFR BLD AUTO: 32.1 % (ref 36–46)
HGB BLD-MCNC: 10.5 G/DL (ref 12–16)
LYMPHOCYTES # BLD AUTO: 0.4 THOUSANDS/ΜL (ref 0.5–4)
LYMPHOCYTES NFR BLD AUTO: 8 % (ref 25–45)
MCH RBC QN AUTO: 27.7 PG (ref 26–34)
MCHC RBC AUTO-ENTMCNC: 32.8 G/DL (ref 31–36)
MCV RBC AUTO: 84 FL (ref 80–100)
MONOCYTES # BLD AUTO: 0.5 THOUSAND/ΜL (ref 0.2–0.9)
MONOCYTES NFR BLD AUTO: 10 % (ref 1–10)
NEUTROPHILS # BLD AUTO: 4.1 THOUSANDS/ΜL (ref 1.8–7.8)
NEUTS SEG NFR BLD AUTO: 80 % (ref 45–65)
PLATELET # BLD AUTO: 326 THOUSANDS/UL (ref 150–450)
PMV BLD AUTO: 7.6 FL (ref 8.9–12.7)
POTASSIUM SERPL-SCNC: 3.1 MMOL/L (ref 3.6–5)
RBC # BLD AUTO: 3.8 MILLION/UL (ref 4–5.2)
SODIUM SERPL-SCNC: 135 MMOL/L (ref 137–147)
WBC # BLD AUTO: 5.1 THOUSAND/UL (ref 4.5–11)

## 2021-03-10 PROCEDURE — 97116 GAIT TRAINING THERAPY: CPT

## 2021-03-10 PROCEDURE — 0HBRXZZ EXCISION OF TOE NAIL, EXTERNAL APPROACH: ICD-10-PCS | Performed by: PODIATRIST

## 2021-03-10 PROCEDURE — 99232 SBSQ HOSP IP/OBS MODERATE 35: CPT

## 2021-03-10 PROCEDURE — 97110 THERAPEUTIC EXERCISES: CPT

## 2021-03-10 PROCEDURE — 97530 THERAPEUTIC ACTIVITIES: CPT

## 2021-03-10 PROCEDURE — 97166 OT EVAL MOD COMPLEX 45 MIN: CPT

## 2021-03-10 PROCEDURE — 80048 BASIC METABOLIC PNL TOTAL CA: CPT | Performed by: NURSE PRACTITIONER

## 2021-03-10 PROCEDURE — 85025 COMPLETE CBC W/AUTO DIFF WBC: CPT | Performed by: NURSE PRACTITIONER

## 2021-03-10 PROCEDURE — 97535 SELF CARE MNGMENT TRAINING: CPT

## 2021-03-10 PROCEDURE — 99232 SBSQ HOSP IP/OBS MODERATE 35: CPT | Performed by: NURSE PRACTITIONER

## 2021-03-10 PROCEDURE — 97163 PT EVAL HIGH COMPLEX 45 MIN: CPT

## 2021-03-10 RX ORDER — POTASSIUM CHLORIDE 20 MEQ/1
40 TABLET, EXTENDED RELEASE ORAL ONCE
Status: COMPLETED | OUTPATIENT
Start: 2021-03-10 | End: 2021-03-10

## 2021-03-10 RX ORDER — XYLITOL/YERBA SANTA
5 AEROSOL, SPRAY WITH PUMP (ML) MUCOUS MEMBRANE EVERY 4 HOURS PRN
Status: DISCONTINUED | OUTPATIENT
Start: 2021-03-10 | End: 2021-03-17 | Stop reason: HOSPADM

## 2021-03-10 RX ADMIN — ACETAMINOPHEN 650 MG: 325 TABLET ORAL at 14:16

## 2021-03-10 RX ADMIN — POTASSIUM CHLORIDE 40 MEQ: 20 TABLET, EXTENDED RELEASE ORAL at 17:05

## 2021-03-10 RX ADMIN — POTASSIUM CHLORIDE 40 MEQ: 1500 TABLET, EXTENDED RELEASE ORAL at 08:51

## 2021-03-10 RX ADMIN — ATENOLOL 50 MG: 50 TABLET ORAL at 21:04

## 2021-03-10 RX ADMIN — MICONAZOLE NITRATE 1 APPLICATION: 20 CREAM TOPICAL at 17:08

## 2021-03-10 RX ADMIN — DICLOFENAC SODIUM 2 G: 10 GEL TOPICAL at 11:37

## 2021-03-10 RX ADMIN — ACETAMINOPHEN 650 MG: 325 TABLET ORAL at 21:04

## 2021-03-10 RX ADMIN — SENNOSIDES 8.6 MG: 8.6 TABLET ORAL at 17:05

## 2021-03-10 RX ADMIN — ENOXAPARIN SODIUM 30 MG: 30 INJECTION SUBCUTANEOUS at 21:03

## 2021-03-10 RX ADMIN — NYSTATIN 1 APPLICATION: 100000 POWDER TOPICAL at 08:50

## 2021-03-10 RX ADMIN — ATENOLOL 50 MG: 50 TABLET ORAL at 08:45

## 2021-03-10 RX ADMIN — DICLOFENAC SODIUM 2 G: 10 GEL TOPICAL at 17:06

## 2021-03-10 RX ADMIN — METHOTREXATE 20 MG: 2.5 TABLET ORAL at 08:46

## 2021-03-10 RX ADMIN — LIDOCAINE 2 PATCH: 50 PATCH CUTANEOUS at 21:03

## 2021-03-10 RX ADMIN — AMLODIPINE BESYLATE 5 MG: 5 TABLET ORAL at 08:45

## 2021-03-10 RX ADMIN — LEVOTHYROXINE SODIUM 88 MCG: 0.09 TABLET ORAL at 05:22

## 2021-03-10 RX ADMIN — OXYCODONE HYDROCHLORIDE 5 MG: 5 TABLET ORAL at 08:45

## 2021-03-10 RX ADMIN — OXYCODONE HYDROCHLORIDE 5 MG: 5 TABLET ORAL at 14:16

## 2021-03-10 RX ADMIN — NYSTATIN 1 APPLICATION: 100000 POWDER TOPICAL at 17:06

## 2021-03-10 RX ADMIN — DICLOFENAC SODIUM 2 G: 10 GEL TOPICAL at 08:46

## 2021-03-10 RX ADMIN — SENNOSIDES 8.6 MG: 8.6 TABLET ORAL at 08:45

## 2021-03-10 RX ADMIN — FOLIC ACID 1 MG: 1 TABLET ORAL at 08:45

## 2021-03-10 RX ADMIN — FLUOXETINE 20 MG: 20 CAPSULE ORAL at 08:45

## 2021-03-10 RX ADMIN — ENOXAPARIN SODIUM 30 MG: 30 INJECTION SUBCUTANEOUS at 08:44

## 2021-03-10 RX ADMIN — ALPRAZOLAM 0.25 MG: 0.25 TABLET ORAL at 21:04

## 2021-03-10 RX ADMIN — ACETAMINOPHEN 650 MG: 325 TABLET ORAL at 05:22

## 2021-03-10 RX ADMIN — MICONAZOLE NITRATE 1 APPLICATION: 20 CREAM TOPICAL at 08:50

## 2021-03-10 RX ADMIN — GABAPENTIN 100 MG: 100 CAPSULE ORAL at 21:05

## 2021-03-10 RX ADMIN — ATORVASTATIN CALCIUM 10 MG: 10 TABLET, FILM COATED ORAL at 08:45

## 2021-03-10 RX ADMIN — OXYCODONE HYDROCHLORIDE 5 MG: 5 TABLET ORAL at 21:05

## 2021-03-10 NOTE — ASSESSMENT & PLAN NOTE
K+ 3 1 this morning  Given 40 mEq oral potassium chloride this morning and will receive another 40mEq in the evening  Repeat BMP tomorrow morning

## 2021-03-10 NOTE — ASSESSMENT & PLAN NOTE
Stooling well; adequate intake   From Sjogren's   monitor closely especially with recent discontinuation of chronic reglan in context of oral dyskinesias  Senna BID  PRN bowel regimen   Follow-up with OP GI

## 2021-03-10 NOTE — ASSESSMENT & PLAN NOTE
2/22 - Mechanical fall down the stairs predominately on the R side  Multiple pelvic fractures: Acute nondisplaced fracture of the right inferior pubic ramus and right pubic symphysis, anterior right acetabular fracture and probable nondisplaced left anterior acetabular fracture  Ortho consulted - no surgical intervention at this time      Monitor for s/s pelvic bleeding  Pain management  WBAT  DVT Prophylaxis - Lovenox  Follow-up with Dr Riccardo Osman (Orthopedics) office in 6 weeks      PT/OT Therapies  Primary team following

## 2021-03-10 NOTE — CONSULTS
Consult Routine Foot Care- Podiatry   Jake Parson Viscomi 78 y o  female MRN: [id number]  Unit/Bed#: -01 Encounter: [id number]    Assessment/Plan     Assessment:  1  Onychomycosis x 10  2  PVD     Plan:  - pt eval and managed     need 1 out of 3  - Number and complexity of problems addressed: 1 or more chronic illness with exacerbation, progression as shown     - Amount/complexity of data reviewed and analyzed: 1 of 3 categories needed     Category 1: prior patient notes were analyzed today before evaluating and managing patient  All PMH were discussed with pt today            - Risk of complications: moderate risk of morbidity from additional testing or treatment involved with this patient, which includes but not limited to:     - discussed anatomy, condition, treatment plan and options  They were instructed on proper foot care  The patient was seen today for greater than total of 45-59 minutes  This is total time spent today involving both face-to-face time and non face-to-face time  This time spent includes reviewing their past medical history, performing a medically appropriate examination and evaluation of the patient, counseling and educating the patient, ordering medication, documenting all findings in EMR, and independently interpreting results and communicating results with patient and discussing their condition and treatment options, risks, and potential complications  I have discussed the findings of this examination with the patient  The discussion included a complete verbal explanation of the examination results, diagnosis and planned treatment(s)  A schedule for future care needs was explained  The patient has verbalized the understanding of these instructions at this time  If any questions should arise after returning home I have encouraged the patient to feel free to call the office            - I have discussed the findings of this examination with the patient   The discussion included a complete verbal explanation of the examination results, diagnosis and planned treatment(s)  A schedule for future care needs was explained  Patient verbalizes understanding of these instructions at this time  If any questions should arise after returning home I have encouraged the patient to feel free to call the office  Treatment Plan: The treatment plan is to treat this patient with conservative and restorative nonsurgical treatment to help alleviate the patient's symptomatology by utilizing some or all of the standard podiatric medical care- e g - palliation, orthotics, injections, physical therapy and the use of padding and strapping  Short term goals: Our plan is to decrease pain and inflammation, to improve ambulation, to allow the patient to return to normal activities and work, and to prevent further disability  Long term goals: Our plan is for the patient to resume normal foot function, be weight bearing, and to prevent surgical intervention  We will reevaluate the patient's progress on a weekly or biweekly basis until symptoms complex subsides or is resolved  - Hallucal mycotic nails x2 debrided decreasing thickness by 1 mm  Elongated toe nails 2-5 b/l were trimmed, decreasing length, without incidence utilizing a sharp nail nipper  - All questions and concerns addressed  - Podiatry signing off, thank you for the consult  History of Present Illness     HPI:  Meliton Lindquist is a 78 y o  female who presents with painful, elongated toenails  They state that they see no Podiatrist outpatient  They have difficulty applying their socks and shoes due to the elongation of the nails  The pressure within their shoe gear is painful and they have been unable to cut their nails adequately  Inpatient consult to Podiatry  Consult performed by: Samuel Song DPM  Consult ordered by: Silas Martinez MD        Review of Systems   Constitutional: Negative  HENT: Negative  Eyes: Negative  Respiratory: Negative  Cardiovascular: Negative  Gastrointestinal: Negative  Musculoskeletal: Negative   Skin: elongated thickened toenails   Neurological: Negative          Historical Information   Past Medical History:   Diagnosis Date    Lymphadenitis, chronic     Sialadenitis     Sjogren's disease ([address] )     Stomach problems     Thyroid disorder     Xerostomia      Past Surgical History:   Procedure Laterality Date    BRAIN SURGERY  2019    tumor removed Dr Della Richardson History   Social History     Substance and Sexual Activity   Alcohol Use Yes    Frequency: Monthly or less     Social History     Substance and Sexual Activity   Drug Use Never     Social History     Tobacco Use   Smoking Status Former Smoker   Smokeless Tobacco Never Used     Family History:   Family History   Problem Relation Age of Onset    Diabetes Other     Hypertension Other     Heart disease Other     Arthritis Other     Heart disease Mother     Kidney disease Mother     Alcohol abuse Father        Meds/Allergies   Medications Prior to Admission   Medication    amLODIPine (NORVASC) 2 5 mg tablet    atenolol (TENORMIN) 50 mg tablet    atorvastatin (LIPITOR) 10 mg tablet    FLUoxetine (PROzac) 20 mg capsule    folic acid (FOLVITE) 1 mg tablet    levothyroxine 88 mcg tablet    methotrexate 2 5 mg tablet    cyclobenzaprine (FLEXERIL) 10 mg tablet     Allergies   Allergen Reactions    Codeine Vomiting    Hydroxyquinoline Sulfate [Oxyquinoline] Vomiting    Leucovorin Vomiting    Tizanidine Vomiting       Objective   First Vitals:   Blood Pressure: 154/72 (03/09/21 1506)  Pulse: 64 (03/09/21 1506)  Temperature: 98 7 °F (37 1 °C) (03/09/21 1506)  Temp Source: Tympanic (03/09/21 1506)  Respirations: 20 (03/09/21 1506)  Height: 4' 10" (147 3 cm) (03/09/21 1506)  Weight - Scale: 59 kg (130 lb 1 6 oz) (03/09/21 1506)  SpO2: 97 % (03/09/21 1506)    Current Vitals:   Blood Pressure: 151/68 (03/10/21 7951)  Pulse: 58 (03/10/21 0523)  Temperature: 98 °F (36 7 °C) (03/10/21 0523)  Temp Source: Tympanic (03/10/21 0523)  Respirations: 18 (03/10/21 0523)  Height: 4' 10" (147 3 cm) (03/09/21 1506)  Weight - Scale: 59 kg (130 lb 1 6 oz) (03/09/21 1506)  SpO2: 92 % (03/10/21 0523)    /68 (BP Location: Left arm)   Pulse 58   Temp 98 °F (36 7 °C) (Tympanic)   Resp 18   Ht 4' 10" (1 473 m)   Wt 59 kg (130 lb 1 6 oz)   LMP 01/13/2005   SpO2 92%   BMI 27 19 kg/m²     General Appearance:    Alert, cooperative, no distress   Head:    Normocephalic, without obvious abnormality, atraumatic   Eyes:    PERRL, conjunctiva/corneas clear, EOM's intact            Nose:   Moist mucous membranes, no drainage or sinus tenderness   Throat:   No tenderness, no exudates   Neck:   Supple, symmetrical, trachea midline, no JVD   Back:     Symmetric, no CVA tenderness   Lungs:     Clear to auscultation bilaterally, respirations unlabored   Chest wall:    No tenderness or deformity   Heart:    Regular rate and rhythm, S1 and S2 normal, no murmur, rub   or gallop   Abdomen:     Soft, non-tender, bowel sounds active all four quadrants,     no masses, no organomegaly     Extremities:   MMT is 4/5 to all compartments of the LE, +1/4 edema B/L, Digital ROM is intact,    Pulses:   R DP is +1/4, R PT is +1/4, L DP is +1/4, L PT is +1/4, CFT< 3sec to all digits  Nail thickening b/l  Absent digital hair growth b/l   Skin:   Nails are thickened, elongated, with notable subungual debris  Yellow discoloration and > 2 mm thickness noted to toenails 1-5 B/L  No open Lesions  Thin/shiny skin noted to the B/L LE, pigmentary changes to B/L LE  Neurologic:   CNII-XII intact  Normal strength, sensation and reflexes       Throughout  Gross sensation is intact   Protective sensation is diminished       Lab Results:   Admission on 03/09/2021   Component Date Value    WBC 03/10/2021 5 10     RBC 03/10/2021 3 80*    Hemoglobin 03/10/2021 10 5*    Hematocrit 03/10/2021 32 1*    MCV 03/10/2021 84     MCH 03/10/2021 27 7     MCHC 03/10/2021 32 8     RDW 03/10/2021 21 6*    MPV 03/10/2021 7 6*    Platelets 73/29/7967 326     Neutrophils Relative 03/10/2021 80*    Lymphocytes Relative 03/10/2021 8*    Monocytes Relative 03/10/2021 10     Eosinophils Relative 03/10/2021 2     Basophils Relative 03/10/2021 1     Neutrophils Absolute 03/10/2021 4 10     Lymphocytes Absolute 03/10/2021 0 40*    Monocytes Absolute 03/10/2021 0 50     Eosinophils Absolute 03/10/2021 0 10     Basophils Absolute 03/10/2021 0 00     Sodium 03/10/2021 135*    Potassium 03/10/2021 3 1*    Chloride 03/10/2021 100     CO2 03/10/2021 26     ANION GAP 03/10/2021 9     BUN 03/10/2021 13     Creatinine 03/10/2021 0 63     Glucose 03/10/2021 95     Glucose, Fasting 03/10/2021 95     Calcium 03/10/2021 9 1     eGFR 03/10/2021 93      Imaging: I have personally reviewed pertinent films in PACS  EKG, Pathology, and Other Studies: I have personally reviewed pertinent reports        Code Status: Level 1 - Full Code  Advance Directive and Living Will:      Power of :    POLST:

## 2021-03-10 NOTE — ASSESSMENT & PLAN NOTE
Podiatry was consulted on 3/9  Nails trimmed on 3/10  Continue to follow-up with Podiatry as outpatient

## 2021-03-10 NOTE — PLAN OF CARE
Problem: PAIN - ADULT  Goal: Verbalizes/displays adequate comfort level or baseline comfort level  Description: Interventions:  - Encourage patient to monitor pain and request assistance  - Assess pain using appropriate pain scale  - Administer analgesics based on type and severity of pain and evaluate response  - Implement non-pharmacological measures as appropriate and evaluate response  - Consider cultural and social influences on pain and pain management  - Notify physician/advanced practitioner if interventions unsuccessful or patient reports new pain  Outcome: Progressing     Problem: Nutrition/Hydration-ADULT  Goal: Nutrient/Hydration intake appropriate for improving, restoring or maintaining nutritional needs  Description: Monitor and assess patient's nutrition/hydration status for malnutrition  Collaborate with interdisciplinary team and initiate plan and interventions as ordered  Monitor patient's weight and dietary intake as ordered or per policy  Utilize nutrition screening tool and intervene as necessary  Determine patient's food preferences and provide high-protein, high-caloric foods as appropriate       INTERVENTIONS:  - Monitor oral intake, urinary output, labs, and treatment plans  - Assess nutrition and hydration status and recommend course of action  - Evaluate amount of meals eaten  - Assist patient with eating if necessary   - Allow adequate time for meals  - Recommend/ encourage appropriate diets, oral nutritional supplements, and vitamin/mineral supplements  - Order, calculate, and assess calorie counts as needed  - Recommend, monitor, and adjust tube feedings and TPN/PPN based on assessed needs  - Assess need for intravenous fluids  - Provide specific nutrition/hydration education as appropriate  - Include patient/family/caregiver in decisions related to nutrition  Outcome: Progressing

## 2021-03-10 NOTE — ASSESSMENT & PLAN NOTE
Mood stable   Prozac  PRN 0 25 mg xanax hs prn (pt states she has Rx of PCP and will not Rx at time of d/c)  Patient counseled on risks does opiates and worsened risks with a combination of 2 taken at the same which includes fall and significant injury   Supportive counseling  Counseled on and continue to encourage deep breathing/relaxation/behavioral management techniques:     Follow-up with PCP

## 2021-03-10 NOTE — ASSESSMENT & PLAN NOTE
Takes methotrexate 20mg weekly  Follows up with Dr Kerrie Sterling (Rheumatology) as outpatient  - saliva substitute to p r n

## 2021-03-10 NOTE — PROGRESS NOTES
ARC PT EVAL   03/10/21 3652   Patient Data   Rehab Impairment  Impairment of mobility, safety and Activities of Daily Living (ADLs) due to Orthopedic Disorders:  08 3  Pelvic Fracture   Etiologic Diagnosis pelvic fx   Date of Onset 02/22/21   Support System   Name Shruti Pichardo   Relationship Spouse   Able to provide 24 hour supervision No   Able to provide physical help? Yes   Home Setup   Type of Home Multi Level   Method of Entry Stairs;Hand Rail Left  (HR L side for 7 steps, no HR 1st step)   Number of Stairs 8  (1+7)   Number of Stairs in Home   (FF to second floor)   In Home Hand Rail Left   First Floor Bathroom None   Second Floor Bathroom Full;Tub; Shower;Grab Bars   Second [employer]; Shower chair   First Floor Setup Available Yes   Home Modifications Necessary?   (pending progress)   Available Equipment Roller Walker;Single [employer]; Shower Chair;Bedside Commode  (x2 SPCs)   Baseline Information   Transportation Family/friends drive   Prior Device(s) Used Roller Walker  (occassionally)   Prior Level of Function   Indoor-Mobility (Ambulation) 3  Independent - Patient completed the activities by him/herself, with or without an assistive device, with no assistance from a helper  Stairs 3  Independent - Patient completed the activities by him/herself, with or without an assistive device, with no assistance from a helper  Functional Cognition 3  Independent - Patient completed the activities by him/herself, with or without an assistive device, with no assistance from a helper  Prior Assistance Needed for Driving;Household Chores/Cleaning;Meal Preparation;Money Management; Shopping   Prior Device Used D   Walker  (occassionally)   Falls in the Last Year   Number of falls in the past 12 months 2   Type of Injury Associated with Fall Major injury  (current injury)   Patient Preference   Nickname (Patient Preference) Sandhya   Psychosocial   Psychosocial (WDL) WDL   Patient Behaviors/Mood Pleasant; Cooperative   Restrictions/Precautions   Precautions Cognitive; Fall Risk;Pain   Weight Bearing Restrictions Yes   RLE Weight Bearing Per Order WBAT   LLE Weight Bearing Per Order WBAT   ROM Restrictions No   Pain Assessment   Pain Assessment Tool 0-10   Pain Score 8   Pain Location/Orientation Orientation: Right;Location: Buttocks   Pain Radiating Towards groin, buttock   Pain Onset/Description Onset: Ongoing   Effect of Pain on Daily Activities effects functional mobilities   Hospital Pain Intervention(s) Cold applied  (to R groin/hip last 10 mins of session during seated TE)   Transfer Bed/Chair/Wheelchair   Positioning Concerns Skin Integrity   Limitations Noted In Balance;LE Strength; Coordination;Pain Management   Adaptive Equipment Roller Walker   Type of Assistance Needed Physical assistance   Amount of Physical Assistance Provided Less than 25%   Comment Ebony-CGA with RW   Chair/Bed-to-Chair Transfer CARE Score 3   Roll Left and Right   Comment unable to complete due to pain   Reason if not Attempted Safety concerns   Roll Left and Right CARE Score 88   Sit to Lying   Type of Assistance Needed Supervision   Amount of Physical Assistance Provided No physical assistance   Comment CS with use of L bed rail, HOB flat, pt reports she will sleep on couch upon d/c home   Sit to Lying CARE Score 4   Lying to Sitting on Side of Bed   Type of Assistance Needed Supervision   Amount of Physical Assistance Provided No physical assistance   Comment CS with use of L bed rail, HOB flat, pt reports she will sleep on couch upon d/c home   Lying to Sitting on Side of Bed CARE Score 4   Sit to Stand   Type of Assistance Needed Incidental touching   Amount of Physical Assistance Provided No physical assistance   Comment CGA with RW, cues for hand placement approx 75% of time; vc's to slow descent on last stand to sit as pt plopping into chair   Sit to Stand CARE Score 4   Picking Up Object   Type of Assistance Needed Physical assistance   Amount of Physical Assistance Provided Less than 25%   Comment with reacher and RW picking up marker off of floor   Picking Up Object CARE Score 3   Car Transfer   Reason if not Attempted Environmental limitations   Car Transfer CARE Score 10   Ambulation   Primary Mode of Locomotion Prior to Admission Walk   Distance Walked (feet) 60 ft  (x1, 30'x1)   Assist Device Roller Walker   Gait Pattern Antalgic; Inconsistant [place]; Slow [place];Decreased foot clearance;Narrow KARE[place];Step to;Decreased R stance; Improper weight shift   Limitations Noted In Balance; Endurance; Heel Strike;Posture;Speed;Swing;Strength   Provided Assistance with: Balance   Walk Assist Level Minimum Assist;Contact Guard   Findings CGA for shorter distances, minAx1 on longer distances due to feeling dizzy, pt needing to sit, CF performed by PT   Walk 10 Feet   Type of Assistance Needed Incidental touching   Amount of Physical Assistance Provided No physical assistance   Comment CGA RW   Walk 10 Feet CARE Score 4   Walk 50 Feet with Two Turns   Type of Assistance Needed Physical assistance   Amount of Physical Assistance Provided Less than 25%   Comment Ebony/CGA with RW   Walk 50 Feet with Two Turns CARE Score 3   Walk 150 Feet   Reason if not Attempted Safety concerns   Walk 150 Feet CARE Score 88   Walking 10 Feet on Uneven Surfaces   Type of Assistance Needed Physical assistance   Amount of Physical Assistance Provided Less than 25%   Comment on inside ramp with RW   Walking 10 Feet on Uneven Surfaces CARE Score 3   Wheel 50 Feet with Two Turns   Reason if not Attempted Activity not applicable   Wheel 50 Feet with Two Turns CARE Score 9   Wheel 150 Feet   Reason if not Attempted Activity not applicable   Wheel 931 Feet CARE Score 9   Curb or Single Stair   Style negotiated Curb   Type of Assistance Needed Incidental touching   Amount of Physical Assistance Provided No physical assistance   Comment CGA with RW on 4" weight scale to mimic 1 MICHAEL home which pt reports is about same size as scale   1 Step (Curb) CARE Score 4   4 Steps   Type of Assistance Needed Physical assistance   Amount of Physical Assistance Provided Less than 25%   Comment use of b/l HRs with step to pattern; cues for sequencing   4 Steps CARE Score 3   12 Steps   Reason if not Attempted Safety concerns   12 Steps CARE Score 88   Stairs   Type Trainer Steps;Curb   # of Steps 4   Weight Bearing Precautions WBAT;RLE;LLE;Fall Risk   Assist Devices Bilateral Rail;Roller Walker   Comprehension   QI: Comprehension 3  Usually Understands: Understands most conversations, but misses some part/intent of message  Requires cues at times to understand  Expression   QI: Expression 3  Exhibits some difficulty with expressing needs and ideas (e g , some words or finishing thoughts) or speech is not clear   Strength RLE   RLE Overall Strength 3-/5  (limited due to pain)   Strength LLE   LLE Overall Strength 3+/5   Coordination   Movements are Fluid and Coordinated 0   Coordination and Movement Description due to pain antalgic gait pattern especially of RLE   Sensation   Light Touch No apparent deficits   Cognition   Overall Cognitive Status Impaired   Arousal/Participation Alert; Cooperative   Attention Attends with cues to redirect   Orientation Level Oriented X4   Memory Decreased short term memory;Decreased recall of recent events;Decreased recall of precautions   Following Commands Follows all commands and directions without difficulty   Comments at times needs directions to complete task repeated to her, pt reports she has poor short term memory   Objective Measure   PT Findings Pt educated on ELOS  Vitals assessed during session as pt with c/o dizziness during amb but reports this has been ongoing since before admission  manual /76, HR 68, SpO2 98%      Therapeutic Exercise   Therapeutic Exercise/Activity Pt performed 2x5 of seated b/l hip flex, LAQs, and man resisted hip abd for LE strengthening to improve transfers and ability to ambulate  Discharge Information   Vocational Plan Retired/not working   Patient's Discharge Plan return home with support from  and son  Need to confirm is pt will have 24/7 S upon d/c  Patient's Rehab Expectations "I want to be able to go home "   Barriers to Discharge Home Limited Family Support;Decreased Strength;Decreased Endurance;Pain; Safety Considerations;Decreased Cognitive Function   Impressions Pt is a 78 y o  female who presents to [facility] with dx of multiple closed fractures of pelvis after fall down steps at home on 2/22/21  MDs decided on non-surgical management for pt, she is WBAT to b/l LEs  Pt was on ARC unit from 2/27/21-3/4/21 before being transferred to acute care due to complaints of chest pain and right upper quadrant pain Patient was evaluated by cardiology due to abnormal EKG to r/o ACS  A CTA was negative for PE, LAMONT found normal wall motion and normal EF, RUE quadrant pain resoled, there were no acute findings on CT, liver enzymes were normal and it was felt that patient's pain was likely referred pain from pelvic fractures  Pt was seen for high complexity eval with comorbidities including Hypertension, Meningioma and Multiple Trauma s/p fall, Sjorgens, hypothyroidism, depression, ambulatory dysfunction  PTA pt reports she lives with  and son who both work full time, however, may be able to provide pt with S upon d/c, will confirm with family as pt unsure at this time  She states she occasionally utilized RW PTA due to balance issues but not all the time  She lives in multi-level home with 1+7 MICHAEL house, no HR on first step, L HR on 7 steps to first floor  Pt states once on first floor she can have FF setup sleeping on couch and utilizing commode  Pt reports her bed/bath is on second floor with FF of steps with L HR  DME includes shower chair, commode, grab bars in 2nd floor bathroom, RW, and x2 SPCs   Upon evaluation pt presents with pain in R buttock/hip/groin area (8/10 with at rest and slightly improves with mobilities), decreased LE strength, decreased ROM, decreased activity tolerance, decreased balance, all which increase her risk for falls and caregiver burden  Due to above listed deficits pt is good rehab candidate with ELOS 7-10 days  Goals to be set at S level pending that family can provide A upon d/c home  Pt currently functioning at Ebony/CGA with use of RW  POC to focus on gait and transfer trng, stair trng with single HR and possible use of cane, transfer and gait trng with use of RW, NMR, improving pt's strength and endurance to decrease her risk for falls      PT Therapy Minutes   PT Time In 0900   PT Time Out 1030   PT Total Time (minutes) 90   PT Mode of treatment - Individual (minutes) 90   PT Mode of treatment - Concurrent (minutes) 0   PT Mode of treatment - Group (minutes) 0   PT Mode of treatment - Co-treat (minutes) 0   PT Mode of Treatment - Total time(minutes) 90 minutes   PT Cumulative Minutes 90   Cumulative Minutes   Cumulative therapy minutes 90

## 2021-03-10 NOTE — TREATMENT PLAN
Individualized Plan of [address] [place] [address] KIERSTEN Viscomi 78 y o  female MRN: [id number]  Unit/Bed#: -01 Encounter: [id number]     PATIENT INFORMATION  ADMISSION DATE: 3/9/2021  2:50 PM JIMENEZ CATEGORY:Orthopedic Disorders:  08 3  Pelvic Fracture   ADMISSION DIAGNOSIS: Multiple pelvic fractures EXPECTED LOS: 7 days     MEDICAL/FUNCTIONAL PROGNOSIS  Pre-admit screens and post-admit evaluations reviewed and are consistent  Based on my assessment of the patient's medical conditions and current functional status, the prognosis for attaining medical and functional goals or the IRF stay is:  Good  Patient is appropriate for acute rehabilitation  Medical Goals:   Patient will be medically stable for discharge back to community setting upon completion or acute rehab program and patient/family will be able to manage medical conditions and comorbid conditions with medications and appropriate follow up upon completion of rehab program     Cardiopulmonary function/Anemia: Ensure cardiopulmonary stability and optimize cardiopulmonary function not only at rest but with activity as patient's activity level significantly increases in acute rehab compared with prior to transfer in preparation for safe discharge from [facility]  Must closely and frequently monitor blood pressure, HR, anemia to ensure adequate cardiac output during ADLs and ambulation as patient is at increased risk for orthostatic hypotension/syncope and potential injury if not monitored for and managed adequately  Blood pressure management:    Frequent monitoring of blood pressure with appropriate adjustments in blood pressure medication management to optimize blood pressure control and prevent/limit renal complications  Monitoring impact of blood pressure and side-effects of blood pressure medications at rest and with activity    Pain management:  Pain will improve with frequent evaluation of pain, careful adjustments in medications, frequent re-evaluation of patient's pain and medical/neurologic status to ensure optimal pain control, avoidance of potential serious and even life-threatening side-effects and drug interactions, as well as weaning pain medications as soon as possible to decrease risk of short and long-term use  Inpatient rehabilitation education/teaching: To be provided to patient and typically family/caregiver (if able to be identified) by all skilled therapists, rehab nursing, case management, and rehab specialized physician to ensure optimal recovery and decrease risks of complications in both acute rehabilitation setting as well as after discharge  Anxiety (and/or Depression): Patient's mood and it's impact on therapy participation and functional recovery will improve during course with supportive counseling, relaxation/breathing techniques and if necessary medication management  Requires frequent re-assessment and close management to ensure anxiety/depression management during acute rehab course with planning for appropriate outpatient management to ensure optimal mental health and functional recovery  Bowel dysfunction: Appropriate bowel management with appropriate toileting program from rehab nursing and staff with oversight management by rehabilitation physicians which include adjustments in medications to optimize appropriate bowel function and prevent/decrease risk of constipation and bowel obstruction  ANTICIPATED DISCHARGE DISPOSITION AND SERVICES  COMMUNITY SETTING:    Previous community setting  ANTICIPATED FOLLOW-UP SERVICE:   Outpatient Therapy PT, OT     DISCIPLINE SPECIFIC PLANS:  Required Disciplines & Services: PT, OT, Rehabilitation Physician, Rehabillitation Nursing, Case Management, Dietary    REQUIRED THERAPY (expected):   Therapy Hours per Day Days per Week Tx Days (Days in ARF)   Physical Therapy 1 5 5 7   Occupational Therapy 1 5 5 7   NOTE: Additional therapy time(s) may be added as appropriate to meet patient needs and to achieve functional goals  ANTICIPATED FUNCTIONAL OUTCOMES:  ADL: Patient will be modified independent with ADLs upon completion of rehab program   Bladder/Bowel: Patient will be modified independent with bladder/bowel management upon completion of rehab program   Transfers: Patient will be modified independent with transfers upon completion of rehab program   Locomotion: Patient will be modified independent with locomotion upon completion of rehab program     DISCHARGE PLANNING NEEDS  Equipment needs: To be determined at team conference  REHAB ANTICIPATED PARTICIPATION RESTRICTIONS:  Uncertain at this time  To be determined closer to discharge

## 2021-03-10 NOTE — ASSESSMENT & PLAN NOTE
Stable to slightly improved   Patient feels this is related to Sjogrens but movements are more consistent with EPS and likely TD in context of long-standing metoclopramide   - Comgmt with IM  - Metoclopramide discontinued prior to [facility] which is first line mgmt (holding DA blockers)   - This may be difficult to improve at this point  - Follow-up with PCP - consider pharmacologic tx such at tetrabenazine if needed in future

## 2021-03-10 NOTE — PCC OCCUPATIONAL THERAPY
Pt reports she lives in a multi level home with   At this time therapist to discuss with  if able to provide supervision at [place]  At this time pt completing fx tranfers at [facility] level, Ebony for toileting, modA LB dressing 2* to dec fx reach  Pt seen for 90mins of skilled OT services with emphasis on self-care, toileting, transfers, BUE TE  Pt presents with the following performance component deficits impacting ADL/IADL skills: pain, dec fx reach, dec cognition, dec BUE strength, dec activity tolerance  Pt to continue to benefit from continued acute rehab OT services during hospital stay to address defined deficits and to maximize level of functional independence in the following Occupational Performance areas: grooming, bathing/shower, toilet hygiene, dressing, medication management, functional mobility  Treatment approaches may include: OT eval, self-care, there ex, therapeutic activity, modalities   Pt presents with good rehab potential  Pt is unsafe to D/C home at this time, recommending ELOS 7-10days to achieve supervision level goals with least restrictive device to address these areas and resume prior occupational roles to maximize independence to reduce risk of fall and decrease risk of readmission

## 2021-03-10 NOTE — PROGRESS NOTES
03/10/21 1300   Rehab Team Goals   ADL Team Goal Patient will require supervision with ADLs with least restrictive device upon completion of rehab program   Rehab Team Interventions   OT Interventions Self Care; Therapeutic Exercise; Energy Conservation;Patient/Family Education;Group Therapy   Eating Goal   Eating Goal 06  Independent - Patient completes the activity by him/herself with no assistance from a helper  Meal Complete All meals   Status Ongoing; Target goal - one week  (7-10days)   Interventions Optimal Position   Grooming Goal   Oral Hygiene Goal 04  Supervision or touching assistance- [place] provides VERBAL CUES or supervision throughout activity  Task Complete Groom; Wash/Dry Face;Wash/Dry Hands;Brush Teeth;Comb Hair   Environment Stand at [employer]   Status Ongoing; Target goal - one week  (7-10days)   Intervention Balance Work; Therapeutic Exercise; Tolerance Work   Tub/Shower Transfer Goal   Method   (NA pt to SB at [facility])   Bathing Goal   Shower/bathe self Goal 03  Partial/moderate assistance - [place] does less than half the effort  [place] lifts or holds trunk or limbs and provides more than half the effort  Environment Seated;Standing;Sponge Bath   Adaptive Equipment Reacher;Long Handle Sponge;Seat with back   Safety Precautions Safety Precaution   Status Ongoing; Target goal - one week  (7-10days)   Intervention ADL Training;Neuromuscular Education   Upper Body Dressing Goal   Upper body dressing Goal 04  Supervision or touching assistance- [place] provides VERBAL CUES or supervision throughout activity  Task Upper Body;Arms in/out; Over Head   Environment Seated   Safety Precautions Safety Precaution   Status Ongoing; Target goal - one week  (7-10days)   Intervention Balance Work; Therapeutic Exercise; Tolerance Work   Lower Body Dressing Goal   Lower body dressing Goal 04   Supervision or touching assistance- [place] provides VERBAL CUES or supervision throughout activity  Putting on/taking off footwear Goal 04  Supervision or touching assistance- [place] provides VERBAL CUES or supervision throughout activity  Task Lower Body;Shoe/Slipper;Socks;Pants; Undergarment   Adaptive Equipment Reacher;Sock Aide; Shoe [place];Dressing Stick   Environment Seated;Standing   Safety Precautions Safety Precaution;WBAT   Status Ongoing; Target goal - one week  (7-10days)   Intervention Balance Work; Therapeutic Exercise; Tolerance Work   Toileting Transfer Goal   Toilet transfer Goal 04  Supervision or touching assistance- [place] provides VERBAL CUES or supervision throughout activity  Assistive Device InfoReach; Bedside Commode   Safety Safety Precaution   Status Ongoing; Target goal - one week  (7-10days)   Intervention Balance Work;ADL Training   Toileting Goal   Toileting hygiene Goal 04  Supervision or touching assistance- [place] provides VERBAL CUES or supervision throughout activity  Task Pants Up;Pants Down;Hygiene   Safety Balance;Use a Bedside Commode at Night;Use a Bedside Commode during day   Status Ongoing; Target goal - one week  (7-10days)   Intervention Balance Work;Assistive Device; ADL Training   Comprehension Goal   Comprehension Assist Level Moderate [place]   Assist Device Glasses   Function Demand Basic Needs   Status Ongoing; Target goal - one week  (7-10days)   Interventions Use of Gesture   Expression Goal   Expression Assist Level Independant   Assist Device Gestures no;Gestures yes   Function Demand Basic Needs   Status Ongoing; Target goal - one week  (7-10days)   Intervention Writing Tasks   Meal Prep and Kitchen Mobility   Assist Level   ( to complete)   Medication Management   Assist Level Supervision   Status Ongoing; Target goal - one week  (7-10days)   Laundry   Assist Level   ( to complete)

## 2021-03-10 NOTE — PROGRESS NOTES
[facility]  Progress Note Maverick Perez Viscomi 1955, 78 y o  female MRN: [id number]  Unit/Bed#: [facility] 265-01 Encounter: [id number]  Primary Care Provider: Bertram Rodríguez MD   Date and time admitted to hospital: 3/9/2021  2:50 PM    Onychomycosis  Assessment & Plan  Podiatry was consulted on 3/9  Nails trimmed on 3/10  Continue to follow-up with Podiatry as outpatient  Abnormal ECG  Assessment & Plan  EKG completed on 3/4 that showed possible T-wave inversion on V2 lead  Sent to acute care setting for formal ACS workup  Following EKGs and troponins were negative  Cardiology was consulted - ECHO completed  Showed no motion wall abnormalities  EF was 64%  No plans for further cardiac workup at this time  CTA showed no PE  Showed small R pleural effusion and basilar atelectasis - given a 1 time dose of IV 40mg lasix  Hypokalemia  Assessment & Plan  K+ 3 1 this morning  Given 40 mEq oral potassium chloride this morning and will receive another 40mEq in the evening  Repeat BMP tomorrow morning  Anemia  Assessment & Plan  Stable  Current Hgb 10 5, Hct 32 1  Baseline Hgb appears to be 9-10  Takes folic acid 1mg daily  Gastroparesis  Assessment & Plan  Formerly on Reglan - stopped in the acute setting due to concerns for extrapyramidal symptoms  Monitor for s/s of bowel obstruction  Continue bowel regimen  Hypothyroidism  Assessment & Plan  Currently on levothyroxine 88mcg daily at home (States that she was increased 1 month ago from 75mcg)  Last TSH level unknown  Continue to follow-up with outpatient PCP  Depression  Assessment & Plan  Stable  Continue Prozac 20mg daily and Xanax PRN for anxiety  Supportive counseling as needed  Hypertension  Assessment & Plan  Home regimen: Norvasc 2 5mg daily and atenolol 50mg BID  Episode of hypertensive urgency while in acute setting      Monitor routine VS   Continue atenolol 50mg BID    Increased Norvasc to 5mg daily on 3/  Sjogren's disease ([facility] [address] )  Assessment & Plan  Takes methotrexate 20mg weekly  Follows up with Dr Callie Woodward (Rheumatology) as outpatient  Parotid gland enlargement  Assessment & Plan  Stable  Yearly follow-up with Dr Cierra Dillard (ENT) as outpatient  * Multiple closed fractures of pelvis [facility])  Assessment & Plan   - Mechanical fall down the stairs predominately on the R side  Multiple pelvic fractures: Acute nondisplaced fracture of the right inferior pubic ramus and right pubic symphysis, anterior right acetabular fracture and probable nondisplaced left anterior acetabular fracture  Ortho consulted - no surgical intervention at this time      Monitor for s/s pelvic bleeding  Pain management  WBAT  DVT Prophylaxis - Lovenox  Follow-up with Dr Laura Capone (Orthopedics) office in 6 weeks      PT/OT Therapies  Primary team following  VTE Pharmacologic Prophylaxis:   Pharmacologic: Enoxaparin (Lovenox)  Mechanical VTE Prophylaxis in Place: Yes - sequential compression devices  Current Length of Stay: 1 day(s)    Current Patient Status: Inpatient Rehab     Discharge Plan: As per primary team     Code Status: Level 1 - Full Code    Subjective:   Pt examined while sitting in WC in pt room  Complaints of 4/10 pain to B/L groins, more so on the R, that radiates to the buttocks, aching/throbbing, improves with rest and ice  Denies any right rib pain  States that she is happy to be back at [facility], feels that she is sleeping better already  Therapy has gone well so far  Potassium was low this morning - denies any chest pain or palpitations  States that she hates the potassium pills but understands they are necessary and states that she used to take them daily at home before  Had a large BM this morning with no issues      Objective:     Vitals:   Temp (24hrs), Av 1 °F (36 7 °C), Min:97 6 °F (36 4 °C), Max:98 7 °F (37 1 °C)    Temp:  [97 6 °F (36 4 °C)-98 7 °F (37 1 °C)] 98 °F (36 7 °C)  HR:  [58-71] 71  Resp:  [18-20] 18  BP: (145-154)/(67-83) 145/83  SpO2:  [92 %-97 %] 92 %  Body mass index is 27 19 kg/m²  Review of Systems   Constitutional: Negative for chills, fatigue and fever  Respiratory: Negative for cough and shortness of breath  Cardiovascular: Negative for chest pain, palpitations and leg swelling  Gastrointestinal: Negative for abdominal distention, abdominal pain, constipation, diarrhea, nausea and vomiting  LBM 3/10   Genitourinary: Negative for difficulty urinating  Musculoskeletal: Positive for arthralgias (B/L groin pain, more on R, rates 4/10, aching/throbbing, improves with rest and ice)  Neurological: Negative for dizziness, weakness and light-headedness  Psychiatric/Behavioral: Negative for sleep disturbance  Input and Output Summary (last 24 hours): Intake/Output Summary (Last 24 hours) at 3/10/2021 0975  Last data filed at 3/10/2021 0647  Gross per 24 hour   Intake 180 ml   Output 900 ml   Net -720 ml       Physical Exam:     Physical Exam  Vitals signs and nursing note reviewed  Constitutional:       Appearance: Normal appearance  HENT:      Head: Normocephalic and atraumatic  Cardiovascular:      Rate and Rhythm: Normal rate and regular rhythm  Pulses: Normal pulses  Heart sounds: Normal heart sounds  No murmur  No friction rub  Pulmonary:      Effort: Pulmonary effort is normal  No respiratory distress  Breath sounds: Normal breath sounds  No wheezing or rhonchi  Abdominal:      General: Abdomen is flat  Bowel sounds are normal  There is no distension  Palpations: Abdomen is soft  Tenderness: There is no abdominal tenderness  Musculoskeletal:      Right lower leg: No edema  Left lower leg: No edema  Skin:     General: Skin is warm and dry  Neurological:      Mental Status: She is alert and oriented to person, place, and time  Comments: Slurred speech, oral dyskinesia     Psychiatric: Mood and Affect: Mood normal          Behavior: Behavior normal          Additional Data:     Labs:    Results from last 7 days   Lab Units 03/10/21  0509   WBC Thousand/uL 5 10   HEMOGLOBIN g/dL 10 5*   HEMATOCRIT % 32 1*   PLATELETS Thousands/uL 326   NEUTROS PCT % 80*   LYMPHS PCT % 8*   MONOS PCT % 10   EOS PCT % 2     Results from last 7 days   Lab Units 03/10/21  0509 03/05/21  0510   SODIUM mmol/L 135* 135*   POTASSIUM mmol/L 3 1* 4 3   CHLORIDE mmol/L 100 100   CO2 mmol/L 26 28   BUN mg/dL 13 13   CREATININE mg/dL 0 63 0 66   ANION GAP mmol/L 9 7   CALCIUM mg/dL 9 1 9 0   ALBUMIN g/dL  --  3 9   TOTAL BILIRUBIN mg/dL  --  0 80   ALK PHOS U/L  --  169*   ALT U/L  --  37   AST U/L  --  64*   GLUCOSE RANDOM mg/dL 95 96                       Labs reviewed    Imaging:    Imaging reviewed    Recent Cultures (last 7 days):           Last 24 Hours Medication List:   Current Facility-Administered Medications   Medication Dose Route Frequency Provider Last Rate    acetaminophen  650 mg Oral [facility] Rocio Roy MD      ALPRAZolam  0 25 mg Oral TID PRN Rocio Roy MD      amLODIPine  5 mg Oral Daily Rocio Roy MD      atenolol  50 mg Oral BID Rocio Roy MD      atorvastatin  10 mg Oral Daily Rocio Roy MD      bisacodyl  10 mg Rectal Daily PRN Rocio Roy MD      Diclofenac Sodium  2 g Topical 4x Daily Rocio Roy MD      enoxaparin  30 mg Subcutaneous Q12H [address] Rocio Roy MD      FLUoxetine  20 mg Oral Daily Rocio Roy MD      folic acid  1 mg Oral Daily Rocio Roy MD      gabapentin  100 mg Oral HS Rocio Roy MD      lactulose  20 g Oral BID PRN Rocio Roy MD      levothyroxine  88 mcg Oral Early Morning Rocio Roy MD      lidocaine  2 patch Topical HS Rocio Roy MD      methocarbamol  500 mg Oral Q6H PRN Rocio Roy MD      methotrexate  20 mg Oral Weekly Rocio Roy MD      CHRISTIE ANTIFUNGAL  1 application Topical BID Rocio Roy MD  nitroglycerin  0 4 mg Sublingual Q5 Min PRN Edis Lowery MD      nystatin  1 application Topical BID Edis Lowery MD      oxyCODONE  5 mg Oral Q4H PRN Edis Lowery MD      senna  1 tablet Oral BID Edis Lowery MD          M*Modal software was used to dictate this note  It may contain errors with dictating incorrect words or incorrect spelling  Please contact the provider directly with any questions

## 2021-03-10 NOTE — ASSESSMENT & PLAN NOTE
Internal medicine consulted and management at their discretion  Current meds: Atenolol, Novasc, Losartan  - Recent adjustments per our IM team  - Follow-up with PCP

## 2021-03-10 NOTE — ASSESSMENT & PLAN NOTE
· Traumatic fall on stairs 2/22:  · Resultant right inferior pubic rami fracture, right pubic symphysis fracture, right anterior acetabular fracture, left anterior acetabular fracture  · Evaluated by Orthopedics - treat conservatively  · WBAT BLE  · DVT ppx - lovenox - 1 additional week  · R hip/pelvis x-ray 3/4 stable fractures Subtle right pelvic fractures as noted above   These correspond approximately to the CT findings   No new or displaced pelvic fractures are demonstrated  · Patient declined follow-up imaging to monitor recent fractures during ARC course > will follow-up with OP ortho - pt counseled to contact ortho if pain worsens or function declines in interim  Completed acute comprehensive interdisciplinary inpatient rehabilitation include intensive skilled therapies (PT, OT) as previously outlined with oversight and management by rehabilitation physician, inpatient rehab level nursing, case management and weekly interdisciplinary team meetings     Follow-up with Trauma/Orthopedics as outpatient - Dr Mary Carney - around week of April 5th

## 2021-03-10 NOTE — ASSESSMENT & PLAN NOTE
Discussed with pt/fam  Incidental finding on recent CT  - thickening of the distal rectal wall "correlate for proctitis "  - Follow-up with PCP

## 2021-03-10 NOTE — PROGRESS NOTES
03/10/21 1300   Patient Data   Rehab Impairment  Impairment of mobility, safety and Activities of Daily Living (ADLs) due to Orthopedic Disorders:  08 3  Pelvic Fracture   Etiologic Diagnosis pelvic fx   Date of Onset 02/22/21   Support System   Name Jodi Lasso   Relationship spouse   Able to provide 24 hour supervision   (TBD between  and son)   Able to provide physical help? Yes   Home Setup   Type of Home Multi Level   Method of Entry Stairs;Hand Rail Left   Number of Stairs 8  (1+7)   Number of Stairs in Home   (FF to 2nd flr)   In Home Hand Rail Left   First Floor Bathroom Half;Shower; Door   Second [employer]; Shower chair   First Floor Setup Available Yes   Home Modifications Necessary?   (pending progress)   Home Modification Comment pending progress   Available Equipment Roller Walker;Single Point Cane;Bedside Commode; Shower Chair   Prior IADL Participation   Money Management   ( completed)   Meal Preparation Partial Participation;Microwave  ( completes)   Laundry   ( completes)   Home Cleaning   ( completes)   Prior Level of Function   Self-Care 3  Independent - Patient completed the activities by him/herself, with or without an assistive device, with no assistance from a helper  Indoor-Mobility (Ambulation) 3  Independent - Patient completed the activities by him/herself, with or without an assistive device, with no assistance from a helper  Stairs 3  Independent - Patient completed the activities by him/herself, with or without an assistive device, with no assistance from a helper  Functional Cognition 3  Independent - Patient completed the activities by him/herself, with or without an assistive device, with no assistance from a helper  Prior Assistance Needed for Driving;Household Chores/Cleaning;Meal Preparation; Shopping;Money Management  (med mngmnt with 4x/day)   Prior Device Used OBDULIO [place] in the Last Year   Number of falls in the past 12 months 2   Type of Injury Associated with Fall Major injury   Patient Preference   Nickname (Patient Preference) Sandhya   Psychosocial   Psychosocial (WDL) WDL   Patient Behaviors/Mood Calm; Cooperative   Restrictions/Precautions   Precautions Cognitive; Fall Risk;Pain   Weight Bearing Restrictions Yes   RLE Weight Bearing Per Order WBAT   LLE Weight Bearing Per Order WBAT   ROM Restrictions No   Pain Assessment   Pain Assessment Tool 0-10   Pain Score 8  (meds provided by RN during session)   Pain Location/Orientation Orientation: Right;Location: Hip;Location: Buttocks  (pelvic)   Pain Onset/Description Onset: Ongoing   Effect of Pain on Daily Activities impacts fx performance   Patient's Stated Pain Goal No pain   Eating Assessment   Type of Assistance Needed Set-up / clean-up   Amount of Physical Assistance Provided No physical assistance   Eating CARE Score 5   Oral Hygiene   Type of Assistance Needed Supervision   Amount of Physical Assistance Provided No physical assistance   Comment compelted seated pt only agreeing to mouth wash   Oral Hygiene CARE Score 4   Grooming   Able To Wash/Dry Face;Brush/Clean Teeth;Wash/Dry Hands   Limitation Noted In Safety;Strength;Timeliness   Findings CGA for standing   Tub/Shower Transfer   Limitations Noted In Balance;ROM;UE Strength;LE Strength; Safety   Adaptive Equipment Grab Bars;Seat with Back   Assessed Shower   Findings CGA/Ebony to perform side stepping  pt to SB at dc   Shower/Bathe Self   Type of Assistance Needed Physical assistance   Amount of Physical Assistance Provided 25%-49%   Comment seated on shower chair pt able to wash UB, upper thighs and lorena (CGA for stance)   Pt req A to wash BLE and buttock area   Shower/Bathe Self CARE Score 3   Bathing   Assessed Bath Style Shower   Anticipated D/C Bath Style Sponge Bath   Able to Gather/Transport Yes   Able to [employer] Temperature Yes   Able to Wash/Rinse/Dry (body part) Left Arm;Right Arm;L Upper Leg;R Upper Leg;Chest;Abdomen;Perineal Area   Limitations Noted in Balance; Endurance;ROM;Strength;Timeliness; Safety   Positioning Seated;Standing   Adaptive Equipment Shower Bars; Shower Seat;[employer]   Remove UB Clothes Other  (hospital gown)   Don UB Clothes Pullover Shirt   Type of Assistance Needed Supervision   Amount of Physical Assistance Provided No physical assistance   Comment seated   Upper Body Dressing CARE Score 4   Remove LB Clothes Undergarment;Pants;Socks   Don LB Clothes Other;Pants;Socks  (pullups)   Type of Assistance Needed Physical assistance   Amount of Physical Assistance Provided 50%-74%   Comment pt able to don LLE, however promise TA to don RLE 2* to pain, CGA for standing for CM   Lower Body Dressing CARE Score 2   Limitations Noted In Balance; Endurance;Strength;ROM; Timeliness   Positioning Supported Sit;Standing   Putting On/Taking Off Footwear   Type of Assistance Needed Physical assistance   Amount of Physical Assistance Provided Total assistance   Comment TA to doff/don socks   Putting On/Taking Off Footwear CARE Score 1   Toileting Hygiene   Type of Assistance Needed Physical assistance   Amount of Physical Assistance Provided Less than 25%   Comment A to wash buttock area, can perform CM at CGA level in stance   Dion Martii 83 Score 3   Toilet Transfer   Surface Assessed Standard Commode   Transfer Technique Standard   Limitations Noted In Balance; Endurance;UE Strength;LE Strength   Adaptive Equipment Walker   Positioning Concerns LE Support; Safety;Cognition   Type of Assistance Needed Incidental touching; Adaptive equipment   Amount of Physical Assistance Provided No physical assistance   Comment CGA with RW   Toilet Transfer CARE Score 4   Toileting   Able to Pull Clothing down yes, up yes     Able to Manage Clothing Closures Yes   Manage Hygiene Bladder   Limitations Noted In Balance;ROM;UE Strength;LE Strength   Adaptive Equipment [facility] Transfer Bed/Chair/Wheelchair   Positioning Concerns Skin Integrity   Limitations Noted In Balance; Endurance;UE Strength;LE Strength   Adaptive Equipment Roller Walker   Type of Assistance Needed Physical assistance   Amount of Physical Assistance Provided Less than 25%   Comment Ebony/CGA SPT with RW   Chair/Bed-to-Chair Transfer CARE Score 3   Sit to Stand   Type of Assistance Needed Incidental touching   Amount of Physical Assistance Provided No physical assistance   Comment CGA with RW   Sit to Stand CARE Score 4   Walk 10 Feet   Type of Assistance Needed Incidental touching; Adaptive equipment   Amount of Physical Assistance Provided No physical assistance   Comment CGA with RW for short fx mobility from bedroom>bathroom   Walk 10 Feet CARE Score 4   Comprehension   Assist Devices Glasses   Auditory Basic   Visual Basic   QI: Comprehension 3  Usually Understands: Understands most conversations, but misses some part/intent of message  Requires cues at times to understand  Comprehension (FIM) 5 - Understands basic directions and conversation   Expression   Verbal Complex   Non-Verbal Complex   Intelligibility Sentence   QI: Expression 3  Exhibits some difficulty with expressing needs and ideas (e g , some words or finishing thoughts) or speech is not clear   Expression (FIM) 6 - Expresses complex/abstract but requires:  more time   RUE Assessment   RUE Assessment X  (4/5, discomfort with supination)   LUE Assessment   LUE Assessment X  (4/5)   Coordination   Movements are Fluid and Coordinated 0   Coordination and Movement Description antalgic gait pattern   Sensation   Light Touch No apparent deficits   Cognition   Overall Cognitive Status Impaired   Arousal/Participation Alert; Cooperative   Attention Attends with cues to redirect   Orientation Level Oriented X4   Memory Decreased short term memory   Following Commands Follows multistep commands with increased time or repetition   Therapeutic Exercise Therapeutic Exercise/Activity engaged pt in [employer] with 2# (LUE) 1# (RUE) 2x15 to inc fx sreength for improved sit<>stand and SPT   Discharge Information   Vocational Plan Retired/not working   Barriers to Return to [employer]; Endurance  (pain)   Patient's Discharge Plan "return home with "   Patient's Rehab Expectations Pt to achieve supervision level goals by the end of 7-10days  Will need to confrim if pt will have 24/7 at time of DC   Barriers to Discharge Home Decreased Strength;Decreased Endurance;Limited Family Support  (pt rpeorts /son are "talking" about supervision between himself and son)   Impressions Pt is a 78 y o  female who [facility] with complaints of chest pain and right upper quadrant pain   Patient with past medical history of Sjogren's disease, depression, hypothyroidism, hypertension, meningioma status post resection in 2012, gastroparesis, oral dyskinesia is status post fall on 02/22/2021 with multiple pelvic fractures   She has been at the acute rehab unit and developed chest pain and right upper quadrant pain   Patient was transferred from \[facility]\"" for ACS rule out   She was evaluated by the hospitalist service there and there was concern of possible flipped T-wave in V2 and was recommended for transfer with telemetry  Patient was evaluated by cardiology due to abnormal EKG to r/o ACS  A CTA was negative for PE, LAMONT found normal wall motion and normal EF, RUE quadrant pain resoled, there were no acute findings on CT, liver enzymes were normal and it was felt that patient's pain was likely referred pain from pelvic fractures  Pt reports she lives in a multi level home with   At this time therapist to discuss with  if able to provide supervision at dc  At this time pt completing fx tranfers at [facility] level, Ebony for toileting, modA LB dressing 2* to dec fx reach   Pt seen for 90mins of skilled OT services with emphasis on self-care, toileting, transfers, BUE TE  Pt presents with the following performance component deficits impacting ADL/IADL skills: pain, dec fx reach, dec BUE strength, dec activity tolerance  Pt to continue to benefit from continued acute rehab OT services during hospital stay to address defined deficits and to maximize level of functional independence in the following Occupational Performance areas: grooming, bathing/shower, toilet hygiene, dressing, medication management, functional mobility  Treatment approaches may include: OT eval, self-care, there ex, therapeutic activity, modalities  Pt presents with good rehab potential  Pt is unsafe to D/C home at this time, recommending ELOS 7-10days to achieve supervision level goals with least restrictive device to address these areas and resume prior occupational roles to maximize independence to reduce risk of fall and decrease risk of readmission          OT Therapy Minutes   OT Time In 1300   OT Time Out 1430   OT Total Time (minutes) 90   OT Mode of treatment - Individual (minutes) 90   OT Mode of treatment - Concurrent (minutes) 0   OT Mode of treatment - Group (minutes) 0   OT Mode of treatment - Co-treat (minutes) 0   OT Mode of Treatment - Total time(minutes) 90 minutes   OT Cumulative Minutes 90   Cumulative Minutes   Cumulative therapy minutes 90

## 2021-03-10 NOTE — ASSESSMENT & PLAN NOTE
Discussed with pt/fam  Incidental finding on recent CT  " stable distal paraesophageal and gastrohepatic ligament adenopathy of uncertain etiology"  - Follow-up with PCP

## 2021-03-10 NOTE — ASSESSMENT & PLAN NOTE
Atypical - improved  ACS ruled out; CTA no PE, Cards TTE normal EF without RWMA; no stress test needed at this time  No absolute indication for aspirin at this time; but cards mentioned considering it if no contraindications  -[facility] IM team feels may have interaction with methotrexate and patient would prefer to remain off; remain off aspirin for now  -Discussed with pt that she should discuss possibly starting baby aspirin as an OP with PCP in follow-up

## 2021-03-10 NOTE — ASSESSMENT & PLAN NOTE
Stable - slowly improving overall   Discharge on:  APAP TID PRN   Oxy 5mg TID PRN   [place] PDMP website checked and no concerning Rx's or Rx patterns noted    Discussed risks of falls and injuries on opiates

## 2021-03-10 NOTE — ASSESSMENT & PLAN NOTE
Per IM  "EKG completed on 3/4 that showed possible T-wave inversion on V2 lead  Sent to acute care setting for formal ACS workup      Following EKGs and troponins were negative  Cardiology was consulted - ECHO completed  Showed no motion wall abnormalities  EF was 64%  No plans for further cardiac workup at this time  CTA showed no PE    Showed small R pleural effusion and basilar atelectasis - given a 1 time dose of IV 40mg lasix "

## 2021-03-10 NOTE — PCC PHYSICAL THERAPY
3/10/21  Upon evaluation pt presents with pain in R buttock/hip/groin area (8/10 with at rest and slightly improves with mobilities), decreased LE strength, decreased ROM, decreased activity tolerance, decreased balance, all which increase her risk for falls and caregiver burden  Due to above listed deficits pt is good rehab candidate with ELOS 7-10 days  Goals to be set at S level pending that family can provide upon d/c home

## 2021-03-11 PROBLEM — E87.1 HYPONATREMIA: Status: ACTIVE | Noted: 2021-03-11

## 2021-03-11 LAB
ANION GAP SERPL CALCULATED.3IONS-SCNC: 10 MMOL/L (ref 5–14)
BUN SERPL-MCNC: 14 MG/DL (ref 5–25)
CALCIUM SERPL-MCNC: 9.1 MG/DL (ref 8.4–10.2)
CHLORIDE SERPL-SCNC: 102 MMOL/L (ref 97–108)
CO2 SERPL-SCNC: 21 MMOL/L (ref 22–30)
CREAT SERPL-MCNC: 0.58 MG/DL (ref 0.6–1.2)
GFR SERPL CREATININE-BSD FRML MDRD: 96 ML/MIN/1.73SQ M
GLUCOSE P FAST SERPL-MCNC: 86 MG/DL (ref 70–99)
GLUCOSE SERPL-MCNC: 86 MG/DL (ref 70–99)
POTASSIUM SERPL-SCNC: 4.8 MMOL/L (ref 3.6–5)
SODIUM SERPL-SCNC: 133 MMOL/L (ref 137–147)

## 2021-03-11 PROCEDURE — 97530 THERAPEUTIC ACTIVITIES: CPT

## 2021-03-11 PROCEDURE — 99232 SBSQ HOSP IP/OBS MODERATE 35: CPT | Performed by: NURSE PRACTITIONER

## 2021-03-11 PROCEDURE — 97110 THERAPEUTIC EXERCISES: CPT

## 2021-03-11 PROCEDURE — 80048 BASIC METABOLIC PNL TOTAL CA: CPT | Performed by: NURSE PRACTITIONER

## 2021-03-11 PROCEDURE — 97535 SELF CARE MNGMENT TRAINING: CPT

## 2021-03-11 PROCEDURE — 97116 GAIT TRAINING THERAPY: CPT

## 2021-03-11 PROCEDURE — 99233 SBSQ HOSP IP/OBS HIGH 50: CPT

## 2021-03-11 RX ADMIN — SENNOSIDES 8.6 MG: 8.6 TABLET ORAL at 09:36

## 2021-03-11 RX ADMIN — ACETAMINOPHEN 650 MG: 325 TABLET ORAL at 13:39

## 2021-03-11 RX ADMIN — NYSTATIN 1 APPLICATION: 100000 POWDER TOPICAL at 17:14

## 2021-03-11 RX ADMIN — OXYCODONE HYDROCHLORIDE 5 MG: 5 TABLET ORAL at 13:39

## 2021-03-11 RX ADMIN — ACETAMINOPHEN 650 MG: 325 TABLET ORAL at 21:00

## 2021-03-11 RX ADMIN — ATORVASTATIN CALCIUM 10 MG: 10 TABLET, FILM COATED ORAL at 09:36

## 2021-03-11 RX ADMIN — OXYCODONE HYDROCHLORIDE 5 MG: 5 TABLET ORAL at 21:05

## 2021-03-11 RX ADMIN — OXYCODONE HYDROCHLORIDE 5 MG: 5 TABLET ORAL at 06:28

## 2021-03-11 RX ADMIN — ENOXAPARIN SODIUM 30 MG: 30 INJECTION SUBCUTANEOUS at 21:01

## 2021-03-11 RX ADMIN — NYSTATIN 1 APPLICATION: 100000 POWDER TOPICAL at 09:00

## 2021-03-11 RX ADMIN — ALPRAZOLAM 0.25 MG: 0.25 TABLET ORAL at 21:05

## 2021-03-11 RX ADMIN — MICONAZOLE NITRATE 1 APPLICATION: 20 CREAM TOPICAL at 17:14

## 2021-03-11 RX ADMIN — ACETAMINOPHEN 650 MG: 325 TABLET ORAL at 06:28

## 2021-03-11 RX ADMIN — FLUOXETINE 20 MG: 20 CAPSULE ORAL at 09:36

## 2021-03-11 RX ADMIN — AMLODIPINE BESYLATE 5 MG: 5 TABLET ORAL at 09:36

## 2021-03-11 RX ADMIN — ATENOLOL 50 MG: 50 TABLET ORAL at 09:36

## 2021-03-11 RX ADMIN — ATENOLOL 50 MG: 50 TABLET ORAL at 21:01

## 2021-03-11 RX ADMIN — GABAPENTIN 100 MG: 100 CAPSULE ORAL at 21:01

## 2021-03-11 RX ADMIN — LEVOTHYROXINE SODIUM 88 MCG: 0.09 TABLET ORAL at 06:28

## 2021-03-11 RX ADMIN — ENOXAPARIN SODIUM 30 MG: 30 INJECTION SUBCUTANEOUS at 09:36

## 2021-03-11 RX ADMIN — LIDOCAINE 2 PATCH: 50 PATCH CUTANEOUS at 21:02

## 2021-03-11 RX ADMIN — SENNOSIDES 8.6 MG: 8.6 TABLET ORAL at 17:13

## 2021-03-11 RX ADMIN — FOLIC ACID 1 MG: 1 TABLET ORAL at 09:36

## 2021-03-11 NOTE — ASSESSMENT & PLAN NOTE
K+ 3 1 on 3/10  Given 40 mEq oral potassium chloride this morning and will receive another 40mEq in the evening  Repeat K+ today is 4 8  Previously on potassium supplements at home for a period of time  May need to consider daily supplementation  Trend with routine BMP

## 2021-03-11 NOTE — PLAN OF CARE
Problem: Prexisting or High Potential for Compromised Skin Integrity  Goal: Skin integrity is maintained or improved  Description: INTERVENTIONS:  - Identify patients at risk for skin breakdown  - Assess and monitor skin integrity  - Assess and monitor nutrition and hydration status  - Monitor labs   - Assess for incontinence   - Turn and reposition patient  - Assist with mobility/ambulation  - Relieve pressure over bony prominences  - Avoid friction and shearing  - Provide appropriate hygiene as needed including keeping skin clean and dry  - Evaluate need for skin moisturizer/barrier cream  - Collaborate with interdisciplinary team   - Patient/family teaching  - Consider wound care consult   Outcome: Progressing     Problem: Potential for Falls  Goal: Patient will remain free of falls  Description: INTERVENTIONS:  - Assess patient frequently for physical needs  -  Identify cognitive and physical deficits and behaviors that affect risk of falls  -  [place] fall precautions as indicated by assessment   - Educate patient/family on patient safety including physical limitations  - Instruct patient to call for assistance with activity based on assessment  - Modify environment to reduce risk of injury  - Consider OT/PT consult to assist with strengthening/mobility  Outcome: Progressing     Problem: Nutrition/Hydration-ADULT  Goal: Nutrient/Hydration intake appropriate for improving, restoring or maintaining nutritional needs  Description: Monitor and assess patient's nutrition/hydration status for malnutrition  Collaborate with interdisciplinary team and initiate plan and interventions as ordered  Monitor patient's weight and dietary intake as ordered or per policy  Utilize nutrition screening tool and intervene as necessary  Determine patient's food preferences and provide high-protein, high-caloric foods as appropriate       INTERVENTIONS:  - Monitor oral intake, urinary output, labs, and treatment plans  - Assess nutrition and hydration status and recommend course of action  - Evaluate amount of meals eaten  - Assist patient with eating if necessary   - Allow adequate time for meals  - Recommend/ encourage appropriate diets, oral nutritional supplements, and vitamin/mineral supplements  - Order, calculate, and assess calorie counts as needed  - Recommend, monitor, and adjust tube feedings and TPN/PPN based on assessed needs  - Assess need for intravenous fluids  - Provide specific nutrition/hydration education as appropriate  - Include patient/family/caregiver in decisions related to nutrition  Outcome: Progressing     Problem: PAIN - ADULT  Goal: Verbalizes/displays adequate comfort level or baseline comfort level  Description: Interventions:  - Encourage patient to monitor pain and request assistance  - Assess pain using appropriate pain scale  - Administer analgesics based on type and severity of pain and evaluate response  - Implement non-pharmacological measures as appropriate and evaluate response  - Consider cultural and social influences on pain and pain management  - Notify physician/advanced practitioner if interventions unsuccessful or patient reports new pain  Outcome: Progressing     Problem: SAFETY ADULT  Goal: Patient will remain free of falls  Description: INTERVENTIONS:  - Assess patient frequently for physical needs  -  Identify cognitive and physical deficits and behaviors that affect risk of falls    -  [place] fall precautions as indicated by assessment   - Educate patient/family on patient safety including physical limitations  - Instruct patient to call for assistance with activity based on assessment  - Modify environment to reduce risk of injury  - Consider OT/PT consult to assist with strengthening/mobility  Outcome: Progressing     Problem: DISCHARGE PLANNING  Goal: Discharge to home or other facility with appropriate resources  Description: INTERVENTIONS:  - Identify barriers to discharge w/patient and caregiver  - Arrange for needed discharge resources and transportation as appropriate  - Identify discharge learning needs (meds, wound care, etc )  - Arrange for interpretive services to assist at discharge as needed  - Refer to Case Management Department for coordinating discharge planning if the patient needs post-hospital services based on physician/advanced practitioner order or complex needs related to functional status, cognitive ability, or social support system  Outcome: Progressing     Problem: PAIN - ADULT  Goal: Verbalizes/displays adequate comfort level or baseline comfort level  Description: Interventions:  - Encourage patient to monitor pain and request assistance  - Assess pain using appropriate pain scale  - Administer analgesics based on type and severity of pain and evaluate response  - Implement non-pharmacological measures as appropriate and evaluate response  - Consider cultural and social influences on pain and pain management  - Notify physician/advanced practitioner if interventions unsuccessful or patient reports new pain  Outcome: Progressing     Problem: Nutrition/Hydration-ADULT  Goal: Nutrient/Hydration intake appropriate for improving, restoring or maintaining nutritional needs  Description: Monitor and assess patient's nutrition/hydration status for malnutrition  Collaborate with interdisciplinary team and initiate plan and interventions as ordered  Monitor patient's weight and dietary intake as ordered or per policy  Utilize nutrition screening tool and intervene as necessary  Determine patient's food preferences and provide high-protein, high-caloric foods as appropriate       INTERVENTIONS:  - Monitor oral intake, urinary output, labs, and treatment plans  - Assess nutrition and hydration status and recommend course of action  - Evaluate amount of meals eaten  - Assist patient with eating if necessary   - Allow adequate time for meals  - Recommend/ encourage appropriate diets, oral nutritional supplements, and vitamin/mineral supplements  - Order, calculate, and assess calorie counts as needed  - Recommend, monitor, and adjust tube feedings and TPN/PPN based on assessed needs  - Assess need for intravenous fluids  - Provide specific nutrition/hydration education as appropriate  - Include patient/family/caregiver in decisions related to nutrition  Outcome: Progressing     Problem: Prexisting or High Potential for Compromised Skin Integrity  Goal: Skin integrity is maintained or improved  Description: INTERVENTIONS:  - Identify patients at risk for skin breakdown  - Assess and monitor skin integrity  - Assess and monitor nutrition and hydration status  - Monitor labs   - Assess for incontinence   - Turn and reposition patient  - Assist with mobility/ambulation  - Relieve pressure over bony prominences  - Avoid friction and shearing  - Provide appropriate hygiene as needed including keeping skin clean and dry  - Evaluate need for skin moisturizer/barrier cream  - Collaborate with interdisciplinary team   - Patient/family teaching  - Consider wound care consult   Outcome: Progressing     Problem: Potential for Falls  Goal: Patient will remain free of falls  Description: INTERVENTIONS:  - Assess patient frequently for physical needs  -  Identify cognitive and physical deficits and behaviors that affect risk of falls    -  [place] fall precautions as indicated by assessment   - Educate patient/family on patient safety including physical limitations  - Instruct patient to call for assistance with activity based on assessment  - Modify environment to reduce risk of injury  - Consider OT/PT consult to assist with strengthening/mobility  Outcome: Progressing

## 2021-03-11 NOTE — PROGRESS NOTES
03/11/21 0845   Pain Assessment   Pain Assessment Tool 0-10   Pain Score 7   Pain Location/Orientation Orientation: Right;Location: Hip   Pain Radiating Towards groin and buttocks   Pain Onset/Description Onset: Ongoing   Effect of Pain on Daily Activities impacts fx performance   Patient's Stated Pain Goal No pain   Hospital Pain Intervention(s) Repositioned; Rest   Multiple Pain Sites No   Restrictions/Precautions   Precautions Cardiac/sternal;Fall Risk;Pain   Weight Bearing Restrictions Yes   RLE Weight Bearing Per Order WBAT   LLE Weight Bearing Per Order WBAT   ROM Restrictions No   Lower Body Dressing   Type of Assistance Needed Supervision;Verbal cues; Adaptive equipment   Amount of Physical Assistance Provided No physical assistance   Comment therapist introduced LHAE to don pants  Therapist provided vc/visuald emosntration to inc independence  Therapist educated pt on don RLE first, pt with F carryover  With inc time pt ableto don pants with LHR at S in stance for CM   Lower Body Dressing CARE Score 4   Putting On/Taking Off Footwear   Type of Assistance Needed Supervision; Adaptive equipment   Amount of Physical Assistance Provided No physical assistance   Comment pt able to doff socks with dressing stick and able to don with sock aid  Therapist provided pt with handout for [facility] which pt rpeorts  will purchase   Putting On/Taking Off Footwear CARE Score 4   Sit to Stand   Type of Assistance Needed Incidental touching; Adaptive equipment   Amount of Physical Assistance Provided No physical assistance   Comment CGA with RW   Sit to Stand CARE Score 4   Bed-Chair Transfer   Type of Assistance Needed Incidental touching   Amount of Physical Assistance Provided No physical assistance   Comment CGA fro SPT with RW   Chair/Bed-to-Chair Transfer CARE Score 4   Toileting Hygiene   Type of Assistance Needed Physical assistance   Amount of Physical Assistance Provided 25%-49%   Comment Ebony to maintain balance ins tance  pt ablet o wash lorena area while seated  Req Ebony when performing buttock hygiene   Toileting Hygiene CARE Score 3   Toileting   Able to [address] down yes, up yes  Able to Manage Clothing Closures Yes   Manage Hygiene Bladder   Limitations Noted In Balance;ROM;UE Strength;LE Strength   Adaptive Equipment Grab Bar   Toilet Transfer   Type of Assistance Needed Incidental touching   Amount of Physical Assistance Provided No physical assistance   Comment CGA with RW   Toilet Transfer CARE Score 4   Toilet Transfer   Surface Assessed Standard Commode   Limitations Noted In Balance; Endurance;ROM;UE Strength;LE Strength   Adaptive Equipment Walker   Positioning Concerns LE Support; Safety;Cognition   Functional Standing Tolerance   Time 3min 32sec; 3mins 5sec   Activity standing toelrance with unilateral release   Comments therapist engaged pt in fx standing with unilateral release to challenge KAREEN, endurance, strength, coord and weight shifting to inc fx performance and activity tolerance for inc participation in ADl tasks  Pt toerlated well x2 attempts with rest radha in between  after 2nd attempt pt reporting inc pain in hips   Therapeutic Excerise-Strength   UE Strength Yes   Right Upper Extremity- Strength   R Shoulder Flexion; Extension;Horizontal ABduction   R Elbow Elbow flexion;Elbow extension   R Position Seated   Equipment Dumbbell  (1#)   R Weight/Reps/Sets 2x15   RUE Strength Comment pt engaged in BUE TE (1# RUE; LUE 2#) to inc fx strength to inc fx performance with ADLs and transfers  Pt toerlated well with rest break in between   Left Upper Extremity-Strength   L Shoulder Flexion; Extension;Horizontal ABduction   L Elbow Elbow flexion;Elbow extension   L Position Seated   Equipment Dumbell  (2#)   L Weights/Reps/Sets 2x15   Cognition   Overall Cognitive Status Impaired   Arousal/Participation Alert; Cooperative   Attention Attends with cues to redirect   Orientation Level Oriented X4   Memory Decreased short term memory   Following Commands Follows all commands and directions without difficulty   Activity Tolerance   Activity Tolerance Patient limited by pain   Medical Staff Made Aware Therapist comnctacted pts  Donovan Vergara [phone]) to discuss pts current LOF and recommendations for home  Therapist recommend pt to have 24/7 supervision/assistance at DC 2* to dec cognition and standing balance and fx mobility   reporting their son will be staying with pt in the morning until 1pm, then neighbor can stay with pt 1-3pm and then  returns [facility] at [facility]   reports pt already has BSC adn rukhsana greement to complete SB  Therpist recommend pt to have tranposrt chair at time of DC with andout provided   in agreement to OT recommendations  Assessment   Treatment Assessment Pt engaged in 90mins of skilled OT services with focus on BUE TE, contacting family, standing toelrance/balance, toielting  Pt toelrated tx sessino well despite 7/10 pain, pt req freq rest breaks  Therapist contacted  Donovan Vergara) see above for details  Pt currently performing fx transfers at [facility] level with Ebony PEREZ for toileting 2* to balance while completing unilateral release  Therapist provided pt with handout for hp kit, communicated with PT Kellie White to provide transport chair handout  Pt pleasant and cooperative and tolerated tx session well with F carryover of LHAE  Can benefit from further practive, standing toelrance/balance, activity toelrance, BUE TE, unilateral release to inc fx performance and dec caregiver burden  Prognosis Good   Problem List Decreased strength;Decreased range of motion;Decreased endurance; Impaired balance;Decreased mobility;Pain   Barriers to Discharge Inaccessible home environment;Decreased caregiver support   Plan   Treatment/Interventions ADL retraining;Functional transfer training; Therapeutic exercise; Endurance training;Patient/family training;Bed mobility; Compensatory technique education   Progress Progressing toward goals   Recommendation   OT Discharge Recommendation   (pending progress)   Equipment Recommended Bedside commode; Hip Kit ($)  (transport chair)   OT Equipment ordered pt has BSC, handout provided for hip kit, PT to provide handout for transport chair   OT Therapy Minutes   OT Time In 0845   OT Time Out 1015   OT Total Time (minutes) 90   OT Mode of treatment - Individual (minutes) 90   OT Mode of treatment - Concurrent (minutes) 0   OT Mode of treatment - Group (minutes) 0   OT Mode of treatment - Co-treat (minutes) 0   OT Mode of Treatment - Total time(minutes) 90 minutes   OT Cumulative Minutes 180   Therapy Time missed   Time missed?  No

## 2021-03-11 NOTE — PROGRESS NOTES
[facility]  Progress Note Alexis Kisermi 1955, 78 y o  female MRN: [id number]  Unit/Bed#: [facility] 265-01 Encounter: [id number]  Primary Care Provider: Aleks Anna MD   Date and time admitted to hospital: 3/9/2021  2:50 PM    Onychomycosis  Assessment & Plan  Podiatry was consulted on 3/9  Nails trimmed on 3/10  Continue to follow-up with Podiatry as outpatient  Abnormal ECG  Assessment & Plan  EKG completed on 3/4 that showed possible T-wave inversion on V2 lead  Sent to acute care setting for formal ACS workup  Following EKGs and troponins were negative  Cardiology was consulted - ECHO completed  Showed no motion wall abnormalities  EF was 64%  No plans for further cardiac workup at this time  CTA showed no PE  Showed small R pleural effusion and basilar atelectasis - given a 1 time dose of IV 40mg lasix  Hypokalemia  Assessment & Plan  K+ 3 1 on 3/10  Given 40 mEq oral potassium chloride this morning and will receive another 40mEq in the evening  Repeat K+ today is 4 8  Previously on potassium supplements at home for a period of time  May need to consider daily supplementation  Trend with routine BMP  Anemia  Assessment & Plan  Stable  Current Hgb 10 5, Hct 32 1  Baseline Hgb appears to be 9-10  Takes folic acid 1mg daily  Gastroparesis  Assessment & Plan  Formerly on Reglan - stopped in the acute setting due to concerns for extrapyramidal symptoms  Monitor for s/s of bowel obstruction  Continue bowel regimen  Hypothyroidism  Assessment & Plan  Currently on levothyroxine 88mcg daily at home (States that she was increased 1 month ago from 75mcg)  Last TSH level unknown  Continue to follow-up with outpatient PCP  Anxiety and depression  Assessment & Plan  Stable  Continue Prozac 20mg daily and Xanax PRN for anxiety  Supportive counseling as needed      Hypertension  Assessment & Plan  Home regimen: Norvasc 2 5mg daily and atenolol 50mg BID  Episode of hypertensive urgency while in acute setting      Monitor routine VS   Continue atenolol 50mg BID  Increased Norvasc to 5mg daily on 3/4  Sjogren's disease ([address] )  Assessment & Plan  Takes methotrexate 20mg weekly  Follows up with Dr Carmen Sanderson (Rheumatology) as outpatient  Saliva spray added as PRN for dry mouth  Parotid gland enlargement  Assessment & Plan  Stable  Yearly follow-up with Dr Lisa Butler (ENT) as outpatient  * Multiple closed fractures of pelvis [facility])  Assessment & Plan  2/22 - Mechanical fall down the stairs predominately on the R side  Multiple pelvic fractures: Acute nondisplaced fracture of the right inferior pubic ramus and right pubic symphysis, anterior right acetabular fracture and probable nondisplaced left anterior acetabular fracture  Ortho consulted - no surgical intervention at this time      Monitor for s/s pelvic bleeding  Pain management  WBAT  DVT Prophylaxis - Lovenox  Follow-up with Dr Osborn (Orthopedics) office in 6 weeks  (Week of April 5th)      PT/OT Therapies  Primary team following  VTE Pharmacologic Prophylaxis:   Pharmacologic: Enoxaparin (Lovenox)  Mechanical VTE Prophylaxis in Place: Yes - sequential compression devices  Current Length of Stay: 2 day(s)    Current Patient Status: Inpatient Rehab     Discharge Plan: As per primary team     Code Status: Level 1 - Full Code    Subjective:   Pt examined while sitting in WC in pt room  Complaints of R pelvic pain that radiates to the groin, rates 7/10, aching/throbbing, improves with pain medication and rest   Therapy has been going well and she has been sleeping well  Feels that her mouth has been dry - saliva substitute spray has been added  Denies any dry eyes  LBM was 3/10 with no issues  Spoke with her about 81mg ASA per Cardiology's last recommendations - she feels that it is unnecessary and will hold off for now due to possible interactions with methotrexate  Objective:     Vitals:   Temp (24hrs), Av 7 °F (36 5 °C), Min:97 6 °F (36 4 °C), Max:97 8 °F (36 6 °C)    Temp:  [97 6 °F (36 4 °C)-97 8 °F (36 6 °C)] 97 6 °F (36 4 °C)  HR:  [56-60] 58  Resp:  [16-20] 16  BP: (134-147)/(60-69) 134/60  SpO2:  [96 %-98 %] 96 %  Body mass index is 27 19 kg/m²  Review of Systems   Constitutional: Negative for chills, fatigue and fever  HENT:        + dry mouth   Eyes:        Negative dry eyes   Respiratory: Negative for cough and shortness of breath  Cardiovascular: Negative for chest pain, palpitations and leg swelling  Gastrointestinal: Negative for abdominal distention, abdominal pain, constipation, diarrhea, nausea and vomiting  LBM 3/10   Genitourinary: Negative for difficulty urinating  Musculoskeletal: Positive for arthralgias (R pelvic/groin pain, 7/10, aching/throbbing, improves with pain medication and rest)  Neurological: Negative for dizziness, light-headedness and headaches  Psychiatric/Behavioral: Negative for sleep disturbance  Input and Output Summary (last 24 hours): Intake/Output Summary (Last 24 hours) at 3/11/2021 1133  Last data filed at 3/11/2021 0857  Gross per 24 hour   Intake 880 ml   Output 1200 ml   Net -320 ml       Physical Exam:     Physical Exam  Vitals signs and nursing note reviewed  Constitutional:       Appearance: Normal appearance  HENT:      Head: Normocephalic and atraumatic  Cardiovascular:      Rate and Rhythm: Normal rate and regular rhythm  Pulses: Normal pulses  Heart sounds: Normal heart sounds  No murmur  No friction rub  Pulmonary:      Effort: Pulmonary effort is normal  No respiratory distress  Breath sounds: Normal breath sounds  No wheezing or rhonchi  Abdominal:      General: Abdomen is flat  Bowel sounds are normal  There is no distension  Palpations: Abdomen is soft  Tenderness: There is no abdominal tenderness     Musculoskeletal:      Right lower leg: No edema  Left lower leg: No edema  Skin:     General: Skin is warm and dry  Neurological:      Mental Status: She is alert and oriented to person, place, and time  Comments: Oral dyskinesia, slurred speech   Psychiatric:         Mood and Affect: Mood normal          Behavior: Behavior normal          Additional Data:     Labs:    Results from last 7 days   Lab Units 03/10/21  0509   WBC Thousand/uL 5 10   HEMOGLOBIN g/dL 10 5*   HEMATOCRIT % 32 1*   PLATELETS Thousands/uL 326   NEUTROS PCT % 80*   LYMPHS PCT % 8*   MONOS PCT % 10   EOS PCT % 2     Results from last 7 days   Lab Units 03/11/21  0519  03/05/21  0510   SODIUM mmol/L 133*   < > 135*   POTASSIUM mmol/L 4 8   < > 4 3   CHLORIDE mmol/L 102   < > 100   CO2 mmol/L 21*   < > 28   BUN mg/dL 14   < > 13   CREATININE mg/dL 0 58*   < > 0 66   ANION GAP mmol/L 10   < > 7   CALCIUM mg/dL 9 1   < > 9 0   ALBUMIN g/dL  --   --  3 9   TOTAL BILIRUBIN mg/dL  --   --  0 80   ALK PHOS U/L  --   --  169*   ALT U/L  --   --  37   AST U/L  --   --  64*   GLUCOSE RANDOM mg/dL 86   < > 96    < > = values in this interval not displayed                         Labs reviewed    Imaging:    Imaging reviewed    Recent Cultures (last 7 days):           Last 24 Hours Medication List:   Current Facility-Administered Medications   Medication Dose Route Frequency Provider Last Rate    acetaminophen  650 mg Oral [facility] Sheryl Gupta MD      ALPRAZolam  0 25 mg Oral TID PRN Sheryl Gupta MD      amLODIPine  5 mg Oral Daily Sheryl Gupta MD      atenolol  50 mg Oral BID Sheryl Gupta MD      atorvastatin  10 mg Oral Daily Sheryl Gupta MD      bisacodyl  10 mg Rectal Daily PRN Sheryl Gupta MD      Diclofenac Sodium  2 g Topical 4x Daily Sheryl Gupta MD      enoxaparin  30 mg Subcutaneous Q12H [address] Sheryl Gupta MD      FLUoxetine  20 mg Oral Daily Sheryl Gupta MD      folic acid  1 mg Oral Daily Sheryl Gupta MD      gabapentin  100 mg Oral HS Senora Armendariz Michelle Perkins MD      lactulose  20 g Oral BID PRN Tian Britton MD      levothyroxine  88 mcg Oral Early Morning Tian Britton MD      lidocaine  2 patch Topical HS Tian Britton MD      methocarbamol  500 mg Oral Q6H PRN Tian Britton MD      methotrexate  20 mg Oral Weekly Tian Britton MD      CHRISTIE ANTIFUNGAL  1 application Topical BID Tian Britton MD      nitroglycerin  0 4 mg Sublingual Q5 Min PRN Tian Britton MD      nystatin  1 application Topical BID Tian Britton MD      oxyCODONE  5 mg Oral Q4H PRN Tian Britton MD      saliva substitute  5 spray Mouth/Throat Q4H PRN Tian Britton MD      senna  1 tablet Oral BID Tian Britton MD          M*Modal software was used to dictate this note  It may contain errors with dictating incorrect words or incorrect spelling  Please contact the provider directly with any questions

## 2021-03-11 NOTE — PROGRESS NOTES
Physical Medicine and Rehabilitation Progress Note  Vera Freed Viscomi 78 y o  female MRN: [id number]  Unit/Bed#: [facility] ([place]) 265-01 Encounter: [id number]      Chief Complaints:   Right pelvic pain    Interval Events/Subjective:      patient reports stable right pelvic pain helped significantly with pain medications  Patient feels function is improving adequately  She notes some chronic mild intermittent lightheadedness but this is at her baseline  She denies shortness of breath, fever, chills, calf pain, bowel or bladder problems, numbness and is eating adequately she denies other new complaints  ROS: A 10 point ROS was performed; negative except as noted above  Assessment & Plan:     * Multiple closed fractures of pelvis ([address] )  Assessment & Plan  · Traumatic fall on stairs 2/22:  · Resultant right inferior pubic rami fracture, right pubic symphysis fracture, right anterior acetabular fracture, left anterior acetabular fracture  · Evaluated by Orthopedics - treat conservatively  · WBAT BLE  · Continue DVT ppx - lovenox   · Follow-up with Trauma/Orthopedics as outpatient   · R hip/pelvis x-ray 3/4 stable fractures Subtle right pelvic fractures as noted above   These correspond approximately to the CT findings   No new or displaced pelvic fractures are demonstrated  · Grossly stable today; continue acute comprehensive interdisciplinary inpatient rehabilitation to include intensive skilled therapies (PT, OT) as outlined with oversight and management by rehabilitation physician as well as inpatient rehab level nursing, case management and weekly interdisciplinary team meetings     · Follow-up with outpatient orthopedics    Pain  Assessment & Plan  Stable overall  -continue mgmt as outlined; tolerating regimen adequately   APAP TID  Gabapentin 100mg HS  LD patches  Robaxin 500mg Q6H PRN spasms  Oxy 5mg Q4H PRN  Counseled on and continue to encourage deep breathing/relaxation/behavioral pain management techniques: Deep breathin seconds in, 5 seconds out, 5 times per hour when awake and PRN when in pain or anticipate pain; avoid holding breath and tightening muscles and instead breathe slowly and deeply  Monitor for oversedation, AMS/delirium, and respiratory depression   If being administered - hold opiates, muscle relaxants, benzodiazepines, and gabapentin for oversedation, AMS, or RR<12       Gastroparesis  Assessment & Plan  Stooling adequately; adequate intake   From Sjogren's   monitor closely especially with recent discontinuation of chronic reglan in context of oral dyskinesias  Senna BID  PRN bowel regimen   Follow-up with OP GI     Hyponatremia  Assessment & Plan  Mild Hyponatremia   Monitor intermittently  Co-management with internal medicine team         Abnormal CT scan, gastrointestinal tract  Assessment & Plan  Discussed with pt/fam  Incidental finding on recent CT  - thickening of the distal rectal wall "correlate for proctitis "  - Follow-up with PCP     Adenopathy  Assessment & Plan  Discussed with pt/fam  Incidental finding on recent CT  " stable distal paraesophageal and gastrohepatic ligament adenopathy of uncertain etiology"  - Follow-up with PCP    Onychomycosis  Assessment & Plan  Pods c/s and nail debridement     Dermatitis associated with moisture  Assessment & Plan  Keep clean and dry  - CHRISTIE cream BID  - Optimal b/b mgmt     Chest pain  Assessment & Plan  Atypical - improved  ACS ruled out; CTA no PE, Cards TTE normal EF without RWMA; no stress test needed at this time  No absolute indication for aspirin at this time; but cards mentioned considering it if no contraindications  -[facility] IM team feels may have interaction with methotrexate and patient would prefer to remain off; remain off aspirin for now  -Discussed with pt that she should discuss possibly starting baby aspirin as an OP with PCP in follow-up     Abnormal ECG  Assessment & Plan  Per IM  "EKG completed on 3/4 that showed possible T-wave inversion on V2 lead  Sent to acute care setting for formal ACS workup      Following EKGs and troponins were negative  Cardiology was consulted - ECHO completed  Showed no motion wall abnormalities  EF was 64%  No plans for further cardiac workup at this time  CTA showed no PE    Showed small R pleural effusion and basilar atelectasis - given a 1 time dose of IV 40mg lasix "    Hypokalemia  Assessment & Plan  Improved 3/11  IM consulted, monitoring, repletion, and overall management at their discretion during ARC course      Anemia  Assessment & Plan  Up to 10 5 on 03/10 for hemoglobin  IM consulted, with overall monitoring, and management at their discretion during ARC course  Monitor H/H, vitals, signs/symptoms of acute bleeding      Oral dyskinesia  Assessment & Plan  Stable   Patient feels this is related to Sjogrens but movements are more consistent with EPS and likely TD in context of long-standing metoclopramide   - Comgmt with IM  - Metoclopramide discontinued prior to [facility] which is first line mgmt (holding DA blockers)   - This may be difficult to improve at this point  - Monitor during ARC course  - Follow-up with PCP - consider pharmacologic tx such at tetrabenazine if needed in future     Hypothyroidism  Assessment & Plan  Levothyroxine     Anxiety and depression  Assessment & Plan  Mood stable   Prozac  PRN xanax  Patient counseled on risks does opiates and worsened risks with a combination of 2 taken at the same which includes fall and significant injury   Supportive counseling  Consider Psychology consult while in [place] on and continue to encourage deep breathing/relaxation/behavioral management techniques:         Hypertension  Assessment & Plan  Internal medicine consulted and management at their discretion  Monitor vitals with and without activity; monitor for orthostasis  Monitor hemoglobin, electrolytes, kidney function, hydration status   Current meds: Atenolol, Novasc Sjogren's disease ([facility] [address] )  Assessment & Plan  Takes methotrexate 20mg weekly  Follows up with Dr Alissa Hollingsworth (Rheumatology) as outpatient  - saliva substitute to p r n  Parotid gland enlargement  Assessment & Plan  OP follow-up with ENT Dr Yareli Duncan       Disposition  ·  home with estimated length of stay of 7-10 days from admission     Follow-up providers and other issues to be followed up after discharge  ·  PCP - overall medical mgmt, follow-up incidental findings; consideration for baby aspirin  · Orthopedics   · Rheumatology  ·  follow-up thickening of distal rectal wall with PCP as well as stable distal paraesophageal in gastrohepatic ligament adenopathy     CODE: Level 1: Full Code     Restrictions include:   Fall precautions    Objective:    Functional Update:   toileting hygiene moderate assist, transfers contact guard    Allergies per EMR    Physical Exam:  Temp:  [97 6 °F (36 4 °C)-97 8 °F (36 6 °C)] 97 6 °F (36 4 °C)  HR:  [56-60] 58  Resp:  [16-20] 16  BP: (134-147)/(60-69) 134/60  SpO2:  [96 %-98 %] 96 %    GEN:  Sitting in NAD   HEENT/NECK:  somewhat dry mucous membranes  CARDIAC: Regular rate rhythm, no murmers, no rubs, no gallops  LUNGS:  clear to auscultation, no wheezes, rales, or rhonchi  ABDOMEN: Soft, non-tender, non-distended, normal active bowel sounds  EXTREMITIES/SKIN:  no calf edema, no calf tenderness to palpation and MSK:  Stable bilateral lower extremity strength   NEURO:   MENTAL STATUS:  Appropriate wakefulness and interaction   PSYCH:  Affect:  Euthymic     Diagnostic Studies: Reviewed, no new imaging  No orders to display       Laboratory: Labs reviewed  Results from last 7 days   Lab Units 03/10/21  0509 03/04/21  1640   HEMOGLOBIN g/dL 10 5* 9 7*   HEMATOCRIT % 32 1* 29 6*   WBC Thousand/uL 5 10 10 10     Results from last 7 days   Lab Units 03/11/21  0519 03/10/21  0509 03/05/21  0510 03/04/21  1624   BUN mg/dL 14 13 13 13   SODIUM mmol/L 133* 135* 135* 132*   POTASSIUM mmol/L 4 8 3 1* 4 3 3 5*   CHLORIDE mmol/L 102 100 100 98   CREATININE mg/dL 0 58* 0 63 0 66 0 57*   AST U/L  --   --  64* 69*   ALT U/L  --   --  37 32            Drug regimen reviewed, all potential adverse effects identified and addressed:    Scheduled Meds:  Current Facility-Administered Medications   Medication Dose Route Frequency Provider Last Rate    acetaminophen  650 mg Oral [facility] Clayton Montgomery MD      ALPRAZolam  0 25 mg Oral TID PRN Clayton Montgomery MD      amLODIPine  5 mg Oral Daily Clayton Montgomery MD      atenolol  50 mg Oral BID Clayton Montgomery MD      atorvastatin  10 mg Oral Daily Clayton Montgomery MD      bisacodyl  10 mg Rectal Daily PRN Clayton Montgomery MD      Diclofenac Sodium  2 g Topical 4x Daily Clayton Montgomery MD      enoxaparin  30 mg Subcutaneous Q12H [address] Clayton Montgomery MD      FLUoxetine  20 mg Oral Daily Clayton Montgomery MD      folic acid  1 mg Oral Daily Clayton Montgomery MD      gabapentin  100 mg Oral HS Clayton Montgomery MD      lactulose  20 g Oral BID PRN Clayton Montgomery MD      levothyroxine  88 mcg Oral Early Morning Clayton Montgomery MD      lidocaine  2 patch Topical HS Clayton Montgomery MD      methocarbamol  500 mg Oral Q6H PRN Clayton Montgomery MD      methotrexate  20 mg Oral Weekly Clayton Montgomery MD      CHRISTIE ANTIFUNGAL  1 application Topical BID Clayton Montgomery MD      nitroglycerin  0 4 mg Sublingual Q5 Min PRN Clayton Montgomery MD      nystatin  1 application Topical BID Clayton Montgomery MD      oxyCODONE  5 mg Oral Q4H PRN Clayton Montgomery MD      saliva substitute  5 spray Mouth/Throat Q4H PRN Clayton Montgomery MD      senna  1 tablet Oral BID Clayton Montgomery MD         ** Please Note: Fluency Direct voice to text software may have been used in the creation of this document  **    Total time spent:  35 minutes, with more than 50% spent counseling/coordinating care   Counseling includes discussion with patient re: progress in therapies, functional issues observed by therapy staff, and discussion with patient his/her current medical state/wellbeing  Coordination of patient's care was performed in conjunction with Internal Medicine service to monitor patient's labs, vitals, and management of their comorbidities  In addition, this patient was discussed by the interdisciplinary team in weekly case conference today  The care of the patient was extensively discussed with all care providers and an appropriate rehabilitation plan was formulated unique for this patient  Barriers were identified preventing progression of therapy and appropriate interventions were discussed with each discipline  Please see the team note for input from all disciplines regarding barriers, intervention, and discharge planning

## 2021-03-11 NOTE — PROGRESS NOTES
03/11/21 1400   Pain Assessment   Pain Assessment Tool 0-10   Pain Score 8   Pain Location/Orientation Orientation: Right;Location: Groin   Restrictions/Precautions   RLE Weight Bearing Per Order WBAT   LLE Weight Bearing Per Order WBAT   Cognition   Overall Cognitive Status Impaired   Arousal/Participation Alert; Cooperative   Attention Attends with cues to redirect   Orientation Level Oriented X4   Memory Decreased short term memory   Following Commands Follows all commands and directions without difficulty   Subjective   Subjective reports being tired, just got pain meds  Sit to Stand   Type of Assistance Needed Incidental touching   Sit to Stand CARE Score 4   Bed-Chair Transfer   Type of Assistance Needed Incidental touching   Chair/Bed-to-Chair Transfer CARE Score 4   Car Transfer   Reason if not Attempted Environmental limitations   Car Transfer CARE Score 10   Walk 10 Feet   Type of Assistance Needed Incidental touching   Walk 10 Feet CARE Score 4   Walk 50 Feet with Two Turns   Type of Assistance Needed Incidental touching   Walk 50 Feet with Two Turns CARE Score 4   Walking 10 Feet on Uneven Surfaces   Type of Assistance Needed Physical assistance   Amount of Physical Assistance Provided Less than 25%   Comment indoor ramp with RW  Walking 10 Feet on Uneven Surfaces CARE Score 3   Ambulation   Does the patient walk? 2  Yes   Primary Mode of Locomotion Prior to Admission Walk   Distance Walked (feet) 125 ft  (50)   Assist Device Roller Walker   Gait Pattern Slow [place]; Antalgic; Step through; Improper weight shift   Limitations Noted In Balance; Endurance; Heel Strike;Speed;Strength;Swing   Provided Assistance with: Balance   Walk Assist Level Minimum Assist;Contact Guard   Findings Min A for balance, generally CGA    Wheelchair mobility   Does the patient use a wheelchair? 0   No   Therapeutic Interventions   Strengthening red tband abd and R Hs curls x30   Assessment   Treatment Assessment Pt partcipated in brief 30 min skilled PT intervention for gait and mobility for endurance  Needs inc time for amb trials, Min A for balance however generallly CGA  Encouragement to inc distance due to c/o ftg  To review steps and HEP for planned DC home, to setup FT  Problem List Decreased strength;Decreased range of motion;Decreased endurance; Impaired balance;Decreased mobility;Pain   Barriers to Discharge Inaccessible home environment;Decreased caregiver support   PT Barriers   Physical Impairment Decreased strength;Decreased range of motion;Decreased endurance; Impaired balance;Pain   Functional Limitation Car transfers; Ramp negotiation;Stair negotiation;Standing;Transfers; Walking   Plan   Treatment/Interventions LE strengthening/ROM; Elevations; Therapeutic exercise; Endurance training;Gait training   Progress Progressing toward goals   Recommendation   PT Discharge Recommendation Home with skilled therapy  (Home PT)   PT Equipment ordered pt has RW  PT Therapy Minutes   PT Time In 1400   PT Time Out 1430   PT Total Time (minutes) 30   PT Mode of treatment - Individual (minutes) 30   PT Mode of treatment - Concurrent (minutes) 0   PT Mode of treatment - Group (minutes) 0   PT Mode of treatment - Co-treat (minutes) 0   PT Mode of Treatment - Total time(minutes) 30 minutes   PT Cumulative Minutes 180   Therapy Time missed   Time missed?  No

## 2021-03-11 NOTE — TEAM CONFERENCE
Acute RehabilitationTeam Conference Note  Date: 3/11/2021   Time: 1:10 PM       Patient Name:  Shazia Payne       Medical Record Number: [id number]   YOB: 1955  Sex: Female          Room/Bed:  [facility] 265/[facility] 265-01  Payor Info:  Payor: BLUE CROSS / Plan: Nancy Huddleston / Product Type: Blue Fee for Service /      Admitting Diagnosis: Pelvic fracture ([facility] ) [S32  9XXA]   Admit Date/Time:  3/9/2021  2:50 PM  Admission Comments: No comment available     Primary Diagnosis:  Multiple closed fractures of pelvis ([facility] )  Principal Problem: Multiple closed fractures of pelvis [facility])    Patient Active Problem List    Diagnosis Date Noted    Hyponatremia 03/11/2021    Adenopathy 03/10/2021    Abnormal CT scan, gastrointestinal tract 03/10/2021    Dermatitis associated with moisture 03/09/2021    Onychomycosis 03/09/2021    Enlarged and hypertrophic nails 03/09/2021    Low HDL (under 40)     Anemia 03/04/2021    Hypokalemia 03/04/2021    WELLS (dyspnea on exertion) 03/04/2021    Lightheadedness 03/04/2021    Pain 03/04/2021    Abnormal ECG 03/04/2021    Chest pain 03/04/2021    Pleural effusion on right 03/04/2021    Atelectasis of right lung 03/04/2021    Potential for cognitive impairment 03/03/2021    Oral dyskinesia 03/02/2021    Gastroparesis 03/01/2021    Anxiety and depression 02/28/2021    Hypothyroidism 02/28/2021    Abnormal findings on imaging test 02/28/2021    Hypertension 02/27/2021    Pain of right upper extremity 02/27/2021    Multiple closed fractures of pelvis ([facility] ) 02/27/2021    Closed nondisplaced fracture of anterior wall of right acetabulum ([facility] ) 02/23/2021    Closed nondisplaced fracture of right pubis ([facility] ) 02/23/2021    Fall 02/23/2021    Parotid gland enlargement 08/29/2019    Sjogren's disease ([facility] ) 08/29/2019       Physical Therapy:    Weight Bearing Status: Weight Bearing as Tolerated(B/L LEs)  Transfers: Incidental Touching, Minimal Assistance  Bed Mobility: Supervision  Amulation Distance (ft): 60 feet  Ambulation: Incidental Touching, Minimal Assistance  Assistive Device for Ambulation: Roller Walker  Wheelchair Mobility Distance: (n/a)  Number of Stairs: 4  Assistive Device for Stairs: Bilateral Hand Rails  Stair Assistance: Minimal Assistance  Ramp: Minimal Assistance  Assistive Device for Ramp: Roller Walker  Discharge Recommendations: Home with:  [address] with[de-identified] Family Support, First Floor Setup, Home Physical Therapy, 24 Hour Supervision    3/10/21  Upon evaluation pt presents with pain in R buttock/hip/groin area (8/10 with at rest and slightly improves with mobilities), decreased LE strength, decreased ROM, decreased activity tolerance, decreased balance, all which increase her risk for falls and caregiver burden  Due to above listed deficits pt is good rehab candidate with ELOS 7-10 days  Goals to be set at S level pending that family can provide upon d/c home  Occupational Therapy:  Eating: Independent  Grooming: Supervision  Bathing: Minimal Assistance  Bathing: Minimal Assistance  Upper Body Dressing: Supervision  Lower Body Dressing: Moderate Assistance  Toileting: Minimal Assistance  Tub/Shower Transfer: Incidental Touching  Toilet Transfer: Incidental Touching  Cognition: Exceptions to WNL  Cognition: Decreased Memory, Decreased Attention, Decreased Safety  Orientation: Person, Place, Time, Situation  Discharge Recommendations: Home with:(to be confirmed husbands ability to provide supervision at OR)  [address] with[de-identified] 24 Hour Assistance, 24 Hour Supervision, Family Support, Home Occupational Therapy       Pt reports she lives in a multi level home with   At this time therapist to discuss with  if able to provide supervision at OR  At this time pt completing fx tranfers at [facility] level, [place] for toileting, modA LB dressing 2* to dec fx reach   Pt seen for 90mins of skilled OT services with emphasis on self-care, toileting, transfers, BUE TE  Pt presents with the following performance component deficits impacting ADL/IADL skills: pain, dec fx reach, dec cognition, dec BUE strength, dec activity tolerance  Pt to continue to benefit from continued acute rehab OT services during hospital stay to address defined deficits and to maximize level of functional independence in the following Occupational Performance areas: grooming, bathing/shower, toilet hygiene, dressing, medication management, functional mobility  Treatment approaches may include: OT eval, self-care, there ex, therapeutic activity, modalities  Pt presents with good rehab potential  Pt is unsafe to D/C home at this time, recommending ELOS 7-10days to achieve supervision level goals with least restrictive device to address these areas and resume prior occupational roles to maximize independence to reduce risk of fall and decrease risk of readmission         Speech Therapy:           No notes on file    Nursing Notes:  Appetite: Fair  Diet Type: Regular/House                                                                     Pain Location/Orientation: Orientation: Right, Location: Hip  Pain Score: 3                       Hospital Pain Intervention(s): Repositioned          Pt is 79 y/o female with a PMH of Sjogren's disease, depression, hypothyroidism, HTN and meningioma s/p resection in 2012 who originally presented to the [address] [place] [address] on 2/22/21 after a fall down the stairs  Patient had fallen down 7 steps, landing on R side with no LOC or head trauma  CT pelvis revealed R inferior pubic rami fx, R pubic symphysis fx,  R anterior acetabular fx, L anterior acetabular fx   Patient with R shoulder abrasion and bruising of R elbow, x-ray showed no acute findings  Pt was evaluated by Dr Iris Gamboa (Ortho) and decided on no surgical intervention   Patient was admitted to [facility] for acute rehab on 2/27/21   Patient developed R upper quadrant pain and was transferred to [facility] acute setting on 3/4/21 to r/o ACS  Patient was  evaluated by cardiology  negative for PE, ECHO no motion wall abnormalities and normal EF   RUQ pain had resolved and liver enzymes were normal   Pt most likely had referred pain from pelvic fractures        Patient re-admitted to [place] [address] on 3/9/2021  Pt was given K 40 meq BID, K level is 3 1  Will repeat blood works tomorrow  On Folic acid 1 mg daily for hx of anemia  Pt's hgb baseline level is 9-10  Pt is on Atenolol 50 mg BID and Amlodipine 5 mg daily for HTN  On Levothyroxine 88 mcg daily for hypothyroidism  Pt is on Prozac 20 mg daily for depression and Xanax 0 25 mg TID PRN for anxiety  Pt is on Methotrexate 20 mg every week for Sjorgen's syndrome  She is on Lovenox and SCD's for VTE prophylaxis  Pt is on scheduled Tylenol 650 mg every 8 hrs and Oxy IR 5mg every 4 hrs PRN for pain  Pt is also using Lidocaine patch, Voltaren gel and ice pack for pain  We will continue to monitor lab works and v/s  Will monitor for safe transfers, pt is on WBAT, will encourage pt to use call bell if needed, hourly rounding maintained  We will also monitor pt's pain and effectiveness of pain management  Pt can be continent and incontinent of urine and uses [employer] @ [facility]  Will encourage pt to call if she feels the need to go to the bathroom to prevent incontinence  Case Management:     Discharge Planning  Living Arrangements: Spouse/significant other, Children  Support Systems: Children, Spouse/significant other  Assistance Needed: discharge planning  Type of Current Residence: Private residence  [address]: No  No notes on file    Is the patient actively participating in therapies?  yes  List any modifications to the treatment plan: No    Barriers Interventions   Pain Manual therapies, modalities; introduce long handled adaptive equipment   Steps to enter Stair training   Cognition (decreased memory) Will complete MoCA, provide written instructions, family training             Is the patient making expected progress toward goals? yes  List any update or changes to goals: No    Medical Goals: Patient will be medically [facility] completion of [facility]hab program    Weekly Team Goals:   Rehab Team Goals  ADL Team Goal: Patient will require supervision with ADLs with least restrictive device upon completion of rehab program  Transfer Team Goal: Patient will require supervision with transfers with least restrictive device upon completion of rehab program  Locomotion Team Goal: Patient will require supervision with locomotion with least restrictive device upon completion of rehab program    Discussion: Patient is participating in rehab program and making gains as expected  Patient presents with above noted barriers which the team will be working on addressing over the next week through interventions listed  The interdisciplinary team will be reinforcing safe and consistent progression with functional mobility and self care  OT to complete MoCA assessment and determine how this will impact teaching of new activities- will communicate to team  Family training will occur over next week to assure safe management in preparation for DC home  Continue to reinforce stair training  Recommended that the patient will continue with home PT and OT  Anticipated Discharge Date:  3/17/2021  [facility] Team Members Present: The following team members are supervising care for this patient and were present during this Weekly Team Conference      Physician: Dr Sharita Gastelum MD  : Ivone Dennis DPT  Registered Nurse: Kojo Doss RN  Physical Therapist: Yoana Linares, CHAVA  Occupational Therapist: Kendal Bone MS, OTR/L

## 2021-03-11 NOTE — CASE MANAGEMENT
Patient presents with good rehab potential and is motivated to particiapte in rehab program  Pt has good understanding of rehab process including communication, team meetings, discharge planning and role of CM  Patient does not have a history of therapy services prior to admission  She uses [facility] on [facility]  Pr was educated on [employer] upon DC  She states that she has supportive family and friends who are available to help with a safe transition home and will be able to provide transportation as needed  Patient owns most DME however stated that her  was looking into purchasing a transport chair for accessing the community  She stated that they preferred to acquire on their own  CM will continue to follow patient to assist with care coordination, DC planning and DC facilitation

## 2021-03-11 NOTE — ASSESSMENT & PLAN NOTE
2/22 - Mechanical fall down the stairs predominately on the R side  Multiple pelvic fractures: Acute nondisplaced fracture of the right inferior pubic ramus and right pubic symphysis, anterior right acetabular fracture and probable nondisplaced left anterior acetabular fracture  Ortho consulted - no surgical intervention at this time      Monitor for s/s pelvic bleeding  Pain management  WBAT  DVT Prophylaxis - Lovenox  Follow-up with Dr Marcelina Starkey (Orthopedics) office in 6 weeks  (Week of April 5th)      PT/OT Therapies  Primary team following

## 2021-03-11 NOTE — PROGRESS NOTES
03/11/21 0700   Pain Assessment   Pain Assessment Tool 0-10   Pain Score 4   Pain Location/Orientation Orientation: Right;Location: Pelvis   Hospital Pain Intervention(s) Rest  (pain meds given by RN prior to therapy)   Restrictions/Precautions   Precautions Cardiac/sternal;Fall Risk   Cognition   Overall Cognitive Status Impaired   Memory Decreased short term memory   Subjective   Subjective Reports no sleeping well last night   Roll Left and Right   Type of Assistance Needed Physical assistance   Amount of Physical Assistance Provided 25%-49%   Roll Left and Right CARE Score 3   Sit to Lying   Type of Assistance Needed Supervision   Sit to Lying CARE Score 4   Lying to Sitting on Side of Bed   Type of Assistance Needed Supervision   Lying to Sitting on Side of Bed CARE Score 4   Sit to Stand   Type of Assistance Needed Supervision   Sit to Stand CARE Score 4   Bed-Chair Transfer   Type of Assistance Needed Incidental touching; Adaptive equipment   Comment using RW, CGA   Chair/Bed-to-Chair Transfer CARE Score 4   Walk 10 Feet   Type of Assistance Needed Incidental touching; Adaptive equipment   Comment with RW   Walk 10 Feet CARE Score 4   Walk 50 Feet with Two Turns   Type of Assistance Needed Incidental touching; Adaptive equipment   Comment with RW   Walk 50 Feet with Two Turns CARE Score 4   Walk 150 Feet   Type of Assistance Needed Incidental touching; Adaptive equipment   Comment reported 1 episode of "lightheadedness" resolved in 15-20 sec andin rest break   Walk 150 Feet CARE Score 4   Ambulation   Does the patient walk? 2  Yes   Primary Mode of Locomotion Prior to Admission Walk   Distance Walked (feet) 150 ft   Assist Device Roller Walker   Gait Pattern Slow [place]; Step to; Step through; Antalgic;Decreased R stance; Improper weight shift   Wheelchair mobility   Does the patient use a wheelchair? 0  No   Curb or Single Stair   Style negotiated Curb   Type of Assistance Needed Incidental touching; Adaptive equipment   Comment RW   1 Step (Curb) CARE Score 4   4 Steps   Type of Assistance Needed Physical assistance; Adaptive equipment;Verbal cues   Amount of Physical Assistance Provided Less than 25%   Comment using Singe HR and SPC   4 Steps CARE Score 3   12 Steps   Reason if not Attempted Safety concerns   12 Steps CARE Score 88   Stairs   # of Steps 8   Therapeutic Interventions   Strengthening Seated LAQ 4 sets to fatigue, therpist supporting thigh on R LE, STS w/o use of UE, al to improve functional transfers   Flexibility manual stretch B HS and calves   Balance 1 attemptwalking 6 ft with SPC only and pt unsafe, very antalgic gait and repor fear of falling, recommend continue with RW   Assessment   Treatment Assessment Pt participating well with pain med given prior to session  FOcus of session on funtional mobility, progresing with ransfers, distane ambulated and stairs  Assist on stairs recommendeddue to R LE weakness  Also recommendedwhile ambulating at this time due to occassional c/o feeling dizzy or lightheaded  PT Therapy Minutes   PT Time In 0700   PT Time Out 0800   PT Total Time (minutes) 60   PT Mode of treatment - Individual (minutes) 60   PT Mode of treatment - Concurrent (minutes) 0   PT Mode of treatment - Group (minutes) 0   PT Mode of treatment - Co-treat (minutes) 0   PT Mode of Treatment - Total time(minutes) 60 minutes   PT Cumulative Minutes 150   Therapy Time missed   Time missed?  No

## 2021-03-11 NOTE — PCC NURSING
Pt is 77 y/o female with a PMH of Sjogren's disease, depression, hypothyroidism, HTN and meningioma s/p resection in 2012 who originally presented to the [address] [place] [address] on 2/22/21 after a fall down the stairs  Patient had fallen down 7 steps, landing on R side with no LOC or head trauma  CT pelvis revealed R inferior pubic rami fx, R pubic symphysis fx,  R anterior acetabular fx, L anterior acetabular fx   Patient with R shoulder abrasion and bruising of R elbow, x-ray showed no acute findings  Pt was evaluated by Dr Erik Mcnulty (Ortho) and decided on no surgical intervention   Patient was admitted to [facility] for acute rehab on 2/27/21  Patient developed R upper quadrant pain and was transferred to [facility] acute setting on 3/4/21 to r/o ACS  Patient was  evaluated by cardiology  negative for PE, ECHO no motion wall abnormalities and normal EF   RUQ pain had resolved and liver enzymes were normal   Pt most likely had referred pain from pelvic fractures        Patient re-admitted to [address] [place] [address] on 3/9/2021  Pt was given K 40 meq BID, K level is 3 1  Will repeat blood works tomorrow  On Folic acid 1 mg daily for hx of anemia  Pt's hgb baseline level is 9-10  Pt is on Atenolol 50 mg BID and Amlodipine 5 mg daily for HTN  On Levothyroxine 88 mcg daily for hypothyroidism  Pt is on Prozac 20 mg daily for depression and Xanax 0 25 mg TID PRN for anxiety  Pt is on Methotrexate 20 mg every week for Sjorgen's syndrome  She is on Lovenox and SCD's for VTE prophylaxis  Pt is on scheduled Tylenol 650 mg every 8 hrs and Oxy IR 5mg every 4 hrs PRN for pain  Pt is also using Lidocaine patch, Voltaren gel and ice pack for pain  We will continue to monitor lab works and v/s  Will monitor for safe transfers, pt is on WBAT, will encourage pt to use call bell if needed, hourly rounding maintained  We will also monitor pt's pain and effectiveness of pain management   Pt can be continent and incontinent of urine and uses purewick @   Will encourage pt to call if she feels the need to go to the bathroom to prevent incontinence

## 2021-03-11 NOTE — ASSESSMENT & PLAN NOTE
Improved still   IM consulted, monitoring, repletion, and overall management at their discretion during [facility] course

## 2021-03-11 NOTE — ASSESSMENT & PLAN NOTE
Takes methotrexate 20mg weekly  Follows up with Dr Valdez School (Rheumatology) as outpatient  Saliva spray added as PRN for dry mouth

## 2021-03-11 NOTE — PROGRESS NOTES
Physical Medicine and Rehabilitation Progress Note  Jl Flores Viscomi 78 y o  female MRN: [id number]  Unit/Bed#: [facility] 265-01 Encounter: [id number]      Chief Complaints:  Pelvic pain    Interval Events/Subjective:     Patient fairly significant pelvic pain at times worse with activities and weight-bearing although slowly improving  Patient denies lightheadedness, shortness of breath  She reports chronic dry mouth which is stable  Patient denies fever, chills, calf, or other new complaints  ROS: A 10 point ROS was performed; negative except as noted above  Spoke with patient patient's  regarding history, medical and rehabilitation plan and disposition  Assessment & Plan:     * Multiple closed fractures of pelvis ([address] )  Assessment & Plan  · Traumatic fall on stairs 2/22:  · Resultant right inferior pubic rami fracture, right pubic symphysis fracture, right anterior acetabular fracture, left anterior acetabular fracture  · Evaluated by Orthopedics - treat conservatively  · WBAT BLE  · Continue DVT ppx - lovenox   · Follow-up with Trauma/Orthopedics as outpatient   · R hip/pelvis x-ray 3/4 stable fractures Subtle right pelvic fractures as noted above   These correspond approximately to the CT findings   No new or displaced pelvic fractures are demonstrated  · Function improving adequately, continue regimen as outlined  · Recommend resuming acute comprehensive interdisciplinary inpatient rehabilitation to include intensive skilled therapies (PT, OT) as outlined with oversight and management by rehabilitation physician as well as inpatient rehab level nursing, case management and weekly interdisciplinary team meetings     · Follow-up with outpatient orthopedics    Pain  Assessment & Plan  Fair amount of pain at times but stable; continue current regimen  APAP TID  Gabapentin 100mg HS  LD patches  Robaxin 500mg Q6H PRN spasms  Oxy 5mg Q4H PRN  Counseled on and continue to encourage deep breathing/relaxation/behavioral pain management techniques:     Deep breathin seconds in, 5 seconds out, 5 times per hour when awake and PRN when in pain or anticipate pain; avoid holding breath and tightening muscles and instead breathe slowly and deeply  Monitor for oversedation, AMS/delirium, and respiratory depression   If being administered - hold opiates, muscle relaxants, benzodiazepines, and gabapentin for oversedation, AMS, or RR<12       Gastroparesis  Assessment & Plan  From Sjogren's   Stooling adequately; monitor closely especially with recent discontinuation of chronic reglan in context of oral dyskinesias  Senna BID  PRN bowel regimen   Follow-up with OP GI     Abnormal CT scan, gastrointestinal tract  Assessment & Plan  Discussed with pt/fam  Incidental finding on recent CT  - thickening of the distal rectal wall "correlate for proctitis "  - Follow-up with PCP     Adenopathy  Assessment & Plan  Discussed with pt/fam  Incidental finding on recent CT  " stable distal paraesophageal and gastrohepatic ligament adenopathy of uncertain etiology"  - Follow-up with PCP    Onychomycosis  Assessment & Plan  Pods c/s and nail debridement     Dermatitis associated with moisture  Assessment & Plan  Keep clean and dry  - CHRISTIE cream BID  - Optimal b/b mgmt     Chest pain  Assessment & Plan  Atypical - improved  ACS ruled out; CTA no PE, Cards TTE normal EF without RWMA; no stress test needed at this time  No absolute indication for aspirin at this time; but cards mentioned considering it if no contraindications  -[facility] IM team feels may have interaction with methotrexate and patient would prefer to remain off; remain off aspirin for now  -Discussed with pt that she should discuss possibly starting baby aspirin as an OP with PCP in follow-up     Abnormal ECG  Assessment & Plan  Per IM  "EKG completed on 3/4 that showed possible T-wave inversion on V2 lead    Sent to acute care setting for formal ACS workup      Following EKGs and troponins were negative  Cardiology was consulted - ECHO completed  Showed no motion wall abnormalities  EF was 64%  No plans for further cardiac workup at this time  CTA showed no PE  Showed small R pleural effusion and basilar atelectasis - given a 1 time dose of IV 40mg lasix "    Hypokalemia  Assessment & Plan  IM consulted, monitoring, repletion, and overall management at their discretion during ARC course      Anemia  Assessment & Plan  Up to 10 5 on 03/10 for hemoglobin  IM consulted, with overall monitoring, and management at their discretion during ARC course  Monitor H/H, vitals, signs/symptoms of acute bleeding      Oral dyskinesia  Assessment & Plan  Stable   Patient feels this is related to Sjogrens but movements are more consistent with EPS and likely TD in context of long-standing metoclopramide   - Comgmt with IM  - Metoclopramide discontinued prior to [facility] which is first line mgmt (holding DA blockers)   - This may be difficult to improve at this point  - Monitor during ARC course  - Follow-up with PCP - consider pharmacologic tx such at tetrabenazine if needed in future     Hypothyroidism  Assessment & Plan  Levothyroxine     Anxiety and depression  Assessment & Plan  Prozac  PRN xanax  Patient counseled on risks does opiates and worsened risks with a combination of 2 taken at the same which includes fall and significant injury   Supportive counseling  Consider Psychology consult while in [place] on and continue to encourage deep breathing/relaxation/behavioral management techniques:         Hypertension  Assessment & Plan  Internal medicine consulted and management at their discretion  Monitor vitals with and without activity; monitor for orthostasis  Monitor hemoglobin, electrolytes, kidney function, hydration status   Current meds: Atenolol, Novasc       Sjogren's disease (HCC)  Assessment & Plan  Takes methotrexate 20mg weekly    Follows up with   Naveen Waller (Rheumatology) as outpatient  - saliva substitute to p r n  Parotid gland enlargement  Assessment & Plan  OP follow-up with ENT Dr Harpreet Moscoso         Disposition  ·  home with estimated length of stay of 7-10 days from admission     Follow-up providers and other issues to be followed up after discharge  ·  PCP - overall medical mgmt, follow-up incidental findings; consideration for baby aspirin  · Orthopedics   · Rheumatology  ·  follow-up thickening of distal rectal wall with PCP as well as stable distal paraesophageal in gastrohepatic ligament adenopathy     CODE: Level 1: Full Code     Restrictions include:   Fall precautions    Objective:    Functional Update:  Bathing moderate assist, lower body dressing significant assist; transfers and ambulation Min assist to contact guard assist    Allergies per EMR    Physical Exam:  Temp:  [97 6 °F (36 4 °C)-98 °F (36 7 °C)] 97 6 °F (36 4 °C)  HR:  [56-71] 56  Resp:  [18-20] 20  BP: (145-151)/(64-83) 147/69  SpO2:  [92 %-98 %] 98 %    GEN:  Sitting in NAD   HEENT/NECK: Somewhat dry mucous membranes with slight cracking lips  CARDIAC: Regular rate rhythm, no murmers, no rubs, no gallops  LUNGS:  clear to auscultation, no wheezes, rales, or rhonchi  ABDOMEN: Soft, non-tender, non-distended, normal active bowel sounds  EXTREMITIES/SKIN:  no calf edema, no calf tenderness to palpation  NEURO:   MENTAL STATUS:  Appropriate wakefulness and interaction   PSYCH:  Affect:  Euthymic     Diagnostic Studies: Reviewed, no new imaging  No orders to display       Laboratory: Labs reviewed  Results from last 7 days   Lab Units 03/10/21  0509 03/04/21  1640 03/04/21  0448   HEMOGLOBIN g/dL 10 5* 9 7* 9 6*   HEMATOCRIT % 32 1* 29 6* 29 8*   WBC Thousand/uL 5 10 10 10 6 40     Results from last 7 days   Lab Units 03/10/21  0509 03/05/21  0510 03/04/21  1624   BUN mg/dL 13 13 13   SODIUM mmol/L 135* 135* 132*   POTASSIUM mmol/L 3 1* 4 3 3 5*   CHLORIDE mmol/L 100 100 98   CREATININE mg/dL 0 63 0 66 0 57*   AST U/L  --  64* 69*   ALT U/L  --  37 32            Drug regimen reviewed, all potential adverse effects identified and addressed:    Scheduled Meds:  Current Facility-Administered Medications   Medication Dose Route Frequency Provider Last Rate    acetaminophen  650 mg Oral [facility] Silas Martinez MD      ALPRAZolam  0 25 mg Oral TID PRN Silas Martinez MD      amLODIPine  5 mg Oral Daily Silas Martinez MD      atenolol  50 mg Oral BID Silas Martinez MD      atorvastatin  10 mg Oral Daily Silas Martinez MD      bisacodyl  10 mg Rectal Daily PRN Silas Martinez MD      Diclofenac Sodium  2 g Topical 4x Daily Silas Martinez MD      enoxaparin  30 mg Subcutaneous Q12H [address] Silas Martinez MD      FLUoxetine  20 mg Oral Daily Silas Martinez MD      folic acid  1 mg Oral Daily Silas Martinez MD      gabapentin  100 mg Oral HS Silas Martinez MD      lactulose  20 g Oral BID PRN Silas Martinez MD      levothyroxine  88 mcg Oral Early Morning Silas Martinez MD      lidocaine  2 patch Topical HS Silas Martinez MD      methocarbamol  500 mg Oral Q6H PRN Silas Martinez MD      methotrexate  20 mg Oral Weekly Silas Martinez MD      CHRISTIE ANTIFUNGAL  1 application Topical BID Silas Martinez MD      nitroglycerin  0 4 mg Sublingual Q5 Min PRN Silas Martinez MD      nystatin  1 application Topical BID Silas Martinez MD      oxyCODONE  5 mg Oral Q4H PRN Silas Martinez MD      saliva substitute  5 spray Mouth/Throat Q4H PRN Silas Martinez MD      senna  1 tablet Oral BID Silas Martinez MD         ** Please Note: Fluency Direct voice to text software may have been used in the creation of this document   **

## 2021-03-11 NOTE — PLAN OF CARE
Problem: Potential for Falls  Goal: Patient will remain free of falls  Description: INTERVENTIONS:  - Assess patient frequently for physical needs  -  Identify cognitive and physical deficits and behaviors that affect risk of falls  -  [place] fall precautions as indicated by assessment   - Educate patient/family on patient safety including physical limitations  - Instruct patient to call for assistance with activity based on assessment  - Modify environment to reduce risk of injury  - Consider OT/PT consult to assist with strengthening/mobility  Outcome: Progressing     Problem: Prexisting or High Potential for Compromised Skin Integrity  Goal: Skin integrity is maintained or improved  Description: INTERVENTIONS:  - Identify patients at risk for skin breakdown  - Assess and monitor skin integrity  - Assess and monitor nutrition and hydration status  - Monitor labs   - Assess for incontinence   - Turn and reposition patient  - Assist with mobility/ambulation  - Relieve pressure over bony prominences  - Avoid friction and shearing  - Provide appropriate hygiene as needed including keeping skin clean and dry  - Evaluate need for skin moisturizer/barrier cream  - Collaborate with interdisciplinary team   - Patient/family teaching  - Consider wound care consult   Outcome: Progressing     Problem: Nutrition/Hydration-ADULT  Goal: Nutrient/Hydration intake appropriate for improving, restoring or maintaining nutritional needs  Description: Monitor and assess patient's nutrition/hydration status for malnutrition  Collaborate with interdisciplinary team and initiate plan and interventions as ordered  Monitor patient's weight and dietary intake as ordered or per policy  Utilize nutrition screening tool and intervene as necessary  Determine patient's food preferences and provide high-protein, high-caloric foods as appropriate       INTERVENTIONS:  - Monitor oral intake, urinary output, labs, and treatment plans  - Assess nutrition and hydration status and recommend course of action  - Evaluate amount of meals eaten  - Assist patient with eating if necessary   - Allow adequate time for meals  - Recommend/ encourage appropriate diets, oral nutritional supplements, and vitamin/mineral supplements  - Order, calculate, and assess calorie counts as needed  - Recommend, monitor, and adjust tube feedings and TPN/PPN based on assessed needs  - Assess need for intravenous fluids  - Provide specific nutrition/hydration education as appropriate  - Include patient/family/caregiver in decisions related to nutrition  Outcome: Progressing     Problem: PAIN - ADULT  Goal: Verbalizes/displays adequate comfort level or baseline comfort level  Description: Interventions:  - Encourage patient to monitor pain and request assistance  - Assess pain using appropriate pain scale  - Administer analgesics based on type and severity of pain and evaluate response  - Implement non-pharmacological measures as appropriate and evaluate response  - Consider cultural and social influences on pain and pain management  - Notify physician/advanced practitioner if interventions unsuccessful or patient reports new pain  Outcome: Progressing     Problem: SAFETY ADULT  Goal: Maintain or return to baseline ADL function  Description: INTERVENTIONS:  -  Assess patient's ability to carry out ADLs; assess patient's baseline for ADL function and identify physical deficits which impact ability to perform ADLs (bathing, care of mouth/teeth, toileting, grooming, dressing, etc )  - Assess/evaluate cause of self-care deficits   - Assess range of motion  - Assess patient's mobility; develop plan if impaired  - Assess patient's need for assistive devices and provide as appropriate  - Encourage maximum independence but intervene and supervise when necessary  - Involve family in performance of ADLs  - Assess for home care needs following discharge   - Consider OT consult to assist with ADL evaluation and planning for discharge  - Provide patient education as appropriate  Outcome: Progressing     Problem: DISCHARGE PLANNING  Goal: Discharge to home or other facility with appropriate resources  Description: INTERVENTIONS:  - Identify barriers to discharge w/patient and caregiver  - Arrange for needed discharge resources and transportation as appropriate  - Identify discharge learning needs (meds, wound care, etc )  - Arrange for interpretive services to assist at discharge as needed  - Refer to Case Management Department for coordinating discharge planning if the patient needs post-hospital services based on physician/advanced practitioner order or complex needs related to functional status, cognitive ability, or social support system  Outcome: Progressing     Problem: GASTROINTESTINAL - ADULT  Goal: Maintains or returns to baseline bowel function  Description: INTERVENTIONS:  - Assess bowel function  - Encourage oral fluids to ensure adequate hydration  - Administer IV fluids if ordered to ensure adequate hydration  - Administer ordered medications as needed  - Encourage mobilization and activity  - Consider nutritional services referral to assist patient with adequate nutrition and appropriate food choices  Outcome: Progressing     Problem: GENITOURINARY - ADULT  Goal: Maintains or returns to baseline urinary function  Description: INTERVENTIONS:  - Assess urinary function  - Encourage oral fluids to ensure adequate hydration if ordered  - Administer IV fluids as ordered to ensure adequate hydration  - Administer ordered medications as needed  - Offer frequent toileting  - Follow urinary retention protocol if ordered  Outcome: Progressing     Problem: SKIN/TISSUE INTEGRITY - ADULT  Goal: Skin integrity remains intact  Description: INTERVENTIONS  - Identify patients at risk for skin breakdown  - Assess and monitor skin integrity  - Assess and monitor nutrition and hydration status  - Monitor labs (i e  albumin)  - Assess for incontinence   - Turn and reposition patient  - Assist with mobility/ambulation  - Relieve pressure over bony prominences  - Avoid friction and shearing  - Provide appropriate hygiene as needed including keeping skin clean and dry  - Evaluate need for skin moisturizer/barrier cream  - Collaborate with interdisciplinary team (i e  Nutrition, Rehabilitation, etc )   - Patient/family teaching  Outcome: Progressing

## 2021-03-12 PROCEDURE — 97110 THERAPEUTIC EXERCISES: CPT

## 2021-03-12 PROCEDURE — 97116 GAIT TRAINING THERAPY: CPT

## 2021-03-12 PROCEDURE — 99232 SBSQ HOSP IP/OBS MODERATE 35: CPT

## 2021-03-12 PROCEDURE — 99232 SBSQ HOSP IP/OBS MODERATE 35: CPT | Performed by: NURSE PRACTITIONER

## 2021-03-12 PROCEDURE — 97535 SELF CARE MNGMENT TRAINING: CPT

## 2021-03-12 PROCEDURE — 97530 THERAPEUTIC ACTIVITIES: CPT

## 2021-03-12 RX ADMIN — AMLODIPINE BESYLATE 5 MG: 5 TABLET ORAL at 09:27

## 2021-03-12 RX ADMIN — OXYCODONE HYDROCHLORIDE 5 MG: 5 TABLET ORAL at 06:50

## 2021-03-12 RX ADMIN — ATENOLOL 50 MG: 50 TABLET ORAL at 20:03

## 2021-03-12 RX ADMIN — MICONAZOLE NITRATE 1 APPLICATION: 20 CREAM TOPICAL at 09:34

## 2021-03-12 RX ADMIN — NYSTATIN 1 APPLICATION: 100000 POWDER TOPICAL at 17:57

## 2021-03-12 RX ADMIN — OXYCODONE HYDROCHLORIDE 5 MG: 5 TABLET ORAL at 11:34

## 2021-03-12 RX ADMIN — Medication 5 SPRAY: at 09:36

## 2021-03-12 RX ADMIN — ACETAMINOPHEN 650 MG: 325 TABLET ORAL at 05:29

## 2021-03-12 RX ADMIN — OXYCODONE HYDROCHLORIDE 5 MG: 5 TABLET ORAL at 19:44

## 2021-03-12 RX ADMIN — ATENOLOL 50 MG: 50 TABLET ORAL at 09:27

## 2021-03-12 RX ADMIN — ALPRAZOLAM 0.25 MG: 0.25 TABLET ORAL at 19:44

## 2021-03-12 RX ADMIN — ENOXAPARIN SODIUM 30 MG: 30 INJECTION SUBCUTANEOUS at 20:03

## 2021-03-12 RX ADMIN — FLUOXETINE 20 MG: 20 CAPSULE ORAL at 09:25

## 2021-03-12 RX ADMIN — ATORVASTATIN CALCIUM 10 MG: 10 TABLET, FILM COATED ORAL at 09:25

## 2021-03-12 RX ADMIN — ACETAMINOPHEN 650 MG: 325 TABLET ORAL at 13:36

## 2021-03-12 RX ADMIN — MICONAZOLE NITRATE 1 APPLICATION: 20 CREAM TOPICAL at 17:57

## 2021-03-12 RX ADMIN — ENOXAPARIN SODIUM 30 MG: 30 INJECTION SUBCUTANEOUS at 09:25

## 2021-03-12 RX ADMIN — GABAPENTIN 100 MG: 100 CAPSULE ORAL at 21:29

## 2021-03-12 RX ADMIN — NYSTATIN 1 APPLICATION: 100000 POWDER TOPICAL at 09:34

## 2021-03-12 RX ADMIN — LEVOTHYROXINE SODIUM 88 MCG: 0.09 TABLET ORAL at 05:29

## 2021-03-12 RX ADMIN — FOLIC ACID 1 MG: 1 TABLET ORAL at 09:25

## 2021-03-12 NOTE — CASE MANAGEMENT
Met with the patient to review Team update which also included barriers and interventions to address  Patient was informed that the plan was for anticipated DC on Wednesday 3/17 with home PT, OT and RN  Patient verbalized understanding and is agreeable to this plan

## 2021-03-12 NOTE — PROGRESS NOTES
03/12/21 0700   Pain Assessment   Pain Assessment Tool 0-10   Pain Score 5   Pain Location/Orientation Orientation: Right;Location: Hip   Restrictions/Precautions   Precautions Fall Risk;Pain;Cognitive   Weight Bearing Restrictions Yes   RLE Weight Bearing Per Order WBAT   LLE Weight Bearing Per Order WBAT   ROM Restrictions No   Lifestyle   Autonomy "Can I just sponge bathe today?"   Eating   Type of Assistance Needed Independent   Amount of Physical Assistance Provided No physical assistance   Comment seated in recliner   Eating CARE Score 6   Oral Hygiene   Type of Assistance Needed Supervision   Amount of Physical Assistance Provided No physical assistance   Comment in stance at sink   Oral Hygiene CARE Score 4   Shower/Bathe Self   Type of Assistance Needed Physical assistance   Amount of Physical Assistance Provided Less than 25%   Comment to bathe R foot; CG while in stance to bathe buttocks/lorena area   Shower/Bathe Self CARE Score 3   Bathing   Assessed Bath Style Sponge Bath   Anticipated D/C Bath Style Sponge Bath   Able to Gather/Transport Yes   Able to Adjust Water Temperature Yes   Able to Wash/Rinse/Dry (body part) Left Arm;Right Arm;L Upper Leg;R Upper Leg;L Lower Leg/Foot;Chest;Abdomen;Perineal Area; Buttocks   Limitations Noted in Balance; Endurance;ROM;Safety;Strength;Timeliness   Positioning Seated;Standing   Upper Body Dressing   Type of Assistance Needed Supervision   Amount of Physical Assistance Provided No physical assistance   Comment seated in w/c   Upper Body Dressing CARE Score 4   Lower Body Dressing   Type of Assistance Needed Incidental touching   Amount of Physical Assistance Provided No physical assistance   Comment CG while in stance to pull up pants over hips   Lower Body Dressing CARE Score 4   Putting On/Taking Off Footwear   Type of Assistance Needed Supervision   Amount of Physical Assistance Provided No physical assistance   Comment supervision with sock aid; good recall of donning sock onto sock aid; would require overall min assist WITHOUT AE  Putting On/Taking Off Footwear CARE Score 4   Dressing/Undressing Clothing   Remove UB Clothes Pullover Shirt   Don UB Clothes Pullover Shirt   Remove LB Clothes Pants; Undergarment;Socks   Don LB Clothes Pants; Undergarment;Socks   Limitations Noted In Safety;ROM; Timeliness;Balance   Adaptive Equipment Sock Aide   Positioning Supported Sit;Standing   Sit to Stand   Type of Assistance Needed Incidental touching   Amount of Physical Assistance Provided No physical assistance   Comment CG with RW   Sit to Stand CARE Score 4   Bed-Chair Transfer   Type of Assistance Needed Incidental touching   Amount of Physical Assistance Provided No physical assistance   Comment CG with RW   Chair/Bed-to-Chair Transfer CARE Score 4   Toilet Transfer   Type of Assistance Needed Incidental touching   Amount of Physical Assistance Provided No physical assistance   Comment CG with RW   Toilet Transfer CARE Score 4   Exercise Tools   Other Exercise Tool 1 Pt engages in light UE strengthening using 2# FW with L UE and 1# FW with R UE, 2x10 for each of the following: shoulder press, chest press, elbow flex  Good tolerance to exercises  Rest breaks as needed due to fatigue  Cognition   Overall Cognitive Status Impaired   Arousal/Participation Alert; Cooperative   Attention Attends with cues to redirect   Orientation Level Oriented X4   Memory Decreased short term memory   Following Commands Follows all commands and directions without difficulty   Activity Tolerance   Activity Tolerance Patient tolerated treatment well   Assessment   Treatment Assessment Pt engages in 90 minute skilled OT session focusing on sponge bathing, functional transfers and light UE strengthening  Pt noted with good recall and carryover of sock aid for LE dressing   Continued CG while in stance during self care tasks due to decreased balance and slight LOB posteriorly when bathing buttocks/lorena area  Recommend continued skilled care in order to inc independence with self care, functional transfers, supported and unsupported standing paulino/bal, activity tolerance, B/L UE strengthening in order to decrease burden of care at d/c  Prognosis Good   Problem List Decreased strength;Decreased range of motion;Decreased endurance; Impaired balance;Decreased mobility;Pain   Barriers to Discharge Inaccessible home environment;Decreased caregiver support   Plan   Treatment/Interventions ADL retraining;Functional transfer training; Therapeutic exercise; Endurance training;Cognitive reorientation;Patient/family training;Equipment eval/education; Compensatory technique education   OT Therapy Minutes   OT Time In 0700   OT Time Out 0830   OT Total Time (minutes) 90   OT Mode of treatment - Individual (minutes) 90   OT Mode of treatment - Concurrent (minutes) 0   OT Mode of treatment - Group (minutes) 0   OT Mode of treatment - Co-treat (minutes) 0   OT Mode of Treatment - Total time(minutes) 90 minutes   OT Cumulative Minutes 270   Therapy Time missed   Time missed?  No

## 2021-03-12 NOTE — PLAN OF CARE
Problem: Potential for Falls  Goal: Patient will remain free of falls  Description: INTERVENTIONS:  - Assess patient frequently for physical needs  -  Identify cognitive and physical deficits and behaviors that affect risk of falls  -  [place] fall precautions as indicated by assessment   - Educate patient/family on patient safety including physical limitations  - Instruct patient to call for assistance with activity based on assessment  - Modify environment to reduce risk of injury  - Consider OT/PT consult to assist with strengthening/mobility  Outcome: Progressing     Problem: Prexisting or High Potential for Compromised Skin Integrity  Goal: Skin integrity is maintained or improved  Description: INTERVENTIONS:  - Identify patients at risk for skin breakdown  - Assess and monitor skin integrity  - Assess and monitor nutrition and hydration status  - Monitor labs   - Assess for incontinence   - Turn and reposition patient  - Assist with mobility/ambulation  - Relieve pressure over bony prominences  - Avoid friction and shearing  - Provide appropriate hygiene as needed including keeping skin clean and dry  - Evaluate need for skin moisturizer/barrier cream  - Collaborate with interdisciplinary team   - Patient/family teaching  - Consider wound care consult   Outcome: Progressing     Problem: Nutrition/Hydration-ADULT  Goal: Nutrient/Hydration intake appropriate for improving, restoring or maintaining nutritional needs  Description: Monitor and assess patient's nutrition/hydration status for malnutrition  Collaborate with interdisciplinary team and initiate plan and interventions as ordered  Monitor patient's weight and dietary intake as ordered or per policy  Utilize nutrition screening tool and intervene as necessary  Determine patient's food preferences and provide high-protein, high-caloric foods as appropriate       INTERVENTIONS:  - Monitor oral intake, urinary output, labs, and treatment plans  - Assess nutrition and hydration status and recommend course of action  - Evaluate amount of meals eaten  - Assist patient with eating if necessary   - Allow adequate time for meals  - Recommend/ encourage appropriate diets, oral nutritional supplements, and vitamin/mineral supplements  - Order, calculate, and assess calorie counts as needed  - Recommend, monitor, and adjust tube feedings and TPN/PPN based on assessed needs  - Assess need for intravenous fluids  - Provide specific nutrition/hydration education as appropriate  - Include patient/family/caregiver in decisions related to nutrition  Outcome: Progressing     Problem: PAIN - ADULT  Goal: Verbalizes/displays adequate comfort level or baseline comfort level  Description: Interventions:  - Encourage patient to monitor pain and request assistance  - Assess pain using appropriate pain scale  - Administer analgesics based on type and severity of pain and evaluate response  - Implement non-pharmacological measures as appropriate and evaluate response  - Consider cultural and social influences on pain and pain management  - Notify physician/advanced practitioner if interventions unsuccessful or patient reports new pain  Outcome: Progressing     Problem: SAFETY ADULT  Goal: Patient will remain free of falls  Description: INTERVENTIONS:  - Assess patient frequently for physical needs  -  Identify cognitive and physical deficits and behaviors that affect risk of falls    -  [place] fall precautions as indicated by assessment   - Educate patient/family on patient safety including physical limitations  - Instruct patient to call for assistance with activity based on assessment  - Modify environment to reduce risk of injury  - Consider OT/PT consult to assist with strengthening/mobility  Outcome: Progressing  Goal: Maintain or return to baseline ADL function  Description: INTERVENTIONS:  -  Assess patient's ability to carry out ADLs; assess patient's baseline for ADL function and identify physical deficits which impact ability to perform ADLs (bathing, care of mouth/teeth, toileting, grooming, dressing, etc )  - Assess/evaluate cause of self-care deficits   - Assess range of motion  - Assess patient's mobility; develop plan if impaired  - Assess patient's need for assistive devices and provide as appropriate  - Encourage maximum independence but intervene and supervise when necessary  - Involve family in performance of ADLs  - Assess for home care needs following discharge   - Consider OT consult to assist with ADL evaluation and planning for discharge  - Provide patient education as appropriate  Outcome: Progressing  Goal: Maintain or return mobility status to optimal level  Description: INTERVENTIONS:  - Assess patient's baseline mobility status (ambulation, transfers, stairs, etc )    - Identify cognitive and physical deficits and behaviors that affect mobility  - Identify mobility aids required to assist with transfers and/or ambulation (gait belt, sit-to-stand, lift, walker, cane, etc )  - [place] fall precautions as indicated by assessment  - Record patient progress and toleration of activity level on Mobility SBAR; progress patient to next Phase/Stage  - Instruct patient to call for assistance with activity based on assessment  - Consider rehabilitation consult to assist with strengthening/weightbearing, etc   Outcome: Progressing     Problem: DISCHARGE PLANNING  Goal: Discharge to home or other facility with appropriate resources  Description: INTERVENTIONS:  - Identify barriers to discharge w/patient and caregiver  - Arrange for needed discharge resources and transportation as appropriate  - Identify discharge learning needs (meds, wound care, etc )  - Arrange for interpretive services to assist at discharge as needed  - Refer to Case Management Department for coordinating discharge planning if the patient needs post-hospital services based on physician/advanced practitioner order or complex needs related to functional status, cognitive ability, or social support system  Outcome: Progressing     Problem: Knowledge Deficit  Goal: Patient/family/caregiver demonstrates understanding of disease process, treatment plan, medications, and discharge instructions  Description: Complete learning assessment and assess knowledge base    Interventions:  - Provide teaching at level of understanding  - Provide teaching via preferred learning methods  Outcome: Progressing     Problem: GASTROINTESTINAL - ADULT  Goal: Minimal or absence of nausea and/or vomiting  Description: INTERVENTIONS:  - Administer IV fluids if ordered to ensure adequate hydration  - Maintain NPO status until nausea and vomiting are resolved  - Nasogastric tube if ordered  - Administer ordered antiemetic medications as needed  - Provide nonpharmacologic comfort measures as appropriate  - Advance diet as tolerated, if ordered  - Consider nutrition services referral to assist patient with adequate nutrition and appropriate food choices  Outcome: Progressing  Goal: Maintains or returns to baseline bowel function  Description: INTERVENTIONS:  - Assess bowel function  - Encourage oral fluids to ensure adequate hydration  - Administer IV fluids if ordered to ensure adequate hydration  - Administer ordered medications as needed  - Encourage mobilization and activity  - Consider nutritional services referral to assist patient with adequate nutrition and appropriate food choices  Outcome: Progressing  Goal: Maintains adequate nutritional intake  Description: INTERVENTIONS:  - Monitor percentage of each meal consumed  - Identify factors contributing to decreased intake, treat as appropriate  - Assist with meals as needed  - Monitor I&O, weight, and lab values if indicated  - Obtain nutrition services referral as needed  Outcome: Progressing     Problem: GENITOURINARY - ADULT  Goal: Maintains or returns to baseline urinary function  Description: INTERVENTIONS:  - Assess urinary function  - Encourage oral fluids to ensure adequate hydration if ordered  - Administer IV fluids as ordered to ensure adequate hydration  - Administer ordered medications as needed  - Offer frequent toileting  - Follow urinary retention protocol if ordered  Outcome: Progressing  Goal: Absence of urinary retention  Description: INTERVENTIONS:  - Assess patients ability to void and empty bladder  - Monitor I/O  - Bladder scan as needed  - Discuss with physician/AP medications to alleviate retention as needed  - Discuss catheterization for long term situations as appropriate  Outcome: Progressing     Problem: SKIN/TISSUE INTEGRITY - ADULT  Goal: Skin integrity remains intact  Description: INTERVENTIONS  - Identify patients at risk for skin breakdown  - Assess and monitor skin integrity  - Assess and monitor nutrition and hydration status  - Monitor labs (i e  albumin)  - Assess for incontinence   - Turn and reposition patient  - Assist with mobility/ambulation  - Relieve pressure over bony prominences  - Avoid friction and shearing  - Provide appropriate hygiene as needed including keeping skin clean and dry  - Evaluate need for skin moisturizer/barrier cream  - Collaborate with interdisciplinary team (i e  Nutrition, Rehabilitation, etc )   - Patient/family teaching  Outcome: Progressing  Goal: Incision(s), wounds(s) or drain site(s) healing without S/S of infection  Description: INTERVENTIONS  - Assess and document risk factors for skin impairment   - Assess and document dressing, incision, wound bed, drain sites and surrounding tissue  - Consider nutrition services referral as needed  - Oral mucous membranes remain intact  - Provide patient/ family education  Outcome: Progressing  Goal: Oral mucous membranes remain intact  Description: INTERVENTIONS  - Assess oral mucosa and hygiene practices  - Implement preventative oral hygiene regimen  - Implement oral medicated treatments as ordered  - Initiate Nutrition services referral as needed  Outcome: Progressing     Problem: HEMATOLOGIC - ADULT  Goal: Maintains hematologic stability  Description: INTERVENTIONS  - Assess for signs and symptoms of bleeding or hemorrhage  - Monitor labs  - Administer supportive blood products/factors as ordered and appropriate  Outcome: Progressing

## 2021-03-12 NOTE — ASSESSMENT & PLAN NOTE
2/22 - Mechanical fall down the stairs predominately on the R side  Multiple pelvic fractures: Acute nondisplaced fracture of the right inferior pubic ramus and right pubic symphysis, anterior right acetabular fracture and probable nondisplaced left anterior acetabular fracture  Ortho consulted - no surgical intervention at this time      Monitor for s/s pelvic bleeding  Pain management  WBAT  DVT Prophylaxis - Lovenox  Follow-up with Dr Magda Major (Orthopedics) office in 6 weeks  (Week of April 5th)      PT/OT Therapies  Primary team following

## 2021-03-12 NOTE — ASSESSMENT & PLAN NOTE
K+ 3 1 on 3/10  Given 40 mEq oral potassium chloride this morning and will receive another 40mEq in the evening  Repeat K+ on 3/11 was 4 8  Previously on potassium supplements at home for a period of time  May need to consider daily supplementation  Trend with routine BMP

## 2021-03-12 NOTE — PLAN OF CARE
Problem: Potential for Falls  Goal: Patient will remain free of falls  Description: INTERVENTIONS:  - Assess patient frequently for physical needs  -  Identify cognitive and physical deficits and behaviors that affect risk of falls  -  [place] fall precautions as indicated by assessment   - Educate patient/family on patient safety including physical limitations  - Instruct patient to call for assistance with activity based on assessment  - Modify environment to reduce risk of injury  - Consider OT/PT consult to assist with strengthening/mobility  Outcome: Progressing     Problem: Prexisting or High Potential for Compromised Skin Integrity  Goal: Skin integrity is maintained or improved  Description: INTERVENTIONS:  - Identify patients at risk for skin breakdown  - Assess and monitor skin integrity  - Assess and monitor nutrition and hydration status  - Monitor labs   - Assess for incontinence   - Turn and reposition patient  - Assist with mobility/ambulation  - Relieve pressure over bony prominences  - Avoid friction and shearing  - Provide appropriate hygiene as needed including keeping skin clean and dry  - Evaluate need for skin moisturizer/barrier cream  - Collaborate with interdisciplinary team   - Patient/family teaching  - Consider wound care consult   Outcome: Progressing     Problem: Nutrition/Hydration-ADULT  Goal: Nutrient/Hydration intake appropriate for improving, restoring or maintaining nutritional needs  Description: Monitor and assess patient's nutrition/hydration status for malnutrition  Collaborate with interdisciplinary team and initiate plan and interventions as ordered  Monitor patient's weight and dietary intake as ordered or per policy  Utilize nutrition screening tool and intervene as necessary  Determine patient's food preferences and provide high-protein, high-caloric foods as appropriate       INTERVENTIONS:  - Monitor oral intake, urinary output, labs, and treatment plans  - Assess nutrition and hydration status and recommend course of action  - Evaluate amount of meals eaten  - Assist patient with eating if necessary   - Allow adequate time for meals  - Recommend/ encourage appropriate diets, oral nutritional supplements, and vitamin/mineral supplements  - Order, calculate, and assess calorie counts as needed  - Recommend, monitor, and adjust tube feedings and TPN/PPN based on assessed needs  - Assess need for intravenous fluids  - Provide specific nutrition/hydration education as appropriate  - Include patient/family/caregiver in decisions related to nutrition  Outcome: Progressing     Problem: PAIN - ADULT  Goal: Verbalizes/displays adequate comfort level or baseline comfort level  Description: Interventions:  - Encourage patient to monitor pain and request assistance  - Assess pain using appropriate pain scale  - Administer analgesics based on type and severity of pain and evaluate response  - Implement non-pharmacological measures as appropriate and evaluate response  - Consider cultural and social influences on pain and pain management  - Notify physician/advanced practitioner if interventions unsuccessful or patient reports new pain  Outcome: Progressing     Problem: SAFETY ADULT  Goal: Patient will remain free of falls  Description: INTERVENTIONS:  - Assess patient frequently for physical needs  -  Identify cognitive and physical deficits and behaviors that affect risk of falls    -  [place] fall precautions as indicated by assessment   - Educate patient/family on patient safety including physical limitations  - Instruct patient to call for assistance with activity based on assessment  - Modify environment to reduce risk of injury  - Consider OT/PT consult to assist with strengthening/mobility  Outcome: Progressing  Goal: Maintain or return to baseline ADL function  Description: INTERVENTIONS:  -  Assess patient's ability to carry out ADLs; assess patient's baseline for ADL function and identify physical deficits which impact ability to perform ADLs (bathing, care of mouth/teeth, toileting, grooming, dressing, etc )  - Assess/evaluate cause of self-care deficits   - Assess range of motion  - Assess patient's mobility; develop plan if impaired  - Assess patient's need for assistive devices and provide as appropriate  - Encourage maximum independence but intervene and supervise when necessary  - Involve family in performance of ADLs  - Assess for home care needs following discharge   - Consider OT consult to assist with ADL evaluation and planning for discharge  - Provide patient education as appropriate  Outcome: Progressing  Goal: Maintain or return mobility status to optimal level  Description: INTERVENTIONS:  - Assess patient's baseline mobility status (ambulation, transfers, stairs, etc )    - Identify cognitive and physical deficits and behaviors that affect mobility  - Identify mobility aids required to assist with transfers and/or ambulation (gait belt, sit-to-stand, lift, walker, cane, etc )  - [place] fall precautions as indicated by assessment  - Record patient progress and toleration of activity level on Mobility SBAR; progress patient to next Phase/Stage  - Instruct patient to call for assistance with activity based on assessment  - Consider rehabilitation consult to assist with strengthening/weightbearing, etc   Outcome: Progressing     Problem: DISCHARGE PLANNING  Goal: Discharge to home or other facility with appropriate resources  Description: INTERVENTIONS:  - Identify barriers to discharge w/patient and caregiver  - Arrange for needed discharge resources and transportation as appropriate  - Identify discharge learning needs (meds, wound care, etc )  - Arrange for interpretive services to assist at discharge as needed  - Refer to Case Management Department for coordinating discharge planning if the patient needs post-hospital services based on physician/advanced practitioner order or complex needs related to functional status, cognitive ability, or social support system  Outcome: Progressing     Problem: Knowledge Deficit  Goal: Patient/family/caregiver demonstrates understanding of disease process, treatment plan, medications, and discharge instructions  Description: Complete learning assessment and assess knowledge base    Interventions:  - Provide teaching at level of understanding  - Provide teaching via preferred learning methods  Outcome: Progressing     Problem: GASTROINTESTINAL - ADULT  Goal: Minimal or absence of nausea and/or vomiting  Description: INTERVENTIONS:  - Administer IV fluids if ordered to ensure adequate hydration  - Maintain NPO status until nausea and vomiting are resolved  - Nasogastric tube if ordered  - Administer ordered antiemetic medications as needed  - Provide nonpharmacologic comfort measures as appropriate  - Advance diet as tolerated, if ordered  - Consider nutrition services referral to assist patient with adequate nutrition and appropriate food choices  Outcome: Progressing  Goal: Maintains or returns to baseline bowel function  Description: INTERVENTIONS:  - Assess bowel function  - Encourage oral fluids to ensure adequate hydration  - Administer IV fluids if ordered to ensure adequate hydration  - Administer ordered medications as needed  - Encourage mobilization and activity  - Consider nutritional services referral to assist patient with adequate nutrition and appropriate food choices  Outcome: Progressing  Goal: Maintains adequate nutritional intake  Description: INTERVENTIONS:  - Monitor percentage of each meal consumed  - Identify factors contributing to decreased intake, treat as appropriate  - Assist with meals as needed  - Monitor I&O, weight, and lab values if indicated  - Obtain nutrition services referral as needed  Outcome: Progressing     Problem: GENITOURINARY - ADULT  Goal: Maintains or returns to baseline urinary function  Description: INTERVENTIONS:  - Assess urinary function  - Encourage oral fluids to ensure adequate hydration if ordered  - Administer IV fluids as ordered to ensure adequate hydration  - Administer ordered medications as needed  - Offer frequent toileting  - Follow urinary retention protocol if ordered  Outcome: Progressing  Goal: Absence of urinary retention  Description: INTERVENTIONS:  - Assess patients ability to void and empty bladder  - Monitor I/O  - Bladder scan as needed  - Discuss with physician/AP medications to alleviate retention as needed  - Discuss catheterization for long term situations as appropriate  Outcome: Progressing     Problem: SKIN/TISSUE INTEGRITY - ADULT  Goal: Skin integrity remains intact  Description: INTERVENTIONS  - Identify patients at risk for skin breakdown  - Assess and monitor skin integrity  - Assess and monitor nutrition and hydration status  - Monitor labs (i e  albumin)  - Assess for incontinence   - Turn and reposition patient  - Assist with mobility/ambulation  - Relieve pressure over bony prominences  - Avoid friction and shearing  - Provide appropriate hygiene as needed including keeping skin clean and dry  - Evaluate need for skin moisturizer/barrier cream  - Collaborate with interdisciplinary team (i e  Nutrition, Rehabilitation, etc )   - Patient/family teaching  Outcome: Progressing  Goal: Incision(s), wounds(s) or drain site(s) healing without S/S of infection  Description: INTERVENTIONS  - Assess and document risk factors for skin impairment   - Assess and document dressing, incision, wound bed, drain sites and surrounding tissue  - Consider nutrition services referral as needed  - Oral mucous membranes remain intact  - Provide patient/ family education  Outcome: Progressing  Goal: Oral mucous membranes remain intact  Description: INTERVENTIONS  - Assess oral mucosa and hygiene practices  - Implement preventative oral hygiene regimen  - Implement oral medicated treatments as ordered  - Initiate Nutrition services referral as needed  Outcome: Progressing     Problem: HEMATOLOGIC - ADULT  Goal: Maintains hematologic stability  Description: INTERVENTIONS  - Assess for signs and symptoms of bleeding or hemorrhage  - Monitor labs  - Administer supportive blood products/factors as ordered and appropriate  Outcome: Progressing

## 2021-03-12 NOTE — PROGRESS NOTES
Physical Medicine and Rehabilitation Progress Note  Sheila Fine 78 y o  female MRN: [id number]  Unit/Bed#: [facility] 045-26 Encounter: [id number]      Chief Complaints:   R pelvic pain     Interval Events/Subjective:     Patient reports R pelvic pain increased after therapies today but overall stable  She denies increased weakness or increased lightheadedness  She is eating but denies constipation, fever, calf pain, urinary issues or other new complaints  ROS: A 10 point ROS was performed; negative except as noted above  Assessment & Plan:     * Multiple closed fractures of pelvis ([address] )  Assessment & Plan  · Traumatic fall on stairs :  · Resultant right inferior pubic rami fracture, right pubic symphysis fracture, right anterior acetabular fracture, left anterior acetabular fracture  · Evaluated by Orthopedics - treat conservatively  · WBAT BLE  · Continue DVT ppx - lovenox   · Follow-up with Trauma/Orthopedics as outpatient   · R hip/pelvis x-ray 3/4 stable fractures Subtle right pelvic fractures as noted above   These correspond approximately to the CT findings   No new or displaced pelvic fractures are demonstrated  · Function improving adequately; continue mgmt as outlined   · continue acute comprehensive interdisciplinary inpatient rehabilitation to include intensive skilled therapies (PT, OT) as outlined with oversight and management by rehabilitation physician as well as inpatient rehab level nursing, case management and weekly interdisciplinary team meetings     · Follow-up with outpatient orthopedics    Pain  Assessment & Plan  Stable  APAP TID  Gabapentin 100mg HS  LD patches  Robaxin 500mg Q6H PRN spasms  Oxy 5mg Q4H PRN  Voltaren gel added by IM   Counseled on and continue to encourage deep breathing/relaxation/behavioral pain management techniques:     Deep breathin seconds in, 5 seconds out, 5 times per hour when awake and PRN when in pain or anticipate pain; avoid holding breath and tightening muscles and instead breathe slowly and deeply  Monitor for oversedation, AMS/delirium, and respiratory depression   If being administered - hold opiates, muscle relaxants, benzodiazepines, and gabapentin for oversedation, AMS, or RR<12       Gastroparesis  Assessment & Plan  Continues to stool adequately - monitor   From Sjogren's   monitor closely especially with recent discontinuation of chronic reglan in context of oral dyskinesias  Senna BID  PRN bowel regimen   Follow-up with OP GI     Hyponatremia  Assessment & Plan  Mild Hyponatremia   Monitor intermittently  Co-management with internal medicine team         Abnormal CT scan, gastrointestinal tract  Assessment & Plan  Discussed with pt/fam  Incidental finding on recent CT  - thickening of the distal rectal wall "correlate for proctitis "  - Follow-up with PCP     Adenopathy  Assessment & Plan  Discussed with pt/fam  Incidental finding on recent CT  " stable distal paraesophageal and gastrohepatic ligament adenopathy of uncertain etiology"  - Follow-up with PCP    Onychomycosis  Assessment & Plan  Pods c/s and nail debridement     Dermatitis associated with moisture  Assessment & Plan  Keep clean and dry  - CHRISTIE cream BID  - Optimal b/b mgmt     Chest pain  Assessment & Plan  Atypical - improved  ACS ruled out; CTA no PE, Cards TTE normal EF without RWMA; no stress test needed at this time  No absolute indication for aspirin at this time; but cards mentioned considering it if no contraindications  -[facility] IM team feels may have interaction with methotrexate and patient would prefer to remain off; remain off aspirin for now  -Discussed with pt that she should discuss possibly starting baby aspirin as an OP with PCP in follow-up     Abnormal ECG  Assessment & Plan  Per IM  "EKG completed on 3/4 that showed possible T-wave inversion on V2 lead    Sent to acute care setting for formal ACS workup      Following EKGs and troponins were negative  Cardiology was consulted - ECHO completed  Showed no motion wall abnormalities  EF was 64%  No plans for further cardiac workup at this time  CTA showed no PE  Showed small R pleural effusion and basilar atelectasis - given a 1 time dose of IV 40mg lasix "    Hypokalemia  Assessment & Plan  Improved 3/11  IM consulted, monitoring, repletion, and overall management at their discretion during ARC course      Anemia  Assessment & Plan  Up to 10 5 on 03/10 for hemoglobin  IM consulted, with overall monitoring, and management at their discretion during ARC course  Monitor H/H, vitals, signs/symptoms of acute bleeding      Oral dyskinesia  Assessment & Plan  Stable   Patient feels this is related to Sjogrens but movements are more consistent with EPS and likely TD in context of long-standing metoclopramide   - Comgmt with IM  - Metoclopramide discontinued prior to [facility] which is first line mgmt (holding DA blockers)   - This may be difficult to improve at this point  - Monitor during ARC course  - Follow-up with PCP - consider pharmacologic tx such at tetrabenazine if needed in future     Hypothyroidism  Assessment & Plan  Levothyroxine     Anxiety and depression  Assessment & Plan  Mood stable   Prozac  PRN xanax  Patient counseled on risks does opiates and worsened risks with a combination of 2 taken at the same which includes fall and significant injury   Supportive counseling  Consider Psychology consult while in [place] on and continue to encourage deep breathing/relaxation/behavioral management techniques:         Hypertension  Assessment & Plan  Internal medicine consulted and management at their discretion  Monitor vitals with and without activity; monitor for orthostasis  Monitor hemoglobin, electrolytes, kidney function, hydration status   Current meds: Atenolol, Novasc       Sjogren's disease (HCC)  Assessment & Plan  Takes methotrexate 20mg weekly    Follows up with Dr Noelle Fernandez (Rheumatology) as outpatient  - saliva substitute to p r n  Parotid gland enlargement  Assessment & Plan  OP follow-up with ENT Dr Shelbie Christina       Disposition  ·  home with estimated length of stay of 7-10 days from admission     Follow-up providers and other issues to be followed up after discharge  ·  PCP - overall medical mgmt, follow-up incidental findings; consideration for baby aspirin  · Orthopedics   · Rheumatology  ·  follow-up thickening of distal rectal wall with PCP as well as stable distal paraesophageal in gastrohepatic ligament adenopathy     CODE: Level 1: Full Code     Restrictions include:   Fall precautions    Objective:    Functional Update:   toileting hygiene moderate assist, bathing min assist; LB dressing, transfers and ambulation CGA    Allergies per EMR    Physical Exam:  Temp:  [97 5 °F (36 4 °C)-98 5 °F (36 9 °C)] 98 5 °F (36 9 °C)  HR:  [57-65] 59  Resp:  [18] 18  BP: (111-155)/(58-78) 128/59  SpO2:  [97 %-99 %] 97 %    GEN:  Sitting in NAD   HEENT/NECK:  somewhat dry mucous membranes  CARDIAC: Regular rate rhythm, no murmers, no rubs, no gallops  LUNGS:  clear to auscultation, no wheezes, rales, or rhonchi  ABDOMEN: Soft, non-tender, non-distended, normal active bowel sounds  EXTREMITIES/SKIN:  no calf edema, no calf tenderness to palpation  NEURO:   MENTAL STATUS:  Appropriate wakefulness and interaction  and Strength/MMT:  Near full BLE - slight decrease in prox RLE with pelvic fxs  PSYCH:  Affect:  Euthymic     Diagnostic Studies: Reviewed, no new imaging  No orders to display       Laboratory: Labs reviewed  Results from last 7 days   Lab Units 03/10/21  0509   HEMOGLOBIN g/dL 10 5*   HEMATOCRIT % 32 1*   WBC Thousand/uL 5 10     Results from last 7 days   Lab Units 03/11/21  0519 03/10/21  0509   BUN mg/dL 14 13   SODIUM mmol/L 133* 135*   POTASSIUM mmol/L 4 8 3 1*   CHLORIDE mmol/L 102 100   CREATININE mg/dL 0 58* 0 63            Drug regimen reviewed, all potential adverse effects identified and addressed:    Scheduled Meds:  Current Facility-Administered Medications   Medication Dose Route Frequency Provider Last Rate    acetaminophen  650 mg Oral [facility] Dorma Backers, MD      ALPRAZolam  0 25 mg Oral TID PRN Dorma Backers, MD      amLODIPine  5 mg Oral Daily Dorma Backers, MD      atenolol  50 mg Oral BID Dorma Backers, MD      atorvastatin  10 mg Oral Daily Dorma Backers, MD      bisacodyl  10 mg Rectal Daily PRN Dorma Backers, MD      Diclofenac Sodium  2 g Topical 4x Daily PRN DARIO Cervantes      enoxaparin  30 mg Subcutaneous Q12H [facility] Dorma Backers, MD      FLUoxetine  20 mg Oral Daily Dorma Backers, MD      folic acid  1 mg Oral Daily Dorma Backers, MD      gabapentin  100 mg Oral HS Dorma Backers, MD      lactulose  20 g Oral BID PRN Dorma Backers, MD      levothyroxine  88 mcg Oral Early Morning Dorma Backers, MD      lidocaine  2 patch Topical HS Dorma Backers, MD      methocarbamol  500 mg Oral Q6H PRN Dorma Backers, MD      methotrexate  20 mg Oral Weekly Dorma Backers, MD      CHRISTIE ANTIFUNGAL  1 application Topical BID Dorma Backers, MD      nitroglycerin  0 4 mg Sublingual Q5 Min PRN Dorma Backers, MD      nystatin  1 application Topical BID Dorma Backers, MD      oxyCODONE  5 mg Oral Q4H PRN Dorma Backers, MD      saliva substitute  5 spray Mouth/Throat Q4H PRN Dorma Backers, MD      senna  1 tablet Oral BID Dorma Backers, MD         ** Please Note: Fluency Direct voice to text software may have been used in the creation of this document   **

## 2021-03-12 NOTE — ASSESSMENT & PLAN NOTE
Takes methotrexate 20mg weekly  Follows up with Dr Faye Iglesias (Rheumatology) as outpatient  Saliva spray added as PRN for dry mouth

## 2021-03-12 NOTE — PROGRESS NOTES
03/12/21 1000   Pain Assessment   Pain Assessment Tool 0-10   Pain Score 7   Pain Location/Orientation Orientation: Right;Location: Hip;Location: Buttocks   Pain Onset/Description Onset: Ongoing;Frequency: Constant/Continuous   Effect of Pain on Daily Activities increased rest breaks required   Patient's Stated Pain Goal No pain   Hospital Pain Intervention(s) Rest;Repositioned   Restrictions/Precautions   Precautions Fall Risk;Cognitive;Pain   Weight Bearing Restrictions Yes   RLE Weight Bearing Per Order WBAT   LLE Weight Bearing Per Order WBAT   ROM Restrictions No   Cognition   Overall Cognitive Status Impaired   Arousal/Participation Alert; Cooperative   Attention Attends with cues to redirect   Orientation Level Oriented X4   Memory Decreased short term memory   Following Commands Follows all commands and directions without difficulty   Comments persevertating on pain meds   Sit to Stand   Type of Assistance Needed Incidental touching;Supervision   Amount of Physical Assistance Provided No physical assistance   Comment CS/CGA   Sit to Stand CARE Score 4   Bed-Chair Transfer   Type of Assistance Needed Supervision; Incidental touching; Adaptive equipment   Amount of Physical Assistance Provided No physical assistance   Comment CS/CGA with RW   Chair/Bed-to-Chair Transfer CARE Score 4   Transfer Bed/Chair/Wheelchair   Limitations Noted In Pain Management;Problem Solving;LE Strength   Adaptive Equipment Roller Walker   Car Transfer   Reason if not Attempted Environmental limitations   Car Transfer CARE Score 10   Walk 10 Feet   Type of Assistance Needed Incidental touching;Supervision; Adaptive equipment   Amount of Physical Assistance Provided No physical assistance   Comment CS/CGA with RW   Walk 10 Feet CARE Score 4   Walk 50 Feet with Two Turns   Type of Assistance Needed Incidental touching; Adaptive equipment   Amount of Physical Assistance Provided No physical assistance   Comment CGA with RW   Walk 50 Feet with Two Turns CARE Score 4   Walk 150 Feet   Type of Assistance Needed Incidental touching; Adaptive equipment   Amount of Physical Assistance Provided No physical assistance   Comment CGA with RW   Walk 150 Feet CARE Score 4   Walking 10 Feet on Uneven Surfaces   Type of Assistance Needed Incidental touching; Adaptive equipment   Amount of Physical Assistance Provided No physical assistance   Comment CGA on ramp   Walking 10 Feet on Uneven Surfaces CARE Score 4   Ambulation   Does the patient walk? 2  Yes   Primary Mode of Locomotion Prior to Admission Walk   Distance Walked (feet) 150 ft  (x80, x20)   Assist Device Roller Walker   Gait Pattern Slow [place]; Antalgic;Decreased R stance; Improper weight shift   Limitations Noted In Balance; Endurance; Heel Strike; Safety;Speed;Strength;Swing   Provided Assistance with: Direction;Balance   Walk Assist Level Close Supervision;Contact Guard   Findings with increased fatigue and pain pt required CGA   Wheel 50 Feet with Two Turns   Reason if not Attempted Activity not applicable   Wheel 50 Feet with Two Turns CARE Score 9   Wheel 150 Feet   Reason if not Attempted Activity not applicable   Wheel 122 Feet CARE Score 9   Wheelchair mobility   Does the patient use a wheelchair? 0  No   Curb or Single Stair   Style negotiated Curb   Type of Assistance Needed Incidental touching; Adaptive equipment   Amount of Physical Assistance Provided No physical assistance   Comment with RW on 4inch weight   1 Step (Curb) CARE Score 4   4 Steps   Type of Assistance Needed Physical assistance; Adaptive equipment   Amount of Physical Assistance Provided Less than 25%   Comment SARITA with L rail ascending with SPC in R   4 Steps CARE Score 3   12 Steps   Reason if not Attempted Safety concerns   12 Steps CARE Score 88   Stairs   Type Stairs   # of Steps 4  (Pt declined doing 4 more due to increased pain)   Weight Bearing Precautions Fall Risk   Assist Devices Single Rail;Cane   Toilet Transfer Type of Assistance Needed Incidental touching;Supervision; Adaptive equipment   Amount of Physical Assistance Provided No physical assistance   Comment CS/CGA with RW/grab bar on L   Toilet Transfer CARE Score 4   Toilet Transfer   Surface Assessed Bedside Commode  (over standard toilet)   Therapeutic Interventions   Strengthening seated LAQ 3x10 reps with 1# RLE and 2# LLE   Assessment   Treatment Assessment Pt participated in skilled 90 min PT session with increased focus on gait, functional transfers and stair management  Pt required several verbal cues through out session to reach back when sitting with poor carryover  As session progressed pt c/o increased pain and had a decrease in act tolerance  Pt required several seated resting breaks  Pt BP was taken in sitting (128/60) post amb due to increased sweating and feeling "clamy"  Pt will cont to benefit from increased LE strengthening, increased act tolerance and increased safety awareness  Cont POC as tolerated  Problem List Decreased strength;Decreased range of motion;Decreased endurance;Decreased mobility; Decreased cognition; Impaired judgement;Decreased safety awareness;Orthopedic restrictions;Pain   Barriers to Discharge Inaccessible home environment;Decreased caregiver support   PT Barriers   Functional Limitation Car transfers; Ramp negotiation;Stair negotiation;Standing;Walking   Plan   Treatment/Interventions Functional transfer training;LE strengthening/ROM; Therapeutic exercise; Endurance training;Cognitive reorientation; Bed mobility;Gait training   Recommendation   PT Discharge Recommendation Home with skilled therapy   Equipment Recommended Walker   PT Therapy Minutes   PT Time In 1000   PT Time Out 1130   PT Total Time (minutes) 90   PT Mode of treatment - Individual (minutes) 90   PT Mode of treatment - Concurrent (minutes) 0   PT Mode of treatment - Group (minutes) 0   PT Mode of treatment - Co-treat (minutes) 0   PT Mode of Treatment - Total time(minutes) 90 minutes   PT Cumulative Minutes 270   Therapy Time missed   Time missed?  No

## 2021-03-12 NOTE — PROGRESS NOTES
[facility]  Progress Note Tevin Francisco Viscomi 1955, 78 y o  female MRN: [id number]  Unit/Bed#: [facility] 316-04 Encounter: [id number]  Primary Care Provider: Tera Mukherjee MD   Date and time admitted to hospital: 3/9/2021  2:50 PM    Onychomycosis  Assessment & Plan  Podiatry was consulted on 3/9  Nails trimmed on 3/10  Continue to follow-up with Podiatry as outpatient  Abnormal ECG  Assessment & Plan  EKG completed on 3/4 that showed possible T-wave inversion on V2 lead  Sent to acute care setting for formal ACS workup  Following EKGs and troponins were negative  Cardiology was consulted - ECHO completed  Showed no motion wall abnormalities  EF was 64%  No plans for further cardiac workup at this time  CTA showed no PE  Showed small R pleural effusion and basilar atelectasis - given a 1 time dose of IV 40mg lasix  Hypokalemia  Assessment & Plan  K+ 3 1 on 3/10  Given 40 mEq oral potassium chloride this morning and will receive another 40mEq in the evening  Repeat K+ on 3/11 was 4 8  Previously on potassium supplements at home for a period of time  May need to consider daily supplementation  Trend with routine BMP  Anemia  Assessment & Plan  Stable  Current Hgb 10 5, Hct 32 1  Baseline Hgb appears to be 9-10  Takes folic acid 1mg daily  Gastroparesis  Assessment & Plan  Formerly on Reglan - stopped in the acute setting due to concerns for extrapyramidal symptoms  Monitor for s/s of bowel obstruction  Continue bowel regimen  Hypothyroidism  Assessment & Plan  Currently on levothyroxine 88mcg daily at home (States that she was increased 1 month ago from 75mcg)  Last TSH level unknown  Continue to follow-up with outpatient PCP  Anxiety and depression  Assessment & Plan  Stable  Continue Prozac 20mg daily and Xanax PRN for anxiety  Supportive counseling as needed      Hypertension  Assessment & Plan  Home regimen: Norvasc 2 5mg daily and atenolol 50mg BID  Episode of hypertensive urgency while in acute setting      Monitor routine VS   Continue atenolol 50mg BID  Increased Norvasc to 5mg daily on 3/  Sjogren's disease ([facility] [address] )  Assessment & Plan  Takes methotrexate 20mg weekly  Follows up with Dr Akshat Masterson (Rheumatology) as outpatient  Saliva spray added as PRN for dry mouth  Parotid gland enlargement  Assessment & Plan  Stable  Yearly follow-up with Dr Johnny Wright (ENT) as outpatient  * Multiple closed fractures of pelvis [facility])  Assessment & Plan   - Mechanical fall down the stairs predominately on the R side  Multiple pelvic fractures: Acute nondisplaced fracture of the right inferior pubic ramus and right pubic symphysis, anterior right acetabular fracture and probable nondisplaced left anterior acetabular fracture  Ortho consulted - no surgical intervention at this time      Monitor for s/s pelvic bleeding  Pain management  WBAT  DVT Prophylaxis - Lovenox  Follow-up with Dr Najma Santana (Orthopedics) office in 6 weeks  (Week of )      PT/OT Therapies  Primary team following  VTE Pharmacologic Prophylaxis:   Pharmacologic: Enoxaparin (Lovenox)  Mechanical VTE Prophylaxis in Place: Yes - sequential compression devices  Current Length of Stay: 3 day(s)    Current Patient Status: Inpatient Rehab     Discharge Plan: As per primary team     Code Status: Level 1 - Full Code    Subjective:   Pt examined while sitting in WC in pt room  7/10 R groin pain, aching/stabbing, improves with pain medication and ice  Slept well last night  Therapy has been going well  LBM yesterday with no issues  Plans for discharge on Wednesday and patient is looking forward to going home soon      Objective:     Vitals:   Temp (24hrs), Av 6 °F (36 4 °C), Min:97 3 °F (36 3 °C), Max:98 °F (36 7 °C)    Temp:  [97 3 °F (36 3 °C)-98 °F (36 7 °C)] 97 5 °F (36 4 °C)  HR:  [57-65] 63  Resp:  [18] 18  BP: (111-155)/(58-78) 111/58  SpO2:  [96 %-99 %] 99 %  Body mass index is 27 19 kg/m²  Review of Systems   Constitutional: Negative for chills, fatigue and fever  Respiratory: Negative for cough and shortness of breath  Cardiovascular: Negative for chest pain, palpitations and leg swelling  Gastrointestinal: Negative for abdominal distention, abdominal pain, constipation, diarrhea, nausea and vomiting  LBM 3/11   Genitourinary: Negative for difficulty urinating  Musculoskeletal: Positive for arthralgias (R groin pain, aching/stabbing, improves with pain medication and ice)  Neurological: Negative for dizziness, weakness and light-headedness  Psychiatric/Behavioral: Negative for sleep disturbance  Input and Output Summary (last 24 hours): Intake/Output Summary (Last 24 hours) at 3/12/2021 0942  Last data filed at 3/12/2021 0845  Gross per 24 hour   Intake 700 ml   Output 800 ml   Net -100 ml       Physical Exam:     Physical Exam  Vitals signs and nursing note reviewed  Constitutional:       Appearance: Normal appearance  HENT:      Head: Normocephalic and atraumatic  Cardiovascular:      Rate and Rhythm: Normal rate and regular rhythm  Pulses: Normal pulses  Heart sounds: Normal heart sounds  No murmur  No friction rub  Pulmonary:      Effort: Pulmonary effort is normal  No respiratory distress  Breath sounds: Normal breath sounds  No wheezing or rhonchi  Abdominal:      General: Abdomen is flat  Bowel sounds are normal  There is no distension  Palpations: Abdomen is soft  Tenderness: There is no abdominal tenderness  Musculoskeletal:      Right lower leg: No edema  Left lower leg: No edema  Skin:     General: Skin is warm and dry  Neurological:      Mental Status: She is alert and oriented to person, place, and time  Comments: Dysarthria  Oral dyskinesia     Psychiatric:         Mood and Affect: Mood normal          Behavior: Behavior normal          Additional Data: Labs:    Results from last 7 days   Lab Units 03/10/21  0509   WBC Thousand/uL 5 10   HEMOGLOBIN g/dL 10 5*   HEMATOCRIT % 32 1*   PLATELETS Thousands/uL 326   NEUTROS PCT % 80*   LYMPHS PCT % 8*   MONOS PCT % 10   EOS PCT % 2     Results from last 7 days   Lab Units 03/11/21  0519   SODIUM mmol/L 133*   POTASSIUM mmol/L 4 8   CHLORIDE mmol/L 102   CO2 mmol/L 21*   BUN mg/dL 14   CREATININE mg/dL 0 58*   ANION GAP mmol/L 10   CALCIUM mg/dL 9 1   GLUCOSE RANDOM mg/dL 86                       Labs reviewed    Imaging:    Imaging reviewed    Recent Cultures (last 7 days):           Last 24 Hours Medication List:   Current Facility-Administered Medications   Medication Dose Route Frequency Provider Last Rate    acetaminophen  650 mg Oral [facility] João Navarro MD      ALPRAZolam  0 25 mg Oral TID PRN João Navarro MD      amLODIPine  5 mg Oral Daily João Navarro MD      atenolol  50 mg Oral BID João Navarro MD      atorvastatin  10 mg Oral Daily João Navarro MD      bisacodyl  10 mg Rectal Daily PRN João Navarro MD      Diclofenac Sodium  2 g Topical 4x Daily PRN DARIO Shaw      enoxaparin  30 mg Subcutaneous Q12H [address] João Navarro MD      FLUoxetine  20 mg Oral Daily João Navarro MD      folic acid  1 mg Oral Daily João Navarro MD      gabapentin  100 mg Oral HS João Navarro MD      lactulose  20 g Oral BID PRN João Navarro MD      levothyroxine  88 mcg Oral Early Morning João Navarro MD      lidocaine  2 patch Topical HS João Navarro MD      methocarbamol  500 mg Oral Q6H PRN João Navarro MD      methotrexate  20 mg Oral Weekly João Navarro MD      CHRISTIE ANTIFUNGAL  1 application Topical BID João Navaror MD      nitroglycerin  0 4 mg Sublingual Q5 Min PRN João Navarro MD      nystatin  1 application Topical BID João Navarro MD      oxyCODONE  5 mg Oral Q4H PRN João Navarro MD      saliva substitute  5 spray Mouth/Throat Q4H PRN MD Chance Menendez senna  1 tablet Oral BID Casie Sagastume MD          M*Modal software was used to dictate this note  It may contain errors with dictating incorrect words or incorrect spelling  Please contact the provider directly with any questions

## 2021-03-13 PROCEDURE — 97110 THERAPEUTIC EXERCISES: CPT | Performed by: PHYSICAL THERAPIST

## 2021-03-13 PROCEDURE — 99232 SBSQ HOSP IP/OBS MODERATE 35: CPT | Performed by: PHYSICAL MEDICINE & REHABILITATION

## 2021-03-13 PROCEDURE — 97530 THERAPEUTIC ACTIVITIES: CPT | Performed by: PHYSICAL THERAPIST

## 2021-03-13 PROCEDURE — 97535 SELF CARE MNGMENT TRAINING: CPT

## 2021-03-13 RX ADMIN — GABAPENTIN 100 MG: 100 CAPSULE ORAL at 21:06

## 2021-03-13 RX ADMIN — MICONAZOLE NITRATE 1 APPLICATION: 20 CREAM TOPICAL at 17:37

## 2021-03-13 RX ADMIN — MICONAZOLE NITRATE 1 APPLICATION: 20 CREAM TOPICAL at 08:29

## 2021-03-13 RX ADMIN — NYSTATIN 1 APPLICATION: 100000 POWDER TOPICAL at 08:29

## 2021-03-13 RX ADMIN — OXYCODONE HYDROCHLORIDE 5 MG: 5 TABLET ORAL at 08:22

## 2021-03-13 RX ADMIN — ENOXAPARIN SODIUM 30 MG: 30 INJECTION SUBCUTANEOUS at 20:59

## 2021-03-13 RX ADMIN — FLUOXETINE 20 MG: 20 CAPSULE ORAL at 08:21

## 2021-03-13 RX ADMIN — ATENOLOL 50 MG: 50 TABLET ORAL at 20:58

## 2021-03-13 RX ADMIN — ACETAMINOPHEN 650 MG: 325 TABLET ORAL at 05:49

## 2021-03-13 RX ADMIN — OXYCODONE HYDROCHLORIDE 5 MG: 5 TABLET ORAL at 12:23

## 2021-03-13 RX ADMIN — ACETAMINOPHEN 650 MG: 325 TABLET ORAL at 21:05

## 2021-03-13 RX ADMIN — SENNOSIDES 8.6 MG: 8.6 TABLET ORAL at 08:21

## 2021-03-13 RX ADMIN — LIDOCAINE 2 PATCH: 50 PATCH CUTANEOUS at 21:02

## 2021-03-13 RX ADMIN — AMLODIPINE BESYLATE 5 MG: 5 TABLET ORAL at 08:21

## 2021-03-13 RX ADMIN — LEVOTHYROXINE SODIUM 88 MCG: 0.09 TABLET ORAL at 05:49

## 2021-03-13 RX ADMIN — ALPRAZOLAM 0.25 MG: 0.25 TABLET ORAL at 19:39

## 2021-03-13 RX ADMIN — ACETAMINOPHEN 650 MG: 325 TABLET ORAL at 14:41

## 2021-03-13 RX ADMIN — ATORVASTATIN CALCIUM 10 MG: 10 TABLET, FILM COATED ORAL at 08:21

## 2021-03-13 RX ADMIN — ENOXAPARIN SODIUM 30 MG: 30 INJECTION SUBCUTANEOUS at 08:22

## 2021-03-13 RX ADMIN — NYSTATIN 1 APPLICATION: 100000 POWDER TOPICAL at 17:34

## 2021-03-13 RX ADMIN — OXYCODONE HYDROCHLORIDE 5 MG: 5 TABLET ORAL at 19:38

## 2021-03-13 RX ADMIN — FOLIC ACID 1 MG: 1 TABLET ORAL at 08:21

## 2021-03-13 RX ADMIN — ATENOLOL 50 MG: 50 TABLET ORAL at 08:21

## 2021-03-13 NOTE — PROGRESS NOTES
03/13/21 1330   Pain Assessment   Pain Assessment Tool 0-10   Pain Score 8   Pain Location/Orientation Orientation: Bilateral;Location: Groin   Pain Onset/Description Onset: Ongoing   Effect of Pain on Daily Activities Limits functional performance   Patient's Stated Pain Goal No pain   Hospital Pain Intervention(s) Rest   Restrictions/Precautions   Precautions Fall Risk;Cognitive;Pain   Weight Bearing Restrictions Yes   RLE Weight Bearing Per Order WBAT   LLE Weight Bearing Per Order WBAT   ROM Restrictions No   Sit to Stand   Type of Assistance Needed Supervision   Amount of Physical Assistance Provided No physical assistance   Comment CS with RW   Sit to Stand CARE Score 4   Bed-Chair Transfer   Type of Assistance Needed Supervision; Adaptive equipment   Amount of Physical Assistance Provided No physical assistance   Comment CS with RW   Chair/Bed-to-Chair Transfer CARE Score 4   Toilet Transfer   Type of Assistance Needed Supervision; Adaptive equipment   Amount of Physical Assistance Provided No physical assistance   Comment CS with RW   Toilet Transfer CARE Score 4   Cognition   Overall Cognitive Status Impaired   Arousal/Participation Alert; Cooperative   Attention Attends with cues to redirect   Orientation Level Oriented to person   Memory Decreased short term memory   Following Commands Follows all commands and directions without difficulty   Additional Activities   Additional Activities Other (Comment)   Additional Activities Comments Family Training completed with pt's tung Moreland) during OT session  FUENTES reviewed pt's current LOF and anticipated OT goal of S for ADL performance  Edu provided on OT recommendation for 24/S upon D/C home for ADL/IADL performance  Pt's  verbalized understanding  Pt's  provided CGA for pt during fxnl mobility with RW and to perform ADL transfer's to over the toilet commode and to EOB    reports they have a BSC that pt will utilize over the toilet upon D/C home  Pt plan to SB upon D/C home, however, pt communicated plan to eventually begin showering  Pt currently has shower chair at home  FUENTES introduced tub bench and demonstrated transfer technique, providing edu on benefit of tub bench  Pt's  receptive and provided with handout to reference when ready to independently order tub bench  FUENTES demo use of RW tray vs RW basket for safe item transportation  Pt and pt's  indterested in independently ordering folding RW tray for D/C home  Overall, pt's  receptive to all OT recommendations and has no further questions at this time  Activity Tolerance   Activity Tolerance Patient limited by pain   Assessment   Treatment Assessment Pt participated in 30 min skilled OT Tx session with focus on completing family training with pt's spouse Branden Jones)  See above for further Tx details  Overall, pt's  receptive to all OT recommendations and will be able to provide 24/S upon D/C home  Pt plan to SB upon D/C home, however, did communicate interest to eventually shower in tub environment  EDU provided on tub bench, as well as a handout provided for spouse to independently order  Pt and spouse are also interested in ordering a folding RW tray for D/C, however, communicated they would prefer to order independently through [employer]  Pt's spouse with no further questions at this time  Continue POC  OT Family training done with: Geo Childs (spouse)   Assessment of family training See above note  Prognosis Good   Problem List Decreased strength;Decreased range of motion;Decreased endurance; Impaired balance;Decreased mobility; Decreased cognition;Pain   Barriers to Discharge Inaccessible home environment   Plan   Treatment/Interventions ADL retraining;Functional transfer training; Therapeutic exercise; Endurance training   Progress Progressing toward goals   Recommendation   OT Discharge Recommendation Home with skilled therapy   Equipment Recommended Shower transfer bench ($$)  (folding RW tray)   OT Equipment ordered   (Family to order independently)   OT Therapy Minutes   OT Time In 1330   OT Time Out 1400   OT Total Time (minutes) 30   OT Mode of treatment - Individual (minutes) 30   OT Mode of treatment - Concurrent (minutes) 0   OT Mode of treatment - Group (minutes) 0   OT Mode of treatment - Co-treat (minutes) 0   OT Mode of Treatment - Total time(minutes) 30 minutes   OT Cumulative Minutes 300   Therapy Time missed   Time missed?  No

## 2021-03-13 NOTE — PLAN OF CARE
Problem: Potential for Falls  Goal: Patient will remain free of falls  Description: INTERVENTIONS:  - Assess patient frequently for physical needs  -  Identify cognitive and physical deficits and behaviors that affect risk of falls  -  [place] fall precautions as indicated by assessment   - Educate patient/family on patient safety including physical limitations  - Instruct patient to call for assistance with activity based on assessment  - Modify environment to reduce risk of injury  - Consider OT/PT consult to assist with strengthening/mobility  Outcome: Progressing     Problem: Prexisting or High Potential for Compromised Skin Integrity  Goal: Skin integrity is maintained or improved  Description: INTERVENTIONS:  - Identify patients at risk for skin breakdown  - Assess and monitor skin integrity  - Assess and monitor nutrition and hydration status  - Monitor labs   - Assess for incontinence   - Turn and reposition patient  - Assist with mobility/ambulation  - Relieve pressure over bony prominences  - Avoid friction and shearing  - Provide appropriate hygiene as needed including keeping skin clean and dry  - Evaluate need for skin moisturizer/barrier cream  - Collaborate with interdisciplinary team   - Patient/family teaching  - Consider wound care consult   Outcome: Progressing     Problem: Nutrition/Hydration-ADULT  Goal: Nutrient/Hydration intake appropriate for improving, restoring or maintaining nutritional needs  Description: Monitor and assess patient's nutrition/hydration status for malnutrition  Collaborate with interdisciplinary team and initiate plan and interventions as ordered  Monitor patient's weight and dietary intake as ordered or per policy  Utilize nutrition screening tool and intervene as necessary  Determine patient's food preferences and provide high-protein, high-caloric foods as appropriate       INTERVENTIONS:  - Monitor oral intake, urinary output, labs, and treatment plans  - Assess nutrition and hydration status and recommend course of action  - Evaluate amount of meals eaten  - Assist patient with eating if necessary   - Allow adequate time for meals  - Recommend/ encourage appropriate diets, oral nutritional supplements, and vitamin/mineral supplements  - Order, calculate, and assess calorie counts as needed  - Recommend, monitor, and adjust tube feedings and TPN/PPN based on assessed needs  - Assess need for intravenous fluids  - Provide specific nutrition/hydration education as appropriate  - Include patient/family/caregiver in decisions related to nutrition  Outcome: Progressing     Problem: PAIN - ADULT  Goal: Verbalizes/displays adequate comfort level or baseline comfort level  Description: Interventions:  - Encourage patient to monitor pain and request assistance  - Assess pain using appropriate pain scale  - Administer analgesics based on type and severity of pain and evaluate response  - Implement non-pharmacological measures as appropriate and evaluate response  - Consider cultural and social influences on pain and pain management  - Notify physician/advanced practitioner if interventions unsuccessful or patient reports new pain  Outcome: Progressing     Problem: SAFETY ADULT  Goal: Patient will remain free of falls  Description: INTERVENTIONS:  - Assess patient frequently for physical needs  -  Identify cognitive and physical deficits and behaviors that affect risk of falls    -  [place] fall precautions as indicated by assessment   - Educate patient/family on patient safety including physical limitations  - Instruct patient to call for assistance with activity based on assessment  - Modify environment to reduce risk of injury  - Consider OT/PT consult to assist with strengthening/mobility  Outcome: Progressing  Goal: Maintain or return to baseline ADL function  Description: INTERVENTIONS:  -  Assess patient's ability to carry out ADLs; assess patient's baseline for ADL function and identify physical deficits which impact ability to perform ADLs (bathing, care of mouth/teeth, toileting, grooming, dressing, etc )  - Assess/evaluate cause of self-care deficits   - Assess range of motion  - Assess patient's mobility; develop plan if impaired  - Assess patient's need for assistive devices and provide as appropriate  - Encourage maximum independence but intervene and supervise when necessary  - Involve family in performance of ADLs  - Assess for home care needs following discharge   - Consider OT consult to assist with ADL evaluation and planning for discharge  - Provide patient education as appropriate  Outcome: Progressing  Goal: Maintain or return mobility status to optimal level  Description: INTERVENTIONS:  - Assess patient's baseline mobility status (ambulation, transfers, stairs, etc )    - Identify cognitive and physical deficits and behaviors that affect mobility  - Identify mobility aids required to assist with transfers and/or ambulation (gait belt, sit-to-stand, lift, walker, cane, etc )  - [place] fall precautions as indicated by assessment  - Record patient progress and toleration of activity level on Mobility SBAR; progress patient to next Phase/Stage  - Instruct patient to call for assistance with activity based on assessment  - Consider rehabilitation consult to assist with strengthening/weightbearing, etc   Outcome: Progressing     Problem: DISCHARGE PLANNING  Goal: Discharge to home or other facility with appropriate resources  Description: INTERVENTIONS:  - Identify barriers to discharge w/patient and caregiver  - Arrange for needed discharge resources and transportation as appropriate  - Identify discharge learning needs (meds, wound care, etc )  - Arrange for interpretive services to assist at discharge as needed  - Refer to Case Management Department for coordinating discharge planning if the patient needs post-hospital services based on physician/advanced practitioner order or complex needs related to functional status, cognitive ability, or social support system  Outcome: Progressing     Problem: Knowledge Deficit  Goal: Patient/family/caregiver demonstrates understanding of disease process, treatment plan, medications, and discharge instructions  Description: Complete learning assessment and assess knowledge base    Interventions:  - Provide teaching at level of understanding  - Provide teaching via preferred learning methods  Outcome: Progressing     Problem: GASTROINTESTINAL - ADULT  Goal: Minimal or absence of nausea and/or vomiting  Description: INTERVENTIONS:  - Administer IV fluids if ordered to ensure adequate hydration  - Maintain NPO status until nausea and vomiting are resolved  - Nasogastric tube if ordered  - Administer ordered antiemetic medications as needed  - Provide nonpharmacologic comfort measures as appropriate  - Advance diet as tolerated, if ordered  - Consider nutrition services referral to assist patient with adequate nutrition and appropriate food choices  Outcome: Progressing  Goal: Maintains or returns to baseline bowel function  Description: INTERVENTIONS:  - Assess bowel function  - Encourage oral fluids to ensure adequate hydration  - Administer IV fluids if ordered to ensure adequate hydration  - Administer ordered medications as needed  - Encourage mobilization and activity  - Consider nutritional services referral to assist patient with adequate nutrition and appropriate food choices  Outcome: Progressing  Goal: Maintains adequate nutritional intake  Description: INTERVENTIONS:  - Monitor percentage of each meal consumed  - Identify factors contributing to decreased intake, treat as appropriate  - Assist with meals as needed  - Monitor I&O, weight, and lab values if indicated  - Obtain nutrition services referral as needed  Outcome: Progressing     Problem: GENITOURINARY - ADULT  Goal: Maintains or returns to baseline urinary function  Description: INTERVENTIONS:  - Assess urinary function  - Encourage oral fluids to ensure adequate hydration if ordered  - Administer IV fluids as ordered to ensure adequate hydration  - Administer ordered medications as needed  - Offer frequent toileting  - Follow urinary retention protocol if ordered  Outcome: Progressing  Goal: Absence of urinary retention  Description: INTERVENTIONS:  - Assess patients ability to void and empty bladder  - Monitor I/O  - Bladder scan as needed  - Discuss with physician/AP medications to alleviate retention as needed  - Discuss catheterization for long term situations as appropriate  Outcome: Progressing     Problem: SKIN/TISSUE INTEGRITY - ADULT  Goal: Skin integrity remains intact  Description: INTERVENTIONS  - Identify patients at risk for skin breakdown  - Assess and monitor skin integrity  - Assess and monitor nutrition and hydration status  - Monitor labs (i e  albumin)  - Assess for incontinence   - Turn and reposition patient  - Assist with mobility/ambulation  - Relieve pressure over bony prominences  - Avoid friction and shearing  - Provide appropriate hygiene as needed including keeping skin clean and dry  - Evaluate need for skin moisturizer/barrier cream  - Collaborate with interdisciplinary team (i e  Nutrition, Rehabilitation, etc )   - Patient/family teaching  Outcome: Progressing  Goal: Incision(s), wounds(s) or drain site(s) healing without S/S of infection  Description: INTERVENTIONS  - Assess and document risk factors for skin impairment   - Assess and document dressing, incision, wound bed, drain sites and surrounding tissue  - Consider nutrition services referral as needed  - Oral mucous membranes remain intact  - Provide patient/ family education  Outcome: Progressing  Goal: Oral mucous membranes remain intact  Description: INTERVENTIONS  - Assess oral mucosa and hygiene practices  - Implement preventative oral hygiene regimen  - Implement oral medicated treatments as ordered  - Initiate Nutrition services referral as needed  Outcome: Progressing     Problem: HEMATOLOGIC - ADULT  Goal: Maintains hematologic stability  Description: INTERVENTIONS  - Assess for signs and symptoms of bleeding or hemorrhage  - Monitor labs  - Administer supportive blood products/factors as ordered and appropriate  Outcome: Progressing

## 2021-03-13 NOTE — PROGRESS NOTES
03/13/21 0830   Pain Assessment   Pain Assessment Tool 0-10   Pain Score 8   Pain Location/Orientation Orientation: Bilateral;Location: Pelvis   Hospital Pain Intervention(s) Cold applied; Rest   Restrictions/Precautions   Precautions Fall Risk;Cognitive;Pain   RLE Weight Bearing Per Order WBAT   LLE Weight Bearing Per Order WBAT   Cognition   Arousal/Participation Alert; Cooperative   Subjective   Subjective Pt reports she just took pain meds prior to session so they haven't kicked in yet  Otherwise no complaints  Sit to Stand   Type of Assistance Needed Supervision   Amount of Physical Assistance Provided No physical assistance   Sit to Stand CARE Score 4   Bed-Chair Transfer   Type of Assistance Needed Supervision; Adaptive equipment   Amount of Physical Assistance Provided No physical assistance   Chair/Bed-to-Chair Transfer CARE Score 4   Transfer Bed/Chair/Wheelchair   Limitations Noted In Balance;Pain Management;LE Strength; Endurance   Adaptive Equipment Roller Walker   All Transfer Supervision   Car Transfer   Reason if not Attempted Environmental limitations   Car Transfer CARE Score 10   Walk 10 Feet   Type of Assistance Needed Supervision; Adaptive equipment   Amount of Physical Assistance Provided No physical assistance   Walk 10 Feet CARE Score 4   Walk 50 Feet with Two Turns   Type of Assistance Needed Supervision; Adaptive equipment   Amount of Physical Assistance Provided No physical assistance   Walk 50 Feet with Two Turns CARE Score 4   Walk 150 Feet   Type of Assistance Needed Supervision; Adaptive equipment   Amount of Physical Assistance Provided No physical assistance   Walk 150 Feet CARE Score 4   Ambulation   Does the patient walk? 2  Yes   Primary Mode of Locomotion Prior to Admission Walk   Distance Walked (feet) 150 ft   Assist Device Roller Walker   Gait Pattern Inconsistant [place]; Slow [place]; Improper weight shift   Limitations Noted In Balance; Endurance; Heel Strike;Speed;Strength;Swing   Walk Assist Level Close Supervision   Wheel 50 Feet with Two Turns   Reason if not Attempted Activity not applicable   Wheel 50 Feet with Two Turns CARE Score 9   Wheel 150 Feet   Reason if not Attempted Activity not applicable   Wheel 625 Feet CARE Score 9   Wheelchair mobility   Does the patient use a wheelchair? 0  No   Curb or Single Stair   Style negotiated Curb   Type of Assistance Needed Incidental touching; Adaptive equipment   Amount of Physical Assistance Provided No physical assistance   1 Step (Curb) CARE Score 4   4 Steps   Type of Assistance Needed Physical assistance; Incidental touching   Amount of Physical Assistance Provided Less than 25%   4 Steps CARE Score 3   12 Steps   Reason if not Attempted Safety concerns   12 Steps CARE Score 88   Stairs   Type Stairs   # of Steps 4   Weight Bearing Precautions Fall Risk   Assist Devices Single Rail;Cane   Findings practiced w/ L HR + SPC and CG , also practiced L HR only and she required min A   Therapeutic Interventions   Strengthening seated LAQ, seated isometric hip add with towel, seated hip abd with red TB 3x10 for all    Assessment   Treatment Assessment Pt tolerated treatment session well today despite higher pain levels  Able to perform all transfers and ambulation with RW @ CS level  She does require CG/min A for stairs and curb; will focus on that during FT session this afternoon  Progressed to higher curb step today (8")  Pt progressing nicely towards goals  She requires continued skilled PT to maximize independence and safety with all functional mobility  Barriers to Discharge Inaccessible home environment;Decreased caregiver support   PT Barriers   Physical Impairment Decreased endurance; Impaired balance;Decreased mobility; Decreased strength;Decreased range of motion;Pain   Functional Limitation Car transfers; Ramp negotiation;Stair negotiation;Standing;Walking   Plan   Treatment/Interventions Functional transfer training;LE strengthening/ROM; Elevations; Therapeutic exercise; Endurance training;Patient/family training;Equipment eval/education; Bed mobility;Gait training   Progress Progressing toward goals   Recommendation   PT Discharge Recommendation Home with skilled therapy   Equipment Recommended Walker   PT Therapy Minutes   PT Time In 0830   PT Time Out 0930   PT Total Time (minutes) 60   PT Mode of treatment - Individual (minutes) 60   PT Mode of treatment - Concurrent (minutes) 0   PT Mode of treatment - Group (minutes) 0   PT Mode of treatment - Co-treat (minutes) 0   PT Mode of Treatment - Total time(minutes) 60 minutes   PT Cumulative Minutes 330   Therapy Time missed   Time missed?  No

## 2021-03-13 NOTE — PROGRESS NOTES
03/13/21 1230   Pain Assessment   Pain Assessment Tool 0-10   Pain Score 8   Pain Location/Orientation Orientation: Bilateral;Location: Pelvis   Hospital Pain Intervention(s) Cold applied; Rest   Subjective   Subjective Pt reports she just got pain medication prior to PT session;  present for family training  Sit to Lying   Type of Assistance Needed Supervision   Amount of Physical Assistance Provided No physical assistance   Sit to Lying CARE Score 4   Lying to Sitting on Side of Bed   Type of Assistance Needed Supervision   Amount of Physical Assistance Provided No physical assistance   Lying to Sitting on Side of Bed CARE Score 4   Sit to Stand   Type of Assistance Needed Supervision   Amount of Physical Assistance Provided No physical assistance   Sit to Stand CARE Score 4   Bed-Chair Transfer   Type of Assistance Needed Supervision; Adaptive equipment   Amount of Physical Assistance Provided No physical assistance   Chair/Bed-to-Chair Transfer CARE Score 4   Transfer Bed/Chair/Wheelchair   Limitations Noted In Balance; Endurance;Pain Management;LE Strength   Adaptive Equipment Roller Walker   All Transfer Supervision   Car Transfer   Reason if not Attempted Environmental limitations   Car Transfer CARE Score 10   Walk 10 Feet   Type of Assistance Needed Supervision; Adaptive equipment   Amount of Physical Assistance Provided No physical assistance   Walk 10 Feet CARE Score 4   Walk 50 Feet with Two Turns   Type of Assistance Needed Supervision; Adaptive equipment   Amount of Physical Assistance Provided No physical assistance   Walk 50 Feet with Two Turns CARE Score 4   Walk 150 Feet   Comment fatigue/pain   Reason if not Attempted Refused to perform   Walk 150 Feet CARE Score 7   Walking 10 Feet on Uneven Surfaces   Reason if not Attempted Environmental limitations   Walking 10 Feet on Uneven Surfaces CARE Score 10   Ambulation   Does the patient walk? 2   Yes   Primary Mode of Locomotion Prior to Admission Walk   Distance Walked (feet) 100 ft   Assist Device Roller Walker   Gait Pattern Inconsistant [place]; Slow [place]; Improper weight shift   Limitations Noted In Balance; Endurance; Heel Strike;Speed;Strength;Swing   Walk Assist Level Close Supervision   Wheel 50 Feet with Two Turns   Reason if not Attempted Activity not applicable   Wheel 50 Feet with Two Turns CARE Score 9   Wheel 150 Feet   Reason if not Attempted Activity not applicable   Wheel 657 Feet CARE Score 9   Wheelchair mobility   Does the patient use a wheelchair? 0  No   Curb or Single Stair   Style negotiated Curb   Type of Assistance Needed Incidental touching; Adaptive equipment   Amount of Physical Assistance Provided No physical assistance   1 Step (Curb) CARE Score 4   4 Steps   Type of Assistance Needed Incidental touching; Adaptive equipment   Amount of Physical Assistance Provided No physical assistance   4 Steps CARE Score 4   12 Steps   Reason if not Attempted Safety concerns   12 Steps CARE Score 88   Stairs   Type Stairs   # of Steps 8   Weight Bearing Precautions Fall Risk   Assist Devices Single Rail;Cane   Assessment   Treatment Assessment Pt participated in PT session with focus on family training with pt's  Asaf Juarez  Reviewed transfers and ambulation @ S level using RW  Practiced curb and  stairs @ CG level, first PT demonstrated and then  demonstrated appropriate guarding  Practiced supine<>sit transfers on mat table at height to simulate couch @ home where pt will be sleeping  Sanchoshyam Manuelsara ordered w/c for community use, advised pt to use RW in the house and not be reliant on w/c  They have w/c, RW, SPC  All questions/concerns addressed at this time  Pt requires continued skilled PT to maximize independence and safety with all functional mobility  Barriers to Discharge Inaccessible home environment   PT Barriers   Physical Impairment Decreased strength; Impaired balance;Decreased endurance;Decreased range of motion;Decreased mobility;Pain   Functional Limitation Car transfers; Ramp negotiation;Stair negotiation;Standing;Walking   Plan   Treatment/Interventions Functional transfer training;LE strengthening/ROM; Elevations; Therapeutic exercise; Endurance training;Patient/family training;Equipment eval/education; Bed mobility;Gait training   Progress Progressing toward goals   Recommendation   PT Discharge Recommendation Home with skilled therapy   Equipment Recommended Walker   PT Therapy Minutes   PT Time In 1230   PT Time Out 1300   PT Total Time (minutes) 30   PT Mode of treatment - Individual (minutes) 30   PT Mode of treatment - Concurrent (minutes) 0   PT Mode of treatment - Group (minutes) 0   PT Mode of treatment - Co-treat (minutes) 0   PT Mode of Treatment - Total time(minutes) 30 minutes   PT Cumulative Minutes 360   Therapy Time missed   Time missed?  No

## 2021-03-14 PROCEDURE — 97535 SELF CARE MNGMENT TRAINING: CPT

## 2021-03-14 PROCEDURE — 97110 THERAPEUTIC EXERCISES: CPT

## 2021-03-14 PROCEDURE — 97116 GAIT TRAINING THERAPY: CPT

## 2021-03-14 PROCEDURE — 97530 THERAPEUTIC ACTIVITIES: CPT

## 2021-03-14 RX ADMIN — NYSTATIN 1 APPLICATION: 100000 POWDER TOPICAL at 08:29

## 2021-03-14 RX ADMIN — ACETAMINOPHEN 650 MG: 325 TABLET ORAL at 21:03

## 2021-03-14 RX ADMIN — ATENOLOL 50 MG: 50 TABLET ORAL at 08:28

## 2021-03-14 RX ADMIN — ENOXAPARIN SODIUM 30 MG: 30 INJECTION SUBCUTANEOUS at 20:44

## 2021-03-14 RX ADMIN — GABAPENTIN 100 MG: 100 CAPSULE ORAL at 21:04

## 2021-03-14 RX ADMIN — OXYCODONE HYDROCHLORIDE 5 MG: 5 TABLET ORAL at 06:41

## 2021-03-14 RX ADMIN — ATENOLOL 50 MG: 50 TABLET ORAL at 20:43

## 2021-03-14 RX ADMIN — ATORVASTATIN CALCIUM 10 MG: 10 TABLET, FILM COATED ORAL at 08:28

## 2021-03-14 RX ADMIN — ALPRAZOLAM 0.25 MG: 0.25 TABLET ORAL at 20:35

## 2021-03-14 RX ADMIN — AMLODIPINE BESYLATE 5 MG: 5 TABLET ORAL at 08:28

## 2021-03-14 RX ADMIN — SENNOSIDES 8.6 MG: 8.6 TABLET ORAL at 08:28

## 2021-03-14 RX ADMIN — FOLIC ACID 1 MG: 1 TABLET ORAL at 08:28

## 2021-03-14 RX ADMIN — OXYCODONE HYDROCHLORIDE 5 MG: 5 TABLET ORAL at 10:57

## 2021-03-14 RX ADMIN — FLUOXETINE 20 MG: 20 CAPSULE ORAL at 08:28

## 2021-03-14 RX ADMIN — ACETAMINOPHEN 650 MG: 325 TABLET ORAL at 13:37

## 2021-03-14 RX ADMIN — OXYCODONE HYDROCHLORIDE 5 MG: 5 TABLET ORAL at 20:35

## 2021-03-14 RX ADMIN — MICONAZOLE NITRATE 1 APPLICATION: 20 CREAM TOPICAL at 08:30

## 2021-03-14 RX ADMIN — LIDOCAINE 2 PATCH: 50 PATCH CUTANEOUS at 21:03

## 2021-03-14 RX ADMIN — ENOXAPARIN SODIUM 30 MG: 30 INJECTION SUBCUTANEOUS at 08:27

## 2021-03-14 RX ADMIN — LEVOTHYROXINE SODIUM 88 MCG: 0.09 TABLET ORAL at 05:43

## 2021-03-14 RX ADMIN — MICONAZOLE NITRATE 1 APPLICATION: 20 CREAM TOPICAL at 17:24

## 2021-03-14 RX ADMIN — NYSTATIN 1 APPLICATION: 100000 POWDER TOPICAL at 17:24

## 2021-03-14 RX ADMIN — ACETAMINOPHEN 650 MG: 325 TABLET ORAL at 05:42

## 2021-03-14 NOTE — PROGRESS NOTES
Physical Medicine and Rehabilitation Progress Note  Stephanie Fine 78 y o  female MRN: [id number]  Unit/Bed#: -12 Encounter: [id number]    Chief Complaint:  Pain in pelvis    Interval History: No acute events overnight  Pain in pelvis is steady  Participating in therapy  Denies any new numbness/tingling/weakness, new bowel/bladder disturbance  Last BM was 3/13  Assessment/Plan - Continue plan of care as per primary attending, unless otherwise stated below:      · Rehab - Continue PT and OT   · Multiple closed fractures of pelvis: DVT PPx with lovenox, WBAT BLE  Contninue current pain regimen    Natalie Hernandez MD  Physical Medicine and Rehabilitation      Review of Systems: A 10 point review of systems was negative except for what is noted in the HPI          Current Facility-Administered Medications:     acetaminophen (TYLENOL) tablet 650 mg, 650 mg, Oral, Q8H [facility], Jarrett Robles MD, 650 mg at 03/13/21 1441    ALPRAZolam Halle Joseph) tablet 0 25 mg, 0 25 mg, Oral, TID PRN, Jarrett Robles MD, 0 25 mg at 03/13/21 1939    amLODIPine (NORVASC) tablet 5 mg, 5 mg, Oral, Daily, Jarrett Robles MD, 5 mg at 03/13/21 2538    atenolol (TENORMIN) tablet 50 mg, 50 mg, Oral, BID, Jarrett Robles MD, 50 mg at 03/13/21 7640    atorvastatin (LIPITOR) tablet 10 mg, 10 mg, Oral, Daily, Jarrett Robles MD, 10 mg at 03/13/21 0057    bisacodyl (DULCOLAX) rectal suppository 10 mg, 10 mg, Rectal, Daily PRN, Jarrett Robles MD    Diclofenac Sodium (VOLTAREN) 1 % topical gel 2 g, 2 g, Topical, 4x Daily PRN, DARIO Lehman    enoxaparin (LOVENOX) subcutaneous injection 30 mg, 30 mg, Subcutaneous, Q12H [facility], Jarrett Robles MD, 30 mg at 03/13/21 1694    FLUoxetine (PROzac) capsule 20 mg, 20 mg, Oral, Daily, Jarrett Robles MD, 20 mg at 11/10/28 0286    folic acid (FOLVITE) tablet 1 mg, 1 mg, Oral, Daily, Jarrett Robles MD, 1 mg at 03/13/21 8733    gabapentin (NEURONTIN) capsule 100 mg, 100 mg, Oral, HS, Jarrett Robles MD, 100 mg at 03/12/21 2129    lactulose oral solution 20 g, 20 g, Oral, BID PRN, Mark Howard MD    levothyroxine tablet 88 mcg, 88 mcg, Oral, Early Morning, Mark Howard MD, 88 mcg at 03/13/21 0549    lidocaine (LIDODERM) 5 % patch 2 patch, 2 patch, Topical, HS, Mark Howard MD, 2 patch at 03/11/21 2102    methocarbamol (ROBAXIN) tablet 500 mg, 500 mg, Oral, Q6H PRN, Mark Howard MD    methotrexate tablet 20 mg, 20 mg, Oral, Weekly, Mark Howard MD, 20 mg at 03/10/21 0846    moisture barrier miconazole 2% cream (aka CHRISTIE MOISTURE BARRIER ANTIFUNGAL CREAM), 1 application, Topical, BID, Mark Howard MD, 1 application at 94/42/93 1737    nitroglycerin (NITROSTAT) SL tablet 0 4 mg, 0 4 mg, Sublingual, Q5 Min PRN, Mark Howard MD    nystatin (MYCOSTATIN) powder 1 application, 1 application, Topical, BID, Mark Howard MD, 1 application at 34/28/60 1734    oxyCODONE (ROXICODONE) IR tablet 5 mg, 5 mg, Oral, Q4H PRN, Mark Howard MD, 5 mg at 03/13/21 1938    saliva substitute (MOUTH KOTE) mucosal solution 5 spray, 5 spray, Mouth/Throat, Q4H PRN, Mark Howard MD, 5 spray at 03/12/21 0936    senna (SENOKOT) tablet 8 6 mg, 1 tablet, Oral, BID, Mark Howard MD, 8 6 mg at 03/13/21 2853    Physical Exam:  Temp:  [97 4 °F (36 3 °C)-98 2 °F (36 8 °C)] 97 4 °F (36 3 °C)  HR:  [59-74] 59  Resp:  [18-20] 18  BP: (124-154)/(62-70) 124/62  SpO2:  [98 %] 98 %      Gen: No acute distress, Well-nourished, well-appearing  Cardiovascular: Regular rate, rhythm, S1/S2  Pulmonary: Non-labored breathing  : No medeiros  GI: Soft, non-tender, non-distended  MSK: Moving all 4 extremities  Integumentary: Skin is warm, dry  Neuro: AAOx3, Speech a bit dysarthric, but similar to when I saw her inpatient  Psych: Normal mood and affect       Laboratory:  Labs reviewed  Results from last 7 days   Lab Units 03/10/21  0509   HEMOGLOBIN g/dL 10 5*   HEMATOCRIT % 32 1*   WBC Thousand/uL 5 10     Results from last 7 days Lab Units 03/11/21  0519 03/10/21  0509   BUN mg/dL 14 13   SODIUM mmol/L 133* 135*   POTASSIUM mmol/L 4 8 3 1*   CHLORIDE mmol/L 102 100   CREATININE mg/dL 0 58* 0 63            Diagnostic Studies: Reviewed, no new imaging   No orders to display       ** Please Note: Fluency Direct voice to text software may have been used in the creation of this document   **

## 2021-03-14 NOTE — PROGRESS NOTES
03/14/21 1230   Pain Assessment   Pain Assessment Tool 0-10   Pain Score 8   Pain Location/Orientation Orientation: Bilateral;Location: Pelvis   Pain Onset/Description Onset: Gradual;Onset: Ongoing; Descriptor: Aching;Descriptor: Discomfort   Hospital Pain Intervention(s) Repositioned   Restrictions/Precautions   RLE Weight Bearing Per Order WBAT   LLE Weight Bearing Per Order WBAT   Cognition   Overall Cognitive Status Impaired   Arousal/Participation Alert; Responsive; Cooperative   Attention Attends with cues to redirect   Orientation Level Oriented to person   Memory Decreased short term memory;Decreased recall of precautions   Following Commands Follows multistep commands without difficulty   Subjective   Subjective reports having oxy around 11am, feels it didnt really help  Roll Left and Right   Comment c/o sig inc pain with rolling, refused due to fear of pain  Reason if not Attempted Refused to perform   Roll Left and Right CARE Score 7   Sit to Lying   Type of Assistance Needed Supervision   Amount of Physical Assistance Provided No physical assistance   Sit to Lying CARE Score 4   Lying to Sitting on Side of Bed   Type of Assistance Needed Supervision   Amount of Physical Assistance Provided No physical assistance   Comment CS, inc time  no log roll      Lying to Sitting on Side of Bed CARE Score 4   Sit to Stand   Type of Assistance Needed Supervision   Amount of Physical Assistance Provided No physical assistance   Comment CS   Sit to Stand CARE Score 4   Bed-Chair Transfer   Type of Assistance Needed Supervision   Amount of Physical Assistance Provided No physical assistance   Comment CS with RW   Chair/Bed-to-Chair Transfer CARE Score 4   Car Transfer   Reason if not Attempted Environmental limitations   Car Transfer CARE Score 10   Walk 10 Feet   Type of Assistance Needed Supervision   Walk 10 Feet CARE Score 4   Walk 50 Feet with Two Turns   Type of Assistance Needed Supervision   Walk 50 Feet with Two Turns CARE Score 4   Walk 150 Feet   Comment limited by pain and ftg  Reason if not Attempted Safety concerns   Walk 150 Feet CARE Score 88   Walking 10 Feet on Uneven Surfaces   Type of Assistance Needed Incidental touching   Amount of Physical Assistance Provided No physical assistance   Comment CGA on indoor ramp with RW  Walking 10 Feet on Uneven Surfaces CARE Score 4   Ambulation   Does the patient walk? 2  Yes   Primary Mode of Locomotion Prior to Admission Walk   Distance Walked (feet) 138 ft  (75)   Assist Device Roller Walker   Gait Pattern Antalgic; Inconsistant [place]; Slow [place];Decreased foot clearance;Shuffle;Step to; Improper weight shift; Poor UE WB   Limitations Noted In Balance; Endurance; Sequencing;Speed;Strength;Swing   Walk Assist Level Close Supervision;Supervision   Findings needs inc time due to pain, limited amb towards of session  Wheel 50 Feet with Two Turns   Reason if not Attempted Activity not applicable   Wheel 50 Feet with Two Turns CARE Score 9   Wheel 150 Feet   Reason if not Attempted Activity not applicable   Wheel 587 Feet CARE Score 9   Wheelchair mobility   Does the patient use a wheelchair? 0  No   4 Steps   Type of Assistance Needed Incidental touching   Amount of Physical Assistance Provided No physical assistance   Comment CG with B HR, inc time to complete due to pain  4 Steps CARE Score 4   12 Steps   Reason if not Attempted Safety concerns   12 Steps CARE Score 88   Stairs   Type Stairs   # of Steps 4   Weight Bearing Precautions Fall Risk   Assist Devices Bilateral Rail   Findings use of B HR due to c/o inc pain  Picking Up Object   Type of Assistance Needed Supervision; Adaptive equipment   Amount of Physical Assistance Provided No physical assistance   Comment w/ reacher and RW   Picking Up Object CARE Score 4   Therapeutic Interventions   Strengthening Supine B LE TE: SAQ  and mod bridges with bolster, abd and heel slides with slide board x30  EOM LAQ and red tband HS curls x20 each  Balance unsupported ball toss x90 sec  item pickup with reacher  and RW support x6  Assessment   Treatment Assessment Pt participated in 60 min skilled PT intervention despite c/o worsening pain without relief from pain meds  Needing inc time for most tasks including inc amb  Felt more R pelvic pain in beginning of session, then was unable to amb at end of session due to inc L LE pain  Was unable to walk back to room  CPs provided to B buttocks and hips in WC  Would benefit from HEP and home PT f/u at [place]  [address] with spouse support wed 3/17  Pt has RW, was her aunts, unsure of height  Problem List Decreased strength;Decreased range of motion;Decreased endurance; Impaired balance;Decreased mobility; Decreased coordination;Decreased cognition; Impaired judgement;Decreased safety awareness;Pain   Barriers to Discharge Inaccessible home environment;Decreased caregiver support   Barriers to Discharge Comments MICHAEL, will be alone at times  PT Barriers   Physical Impairment Decreased strength; Impaired balance;Decreased endurance;Decreased range of motion;Decreased mobility;Pain   Functional Limitation Car transfers; Ramp negotiation;Stair negotiation;Standing;Walking   Plan   Treatment/Interventions Functional transfer training;LE strengthening/ROM; Therapeutic exercise; Endurance training;Elevations;Gait training   Progress Progressing toward goals   Recommendation   PT Discharge Recommendation Home with skilled therapy  (home PT)   PT Equipment ordered may need to contact spouse about bringing in RW for assessment      PT Therapy Minutes   PT Time In 1230   PT Time Out 1330   PT Total Time (minutes) 60   PT Mode of treatment - Individual (minutes) 60   PT Mode of treatment - Concurrent (minutes) 0   PT Mode of treatment - Group (minutes) 0   PT Mode of treatment - Co-treat (minutes) 0   PT Mode of Treatment - Total time(minutes) 60 minutes   PT Cumulative Minutes 420   Therapy Time missed   Time missed?  No

## 2021-03-14 NOTE — PROGRESS NOTES
03/14/21 0819   Pain Assessment   Pain Assessment Tool 0-10   Pain Score Worst Possible Pain  (initially reporting low pain, inc w/ activity)   Pain Location/Orientation Orientation: Bilateral;Location: Pelvis   Pain Onset/Description Onset: Gradual   Restrictions/Precautions   Precautions Cognitive; Fall Risk;Pain   Weight Bearing Restrictions Yes   RLE Weight Bearing Per Order WBAT   LLE Weight Bearing Per Order WBAT   Lifestyle   Autonomy "I want to wash at the sink because that's what I will do at home"   Oral Hygiene   Type of Assistance Needed Supervision   Amount of Physical Assistance Provided No physical assistance   Comment Sup in stance at sink  No balance assist required  Pt demo'd (B)UE release for most of activity  Oral Hygiene CARE Score 4   Grooming   Able To Initiate Tasks; Acquire Items;Comb/Brush Hair;Wash/Dry Face;Brush/Clean Teeth;Wash/Dry Hands   Limitation Noted In Safety;Strength;Timeliness   Shower/Bathe Self   Type of Assistance Needed Physical assistance   Amount of Physical Assistance Provided Less than 25%   Comment Assist to bathe lower RLE  Pt able to complete remainder of task w/ Sup  Likely would increase IND w/ use of LH sponge  Pt declined shower stating that she will only be sponge bathing at home  Shower/Bathe Self CARE Score 3   Bathing   Assessed Bath Style Sponge Bath   Anticipated D/C Bath Style Sponge Bath   Upper Body Dressing   Type of Assistance Needed Supervision   Amount of Physical Assistance Provided No physical assistance   Comment seated in WC for donning and doffing of pullover shirt   Upper Body Dressing CARE Score 4   Lower Body Dressing   Type of Assistance Needed Physical assistance   Amount of Physical Assistance Provided Less than 25%   Comment assist to don RLE in pull up and PJ pants   Pt able to complete remainder of task at sup level including standing for CM   Lower Body Dressing CARE Score 3   Putting On/Taking Off Footwear   Type of Assistance Needed Supervision   Amount of Physical Assistance Provided No physical assistance   Comment Sup to doff socks w/ reacher and don using sock aid  Pt reports that at home she only wears slip on slippers  Putting On/Taking Off Footwear CARE Score 4   Sit to Stand   Type of Assistance Needed Physical assistance   Amount of Physical Assistance Provided Less than 25%   Comment All but one trial pt complete w/ sup  Last STS trial required Jesus for rise to stand and controlled descent d/t increased pain  Sit to Stand CARE Score 3   Therapeutic Excerise-Strength   UE Strength Yes   Right Upper Extremity- Strength   RUE Strength Comment Completed the following (B)UE TE using 1# DB in right hand and 2# DB in L hand: shoulder press, chest press, shoulder internal/external rotation, elbow flx/extn  Activity completed to increase (B)UE strength for increased IND during ADL completion  Pt tolerated well  Left Upper Extremity-Strength   LUE Strength Comment Completed the following (B)UE TE using 1# DB in right hand and 2# DB in L hand: shoulder press, chest press, shoulder internal/external rotation, elbow flx/extn  Activity completed to increase (B)UE strength for increased IND during ADL completion  Pt tolerated well  Cognition   Overall Cognitive Status Impaired   Arousal/Participation Alert; Responsive; Cooperative   Attention Attends with cues to redirect   Memory Decreased short term memory;Decreased recall of precautions   Following Commands Follows multistep commands without difficulty   Additional Activities   Additional Activities Comments Pt completed fxnl mobility in hallway and in multipurpose room w/ CGA-Csup and RW  Pt collected targeted objects t/o multi purpose room and placed in RW basket  Pt not able to collect all targeted objects d/t increased pain in L groin w/ mobility  Activity discontinued and pt provided w/ rest break     Assessment   Treatment Assessment 90min IPOT session completing self care tasks, TE and fxnl mobility and activities  Pt tolerated session but required rest breaks t/o to manage fatigue and pain  Pt demo'd increased IND w/ ADLs this AM and is working towards achieving sup level goals for all dressing  Prognosis Good   Problem List Decreased strength;Decreased range of motion;Decreased endurance; Impaired balance;Decreased mobility; Decreased coordination;Decreased cognition; Impaired judgement;Decreased safety awareness;Pain   Barriers to Discharge Inaccessible home environment;Decreased caregiver support   Plan   Treatment/Interventions ADL retraining;Functional transfer training; Therapeutic exercise; Endurance training;Cognitive reorientation;Patient/family training;Equipment eval/education; Compensatory technique education;Continued evaluation   Progress Progressing toward goals   OT Therapy Minutes   OT Time In 0830   OT Time Out 1000   OT Total Time (minutes) 90   OT Mode of treatment - Individual (minutes) 90   OT Mode of treatment - Concurrent (minutes) 0   OT Mode of treatment - Group (minutes) 0   OT Mode of treatment - Co-treat (minutes) 0   OT Mode of Treatment - Total time(minutes) 90 minutes   OT Cumulative Minutes 390   Therapy Time missed   Time missed?  No

## 2021-03-14 NOTE — PLAN OF CARE
Problem: Potential for Falls  Goal: Patient will remain free of falls  Description: INTERVENTIONS:  - Assess patient frequently for physical needs  -  Identify cognitive and physical deficits and behaviors that affect risk of falls  -  [place] fall precautions as indicated by assessment   - Educate patient/family on patient safety including physical limitations  - Instruct patient to call for assistance with activity based on assessment  - Modify environment to reduce risk of injury  - Consider OT/PT consult to assist with strengthening/mobility  Outcome: Progressing     Problem: Prexisting or High Potential for Compromised Skin Integrity  Goal: Skin integrity is maintained or improved  Description: INTERVENTIONS:  - Identify patients at risk for skin breakdown  - Assess and monitor skin integrity  - Assess and monitor nutrition and hydration status  - Monitor labs   - Assess for incontinence   - Turn and reposition patient  - Assist with mobility/ambulation  - Relieve pressure over bony prominences  - Avoid friction and shearing  - Provide appropriate hygiene as needed including keeping skin clean and dry  - Evaluate need for skin moisturizer/barrier cream  - Collaborate with interdisciplinary team   - Patient/family teaching  - Consider wound care consult   Outcome: Progressing     Problem: Nutrition/Hydration-ADULT  Goal: Nutrient/Hydration intake appropriate for improving, restoring or maintaining nutritional needs  Description: Monitor and assess patient's nutrition/hydration status for malnutrition  Collaborate with interdisciplinary team and initiate plan and interventions as ordered  Monitor patient's weight and dietary intake as ordered or per policy  Utilize nutrition screening tool and intervene as necessary  Determine patient's food preferences and provide high-protein, high-caloric foods as appropriate       INTERVENTIONS:  - Monitor oral intake, urinary output, labs, and treatment plans  - Assess nutrition and hydration status and recommend course of action  - Evaluate amount of meals eaten  - Assist patient with eating if necessary   - Allow adequate time for meals  - Recommend/ encourage appropriate diets, oral nutritional supplements, and vitamin/mineral supplements  - Order, calculate, and assess calorie counts as needed  - Recommend, monitor, and adjust tube feedings and TPN/PPN based on assessed needs  - Assess need for intravenous fluids  - Provide specific nutrition/hydration education as appropriate  - Include patient/family/caregiver in decisions related to nutrition  Outcome: Progressing     Problem: PAIN - ADULT  Goal: Verbalizes/displays adequate comfort level or baseline comfort level  Description: Interventions:  - Encourage patient to monitor pain and request assistance  - Assess pain using appropriate pain scale  - Administer analgesics based on type and severity of pain and evaluate response  - Implement non-pharmacological measures as appropriate and evaluate response  - Consider cultural and social influences on pain and pain management  - Notify physician/advanced practitioner if interventions unsuccessful or patient reports new pain  Outcome: Progressing     Problem: SAFETY ADULT  Goal: Patient will remain free of falls  Description: INTERVENTIONS:  - Assess patient frequently for physical needs  -  Identify cognitive and physical deficits and behaviors that affect risk of falls    -  [place] fall precautions as indicated by assessment   - Educate patient/family on patient safety including physical limitations  - Instruct patient to call for assistance with activity based on assessment  - Modify environment to reduce risk of injury  - Consider OT/PT consult to assist with strengthening/mobility  Outcome: Progressing  Goal: Maintain or return to baseline ADL function  Description: INTERVENTIONS:  -  Assess patient's ability to carry out ADLs; assess patient's baseline for ADL function and identify physical deficits which impact ability to perform ADLs (bathing, care of mouth/teeth, toileting, grooming, dressing, etc )  - Assess/evaluate cause of self-care deficits   - Assess range of motion  - Assess patient's mobility; develop plan if impaired  - Assess patient's need for assistive devices and provide as appropriate  - Encourage maximum independence but intervene and supervise when necessary  - Involve family in performance of ADLs  - Assess for home care needs following discharge   - Consider OT consult to assist with ADL evaluation and planning for discharge  - Provide patient education as appropriate  Outcome: Progressing  Goal: Maintain or return mobility status to optimal level  Description: INTERVENTIONS:  - Assess patient's baseline mobility status (ambulation, transfers, stairs, etc )    - Identify cognitive and physical deficits and behaviors that affect mobility  - Identify mobility aids required to assist with transfers and/or ambulation (gait belt, sit-to-stand, lift, walker, cane, etc )  - [place] fall precautions as indicated by assessment  - Record patient progress and toleration of activity level on Mobility SBAR; progress patient to next Phase/Stage  - Instruct patient to call for assistance with activity based on assessment  - Consider rehabilitation consult to assist with strengthening/weightbearing, etc   Outcome: Progressing     Problem: DISCHARGE PLANNING  Goal: Discharge to home or other facility with appropriate resources  Description: INTERVENTIONS:  - Identify barriers to discharge w/patient and caregiver  - Arrange for needed discharge resources and transportation as appropriate  - Identify discharge learning needs (meds, wound care, etc )  - Arrange for interpretive services to assist at discharge as needed  - Refer to Case Management Department for coordinating discharge planning if the patient needs post-hospital services based on physician/advanced practitioner order or complex needs related to functional status, cognitive ability, or social support system  Outcome: Progressing     Problem: Knowledge Deficit  Goal: Patient/family/caregiver demonstrates understanding of disease process, treatment plan, medications, and discharge instructions  Description: Complete learning assessment and assess knowledge base    Interventions:  - Provide teaching at level of understanding  - Provide teaching via preferred learning methods  Outcome: Progressing     Problem: GASTROINTESTINAL - ADULT  Goal: Minimal or absence of nausea and/or vomiting  Description: INTERVENTIONS:  - Administer IV fluids if ordered to ensure adequate hydration  - Maintain NPO status until nausea and vomiting are resolved  - Nasogastric tube if ordered  - Administer ordered antiemetic medications as needed  - Provide nonpharmacologic comfort measures as appropriate  - Advance diet as tolerated, if ordered  - Consider nutrition services referral to assist patient with adequate nutrition and appropriate food choices  Outcome: Progressing  Goal: Maintains or returns to baseline bowel function  Description: INTERVENTIONS:  - Assess bowel function  - Encourage oral fluids to ensure adequate hydration  - Administer IV fluids if ordered to ensure adequate hydration  - Administer ordered medications as needed  - Encourage mobilization and activity  - Consider nutritional services referral to assist patient with adequate nutrition and appropriate food choices  Outcome: Progressing  Goal: Maintains adequate nutritional intake  Description: INTERVENTIONS:  - Monitor percentage of each meal consumed  - Identify factors contributing to decreased intake, treat as appropriate  - Assist with meals as needed  - Monitor I&O, weight, and lab values if indicated  - Obtain nutrition services referral as needed  Outcome: Progressing     Problem: GENITOURINARY - ADULT  Goal: Maintains or returns to baseline urinary function  Description: INTERVENTIONS:  - Assess urinary function  - Encourage oral fluids to ensure adequate hydration if ordered  - Administer IV fluids as ordered to ensure adequate hydration  - Administer ordered medications as needed  - Offer frequent toileting  - Follow urinary retention protocol if ordered  Outcome: Progressing  Goal: Absence of urinary retention  Description: INTERVENTIONS:  - Assess patients ability to void and empty bladder  - Monitor I/O  - Bladder scan as needed  - Discuss with physician/AP medications to alleviate retention as needed  - Discuss catheterization for long term situations as appropriate  Outcome: Progressing     Problem: SKIN/TISSUE INTEGRITY - ADULT  Goal: Skin integrity remains intact  Description: INTERVENTIONS  - Identify patients at risk for skin breakdown  - Assess and monitor skin integrity  - Assess and monitor nutrition and hydration status  - Monitor labs (i e  albumin)  - Assess for incontinence   - Turn and reposition patient  - Assist with mobility/ambulation  - Relieve pressure over bony prominences  - Avoid friction and shearing  - Provide appropriate hygiene as needed including keeping skin clean and dry  - Evaluate need for skin moisturizer/barrier cream  - Collaborate with interdisciplinary team (i e  Nutrition, Rehabilitation, etc )   - Patient/family teaching  Outcome: Progressing  Goal: Incision(s), wounds(s) or drain site(s) healing without S/S of infection  Description: INTERVENTIONS  - Assess and document risk factors for skin impairment   - Assess and document dressing, incision, wound bed, drain sites and surrounding tissue  - Consider nutrition services referral as needed  - Oral mucous membranes remain intact  - Provide patient/ family education  Outcome: Progressing  Goal: Oral mucous membranes remain intact  Description: INTERVENTIONS  - Assess oral mucosa and hygiene practices  - Implement preventative oral hygiene regimen  - Implement oral medicated treatments as ordered  - Initiate Nutrition services referral as needed  Outcome: Progressing     Problem: HEMATOLOGIC - ADULT  Goal: Maintains hematologic stability  Description: INTERVENTIONS  - Assess for signs and symptoms of bleeding or hemorrhage  - Monitor labs  - Administer supportive blood products/factors as ordered and appropriate  Outcome: Progressing

## 2021-03-15 LAB
ANION GAP SERPL CALCULATED.3IONS-SCNC: 10 MMOL/L (ref 5–14)
BASOPHILS # BLD AUTO: 0 THOUSANDS/ΜL (ref 0–0.1)
BASOPHILS NFR BLD AUTO: 1 % (ref 0–1)
BUN SERPL-MCNC: 9 MG/DL (ref 5–25)
CALCIUM SERPL-MCNC: 9.6 MG/DL (ref 8.4–10.2)
CHLORIDE SERPL-SCNC: 100 MMOL/L (ref 97–108)
CO2 SERPL-SCNC: 25 MMOL/L (ref 22–30)
CREAT SERPL-MCNC: 0.54 MG/DL (ref 0.6–1.2)
EOSINOPHIL # BLD AUTO: 0.1 THOUSAND/ΜL (ref 0–0.4)
EOSINOPHIL NFR BLD AUTO: 2 % (ref 0–6)
ERYTHROCYTE [DISTWIDTH] IN BLOOD BY AUTOMATED COUNT: 21.3 %
GFR SERPL CREATININE-BSD FRML MDRD: 98 ML/MIN/1.73SQ M
GLUCOSE P FAST SERPL-MCNC: 99 MG/DL (ref 70–99)
GLUCOSE SERPL-MCNC: 99 MG/DL (ref 70–99)
HCT VFR BLD AUTO: 34.1 % (ref 36–46)
HGB BLD-MCNC: 11 G/DL (ref 12–16)
LYMPHOCYTES # BLD AUTO: 0.3 THOUSANDS/ΜL (ref 0.5–4)
LYMPHOCYTES NFR BLD AUTO: 8 % (ref 25–45)
MCH RBC QN AUTO: 27.6 PG (ref 26–34)
MCHC RBC AUTO-ENTMCNC: 32.4 G/DL (ref 31–36)
MCV RBC AUTO: 85 FL (ref 80–100)
MONOCYTES # BLD AUTO: 0.3 THOUSAND/ΜL (ref 0.2–0.9)
MONOCYTES NFR BLD AUTO: 8 % (ref 1–10)
NEUTROPHILS # BLD AUTO: 3.6 THOUSANDS/ΜL (ref 1.8–7.8)
NEUTS SEG NFR BLD AUTO: 83 % (ref 45–65)
PLATELET # BLD AUTO: 302 THOUSANDS/UL (ref 150–450)
PMV BLD AUTO: 8.3 FL (ref 8.9–12.7)
POTASSIUM SERPL-SCNC: 3.6 MMOL/L (ref 3.6–5)
RBC # BLD AUTO: 4 MILLION/UL (ref 4–5.2)
SODIUM SERPL-SCNC: 135 MMOL/L (ref 137–147)
WBC # BLD AUTO: 4.3 THOUSAND/UL (ref 4.5–11)

## 2021-03-15 PROCEDURE — 99232 SBSQ HOSP IP/OBS MODERATE 35: CPT

## 2021-03-15 PROCEDURE — 99232 SBSQ HOSP IP/OBS MODERATE 35: CPT | Performed by: NURSE PRACTITIONER

## 2021-03-15 PROCEDURE — 97116 GAIT TRAINING THERAPY: CPT

## 2021-03-15 PROCEDURE — 80048 BASIC METABOLIC PNL TOTAL CA: CPT | Performed by: NURSE PRACTITIONER

## 2021-03-15 PROCEDURE — 97530 THERAPEUTIC ACTIVITIES: CPT

## 2021-03-15 PROCEDURE — 97110 THERAPEUTIC EXERCISES: CPT

## 2021-03-15 PROCEDURE — 85025 COMPLETE CBC W/AUTO DIFF WBC: CPT | Performed by: NURSE PRACTITIONER

## 2021-03-15 PROCEDURE — 97535 SELF CARE MNGMENT TRAINING: CPT

## 2021-03-15 RX ORDER — LOSARTAN POTASSIUM 25 MG/1
25 TABLET ORAL DAILY
Status: DISCONTINUED | OUTPATIENT
Start: 2021-03-16 | End: 2021-03-17 | Stop reason: HOSPADM

## 2021-03-15 RX ORDER — ATENOLOL 50 MG/1
50 TABLET ORAL DAILY
Status: DISCONTINUED | OUTPATIENT
Start: 2021-03-16 | End: 2021-03-17 | Stop reason: HOSPADM

## 2021-03-15 RX ORDER — LOSARTAN POTASSIUM 25 MG/1
25 TABLET ORAL DAILY
Qty: 30 TABLET | Refills: 0 | Status: SHIPPED | OUTPATIENT
Start: 2021-03-16

## 2021-03-15 RX ORDER — ATENOLOL 50 MG/1
50 TABLET ORAL DAILY
Qty: 30 TABLET | Refills: 0 | Status: SHIPPED | OUTPATIENT
Start: 2021-03-16

## 2021-03-15 RX ORDER — XYLITOL/YERBA SANTA
5 AEROSOL, SPRAY WITH PUMP (ML) MUCOUS MEMBRANE EVERY 4 HOURS PRN
Qty: 236 ML | Refills: 1 | Status: SHIPPED | OUTPATIENT
Start: 2021-03-15

## 2021-03-15 RX ORDER — OXYCODONE HYDROCHLORIDE 5 MG/1
5 TABLET ORAL 3 TIMES DAILY PRN
Qty: 30 TABLET | Refills: 0 | Status: SHIPPED | OUTPATIENT
Start: 2021-03-15 | End: 2021-03-25

## 2021-03-15 RX ORDER — ACETAMINOPHEN 325 MG/1
650 TABLET ORAL EVERY 6 HOURS PRN
Refills: 0
Start: 2021-03-15

## 2021-03-15 RX ORDER — NYSTATIN 100000 [USP'U]/G
1 POWDER TOPICAL 2 TIMES DAILY
Qty: 15 G | Refills: 0 | Status: SHIPPED | OUTPATIENT
Start: 2021-03-16

## 2021-03-15 RX ORDER — AMLODIPINE BESYLATE 5 MG/1
5 TABLET ORAL DAILY
Qty: 30 TABLET | Refills: 0 | Status: SHIPPED | OUTPATIENT
Start: 2021-03-16

## 2021-03-15 RX ORDER — LEVOTHYROXINE SODIUM 88 UG/1
88 TABLET ORAL DAILY
Refills: 0
Start: 2021-03-15

## 2021-03-15 RX ORDER — SENNOSIDES 8.6 MG
8.6 TABLET ORAL 2 TIMES DAILY PRN
Qty: 60 TABLET | Refills: 0 | Status: SHIPPED | OUTPATIENT
Start: 2021-03-15

## 2021-03-15 RX ORDER — POTASSIUM CHLORIDE 20 MEQ/1
20 TABLET, EXTENDED RELEASE ORAL DAILY
Status: DISCONTINUED | OUTPATIENT
Start: 2021-03-15 | End: 2021-03-15

## 2021-03-15 RX ADMIN — ACETAMINOPHEN 650 MG: 325 TABLET ORAL at 14:12

## 2021-03-15 RX ADMIN — ENOXAPARIN SODIUM 30 MG: 30 INJECTION SUBCUTANEOUS at 08:42

## 2021-03-15 RX ADMIN — FLUOXETINE 20 MG: 20 CAPSULE ORAL at 08:42

## 2021-03-15 RX ADMIN — ATORVASTATIN CALCIUM 10 MG: 10 TABLET, FILM COATED ORAL at 08:42

## 2021-03-15 RX ADMIN — NYSTATIN 1 APPLICATION: 100000 POWDER TOPICAL at 17:05

## 2021-03-15 RX ADMIN — LIDOCAINE 2 PATCH: 50 PATCH CUTANEOUS at 21:06

## 2021-03-15 RX ADMIN — AMLODIPINE BESYLATE 5 MG: 5 TABLET ORAL at 08:42

## 2021-03-15 RX ADMIN — OXYCODONE HYDROCHLORIDE 5 MG: 5 TABLET ORAL at 21:07

## 2021-03-15 RX ADMIN — ENOXAPARIN SODIUM 30 MG: 30 INJECTION SUBCUTANEOUS at 21:06

## 2021-03-15 RX ADMIN — MICONAZOLE NITRATE 1 APPLICATION: 20 CREAM TOPICAL at 08:48

## 2021-03-15 RX ADMIN — LEVOTHYROXINE SODIUM 88 MCG: 0.09 TABLET ORAL at 05:24

## 2021-03-15 RX ADMIN — FOLIC ACID 1 MG: 1 TABLET ORAL at 08:42

## 2021-03-15 RX ADMIN — NYSTATIN 1 APPLICATION: 100000 POWDER TOPICAL at 08:47

## 2021-03-15 RX ADMIN — ACETAMINOPHEN 650 MG: 325 TABLET ORAL at 05:23

## 2021-03-15 RX ADMIN — ATENOLOL 50 MG: 50 TABLET ORAL at 08:43

## 2021-03-15 RX ADMIN — MICONAZOLE NITRATE 1 APPLICATION: 20 CREAM TOPICAL at 17:05

## 2021-03-15 RX ADMIN — ALPRAZOLAM 0.25 MG: 0.25 TABLET ORAL at 21:06

## 2021-03-15 RX ADMIN — GABAPENTIN 100 MG: 100 CAPSULE ORAL at 21:06

## 2021-03-15 RX ADMIN — OXYCODONE HYDROCHLORIDE 5 MG: 5 TABLET ORAL at 05:28

## 2021-03-15 RX ADMIN — OXYCODONE HYDROCHLORIDE 5 MG: 5 TABLET ORAL at 11:45

## 2021-03-15 RX ADMIN — POTASSIUM CHLORIDE 20 MEQ: 20 TABLET, EXTENDED RELEASE ORAL at 08:42

## 2021-03-15 RX ADMIN — ACETAMINOPHEN 650 MG: 325 TABLET ORAL at 21:07

## 2021-03-15 NOTE — ASSESSMENT & PLAN NOTE
Takes methotrexate 20mg weekly  Follows up with Dr Mendel Henriquez (Rheumatology) as outpatient  Saliva spray added as PRN for dry mouth

## 2021-03-15 NOTE — PROGRESS NOTES
Physical Medicine and Rehabilitation Progress Note  Stephanie Kisermi 78 y o  female MRN: [id number]  Unit/Bed#: -47 Encounter: [id number]      Chief Complaints:    Left and right pelvic pain    Interval Events/Subjective:      patient notes some new started recently but is able to weightbear adequately  She notes stable right worse with activity and help with pain medications  Patient declined option for x-ray of pelvis at this time  She denies increased lightheadedness, shortness of breath, constipation  She reports adequate oral intake  She denies other new complaints  ROS: A 10 point ROS was performed; negative except as noted above  Assessment & Plan:     * Multiple closed fractures of pelvis ([address] )  Assessment & Plan  · Traumatic fall on stairs 2/22:  · Resultant right inferior pubic rami fracture, right pubic symphysis fracture, right anterior acetabular fracture, left anterior acetabular fracture  · Reported some increased L pelvic pain 3/15 and stable R pelvic pain but patient declined imaging; stable function > monitor  · Evaluated by Orthopedics - treat conservatively  · WBAT BLE  · Continue DVT ppx - lovenox   · Follow-up with Trauma/Orthopedics as outpatient   · R hip/pelvis x-ray 3/4 stable fractures Subtle right pelvic fractures as noted above   These correspond approximately to the CT findings   No new or displaced pelvic fractures are demonstrated  · continue acute comprehensive interdisciplinary inpatient rehabilitation to include intensive skilled therapies (PT, OT) as outlined with oversight and management by rehabilitation physician as well as inpatient rehab level nursing, case management and weekly interdisciplinary team meetings     · Follow-up with outpatient orthopedics    Pain  Assessment & Plan  Some L pelvic pain and stable R pelvic pain   Continue mgmt as previously outlined   APAP TID  Gabapentin 100mg HS  LD patches  Robaxin 500mg Q6H PRN spasms  Oxy 5mg Q4H PRN  Voltaren gel added by IM   Counseled on and continue to encourage deep breathing/relaxation/behavioral pain management techniques:     Deep breathin seconds in, 5 seconds out, 5 times per hour when awake and PRN when in pain or anticipate pain; avoid holding breath and tightening muscles and instead breathe slowly and deeply  Monitor for oversedation, AMS/delirium, and respiratory depression   If being administered - hold opiates, muscle relaxants, benzodiazepines, and gabapentin for oversedation, AMS, or RR<12       Gastroparesis  Assessment & Plan  Stooling well; adequate intake   From Sjogren's   monitor closely especially with recent discontinuation of chronic reglan in context of oral dyskinesias  Senna BID  PRN bowel regimen   Follow-up with OP GI     Hyponatremia  Assessment & Plan  Stable   Monitor intermittently  Co-management with internal medicine team         Abnormal CT scan, gastrointestinal tract  Assessment & Plan  Discussed with pt/fam  Incidental finding on recent CT  - thickening of the distal rectal wall "correlate for proctitis "  - Follow-up with PCP     Adenopathy  Assessment & Plan  Discussed with pt/fam  Incidental finding on recent CT  " stable distal paraesophageal and gastrohepatic ligament adenopathy of uncertain etiology"  - Follow-up with PCP    Onychomycosis  Assessment & Plan  Pods c/s and nail debridement     Dermatitis associated with moisture  Assessment & Plan  Keep clean and dry  - CHRISTIE cream BID  - Optimal b/b mgmt     Chest pain  Assessment & Plan  Atypical - improved  ACS ruled out; CTA no PE, Cards TTE normal EF without RWMA; no stress test needed at this time  No absolute indication for aspirin at this time; but cards mentioned considering it if no contraindications  -[facility] IM team feels may have interaction with methotrexate and patient would prefer to remain off; remain off aspirin for now  -Discussed with pt that she should discuss possibly starting baby aspirin as an OP with PCP in follow-up     Abnormal ECG  Assessment & Plan  Per IM  "EKG completed on 3/4 that showed possible T-wave inversion on V2 lead  Sent to acute care setting for formal ACS workup      Following EKGs and troponins were negative  Cardiology was consulted - ECHO completed  Showed no motion wall abnormalities  EF was 64%  No plans for further cardiac workup at this time  CTA showed no PE    Showed small R pleural effusion and basilar atelectasis - given a 1 time dose of IV 40mg lasix "    Hypokalemia  Assessment & Plan  Improved still   IM consulted, monitoring, repletion, and overall management at their discretion during ARC course      Anemia  Assessment & Plan  Improving  IM consulted, with overall monitoring, and management at their discretion during ARC course  Monitor H/H, vitals, signs/symptoms of acute bleeding      Oral dyskinesia  Assessment & Plan  Stable   Patient feels this is related to Sjogrens but movements are more consistent with EPS and likely TD in context of long-standing metoclopramide   - Comgmt with IM  - Metoclopramide discontinued prior to [facility] which is first line mgmt (holding DA blockers)   - This may be difficult to improve at this point  - Monitor during [facility] course  - Follow-up with PCP - consider pharmacologic tx such at tetrabenazine if needed in future     Hypothyroidism  Assessment & Plan  Levothyroxine     Anxiety and depression  Assessment & Plan  Mood stable   Prozac  PRN xanax  Patient counseled on risks does opiates and worsened risks with a combination of 2 taken at the same which includes fall and significant injury   Supportive counseling  [facility] and continue to encourage deep breathing/relaxation/behavioral management techniques:         Hypertension  Assessment & Plan  Internal medicine consulted and management at their discretion  Monitor vitals with and without activity; monitor for orthostasis  Monitor hemoglobin, electrolytes, kidney function, hydration status   Current meds: Atenolol, Novasc       Sjogren's disease (HCC)  Assessment & Plan  Takes methotrexate 20mg weekly  Follows up with Dr Lisa Platt (Rheumatology) as outpatient  - saliva substitute to p r n  Parotid gland enlargement  Assessment & Plan  OP follow-up with ENT Dr Edwin Kohli       Disposition  ·  home with estimated length of stay of 7-10 days from admission     Follow-up providers and other issues to be followed up after discharge  ·  PCP - overall medical mgmt, follow-up incidental findings; consideration for baby aspirin  · Orthopedics   · Rheumatology  ·  follow-up thickening of distal rectal wall with PCP as well as stable distal paraesophageal in gastrohepatic ligament adenopathy     CODE: Level 1: Full Code     Restrictions include:   Fall precautions    Objective:    Functional Update:    Largely supervision for transfers, ambulation, ADLs now    Allergies per EMR    Physical Exam:  Vitals:    03/15/21 0840   BP: 112/58   Pulse: 65   Resp:    Temp:    SpO2:        GEN:  Sitting in NAD   HEENT/NECK: stable dry MM  CARDIAC: Regular rate rhythm, no murmers, no rubs, no gallops  LUNGS:  clear to auscultation, no wheezes, rales, or rhonchi  ABDOMEN: Soft, non-tender, non-distended, normal active bowel sounds  EXTREMITIES/SKIN:  no calf edema, no calf tenderness to palpation  NEURO:   MENTAL STATUS:  Appropriate wakefulness and interaction  and Strength/MMT:  Strength unchanged; adequate ROM of BLE  PSYCH:  Affect:  Euthymic     Diagnostic Studies: Reviewed, no new imaging  No orders to display       Laboratory: Labs reviewed  Results from last 7 days   Lab Units 03/15/21  0539 03/10/21  0509   HEMOGLOBIN g/dL 11 0* 10 5*   HEMATOCRIT % 34 1* 32 1*   WBC Thousand/uL 4 30* 5 10     Results from last 7 days   Lab Units 03/15/21  0540 03/11/21  0519 03/10/21  0509   BUN mg/dL 9 14 13   SODIUM mmol/L 135* 133* 135*   POTASSIUM mmol/L 3 6 4 8 3 1* CHLORIDE mmol/L 100 102 100   CREATININE mg/dL 0 54* 0 58* 0 63            Drug regimen reviewed, all potential adverse effects identified and addressed:    Scheduled Meds:  Current Facility-Administered Medications   Medication Dose Route Frequency Provider Last Rate    acetaminophen  650 mg Oral [facility] Dorma Backers, MD      ALPRAZolam  0 25 mg Oral TID PRN Dorma Backers, MD      amLODIPine  5 mg Oral Daily Dorma Backers, MD      atenolol  50 mg Oral BID Dorma Backers, MD      atorvastatin  10 mg Oral Daily Dorma Backers, MD      bisacodyl  10 mg Rectal Daily PRN Dorma Backers, MD      Diclofenac Sodium  2 g Topical 4x Daily PRN DARIO Cervantes      enoxaparin  30 mg Subcutaneous Q12H [facility] Dorma Backers, MD      FLUoxetine  20 mg Oral Daily Dorma Backers, MD      folic acid  1 mg Oral Daily Dorma Backers, MD      gabapentin  100 mg Oral HS Dorma Backers, MD      lactulose  20 g Oral BID PRN Dorma Backers, MD      levothyroxine  88 mcg Oral Early Morning Dorma Backers, MD      lidocaine  2 patch Topical HS Dorma Backers, MD      methocarbamol  500 mg Oral Q6H PRN Dorma Backers, MD      methotrexate  20 mg Oral Weekly Dorma Backers, MD      CHRISTIE ANTIFUNGAL  1 application Topical BID Dorma Backers, MD      nitroglycerin  0 4 mg Sublingual Q5 Min PRN Dorma Backers, MD      nystatin  1 application Topical BID Dorma Backers, MD      oxyCODONE  5 mg Oral Q4H PRN Dorma Backers, MD      potassium chloride  20 mEq Oral Daily [facility] [place]      saliva substitute  5 spray Mouth/Throat Q4H PRN Dorma Backers, MD      senna  1 tablet Oral BID Dorma Backers, MD         ** Please Note: Fluency Direct voice to text software may have been used in the creation of this document   **

## 2021-03-15 NOTE — ASSESSMENT & PLAN NOTE
K+ 3 1 on 3/10  Given 80mEq of replacement  Repeat K+ on 3/11 was 4 8  Potassium today is 3 6  Was previously taking potassium supplementation at home  Started on 20mEq potassium chloride daily  Trend with routine BMP

## 2021-03-15 NOTE — PROGRESS NOTES
OT Treatment Note       03/15/21 0700   Pain Assessment   Pain Assessment Tool 0-10   Pain Score 8   Pain Location/Orientation Orientation: Bilateral;Location: Groin   Hospital Pain Intervention(s) Rest;Repositioned  (reports receiving pain meds prior to session)   Restrictions/Precautions   Precautions Cognitive; Fall Risk;Pain   Weight Bearing Restrictions Yes   RLE Weight Bearing Per Order WBAT   LLE Weight Bearing Per Order WBAT   ROM Restrictions No   Lifestyle   Autonomy "I'm a little nervous to be without my girls," referring to therapists upon d/c home  Eating   Type of Assistance Needed Independent   Amount of Physical Assistance Provided No physical assistance   Comment seated in W/C   Eating CARE Score 6   Oral Hygiene   Type of Assistance Needed Supervision   Amount of Physical Assistance Provided No physical assistance   Comment SUP standing at sink with intermittent unilateral UE support on countertop   Oral Hygiene CARE Score 4   Grooming   Able To Wash/Dry Face;Comb/Brush Hair;Brush/Clean Teeth;Wash/Dry Hands   Limitation Noted In Safety;Strength;Timeliness   Findings SUP standing at sink   Shower/Bathe Self   Type of Assistance Needed Supervision   Amount of Physical Assistance Provided No physical assistance   Comment spongebathing sit->Stand level at sink   Pt deferring shower, stating she plans to spongebathe upon d/c home   Shower/Bathe Self CARE Score 4   Upper Body Dressing   Type of Assistance Needed Supervision   Amount of Physical Assistance Provided No physical assistance   Comment SUP standing   Upper Body Dressing CARE Score 4   Lower Body Dressing   Type of Assistance Needed Supervision;Verbal cues   Amount of Physical Assistance Provided No physical assistance   Comment pt able to doff/don briefs, pants sit->stand level WC to RW with SUP with vc's for technique to thread RLE first   Lower Body Dressing CARE Score 4   Putting On/Taking Off Footwear   Type of Assistance Needed Adaptive equipment;Set-up / clean-up   Amount of Physical Assistance Provided No physical assistance   Comment SUP to doff/don socks using reacher and sock aid   Putting On/Taking Off Footwear CARE Score 5   Lying to Sitting on Side of Bed   Type of Assistance Needed Supervision   Amount of Physical Assistance Provided No physical assistance   Lying to Sitting on Side of Bed CARE Score 4   Sit to Stand   Type of Assistance Needed Supervision; Adaptive equipment   Amount of Physical Assistance Provided No physical assistance   Comment close SUP using RW   Sit to Stand CARE Score 4   Bed-Chair Transfer   Type of Assistance Needed Supervision; Adaptive equipment   Amount of Physical Assistance Provided No physical assistance   Comment close SUP using RW   Chair/Bed-to-Chair Transfer CARE Score 4   Toileting Hygiene   Type of Assistance Needed Incidental touching   Amount of Physical Assistance Provided No physical assistance   Comment CGA in standing during clothing management; completed hygiene seated on commode   Toileting Hygiene CARE Score 4   Toilet Transfer   Type of Assistance Needed Supervision; Adaptive equipment   Amount of Physical Assistance Provided No physical assistance   Comment close SUP using RW   Toilet Transfer CARE Score 4   Exercise Tools   Other Exercise Tool 1 Pt participating in BUE therex using 1# db RUE, 2# db LUE, 10x2 reps each of chest press and bicep curls   Cognition   Overall Cognitive Status Impaired   Arousal/Participation Alert; Cooperative   Attention Attends with cues to redirect   Orientation Level Oriented X4   Memory Decreased short term memory;Decreased recall of precautions   Following Commands Follows multistep commands without difficulty   Additional Activities   Additional Activities Comments Pt functionally mobilizing household distances with close SUP using RW   Activity Tolerance   Activity Tolerance Patient tolerated treatment well   Assessment   Treatment Assessment Pt seen for 90 min skilled OT session focusing on ADL routine, functional transfers/mobility and BUE therex  Pt completing spongebathing as she reports this is what she will do at home  Pt able to complete full ADL routine at SUP level, using reacher and sock aid for footwear  Pt completing all functional transfers with SUP using RW with min vc's for proper hand placement  See note for further details  OT to continue POC to address balance, standing tolerance, and functional strength to maximize (I) and safety with functional transfers/ADLs prior to discharge home  Prognosis Good   Problem List Decreased strength;Decreased range of motion;Decreased endurance; Impaired balance;Decreased mobility; Decreased coordination;Decreased cognition; Impaired judgement;Decreased safety awareness;Pain   Barriers to Discharge Inaccessible home environment;Decreased caregiver support   Plan   Treatment/Interventions ADL retraining;Functional transfer training; Therapeutic exercise; Endurance training;Patient/family training;Equipment eval/education; Compensatory technique education   Progress Progressing toward goals   Recommendation   OT Discharge Recommendation Home with skilled therapy   OT Therapy Minutes   OT Time In 0700   OT Time Out 0830   OT Total Time (minutes) 90   OT Mode of treatment - Individual (minutes) 90   OT Mode of treatment - Concurrent (minutes) 0   OT Mode of treatment - Group (minutes) 0   OT Mode of treatment - Co-treat (minutes) 0   OT Mode of Treatment - Total time(minutes) 90 minutes   OT Cumulative Minutes 480   Therapy Time missed   Time missed?  No

## 2021-03-15 NOTE — CASE MANAGEMENT
Met with the patient today to discuss the plan for her DC on Wednesday  The patient stated that she was ready for her upcoming DC  Her  was able to get a transport chair  Attempted to call her , Geo Childs (at [phone]), to discuss discharge but there was no answer and no voicemail setup  Discussed the recommendation for home PT and OT with the patient which she was agreeable to  Patient requested referral be made to [address] [place] [address] Dr Cheek submitted today  Will continue to follow the patient to assist with DC planning and care coordination

## 2021-03-15 NOTE — PROGRESS NOTES
03/15/21 1230   Pain Assessment   Pain Assessment Tool 0-10   Pain Score 8   Pain Location/Orientation Orientation: Bilateral;Location: Hip;Location: Pelvis   Pain Onset/Description Onset: Ongoing;Frequency: Constant/Continuous   Hospital Pain Intervention(s) Cold applied   Restrictions/Precautions   Precautions Cognitive; Fall Risk;Pain   RLE Weight Bearing Per Order WBAT   LLE Weight Bearing Per Order WBAT   Cognition   Overall Cognitive Status Impaired   Arousal/Participation Alert; Cooperative   Attention Attends with cues to redirect   Orientation Level Oriented X4   Memory Decreased short term memory;Decreased recall of precautions   Following Commands Follows multistep commands without difficulty   Subjective   Subjective c/o severe pain, was medicated per pt at 11am   Sit to Lying   Type of Assistance Needed Supervision   Sit to Lying CARE Score 4   Lying to Sitting on Side of Bed   Type of Assistance Needed Supervision   Lying to Sitting on Side of Bed CARE Score 4   Sit to Stand   Type of Assistance Needed Supervision   Sit to Stand CARE Score 4   Bed-Chair Transfer   Type of Assistance Needed Supervision   Chair/Bed-to-Chair Transfer CARE Score 4   Car Transfer   Comment TBA at Dc     Reason if not Attempted Environmental limitations   Car Transfer CARE Score 10   Walk 10 Feet   Type of Assistance Needed Supervision   Walk 10 Feet CARE Score 4   Walk 50 Feet with Two Turns   Type of Assistance Needed Supervision   Walk 50 Feet with Two Turns CARE Score 4   Walk 150 Feet   Comment not attempted due to pain level  Reason if not Attempted Safety concerns   Walk 150 Feet CARE Score 88   Ambulation   Does the patient walk? 2  Yes   Primary Mode of Locomotion Prior to Admission Walk   Distance Walked (feet) 50 ft  (x2)   Assist Device Roller Walker   Gait Pattern [place]; Inconsistant [place]; Slow [place]; Step to; Improper weight shift   Limitations Noted In Endurance;Speed;Strength;Swing   Walk Assist Level Supervision   Findings focus on household distances due to inc pain  Inc time needed for amb  Wheelchair mobility   Does the patient use a wheelchair? 0  No   4 Steps   Type of Assistance Needed Incidental touching   Amount of Physical Assistance Provided No physical assistance   Comment CGA x4 with B UE on L HR, x4 with L HR and SPC  (x8 steps total)   4 Steps CARE Score 4   Stairs   Type Stairs   # of Steps 8   Weight Bearing Precautions Fall Risk   Assist Devices Single Rail;Cane   Findings very challenged descending with B UE support on single HR  Trialed with HR and SPC with decent result  Therapeutic Interventions   Strengthening Seated B LE TE: LAQ, yellow tband HS and hip abd x20, ball squeeze x20  Balance in parallel bars: side stepping 2x 5 steps each direction, limited by pain  Modalities CP to B buttocks and hips at end of session in WC  Assessment   Treatment Assessment Pt engaged in 90 min skilled PT intervnetion despite c/o sig pain, just wanting to sit and do nothing the rest of the day  Started with steps as pt has 7 MICHAEL with L HR, better paulino with L HR and SPC, should review again next visit  Focused on household distances due to pain level, pt needs inc time and S with RW for safety  Attempted side stepping in parallel bars, very limited paulino due to pain  Pt overall demonstrates S for gait and transfers, including in/out of bed, pt plans to sleep on couch at home as she did previously  However pt liked to sleep on R side prior to fall, now cannot roll and will needs to sleep on her back  Gets some relief form cold packs, placed on chair and around hips B at end of session  Pt continues to be limited by pain paulino vs control  However is set for S at [facility] with family support and Home PT followup  Spoke with spouse to bring in pts RW when he picks her up for DC, he feel it may just need skis/sliders  Problem List Decreased strength;Decreased range of motion;Decreased endurance; Impaired balance;Decreased mobility; Decreased coordination;Decreased cognition; Impaired judgement;Decreased safety awareness;Pain   Barriers to Discharge Inaccessible home environment;Decreased caregiver support   Barriers to Discharge Comments MICHAEL, will be alone at times  PT Barriers   Physical Impairment Decreased strength; Impaired balance;Decreased endurance;Decreased range of motion;Decreased mobility;Pain   Functional Limitation Car transfers; Ramp negotiation;Stair negotiation;Standing;Walking   Plan   Treatment/Interventions Functional transfer training;LE strengthening/ROM; Therapeutic exercise; Endurance training;Gait training;Patient/family training   Progress Slow progress, decreased activity tolerance   Recommendation   PT Discharge Recommendation Home with skilled therapy  (home PT)   PT Equipment ordered contacted spouse, to bring RW at [place] for assessment  PT Therapy Minutes   PT Time In 1230   PT Time Out 1400   PT Total Time (minutes) 90   PT Mode of treatment - Individual (minutes) 90   PT Mode of treatment - Concurrent (minutes) 0   PT Mode of treatment - Group (minutes) 0   PT Mode of treatment - Co-treat (minutes) 0   PT Mode of Treatment - Total time(minutes) 90 minutes   PT Cumulative Minutes 510   Therapy Time missed   Time missed?  No

## 2021-03-15 NOTE — PLAN OF CARE
Problem: Potential for Falls  Goal: Patient will remain free of falls  Description: INTERVENTIONS:  - Assess patient frequently for physical needs  -  Identify cognitive and physical deficits and behaviors that affect risk of falls  -  [place] fall precautions as indicated by assessment   - Educate patient/family on patient safety including physical limitations  - Instruct patient to call for assistance with activity based on assessment  - Modify environment to reduce risk of injury  - Consider OT/PT consult to assist with strengthening/mobility  Outcome: Progressing     Problem: Prexisting or High Potential for Compromised Skin Integrity  Goal: Skin integrity is maintained or improved  Description: INTERVENTIONS:  - Identify patients at risk for skin breakdown  - Assess and monitor skin integrity  - Assess and monitor nutrition and hydration status  - Monitor labs   - Assess for incontinence   - Turn and reposition patient  - Assist with mobility/ambulation  - Relieve pressure over bony prominences  - Avoid friction and shearing  - Provide appropriate hygiene as needed including keeping skin clean and dry  - Evaluate need for skin moisturizer/barrier cream  - Collaborate with interdisciplinary team   - Patient/family teaching  - Consider wound care consult   Outcome: Progressing     Problem: Nutrition/Hydration-ADULT  Goal: Nutrient/Hydration intake appropriate for improving, restoring or maintaining nutritional needs  Description: Monitor and assess patient's nutrition/hydration status for malnutrition  Collaborate with interdisciplinary team and initiate plan and interventions as ordered  Monitor patient's weight and dietary intake as ordered or per policy  Utilize nutrition screening tool and intervene as necessary  Determine patient's food preferences and provide high-protein, high-caloric foods as appropriate       INTERVENTIONS:  - Monitor oral intake, urinary output, labs, and treatment plans  - Assess nutrition and hydration status and recommend course of action  - Evaluate amount of meals eaten  - Assist patient with eating if necessary   - Allow adequate time for meals  - Recommend/ encourage appropriate diets, oral nutritional supplements, and vitamin/mineral supplements  - Order, calculate, and assess calorie counts as needed  - Recommend, monitor, and adjust tube feedings and TPN/PPN based on assessed needs  - Assess need for intravenous fluids  - Provide specific nutrition/hydration education as appropriate  - Include patient/family/caregiver in decisions related to nutrition  Outcome: Progressing     Problem: PAIN - ADULT  Goal: Verbalizes/displays adequate comfort level or baseline comfort level  Description: Interventions:  - Encourage patient to monitor pain and request assistance  - Assess pain using appropriate pain scale  - Administer analgesics based on type and severity of pain and evaluate response  - Implement non-pharmacological measures as appropriate and evaluate response  - Consider cultural and social influences on pain and pain management  - Notify physician/advanced practitioner if interventions unsuccessful or patient reports new pain  Outcome: Progressing     Problem: SAFETY ADULT  Goal: Maintain or return mobility status to optimal level  Description: INTERVENTIONS:  - Assess patient's baseline mobility status (ambulation, transfers, stairs, etc )    - Identify cognitive and physical deficits and behaviors that affect mobility  - Identify mobility aids required to assist with transfers and/or ambulation (gait belt, sit-to-stand, lift, walker, cane, etc )  - [place] fall precautions as indicated by assessment  - Record patient progress and toleration of activity level on Mobility SBAR; progress patient to next Phase/Stage  - Instruct patient to call for assistance with activity based on assessment  - Consider rehabilitation consult to assist with strengthening/weightbearing, etc   Outcome: Progressing     Problem: Knowledge Deficit  Goal: Patient/family/caregiver demonstrates understanding of disease process, treatment plan, medications, and discharge instructions  Description: Complete learning assessment and assess knowledge base    Interventions:  - Provide teaching at level of understanding  - Provide teaching via preferred learning methods  Outcome: Progressing     Problem: GASTROINTESTINAL - ADULT  Goal: Maintains or returns to baseline bowel function  Description: INTERVENTIONS:  - Assess bowel function  - Encourage oral fluids to ensure adequate hydration  - Administer IV fluids if ordered to ensure adequate hydration  - Administer ordered medications as needed  - Encourage mobilization and activity  - Consider nutritional services referral to assist patient with adequate nutrition and appropriate food choices  Outcome: Progressing     Problem: SKIN/TISSUE INTEGRITY - ADULT  Goal: Skin integrity remains intact  Description: INTERVENTIONS  - Identify patients at risk for skin breakdown  - Assess and monitor skin integrity  - Assess and monitor nutrition and hydration status  - Monitor labs (i e  albumin)  - Assess for incontinence   - Turn and reposition patient  - Assist with mobility/ambulation  - Relieve pressure over bony prominences  - Avoid friction and shearing  - Provide appropriate hygiene as needed including keeping skin clean and dry  - Evaluate need for skin moisturizer/barrier cream  - Collaborate with interdisciplinary team (i e  Nutrition, Rehabilitation, etc )   - Patient/family teaching  Outcome: Progressing

## 2021-03-15 NOTE — PROGRESS NOTES
[facility]  Progress Note Tevin Francisco Viscomi 1955, 78 y o  female MRN: [id number]  Unit/Bed#: [facility] 507-46 Encounter: [id number]  Primary Care Provider: Tera Mukherjee MD   Date and time admitted to hospital: 3/9/2021  2:50 PM    Onychomycosis  Assessment & Plan  Podiatry was consulted on 3/9  Nails trimmed on 3/10  Continue to follow-up with Podiatry as outpatient  Abnormal ECG  Assessment & Plan  EKG completed on 3/4 that showed possible T-wave inversion on V2 lead  Sent to acute care setting for formal ACS workup  Following EKGs and troponins were negative  Cardiology was consulted - ECHO completed  Showed no motion wall abnormalities  EF was 64%  No plans for further cardiac workup at this time  CTA showed no PE  Showed small R pleural effusion and basilar atelectasis - given a 1 time dose of IV 40mg lasix  Hypokalemia  Assessment & Plan  K+ 3 1 on 3/10  Given 80mEq of replacement  Repeat K+ on 3/11 was 4 8  Potassium today is 3 6  Was previously taking potassium supplementation at home  Started on 20mEq potassium chloride daily  Trend with routine BMP  Anemia  Assessment & Plan  Stable  Current Hgb 11 0, Hct 34 1  Baseline Hgb appears to be 9-10  Takes folic acid 1mg daily  Gastroparesis  Assessment & Plan  Formerly on Reglan - stopped in the acute setting due to concerns for extrapyramidal symptoms  Monitor for s/s of bowel obstruction  Continue bowel regimen  Hypothyroidism  Assessment & Plan  Currently on levothyroxine 88mcg daily at home (States that she was increased 1 month ago from 75mcg)  Last TSH level unknown  Continue to follow-up with outpatient PCP  Anxiety and depression  Assessment & Plan  Stable  Continue Prozac 20mg daily and Xanax PRN for anxiety  Supportive counseling as needed  Hypertension  Assessment & Plan  Home regimen: Norvasc 2 5mg daily and atenolol 50mg BID    Episode of hypertensive urgency while in acute setting      Monitor routine VS   Continue atenolol 50mg BID  Increased Norvasc to 5mg daily on 3/  Sjogren's disease ([address] )  Assessment & Plan  Takes methotrexate 20mg weekly  Follows up with Dr Akshat Masterson (Rheumatology) as outpatient  Saliva spray added as PRN for dry mouth  Parotid gland enlargement  Assessment & Plan  Stable  Yearly follow-up with Dr Johnny Wright (ENT) as outpatient  * Multiple closed fractures of pelvis [facility])  Assessment & Plan   - Mechanical fall down the stairs predominately on the R side  Multiple pelvic fractures: Acute nondisplaced fracture of the right inferior pubic ramus and right pubic symphysis, anterior right acetabular fracture and probable nondisplaced left anterior acetabular fracture  Ortho consulted - no surgical intervention at this time      Monitor for s/s pelvic bleeding  Pain management  WBAT  DVT Prophylaxis - Lovenox  Follow-up with Dr Najma Santana (Orthopedics) office in 6 weeks  (Week of )      PT/OT Therapies  Primary team following  VTE Pharmacologic Prophylaxis:   Pharmacologic: Enoxaparin (Lovenox)  Mechanical VTE Prophylaxis in Place: Yes - sequential compression devices  Current Length of Stay: 6 day(s)    Current Patient Status: Inpatient Rehab     Discharge Plan: As per primary team     Code Status: Level 1 - Full Code    Subjective:   Pt examined while sitting in WC in pt room  Currently has B/L pelvic pain, 8/10, aching/throbbing, improves with pain medication and ice  States that this weekend was "quiet "  Has been sleeping well and doing well in therapy  LBM was 3/15 with no issues  Objective:     Vitals:   Temp (24hrs), Av 1 °F (36 7 °C), Min:97 4 °F (36 3 °C), Max:98 6 °F (37 °C)    Temp:  [97 4 °F (36 3 °C)-98 6 °F (37 °C)] 98 6 °F (37 °C)  HR:  [55-65] 65  Resp:  [18-20] 20  BP: (112-162)/(58-80) 112/58  SpO2:  [98 %] 98 %  Body mass index is 27 19 kg/m²       Review of Systems   Constitutional: Negative for chills, fatigue and fever  Respiratory: Negative for cough and shortness of breath  Cardiovascular: Negative for chest pain, palpitations and leg swelling  Gastrointestinal: Negative for abdominal distention, abdominal pain, constipation, diarrhea, nausea and vomiting  LBM 3/15  Genitourinary: Negative for difficulty urinating  Musculoskeletal: Positive for arthralgias (B/L pelvic pain, 8/10, aching/throbbing, improves with pain medication and ice)  Neurological: Negative for dizziness and light-headedness  Psychiatric/Behavioral: Negative for sleep disturbance  Input and Output Summary (last 24 hours): Intake/Output Summary (Last 24 hours) at 3/15/2021 0938  Last data filed at 3/15/2021 0700  Gross per 24 hour   Intake 200 ml   Output 1900 ml   Net -1700 ml       Physical Exam:     Physical Exam  Vitals signs and nursing note reviewed  Constitutional:       Appearance: Normal appearance  HENT:      Head: Normocephalic and atraumatic  Cardiovascular:      Rate and Rhythm: Normal rate and regular rhythm  Pulses: Normal pulses  Heart sounds: Normal heart sounds  No murmur  No friction rub  Pulmonary:      Effort: Pulmonary effort is normal  No respiratory distress  Breath sounds: Normal breath sounds  No wheezing or rhonchi  Abdominal:      General: Abdomen is flat  Bowel sounds are normal  There is no distension  Palpations: Abdomen is soft  Tenderness: There is no abdominal tenderness  Musculoskeletal:      Right lower leg: No edema  Left lower leg: No edema  Skin:     General: Skin is warm and dry  Neurological:      Mental Status: She is alert and oriented to person, place, and time  Comments: Dysarthria, oral dyskinesia     Psychiatric:         Mood and Affect: Mood normal          Behavior: Behavior normal          Additional Data:     Labs:    Results from last 7 days   Lab Units 03/15/21  0539   WBC Thousand/uL 4 30* HEMOGLOBIN g/dL 11 0*   HEMATOCRIT % 34 1*   PLATELETS Thousands/uL 302   NEUTROS PCT % 83*   LYMPHS PCT % 8*   MONOS PCT % 8   EOS PCT % 2     Results from last 7 days   Lab Units 03/15/21  0540   SODIUM mmol/L 135*   POTASSIUM mmol/L 3 6   CHLORIDE mmol/L 100   CO2 mmol/L 25   BUN mg/dL 9   CREATININE mg/dL 0 54*   ANION GAP mmol/L 10   CALCIUM mg/dL 9 6   GLUCOSE RANDOM mg/dL 99                       Labs reviewed    Imaging:    Imaging reviewed    Recent Cultures (last 7 days):           Last 24 Hours Medication List:   Current Facility-Administered Medications   Medication Dose Route Frequency Provider Last Rate    acetaminophen  650 mg Oral [facility] Zay Cote MD      ALPRAZolam  0 25 mg Oral TID PRN Zay Cote MD      amLODIPine  5 mg Oral Daily Zay Cote MD      atenolol  50 mg Oral BID Zay Cote MD      atorvastatin  10 mg Oral Daily Zay Cote MD      bisacodyl  10 mg Rectal Daily PRN Zay Cote MD      Diclofenac Sodium  2 g Topical 4x Daily PRN DARIO Barreto      enoxaparin  30 mg Subcutaneous Q12H [facility] Zay Cote MD      FLUoxetine  20 mg Oral Daily Zay Cote MD      folic acid  1 mg Oral Daily Zay Cote MD      gabapentin  100 mg Oral HS Zay Cote MD      lactulose  20 g Oral BID PRN Zay Cote MD      levothyroxine  88 mcg Oral Early Morning Zay Cote MD      lidocaine  2 patch Topical HS Zay Cote MD      methocarbamol  500 mg Oral Q6H PRN Zay Cote MD      methotrexate  20 mg Oral Weekly Zay Cote MD      CHRISTIE ANTIFUNGAL  1 application Topical BID Zay Cote MD      nitroglycerin  0 4 mg Sublingual Q5 Min PRN Zay Cote MD      nystatin  1 application Topical BID Zay Cote MD      oxyCODONE  5 mg Oral Q4H PRN Zay Cote MD      potassium chloride  20 mEq Oral Daily DARIO Barreto      saliva substitute  5 spray Mouth/Throat Q4H PRN Zay Cote MD      senna  1 tablet Oral BID Butch Swanson Thuy Orlando MD          M*Modal software was used to dictate this note  It may contain errors with dictating incorrect words or incorrect spelling  Please contact the provider directly with any questions

## 2021-03-15 NOTE — ASSESSMENT & PLAN NOTE
2/22 - Mechanical fall down the stairs predominately on the R side  Multiple pelvic fractures: Acute nondisplaced fracture of the right inferior pubic ramus and right pubic symphysis, anterior right acetabular fracture and probable nondisplaced left anterior acetabular fracture  Ortho consulted - no surgical intervention at this time      Monitor for s/s pelvic bleeding  Pain management  WBAT  DVT Prophylaxis - Lovenox  Follow-up with Dr Zach Parekh (Orthopedics) office in 6 weeks  (Week of April 5th)      PT/OT Therapies  Primary team following

## 2021-03-16 PROBLEM — G31.84 MILD COGNITIVE IMPAIRMENT: Status: ACTIVE | Noted: 2021-03-16

## 2021-03-16 PROCEDURE — 97530 THERAPEUTIC ACTIVITIES: CPT

## 2021-03-16 PROCEDURE — 99232 SBSQ HOSP IP/OBS MODERATE 35: CPT

## 2021-03-16 PROCEDURE — 97110 THERAPEUTIC EXERCISES: CPT

## 2021-03-16 PROCEDURE — 97535 SELF CARE MNGMENT TRAINING: CPT

## 2021-03-16 PROCEDURE — 97129 THER IVNTJ 1ST 15 MIN: CPT

## 2021-03-16 PROCEDURE — 97116 GAIT TRAINING THERAPY: CPT

## 2021-03-16 PROCEDURE — 99232 SBSQ HOSP IP/OBS MODERATE 35: CPT | Performed by: NURSE PRACTITIONER

## 2021-03-16 RX ADMIN — LEVOTHYROXINE SODIUM 88 MCG: 0.09 TABLET ORAL at 06:12

## 2021-03-16 RX ADMIN — ATORVASTATIN CALCIUM 10 MG: 10 TABLET, FILM COATED ORAL at 08:01

## 2021-03-16 RX ADMIN — NYSTATIN 1 APPLICATION: 100000 POWDER TOPICAL at 08:03

## 2021-03-16 RX ADMIN — OXYCODONE HYDROCHLORIDE 5 MG: 5 TABLET ORAL at 21:08

## 2021-03-16 RX ADMIN — AMLODIPINE BESYLATE 5 MG: 5 TABLET ORAL at 08:01

## 2021-03-16 RX ADMIN — ACETAMINOPHEN 650 MG: 325 TABLET ORAL at 14:09

## 2021-03-16 RX ADMIN — OXYCODONE HYDROCHLORIDE 5 MG: 5 TABLET ORAL at 12:40

## 2021-03-16 RX ADMIN — GABAPENTIN 100 MG: 100 CAPSULE ORAL at 21:07

## 2021-03-16 RX ADMIN — FOLIC ACID 1 MG: 1 TABLET ORAL at 08:02

## 2021-03-16 RX ADMIN — LOSARTAN POTASSIUM 25 MG: 25 TABLET, FILM COATED ORAL at 08:01

## 2021-03-16 RX ADMIN — NYSTATIN 1 APPLICATION: 100000 POWDER TOPICAL at 21:06

## 2021-03-16 RX ADMIN — ACETAMINOPHEN 650 MG: 325 TABLET ORAL at 21:08

## 2021-03-16 RX ADMIN — ATENOLOL 50 MG: 50 TABLET ORAL at 08:02

## 2021-03-16 RX ADMIN — FLUOXETINE 20 MG: 20 CAPSULE ORAL at 08:02

## 2021-03-16 RX ADMIN — LIDOCAINE 2 PATCH: 50 PATCH CUTANEOUS at 21:07

## 2021-03-16 RX ADMIN — ENOXAPARIN SODIUM 40 MG: 40 INJECTION SUBCUTANEOUS at 08:03

## 2021-03-16 RX ADMIN — SENNOSIDES 8.6 MG: 8.6 TABLET ORAL at 08:02

## 2021-03-16 RX ADMIN — OXYCODONE HYDROCHLORIDE 5 MG: 5 TABLET ORAL at 08:02

## 2021-03-16 RX ADMIN — ACETAMINOPHEN 650 MG: 325 TABLET ORAL at 06:12

## 2021-03-16 RX ADMIN — MICONAZOLE NITRATE 1 APPLICATION: 20 CREAM TOPICAL at 08:03

## 2021-03-16 RX ADMIN — MICONAZOLE NITRATE 1 APPLICATION: 20 CREAM TOPICAL at 21:07

## 2021-03-16 RX ADMIN — ALPRAZOLAM 0.25 MG: 0.25 TABLET ORAL at 21:07

## 2021-03-16 NOTE — PLAN OF CARE
Problem: Potential for Falls  Goal: Patient will remain free of falls  Description: INTERVENTIONS:  - Assess patient frequently for physical needs  -  Identify cognitive and physical deficits and behaviors that affect risk of falls  -  [place] fall precautions as indicated by assessment   - Educate patient/family on patient safety including physical limitations  - Instruct patient to call for assistance with activity based on assessment  - Modify environment to reduce risk of injury  - Consider OT/PT consult to assist with strengthening/mobility  Outcome: Progressing     Problem: Prexisting or High Potential for Compromised Skin Integrity  Goal: Skin integrity is maintained or improved  Description: INTERVENTIONS:  - Identify patients at risk for skin breakdown  - Assess and monitor skin integrity  - Assess and monitor nutrition and hydration status  - Monitor labs   - Assess for incontinence   - Turn and reposition patient  - Assist with mobility/ambulation  - Relieve pressure over bony prominences  - Avoid friction and shearing  - Provide appropriate hygiene as needed including keeping skin clean and dry  - Evaluate need for skin moisturizer/barrier cream  - Collaborate with interdisciplinary team   - Patient/family teaching  - Consider wound care consult   Outcome: Progressing     Problem: Nutrition/Hydration-ADULT  Goal: Nutrient/Hydration intake appropriate for improving, restoring or maintaining nutritional needs  Description: Monitor and assess patient's nutrition/hydration status for malnutrition  Collaborate with interdisciplinary team and initiate plan and interventions as ordered  Monitor patient's weight and dietary intake as ordered or per policy  Utilize nutrition screening tool and intervene as necessary  Determine patient's food preferences and provide high-protein, high-caloric foods as appropriate       INTERVENTIONS:  - Monitor oral intake, urinary output, labs, and treatment plans  - Assess nutrition and hydration status and recommend course of action  - Evaluate amount of meals eaten  - Assist patient with eating if necessary   - Allow adequate time for meals  - Recommend/ encourage appropriate diets, oral nutritional supplements, and vitamin/mineral supplements  - Order, calculate, and assess calorie counts as needed  - Recommend, monitor, and adjust tube feedings and TPN/PPN based on assessed needs  - Assess need for intravenous fluids  - Provide specific nutrition/hydration education as appropriate  - Include patient/family/caregiver in decisions related to nutrition  Outcome: Progressing     Problem: SAFETY ADULT  Goal: Patient will remain free of falls  Description: INTERVENTIONS:  - Assess patient frequently for physical needs  -  Identify cognitive and physical deficits and behaviors that affect risk of falls    -  [place] fall precautions as indicated by assessment   - Educate patient/family on patient safety including physical limitations  - Instruct patient to call for assistance with activity based on assessment  - Modify environment to reduce risk of injury  - Consider OT/PT consult to assist with strengthening/mobility  Outcome: Progressing  Goal: Maintain or return mobility status to optimal level  Description: INTERVENTIONS:  - Assess patient's baseline mobility status (ambulation, transfers, stairs, etc )    - Identify cognitive and physical deficits and behaviors that affect mobility  - Identify mobility aids required to assist with transfers and/or ambulation (gait belt, sit-to-stand, lift, walker, cane, etc )  - [place] fall precautions as indicated by assessment  - Record patient progress and toleration of activity level on Mobility SBAR; progress patient to next Phase/Stage  - Instruct patient to call for assistance with activity based on assessment  - Consider rehabilitation consult to assist with strengthening/weightbearing, etc   Outcome: Progressing

## 2021-03-16 NOTE — PROGRESS NOTES
[facility]  Progress Note Charan Sands Viscomi 1955, 78 y o  female MRN: [id number]  Unit/Bed#: -38 Encounter: [id number]  Primary Care Provider: Glenys Quiroz MD   Date and time admitted to hospital: 3/9/2021  2:50 PM    Onychomycosis  Assessment & Plan  Podiatry was consulted on 3/9  Nails trimmed on 3/10  Continue to follow-up with Podiatry as outpatient  Abnormal ECG  Assessment & Plan  EKG completed on 3/4 that showed possible T-wave inversion on V2 lead  Sent to acute care setting for formal ACS workup  Following EKGs and troponins were negative  Cardiology was consulted - ECHO completed  Showed no motion wall abnormalities  EF was 64%  No plans for further cardiac workup at this time  CTA showed no PE  Showed small R pleural effusion and basilar atelectasis - given a 1 time dose of IV 40mg lasix  Hypokalemia  Assessment & Plan  K+ 3 1 on 3/10  Given 80mEq of replacement  Repeat K+ on 3/11 was 4 8  Potassium 3 6 on 3/15  Was previously taking potassium supplementation at home  Started on losartan 25mg daily to help with hypokalemia and HTN on 3/15  Trend with routine BMP  Anemia  Assessment & Plan  Stable  Current Hgb 11 0, Hct 34 1  Baseline Hgb appears to be 9-10  Takes folic acid 1mg daily  Gastroparesis  Assessment & Plan  Formerly on Reglan - stopped in the acute setting due to concerns for extrapyramidal symptoms  Monitor for s/s of bowel obstruction  Continue bowel regimen  Hypothyroidism  Assessment & Plan  Currently on levothyroxine 88mcg daily at home (States that she was increased 1 month ago from 75mcg)  Last TSH level unknown  Continue to follow-up with outpatient PCP  Anxiety and depression  Assessment & Plan  Stable  Continue Prozac 20mg daily and Xanax PRN for anxiety  Supportive counseling as needed  Hypertension  Assessment & Plan  Home regimen: Norvasc 2 5mg daily and atenolol 50mg BID    Episode of hypertensive urgency while in acute setting      Monitor routine VS   Continue atenolol 50mg BID  Increased Norvasc to 5mg daily on 3/4  Decreased atenolol to 50mg daily due to HR 50s-60s and started on losartan 25mg daily to help with hypokalemia  Sjogren's disease ([facility] [address] )  Assessment & Plan  Takes methotrexate 20mg weekly  Follows up with Dr Callie Woodward (Rheumatology) as outpatient  Saliva spray added as PRN for dry mouth  Parotid gland enlargement  Assessment & Plan  Stable  Yearly follow-up with Dr Cierra Dillard (ENT) as outpatient  * Multiple closed fractures of pelvis [facility])  Assessment & Plan   - Mechanical fall down the stairs predominately on the R side  Multiple pelvic fractures: Acute nondisplaced fracture of the right inferior pubic ramus and right pubic symphysis, anterior right acetabular fracture and probable nondisplaced left anterior acetabular fracture  Ortho consulted - no surgical intervention at this time      Monitor for s/s pelvic bleeding  Pain management  WBAT  DVT Prophylaxis - Lovenox  Follow-up with Dr Laura Capone (Orthopedics) office in 6 weeks  (Week of )      PT/OT Therapies  Primary team following  VTE Pharmacologic Prophylaxis:   Pharmacologic: Enoxaparin (Lovenox)  Mechanical VTE Prophylaxis in Place: Yes - sequential compression devices  Current Length of Stay: 7 day(s)    Current Patient Status: Inpatient Rehab     Discharge Plan: As per primary team     Code Status: Level 1 - Full Code    Subjective:   Pt examined while sitting in WC in pt room  Complaints of 8/10 B/L pelvic pain that radiates to the groin, improves with rest/ice/and pain medication  Slept well last night and therapy has been going well  Reviewed changes made in BP medications and pt was understanding  No other questions or concerns at this time      Objective:     Vitals:   Temp (24hrs), Av 5 °F (36 9 °C), Min:97 9 °F (36 6 °C), Max:99 °F (37 2 °C)    Temp:  [97 9 °F (36 6 °C)-99 °F (37 2 °C)] 97 9 °F (36 6 °C)  HR:  [58-76] 76  Resp:  [18] 18  BP: (133-152)/(63-67) 145/65  SpO2:  [97 %-98 %] 97 %  Body mass index is 27 19 kg/m²  Review of Systems   Constitutional: Negative for chills, fatigue and fever  Respiratory: Negative for cough and shortness of breath  Cardiovascular: Negative for chest pain, palpitations and leg swelling  Gastrointestinal: Negative for abdominal distention, abdominal pain, constipation, diarrhea, nausea and vomiting  LBM 3/16   Genitourinary: Negative for difficulty urinating  Musculoskeletal: Positive for arthralgias (B/L sharp/throbbing pelvic pain that radiates to the groins, 8/10, improves with rest, pain medication, and ice)  Neurological: Negative for dizziness, weakness, light-headedness and headaches  Psychiatric/Behavioral: Negative for sleep disturbance  Input and Output Summary (last 24 hours): Intake/Output Summary (Last 24 hours) at 3/16/2021 0934  Last data filed at 3/16/2021 0900  Gross per 24 hour   Intake 480 ml   Output 800 ml   Net -320 ml       Physical Exam:     Physical Exam  Vitals signs and nursing note reviewed  Constitutional:       Appearance: Normal appearance  HENT:      Head: Normocephalic and atraumatic  Cardiovascular:      Rate and Rhythm: Normal rate and regular rhythm  Pulses: Normal pulses  Heart sounds: Normal heart sounds  No murmur  No friction rub  Pulmonary:      Effort: Pulmonary effort is normal  No respiratory distress  Breath sounds: Normal breath sounds  No wheezing or rhonchi  Abdominal:      General: Abdomen is flat  Bowel sounds are normal  There is no distension  Palpations: Abdomen is soft  Tenderness: There is no abdominal tenderness  Musculoskeletal:      Right lower leg: No edema  Left lower leg: No edema  Skin:     General: Skin is warm and dry     Neurological:      Mental Status: She is alert and oriented to person, place, and time  Comments: Oral dyskinesia, dysarthria present     Psychiatric:         Mood and Affect: Mood normal          Behavior: Behavior normal          Additional Data:     Labs:    Results from last 7 days   Lab Units 03/15/21  0539   WBC Thousand/uL 4 30*   HEMOGLOBIN g/dL 11 0*   HEMATOCRIT % 34 1*   PLATELETS Thousands/uL 302   NEUTROS PCT % 83*   LYMPHS PCT % 8*   MONOS PCT % 8   EOS PCT % 2     Results from last 7 days   Lab Units 03/15/21  0540   SODIUM mmol/L 135*   POTASSIUM mmol/L 3 6   CHLORIDE mmol/L 100   CO2 mmol/L 25   BUN mg/dL 9   CREATININE mg/dL 0 54*   ANION GAP mmol/L 10   CALCIUM mg/dL 9 6   GLUCOSE RANDOM mg/dL 99                       Labs reviewed    Imaging:    Imaging reviewed    Recent Cultures (last 7 days):           Last 24 Hours Medication List:   Current Facility-Administered Medications   Medication Dose Route Frequency Provider Last Rate    acetaminophen  650 mg Oral [facility] Erinn Hernandez MD      ALPRAZolam  0 25 mg Oral TID PRN Erinn Hernandez MD      amLODIPine  5 mg Oral Daily rEinn Hernandez MD      atenolol  50 mg Oral Daily Elle Falling, CRNP      atorvastatin  10 mg Oral Daily Erinn Hernandez MD      bisacodyl  10 mg Rectal Daily PRN Erinn Hernandez MD      Diclofenac Sodium  2 g Topical 4x Daily PRN Elle Falling, CRNP      enoxaparin  40 mg Subcutaneous Q24H [facility] Erinn Hernandez MD      FLUoxetine  20 mg Oral Daily Erinn Hernandez MD      folic acid  1 mg Oral Daily Erinn Hernandez MD      gabapentin  100 mg Oral HS Erinn Hernandez MD      lactulose  20 g Oral BID PRN Erinn Hernandez MD      levothyroxine  88 mcg Oral Early Morning Erinn Hernandez MD      lidocaine  2 patch Topical HS Erinn Hernandez MD      losartan  25 mg Oral Daily Elle Falling, CRNP      methocarbamol  500 mg Oral Q6H PRN Erinn Hernandez MD      methotrexate  20 mg Oral Weekly Erinn Hernandez MD      CHRISTIE ANTIFUNGAL  1 application Topical BID Erinn Hernandez MD      nitroglycerin  0 4 mg Sublingual Q5 Min PRN Dorma Backlindsay, MD      nystatin  1 application Topical BID John Paula Backlindsay, MD      oxyCODONE  5 mg Oral Q4H PRN John Paula Backers, MD      saliva substitute  5 spray Mouth/Throat Q4H PRN John Paula Backers, MD      senna  1 tablet Oral BID John Paula BackMD lindsay          M*Modal software was used to dictate this note  It may contain errors with dictating incorrect words or incorrect spelling  Please contact the provider directly with any questions

## 2021-03-16 NOTE — PROGRESS NOTES
03/16/21 0830   Pain Assessment   Pain Assessment Tool 0-10   Pain Score 8   Pain Location/Orientation Orientation: Bilateral;Location: Groin   Pain Radiating Towards nestor groin   Pain Onset/Description Frequency: Constant/Continuous; Descriptor: Throbbing   Effect of Pain on Daily Activities toelrated   Patient's Stated Pain Goal No pain   Hospital Pain Intervention(s) Rest   Restrictions/Precautions   Precautions Cognitive; Fall Risk;Pain   Weight Bearing Restrictions Yes   RLE Weight Bearing Per Order WBAT   LLE Weight Bearing Per Order WBAT   ROM Restrictions No   Eating   Type of Assistance Needed Independent   Amount of Physical Assistance Provided No physical assistance   Eating CARE Score 6   Oral Hygiene   Type of Assistance Needed Supervision   Amount of Physical Assistance Provided No physical assistance   Comment CS while ins tance with RW for support, with G unilateral release   Oral Hygiene CARE Score 4   Grooming   Able To Comb/Brush Hair;Wash/Dry Face;Brush/Clean Teeth;Wash/Dry Hands   Limitation Noted In Strength;Timeliness   Shower/Bathe Self   Type of Assistance Needed Supervision; Adaptive equipment   Amount of Physical Assistance Provided No physical assistance   Comment Pt compelted SB this session  While seated pt able to wash 8/10 parts with inc time to [facility] feet  S when ins tance for lorena/buttock hygiene  Pt reporting that  has purchased hip kit and will use LHS at time of DC to nc independence with washing feet   Shower/Bathe Self CARE Score 4   Bathing   Assessed Bath Style Sponge Bath   Anticipated D/C Bath Style Sponge Bath   Able to Gather/Transport Yes   Able to Adjust Water Temperature Yes   Able to Wash/Rinse/Dry (body part) Left Arm;Right Arm;L Upper Leg;R Upper Leg;L Lower Leg/Foot;R Lower Leg/Foot;Chest;Abdomen;Perineal Area; Buttocks   Limitations Noted in Balance; Endurance;ROM;Strength;Timeliness   Positioning Seated;Standing   Tub/Shower Transfer   Reason Not Assessed Safety;Sponge Bath  (PT TO SB AT [place])   Upper Body Dressing   Type of Assistance Needed Set-up / clean-up   Amount of Physical Assistance Provided No physical assistance   Comment seated able to don   Upper Body Dressing CARE Score 5   Lower Body Dressing   Type of Assistance Needed Supervision; Adaptive equipment   Amount of Physical Assistance Provided No physical assistance   Comment pt with improved fx reach and able to don pullups and pants without LHAE and S in stance for CM  However, therapist educated pt to cont to use Gerda Croissant at home incase of pain and cont to be indep with LB dressing   Lower Body Dressing CARE Score 4   Putting On/Taking Off Footwear   Type of Assistance Needed Supervision; Adaptive equipment   Amount of Physical Assistance Provided No physical assistance   Comment use of sock aid to don socks, pt able to step on socks to doff   Putting On/Taking Off Footwear CARE Score 4   Dressing/Undressing Clothing   Remove UB Clothes Other  (hospital gown)   [address]; Other  (pullups)   Don LB Clothes Other;Pants;Socks  (pullups)   Sit to Stand   Type of Assistance Needed Supervision; Adaptive equipment   Amount of Physical Assistance Provided No physical assistance   Sit to Stand CARE Score 4   Bed-Chair Transfer   Type of Assistance Needed Supervision; Adaptive equipment   Amount of Physical Assistance Provided No physical assistance   Comment CS with RW   Chair/Bed-to-Chair Transfer CARE Score 4   Transfer Bed/Chair/Wheelchair   Positioning Concerns Skin Integrity   Limitations Noted In Balance; Endurance;UE Strength;LE Strength   Adaptive Equipment Roller Walker   Toileting Hygiene   Type of Assistance Needed Supervision   Amount of Physical Assistance Provided No physical assistance   Comment S in stance for lorena/buttock hygiene   Toileting Hygiene CARE Score 4   Toileting   Able to [address] down yes, up yes     Able to [address] Yes Manage Hygiene Bladder   Limitations Noted In Balance;ROM;UE Strength;LE Strength   Toilet Transfer   Type of Assistance Needed Supervision; Adaptive equipment   Amount of Physical Assistance Provided No physical assistance   Comment CS with RW  pt to have BSC in same living room area  Toilet Transfer CARE Score 4   Toilet Transfer   Surface Assessed Standard Commode   Transfer Technique Standard   Limitations Noted In Balance; Endurance;UE Strength;LE Strength   Adaptive Equipment Walker   Commmunity Re-entry   Community Re-entry Level of Assistance Close supervision   Community Re-entry Therapist printed pts current med list with pt currently on 11 meds  Pt reporting she use spill box at home (AM, noon, PM)  Therapist engaged pt in med mngmnt wiht foucs on palcing in each slot of pill box  Pt ablet o correctly identify 11/11  meds with inc time  However, the[facility]paist [facility] [place] [facility] to pt have mdoerate cog deficts as well as new medications prescibed, pt vebralized understanding  Therapeutic Excerise-Strength   UE Strength Yes   Right Upper Extremity- Strength   R Shoulder Flexion; Extension;Horizontal ABduction   R Elbow Elbow flexion;Elbow extension   R Position Seated   Equipment Theraband  (yellow)   R Weight/Reps/Sets 2x15   RUE Strength Comment therapist provided pt with BUE strengthening with yellow tband  Therapist reviewed HEP with pt with G carryover   Left Upper Extremity-Strength   L Shoulder Flexion; Extension;Horizontal ABduction   L Elbow Elbow flexion;Elbow extension   L Position Seated   Equipment Theraband   L Weights/Reps/Sets 2x15   LUE Strength Comment see above   Cognition   Overall Cognitive Status Impaired   Arousal/Participation Alert; Cooperative   Attention Attends with cues to redirect   Orientation Level Oriented X4   Memory Decreased short term memory   Following Commands Follows multistep commands with increased time or repetition   Comments Therapist administered MOCA 8 2 assessment, with pt scoring 17/30 placing pt at moderate cog deficits, see below for further details   Activity Tolerance   Activity Tolerance Patient tolerated treatment well   Assessment   Treatment Assessment Pt engaged in 90mins of skilled TO services with focus on slef-care, NATHANIEL jason with HEP, med mngmtna dn MOCA 8 2 administration  Pt has met LTG and currently performing ADLs at CS/setup level  Pt does req inc time yogesh ompetle  At this time for dc to ensure safety therapist recommends 24/7 supervision at dc  Which therapist spoke to  last week who reported son will be with pt until 1pm, neighbor 1-3pm and  returns form work at [employer] nd willb e with pt rest of the day  On this day pt did not req LAHE to dress LB, however, therapist cont to recommend pt to use in case of inc LE pain which pt verbalized understanding and has demosntrated G carryover  Pt to cont with home OT services to inc fx performance and independence and dec caregiver burden  Prognosis Good   Problem List Decreased range of motion;Decreased strength;Decreased endurance; Impaired balance;Decreased mobility   Barriers to Discharge Inaccessible home environment   Recommendation   OT Discharge Recommendation Home with skilled therapy   Equipment Recommended Other (comment); Hip Kit ($)  (folding walker tray)   OT Therapy Minutes   OT Time In 0830   OT Time Out 1030   OT Total Time (minutes) 120   OT Mode of treatment - Individual (minutes) 120   OT Mode of treatment - Concurrent (minutes) 0   OT Mode of treatment - Group (minutes) 0   OT Mode of treatment - Co-treat (minutes) 0   OT Mode of Treatment - Total time(minutes) 120 minutes   OT Cumulative Minutes 600   Therapy Time missed   Time missed? No       Pt completed Twin Cognitive Assessment (MOCA) version 8 2  Pt scored overall 17 / 30  indicating a moderate cognitive deficit     Visuospatial/executive: 2/5 (incorrect letter ascending, incorrect 3d copy, hands incorrect ion clock)  Naming: 3/3   Memory:    (worth no points)   Attention:  3/ 6 (unable to complete subtraction and pt observed to count with fingers)   Language: 2 / 3 (ablet o provide 10/11 words beginning with S, in order to gain pts pt needs to identify 11 words)   Abstraction: 1 / 2 (bed-table-furniture; letter-telephone- (incorrect))   Delayed recall: 0 / 5 (5/5 req multiple choice cue)  Orientation: 6 / 6       Total score 17/30, indicating a moderate cognitive deficit

## 2021-03-16 NOTE — ASSESSMENT & PLAN NOTE
K+ 3 1 on 3/10  Given 80mEq of replacement  Repeat K+ on 3/11 was 4 8  Potassium 3 6 on 3/15  Was previously taking potassium supplementation at home  Started on losartan 25mg daily to help with hypokalemia and HTN on 3/15  Trend with routine BMP

## 2021-03-16 NOTE — PROGRESS NOTES
03/16/21 1330   Pain Assessment   Pain Assessment Tool 0-10   Pain Score 9   Pain Location/Orientation Orientation: Bilateral;Location: Pelvis   Pain Onset/Description Onset: Ongoing;Frequency: Constant/Continuous; Descriptor: Aching;Descriptor: Discomfort   Hospital Pain Intervention(s) Cold applied;Medication (See MAR)  (at end of session  RN gave scheduled tylenol during session)   Restrictions/Precautions   Precautions Cognitive; Fall Risk;Pain   RLE Weight Bearing Per Order WBAT   LLE Weight Bearing Per Order WBAT   Cognition   Overall Cognitive Status Impaired   Arousal/Participation Alert; Cooperative   Attention Attends with cues to redirect   Orientation Level Oriented X4   Memory Decreased short term memory   Following Commands Follows multistep commands with increased time or repetition   Subjective   Subjective "Im in terrible pain"    Roll Left and Right   Comment plans to sleep on back on couch  Cannot paulino roll/sidelying due to pain      Reason if not Attempted Refused to perform   Roll Left and Right CARE Score 7   Sit to Lying   Type of Assistance Needed Independent   Sit to Lying CARE Score 6   Lying to Sitting on Side of Bed   Type of Assistance Needed Independent   Lying to Sitting on Side of Bed CARE Score 6   Sit to Stand   Type of Assistance Needed Supervision   Sit to Stand CARE Score 4   Bed-Chair Transfer   Type of Assistance Needed Supervision   Chair/Bed-to-Chair Transfer CARE Score 4   Car Transfer   Comment TBD at DC   Reason if not Attempted Environmental limitations   Car Transfer CARE Score 10   Walk 10 Feet   Type of Assistance Needed Supervision   Amount of Physical Assistance Provided No physical assistance   Comment CS w/ Rw   Walk 10 Feet CARE Score 4   Walk 50 Feet with Two Turns   Type of Assistance Needed Supervision   Amount of Physical Assistance Provided No physical assistance   Comment Cs with RW   Walk 50 Feet with Two Turns CARE Score 4   Walk 150 Feet   Comment household distances only  Reason if not Attempted Safety concerns   Walk 150 Feet CARE Score 88   Walking 10 Feet on Uneven Surfaces   Type of Assistance Needed Supervision   Amount of Physical Assistance Provided No physical assistance   Comment CS on indoor ramp with RW  Walking 10 Feet on Uneven Surfaces CARE Score 4   Ambulation   Does the patient walk? 2  Yes   Primary Mode of Locomotion Prior to Admission Walk   Distance Walked (feet) 50 ft  (60)   Assist Device Roller Walker   Gait Pattern Antalgic; Inconsistant [place]; Step to; Improper weight shift   Limitations Noted In Endurance;Speed;Strength   Walk Assist Level Supervision   Findings household amb with S  Inc time needed due to pain level  Wheel 50 Feet with Two Turns   Reason if not Attempted Activity not applicable   Wheel 50 Feet with Two Turns CARE Score 9   Wheel 150 Feet   Reason if not Attempted Activity not applicable   Wheel 753 Feet CARE Score 9   Wheelchair mobility   Does the patient use a wheelchair? 0  No   Curb or Single Stair   Style negotiated Curb   Type of Assistance Needed Supervision   Amount of Physical Assistance Provided No physical assistance   Comment CS with RW for shortened curb-style step  1 Step (Curb) CARE Score 4   4 Steps   Type of Assistance Needed Supervision   Amount of Physical Assistance Provided No physical assistance   Comment CS with SPC and single HR x8 steps  4 Steps CARE Score 4   12 Steps   Comment not a goal     Reason if not Attempted Safety concerns   12 Steps CARE Score 88   Stairs   Type Stairs   # of Steps 8   Weight Bearing Precautions Fall Risk   Assist Devices Single Rail;Cane   Picking Up Object   Type of Assistance Needed Supervision; Adaptive equipment   Amount of Physical Assistance Provided No physical assistance   Comment w/ reacher and RW      Picking Up Object CARE Score 4   Therapeutic Interventions   Strengthening Seated HEP handout review for DC: APs, LAQ, seated hip flexion to comfort, pillow squeeze, and yellow tband abd x20 each  Flexibility manual passive DF and knee ext B LE 5x20 sec each  Modalities CP B hip/buttocks seated in room at end of session  Assessment   Treatment Assessment Pt participated well in 60 min skilled PT intervention despite c/o sig pelvic pain again  Excited for DC home tomorrow  States did not sleep well due to urinary incontinence per pt pure wick not placed properly  Pt states that she plans to use BSC next to couch at [place]  Per RN Governchhaya Mccall) pt told them, spouse was going to get pure edilia for home however pt now states he could not find any online  SO will use BSC  White board inroom updated to use BSC this evening,as pt will at home  Overall pt at S level for household moiblity with RW, currently all goals met and pt is ready for DC home with family support home PT followup  HEP handout provided for seated TE  To assess car transfer at [place], pt states spouse purchased swivel disc for front seat  Family/Caregiver Present no   Problem List Decreased endurance;Decreased strength; Impaired vision;Pain   Barriers to Discharge Inaccessible home environment   Barriers to Discharge Comments MICHAEL will be alone at times  Plan   Treatment/Interventions Functional transfer training;LE strengthening/ROM; Elevations; Therapeutic exercise;Gait training   Progress Slow progress, decreased activity tolerance   Recommendation   PT Discharge Recommendation Home with skilled therapy  (Home PT followup )   PT Therapy Minutes   PT Time In 1330   PT Time Out 1430   PT Total Time (minutes) 60   PT Mode of treatment - Individual (minutes) 60   PT Mode of treatment - Concurrent (minutes) 0   PT Mode of treatment - Group (minutes) 0   PT Mode of treatment - Co-treat (minutes) 0   PT Mode of Treatment - Total time(minutes) 60 minutes   PT Cumulative Minutes 570   Therapy Time missed   Time missed?  No

## 2021-03-16 NOTE — DISCHARGE INSTRUCTIONS
DISCHARGE INSTRUCTIONS: Mikayla Cayden [age]    Bring these instructions with you to your Outpatient Physician appointments so they can order and follow-up any additional lab work or imaging recommended at time of discharge  IT IS YOU OR YOUR FAMILY'S RESPONSIBILITY TO OBTAIN FOLLOW-UP MEDICATION REFILLS AS INDICATED THROUGH YOUR PCP OR OTHER OUTPATIENT SPECIALTY PROVIDER AFTER DISCHARGE  PLEASE FOLLOW-UP WITH YOUR PCP AS SOON AS POSSIBLE AFTER DISCHARGE TO SET-UP FOLLOW-UP MANAGEMENT AND WHEN APPROPRIATE REFILLS    If you have any questions or concerns regarding your acute rehabilitation stay including issues with medications, rehabilitation, and follow-up plan, please call:          [facility] Acute Rehabilitation Unit at [address] [place] [address] at [phone]    Should you develop fevers, chills, new weakness, changes in sensation, difficulty speaking, facial weakness, confusion, shortness of breath, chest pain, or other concerning symptoms please call 911 and/or obtain transportation to nearest ER immediately  Should you develop worsening pain notify orthopedics    PHYSICIANS to see:  Please see your doctors listed in the follow up providers section of your discharge paperwork, and take the discharge paperwork with you to your appointments  Home health has been ordered for you: Your home health agency should reach out to you or your family soon to arrange follow-up  (If [facility] hospital is your provider their phone number is [phone])    If you are unable to get in touch with home health you may contact your  at [phone]  [place]    LAB WORK to recommended after discharge: Follow-up lab work at discretion of your outpatient physicians to be determined at time of your future appointments  Obtain the following lab orders and follow-up management through PCP and outpatient specialists       CBC and BMP within 1-2 weeks to monitor anemia and electrolytes     Excessive delay in labs and appropriate follow-up could potentially increase your risk of complications which could be severe and even life-threatening  It is you or your caregiver's responsibility to ensure these tests are ordered by your outpatient providers and followed up with accordingly  IMAGING to follow-up:  Follow-up imaging as discussed with your recent physicians or at discretion of your outpatient physicians to be determined at time of your appointments  Call orthopedics and set-up follow-up appointment around 1st week in April and to set-up possible x-rays prior    ADDITIONAL FINDINGS and ISSUES to follow-up:    Incidental finding on cat scan " thickening of the distal rectal wall "correlate for proctitis " - follow-up with PCP and GI physician within 1 month of discharge    Incidental finding " stable distal paraesophageal and gastrohepatic ligament adenopathy of uncertain etiology"  - Follow-up with PCP at next visit     Check under the "DISCHARGE PROVIDER" section of these [address] for provider specific issues as well  Excessive delay or lack of appropriate follow-up could potentially increase your risk of complications which could be severe and even life-threatening  It is you or your caregiver's responsibility to ensure these tests are ordered by your outpatient providers and followed up with accordingly  SKIN CARE INSTRUCTIONS to follow:    Monitor skin for increased redness or breadown and promptly notify your physician should these develop    Be sure you stand and walk some every 1-2 hours during day  While seated or lying in bed shift positions and from side to side often  Can use barrier type cream such as Hydragaurd 1-2 times per day  Turn patient (yourself) every 2 hours  WEIGHTBEARING/ACTIVITY PRECAUTIONS to follow:  Weightbearing as tolerated  Driving restrictions:   You are recommended against driving until cleared by an outpatient physician  Work restrictions: You should NOT return to work (if working) until cleared by an outpatient physician  You should not operate heavy machinery (if applicable) until cleared by an outpatient physician  Alcohol restrictions: You are recommended to not drink alcohol at this time unless cleared by an outpatient physician  Drinking alcohol in your current functional condition can increase your risk of injury which could be severe  Drinking alcohol given your current health problems can lead to increased medical complications which could be severe  Combining alcohol with your current medications can increase your risk of injury which could be severe  Smoking restrictions: You are recommended to not smoke nicotine  Smoking increases your risk of heart attack, stroke, emphysema/COPD, and lung cancer  MEDICATIONS:  Please see a full list of your medications outlined in the After Visit Summary that is attached to these Discharge Instructions  Please note changes may have been made to your medications please refer to your discharge paperwork for your current medications and take this list with you to all your doctors appointments for your doctors to review  Please do not resume a home medication unless the medication reconciliation sheet indicates to do so, please do not assume that a medication that you were given a prescription for is the same as a medication you have at home based on both medications having the same name as dosages and frequency may have changed  Unless specifically noted in your medication list provided to you in your discharge paper work do not resume prior vitamins, minerals, or supplements you may have been taking prior to your hospitalization unless instructed by an outpatient physician in the future        Sedating Medications with increased risk of complications:    This(these) medication(s) was(were) started prior to your acute rehabilitation course as recommended by your prior physicians  It was continued during your acute rehabilitation course  This(these) medication(s) will need to be managed by your outpatient physician(s) after discharge  Follow-up with the appropriate provider as soon as possible to ensure appropriate use  Your goal will be to wean off of opiate medications and then onto acetaminophen only and then off of acetaminophen as well if possible  Oxycodone (opiate) pain medication has been used to help your acute pain  You tolerated this medication adequately during your recent hospital stay  You will be given a limited supply of opiate pain medications on discharge; should you require additional refills/medications, your PCP and/or surgeon may prescribe at his/her discretion  This can be quite helpful particularly for severe acute pain  They can increase your risk of falling, injury, and breathing difficulties which can be severe and even life-threatening  Note it is advisable to limit use of opiate pain medications as they can become habit forming and lead to addiction  Alprazolam (benzodiazepine) sedating medication has been used to help your anxiet panic  You tolerated this medication adequately during your recent hospital stay  Taking both opiate and benzodiazepine medications together further increase your risk of fall and even life-threatening injury as well as respiratory depression (slowing and stopping of breathing)  Physicians at times will carefully use these medications in combination but this must be done with close oversight  You have been carefully monitored during your rehab course on these medications without signs of increased confusion, respiratory depression, or worsening balance  Nevertheless, this risk remains    Should you develop confusion, difficulty staying awake, imbalance or other concerning symptoms do NOT take these medications and notify your physician right away or obtain transportation to nearest emergency room  You have been discussed risks and benefits of these medications and at this time have agreed to risks associated with these medications  Please note a summary of your hospital stay with relevant information for your doctors will try to be sent to them  Please confirm with your doctors at your follow up visits that they have received this summary and have them contact [facility] if they have not received them along with any other medical records they may require  Citlali Kelley Phone Number:  [phone]          Pelvic Fracture   WHAT YOU NEED TO KNOW:   A pelvic fracture is a break in a pelvic bone or hip joint  DISCHARGE INSTRUCTIONS:   Call your local emergency number (911 in the [facility]) if:   · You suddenly feel lightheaded and short of breath  · You have chest pain when you take a deep breath or cough  · You cough up blood  Seek care immediately if:   · Your leg feels warm, tender, and painful  It may look swollen and red  · Your legs and feet turn blue or feel cold and numb  Call your doctor or orthopedist if:   · Your skin is itchy, swollen, or has a rash  · Your pain or swelling increases  · You have new symptoms  · You have a fever  · You have questions or concerns about your condition or care  Medicines:   · Prescription pain medicine  may be given  Ask your healthcare provider how to take this medicine safely  Some prescription pain medicines contain acetaminophen  Do not take other medicines that contain acetaminophen without talking to your healthcare provider  Too much acetaminophen may cause liver damage  Prescription pain medicine may cause constipation  Ask your healthcare provider how to prevent or treat constipation  · Take your medicine as directed  Contact your healthcare provider if you think your medicine is not helping or if you have side effects   Tell him or her if you are allergic to any medicine  Keep a list of the medicines, vitamins, and herbs you take  Include the amounts, and when and why you take them  Bring the list or the pill bottles to follow-up visits  Carry your medicine list with you in case of an emergency  Limit activity as directed:  Get plenty of rest while your fracture heals  When the pain decreases, begin normal, slow movements  Slowly start to do more as directed  Rest when you feel it is needed  Do not lift heavy objects  Ice:  Apply ice on your hip joint or pelvis for 15 to 20 minutes every hour or as directed  Use an ice pack, or put crushed ice in a plastic bag  Cover it with a towel before you apply it  Ice helps prevent tissue damage and decreases swelling and pain  Walking devices: You may need to use crutches or a walker until the fracture heals  Ask for more information about how to use a walking devices if needed  Physical therapy:  A physical therapist may teach you exercises to strengthen your hips and legs after the pain is gone  Follow up with your doctor or orthopedist as directed:  Write down your questions so you remember to ask them during your visits  © Copyright [address] [place] [address] Drive Information is for End User's use only and may not be sold, redistributed or otherwise used for commercial purposes  All illustrations and images included in CareNotes® are the copyrighted property of [employer]  or [facility] Jorge Alberto Hinton   The above information is an  only  It is not intended as medical advice for individual conditions or treatments  Talk to your doctor, nurse or pharmacist before following any medical regimen to see if it is safe and effective for you

## 2021-03-16 NOTE — ASSESSMENT & PLAN NOTE
2/22 - Mechanical fall down the stairs predominately on the R side  Multiple pelvic fractures: Acute nondisplaced fracture of the right inferior pubic ramus and right pubic symphysis, anterior right acetabular fracture and probable nondisplaced left anterior acetabular fracture  Ortho consulted - no surgical intervention at this time      Monitor for s/s pelvic bleeding  Pain management  WBAT  DVT Prophylaxis - Lovenox  Follow-up with Dr Xiomy Clancy (Orthopedics) office in 6 weeks  (Week of April 5th)      PT/OT Therapies  Primary team following

## 2021-03-16 NOTE — ASSESSMENT & PLAN NOTE
[facility] 17/30 prior to discharge  - Follow-up with outpatient neurology

## 2021-03-16 NOTE — PROGRESS NOTES
Physical Medicine and Rehabilitation Progress Note  Jake Parson Viscomi 78 y o  female MRN: [id number]  Unit/Bed#: -55 Encounter: [id number]      Chief Complaints:    Right pelvic pain    Interval Events/Subjective:      patient reports stable right pelvic pain still fairly significant at times but she is able to ambulate in even do stairs better  Patient declines additional imaging of pelvis prior to discharge  Patient denies lightheadedness even with stairs today  She denies fever, chills, sweats, calf pain, or other new complaints  ROS: A 10 point ROS was performed; negative except as noted above  Assessment & Plan:     * Multiple closed fractures of pelvis ([facility] [address] )  Assessment & Plan  · Traumatic fall on stairs 2/22:  · Resultant right inferior pubic rami fracture, right pubic symphysis fracture, right anterior acetabular fracture, left anterior acetabular fracture  · Function still improving and functionally ready for d/c tomorrow   · Stable pelvic pain 3/16 - declines additional imaging again > will follow-up with OP ortho - pt counseled to contact ortho if pain worsens or function declines in interim  · Evaluated by Orthopedics - treat conservatively  · WBAT BLE  · Continue DVT ppx - lovenox   · Follow-up with Trauma/Orthopedics as outpatient - Dr Jennifer Jimenez - around week of April 5th  · R hip/pelvis x-ray 3/4 stable fractures Subtle right pelvic fractures as noted above   These correspond approximately to the CT findings   No new or displaced pelvic fractures are demonstrated  · Completing acute comprehensive interdisciplinary inpatient rehabilitation to include intensive skilled therapies (PT, OT) as outlined with oversight and management by rehabilitation physician as well as inpatient rehab level nursing, case management and weekly interdisciplinary team meetings     · Follow-up with outpatient orthopedics    Pain  Assessment & Plan  Stable  Continue mgmt as previously outlined   APAP TID  Gabapentin 100mg HS  LD patches  Robaxin 500mg Q6H PRN spasms  Oxy 5mg Q4H PRN  Voltaren gel added by IM   Counseled on and continue to encourage deep breathing/relaxation/behavioral pain management techniques:     Deep breathin seconds in, 5 seconds out, 5 times per hour when awake and PRN when in pain or anticipate pain; avoid holding breath and tightening muscles and instead breathe slowly and deeply  Monitor for oversedation, AMS/delirium, and respiratory depression   If being administered - hold opiates, muscle relaxants, benzodiazepines, and gabapentin for oversedation, AMS, or RR<12       Gastroparesis  Assessment & Plan  Stooling well; adequate intake   From Sjogren's   monitor closely especially with recent discontinuation of chronic reglan in context of oral dyskinesias  Senna BID  PRN bowel regimen   Follow-up with OP GI     Hyponatremia  Assessment & Plan  Stable   Monitor intermittently  Co-management with internal medicine team         Abnormal CT scan, gastrointestinal tract  Assessment & Plan  Discussed with pt/fam  Incidental finding on recent CT  - thickening of the distal rectal wall "correlate for proctitis "  - Follow-up with PCP     Adenopathy  Assessment & Plan  Discussed with pt/fam  Incidental finding on recent CT  " stable distal paraesophageal and gastrohepatic ligament adenopathy of uncertain etiology"  - Follow-up with PCP    Onychomycosis  Assessment & Plan  Pods c/s and nail debridement     Dermatitis associated with moisture  Assessment & Plan  Keep clean and dry  - CHRISTIE cream BID  - Optimal b/b mgmt     Chest pain  Assessment & Plan  Atypical - improved  ACS ruled out; CTA no PE, Cards TTE normal EF without RWMA; no stress test needed at this time  No absolute indication for aspirin at this time; but cards mentioned considering it if no contraindications  -[facility] IM team feels may have interaction with methotrexate and patient would prefer to remain off; remain off aspirin for now  -Discussed with pt that she should discuss possibly starting baby aspirin as an OP with PCP in follow-up     Abnormal ECG  Assessment & Plan  Per IM  "EKG completed on 3/4 that showed possible T-wave inversion on V2 lead  Sent to acute care setting for formal ACS workup      Following EKGs and troponins were negative  Cardiology was consulted - ECHO completed  Showed no motion wall abnormalities  EF was 64%  No plans for further cardiac workup at this time  CTA showed no PE    Showed small R pleural effusion and basilar atelectasis - given a 1 time dose of IV 40mg lasix "    Hypokalemia  Assessment & Plan  Improved still   IM consulted, monitoring, repletion, and overall management at their discretion during ARC course      Anemia  Assessment & Plan  Improving  IM consulted, with overall monitoring, and management at their discretion during ARC course  Monitor H/H, vitals, signs/symptoms of acute bleeding      Oral dyskinesia  Assessment & Plan  Stable   Patient feels this is related to Sjogrens but movements are more consistent with EPS and likely TD in context of long-standing metoclopramide   - Comgmt with IM  - Metoclopramide discontinued prior to [place] which is first line mgmt (holding DA blockers)   - This may be difficult to improve at this point  - Monitor during ARC course  - Follow-up with PCP - consider pharmacologic tx such at tetrabenazine if needed in future     Hypothyroidism  Assessment & Plan  Levothyroxine     Anxiety and depression  Assessment & Plan  Mood stable   Prozac  PRN xanax  Patient counseled on risks does opiates and worsened risks with a combination of 2 taken at the same which includes fall and significant injury   Supportive counseling  [facility] and continue to encourage deep breathing/relaxation/behavioral management techniques:         Hypertension  Assessment & Plan  Internal medicine consulted and management at their discretion  Monitor vitals with and without activity; monitor for orthostasis  Monitor hemoglobin, electrolytes, kidney function, hydration status   Current meds: Atenolol, Novasc, Losartan  - Recent adjustments per our IM team  - Follow-up with PCP        Sjogren's disease ([facility] [address] )  Assessment & Plan  Takes methotrexate 20mg weekly  Follows up with Dr Naveen Waller (Rheumatology) as outpatient  - saliva substitute to p r n  Parotid gland enlargement  Assessment & Plan  OP follow-up with ENT Dr Harpreet Moscoso       Disposition  ·  home with estimated length of stay of 7-10 days from admission     Follow-up providers and other issues to be followed up after discharge  ·  PCP - overall medical mgmt, follow-up incidental findings; consideration for baby aspirin  · Orthopedics   · Rheumatology  ·  follow-up thickening of distal rectal wall with PCP as well as stable distal paraesophageal in gastrohepatic ligament adenopathy     CODE: Level 1: Full Code     Restrictions include:   Fall precautions    Objective:    Functional Update:   ADLs and mobility largely supervision, stairs contact guard    Allergies per EMR    Physical Exam:  Vitals:    03/16/21 0800   BP: 145/65   Pulse: 76   Resp:    Temp:    SpO2:        GEN:  Sitting in NAD   HEENT/NECK: stable dry MM  CARDIAC: Regular rate rhythm, no murmers, no rubs, no gallops  LUNGS:  clear to auscultation, no wheezes, rales, or rhonchi  ABDOMEN: Soft, non-tender, non-distended, normal active bowel sounds  EXTREMITIES/SKIN:  no calf edema, no calf tenderness to palpation and MSK: Stable right hip and knee range of motion  NEURO:   MENTAL STATUS:  Appropriate wakefulness and interaction   PSYCH:  Affect:  Euthymic     Diagnostic Studies: Reviewed, no new imaging  No orders to display       Laboratory: Labs reviewed  Results from last 7 days   Lab Units 03/15/21  0539 03/10/21  0509   HEMOGLOBIN g/dL 11 0* 10 5*   HEMATOCRIT % 34 1* 32 1*   WBC Thousand/uL 4 30* 5 10     Results from last 7 days   Lab Units 03/15/21  0540 03/11/21  0519 03/10/21  0509   BUN mg/dL 9 14 13   SODIUM mmol/L 135* 133* 135*   POTASSIUM mmol/L 3 6 4 8 3 1*   CHLORIDE mmol/L 100 102 100   CREATININE mg/dL 0 54* 0 58* 0 63            Drug regimen reviewed, all potential adverse effects identified and addressed:    Scheduled Meds:  Current Facility-Administered Medications   Medication Dose Route Frequency Provider Last Rate    acetaminophen  650 mg Oral [facility] Minerva Kam MD      ALPRAZolam  0 25 mg Oral TID PRN Minerva Kam MD      amLODIPine  5 mg Oral Daily Minerva Kam MD      atenolol  50 mg Oral Daily DARIO Varghese      atorvastatin  10 mg Oral Daily Minerva Kam MD      bisacodyl  10 mg Rectal Daily PRN Minerva Kam MD      Diclofenac Sodium  2 g Topical 4x Daily PRN DARIO Varghese      enoxaparin  40 mg Subcutaneous Q24H [facility] Minerva Kam MD      FLUoxetine  20 mg Oral Daily Minerva Kam MD      folic acid  1 mg Oral Daily Minerva Kam MD      gabapentin  100 mg Oral HS Minerva Kam MD      lactulose  20 g Oral BID PRN Minerva Kam MD      levothyroxine  88 mcg Oral Early Morning Minerva Kam MD      lidocaine  2 patch Topical HS Minerva Kam MD      losartan  25 mg Oral Daily DARIO Varghese      methocarbamol  500 mg Oral Q6H PRN Minerva Kam MD      methotrexate  20 mg Oral Weekly Minerva Kam MD      CHRISTIE ANTIFUNGAL  1 application Topical BID Minerva Kam MD      nitroglycerin  0 4 mg Sublingual Q5 Min PRN Minerva Kam MD      nystatin  1 application Topical BID Minerva Kam MD      oxyCODONE  5 mg Oral Q4H PRN Minerva Kam MD      saliva substitute  5 spray Mouth/Throat Q4H PRN Minerva Kam MD      senna  1 tablet Oral BID Minerva Kam MD         ** Please Note: Fluency Direct voice to text software may have been used in the creation of this document   **

## 2021-03-16 NOTE — NURSING NOTE
Provided Lovenox training to patient states she will have to injected self and she has never done any injections prior  Husbands injections medications for psoriasis/humira and will be able to assist patient

## 2021-03-16 NOTE — ASSESSMENT & PLAN NOTE
Takes methotrexate 20mg weekly  Follows up with Dr Noelle Fernandez (Rheumatology) as outpatient  Saliva spray added as PRN for dry mouth

## 2021-03-16 NOTE — PLAN OF CARE
Problem: Potential for Falls  Goal: Patient will remain free of falls  Description: INTERVENTIONS:  - Assess patient frequently for physical needs  -  Identify cognitive and physical deficits and behaviors that affect risk of falls  -  [place] fall precautions as indicated by assessment   - Educate patient/family on patient safety including physical limitations  - Instruct patient to call for assistance with activity based on assessment  - Modify environment to reduce risk of injury  - Consider OT/PT consult to assist with strengthening/mobility  Outcome: Progressing     Problem: Prexisting or High Potential for Compromised Skin Integrity  Goal: Skin integrity is maintained or improved  Description: INTERVENTIONS:  - Identify patients at risk for skin breakdown  - Assess and monitor skin integrity  - Assess and monitor nutrition and hydration status  - Monitor labs   - Assess for incontinence   - Turn and reposition patient  - Assist with mobility/ambulation  - Relieve pressure over bony prominences  - Avoid friction and shearing  - Provide appropriate hygiene as needed including keeping skin clean and dry  - Evaluate need for skin moisturizer/barrier cream  - Collaborate with interdisciplinary team   - Patient/family teaching  - Consider wound care consult   Outcome: Progressing     Problem: Nutrition/Hydration-ADULT  Goal: Nutrient/Hydration intake appropriate for improving, restoring or maintaining nutritional needs  Description: Monitor and assess patient's nutrition/hydration status for malnutrition  Collaborate with interdisciplinary team and initiate plan and interventions as ordered  Monitor patient's weight and dietary intake as ordered or per policy  Utilize nutrition screening tool and intervene as necessary  Determine patient's food preferences and provide high-protein, high-caloric foods as appropriate       INTERVENTIONS:  - Monitor oral intake, urinary output, labs, and treatment plans  - Assess nutrition and hydration status and recommend course of action  - Evaluate amount of meals eaten  - Assist patient with eating if necessary   - Allow adequate time for meals  - Recommend/ encourage appropriate diets, oral nutritional supplements, and vitamin/mineral supplements  - Order, calculate, and assess calorie counts as needed  - Recommend, monitor, and adjust tube feedings and TPN/PPN based on assessed needs  - Assess need for intravenous fluids  - Provide specific nutrition/hydration education as appropriate  - Include patient/family/caregiver in decisions related to nutrition  Outcome: Progressing     Problem: PAIN - ADULT  Goal: Verbalizes/displays adequate comfort level or baseline comfort level  Description: Interventions:  - Encourage patient to monitor pain and request assistance  - Assess pain using appropriate pain scale  - Administer analgesics based on type and severity of pain and evaluate response  - Implement non-pharmacological measures as appropriate and evaluate response  - Consider cultural and social influences on pain and pain management  - Notify physician/advanced practitioner if interventions unsuccessful or patient reports new pain  Outcome: Progressing     Problem: SAFETY ADULT  Goal: Patient will remain free of falls  Description: INTERVENTIONS:  - Assess patient frequently for physical needs  -  Identify cognitive and physical deficits and behaviors that affect risk of falls    -  [place] fall precautions as indicated by assessment   - Educate patient/family on patient safety including physical limitations  - Instruct patient to call for assistance with activity based on assessment  - Modify environment to reduce risk of injury  - Consider OT/PT consult to assist with strengthening/mobility  Outcome: Progressing  Goal: Maintain or return to baseline ADL function  Description: INTERVENTIONS:  -  Assess patient's ability to carry out ADLs; assess patient's baseline for ADL function and identify physical deficits which impact ability to perform ADLs (bathing, care of mouth/teeth, toileting, grooming, dressing, etc )  - Assess/evaluate cause of self-care deficits   - Assess range of motion  - Assess patient's mobility; develop plan if impaired  - Assess patient's need for assistive devices and provide as appropriate  - Encourage maximum independence but intervene and supervise when necessary  - Involve family in performance of ADLs  - Assess for home care needs following discharge   - Consider OT consult to assist with ADL evaluation and planning for discharge  - Provide patient education as appropriate  Outcome: Progressing  Goal: Maintain or return mobility status to optimal level  Description: INTERVENTIONS:  - Assess patient's baseline mobility status (ambulation, transfers, stairs, etc )    - Identify cognitive and physical deficits and behaviors that affect mobility  - Identify mobility aids required to assist with transfers and/or ambulation (gait belt, sit-to-stand, lift, walker, cane, etc )  - [place] fall precautions as indicated by assessment  - Record patient progress and toleration of activity level on Mobility SBAR; progress patient to next Phase/Stage  - Instruct patient to call for assistance with activity based on assessment  - Consider rehabilitation consult to assist with strengthening/weightbearing, etc   Outcome: Progressing     Problem: DISCHARGE PLANNING  Goal: Discharge to home or other facility with appropriate resources  Description: INTERVENTIONS:  - Identify barriers to discharge w/patient and caregiver  - Arrange for needed discharge resources and transportation as appropriate  - Identify discharge learning needs (meds, wound care, etc )  - Arrange for interpretive services to assist at discharge as needed  - Refer to Case Management Department for coordinating discharge planning if the patient needs post-hospital services based on physician/advanced practitioner order or complex needs related to functional status, cognitive ability, or social support system  Outcome: Progressing     Problem: Knowledge Deficit  Goal: Patient/family/caregiver demonstrates understanding of disease process, treatment plan, medications, and discharge instructions  Description: Complete learning assessment and assess knowledge base    Interventions:  - Provide teaching at level of understanding  - Provide teaching via preferred learning methods  Outcome: Progressing     Problem: GASTROINTESTINAL - ADULT  Goal: Minimal or absence of nausea and/or vomiting  Description: INTERVENTIONS:  - Administer IV fluids if ordered to ensure adequate hydration  - Maintain NPO status until nausea and vomiting are resolved  - Nasogastric tube if ordered  - Administer ordered antiemetic medications as needed  - Provide nonpharmacologic comfort measures as appropriate  - Advance diet as tolerated, if ordered  - Consider nutrition services referral to assist patient with adequate nutrition and appropriate food choices  Outcome: Progressing  Goal: Maintains or returns to baseline bowel function  Description: INTERVENTIONS:  - Assess bowel function  - Encourage oral fluids to ensure adequate hydration  - Administer IV fluids if ordered to ensure adequate hydration  - Administer ordered medications as needed  - Encourage mobilization and activity  - Consider nutritional services referral to assist patient with adequate nutrition and appropriate food choices  Outcome: Progressing  Goal: Maintains adequate nutritional intake  Description: INTERVENTIONS:  - Monitor percentage of each meal consumed  - Identify factors contributing to decreased intake, treat as appropriate  - Assist with meals as needed  - Monitor I&O, weight, and lab values if indicated  - Obtain nutrition services referral as needed  Outcome: Progressing     Problem: GENITOURINARY - ADULT  Goal: Maintains or returns to baseline urinary function  Description: INTERVENTIONS:  - Assess urinary function  - Encourage oral fluids to ensure adequate hydration if ordered  - Administer IV fluids as ordered to ensure adequate hydration  - Administer ordered medications as needed  - Offer frequent toileting  - Follow urinary retention protocol if ordered  Outcome: Progressing  Goal: Absence of urinary retention  Description: INTERVENTIONS:  - Assess patients ability to void and empty bladder  - Monitor I/O  - Bladder scan as needed  - Discuss with physician/AP medications to alleviate retention as needed  - Discuss catheterization for long term situations as appropriate  Outcome: Progressing     Problem: SKIN/TISSUE INTEGRITY - ADULT  Goal: Skin integrity remains intact  Description: INTERVENTIONS  - Identify patients at risk for skin breakdown  - Assess and monitor skin integrity  - Assess and monitor nutrition and hydration status  - Monitor labs (i e  albumin)  - Assess for incontinence   - Turn and reposition patient  - Assist with mobility/ambulation  - Relieve pressure over bony prominences  - Avoid friction and shearing  - Provide appropriate hygiene as needed including keeping skin clean and dry  - Evaluate need for skin moisturizer/barrier cream  - Collaborate with interdisciplinary team (i e  Nutrition, Rehabilitation, etc )   - Patient/family teaching  Outcome: Progressing  Goal: Incision(s), wounds(s) or drain site(s) healing without S/S of infection  Description: INTERVENTIONS  - Assess and document risk factors for skin impairment   - Assess and document dressing, incision, wound bed, drain sites and surrounding tissue  - Consider nutrition services referral as needed  - Oral mucous membranes remain intact  - Provide patient/ family education  Outcome: Progressing  Goal: Oral mucous membranes remain intact  Description: INTERVENTIONS  - Assess oral mucosa and hygiene practices  - Implement preventative oral hygiene regimen  - Implement oral medicated treatments as ordered  - Initiate Nutrition services referral as needed  Outcome: Progressing     Problem: HEMATOLOGIC - ADULT  Goal: Maintains hematologic stability  Description: INTERVENTIONS  - Assess for signs and symptoms of bleeding or hemorrhage  - Monitor labs  - Administer supportive blood products/factors as ordered and appropriate  Outcome: Progressing

## 2021-03-16 NOTE — ASSESSMENT & PLAN NOTE
Home regimen: Norvasc 2 5mg daily and atenolol 50mg BID  Episode of hypertensive urgency while in acute setting      Monitor routine VS   Continue atenolol 50mg BID  Increased Norvasc to 5mg daily on 3/4  Decreased atenolol to 50mg daily due to HR 50s-60s and started on losartan 25mg daily to help with hypokalemia

## 2021-03-17 VITALS
SYSTOLIC BLOOD PRESSURE: 149 MMHG | RESPIRATION RATE: 18 BRPM | OXYGEN SATURATION: 99 % | WEIGHT: 130.1 LBS | DIASTOLIC BLOOD PRESSURE: 69 MMHG | HEIGHT: 58 IN | BODY MASS INDEX: 27.31 KG/M2 | HEART RATE: 78 BPM | TEMPERATURE: 99 F

## 2021-03-17 PROBLEM — B36.9 FUNGAL SKIN INFECTION: Status: ACTIVE | Noted: 2021-03-17

## 2021-03-17 PROCEDURE — 99232 SBSQ HOSP IP/OBS MODERATE 35: CPT | Performed by: NURSE PRACTITIONER

## 2021-03-17 PROCEDURE — 97530 THERAPEUTIC ACTIVITIES: CPT

## 2021-03-17 PROCEDURE — 99239 HOSP IP/OBS DSCHRG MGMT >30: CPT

## 2021-03-17 RX ADMIN — AMLODIPINE BESYLATE 5 MG: 5 TABLET ORAL at 08:53

## 2021-03-17 RX ADMIN — ATENOLOL 50 MG: 50 TABLET ORAL at 08:53

## 2021-03-17 RX ADMIN — FOLIC ACID 1 MG: 1 TABLET ORAL at 08:53

## 2021-03-17 RX ADMIN — METHOTREXATE 20 MG: 2.5 TABLET ORAL at 08:54

## 2021-03-17 RX ADMIN — LEVOTHYROXINE SODIUM 88 MCG: 0.09 TABLET ORAL at 05:56

## 2021-03-17 RX ADMIN — OXYCODONE HYDROCHLORIDE 5 MG: 5 TABLET ORAL at 08:54

## 2021-03-17 RX ADMIN — ENOXAPARIN SODIUM 40 MG: 40 INJECTION SUBCUTANEOUS at 08:54

## 2021-03-17 RX ADMIN — ACETAMINOPHEN 650 MG: 325 TABLET ORAL at 05:56

## 2021-03-17 RX ADMIN — FLUOXETINE 20 MG: 20 CAPSULE ORAL at 08:53

## 2021-03-17 RX ADMIN — ATORVASTATIN CALCIUM 10 MG: 10 TABLET, FILM COATED ORAL at 08:53

## 2021-03-17 RX ADMIN — NYSTATIN 1 APPLICATION: 100000 POWDER TOPICAL at 08:55

## 2021-03-17 RX ADMIN — LOSARTAN POTASSIUM 25 MG: 25 TABLET, FILM COATED ORAL at 08:53

## 2021-03-17 NOTE — NURSING NOTE
RN reviewed all medications times with pt and , more verbal teaching done on lovenow- is confident to give injections as he himself takes humira  All belongings are with the pt   did bring in their own walker, pt was taken outside with physical therapist for transfer to car   and pt aware to call the unit if any questions arise  Pt aware she will have home care

## 2021-03-17 NOTE — PHYSICAL THERAPY NOTE
[facility] PT DC SUMMARY  Pt is a 78 y o  female who presented to [facility] with dx of multiple closed fractures of pelvis after fall down steps at home on 2/22/21  MDs decided on non-surgical management for pt, she is WBAT to b/l LEs  Pt was on [facility] unit from 2/27/21-3/4/21 before being transferred to acute care due to complaints of chest pain and right upper quadrant pain Patient was evaluated by cardiology due to abnormal EKG to r/o ACS  A CTA was negative for PE, LAMONT found normal wall motion and normal EF, RUE quadrant pain resoled, there were no acute findings on CT, liver enzymes were normal and it was felt that patient's pain was likely referred pain from pelvic fractures  PMH: Hypertension, Meningioma and Multiple Trauma s/p fall, Sjorgens, hypothyroidism, depression, ambulatory dysfunction  PTA pt reports she lives with  and son who both work full time, however will be able to provide pt with S upon d/c  She states she occasionally utilized RW PTA due to balance issues but not all the time  She lives in multi-level home with 1+7 MICHAEL house, no HR on first step, L HR on 7 steps to first floor  Pt states once on first floor she can have FF setup sleeping on couch and utilizing commode  Spouse brought in RW, very old and outdated with worn down posterior legs, R wheel with reduced spin  Discussed and educated at length need for new RW, daniel size  RW is fucntional for pt use for a few days but would not be beneficial for long term use  Pt states she will just use her WC instead, education to pt and spouse importance of continued mobility for strength and endurance  Pt verablized understanding however feels pain is a big factor to her tolerance  Spouse aware of need for new RW and is receptive to ordering one  Reviewed importance of completing HEP until home PT followup  Pt rehab course limited by persistent pelvic pain   Demonstrated S for gait and transfers with RW, including x8 steps with single HR and SPC use, CGA  At this time all goals met and pt cleared for DC home with family support and home services

## 2021-03-17 NOTE — PLAN OF CARE
Problem: Potential for Falls  Goal: Patient will remain free of falls  Description: INTERVENTIONS:  - Assess patient frequently for physical needs  -  Identify cognitive and physical deficits and behaviors that affect risk of falls  -  [place] fall precautions as indicated by assessment   - Educate patient/family on patient safety including physical limitations  - Instruct patient to call for assistance with activity based on assessment  - Modify environment to reduce risk of injury  - Consider OT/PT consult to assist with strengthening/mobility  Outcome: Progressing     Problem: Prexisting or High Potential for Compromised Skin Integrity  Goal: Skin integrity is maintained or improved  Description: INTERVENTIONS:  - Identify patients at risk for skin breakdown  - Assess and monitor skin integrity  - Assess and monitor nutrition and hydration status  - Monitor labs   - Assess for incontinence   - Turn and reposition patient  - Assist with mobility/ambulation  - Relieve pressure over bony prominences  - Avoid friction and shearing  - Provide appropriate hygiene as needed including keeping skin clean and dry  - Evaluate need for skin moisturizer/barrier cream  - Collaborate with interdisciplinary team   - Patient/family teaching  - Consider wound care consult   Outcome: Progressing     Problem: Nutrition/Hydration-ADULT  Goal: Nutrient/Hydration intake appropriate for improving, restoring or maintaining nutritional needs  Description: Monitor and assess patient's nutrition/hydration status for malnutrition  Collaborate with interdisciplinary team and initiate plan and interventions as ordered  Monitor patient's weight and dietary intake as ordered or per policy  Utilize nutrition screening tool and intervene as necessary  Determine patient's food preferences and provide high-protein, high-caloric foods as appropriate       INTERVENTIONS:  - Monitor oral intake, urinary output, labs, and treatment plans  - Assess nutrition and hydration status and recommend course of action  - Evaluate amount of meals eaten  - Assist patient with eating if necessary   - Allow adequate time for meals  - Recommend/ encourage appropriate diets, oral nutritional supplements, and vitamin/mineral supplements  - Order, calculate, and assess calorie counts as needed  - Recommend, monitor, and adjust tube feedings and TPN/PPN based on assessed needs  - Assess need for intravenous fluids  - Provide specific nutrition/hydration education as appropriate  - Include patient/family/caregiver in decisions related to nutrition  Outcome: Progressing     Problem: PAIN - ADULT  Goal: Verbalizes/displays adequate comfort level or baseline comfort level  Description: Interventions:  - Encourage patient to monitor pain and request assistance  - Assess pain using appropriate pain scale  - Administer analgesics based on type and severity of pain and evaluate response  - Implement non-pharmacological measures as appropriate and evaluate response  - Consider cultural and social influences on pain and pain management  - Notify physician/advanced practitioner if interventions unsuccessful or patient reports new pain  Outcome: Progressing     Problem: SAFETY ADULT  Goal: Patient will remain free of falls  Description: INTERVENTIONS:  - Assess patient frequently for physical needs  -  Identify cognitive and physical deficits and behaviors that affect risk of falls    -  [place] fall precautions as indicated by assessment   - Educate patient/family on patient safety including physical limitations  - Instruct patient to call for assistance with activity based on assessment  - Modify environment to reduce risk of injury  - Consider OT/PT consult to assist with strengthening/mobility  Outcome: Progressing  Goal: Maintain or return to baseline ADL function  Description: INTERVENTIONS:  -  Assess patient's ability to carry out ADLs; assess patient's baseline for ADL function and identify physical deficits which impact ability to perform ADLs (bathing, care of mouth/teeth, toileting, grooming, dressing, etc )  - Assess/evaluate cause of self-care deficits   - Assess range of motion  - Assess patient's mobility; develop plan if impaired  - Assess patient's need for assistive devices and provide as appropriate  - Encourage maximum independence but intervene and supervise when necessary  - Involve family in performance of ADLs  - Assess for home care needs following discharge   - Consider OT consult to assist with ADL evaluation and planning for discharge  - Provide patient education as appropriate  Outcome: Progressing  Goal: Maintain or return mobility status to optimal level  Description: INTERVENTIONS:  - Assess patient's baseline mobility status (ambulation, transfers, stairs, etc )    - Identify cognitive and physical deficits and behaviors that affect mobility  - Identify mobility aids required to assist with transfers and/or ambulation (gait belt, sit-to-stand, lift, walker, cane, etc )  - [place] fall precautions as indicated by assessment  - Record patient progress and toleration of activity level on Mobility SBAR; progress patient to next Phase/Stage  - Instruct patient to call for assistance with activity based on assessment  - Consider rehabilitation consult to assist with strengthening/weightbearing, etc   Outcome: Progressing     Problem: DISCHARGE PLANNING  Goal: Discharge to home or other facility with appropriate resources  Description: INTERVENTIONS:  - Identify barriers to discharge w/patient and caregiver  - Arrange for needed discharge resources and transportation as appropriate  - Identify discharge learning needs (meds, wound care, etc )  - Arrange for interpretive services to assist at discharge as needed  - Refer to Case Management Department for coordinating discharge planning if the patient needs post-hospital services based on physician/advanced practitioner order or complex needs related to functional status, cognitive ability, or social support system  Outcome: Progressing     Problem: Knowledge Deficit  Goal: Patient/family/caregiver demonstrates understanding of disease process, treatment plan, medications, and discharge instructions  Description: Complete learning assessment and assess knowledge base    Interventions:  - Provide teaching at level of understanding  - Provide teaching via preferred learning methods  Outcome: Progressing     Problem: GASTROINTESTINAL - ADULT  Goal: Minimal or absence of nausea and/or vomiting  Description: INTERVENTIONS:  - Administer IV fluids if ordered to ensure adequate hydration  - Maintain NPO status until nausea and vomiting are resolved  - Nasogastric tube if ordered  - Administer ordered antiemetic medications as needed  - Provide nonpharmacologic comfort measures as appropriate  - Advance diet as tolerated, if ordered  - Consider nutrition services referral to assist patient with adequate nutrition and appropriate food choices  Outcome: Progressing  Goal: Maintains or returns to baseline bowel function  Description: INTERVENTIONS:  - Assess bowel function  - Encourage oral fluids to ensure adequate hydration  - Administer IV fluids if ordered to ensure adequate hydration  - Administer ordered medications as needed  - Encourage mobilization and activity  - Consider nutritional services referral to assist patient with adequate nutrition and appropriate food choices  Outcome: Progressing  Goal: Maintains adequate nutritional intake  Description: INTERVENTIONS:  - Monitor percentage of each meal consumed  - Identify factors contributing to decreased intake, treat as appropriate  - Assist with meals as needed  - Monitor I&O, weight, and lab values if indicated  - Obtain nutrition services referral as needed  Outcome: Progressing     Problem: GENITOURINARY - ADULT  Goal: Maintains or returns to baseline urinary function  Description: INTERVENTIONS:  - Assess urinary function  - Encourage oral fluids to ensure adequate hydration if ordered  - Administer IV fluids as ordered to ensure adequate hydration  - Administer ordered medications as needed  - Offer frequent toileting  - Follow urinary retention protocol if ordered  Outcome: Progressing  Goal: Absence of urinary retention  Description: INTERVENTIONS:  - Assess patients ability to void and empty bladder  - Monitor I/O  - Bladder scan as needed  - Discuss with physician/AP medications to alleviate retention as needed  - Discuss catheterization for long term situations as appropriate  Outcome: Progressing     Problem: SKIN/TISSUE INTEGRITY - ADULT  Goal: Skin integrity remains intact  Description: INTERVENTIONS  - Identify patients at risk for skin breakdown  - Assess and monitor skin integrity  - Assess and monitor nutrition and hydration status  - Monitor labs (i e  albumin)  - Assess for incontinence   - Turn and reposition patient  - Assist with mobility/ambulation  - Relieve pressure over bony prominences  - Avoid friction and shearing  - Provide appropriate hygiene as needed including keeping skin clean and dry  - Evaluate need for skin moisturizer/barrier cream  - Collaborate with interdisciplinary team (i e  Nutrition, Rehabilitation, etc )   - Patient/family teaching  Outcome: Progressing  Goal: Incision(s), wounds(s) or drain site(s) healing without S/S of infection  Description: INTERVENTIONS  - Assess and document risk factors for skin impairment   - Assess and document dressing, incision, wound bed, drain sites and surrounding tissue  - Consider nutrition services referral as needed  - Oral mucous membranes remain intact  - Provide patient/ family education  Outcome: Progressing  Goal: Oral mucous membranes remain intact  Description: INTERVENTIONS  - Assess oral mucosa and hygiene practices  - Implement preventative oral hygiene regimen  - Implement oral medicated treatments as ordered  - Initiate Nutrition services referral as needed  Outcome: Progressing     Problem: HEMATOLOGIC - ADULT  Goal: Maintains hematologic stability  Description: INTERVENTIONS  - Assess for signs and symptoms of bleeding or hemorrhage  - Monitor labs  - Administer supportive blood products/factors as ordered and appropriate  Outcome: Progressing

## 2021-03-17 NOTE — ASSESSMENT & PLAN NOTE
2/22 - Mechanical fall down the stairs predominately on the R side  Multiple pelvic fractures: Acute nondisplaced fracture of the right inferior pubic ramus and right pubic symphysis, anterior right acetabular fracture and probable nondisplaced left anterior acetabular fracture  Ortho consulted - no surgical intervention at this time      Monitor for s/s pelvic bleeding  Pain management  WBAT  DVT Prophylaxis - Lovenox  Follow-up with Dr Addison Mejia (Orthopedics) office in 6 weeks  (Week of April 5th)      PT/OT Therapies  Primary team following

## 2021-03-17 NOTE — PROGRESS NOTES
03/17/21 1115   Car Transfer   Type of Assistance Needed Physical assistance;Verbal cues   Amount of Physical Assistance Provided Less than 25%   Comment Min A for balance, cues for technique with swivel disc spouse purchased for pt use  Car Transfer CARE Score 3   Assessment   Treatment Assessment Pt seen for DC car transfers, Spouse brought in RW, very old and outdated with worn down posterior legs, R wheel with reduced spin  Discussed and educated at length need for new RW, daniel size  RW is fucntional for pt use for a few days but would not be beneficial for long term use  Pt states she will just use her WC instead, education to pt and spouse importance of continued mobility for strength and endurance  Pt verablized understanding however feels pain is a big factor to her tolerance  Spouse aware of need for new RW and is receptive to ordering one  Reviewed importance of completing HEP until home PT followup  Pt needing min A for balance while sitting on front seat with pivot disc spouse purchased for pt use  Cleared for DC home with family support and home serivces      PT Therapy Minutes   PT Time In 1115   PT Time Out 1130   PT Total Time (minutes) 15   PT Mode of treatment - Individual (minutes) 0   PT Mode of treatment - Concurrent (minutes) 15   PT Mode of treatment - Group (minutes) 0   PT Mode of treatment - Co-treat (minutes) 0   PT Mode of Treatment - Total time(minutes) 15 minutes   PT Cumulative Minutes 585

## 2021-03-17 NOTE — ASSESSMENT & PLAN NOTE
Home regimen: Norvasc 2 5mg daily and atenolol 50mg BID  Episode of hypertensive urgency while in acute setting      Monitor routine VS   Continue atenolol 50mg BID  Increased Norvasc to 5mg daily on 3/4  Decreased atenolol to 50mg daily due to HR 50s-60s and started on losartan 25mg daily to help with hypokalemia  Continue to follow-up with outpatient PCP

## 2021-03-17 NOTE — DISCHARGE INSTR - APPOINTMENTS
Home Health Physical and [address] [facility] [employer] [facility] [phone]    Anticipated start of care is for 3/18/2021  The company was instructed to call Shaan's cell phone to arrange first visit  Discharge instructions were sent to the company but please have your copyavailable for their review

## 2021-03-17 NOTE — ASSESSMENT & PLAN NOTE
Takes methotrexate 20mg weekly  Follows up with Dr Juliocesar Ram (Rheumatology) as outpatient  Saliva spray added as PRN for dry mouth

## 2021-03-17 NOTE — OCCUPATIONAL THERAPY NOTE
OT DISCHARGE SUMMARY    Pt made good progress during stay on the [facility] following admission for multiple closed fx of pelvis  Pt presented upon IE with generalized weakness, decreased endurance, activity tolerance, and functional mobility  On evaluation, pt required Ebony/modA to complete all ADLs and functional transfers  Pt was discharged to home with  support and son  Per OT recommendation pt to have 24/7 supervision at DC  while speaking with  reporting son can stay with pt until 1pm, neighbor 1-3pm, and he will return form work at [employer] and he can stay with pt  Pt currently functioning at supervision level for ADL, Supervisoin level for transfers with RW  The following DME was recommended BSC, hip kit, transport chair  Pt already had BSC and handouts provided for the rest of DME  At dc ADL pt reporting  already purchased items from [employer]  Family training occurred with pts  Tessa Robin)  Family Training completed with pt's spoude Tessa Robin) during OT session  FUENTES reviewed pt's current LOF and anticipated OT goal of S for ADL performance  Edu provided on OT recommendation for 24/S upon D/C home for ADL/IADL performance  Pt's  verbalized understanding  Pt's  provided CGA for pt during fxnl mobility with RW and to perform ADL transfer's to over the toilet commode and to EOB   reports they have a BSC that pt will utilize over the toilet upon D/C home  Pt plan to SB upon D/C home, however, pt communicated plan to eventually begin showering  Pt currently has shower chair at home  FUENTES introduced tub bench and demonstrated transfer technique, providing edu on benefit of tub bench  Pt's  receptive and provided with handout to reference when ready to independently order tub bench  FUENTES demo use of RW tray vs RW basket for safe item transportation  Pt and pt's  indterested in independently ordering folding RW tray for D/C home   Overall, pt's  receptive to all OT recommendations and has no further questions at this time   OT recommended pt continue tor receive home OT services       -Amilcar Atwood MS, OTR/L

## 2021-03-17 NOTE — CASE MANAGEMENT
Spoke with the patient's  today to inform him that [facility] was unable to accept the referral due to volume and review options  The patient's  did not have experience with other companies and stated he was comfortable accepting another company as long as insurance would cover  Referrals will be made to [facility], [facility] and [facility]  He was informed that if we did not hear back before the patient was discharged I would be in contact regarding accepting company  Patient's  stated that he would prefer all meds be sent to [facility] on [address] in [place], [place]  Patient's  was able to acquire a transport chair and no other additional DME was needed in preparation for [address] [place], [place] [address]god[place] Jose Martin  Patient and  both stated that they are prepared for DC today and anticipate that he will be here around 10:30-11:00am to review discharge information and transport her home

## 2021-03-17 NOTE — PROGRESS NOTES
[facility]  Progress Note Brittni Mercado Viscomi 1955, 78 y o  female MRN: [id number]  Unit/Bed#: [facility] 323-11 Encounter: [id number]  Primary Care Provider: Naheed Rosa MD   Date and time admitted to hospital: 3/9/2021  2:50 PM    Onychomycosis  Assessment & Plan  Podiatry was consulted on 3/9  Nails trimmed on 3/10  Continue to follow-up with Podiatry as outpatient  Abnormal ECG  Assessment & Plan  EKG completed on 3/4 that showed possible T-wave inversion on V2 lead  Sent to acute care setting for formal ACS workup  Following EKGs and troponins were negative  Cardiology was consulted - ECHO completed  Showed no motion wall abnormalities  EF was 64%  No plans for further cardiac workup at this time  CTA showed no PE  Showed small R pleural effusion and basilar atelectasis - given a 1 time dose of IV 40mg lasix  Hypokalemia  Assessment & Plan  K+ 3 1 on 3/10  Given 80mEq of replacement  Repeat K+ on 3/11 was 4 8  Potassium 3 6 on 3/15  Was previously taking potassium supplementation at home  Started on losartan 25mg daily to help with hypokalemia and HTN on 3/15  Trend with routine BMP  Anemia  Assessment & Plan  Stable  Current Hgb 11 0, Hct 34 1  Baseline Hgb appears to be 9-10  Takes folic acid 1mg daily  Gastroparesis  Assessment & Plan  Formerly on Reglan - stopped in the acute setting due to concerns for extrapyramidal symptoms  Monitor for s/s of bowel obstruction  Continue bowel regimen  Hypothyroidism  Assessment & Plan  Currently on levothyroxine 88mcg daily at home (States that she was increased 1 month ago from 75mcg)  Last TSH level unknown  Continue to follow-up with outpatient PCP  Anxiety and depression  Assessment & Plan  Stable  Continue Prozac 20mg daily and Xanax PRN for anxiety  Supportive counseling as needed  Hypertension  Assessment & Plan  Home regimen: Norvasc 2 5mg daily and atenolol 50mg BID    Episode of hypertensive urgency while in acute setting      Monitor routine VS   Continue atenolol 50mg BID  Increased Norvasc to 5mg daily on 3/4  Decreased atenolol to 50mg daily due to HR 50s-60s and started on losartan 25mg daily to help with hypokalemia  Continue to follow-up with outpatient PCP  Sjogren's disease ([facility] [address] )  Assessment & Plan  Takes methotrexate 20mg weekly  Follows up with Dr Milagros Gramajo (Rheumatology) as outpatient  Saliva spray added as PRN for dry mouth  Parotid gland enlargement  Assessment & Plan  Stable  Yearly follow-up with Dr Swapnil Salgado (ENT) as outpatient  * Multiple closed fractures of pelvis [facility])  Assessment & Plan  2/22 - Mechanical fall down the stairs predominately on the R side  Multiple pelvic fractures: Acute nondisplaced fracture of the right inferior pubic ramus and right pubic symphysis, anterior right acetabular fracture and probable nondisplaced left anterior acetabular fracture  Ortho consulted - no surgical intervention at this time      Monitor for s/s pelvic bleeding  Pain management  WBAT  DVT Prophylaxis - Lovenox  Follow-up with Dr Theodora Viveros (Orthopedics) office in 6 weeks  (Week of April 5th)      PT/OT Therapies  Primary team following  VTE Pharmacologic Prophylaxis:   Pharmacologic: Enoxaparin (Lovenox)  Mechanical VTE Prophylaxis in Place: Yes - sequential compression devices  Current Length of Stay: 8 day(s)    Current Patient Status: Inpatient Rehab     Discharge Plan: As per primary team     Code Status: Level 1 - Full Code    Subjective:   Pt examined while sitting in WC in pt room  Complaints of 8/10 B/L pelvic/groin pain, aching/stabbing, improves with pain medication and rest   Slept well last night  Therapy has been going well  Currently has not concerns or questions about her medications  Understands that she should follow-up with her PCP within the next 2 weeks  Plans for discharge later this morning      Objective:     Vitals:   Temp (24hrs), Av 4 °F (36 9 °C), Min:98 °F (36 7 °C), Max:99 °F (37 2 °C)    Temp:  [98 °F (36 7 °C)-99 °F (37 2 °C)] 99 °F (37 2 °C)  HR:  [59-78] 78  Resp:  [18] 18  BP: (129-149)/(69-78) 149/69  SpO2:  [99 %-100 %] 99 %  Body mass index is 27 19 kg/m²  Review of Systems   Constitutional: Negative for chills, fatigue and fever  Respiratory: Negative for cough and shortness of breath  Cardiovascular: Negative for chest pain, palpitations and leg swelling  Gastrointestinal: Negative for abdominal distention, abdominal pain, constipation, diarrhea, nausea and vomiting  LBM 3/17   Genitourinary: Negative for difficulty urinating  Musculoskeletal: Positive for arthralgias (8/10 B/L pelvic/hip pain, aching/stabbing, improves with rest and pain medication)  Neurological: Negative for dizziness and light-headedness  Psychiatric/Behavioral: Negative for sleep disturbance  Input and Output Summary (last 24 hours): Intake/Output Summary (Last 24 hours) at 3/17/2021 0932  Last data filed at 3/16/2021 1727  Gross per 24 hour   Intake 240 ml   Output --   Net 240 ml       Physical Exam:     Physical Exam  Vitals signs and nursing note reviewed  Constitutional:       Appearance: Normal appearance  HENT:      Head: Normocephalic and atraumatic  Cardiovascular:      Rate and Rhythm: Normal rate and regular rhythm  Pulses: Normal pulses  Heart sounds: Normal heart sounds  No murmur  No friction rub  Pulmonary:      Effort: Pulmonary effort is normal  No respiratory distress  Breath sounds: Normal breath sounds  No wheezing or rhonchi  Abdominal:      General: Abdomen is flat  Bowel sounds are normal  There is no distension  Palpations: Abdomen is soft  Tenderness: There is no abdominal tenderness  Musculoskeletal:      Right lower leg: No edema  Left lower leg: No edema  Skin:     General: Skin is warm and dry     Neurological:      Mental Status: She is alert and oriented to person, place, and time  Comments: Dysarthria, oral dyskinesia     Psychiatric:         Mood and Affect: Mood normal          Behavior: Behavior normal          Additional Data:     Labs:    Results from last 7 days   Lab Units 03/15/21  0539   WBC Thousand/uL 4 30*   HEMOGLOBIN g/dL 11 0*   HEMATOCRIT % 34 1*   PLATELETS Thousands/uL 302   NEUTROS PCT % 83*   LYMPHS PCT % 8*   MONOS PCT % 8   EOS PCT % 2     Results from last 7 days   Lab Units 03/15/21  0540   SODIUM mmol/L 135*   POTASSIUM mmol/L 3 6   CHLORIDE mmol/L 100   CO2 mmol/L 25   BUN mg/dL 9   CREATININE mg/dL 0 54*   ANION GAP mmol/L 10   CALCIUM mg/dL 9 6   GLUCOSE RANDOM mg/dL 99                       Labs reviewed    Imaging:    Imaging reviewed    Recent Cultures (last 7 days):           Last 24 Hours Medication List:   Current Facility-Administered Medications   Medication Dose Route Frequency Provider Last Rate    acetaminophen  650 mg Oral [facility] Arnaldo Solis MD      ALPRAZolam  0 25 mg Oral TID PRN Arnaldo Solis MD      amLODIPine  5 mg Oral Daily Arnaldo Solis MD      atenolol  50 mg Oral Daily Britton Blew, CRNP      atorvastatin  10 mg Oral Daily Arnaldo Solis MD      bisacodyl  10 mg Rectal Daily PRN Arnaldo Solis MD      Diclofenac Sodium  2 g Topical 4x Daily PRN Britton Blew, CRNP      enoxaparin  40 mg Subcutaneous Q24H [facility] Arnaldo Solis MD      FLUoxetine  20 mg Oral Daily Arnaldo Solis MD      folic acid  1 mg Oral Daily Arnaldo Solis MD      gabapentin  100 mg Oral HS Arnaldo Solis MD      lactulose  20 g Oral BID PRN Arnaldo Solis MD      levothyroxine  88 mcg Oral Early Morning Arnaldo Solis MD      lidocaine  2 patch Topical HS Arnaldo Solis MD      losartan  25 mg Oral Daily Britton Blew, CRNP      methocarbamol  500 mg Oral Q6H PRN Arnaldo Solis MD      methotrexate  20 mg Oral Weekly Arnaldo Solis MD      CHRISTIE ANTIFUNGAL  1 application Topical BID MD Nicole Canela nitroglycerin  0 4 mg Sublingual Q5 Min PRN Anice Purchase, MD      nystatin  1 application Topical BID Anice Purchase, MD      oxyCODONE  5 mg Oral Q4H PRN Anice Purchase, MD      saliva substitute  5 spray Mouth/Throat Q4H PRN Anice Purchase, MD      senna  1 tablet Oral BID Anice Purchase, MD          M*Modal software was used to dictate this note  It may contain errors with dictating incorrect words or incorrect spelling  Please contact the provider directly with any questions

## 2021-03-17 NOTE — PLAN OF CARE
Problem: Potential for Falls  Goal: Patient will remain free of falls  Description: INTERVENTIONS:  - Assess patient frequently for physical needs  -  Identify cognitive and physical deficits and behaviors that affect risk of falls    -  [place] fall precautions as indicated by assessment   - Educate patient/family on patient safety including physical limitations  - Instruct patient to call for assistance with activity based on assessment  - Modify environment to reduce risk of injury  - Consider OT/PT consult to assist with strengthening/mobility  Outcome: Adequate for Discharge

## 2021-03-17 NOTE — CASE MANAGEMENT
[facility] has accepted the patient into their program with anticipated start of care tomorrow 3/18/21 for PT and OT  The patient and her  accepted their services   Information added to AVS

## 2021-04-12 ENCOUNTER — TELEPHONE (OUTPATIENT)
Dept: NEUROLOGY | Facility: CLINIC | Age: 66
End: 2021-04-12

## 2021-04-12 NOTE — TELEPHONE ENCOUNTER
Received authorization for inpatient stay from [facility] [address]  Scanned into chart

## 2021-05-19 ENCOUNTER — TELEPHONE (OUTPATIENT)
Dept: NEUROLOGY | Facility: CLINIC | Age: 66
End: 2021-05-19

## 2021-11-03 NOTE — ASSESSMENT & PLAN NOTE
Right inferior pubic ramus and right pubic symphysis   WBAT  PMR consult Pending  PT/OT consult  DVT Prophylaxis ,[email or web address] 22-Nov-2020 14:30

## 2022-02-19 ENCOUNTER — APPOINTMENT (OUTPATIENT)
Dept: LAB | Facility: CLINIC | Age: 67
End: 2022-02-19
Payer: COMMERCIAL

## 2022-02-19 DIAGNOSIS — M05.9 SEROPOSITIVE RHEUMATOID ARTHRITIS (HCC): ICD-10-CM

## 2022-02-19 LAB
ALBUMIN SERPL BCP-MCNC: 3.7 G/DL (ref 3.5–5)
ALP SERPL-CCNC: 112 U/L (ref 46–116)
ALT SERPL W P-5'-P-CCNC: 40 U/L (ref 12–78)
AST SERPL W P-5'-P-CCNC: 52 U/L (ref 5–45)
BASOPHILS # BLD AUTO: 0.02 THOUSANDS/ΜL (ref 0–0.1)
BASOPHILS NFR BLD AUTO: 0 % (ref 0–1)
BILIRUB DIRECT SERPL-MCNC: 0.12 MG/DL (ref 0–0.2)
BILIRUB SERPL-MCNC: 0.47 MG/DL (ref 0.2–1)
C3 SERPL-MCNC: 172 MG/DL (ref 90–180)
C4 SERPL-MCNC: 27 MG/DL (ref 10–40)
CREAT SERPL-MCNC: 0.96 MG/DL (ref 0.6–1.3)
CRP SERPL QL: 20.2 MG/L
EOSINOPHIL # BLD AUTO: 0.15 THOUSAND/ΜL (ref 0–0.61)
EOSINOPHIL NFR BLD AUTO: 3 % (ref 0–6)
ERYTHROCYTE [DISTWIDTH] IN BLOOD BY AUTOMATED COUNT: 19.6 % (ref 11.6–15.1)
ERYTHROCYTE [SEDIMENTATION RATE] IN BLOOD: 85 MM/HOUR (ref 0–29)
GFR SERPL CREATININE-BSD FRML MDRD: 60 ML/MIN/1.73SQ M
HCT VFR BLD AUTO: 32.6 % (ref 34.8–46.1)
HGB BLD-MCNC: 10 G/DL (ref 11.5–15.4)
IGA SERPL-MCNC: 382 MG/DL (ref 70–400)
IGG SERPL-MCNC: 1980 MG/DL (ref 700–1600)
IGM SERPL-MCNC: 95 MG/DL (ref 40–230)
IMM GRANULOCYTES # BLD AUTO: 0.02 THOUSAND/UL (ref 0–0.2)
IMM GRANULOCYTES NFR BLD AUTO: 0 % (ref 0–2)
LYMPHOCYTES # BLD AUTO: 0.54 THOUSANDS/ΜL (ref 0.6–4.47)
LYMPHOCYTES NFR BLD AUTO: 11 % (ref 14–44)
MCH RBC QN AUTO: 25.6 PG (ref 26.8–34.3)
MCHC RBC AUTO-ENTMCNC: 30.7 G/DL (ref 31.4–37.4)
MCV RBC AUTO: 84 FL (ref 82–98)
MONOCYTES # BLD AUTO: 0.25 THOUSAND/ΜL (ref 0.17–1.22)
MONOCYTES NFR BLD AUTO: 5 % (ref 4–12)
NEUTROPHILS # BLD AUTO: 3.79 THOUSANDS/ΜL (ref 1.85–7.62)
NEUTS SEG NFR BLD AUTO: 81 % (ref 43–75)
NRBC BLD AUTO-RTO: 0 /100 WBCS
PLATELET # BLD AUTO: 275 THOUSANDS/UL (ref 149–390)
PMV BLD AUTO: 10.6 FL (ref 8.9–12.7)
PROT SERPL-MCNC: 8.6 G/DL (ref 6.4–8.2)
RBC # BLD AUTO: 3.9 MILLION/UL (ref 3.81–5.12)
WBC # BLD AUTO: 4.77 THOUSAND/UL (ref 4.31–10.16)

## 2022-02-19 PROCEDURE — 82595 ASSAY OF CRYOGLOBULIN: CPT

## 2022-02-19 PROCEDURE — 86162 COMPLEMENT TOTAL (CH50): CPT

## 2022-02-19 PROCEDURE — 82784 ASSAY IGA/IGD/IGG/IGM EACH: CPT

## 2022-02-19 PROCEDURE — 85652 RBC SED RATE AUTOMATED: CPT

## 2022-02-19 PROCEDURE — 80076 HEPATIC FUNCTION PANEL: CPT

## 2022-02-19 PROCEDURE — 83521 IG LIGHT CHAINS FREE EACH: CPT

## 2022-02-19 PROCEDURE — 82565 ASSAY OF CREATININE: CPT

## 2022-02-19 PROCEDURE — 36415 COLL VENOUS BLD VENIPUNCTURE: CPT

## 2022-02-19 PROCEDURE — 86160 COMPLEMENT ANTIGEN: CPT

## 2022-02-19 PROCEDURE — 84165 PROTEIN E-PHORESIS SERUM: CPT

## 2022-02-19 PROCEDURE — 84165 PROTEIN E-PHORESIS SERUM: CPT | Performed by: PATHOLOGY

## 2022-02-19 PROCEDURE — 86140 C-REACTIVE PROTEIN: CPT

## 2022-02-19 PROCEDURE — 85025 COMPLETE CBC W/AUTO DIFF WBC: CPT

## 2022-02-21 LAB — CH50 SERPL-ACNC: >60 U/ML

## 2022-02-22 LAB
KAPPA LC FREE SER-MCNC: 90.4 MG/L (ref 3.3–19.4)
KAPPA LC FREE/LAMBDA FREE SER: 1.66 {RATIO} (ref 0.26–1.65)
KAPPA LC UR-MCNC: 249.32 MG/L (ref 0.63–113.79)
KAPPA LC/LAMBDA UR: 11.69 {RATIO} (ref 1.03–31.76)
LAMBDA LC FREE SERPL-MCNC: 54.5 MG/L (ref 5.7–26.3)
LAMBDA LC UR-MCNC: 21.32 MG/L (ref 0.47–11.77)

## 2022-02-23 LAB
ALBUMIN SERPL ELPH-MCNC: 4.39 G/DL (ref 3.5–5)
ALBUMIN SERPL ELPH-MCNC: 51.1 % (ref 52–65)
ALPHA1 GLOB SERPL ELPH-MCNC: 0.38 G/DL (ref 0.1–0.4)
ALPHA1 GLOB SERPL ELPH-MCNC: 4.4 % (ref 2.5–5)
ALPHA2 GLOB SERPL ELPH-MCNC: 0.92 G/DL (ref 0.4–1.2)
ALPHA2 GLOB SERPL ELPH-MCNC: 10.7 % (ref 7–13)
BETA GLOB ABNORMAL SERPL ELPH-MCNC: 0.55 G/DL (ref 0.4–0.8)
BETA1 GLOB SERPL ELPH-MCNC: 6.4 % (ref 5–13)
BETA2 GLOB SERPL ELPH-MCNC: 5.7 % (ref 2–8)
BETA2+GAMMA GLOB SERPL ELPH-MCNC: 0.49 G/DL (ref 0.2–0.5)
GAMMA GLOB ABNORMAL SERPL ELPH-MCNC: 1.87 G/DL (ref 0.5–1.6)
GAMMA GLOB SERPL ELPH-MCNC: 21.7 % (ref 12–22)
IGG/ALB SER: 1.04 {RATIO} (ref 1.1–1.8)
PROT PATTERN SERPL ELPH-IMP: ABNORMAL
PROT SERPL-MCNC: 8.6 G/DL (ref 6.4–8.2)

## 2022-02-25 LAB — CRYOGLOB SER QL 1D COLD INC: POSITIVE

## 2022-05-31 ENCOUNTER — TELEPHONE (OUTPATIENT)
Dept: HEMATOLOGY ONCOLOGY | Facility: CLINIC | Age: 67
End: 2022-05-31

## 2022-05-31 NOTE — TELEPHONE ENCOUNTER
New Patient Intake Form   Patient Details:    Selena Duran  1955    Appointment Information   Who is calling to schedule? Patient   If not self, what is the caller's name? Please put name of [facility] nurse as well  DID YOU CONFIRM INSURANCE WITH PATIENT? yes   Referring provider Self    What is the diagnosis? Abnormal Labs     Is there a confirmed tissue diagnosis? No   Is there a biopsy ordered or pending? Please specify dates   n/a     Is patient aware of diagnosis? Yes   Have you had any imaging or labs done? If yes, where? (If imaging done outside of [facility], please remind patient to bring a disk ) No     If imaging done at outside facility, did you instruct patient to obtain discs and bring to visit? n/a   Have you been seen by another Oncologist/Hematologist?  If so, who and where? no   Are the records in [facility] or Care Everywhere? yes   Does the patient have records at another facility/hospital? no   If yes, Name of facility, city and state where facility is located  Did you instruct patient to have records faxed to [facility] and provide rightfax number? n/a   Preferred [place]   Is the patient willing to be seen by another provider? (This is for breast patients only) n/a     Did you send new patient paperwork?   Email or mail? no   Miscellaneous Information: appt made for 06/30

## 2022-06-21 ENCOUNTER — APPOINTMENT (OUTPATIENT)
Dept: LAB | Facility: CLINIC | Age: 67
End: 2022-06-21
Payer: COMMERCIAL

## 2022-06-21 DIAGNOSIS — M35.01 KERATOCONJUNCTIVITIS SICCA (HCC): ICD-10-CM

## 2022-06-21 LAB
ALBUMIN SERPL BCP-MCNC: 3.9 G/DL (ref 3.5–5)
ALP SERPL-CCNC: 100 U/L (ref 34–104)
ALT SERPL W P-5'-P-CCNC: 18 U/L (ref 7–52)
AST SERPL W P-5'-P-CCNC: 45 U/L (ref 13–39)
BASOPHILS # BLD AUTO: 0.02 THOUSANDS/ΜL (ref 0–0.1)
BASOPHILS NFR BLD AUTO: 0 % (ref 0–1)
BILIRUB DIRECT SERPL-MCNC: 0.17 MG/DL (ref 0–0.2)
BILIRUB SERPL-MCNC: 0.85 MG/DL (ref 0.2–1)
C3 SERPL-MCNC: 164 MG/DL (ref 90–180)
C4 SERPL-MCNC: 22 MG/DL (ref 10–40)
CREAT SERPL-MCNC: 1.04 MG/DL (ref 0.6–1.3)
CRP SERPL QL: 24.4 MG/L
EOSINOPHIL # BLD AUTO: 0.09 THOUSAND/ΜL (ref 0–0.61)
EOSINOPHIL NFR BLD AUTO: 1 % (ref 0–6)
ERYTHROCYTE [DISTWIDTH] IN BLOOD BY AUTOMATED COUNT: 23.3 % (ref 11.6–15.1)
ERYTHROCYTE [SEDIMENTATION RATE] IN BLOOD: 50 MM/HOUR (ref 0–29)
GFR SERPL CREATININE-BSD FRML MDRD: 55 ML/MIN/1.73SQ M
HCT VFR BLD AUTO: 29 % (ref 34.8–46.1)
HGB BLD-MCNC: 9.6 G/DL (ref 11.5–15.4)
IMM GRANULOCYTES # BLD AUTO: 0.05 THOUSAND/UL (ref 0–0.2)
IMM GRANULOCYTES NFR BLD AUTO: 1 % (ref 0–2)
LYMPHOCYTES # BLD AUTO: 0.76 THOUSANDS/ΜL (ref 0.6–4.47)
LYMPHOCYTES NFR BLD AUTO: 10 % (ref 14–44)
MCH RBC QN AUTO: 31.1 PG (ref 26.8–34.3)
MCHC RBC AUTO-ENTMCNC: 33.1 G/DL (ref 31.4–37.4)
MCV RBC AUTO: 94 FL (ref 82–98)
MONOCYTES # BLD AUTO: 0.92 THOUSAND/ΜL (ref 0.17–1.22)
MONOCYTES NFR BLD AUTO: 12 % (ref 4–12)
NEUTROPHILS # BLD AUTO: 5.93 THOUSANDS/ΜL (ref 1.85–7.62)
NEUTS SEG NFR BLD AUTO: 76 % (ref 43–75)
NRBC BLD AUTO-RTO: 0 /100 WBCS
PLATELET # BLD AUTO: 329 THOUSANDS/UL (ref 149–390)
PMV BLD AUTO: 10.1 FL (ref 8.9–12.7)
PROT SERPL-MCNC: 8.2 G/DL (ref 6.4–8.4)
RBC # BLD AUTO: 3.09 MILLION/UL (ref 3.81–5.12)
WBC # BLD AUTO: 7.77 THOUSAND/UL (ref 4.31–10.16)

## 2022-06-21 PROCEDURE — 82565 ASSAY OF CREATININE: CPT

## 2022-06-21 PROCEDURE — 85652 RBC SED RATE AUTOMATED: CPT

## 2022-06-21 PROCEDURE — 80076 HEPATIC FUNCTION PANEL: CPT

## 2022-06-21 PROCEDURE — 85025 COMPLETE CBC W/AUTO DIFF WBC: CPT

## 2022-06-21 PROCEDURE — 36415 COLL VENOUS BLD VENIPUNCTURE: CPT

## 2022-06-21 PROCEDURE — 86140 C-REACTIVE PROTEIN: CPT

## 2022-06-21 PROCEDURE — 86162 COMPLEMENT TOTAL (CH50): CPT

## 2022-06-21 PROCEDURE — 86160 COMPLEMENT ANTIGEN: CPT

## 2022-06-22 LAB — CH50 SERPL-ACNC: >60 U/ML

## 2022-06-29 NOTE — PROGRESS NOTES
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  Her Rheumatologist is Dr Prasad Medina  She is on methotrexate 20 mg weekly and prednisone 5 mg BID for her RA secondary to Sjogren's disease  More recently, patient has had increase in size of her Parotid glands  Right is quite enlarged  Left is smaller  She will be having an ultrasound head/neck ordered by ENT  Patient denies any recent unintentional weight loss  No worsening fatigue  She does have occasional night sweats  She has symptoms of dry mouth, eyes, joint swelling, arthralgias  Denies any dysphagia  No CP, SOB  Blood work on file reviewed and demonstrates history of normocytic normochromic anemia dating back to 2/2021  Her Hgb has ranged from 9 6 to 11  White count, platelet count and differential have all been normal  No evidence of renal insufficiency  FLC have been elevated in past  FLC ratio 1 66  No monoclonal gammopathy was detected  I suspect she has anemia of chronic disease  Test Results:    Imaging: No results found      Labs:   Lab Results   Component Value Date    WBC 7 77 06/21/2022    HGB 9 6 (L) 06/21/2022    HCT 29 0 (L) 06/21/2022    MCV 94 06/21/2022     06/21/2022     Lab Results   Component Value Date     01/08/2015    K 3 6 03/15/2021     03/15/2021    CO2 25 03/15/2021    ANIONGAP 8 01/08/2015    BUN 9 03/15/2021    CREATININE 1 04 06/21/2022    GLUCOSE 89 01/08/2015    GLUF 99 03/15/2021    CALCIUM 9 6 03/15/2021    AST 45 (H) 06/21/2022    ALT 18 06/21/2022 ALKPHOS 100 06/21/2022    EGFR 55 06/21/2022             ROS:  Review of Systems   HENT: Negative for trouble swallowing  Dry mouth   Swollen parotid glands    Eyes:        Dry eyes   Musculoskeletal: Positive for arthralgias, gait problem, joint swelling and myalgias  All other systems reviewed and are negative  Active Problems:   Patient Active Problem List   Diagnosis    Parotid gland enlargement    Sjogren's disease ([address] )    Closed nondisplaced fracture of anterior wall of right acetabulum (HCC)    Closed nondisplaced fracture of right pubis ([address] )    Fall    Hypertension    Pain of right upper extremity    Multiple closed fractures of pelvis (HCC)    Anxiety and depression    Hypothyroidism    Abnormal findings on imaging test    Gastroparesis    Oral dyskinesia    Potential for cognitive impairment    Anemia    Hypokalemia    WELLS (dyspnea on exertion)    Lightheadedness    Pain    Abnormal ECG    Chest pain    Pleural effusion on right    Atelectasis of right lung    Low HDL (under 40)    Dermatitis associated with moisture    Onychomycosis    Enlarged and hypertrophic nails    Adenopathy    Abnormal CT scan, gastrointestinal tract    Hyponatremia    Mild cognitive impairment    Fungal skin infection       Past Medical History:   Past Medical History:   Diagnosis Date    Lymphadenitis, chronic     Sialadenitis     Sjogren's disease ([address] )     Stomach problems     Thyroid disorder     Xerostomia        Surgical History:   Past Surgical History:   Procedure Laterality Date    BRAIN SURGERY  2019    tumor removed Dr Trini Yen         Family History:    Family History   Problem Relation Age of Onset    Diabetes Other     Hypertension Other     Heart disease Other     Arthritis Other     Heart disease Mother     Kidney disease Mother     Alcohol abuse Father        Cancer-related family history is not on file      Social History: Social History     Socioeconomic History    Marital status: /Civil Union     Spouse name: Not on file    Number of children: Not on file    Years of education: Not on file    Highest education level: Not on file   Occupational History    Not on file   Tobacco Use    Smoking status: Former Smoker    Smokeless tobacco: Never Used   Substance and Sexual Activity    Alcohol use:  Yes    Drug use: Never    Sexual activity: Not Currently     Partners: Male   Other Topics Concern    Not on file   Social History Narrative    Not on file     Social Determinants of Health     Financial Resource Strain: Not on file   Food Insecurity: Not on file   Transportation Needs: Not on file   Physical Activity: Not on file   Stress: Not on file   Social Connections: Not on file   Intimate Partner Violence: Not on file   Housing Stability: Not on file       Current Medications:   Current Outpatient Medications   Medication Sig Dispense Refill    acetaminophen (TYLENOL) 325 mg tablet Take 2 tablets (650 mg total) by mouth every 6 (six) hours as needed for mild pain or moderate pain  0    ALPRAZolam (XANAX) 1 mg tablet Take 1 mg by mouth in the morning      amLODIPine (NORVASC) 5 mg tablet Take 1 tablet (5 mg total) by mouth daily 30 tablet 0    atenolol (TENORMIN) 50 mg tablet Take 1 tablet (50 mg total) by mouth daily 30 tablet 0    atorvastatin (LIPITOR) 10 mg tablet Take 10 mg by mouth daily  1    cyclobenzaprine (FLEXERIL) 10 mg tablet Take 10 mg by mouth daily at bedtime      FLUoxetine (PROzac) 20 mg capsule Take 20 mg by mouth daily  1    folic acid (FOLVITE) 1 mg tablet Take 1 mg by mouth daily       furosemide (LASIX) 40 mg tablet Take 40 mg by mouth daily      levothyroxine 75 mcg tablet Take 75 mcg by mouth daily      linaCLOtide (Linzess) 145 MCG CAPS Take 1 capsule by mouth daily      methotrexate 2 5 mg tablet Take 8 tablets (20 mg total) by mouth once a week  0    nystatin (MYCOSTATIN) powder Apply 1 application topically 2 (two) times a day 15 g 0    saliva substitute (MOUTH KOTE) Apply 5 sprays to the mouth or throat every 4 (four) hours as needed (Dry mouth) 236 mL 1    senna (SENOKOT) 8 6 mg Take 1 tablet (8 6 mg total) by mouth 2 (two) times a day as needed for constipation 60 tablet 0    enoxaparin (LOVENOX) 40 mg/0 4 mL Inject 0 4 mL (40 mg total) under the skin every 24 hours (Patient not taking: No sig reported) 7 Syringe 0    levothyroxine 88 mcg tablet Take 1 tablet (88 mcg total) by mouth daily (Patient not taking: Reported on 6/30/2022)  0    losartan (COZAAR) 25 mg tablet Take 1 tablet (25 mg total) by mouth daily (Patient not taking: No sig reported) 30 tablet 0     No current facility-administered medications for this visit  Allergies: Allergies   Allergen Reactions    Codeine Vomiting    Hydroxyquinoline Sulfate [Oxyquinoline] Vomiting    Leucovorin Vomiting    Tizanidine Vomiting         Physical Exam:    Body surface area is 1 54 meters squared  Wt Readings from Last 3 Encounters:   06/30/22 61 2 kg (135 lb)   06/28/22 61 7 kg (136 lb)   03/09/21 59 kg (130 lb 1 6 oz)        Temp Readings from Last 3 Encounters:   06/30/22 99 °F (37 2 °C) (Temporal)   06/28/22 98 2 °F (36 8 °C)   03/17/21 99 °F (37 2 °C) (Tympanic)        BP Readings from Last 3 Encounters:   06/30/22 134/76   03/17/21 149/69   03/09/21 150/72         Pulse Readings from Last 3 Encounters:   06/30/22 76   03/17/21 78   03/09/21 61          Physical Exam  Constitutional:       General: She is in acute distress  HENT:      Head: Normocephalic and atraumatic  Eyes:      General: No scleral icterus  Right eye: No discharge  Left eye: No discharge  Neck:      Comments: Diffuse enlargement of bilateral Parotid glands R>L  Cardiovascular:      Rate and Rhythm: Normal rate and regular rhythm     Pulmonary:      Effort: Pulmonary effort is normal       Breath sounds: Normal breath sounds  Abdominal:      General: Bowel sounds are normal       Palpations: Abdomen is soft  Skin:     General: Skin is warm and dry  Neurological:      General: No focal deficit present  Mental Status: She is alert and oriented to person, place, and time  Psychiatric:         Mood and Affect: Mood normal          Behavior: Behavior normal          Assessment/ Plan:    1  Anemia, unspecified type      The patient is a bessie 56-year-old female with multiple chronic medical conditions including a longstanding history of Sjogren's syndrome, RA on weekly methotrexate and prednisone, bilateral parotid gland swelling status post open biopsy of left parotid gland in January 2015 which did not reveal any malignancy  Pathology confirmed lymphoepithelial sialadenitis  She is referred to Hematology for evaluation of anemia  Blood work on file does demonstrate evidence of chronic normocytic, normochromic anemia  Her hemoglobin has ranged between 9 6 and 11 over the past year and a half  Most recent CBC demonstrates a normal white count, hemoglobin 9 6, hematocrit 29, MCV 94, platelet count 236, normal differential   Patient is asymptomatic from her anemia  She does have many other symptoms related to her history of Sjogren's disease  She follows closely with Rheumatology  She is also in close follow-up with ENT for her history of bilateral parotid gland swelling which per patient report has gotten profoundly worse  She has been ordered a ultrasound of the head and neck which we will help getting scheduled for her  In regards to her anemia, I suspect it is anemia of chronic disease  I will order iron panel, B12, MMA, folate, reticulocyte and LDH for further evaluation  Her hemoglobin has been stable, there has not been any evidence of acute blood loss  No evidence of renal insufficiency  No monoclonal gammopathy detected on previous workup  Patient denies any abnormal bleeding    Patient states she is up-to-date with routine screening mammography and colonoscopy  I did not find records of these in care everywhere or in our epic system    Patient will have requested workup and return for a follow-up visit in a few weeks to review results  Further recommendations to be made based on results of initial workup  Patient and her spouse were in agreement with this plan of care  Barriers: None  Patient  able to self care  Portions of the record may have been created with voice recognition software  Occasional wrong word or "sound a like" substitutions may have occurred due to the inherent limitations of voice recognition software  Read the chart carefully and recognize, using context, where substitutions have occurred

## 2022-06-30 ENCOUNTER — APPOINTMENT (OUTPATIENT)
Dept: LAB | Facility: CLINIC | Age: 67
End: 2022-06-30
Payer: COMMERCIAL

## 2022-06-30 ENCOUNTER — CONSULT (OUTPATIENT)
Dept: HEMATOLOGY ONCOLOGY | Facility: CLINIC | Age: 67
End: 2022-06-30
Payer: COMMERCIAL

## 2022-06-30 VITALS
WEIGHT: 135 LBS | OXYGEN SATURATION: 96 % | SYSTOLIC BLOOD PRESSURE: 134 MMHG | HEART RATE: 76 BPM | BODY MASS INDEX: 28.34 KG/M2 | RESPIRATION RATE: 18 BRPM | DIASTOLIC BLOOD PRESSURE: 76 MMHG | TEMPERATURE: 99 F | HEIGHT: 58 IN

## 2022-06-30 DIAGNOSIS — D64.9 ANEMIA, UNSPECIFIED TYPE: Primary | ICD-10-CM

## 2022-06-30 DIAGNOSIS — D64.9 ANEMIA, UNSPECIFIED TYPE: ICD-10-CM

## 2022-06-30 LAB
ALBUMIN SERPL BCP-MCNC: 4 G/DL (ref 3.5–5)
ALP SERPL-CCNC: 123 U/L (ref 34–104)
ALT SERPL W P-5'-P-CCNC: 16 U/L (ref 7–52)
ANION GAP SERPL CALCULATED.3IONS-SCNC: 9 MMOL/L (ref 4–13)
AST SERPL W P-5'-P-CCNC: 38 U/L (ref 13–39)
BASOPHILS # BLD AUTO: 0.03 THOUSANDS/ΜL (ref 0–0.1)
BASOPHILS NFR BLD AUTO: 0 % (ref 0–1)
BILIRUB SERPL-MCNC: 0.7 MG/DL (ref 0.2–1)
BUN SERPL-MCNC: 13 MG/DL (ref 5–25)
CALCIUM SERPL-MCNC: 9.4 MG/DL (ref 8.4–10.2)
CHLORIDE SERPL-SCNC: 104 MMOL/L (ref 96–108)
CO2 SERPL-SCNC: 27 MMOL/L (ref 21–32)
CREAT SERPL-MCNC: 0.83 MG/DL (ref 0.6–1.3)
EOSINOPHIL # BLD AUTO: 0.09 THOUSAND/ΜL (ref 0–0.61)
EOSINOPHIL NFR BLD AUTO: 1 % (ref 0–6)
ERYTHROCYTE [DISTWIDTH] IN BLOOD BY AUTOMATED COUNT: 21.6 % (ref 11.6–15.1)
FERRITIN SERPL-MCNC: 33 NG/ML (ref 8–388)
FOLATE SERPL-MCNC: >20 NG/ML (ref 3.1–17.5)
GFR SERPL CREATININE-BSD FRML MDRD: 72 ML/MIN/1.73SQ M
GLUCOSE P FAST SERPL-MCNC: 86 MG/DL (ref 65–99)
HCT VFR BLD AUTO: 30.4 % (ref 34.8–46.1)
HGB BLD-MCNC: 9.9 G/DL (ref 11.5–15.4)
IMM GRANULOCYTES # BLD AUTO: 0.08 THOUSAND/UL (ref 0–0.2)
IMM GRANULOCYTES NFR BLD AUTO: 1 % (ref 0–2)
IRON SATN MFR SERPL: 6 % (ref 15–50)
IRON SERPL-MCNC: 45 UG/DL (ref 50–170)
LDH SERPL-CCNC: 195 U/L (ref 140–271)
LYMPHOCYTES # BLD AUTO: 0.64 THOUSANDS/ΜL (ref 0.6–4.47)
LYMPHOCYTES NFR BLD AUTO: 8 % (ref 14–44)
MCH RBC QN AUTO: 29.4 PG (ref 26.8–34.3)
MCHC RBC AUTO-ENTMCNC: 32.6 G/DL (ref 31.4–37.4)
MCV RBC AUTO: 90 FL (ref 82–98)
MONOCYTES # BLD AUTO: 0.49 THOUSAND/ΜL (ref 0.17–1.22)
MONOCYTES NFR BLD AUTO: 6 % (ref 4–12)
NEUTROPHILS # BLD AUTO: 6.96 THOUSANDS/ΜL (ref 1.85–7.62)
NEUTS SEG NFR BLD AUTO: 84 % (ref 43–75)
NRBC BLD AUTO-RTO: 0 /100 WBCS
PLATELET # BLD AUTO: 360 THOUSANDS/UL (ref 149–390)
PMV BLD AUTO: 10 FL (ref 8.9–12.7)
POTASSIUM SERPL-SCNC: 3.3 MMOL/L (ref 3.5–5.3)
PROT SERPL-MCNC: 8.4 G/DL (ref 6.4–8.4)
RBC # BLD AUTO: 3.37 MILLION/UL (ref 3.81–5.12)
RETICS # AUTO: NORMAL 10*3/UL (ref 14097–95744)
RETICS # CALC: 1.82 % (ref 0.37–1.87)
SODIUM SERPL-SCNC: 140 MMOL/L (ref 135–147)
TIBC SERPL-MCNC: 771 UG/DL (ref 250–450)
VIT B12 SERPL-MCNC: 196 PG/ML (ref 100–900)
WBC # BLD AUTO: 8.29 THOUSAND/UL (ref 4.31–10.16)

## 2022-06-30 PROCEDURE — 82607 VITAMIN B-12: CPT

## 2022-06-30 PROCEDURE — 82746 ASSAY OF FOLIC ACID SERUM: CPT

## 2022-06-30 PROCEDURE — 99205 OFFICE O/P NEW HI 60 MIN: CPT | Performed by: NURSE PRACTITIONER

## 2022-06-30 PROCEDURE — 82728 ASSAY OF FERRITIN: CPT

## 2022-06-30 PROCEDURE — 85025 COMPLETE CBC W/AUTO DIFF WBC: CPT

## 2022-06-30 PROCEDURE — 83550 IRON BINDING TEST: CPT

## 2022-06-30 PROCEDURE — 36415 COLL VENOUS BLD VENIPUNCTURE: CPT

## 2022-06-30 PROCEDURE — 83615 LACTATE (LD) (LDH) ENZYME: CPT

## 2022-06-30 PROCEDURE — 80053 COMPREHEN METABOLIC PANEL: CPT

## 2022-06-30 PROCEDURE — 85045 AUTOMATED RETICULOCYTE COUNT: CPT

## 2022-06-30 PROCEDURE — 83540 ASSAY OF IRON: CPT

## 2022-06-30 PROCEDURE — 83918 ORGANIC ACIDS TOTAL QUANT: CPT

## 2022-06-30 RX ORDER — ALPRAZOLAM 1 MG/1
1 TABLET ORAL DAILY
COMMUNITY
Start: 2022-05-30

## 2022-06-30 RX ORDER — LEVOTHYROXINE SODIUM 0.07 MG/1
75 TABLET ORAL DAILY
COMMUNITY
Start: 2022-06-23

## 2022-06-30 RX ORDER — CYCLOBENZAPRINE HCL 10 MG
10 TABLET ORAL
COMMUNITY
Start: 2022-04-23

## 2022-06-30 RX ORDER — FUROSEMIDE 40 MG/1
40 TABLET ORAL DAILY
COMMUNITY
Start: 2022-06-16

## 2022-06-30 RX ORDER — LINACLOTIDE 145 UG/1
1 CAPSULE, GELATIN COATED ORAL DAILY
COMMUNITY
Start: 2017-02-02

## 2022-07-08 LAB — METHYLMALONATE SERPL-SCNC: 404 NMOL/L (ref 0–378)

## 2022-07-12 ENCOUNTER — HOSPITAL ENCOUNTER (OUTPATIENT)
Dept: RADIOLOGY | Age: 67
Discharge: HOME | End: 2022-07-12
Payer: COMMERCIAL

## 2022-07-12 DIAGNOSIS — M35.00 SJOGREN'S SYNDROME, WITH UNSPECIFIED ORGAN INVOLVEMENT (HCC): ICD-10-CM

## 2022-07-12 DIAGNOSIS — K11.1 PAROTID GLAND ENLARGEMENT: ICD-10-CM

## 2022-07-12 PROCEDURE — 76536 US EXAM OF HEAD AND NECK: CPT

## 2022-07-28 ENCOUNTER — TELEPHONE (OUTPATIENT)
Dept: SURGICAL ONCOLOGY | Facility: CLINIC | Age: 67
End: 2022-07-28

## 2022-07-28 ENCOUNTER — OFFICE VISIT (OUTPATIENT)
Dept: HEMATOLOGY ONCOLOGY | Facility: CLINIC | Age: 67
End: 2022-07-28
Payer: COMMERCIAL

## 2022-07-28 VITALS
HEIGHT: 58 IN | DIASTOLIC BLOOD PRESSURE: 76 MMHG | BODY MASS INDEX: 27.92 KG/M2 | OXYGEN SATURATION: 94 % | SYSTOLIC BLOOD PRESSURE: 130 MMHG | TEMPERATURE: 99.2 F | HEART RATE: 77 BPM | RESPIRATION RATE: 16 BRPM | WEIGHT: 133 LBS

## 2022-07-28 DIAGNOSIS — D50.9 IRON DEFICIENCY ANEMIA, UNSPECIFIED IRON DEFICIENCY ANEMIA TYPE: Primary | ICD-10-CM

## 2022-07-28 PROCEDURE — 99214 OFFICE O/P EST MOD 30 MIN: CPT | Performed by: NURSE PRACTITIONER

## 2022-07-28 RX ORDER — SODIUM CHLORIDE 9 MG/ML
20 INJECTION, SOLUTION INTRAVENOUS ONCE
Status: CANCELLED | OUTPATIENT
Start: 2022-08-08

## 2022-07-28 RX ORDER — CYANOCOBALAMIN 1000 UG/ML
1000 INJECTION, SOLUTION INTRAMUSCULAR; SUBCUTANEOUS ONCE
Status: CANCELLED | OUTPATIENT
Start: 2022-08-08 | End: 2022-08-01

## 2022-07-28 NOTE — TELEPHONE ENCOUNTER
Please schedule patient for Venofer/B12  Patient prefers Tuesday or Wednesday late afternoon (after 3 when possible)  Thank you!

## 2022-07-28 NOTE — PROGRESS NOTES
Hematology/Oncology Outpatient Follow- up Note  Perry Baker 1955, [id number]  7/28/2022        Chief Complaint   Patient presents with    Follow-up       HPI:   Perry Baker is a 79-year-old female with a history of Sjogren's syndrome, RA, depression, hypertension, hypothyroidism, parotid gland enlargement, gastroparesis  She follows with ENT for history of bilateral parotid enlargement  She has undergone biopsy in past which has demonstrated lymphoepithelial sialadenitis    Her Rheumatologist is Dr Leo Vallejo  She is on methotrexate 20 mg weekly and prednisone 5 mg BID for her RA secondary to Sjogren's disease  More recently, patient has had increase in size of her Parotid glands  Right is quite enlarged  Left is smaller  She will be having an ultrasound head/neck ordered by ENT  Patient denies any recent unintentional weight loss  No worsening fatigue  She does have occasional night sweats      She has symptoms of dry mouth, eyes, joint swelling, arthralgias  Denies any dysphagia  No CP, SOB       Blood work on file reviewed and demonstrates history of normocytic normochromic anemia dating back to 2/2021  Her Hgb has ranged from 9 6 to 11  White count, platelet count and differential have all been normal  No evidence of renal insufficiency  FLC have been elevated in past  FLC ratio 1 66  No monoclonal gammopathy was detected       Work up for anemia was ordered  Reticulocyte count normal   LDH normal   Folate greater than 20  MMA was mildly elevated and serum B12 borderline low indicating a mild B12 deficiency  Iron panel consistent with mild iron deficiency     Previous Hematologic/ Oncologic History:    Workup    Current Hematologic/ Oncologic Treatment:    Will order IV Venofer 200 mg weekly x8 and B12 1000 mcg weekly x4      Interval History:  Patient returns for follow-up to review the results of her anemia workup    Findings are consistent with a mild B12 deficiency and iron deficiency  MMA was elevated 404, serum B12 196  Iron saturation 6%, serum iron 45, ferritin 33  Repeat CBC shows stable anemia with a hemoglobin of 9 9, MCV 90  White count, platelet count and differential are normal     She does have symptoms of fatigue and exertional dyspnea  Denies any chest pain, headaches, lightheadedness dizziness, palpitations        Test Results:    Imaging: US head neck soft tissue    Result Date: 7/13/2022  Narrative: Bilateral parotid ULTRASOUND INDICATION:   M35 00: Sjogren syndrome, unspecified K11 1: Hypertrophy of salivary gland  COMPARISON:  Bilateral parotid ultrasound 9/24/2014 TECHNIQUE:   Real-time ultrasound of the parotid glands was performed with a curvilinear transducer with both volumetric sweeps and still imaging techniques  FINDINGS:  Diffusely heterogeneous echotexture of bilateral parotid glands  Right parotid gland: 5 6 x 2 8 x 5 8 cm  Small scattered cystic changes  No dominant cyst or solid mass  Left parotid gland: 4 9 x 2 4 x 3 4 cm  Small scattered cystic changes  No dominant cyst or solid mass  Prior lesions no longer visualized  Impression: Diffusely heterogeneous parenchyma with small scattered cystic changes of bilateral parotid glands  Right parotid gland larger than the left  No dominant cyst or solid mass  Consider follow-up with parotid MRI with contrast if it would alter patient management   Workstation performed: [id number]       Labs:   Lab Results   Component Value Date    WBC 8 29 06/30/2022    HGB 9 9 (L) 06/30/2022    HCT 30 4 (L) 06/30/2022    MCV 90 06/30/2022     06/30/2022     Lab Results   Component Value Date     01/08/2015    K 3 3 (L) 06/30/2022     06/30/2022    CO2 27 06/30/2022    ANIONGAP 8 01/08/2015    BUN 13 06/30/2022    CREATININE 0 83 06/30/2022    GLUCOSE 89 01/08/2015    GLUF 86 06/30/2022    CALCIUM 9 4 06/30/2022    AST 38 06/30/2022    ALT 16 06/30/2022    ALKPHOS 123 (H) 06/30/2022    EGFR 72 06/30/2022           Review of Systems   Constitutional: Positive for fatigue  Respiratory: Positive for shortness of breath  Musculoskeletal: Positive for arthralgias, gait problem and joint swelling  All other systems reviewed and are negative  Active Problems:   Patient Active Problem List   Diagnosis    Parotid gland enlargement    Sjogren's disease ([facility] [address]    Closed nondisplaced fracture of anterior wall of right acetabulum ([facility])    Closed nondisplaced fracture of right pubis ([facility] [address] )    Fall    Hypertension    Pain of right upper extremity    Multiple closed fractures of pelvis (HCC)    Anxiety and depression    Hypothyroidism    Abnormal findings on imaging test    Gastroparesis    Oral dyskinesia    Potential for cognitive impairment    Anemia    Hypokalemia    WELLS (dyspnea on exertion)    Lightheadedness    Pain    Abnormal ECG    Chest pain    Pleural effusion on right    Atelectasis of right lung    Low HDL (under 40)    Dermatitis associated with moisture    Onychomycosis    Enlarged and hypertrophic nails    Adenopathy    Abnormal CT scan, gastrointestinal tract    Hyponatremia    Mild cognitive impairment    Fungal skin infection       Past Medical History:   Past Medical History:   Diagnosis Date    Lymphadenitis, chronic     Sialadenitis     Sjogren's disease ([facility] [address] )     Stomach problems     Thyroid disorder     Xerostomia        Surgical History:   Past Surgical History:   Procedure Laterality Date    BRAIN SURGERY  2019    tumor removed Dr Marcey Babinski         Family History:    Family History   Problem Relation Age of Onset    Diabetes Other     Hypertension Other     Heart disease Other     Arthritis Other     Heart disease Mother     Kidney disease Mother     Alcohol abuse Father        Cancer-related family history is not on file      Social History:   Social History     Socioeconomic History    Marital status: /Civil Union     Spouse name: Not on file    Number of children: Not on file    Years of education: Not on file    Highest education level: Not on file   Occupational History    Not on file   Tobacco Use    Smoking status: Former Smoker    Smokeless tobacco: Never Used   Substance and Sexual Activity    Alcohol use:  Yes    Drug use: Never    Sexual activity: Not Currently     Partners: Male   Other Topics Concern    Not on file   Social History Narrative    Not on file     Social Determinants of Health     Financial Resource Strain: Not on file   Food Insecurity: Not on file   Transportation Needs: Not on file   Physical Activity: Not on file   Stress: Not on file   Social Connections: Not on file   Intimate Partner Violence: Not on file   Housing Stability: Not on file       Current Medications:   Current Outpatient Medications   Medication Sig Dispense Refill    acetaminophen (TYLENOL) 325 mg tablet Take 2 tablets (650 mg total) by mouth every 6 (six) hours as needed for mild pain or moderate pain  0    ALPRAZolam (XANAX) 1 mg tablet Take 1 mg by mouth in the morning      amLODIPine (NORVASC) 5 mg tablet Take 1 tablet (5 mg total) by mouth daily 30 tablet 0    atenolol (TENORMIN) 50 mg tablet Take 1 tablet (50 mg total) by mouth daily 30 tablet 0    atorvastatin (LIPITOR) 10 mg tablet Take 10 mg by mouth daily  1    cyclobenzaprine (FLEXERIL) 10 mg tablet Take 10 mg by mouth daily at bedtime      FLUoxetine (PROzac) 20 mg capsule Take 20 mg by mouth daily  1    folic acid (FOLVITE) 1 mg tablet Take 1 mg by mouth daily       furosemide (LASIX) 40 mg tablet Take 40 mg by mouth daily      levothyroxine 75 mcg tablet Take 75 mcg by mouth daily      levothyroxine 88 mcg tablet Take 1 tablet (88 mcg total) by mouth daily  0    linaCLOtide (Linzess) 145 MCG CAPS Take 1 capsule by mouth daily      losartan (COZAAR) 25 mg tablet Take 1 tablet (25 mg total) by mouth daily 30 tablet 0    methotrexate 2 5 mg tablet Take 8 tablets (20 mg total) by mouth once a week  0    nystatin (MYCOSTATIN) powder Apply 1 application topically 2 (two) times a day 15 g 0    saliva substitute (MOUTH KOTE) Apply 5 sprays to the mouth or throat every 4 (four) hours as needed (Dry mouth) 236 mL 1    enoxaparin (LOVENOX) 40 mg/0 4 mL Inject 0 4 mL (40 mg total) under the skin every 24 hours (Patient not taking: No sig reported) 7 Syringe 0    senna (SENOKOT) 8 6 mg Take 1 tablet (8 6 mg total) by mouth 2 (two) times a day as needed for constipation 60 tablet 0     No current facility-administered medications for this visit  Allergies: Allergies   Allergen Reactions    Codeine Vomiting    Hydroxyquinoline Sulfate [Oxyquinoline] Vomiting    Leucovorin Vomiting    Tizanidine Vomiting       Physical Exam:  /76 (BP Location: Left arm, Patient Position: Sitting, Cuff Size: Adult)   Pulse 77   Temp 99 2 °F (37 3 °C) (Tympanic)   Resp 16   Ht 4' 10" (1 473 m)   Wt 60 3 kg (133 lb)   LMP 01/13/2005   SpO2 94%   BMI 27 80 kg/m²   Body surface area is 1 53 meters squared  Wt Readings from Last 3 Encounters:   07/28/22 60 3 kg (133 lb)   06/30/22 61 2 kg (135 lb)   06/28/22 61 7 kg (136 lb)           Physical Exam  Constitutional:       General: She is not in acute distress  Appearance: Normal appearance  HENT:      Head: Normocephalic and atraumatic  Eyes:      General: No scleral icterus  Right eye: No discharge  Left eye: No discharge  Conjunctiva/sclera: Conjunctivae normal    Cardiovascular:      Rate and Rhythm: Normal rate and regular rhythm  Pulmonary:      Effort: Pulmonary effort is normal  No respiratory distress  Breath sounds: Normal breath sounds  Chest:   Breasts:      Right: No axillary adenopathy or supraclavicular adenopathy  Left: No axillary adenopathy or supraclavicular adenopathy         Abdominal:      General: Bowel sounds are normal  There is no distension  Palpations: Abdomen is soft  There is no mass  Tenderness: There is no abdominal tenderness  Musculoskeletal:         General: Normal range of motion  Lymphadenopathy:      Cervical: No cervical adenopathy  Upper Body:      Right upper body: No supraclavicular, axillary or pectoral adenopathy  Left upper body: No supraclavicular, axillary or pectoral adenopathy  Skin:     General: Skin is warm and dry  Neurological:      General: No focal deficit present  Mental Status: She is alert and oriented to person, place, and time  Psychiatric:         Mood and Affect: Mood normal          Behavior: Behavior normal          Assessment / Plan:    1  Iron deficiency anemia, unspecified iron deficiency anemia type    The patient is a bessie 68-year-old female with multiple chronic medical conditions including a longstanding history of Sjogren's syndrome, RA on weekly methotrexate and prednisone, bilateral parotid gland swelling status post open biopsy of left parotid gland in January 2015 which did not reveal any malignancy  Pathology confirmed lymphoepithelial sialadenitis  She does have many other symptoms related to her history of Sjogren's disease  She follows closely with Rheumatology  She is also in close follow-up with ENT for her history of bilateral parotid gland swelling     She ws referred to Hematology for evaluation of anemia  Blood work on file does demonstrate evidence of chronic normocytic, normochromic anemia  Her hemoglobin has ranged between 9 6 and 11 over the past year and a half  Her hemoglobin has been stable, there has not been any evidence of acute blood loss  No evidence of renal insufficiency  No monoclonal gammopathy detected on previous workup  Additional lab studies were requested for further evaluation of her persistent anemia    Reticulocyte count is normal, LDH is normal   Her MMA is a mildly elevated and her serum B12 is in a low normal range   Her iron panel demonstrates low iron saturation and a low serum iron  I suspect this may be contributing to her anemia  We did discuss IV iron replacement  Potential side effects of IV iron could include but may not be limited to:  change in taste, diarrhea, muscle cramps, nausea or vomiting, pain in the arms or legs, pain or burning sensation in the injection site, allergic reaction  The patient verbalized understanding and wishes to proceed  Will set her up for IV Venofer 200 mg weekly x8 doses  I will also give her 4 weekly doses of B12 1000 mcg IM  Hopefully this will help to improve her numbers  I did explain it may not normalize her hemoglobin completely as I do suspect she has some element of anemia from chronic disease state    Patient was in agreement with this recommendation  I will see her back in 4 months with repeat blood work to see how she responded to IV Venofer and B12 replacement  She will continue to follow closely with her other specialists  She is instructed to call at any time with questions or concerns    Barriers: None  Patient  able to self care  Portions of the record may have been created with voice recognition software  Occasional wrong word or "sound a like" substitutions may have occurred due to the inherent limitations of voice recognition software  Read the chart carefully and recognize, using context, where substitutions have occurred

## 2022-07-28 NOTE — TELEPHONE ENCOUNTER
IV Venofer x8   B12 x4      Does the Provider use the intake sheet or checkout note? What would be a preferred day of the week that would work best for your infusion appointment? Tues or Wed    Do you prefer mornings or afternoons for your appointments? Afternoon (after 3)    We are going to try our best to schedule you at the infusion center closest to your home  In the event that we are unable to what would be your next preferred infusion site or sites? 1  [facility] you have transportation to take you to all of your appointments?  yes    Would you like the infusion center to draw labs from your port? (disregard if patient doesn't have a port or need labs for infusion appointment) No    Please reach out to patient while scheduling

## 2022-07-29 NOTE — TELEPHONE ENCOUNTER
Spoke with patient's , Carolina Feldman  We can keep her on your schedule for 8/8 at 2:30pm  Is it possible to schedule her on Saturdays after that?

## 2022-07-29 NOTE — TELEPHONE ENCOUNTER
Patient said that she rely's on her  to bring her to her appts  She said that Monday's are not good for him  I have a call out to her   I left a message for him to call me back so that we can find out what days and times will work best as well as what other infusion centers he is willing to bring her to  Can we cancel what we have for her now? I'm sorry  I just don't think that she will show up on Monday  I will wait to hear back from her  and then we can try this again

## 2022-07-29 NOTE — TELEPHONE ENCOUNTER
We don't have anything available until 08/30  Is the patient able to wait until then for tx or is there another facility she'd like to go to if they have sooner availability?

## 2022-07-29 NOTE — TELEPHONE ENCOUNTER
Call out to patient to find out what she would like to do  Waiting for patient to call back  I will call patient back if I do not hear back from her soon

## 2022-07-29 NOTE — TELEPHONE ENCOUNTER
[facility] is able to schedule her for 08/01 at 1230pm  Unfortunately they do not have any Tuesday or Wednesday appts available and due to her appt length, she is unable to be scheduled after 3pm  If she is willing to be scheduled any day or time, we can continue scheduling the rest of her tx at [facility]   I scheduled her second tx for 08/08 at 230pm

## 2022-07-29 NOTE — TELEPHONE ENCOUNTER
[facility] does not have anything available for the week of 08/01 except for the date she is already scheduled and [facility] does not have anything for the week of 08/08 for her second tx except for the date that she has been scheduled  Is there another facility she is willing to go to if she is unable to do Mondays?

## 2022-08-01 ENCOUNTER — HOSPITAL ENCOUNTER (OUTPATIENT)
Dept: INFUSION CENTER | Facility: HOSPITAL | Age: 67
End: 2022-08-01
Attending: INTERNAL MEDICINE

## 2022-08-01 NOTE — TELEPHONE ENCOUNTER
Spoke to patient's , Brittney Schwartz, to go over updated infusion schedule  He will check her MyChart for more details and call back if he has any questions

## 2022-08-01 NOTE — TELEPHONE ENCOUNTER
Apologies, I did not see the message you sent about scheduling for saturdays  Patient's 08/08 appt at 230pm has been kept and she has been scheduled for saturdays for her treatments following that

## 2022-08-06 ENCOUNTER — TELEPHONE (OUTPATIENT)
Dept: INFUSION CENTER | Facility: CLINIC | Age: 67
End: 2022-08-06

## 2022-08-06 NOTE — TELEPHONE ENCOUNTER
I reached out to go over new patient information as well as COVID screening  I left VM to inquire about any symptoms of COVID or contact with anyone who has COVID  Confirmed appointment date, time, location and current policies on  visitors and masking  Pt was also advised on our attendance policy that requires a minimum of 24hrs notice prior to scheduled appnts for any cancellations  Requested return call with any questions or to reschedule if any responses to the the COVID screening require

## 2022-08-08 ENCOUNTER — HOSPITAL ENCOUNTER (OUTPATIENT)
Dept: INFUSION CENTER | Facility: CLINIC | Age: 67
Discharge: HOME | End: 2022-08-08
Payer: COMMERCIAL

## 2022-08-08 VITALS
TEMPERATURE: 97.3 F | RESPIRATION RATE: 18 BRPM | OXYGEN SATURATION: 98 % | DIASTOLIC BLOOD PRESSURE: 65 MMHG | HEART RATE: 73 BPM | SYSTOLIC BLOOD PRESSURE: 96 MMHG

## 2022-08-08 DIAGNOSIS — D50.9 IRON DEFICIENCY ANEMIA, UNSPECIFIED IRON DEFICIENCY ANEMIA TYPE: Primary | ICD-10-CM

## 2022-08-08 PROCEDURE — 96372 THER/PROPH/DIAG INJ SC/IM: CPT

## 2022-08-08 PROCEDURE — 96365 THER/PROPH/DIAG IV INF INIT: CPT

## 2022-08-08 RX ORDER — SODIUM CHLORIDE 9 MG/ML
20 INJECTION, SOLUTION INTRAVENOUS ONCE
Status: CANCELLED | OUTPATIENT
Start: 2022-08-15

## 2022-08-08 RX ORDER — CYANOCOBALAMIN 1000 UG/ML
1000 INJECTION, SOLUTION INTRAMUSCULAR; SUBCUTANEOUS ONCE
Status: COMPLETED | OUTPATIENT
Start: 2022-08-08 | End: 2022-08-08

## 2022-08-08 RX ORDER — SODIUM CHLORIDE 9 MG/ML
20 INJECTION, SOLUTION INTRAVENOUS ONCE
Status: COMPLETED | OUTPATIENT
Start: 2022-08-08 | End: 2022-08-08

## 2022-08-08 RX ORDER — CYANOCOBALAMIN 1000 UG/ML
1000 INJECTION, SOLUTION INTRAMUSCULAR; SUBCUTANEOUS ONCE
Status: CANCELLED | OUTPATIENT
Start: 2022-08-15 | End: 2022-08-15

## 2022-08-08 RX ADMIN — CYANOCOBALAMIN 1000 MCG: 1000 INJECTION, SOLUTION INTRAMUSCULAR; SUBCUTANEOUS at 14:54

## 2022-08-08 RX ADMIN — IRON SUCROSE 200 MG: 20 INJECTION, SOLUTION INTRAVENOUS at 14:54

## 2022-08-08 RX ADMIN — SODIUM CHLORIDE 20 ML/HR: 0.9 INJECTION, SOLUTION INTRAVENOUS at 14:55

## 2022-08-20 ENCOUNTER — HOSPITAL ENCOUNTER (OUTPATIENT)
Dept: INFUSION CENTER | Facility: CLINIC | Age: 67
Discharge: HOME | End: 2022-08-20
Payer: COMMERCIAL

## 2022-08-20 VITALS
TEMPERATURE: 97.4 F | OXYGEN SATURATION: 99 % | DIASTOLIC BLOOD PRESSURE: 58 MMHG | SYSTOLIC BLOOD PRESSURE: 92 MMHG | HEART RATE: 63 BPM | RESPIRATION RATE: 20 BRPM

## 2022-08-20 DIAGNOSIS — D50.9 IRON DEFICIENCY ANEMIA, UNSPECIFIED IRON DEFICIENCY ANEMIA TYPE: Primary | ICD-10-CM

## 2022-08-20 PROCEDURE — 96365 THER/PROPH/DIAG IV INF INIT: CPT

## 2022-08-20 PROCEDURE — 96372 THER/PROPH/DIAG INJ SC/IM: CPT

## 2022-08-20 RX ORDER — CYANOCOBALAMIN 1000 UG/ML
1000 INJECTION, SOLUTION INTRAMUSCULAR; SUBCUTANEOUS ONCE
Status: COMPLETED | OUTPATIENT
Start: 2022-08-20 | End: 2022-08-20

## 2022-08-20 RX ORDER — SODIUM CHLORIDE 9 MG/ML
20 INJECTION, SOLUTION INTRAVENOUS ONCE
Status: CANCELLED | OUTPATIENT
Start: 2022-08-27

## 2022-08-20 RX ORDER — CYANOCOBALAMIN 1000 UG/ML
1000 INJECTION, SOLUTION INTRAMUSCULAR; SUBCUTANEOUS ONCE
Status: CANCELLED | OUTPATIENT
Start: 2022-08-27 | End: 2022-08-22

## 2022-08-20 RX ORDER — SODIUM CHLORIDE 9 MG/ML
20 INJECTION, SOLUTION INTRAVENOUS ONCE
Status: COMPLETED | OUTPATIENT
Start: 2022-08-20 | End: 2022-08-20

## 2022-08-20 RX ADMIN — SODIUM CHLORIDE 20 ML/HR: 0.9 INJECTION, SOLUTION INTRAVENOUS at 14:11

## 2022-08-20 RX ADMIN — CYANOCOBALAMIN 1000 MCG: 1000 INJECTION, SOLUTION INTRAMUSCULAR; SUBCUTANEOUS at 14:14

## 2022-08-20 RX ADMIN — IRON SUCROSE 200 MG: 20 INJECTION, SOLUTION INTRAVENOUS at 14:13

## 2022-08-20 NOTE — PROGRESS NOTES
Pt tolerated treatment well without any adverse reactions  Aware of next appointment 8/27   Declines AVS

## 2022-08-27 ENCOUNTER — HOSPITAL ENCOUNTER (OUTPATIENT)
Dept: INFUSION CENTER | Facility: CLINIC | Age: 67
Discharge: HOME | End: 2022-08-27
Payer: COMMERCIAL

## 2022-08-27 VITALS
TEMPERATURE: 96.3 F | DIASTOLIC BLOOD PRESSURE: 65 MMHG | HEART RATE: 62 BPM | RESPIRATION RATE: 18 BRPM | OXYGEN SATURATION: 100 % | SYSTOLIC BLOOD PRESSURE: 112 MMHG

## 2022-08-27 DIAGNOSIS — D50.9 IRON DEFICIENCY ANEMIA, UNSPECIFIED IRON DEFICIENCY ANEMIA TYPE: Primary | ICD-10-CM

## 2022-08-27 PROCEDURE — 96365 THER/PROPH/DIAG IV INF INIT: CPT

## 2022-08-27 PROCEDURE — 96372 THER/PROPH/DIAG INJ SC/IM: CPT

## 2022-08-27 RX ORDER — SODIUM CHLORIDE 9 MG/ML
20 INJECTION, SOLUTION INTRAVENOUS ONCE
Status: COMPLETED | OUTPATIENT
Start: 2022-08-27 | End: 2022-08-27

## 2022-08-27 RX ORDER — CYANOCOBALAMIN 1000 UG/ML
1000 INJECTION, SOLUTION INTRAMUSCULAR; SUBCUTANEOUS ONCE
Status: CANCELLED | OUTPATIENT
Start: 2022-09-03 | End: 2022-09-03

## 2022-08-27 RX ORDER — CYANOCOBALAMIN 1000 UG/ML
1000 INJECTION, SOLUTION INTRAMUSCULAR; SUBCUTANEOUS ONCE
Status: COMPLETED | OUTPATIENT
Start: 2022-08-27 | End: 2022-08-27

## 2022-08-27 RX ORDER — SODIUM CHLORIDE 9 MG/ML
20 INJECTION, SOLUTION INTRAVENOUS ONCE
Status: CANCELLED | OUTPATIENT
Start: 2022-09-03

## 2022-08-27 RX ADMIN — SODIUM CHLORIDE 20 ML/HR: 0.9 INJECTION, SOLUTION INTRAVENOUS at 13:03

## 2022-08-27 RX ADMIN — CYANOCOBALAMIN 1000 MCG: 1000 INJECTION, SOLUTION INTRAMUSCULAR; SUBCUTANEOUS at 13:03

## 2022-08-27 RX ADMIN — IRON SUCROSE 200 MG: 20 INJECTION, SOLUTION INTRAVENOUS at 13:02

## 2022-08-27 NOTE — PROGRESS NOTES
pateint to infusion for B12 injection and venofer infusion  She offers no complaints  VSS  IV placed without issue  Call bell within reach

## 2022-08-27 NOTE — PROGRESS NOTES
Patient tolerated treatment today without adverse reaction    Next appointment confirmed, she declined AVS

## 2022-09-03 ENCOUNTER — HOSPITAL ENCOUNTER (OUTPATIENT)
Dept: INFUSION CENTER | Facility: CLINIC | Age: 67
Discharge: HOME | End: 2022-09-03
Payer: COMMERCIAL

## 2022-09-03 VITALS
SYSTOLIC BLOOD PRESSURE: 93 MMHG | RESPIRATION RATE: 18 BRPM | DIASTOLIC BLOOD PRESSURE: 56 MMHG | HEART RATE: 75 BPM | TEMPERATURE: 95.3 F

## 2022-09-03 DIAGNOSIS — D50.9 IRON DEFICIENCY ANEMIA, UNSPECIFIED IRON DEFICIENCY ANEMIA TYPE: Primary | ICD-10-CM

## 2022-09-03 PROCEDURE — 96365 THER/PROPH/DIAG IV INF INIT: CPT

## 2022-09-03 PROCEDURE — 96372 THER/PROPH/DIAG INJ SC/IM: CPT

## 2022-09-03 RX ORDER — SODIUM CHLORIDE 9 MG/ML
20 INJECTION, SOLUTION INTRAVENOUS ONCE
Status: CANCELLED | OUTPATIENT
Start: 2022-09-10

## 2022-09-03 RX ORDER — PREDNISONE 1 MG/1
TABLET ORAL
COMMUNITY
Start: 2022-08-01

## 2022-09-03 RX ORDER — CYANOCOBALAMIN 1000 UG/ML
1000 INJECTION, SOLUTION INTRAMUSCULAR; SUBCUTANEOUS ONCE
Status: COMPLETED | OUTPATIENT
Start: 2022-09-03 | End: 2022-09-03

## 2022-09-03 RX ORDER — CYANOCOBALAMIN 1000 UG/ML
1000 INJECTION, SOLUTION INTRAMUSCULAR; SUBCUTANEOUS ONCE
Status: CANCELLED | OUTPATIENT
Start: 2022-09-10 | End: 2022-09-10

## 2022-09-03 RX ORDER — SODIUM CHLORIDE 9 MG/ML
20 INJECTION, SOLUTION INTRAVENOUS ONCE
Status: COMPLETED | OUTPATIENT
Start: 2022-09-03 | End: 2022-09-03

## 2022-09-03 RX ADMIN — IRON SUCROSE 200 MG: 20 INJECTION, SOLUTION INTRAVENOUS at 08:18

## 2022-09-03 RX ADMIN — SODIUM CHLORIDE 20 ML/HR: 0.9 INJECTION, SOLUTION INTRAVENOUS at 08:15

## 2022-09-03 RX ADMIN — CYANOCOBALAMIN 1000 MCG: 1000 INJECTION, SOLUTION INTRAMUSCULAR; SUBCUTANEOUS at 08:17

## 2022-09-03 NOTE — PROGRESS NOTES
Patient here for venofer, offers no complaints  Tolerated infusion without incident   Patient is aware of next appointment, declined AVS

## 2022-09-10 ENCOUNTER — HOSPITAL ENCOUNTER (OUTPATIENT)
Dept: INFUSION CENTER | Facility: CLINIC | Age: 67
Discharge: HOME | End: 2022-09-10
Payer: COMMERCIAL

## 2022-09-10 VITALS
SYSTOLIC BLOOD PRESSURE: 93 MMHG | HEART RATE: 70 BPM | OXYGEN SATURATION: 97 % | TEMPERATURE: 97.8 F | DIASTOLIC BLOOD PRESSURE: 66 MMHG | RESPIRATION RATE: 16 BRPM

## 2022-09-10 DIAGNOSIS — D50.9 IRON DEFICIENCY ANEMIA, UNSPECIFIED IRON DEFICIENCY ANEMIA TYPE: Primary | ICD-10-CM

## 2022-09-10 PROCEDURE — 96365 THER/PROPH/DIAG IV INF INIT: CPT

## 2022-09-10 RX ORDER — SODIUM CHLORIDE 9 MG/ML
20 INJECTION, SOLUTION INTRAVENOUS ONCE
Status: CANCELLED | OUTPATIENT
Start: 2022-09-17

## 2022-09-10 RX ORDER — SODIUM CHLORIDE 9 MG/ML
20 INJECTION, SOLUTION INTRAVENOUS ONCE
Status: COMPLETED | OUTPATIENT
Start: 2022-09-10 | End: 2022-09-10

## 2022-09-10 RX ADMIN — SODIUM CHLORIDE 20 ML/HR: 0.9 INJECTION, SOLUTION INTRAVENOUS at 12:51

## 2022-09-10 RX ADMIN — SODIUM CHLORIDE 200 MG: 9 INJECTION, SOLUTION INTRAVENOUS at 12:56

## 2022-09-10 NOTE — PROGRESS NOTES
Patient here for venofer infusion  Patient resting with no complaints  Vitals stable  Call bell within reach   Patient tolerated infusion without issue, declined AVS

## 2022-09-17 ENCOUNTER — HOSPITAL ENCOUNTER (OUTPATIENT)
Dept: INFUSION CENTER | Facility: CLINIC | Age: 67
Discharge: HOME | End: 2022-09-17
Payer: COMMERCIAL

## 2022-09-17 VITALS
RESPIRATION RATE: 16 BRPM | OXYGEN SATURATION: 98 % | HEART RATE: 70 BPM | SYSTOLIC BLOOD PRESSURE: 108 MMHG | TEMPERATURE: 96.8 F | DIASTOLIC BLOOD PRESSURE: 69 MMHG

## 2022-09-17 DIAGNOSIS — D50.9 IRON DEFICIENCY ANEMIA, UNSPECIFIED IRON DEFICIENCY ANEMIA TYPE: Primary | ICD-10-CM

## 2022-09-17 PROCEDURE — 96365 THER/PROPH/DIAG IV INF INIT: CPT

## 2022-09-17 RX ORDER — SODIUM CHLORIDE 9 MG/ML
20 INJECTION, SOLUTION INTRAVENOUS ONCE
Status: CANCELLED | OUTPATIENT
Start: 2022-09-24

## 2022-09-17 RX ORDER — SODIUM CHLORIDE 9 MG/ML
20 INJECTION, SOLUTION INTRAVENOUS ONCE
Status: COMPLETED | OUTPATIENT
Start: 2022-09-17 | End: 2022-09-17

## 2022-09-17 RX ADMIN — SODIUM CHLORIDE 200 MG: 9 INJECTION, SOLUTION INTRAVENOUS at 09:33

## 2022-09-17 RX ADMIN — SODIUM CHLORIDE 20 ML/HR: 9 INJECTION, SOLUTION INTRAVENOUS at 09:33

## 2022-09-17 NOTE — PROGRESS NOTES
Pt resting with no complaints, vitals stable,  call bell within reach  All questions answered and emotional support given  All needs met at this time

## 2022-09-24 ENCOUNTER — HOSPITAL ENCOUNTER (OUTPATIENT)
Dept: INFUSION CENTER | Facility: CLINIC | Age: 67
Discharge: HOME | End: 2022-09-24
Payer: COMMERCIAL

## 2022-09-24 VITALS
TEMPERATURE: 98.2 F | OXYGEN SATURATION: 96 % | RESPIRATION RATE: 14 BRPM | SYSTOLIC BLOOD PRESSURE: 124 MMHG | HEART RATE: 66 BPM | DIASTOLIC BLOOD PRESSURE: 66 MMHG

## 2022-09-24 DIAGNOSIS — D50.9 IRON DEFICIENCY ANEMIA, UNSPECIFIED IRON DEFICIENCY ANEMIA TYPE: Primary | ICD-10-CM

## 2022-09-24 PROCEDURE — 96365 THER/PROPH/DIAG IV INF INIT: CPT

## 2022-09-24 RX ORDER — SODIUM CHLORIDE 9 MG/ML
20 INJECTION, SOLUTION INTRAVENOUS ONCE
Status: COMPLETED | OUTPATIENT
Start: 2022-09-24 | End: 2022-09-24

## 2022-09-24 RX ORDER — SODIUM CHLORIDE 9 MG/ML
20 INJECTION, SOLUTION INTRAVENOUS ONCE
OUTPATIENT
Start: 2022-10-01

## 2022-09-24 RX ADMIN — SODIUM CHLORIDE 200 MG: 9 INJECTION, SOLUTION INTRAVENOUS at 08:06

## 2022-09-24 RX ADMIN — SODIUM CHLORIDE 20 ML/HR: 0.9 INJECTION, SOLUTION INTRAVENOUS at 08:06

## 2022-10-01 ENCOUNTER — HOSPITAL ENCOUNTER (OUTPATIENT)
Dept: INFUSION CENTER | Facility: CLINIC | Age: 67
End: 2022-10-01

## 2022-11-17 ENCOUNTER — TELEPHONE (OUTPATIENT)
Dept: HEMATOLOGY ONCOLOGY | Facility: CLINIC | Age: 67
End: 2022-11-17

## 2022-11-17 NOTE — TELEPHONE ENCOUNTER
11/17/22    LVM informing I r/s pt's f/u appt to 1/12/2023 d/t Jaren Backer will be OOO on 12/1  Pt was advised to call us back if any questions or concerns  Or if any symptoms, we will help move up her appt if any openings  Pt was also advised to get labs done 1 wk before her new appt  Hope line number provided

## 2022-12-10 NOTE — PROGRESS NOTES
Pt tolerated treatment well  Pt given AVS  Next appt reviewed c/ pt and her    ID [id number] pt c/o feeling sick, dizziness, sweating, blurry vision, sob x 3 years.  pt ran out of pills, clonazapam, which he takes it for HTN, last dose of clonazepam 4 days ago.  pt from [facility].

## 2023-07-20 ENCOUNTER — TELEPHONE (OUTPATIENT)
Dept: OBGYN CLINIC | Facility: CLINIC | Age: 68
End: 2023-07-20

## 2023-07-20 NOTE — TELEPHONE ENCOUNTER
Caller:     Doctor: n/a    Reason for call: calling for NP apt with Eleuterio . Talha Show  Offered next avail and declined     Call back#: n/a

## 2023-08-15 ENCOUNTER — APPOINTMENT (EMERGENCY)
Dept: CT IMAGING | Facility: HOSPITAL | Age: 68
DRG: 184 | End: 2023-08-15
Payer: MEDICARE

## 2023-08-15 ENCOUNTER — HOSPITAL ENCOUNTER (INPATIENT)
Facility: HOSPITAL | Age: 68
LOS: 3 days | DRG: 184 | End: 2023-08-18
Attending: EMERGENCY MEDICINE | Admitting: SURGERY
Payer: MEDICARE

## 2023-08-15 DIAGNOSIS — S22.060A CLOSED WEDGE COMPRESSION FRACTURE OF T8 VERTEBRA, INITIAL ENCOUNTER (HCC): ICD-10-CM

## 2023-08-15 DIAGNOSIS — M54.9 BACK PAIN: ICD-10-CM

## 2023-08-15 DIAGNOSIS — S22.41XA CLOSED FRACTURE OF MULTIPLE RIBS OF RIGHT SIDE, INITIAL ENCOUNTER: Primary | ICD-10-CM

## 2023-08-15 DIAGNOSIS — G25.9 MOVEMENT DISORDER: ICD-10-CM
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
## 2023-08-15 PROBLEM — M05.20 RHEUMATOID ARTERITIS (HCC): Status: ACTIVE | Noted: 2023-08-15

## 2023-08-15 LAB
2HR DELTA HS TROPONIN: -2 NG/L
ALBUMIN SERPL BCP-MCNC: 3.9 G/DL (ref 3.5–5)
ALP SERPL-CCNC: 110 U/L (ref 34–104)
ALT SERPL W P-5'-P-CCNC: 15 U/L (ref 7–52)
ANION GAP SERPL CALCULATED.3IONS-SCNC: 7 MMOL/L
AST SERPL W P-5'-P-CCNC: 15 U/L (ref 13–39)
BASOPHILS # BLD AUTO: 0.02 THOUSANDS/ÂΜL (ref 0–0.1)
BASOPHILS NFR BLD AUTO: 0 % (ref 0–1)
BILIRUB SERPL-MCNC: 0.51 MG/DL (ref 0.2–1)
BUN SERPL-MCNC: 12 MG/DL (ref 5–25)
CALCIUM SERPL-MCNC: 9.6 MG/DL (ref 8.4–10.2)
CARDIAC TROPONIN I PNL SERPL HS: 4 NG/L
CARDIAC TROPONIN I PNL SERPL HS: 6 NG/L
CHLORIDE SERPL-SCNC: 103 MMOL/L (ref 96–108)
CO2 SERPL-SCNC: 24 MMOL/L (ref 21–32)
CREAT SERPL-MCNC: 0.85 MG/DL (ref 0.6–1.3)
EOSINOPHIL # BLD AUTO: 0.06 THOUSAND/ÂΜL (ref 0–0.61)
EOSINOPHIL NFR BLD AUTO: 1 % (ref 0–6)
ERYTHROCYTE [DISTWIDTH] IN BLOOD BY AUTOMATED COUNT: 13.6 % (ref 11.6–15.1)
GFR SERPL CREATININE-BSD FRML MDRD: 70 ML/MIN/1.73SQ M
GLUCOSE SERPL-MCNC: 97 MG/DL (ref 65–140)
HCT VFR BLD AUTO: 37.6 % (ref 34.8–46.1)
HGB BLD-MCNC: 12.4 G/DL (ref 11.5–15.4)
IMM GRANULOCYTES # BLD AUTO: 0.02 THOUSAND/UL (ref 0–0.2)
IMM GRANULOCYTES NFR BLD AUTO: 0 % (ref 0–2)
LYMPHOCYTES # BLD AUTO: 0.46 THOUSANDS/ÂΜL (ref 0.6–4.47)
LYMPHOCYTES NFR BLD AUTO: 9 % (ref 14–44)
MCH RBC QN AUTO: 32.4 PG (ref 26.8–34.3)
MCHC RBC AUTO-ENTMCNC: 33 G/DL (ref 31.4–37.4)
MCV RBC AUTO: 98 FL (ref 82–98)
MONOCYTES # BLD AUTO: 0.62 THOUSAND/ÂΜL (ref 0.17–1.22)
MONOCYTES NFR BLD AUTO: 12 % (ref 4–12)
NEUTROPHILS # BLD AUTO: 4.13 THOUSANDS/ÂΜL (ref 1.85–7.62)
NEUTS SEG NFR BLD AUTO: 78 % (ref 43–75)
NRBC BLD AUTO-RTO: 0 /100 WBCS
PLATELET # BLD AUTO: 205 THOUSANDS/UL (ref 149–390)
PMV BLD AUTO: 9.8 FL (ref 8.9–12.7)
POTASSIUM SERPL-SCNC: 3.9 MMOL/L (ref 3.5–5.3)
PROT SERPL-MCNC: 7.8 G/DL (ref 6.4–8.4)
RBC # BLD AUTO: 3.83 MILLION/UL (ref 3.81–5.12)
SODIUM SERPL-SCNC: 134 MMOL/L (ref 135–147)
WBC # BLD AUTO: 5.31 THOUSAND/UL (ref 4.31–10.16)

## 2023-08-15 PROCEDURE — 99285 EMERGENCY DEPT VISIT HI MDM: CPT | Performed by: EMERGENCY MEDICINE

## 2023-08-15 PROCEDURE — 85025 COMPLETE CBC W/AUTO DIFF WBC: CPT | Performed by: EMERGENCY MEDICINE

## 2023-08-15 PROCEDURE — 80053 COMPREHEN METABOLIC PANEL: CPT | Performed by: EMERGENCY MEDICINE

## 2023-08-15 PROCEDURE — 99285 EMERGENCY DEPT VISIT HI MDM: CPT

## 2023-08-15 PROCEDURE — 70450 CT HEAD/BRAIN W/O DYE: CPT

## 2023-08-15 PROCEDURE — 84484 ASSAY OF TROPONIN QUANT: CPT | Performed by: EMERGENCY MEDICINE

## 2023-08-15 PROCEDURE — 96375 TX/PRO/DX INJ NEW DRUG ADDON: CPT

## 2023-08-15 PROCEDURE — 96374 THER/PROPH/DIAG INJ IV PUSH: CPT

## 2023-08-15 PROCEDURE — 74177 CT ABD & PELVIS W/CONTRAST: CPT

## 2023-08-15 PROCEDURE — 99222 1ST HOSP IP/OBS MODERATE 55: CPT | Performed by: SURGERY

## 2023-08-15 PROCEDURE — 93005 ELECTROCARDIOGRAM TRACING: CPT

## 2023-08-15 PROCEDURE — 36415 COLL VENOUS BLD VENIPUNCTURE: CPT

## 2023-08-15 PROCEDURE — 71260 CT THORAX DX C+: CPT

## 2023-08-15 PROCEDURE — 72125 CT NECK SPINE W/O DYE: CPT

## 2023-08-15 RX ORDER — ATENOLOL 50 MG/1
50 TABLET ORAL DAILY
Status: DISCONTINUED | OUTPATIENT
Start: 2023-08-16 | End: 2023-08-18 | Stop reason: HOSPADM

## 2023-08-15 RX ORDER — ATORVASTATIN CALCIUM 10 MG/1
10 TABLET, FILM COATED ORAL DAILY
Status: DISCONTINUED | OUTPATIENT
Start: 2023-08-16 | End: 2023-08-18 | Stop reason: HOSPADM

## 2023-08-15 RX ORDER — CYCLOBENZAPRINE HCL 10 MG
10 TABLET ORAL
Status: DISCONTINUED | OUTPATIENT
Start: 2023-08-15 | End: 2023-08-18 | Stop reason: HOSPADM

## 2023-08-15 RX ORDER — FOLIC ACID 1 MG/1
1 TABLET ORAL DAILY
Status: DISCONTINUED | OUTPATIENT
Start: 2023-08-16 | End: 2023-08-18 | Stop reason: HOSPADM

## 2023-08-15 RX ORDER — ACETAMINOPHEN 325 MG/1
975 TABLET ORAL EVERY 8 HOURS SCHEDULED
Status: DISCONTINUED | OUTPATIENT
Start: 2023-08-15 | End: 2023-08-18 | Stop reason: HOSPADM

## 2023-08-15 RX ORDER — LIDOCAINE 50 MG/G
1 PATCH TOPICAL DAILY
Status: DISCONTINUED | OUTPATIENT
Start: 2023-08-16 | End: 2023-08-18 | Stop reason: HOSPADM

## 2023-08-15 RX ORDER — LEVOTHYROXINE SODIUM 0.1 MG/1
100 TABLET ORAL
Status: DISCONTINUED | OUTPATIENT
Start: 2023-08-16 | End: 2023-08-18 | Stop reason: HOSPADM

## 2023-08-15 RX ORDER — PREDNISONE 5 MG/1
5 TABLET ORAL
Status: DISCONTINUED | OUTPATIENT
Start: 2023-08-15 | End: 2023-08-18 | Stop reason: HOSPADM

## 2023-08-15 RX ORDER — FLUOXETINE HYDROCHLORIDE 20 MG/1
20 CAPSULE ORAL DAILY
Status: DISCONTINUED | OUTPATIENT
Start: 2023-08-16 | End: 2023-08-18 | Stop reason: HOSPADM

## 2023-08-15 RX ORDER — ONDANSETRON 2 MG/ML
4 INJECTION INTRAMUSCULAR; INTRAVENOUS ONCE
Status: COMPLETED | OUTPATIENT
Start: 2023-08-15 | End: 2023-08-15

## 2023-08-15 RX ORDER — OXYCODONE HYDROCHLORIDE 5 MG/1
5 TABLET ORAL EVERY 4 HOURS PRN
Status: DISCONTINUED | OUTPATIENT
Start: 2023-08-15 | End: 2023-08-18 | Stop reason: HOSPADM

## 2023-08-15 RX ORDER — ALPRAZOLAM 0.5 MG/1
1 TABLET ORAL
Status: DISCONTINUED | OUTPATIENT
Start: 2023-08-15 | End: 2023-08-18 | Stop reason: HOSPADM

## 2023-08-15 RX ORDER — ENOXAPARIN SODIUM 100 MG/ML
30 INJECTION SUBCUTANEOUS EVERY 12 HOURS SCHEDULED
Status: DISCONTINUED | OUTPATIENT
Start: 2023-08-15 | End: 2023-08-18 | Stop reason: HOSPADM

## 2023-08-15 RX ADMIN — ONDANSETRON 4 MG: 2 INJECTION INTRAMUSCULAR; INTRAVENOUS at 18:00

## 2023-08-15 RX ADMIN — PREDNISONE 5 MG: 5 TABLET ORAL at 22:38

## 2023-08-15 RX ADMIN — ENOXAPARIN SODIUM 30 MG: 30 INJECTION SUBCUTANEOUS at 20:44

## 2023-08-15 RX ADMIN — OXYCODONE HYDROCHLORIDE 5 MG: 5 TABLET ORAL at 22:38

## 2023-08-15 RX ADMIN — IOHEXOL 100 ML: 350 INJECTION, SOLUTION INTRAVENOUS at 17:15

## 2023-08-15 RX ADMIN — CYCLOBENZAPRINE 10 MG: 10 TABLET, FILM COATED ORAL at 22:38

## 2023-08-15 RX ADMIN — ACETAMINOPHEN 975 MG: 325 TABLET, FILM COATED ORAL at 22:06

## 2023-08-15 RX ADMIN — MORPHINE SULFATE 2 MG: 2 INJECTION, SOLUTION INTRAMUSCULAR; INTRAVENOUS at 17:57

## 2023-08-15 NOTE — ED PROVIDER NOTES
History  Chief Complaint   Patient presents with   • Weakness - Generalized     Pt reports more falls recently last one being Sunday, no loc, no thinners, no headstrike. Pt denies headaches/dizziness. Reports lower back pain. Also reports falling more d/t balance issues. Hx Sjogren's disease and arthritis     HPI     70-year-old female with history of Sjogren's disease and rheumatoid arthritis on methotrexate, presenting for evaluation of severe back pain after recent fall. Patient initially fell 6 days ago from "tripping over my own feet."  She fell backwards, landing on the ground. She does think that she hit her head but did not lose consciousness. Since that time she has had a constant headache as well as severe pain in the entirety of her back. The pain seems to be getting worse rather than better and occurs with any movement. Over the last several days she has had numerous other falls to the point that her  will not allow her to walk without assistance. She last fell 3 days ago. Denies numbness in her groin or legs or leg weakness. She has some urinary incontinence at baseline but denies bowel incontinence and states that she is able to feel when she has to use the bathroom. Denies pain in her arms or legs. Reports pain to the right lateral rib cage with touching the area but denies shortness of breath or abdominal pain. Reports dizziness which she describes as a sensation of spinning that occurs intermittently when standing, not present currently. No vision changes. Prior to Admission Medications   Prescriptions Last Dose Informant Patient Reported? Taking?    ALPRAZolam (XANAX) 1 mg tablet  Spouse/Significant Other Yes No   Sig: Take 1 mg by mouth in the morning   FLUoxetine (PROzac) 20 mg capsule  Spouse/Significant Other Yes No   Sig: Take 20 mg by mouth daily   acetaminophen (TYLENOL) 325 mg tablet  Spouse/Significant Other No No   Sig: Take 2 tablets (650 mg total) by mouth every 6 (six) hours as needed for mild pain or moderate pain   amLODIPine (NORVASC) 5 mg tablet Unknown Spouse/Significant Other No No   Sig: Take 1 tablet (5 mg total) by mouth daily   atenolol (TENORMIN) 50 mg tablet  Spouse/Significant Other No No   Sig: Take 1 tablet (50 mg total) by mouth daily   atorvastatin (LIPITOR) 10 mg tablet  Spouse/Significant Other Yes No   Sig: Take 10 mg by mouth daily   cyclobenzaprine (FLEXERIL) 10 mg tablet Unknown Spouse/Significant Other Yes No   Sig: Take 10 mg by mouth daily at bedtime   folic acid (FOLVITE) 1 mg tablet  Spouse/Significant Other Yes No   Sig: Take 1 mg by mouth daily    furosemide (LASIX) 40 mg tablet Not Taking Spouse/Significant Other Yes No   Sig: Take 40 mg by mouth daily   Patient not taking: Reported on 8/15/2023   levothyroxine 75 mcg tablet  Spouse/Significant Other Yes No   Sig: Take 75 mcg by mouth daily   levothyroxine 88 mcg tablet  Spouse/Significant Other No No   Sig: Take 1 tablet (88 mcg total) by mouth daily   linaCLOtide (Linzess) 145 MCG CAPS  Spouse/Significant Other Yes No   Sig: Take 1 capsule by mouth daily   losartan (COZAAR) 25 mg tablet  Spouse/Significant Other No No   Sig: Take 1 tablet (25 mg total) by mouth daily   methotrexate 2.5 mg tablet  Spouse/Significant Other No No   Sig: Take 8 tablets (20 mg total) by mouth once a week   nystatin (MYCOSTATIN) powder  Spouse/Significant Other No No   Sig: Apply 1 application topically 2 (two) times a day   predniSONE 5 mg tablet   Yes No   saliva substitute (MOUTH KOTE)  Spouse/Significant Other No No   Sig: Apply 5 sprays to the mouth or throat every 4 (four) hours as needed (Dry mouth)   senna (SENOKOT) 8.6 mg  Spouse/Significant Other No No   Sig: Take 1 tablet (8.6 mg total) by mouth 2 (two) times a day as needed for constipation      Facility-Administered Medications: None       Past Medical History:   Diagnosis Date   • Lymphadenitis, chronic    • Sialadenitis    • Sjogren's disease ([address] [facility]) • Stomach problems    • Thyroid disorder    • Xerostomia        Past Surgical History:   Procedure Laterality Date   • BRAIN SURGERY  2019    tumor removed Dr. Kd Johnson   • CHOLECYSTECTOMY         Family History   Problem Relation Age of Onset   • Diabetes Other    • Hypertension Other    • Heart disease Other    • Arthritis Other    • Heart disease Mother    • Kidney disease Mother    • Alcohol abuse Father      I have reviewed and agree with the history as documented. E-Cigarette/Vaping     E-Cigarette/Vaping Substances     Social History     Tobacco Use   • Smoking status: Former   • Smokeless tobacco: Never   Substance Use Topics   • Alcohol use: Yes   • Drug use: Never       Review of Systems   Constitutional: Negative for chills and fever. HENT: Negative for congestion and facial swelling. Eyes: Negative for visual disturbance. Respiratory: Negative for cough and shortness of breath. Cardiovascular: Positive for chest pain (R lateral ribcage). Negative for leg swelling. Gastrointestinal: Negative for abdominal pain, diarrhea, nausea and vomiting. Genitourinary: Negative for dysuria, flank pain and frequency. Musculoskeletal: Positive for back pain. Negative for arthralgias, neck pain and neck stiffness. Skin: Negative for rash. Neurological: Positive for dizziness (intermittently, not present currently) and headaches. Negative for weakness and numbness. Psychiatric/Behavioral: Negative for agitation, behavioral problems and confusion. All other systems reviewed and are negative. Physical Exam  Physical Exam  Vitals and nursing note reviewed. Constitutional:       General: She is not in acute distress. Appearance: She is well-developed. She is not diaphoretic. HENT:      Head: Normocephalic and atraumatic. Right Ear: External ear normal.      Left Ear: External ear normal.      Nose: Nose normal.   Eyes:      Extraocular Movements: Extraocular movements intact. Conjunctiva/sclera: Conjunctivae normal.      Pupils: Pupils are equal, round, and reactive to light. Neck:      Comments: Swelling over the right submandibular gland, no overlying erythema. No midline tenderness to palpation over the cervical spine  Cardiovascular:      Rate and Rhythm: Normal rate and regular rhythm. Pulses: Normal pulses. Heart sounds: Normal heart sounds. No murmur heard. No friction rub. No gallop. Pulmonary:      Effort: Pulmonary effort is normal. No respiratory distress. Breath sounds: Normal breath sounds. No wheezing or rales. Chest:      Chest wall: Tenderness (TTP over the R lateral chest wall without ecchymosis or crepitus) present. Abdominal:      General: Bowel sounds are normal. There is no distension. Palpations: Abdomen is soft. Tenderness: There is abdominal tenderness (mild, generalized to deep palpation). There is no right CVA tenderness, left CVA tenderness or guarding. Musculoskeletal:         General: No deformity. Normal range of motion. Cervical back: Normal range of motion and neck supple. Comments: Midline TTP over the entirety of the thoracic and lumbar spine without stepoffs. Extremities atraumatic. Skin:     General: Skin is warm and dry. Neurological:      Mental Status: She is alert and oriented to person, place, and time. Motor: No abnormal muscle tone. Comments: 5 out of 5 strength in the proximal and distal bilateral lower extremities with intact sensation to light touch.   No saddle anesthesia   Psychiatric:         Mood and Affect: Mood normal.         Vital Signs  ED Triage Vitals   Temperature Pulse Respirations Blood Pressure SpO2   08/15/23 1233 08/15/23 1233 08/15/23 1233 08/15/23 1233 08/15/23 1233   98.3 °F (36.8 °C) 63 16 143/66 96 %      Temp Source Heart Rate Source Patient Position - Orthostatic VS BP Location FiO2 (%)   08/15/23 1233 08/15/23 1233 08/15/23 1233 08/15/23 1233 --   Oral Monitor Sitting Right arm       Pain Score       08/15/23 1757       8           Vitals:    08/17/23 1531 08/17/23 1715 08/17/23 2142 08/18/23 0756   BP: (!) 186/82 (!) 184/87 130/95 167/92   Pulse:  67 71 70   Patient Position - Orthostatic VS:             Visual Acuity  Visual Acuity    Flowsheet Row Most Recent Value   L Pupil Size (mm) 2   R Pupil Size (mm) 2   L Pupil Shape Round   R Pupil Shape Round          ED Medications  Medications   enoxaparin (LOVENOX) subcutaneous injection 30 mg (30 mg Subcutaneous Given 8/17/23 2134)   lidocaine (LIDODERM) 5 % patch 1 patch (1 patch Topical Patch Removed 8/17/23 2136)   acetaminophen (TYLENOL) tablet 975 mg (975 mg Oral Given 8/18/23 0601)   oxyCODONE (ROXICODONE) split tablet 2.5 mg (has no administration in time range)   oxyCODONE (ROXICODONE) IR tablet 5 mg (5 mg Oral Given 8/17/23 0543)   ALPRAZolam (XANAX) tablet 1 mg (1 mg Oral Given 8/18/23 0236)   atenolol (TENORMIN) tablet 50 mg (50 mg Oral Given 8/17/23 0904)   atorvastatin (LIPITOR) tablet 10 mg (10 mg Oral Given 8/17/23 0904)   cyclobenzaprine (FLEXERIL) tablet 10 mg (10 mg Oral Given 8/17/23 2133)   FLUoxetine (PROzac) capsule 20 mg (20 mg Oral Given 6/17/91 5689)   folic acid (FOLVITE) tablet 1 mg (1 mg Oral Given 8/17/23 0904)   levothyroxine tablet 100 mcg (100 mcg Oral Given 8/18/23 0601)   methotrexate tablet 20 mg (20 mg Oral Given 8/16/23 1029)   predniSONE tablet 5 mg (5 mg Oral Given 8/17/23 2133)   senna-docusate sodium (SENOKOT S) 8.6-50 mg per tablet 1 tablet (1 tablet Oral Given 8/17/23 2133)   metoprolol (LOPRESSOR) injection 5 mg (has no administration in time range)   cyanocobalamin injection 1,000 mcg (has no administration in time range)   iohexol (OMNIPAQUE) 350 MG/ML injection (SINGLE-DOSE) 100 mL (100 mL Intravenous Given 8/15/23 1715)   morphine injection 2 mg (2 mg Intravenous Given 8/15/23 1757)   ondansetron (ZOFRAN) injection 4 mg (4 mg Intravenous Given 8/15/23 1800) Gadobutrol injection (SINGLE-DOSE) SOLN 6 mL (6 mL Intravenous Given 8/16/23 2215)       Diagnostic Studies  Results Reviewed     Procedure Component Value Units Date/Time    Basic metabolic panel [[id number]] Collected: 08/16/23 0602    Lab Status: Final result Specimen: Blood from Arm, Left Updated: 08/16/23 0703     Sodium 136 mmol/L      Potassium 4.8 mmol/L      Chloride 103 mmol/L      CO2 27 mmol/L      ANION GAP 6 mmol/L      BUN 10 mg/dL      Creatinine 0.85 mg/dL      Glucose 108 mg/dL      Calcium 9.7 mg/dL      eGFR 70 ml/min/1.73sq m     Narrative:      [place] guidelines for Chronic Kidney Disease (CKD):   •  Stage 1 with normal or high GFR (GFR > 90 mL/min/1.73 square meters)  •  Stage 2 Mild CKD (GFR = 60-89 mL/min/1.73 square meters)  •  Stage 3A Moderate CKD (GFR = 45-59 mL/min/1.73 square meters)  •  Stage 3B Moderate CKD (GFR = 30-44 mL/min/1.73 square meters)  •  Stage 4 Severe CKD (GFR = 15-29 mL/min/1.73 square meters)  •  Stage 5 End Stage CKD (GFR <15 mL/min/1.73 square meters)  Note: GFR calculation is accurate only with a steady state creatinine    Phosphorus [[id number]]  (Abnormal) Collected: 08/16/23 0602    Lab Status: Final result Specimen: Blood from Arm, Left Updated: 08/16/23 0703     Phosphorus 4.3 mg/dL     Magnesium [[id number]]  (Normal) Collected: 08/16/23 0602    Lab Status: Final result Specimen: Blood from Arm, Left Updated: 08/16/23 0703     Magnesium 2.0 mg/dL     Protime-INR [[id number]]  (Abnormal) Collected: 08/16/23 0602    Lab Status: Final result Specimen: Blood from Arm, Left Updated: 08/16/23 0648     Protime 15.2 seconds      INR 1.14    APTT [[id number]]  (Normal) Collected: 08/16/23 0602    Lab Status: Final result Specimen: Blood from Arm, Left Updated: 08/16/23 0648     PTT 31 seconds     CBC and differential [[id number]]  (Abnormal) Collected: 08/16/23 0602    Lab Status: Final result Specimen: Blood from Arm, Left Updated: 08/16/23 0634     WBC 5.38 Thousand/uL      RBC 3.96 Million/uL      Hemoglobin 13.1 g/dL      Hematocrit 39.7 %       fL      MCH 33.1 pg      MCHC 33.0 g/dL      RDW 13.7 %      MPV 10.0 fL      Platelets 948 Thousands/uL      nRBC 0 /100 WBCs      Neutrophils Relative 85 %      Immat GRANS % 0 %      Lymphocytes Relative 7 %      Monocytes Relative 7 %      Eosinophils Relative 0 %      Basophils Relative 1 %      Neutrophils Absolute 4.60 Thousands/µL      Immature Grans Absolute 0.02 Thousand/uL      Lymphocytes Absolute 0.36 Thousands/µL      Monocytes Absolute 0.35 Thousand/µL      Eosinophils Absolute 0.02 Thousand/µL      Basophils Absolute 0.03 Thousands/µL     HS Troponin I 2hr [[id number]]  (Normal) Collected: 08/15/23 1728    Lab Status: Final result Specimen: Blood from Arm, Right Updated: 08/15/23 1801     hs TnI 2hr 4 ng/L      Delta 2hr hsTnI -2 ng/L     HS Troponin 0hr (reflex protocol) [[id number]]  (Normal) Collected: 08/15/23 1247    Lab Status: Final result Specimen: Blood from Arm, Left Updated: 08/15/23 1339     hs TnI 0hr 6 ng/L     Comprehensive metabolic panel [[id number]]  (Abnormal) Collected: 08/15/23 1247    Lab Status: Final result Specimen: Blood from Arm, Left Updated: 08/15/23 1332     Sodium 134 mmol/L      Potassium 3.9 mmol/L      Chloride 103 mmol/L      CO2 24 mmol/L      ANION GAP 7 mmol/L      BUN 12 mg/dL      Creatinine 0.85 mg/dL      Glucose 97 mg/dL      Calcium 9.6 mg/dL      AST 15 U/L      ALT 15 U/L      Alkaline Phosphatase 110 U/L      Total Protein 7.8 g/dL      Albumin 3.9 g/dL      Total Bilirubin 0.51 mg/dL      eGFR 70 ml/min/1.73sq m     Narrative:      [place] guidelines for Chronic Kidney Disease (CKD):   •  Stage 1 with normal or high GFR (GFR > 90 mL/min/1.73 square meters)  •  Stage 2 Mild CKD (GFR = 60-89 mL/min/1.73 square meters)  •  Stage 3A Moderate CKD (GFR = 45-59 mL/min/1.73 square meters)  •  Stage 3B Moderate CKD (GFR = 30-44 mL/min/1.73 square meters)  •  Stage 4 Severe CKD (GFR = 15-29 mL/min/1.73 square meters)  •  Stage 5 End Stage CKD (GFR <15 mL/min/1.73 square meters)  Note: GFR calculation is accurate only with a steady state creatinine    CBC and differential [[id number]]  (Abnormal) Collected: 08/15/23 1247    Lab Status: Final result Specimen: Blood from Arm, Left Updated: 08/15/23 1318     WBC 5.31 Thousand/uL      RBC 3.83 Million/uL      Hemoglobin 12.4 g/dL      Hematocrit 37.6 %      MCV 98 fL      MCH 32.4 pg      MCHC 33.0 g/dL      RDW 13.6 %      MPV 9.8 fL      Platelets 222 Thousands/uL      nRBC 0 /100 WBCs      Neutrophils Relative 78 %      Immat GRANS % 0 %      Lymphocytes Relative 9 %      Monocytes Relative 12 %      Eosinophils Relative 1 %      Basophils Relative 0 %      Neutrophils Absolute 4.13 Thousands/µL      Immature Grans Absolute 0.02 Thousand/uL      Lymphocytes Absolute 0.46 Thousands/µL      Monocytes Absolute 0.62 Thousand/µL      Eosinophils Absolute 0.06 Thousand/µL      Basophils Absolute 0.02 Thousands/µL                  MRI brain w wo contrast   Final Result by Lizzy Conde MD (26/52 9984)         1. Moderate, chronic microangiopathy is significantly increased from the prior study. 2. A few foci of blooming artifact implicate amyloid angiopathy. No acute intracranial hemorrhage. 3. No acute infarction, intracranial hemorrhage or mass. 4. Right greater than left parotid cystic changes similar to the prior ultrasound, correlating to the history of Sjogren's disease. Workstation performed: [id number]         MRI cervical spine w wo contrast   Final Result by Lizzy Conde MD (35/15 0800)         1. No cord compression or cord signal abnormality. No epidural hematoma or paraspinal edema. 2. Scattered spondylotic changes with reversal of the cervical lordosis at the C5 level are stable.    3. Sessile enhancing extra-axial lesion anterior to the upper cervical cord dorsal to the odontoid process is stable possibly meningioma and/or pannus, stable. No cord compression or mass effect. 4. Progressive cystic changes in both parotid glands right greater than left, correlating to the history of Sjogren's disease. Workstation performed: [id number]         MRI thoracic spine w wo contrast   Final Result by Lizzy Conde MD (45/91 2113)         1. Mild, acute compression fracture of T8 with approximately 25% loss of vertebral body height. No bony retropulsion or epidural hematoma. No cord compression or cord signal abnormality. 2. Mild spondylotic changes elsewhere. 3. Abnormal 2.2 cm right retrocrural lymph node adjacent to the descending thoracic aorta just proximal to the diaphragmatic hiatus. Both benign and malignant etiologies are in the differential diagnosis. Follow-up CT of the chest in 3 to 6 months is    suggested to assess for stability. Further clinical assessment advised. Workstation performed: [id number]         CT head without contrast   Final Result by Aurora Rosado DO (08/15 1756)      No acute intracranial abnormality. Sinus disease                  Workstation performed: [id number]         CT spine cervical without contrast   Final Result by Aurora Rosado DO (08/15 1800)      No cervical spine fracture or traumatic malalignment. Workstation performed: [id number]         CT chest abdomen pelvis w contrast   Final Result by Cody Washington MD (08/15 1856)      Acute nondisplaced anterior right 3rd-5th rib fractures. No pleural effusion or pneumothorax. Stable hepatomegaly. Stable right renal cyst.            Workstation performed: [id number]         CT recon only thoracolumbar   Final Result by Cody Washington MD (08/15 1859)      No fracture or traumatic subluxation.                Workstation performed: [id number]         XR spine thoracic 3 vw    (Results Pending)   XR spine thoracic 3 vw    (Results Pending)              Procedures  Procedures         ED Course  ED Course as of 08/18/23 0906   Tue Aug 15, 2023   1623 CBC and CMP unremarkable. Troponin ordered in triage is 6. Pt denies CP. Doubt ACS. EKG ordered in the setting of generalized weakness and frequent falls. HEART score not calculated in the absence of CP.    1654 I personally interpreted the patient's EKG which reveals normal rate, normal sinus rhythm, left axis deviation unchanged from prior, normal intervals, T wave inversion with mild ST depression in lead V2 unchanged from prior, no ST segment elevation, prominent S wave in leads III and aVF unchanged from prior, similar to most recent prior EKG. 1808 CT cervical spine with no acute fracture or malalignment. 1808 CT head shows no acute intracranial abnormalities. 1914 Thoracolumbar recons negative for acute fracture or malalignment. 1914 Case discussed with Dr. Trina Lange with trauma surgery who has accepted the pt to his service for admission. Medical Decision Making  Please see ED course above for details of the Medical Decision Making. Back pain: acute illness or injury  Closed fracture of multiple ribs of right side, initial encounter: acute illness or injury  Fall: acute illness or injury  Amount and/or Complexity of Data Reviewed  Labs: ordered. Radiology: ordered. Risk  Prescription drug management. Decision regarding hospitalization.           Disposition  Final diagnoses:   Closed fracture of multiple ribs of right side, initial encounter   Back pain   Movement disorder   Fall     Time reflects when diagnosis was documented in both MDM as applicable and the Disposition within this note     Time User Action Codes Description Comment    8/15/2023  7:15 PM Schuyler Gonsales Add [S22.41XA] Closed fracture of multiple ribs of right side, initial encounter     8/15/2023  7:15 PM Schuyler Gonsales Add [M54.9] Back pain     8/15/2023 8:02 PM Isabelle Lit Add [G25.9] Movement disorder     8/15/2023  8:12 PM Isabelle Lit Add [Q82. XXXA] Fall     8/17/2023  3:13 PM Otoniel Denton Add [S22.060A] Closed wedge compression fracture of T8 vertebra, initial encounter [facility])       ED Disposition     ED Disposition   Admit    Condition   Stable    Date/Time   Tue Aug 15, 2023  7:15 PM    Comment   Case was discussed with Trauma Surgery and the patient's admission status was agreed to be Admission Status: inpatient status to the service of Dr. Rere Oh. Follow-up Information     Follow up With Specialties Details Why Contact Info Additional [address] [facility] Neurosurgery Follow up in 2 week(s) Please call office and set up a 2-week follow-up appointment with AP. Please complete upright thoracic spine x-rays and brace 1 to 2 days prior to your appointment date.  [facility] [address], [address] [place] [address],[address] [place] [address], [id number] [phone]          Current Discharge Medication List      CONTINUE these medications which have NOT CHANGED    Details   acetaminophen (TYLENOL) 325 mg tablet Take 2 tablets (650 mg total) by mouth every 6 (six) hours as needed for mild pain or moderate pain  Refills: 0    Associated Diagnoses: Pain      ALPRAZolam (XANAX) 1 mg tablet Take 1 mg by mouth in the morning      amLODIPine (NORVASC) 5 mg tablet Take 1 tablet (5 mg total) by mouth daily  Qty: 30 tablet, Refills: 0    Associated Diagnoses: Essential hypertension      atenolol (TENORMIN) 50 mg tablet Take 1 tablet (50 mg total) by mouth daily  Qty: 30 tablet, Refills: 0    Associated Diagnoses: Essential hypertension      atorvastatin (LIPITOR) 10 mg tablet Take 10 mg by mouth daily  Refills: 1      cyclobenzaprine (FLEXERIL) 10 mg tablet Take 10 mg by mouth daily at bedtime      FLUoxetine (PROzac) 20 mg capsule Take 20 mg by mouth daily  Refills: 1      folic acid (FOLVITE) 1 mg tablet Take 1 mg by mouth daily       furosemide (LASIX) 40 mg tablet Take 40 mg by mouth daily      !! levothyroxine 75 mcg tablet Take 75 mcg by mouth daily      !! 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 atrophy of thyroid      linaCLOtide (Linzess) 145 MCG CAPS Take 1 capsule by mouth daily      losartan (COZAAR) 25 mg tablet Take 1 tablet (25 mg total) by mouth daily  Qty: 30 tablet, Refills: 0    Associated Diagnoses: Essential hypertension      methotrexate 2.5 mg tablet Take 8 tablets (20 mg total) by mouth once a week  Refills: 0    Associated Diagnoses: Sjogren's syndrome, with unspecified organ involvement (HCC)      nystatin (MYCOSTATIN) powder Apply 1 application topically 2 (two) times a day  Qty: 15 g, Refills: 0    Associated Diagnoses: Fungal skin infection      predniSONE 5 mg tablet       saliva substitute (MOUTH KOTE) Apply 5 sprays to the mouth or throat every 4 (four) hours as needed (Dry mouth)  Qty: 236 mL, Refills: 1    Associated Diagnoses: Sjogren's syndrome, with unspecified organ involvement ([address]); Dry mouth      senna (SENOKOT) 8.6 mg Take 1 tablet (8.6 mg total) by mouth 2 (two) times a day as needed for constipation  Qty: 60 tablet, Refills: 0    Associated Diagnoses: Constipation       !! - Potential duplicate medications found. Please discuss with provider. Outpatient Discharge Orders   XR spine thoracic 3 vw   Standing Status: Future Standing Exp.  Date: 08/18/27       PDMP Review       Value Time User    PDMP Reviewed  Yes 8/16/2023 10:02 AM Ceil Snellen, [place]          ED Provider  Electronically Signed by           Shorty Walils MD  08/18/23 0149

## 2023-08-16 ENCOUNTER — APPOINTMENT (INPATIENT)
Dept: MRI IMAGING | Facility: HOSPITAL | Age: 68
DRG: 184 | End: 2023-08-16
Payer: MEDICARE

## 2023-08-16 LAB
ANION GAP SERPL CALCULATED.3IONS-SCNC: 6 MMOL/L
APTT PPP: 31 SECONDS (ref 23–37)
ATRIAL RATE: 61 BPM
BASOPHILS # BLD AUTO: 0.03 THOUSANDS/ÂΜL (ref 0–0.1)
BASOPHILS NFR BLD AUTO: 1 % (ref 0–1)
BUN SERPL-MCNC: 10 MG/DL (ref 5–25)
CALCIUM SERPL-MCNC: 9.7 MG/DL (ref 8.4–10.2)
CHLORIDE SERPL-SCNC: 103 MMOL/L (ref 96–108)
CO2 SERPL-SCNC: 27 MMOL/L (ref 21–32)
CREAT SERPL-MCNC: 0.85 MG/DL (ref 0.6–1.3)
EOSINOPHIL # BLD AUTO: 0.02 THOUSAND/ÂΜL (ref 0–0.61)
EOSINOPHIL NFR BLD AUTO: 0 % (ref 0–6)
ERYTHROCYTE [DISTWIDTH] IN BLOOD BY AUTOMATED COUNT: 13.7 % (ref 11.6–15.1)
GFR SERPL CREATININE-BSD FRML MDRD: 70 ML/MIN/1.73SQ M
GLUCOSE SERPL-MCNC: 108 MG/DL (ref 65–140)
HCT VFR BLD AUTO: 39.7 % (ref 34.8–46.1)
HGB BLD-MCNC: 13.1 G/DL (ref 11.5–15.4)
IMM GRANULOCYTES # BLD AUTO: 0.02 THOUSAND/UL (ref 0–0.2)
IMM GRANULOCYTES NFR BLD AUTO: 0 % (ref 0–2)
INR PPP: 1.14 (ref 0.84–1.19)
LYMPHOCYTES # BLD AUTO: 0.36 THOUSANDS/ÂΜL (ref 0.6–4.47)
LYMPHOCYTES NFR BLD AUTO: 7 % (ref 14–44)
MAGNESIUM SERPL-MCNC: 2 MG/DL (ref 1.9–2.7)
MCH RBC QN AUTO: 33.1 PG (ref 26.8–34.3)
MCHC RBC AUTO-ENTMCNC: 33 G/DL (ref 31.4–37.4)
MCV RBC AUTO: 100 FL (ref 82–98)
MONOCYTES # BLD AUTO: 0.35 THOUSAND/ÂΜL (ref 0.17–1.22)
MONOCYTES NFR BLD AUTO: 7 % (ref 4–12)
NEUTROPHILS # BLD AUTO: 4.6 THOUSANDS/ÂΜL (ref 1.85–7.62)
NEUTS SEG NFR BLD AUTO: 85 % (ref 43–75)
NRBC BLD AUTO-RTO: 0 /100 WBCS
P AXIS: 64 DEGREES
PHOSPHATE SERPL-MCNC: 4.3 MG/DL (ref 2.3–4.1)
PLATELET # BLD AUTO: 187 THOUSANDS/UL (ref 149–390)
PMV BLD AUTO: 10 FL (ref 8.9–12.7)
POTASSIUM SERPL-SCNC: 4.8 MMOL/L (ref 3.5–5.3)
PR INTERVAL: 172 MS
PROTHROMBIN TIME: 15.2 SECONDS (ref 11.6–14.5)
QRS AXIS: -9 DEGREES
QRSD INTERVAL: 82 MS
QT INTERVAL: 440 MS
QTC INTERVAL: 442 MS
RBC # BLD AUTO: 3.96 MILLION/UL (ref 3.81–5.12)
SODIUM SERPL-SCNC: 136 MMOL/L (ref 135–147)
T WAVE AXIS: 103 DEGREES
VENTRICULAR RATE: 61 BPM
WBC # BLD AUTO: 5.38 THOUSAND/UL (ref 4.31–10.16)

## 2023-08-16 PROCEDURE — 85610 PROTHROMBIN TIME: CPT

## 2023-08-16 PROCEDURE — 85025 COMPLETE CBC W/AUTO DIFF WBC: CPT

## 2023-08-16 PROCEDURE — 93010 ELECTROCARDIOGRAM REPORT: CPT | Performed by: INTERNAL MEDICINE

## 2023-08-16 PROCEDURE — 97116 GAIT TRAINING THERAPY: CPT

## 2023-08-16 PROCEDURE — 99232 SBSQ HOSP IP/OBS MODERATE 35: CPT | Performed by: SURGERY

## 2023-08-16 PROCEDURE — A9585 GADOBUTROL INJECTION: HCPCS

## 2023-08-16 PROCEDURE — 83735 ASSAY OF MAGNESIUM: CPT

## 2023-08-16 PROCEDURE — 84100 ASSAY OF PHOSPHORUS: CPT

## 2023-08-16 PROCEDURE — 97167 OT EVAL HIGH COMPLEX 60 MIN: CPT

## 2023-08-16 PROCEDURE — 85730 THROMBOPLASTIN TIME PARTIAL: CPT

## 2023-08-16 PROCEDURE — 97163 PT EVAL HIGH COMPLEX 45 MIN: CPT

## 2023-08-16 PROCEDURE — 70553 MRI BRAIN STEM W/O & W/DYE: CPT

## 2023-08-16 PROCEDURE — 72156 MRI NECK SPINE W/O & W/DYE: CPT

## 2023-08-16 PROCEDURE — 80048 BASIC METABOLIC PNL TOTAL CA: CPT

## 2023-08-16 PROCEDURE — 72157 MRI CHEST SPINE W/O & W/DYE: CPT

## 2023-08-16 PROCEDURE — 99223 1ST HOSP IP/OBS HIGH 75: CPT | Performed by: NURSE PRACTITIONER

## 2023-08-16 RX ORDER — GADOBUTROL 604.72 MG/ML
6 INJECTION INTRAVENOUS
Status: COMPLETED | OUTPATIENT
Start: 2023-08-16 | End: 2023-08-16

## 2023-08-16 RX ORDER — AMOXICILLIN 250 MG
1 CAPSULE ORAL
Status: DISCONTINUED | OUTPATIENT
Start: 2023-08-16 | End: 2023-08-18 | Stop reason: HOSPADM

## 2023-08-16 RX ADMIN — ATORVASTATIN CALCIUM 10 MG: 10 TABLET, FILM COATED ORAL at 10:29

## 2023-08-16 RX ADMIN — CYCLOBENZAPRINE 10 MG: 10 TABLET, FILM COATED ORAL at 22:02

## 2023-08-16 RX ADMIN — ENOXAPARIN SODIUM 30 MG: 30 INJECTION SUBCUTANEOUS at 22:02

## 2023-08-16 RX ADMIN — LIDOCAINE 1 PATCH: 700 PATCH TOPICAL at 10:29

## 2023-08-16 RX ADMIN — OXYCODONE HYDROCHLORIDE 5 MG: 5 TABLET ORAL at 06:00

## 2023-08-16 RX ADMIN — SENNOSIDES AND DOCUSATE SODIUM 1 TABLET: 50; 8.6 TABLET ORAL at 22:02

## 2023-08-16 RX ADMIN — FLUOXETINE 20 MG: 20 CAPSULE ORAL at 10:29

## 2023-08-16 RX ADMIN — ENOXAPARIN SODIUM 30 MG: 30 INJECTION SUBCUTANEOUS at 10:29

## 2023-08-16 RX ADMIN — OXYCODONE HYDROCHLORIDE 5 MG: 5 TABLET ORAL at 22:04

## 2023-08-16 RX ADMIN — ATENOLOL 50 MG: 50 TABLET ORAL at 10:29

## 2023-08-16 RX ADMIN — ACETAMINOPHEN 975 MG: 325 TABLET, FILM COATED ORAL at 13:52

## 2023-08-16 RX ADMIN — METHOTREXATE SODIUM 20 MG: 2.5 TABLET ORAL at 10:29

## 2023-08-16 RX ADMIN — GADOBUTROL 6 ML: 604.72 INJECTION INTRAVENOUS at 22:15

## 2023-08-16 RX ADMIN — ACETAMINOPHEN 975 MG: 325 TABLET, FILM COATED ORAL at 22:02

## 2023-08-16 RX ADMIN — FOLIC ACID 1 MG: 1 TABLET ORAL at 10:29

## 2023-08-16 RX ADMIN — PREDNISONE 5 MG: 5 TABLET ORAL at 22:02

## 2023-08-16 RX ADMIN — LEVOTHYROXINE SODIUM 100 MCG: 100 TABLET ORAL at 06:00

## 2023-08-16 NOTE — ASSESSMENT & PLAN NOTE
Concern for possible movement disorder contributing to recent falls.     Plan:  - Neurology consulted  - PT/OT evaluation

## 2023-08-16 NOTE — CONSULTS
NEUROLOGY RESIDENCY CONSULT NOTE     Name: Paige Scruggs   Age & Sex: 70 y.o. female   MRN: [id number]  Unit/Bed#: W -77   Encounter: [id number]  Length of Stay: 1    ASSESSMENT & PLAN     * Fall  Assessment & Plan  70 y.o. female with cervical meningioma s/p resection 2012, frequent falls, Sjogren's disease, rheumatoid arthritis on methotrexate, HTN, hypothyroidism, gastroparesis, depression and parotid gland enlargement who presents with back and right rib pain and found to have multiple R rib fractures. Neurology consulted for concern for movement disorders. Plan:  -Discussed plan with neurology attending, Dr. Roseann Miller  -Will get MRI brain, c spine, t spine w/wo contrast  -Continue PT/OT  -Rest of care per primary team.    Recommendations for outpatient neurological follow up have yet to be determined    Pending for discharge: MRI    SUBJECTIVE     Reason for Consult / Principal Problem: falls concerning for movement disorders  Hx and PE limited by: None    HPI: Paige Scruggs is a 70 y.o. female with cervical meningioma s/p resection 2012, frequent falls, Sjogren's disease, rheumatoid arthritis on methotrexate, HTN, hypothyroidism, gastroparesis, depression and parotid gland enlargement who presents with back and right rib pain and found to have multiple R rib fractures. Patient has been falling more frequently than before for the last 2 weeks. She falls 1-2 times per day. She reports headstrike with the fall. CTH was unremarkable. Neurology consulted concern for movement disorders. Patient reports that she normally falls frequently however, for the past 2 weeks, frequency is more. She falls 1-2 times per day. She normally feels dizziness/lightheadedness due to Sjogren's and thinks that might contribute to her fall but not sure. She does not lose consciousness or have abnormal shaking/twitching during or after falls.  Per PT/OT, patient has significant posterior leaning during sitting and standing/ambulation, retropulsive falling and poor insight. Inpatient consult to Neurology  Consult performed by: Luann Soto MD  Consult ordered by: Ronaldo Cardenas MD        Historical Information   Past Medical History:   Diagnosis Date   • Lymphadenitis, chronic    • Sialadenitis    • Sjogren's disease ([address])    • Stomach problems    • Thyroid disorder    • Xerostomia      Past Surgical History:   Procedure Laterality Date   • BRAIN SURGERY  2019    tumor removed Dr. Tosin Del Valle   • CHOLECYSTECTOMY       Social History   Social History     Substance and Sexual Activity   Alcohol Use Yes     Social History     Substance and Sexual Activity   Drug Use Never     E-Cigarette/Vaping     E-Cigarette/Vaping Substances     Social History     Tobacco Use   Smoking Status Former   Smokeless Tobacco Never     Family History:   Family History   Problem Relation Age of Onset   • Diabetes Other    • Hypertension Other    • Heart disease Other    • Arthritis Other    • Heart disease Mother    • Kidney disease Mother    • Alcohol abuse Father      Meds/Allergies   PTA meds:   Prior to Admission Medications   Prescriptions Last Dose Informant Patient Reported? Taking?    ALPRAZolam (XANAX) 1 mg tablet  Spouse/Significant Other Yes No   Sig: Take 1 mg by mouth in the morning   FLUoxetine (PROzac) 20 mg capsule  Spouse/Significant Other Yes No   Sig: Take 20 mg by mouth daily   acetaminophen (TYLENOL) 325 mg tablet  Spouse/Significant Other No No   Sig: Take 2 tablets (650 mg total) by mouth every 6 (six) hours as needed for mild pain or moderate pain   amLODIPine (NORVASC) 5 mg tablet Unknown Spouse/Significant Other No No   Sig: Take 1 tablet (5 mg total) by mouth daily   atenolol (TENORMIN) 50 mg tablet  Spouse/Significant Other No No   Sig: Take 1 tablet (50 mg total) by mouth daily   atorvastatin (LIPITOR) 10 mg tablet  Spouse/Significant Other Yes No   Sig: Take 10 mg by mouth daily   cyclobenzaprine (FLEXERIL) 10 mg tablet Unknown Spouse/Significant Other Yes No   Sig: Take 10 mg by mouth daily at bedtime   folic acid (FOLVITE) 1 mg tablet  Spouse/Significant Other Yes No   Sig: Take 1 mg by mouth daily    furosemide (LASIX) 40 mg tablet Not Taking Spouse/Significant Other Yes No   Sig: Take 40 mg by mouth daily   Patient not taking: Reported on 8/15/2023   levothyroxine 75 mcg tablet  Spouse/Significant Other Yes No   Sig: Take 75 mcg by mouth daily   levothyroxine 88 mcg tablet  Spouse/Significant Other No No   Sig: Take 1 tablet (88 mcg total) by mouth daily   linaCLOtide (Linzess) 145 MCG CAPS  Spouse/Significant Other Yes No   Sig: Take 1 capsule by mouth daily   losartan (COZAAR) 25 mg tablet  Spouse/Significant Other No No   Sig: Take 1 tablet (25 mg total) by mouth daily   methotrexate 2.5 mg tablet  Spouse/Significant Other No No   Sig: Take 8 tablets (20 mg total) by mouth once a week   nystatin (MYCOSTATIN) powder  Spouse/Significant Other No No   Sig: Apply 1 application topically 2 (two) times a day   predniSONE 5 mg tablet   Yes No   saliva substitute (MOUTH KOTE)  Spouse/Significant Other No No   Sig: Apply 5 sprays to the mouth or throat every 4 (four) hours as needed (Dry mouth)   senna (SENOKOT) 8.6 mg  Spouse/Significant Other No No   Sig: Take 1 tablet (8.6 mg total) by mouth 2 (two) times a day as needed for constipation      Facility-Administered Medications: None     Allergies   Allergen Reactions   • Codeine Vomiting   • Hydroxyquinoline Sulfate [Oxyquinoline] Vomiting   • Leucovorin Vomiting   • Tizanidine Vomiting       Review of previous medical records was completed. Review of Systems   Constitutional: Negative for chills and fever. HENT: Negative for ear pain and sore throat. Eyes: Negative for pain and visual disturbance. Respiratory: Negative for cough and shortness of breath. Cardiovascular: Negative for chest pain and palpitations.    Gastrointestinal: Negative for abdominal pain and vomiting. Genitourinary: Negative for dysuria and hematuria. Musculoskeletal: Negative for arthralgias and back pain. Skin: Negative for color change and rash. Neurological: Negative for seizures and syncope. All other systems reviewed and are negative. OBJECTIVE     Patient ID: Raphael Flores is a 70 y.o. female. Vitals:   Vitals:    08/15/23 2100 08/15/23 2157 23 0132 23 0743   BP: 137/65 151/74 147/77 159/79   BP Location:       Pulse: 73 69 78 67   Resp: 18 18 16 18   Temp:  97.5 °F (36.4 °C) 97.6 °F (36.4 °C) (!) 97.4 °F (36.3 °C)   TempSrc:       SpO2: 93% 95% 94% 93%   Weight:       Height:          Body mass index is 28.34 kg/m². Intake/Output Summary (Last 24 hours) at 2023 1207  Last data filed at 2023 0900  Gross per 24 hour   Intake 970 ml   Output 300 ml   Net 670 ml       Temperature:   Temp (24hrs), Av.7 °F (36.5 °C), Min:97.4 °F (36.3 °C), Max:98.3 °F (36.8 °C)    Temperature: (!) 97.4 °F (36.3 °C)    Invasive Devices: Invasive Devices     Peripheral Intravenous Line  Duration           Peripheral IV 08/15/23 Right;Ventral (anterior) Forearm <1 day                Physical Exam  Vitals and nursing note reviewed. Constitutional:       General: She is not in acute distress. Appearance: She is well-developed. HENT:      Head: Normocephalic and atraumatic. Eyes:      Extraocular Movements: Extraocular movements intact and EOM normal.      Conjunctiva/sclera: Conjunctivae normal.      Pupils: Pupils are equal, round, and reactive to light. Cardiovascular:      Rate and Rhythm: Normal rate. Pulmonary:      Effort: Pulmonary effort is normal. No respiratory distress. Abdominal:      Palpations: Abdomen is soft. Tenderness: There is no abdominal tenderness. Musculoskeletal:         General: No swelling. Cervical back: Neck supple. Skin:     General: Skin is warm. Neurological:      Mental Status: She is alert.       Deep Tendon Reflexes:      Reflex Scores:       Tricep reflexes are 3+ on the right side and 3+ on the left side. Bicep reflexes are 3+ on the right side and 3+ on the left side. Brachioradialis reflexes are 3+ on the right side and 3+ on the left side. Patellar reflexes are 0 on the right side and 0 on the left side. Achilles reflexes are 2+ on the right side and 2+ on the left side. Neurologic Exam     Mental Status   Level of consciousness: alert    Cranial Nerves     CN II   Visual fields full to confrontation. CN III, IV, VI   Pupils are equal, round, and reactive to light. Extraocular motions are normal.     CN V   Facial sensation intact. CN VII   Facial expression full, symmetric. CN VIII   CN VIII normal.     CN IX, X   CN IX normal.   CN X normal.     CN XI   CN XI normal.     CN XII   CN XII normal.     Motor Exam   Muscle bulk: normal  Right arm tone: normal  Left arm tone: normal  Right leg tone: increased  Left leg tone: increased    Strength   Strength 5/5 except as noted. Patient has a hard time bending her knees especially right knee     Sensory Exam   Light touch normal.     Gait, Coordination, and Reflexes     Reflexes   Right brachioradialis: 3+  Left brachioradialis: 3+  Right biceps: 3+  Left biceps: 3+  Right triceps: 3+  Left triceps: 3+  Right patellar: 0  Left patellar: 0  Right achilles: 2+  Left achilles: 2+  Right Pan: present  Left Pan: present  Right ankle clonus: absent  Left ankle clonus: absent       LABORATORY DATA     Labs: I have personally reviewed pertinent reports.     Results from last 7 days   Lab Units 08/16/23  0602 08/15/23  1247   WBC Thousand/uL 5.38 5.31   HEMOGLOBIN g/dL 13.1 12.4   HEMATOCRIT % 39.7 37.6   PLATELETS Thousands/uL 187 205   NEUTROS PCT % 85* 78*   MONOS PCT % 7 12   EOS PCT % 0 1      Results from last 7 days   Lab Units 08/16/23  0602 08/15/23  1247   POTASSIUM mmol/L 4.8 3.9   CHLORIDE mmol/L 103 103 CO2 mmol/L 27 24   BUN mg/dL 10 12   CREATININE mg/dL 0.85 0.85   CALCIUM mg/dL 9.7 9.6   ALK PHOS U/L  --  110*   ALT U/L  --  15   AST U/L  --  15     Results from last 7 days   Lab Units 08/16/23  0602   MAGNESIUM mg/dL 2.0     Results from last 7 days   Lab Units 08/16/23  0602   PHOSPHORUS mg/dL 4.3*      Results from last 7 days   Lab Units 08/16/23  0602   INR  1.14   PTT seconds 31               IMAGING & DIAGNOSTIC TESTING     Radiology Results: I have personally reviewed pertinent reports. CT head without contrast   Final Result by Bridger Wong DO (08/15 1756)      No acute intracranial abnormality. Sinus disease                  Workstation performed: [id number]         CT spine cervical without contrast   Final Result by Bridger Wong DO (08/15 1800)      No cervical spine fracture or traumatic malalignment. Workstation performed: [id number]         CT chest abdomen pelvis w contrast   Final Result by Sebas Osorio MD (08/15 1856)      Acute nondisplaced anterior right 3rd-5th rib fractures. No pleural effusion or pneumothorax. Stable hepatomegaly. Stable right renal cyst.            Workstation performed: [id number]         CT recon only thoracolumbar   Final Result by Sebas Osorio MD (08/15 1859)      No fracture or traumatic subluxation. Workstation performed: [id number]         MRI inpatient order    (Results Pending)       Other Diagnostic Testing: I have personally reviewed pertinent reports.       ACTIVE MEDICATIONS     Current Facility-Administered Medications   Medication Dose Route Frequency   • acetaminophen (TYLENOL) tablet 975 mg  975 mg Oral Q8H [address]   • ALPRAZolam (XANAX) tablet 1 mg  1 mg Oral HS PRN   • atenolol (TENORMIN) tablet 50 mg  50 mg Oral Daily   • atorvastatin (LIPITOR) tablet 10 mg  10 mg Oral Daily   • cyclobenzaprine (FLEXERIL) tablet 10 mg  10 mg Oral HS   • enoxaparin (LOVENOX) subcutaneous injection 30 mg  30 mg Subcutaneous Q12H [address]   • FLUoxetine (PROzac) capsule 20 mg  20 mg Oral Daily   • folic acid (FOLVITE) tablet 1 mg  1 mg Oral Daily   • levothyroxine tablet 100 mcg  100 mcg Oral Early Morning   • lidocaine (LIDODERM) 5 % patch 1 patch  1 patch Topical Daily   • methotrexate tablet 20 mg  20 mg Oral Weekly   • oxyCODONE (ROXICODONE) IR tablet 5 mg  5 mg Oral Q4H PRN   • oxyCODONE (ROXICODONE) split tablet 2.5 mg  2.5 mg Oral Q4H PRN   • predniSONE tablet 5 mg  5 mg Oral HS   • senna-docusate sodium (SENOKOT S) 8.6-50 mg per tablet 1 tablet  1 tablet Oral HS       Prior to Admission medications    Medication Sig Start Date End Date Taking?  Authorizing Provider   acetaminophen (TYLENOL) 325 mg tablet Take 2 tablets (650 mg total) by mouth every 6 (six) hours as needed for mild pain or moderate pain 3/15/21   Marylou Odonnell MD   ALPRAZolam Terri Argue) 1 mg tablet Take 1 mg by mouth in the morning 5/30/22   Historical Provider, MD   amLODIPine (NORVASC) 5 mg tablet Take 1 tablet (5 mg total) by mouth daily 3/16/21   Marylou Odonnell MD   atenolol (TENORMIN) 50 mg tablet Take 1 tablet (50 mg total) by mouth daily 3/16/21   Marylou Odonnell MD   atorvastatin (LIPITOR) 10 mg tablet Take 10 mg by mouth daily 7/6/19   Historical Provider, MD   cyclobenzaprine (FLEXERIL) 10 mg tablet Take 10 mg by mouth daily at bedtime 4/23/22   Historical Provider, MD   FLUoxetine (PROzac) 20 mg capsule Take 20 mg by mouth daily 8/8/19   Historical Provider, MD   folic acid (FOLVITE) 1 mg tablet Take 1 mg by mouth daily     Historical Provider, MD   furosemide (LASIX) 40 mg tablet Take 40 mg by mouth daily  Patient not taking: Reported on 8/15/2023 6/16/22   Historical Provider, MD   levothyroxine 75 mcg tablet Take 75 mcg by mouth daily 6/23/22   Historical Provider, MD   levothyroxine 88 mcg tablet Take 1 tablet (88 mcg total) by mouth daily 3/15/21   Marylou Odonnell MD   linaCLOtide (Linzess) 145 MCG CAPS Take 1 capsule by mouth daily 2/2/17   Historical Provider, MD   losartan (COZAAR) 25 mg tablet Take 1 tablet (25 mg total) by mouth daily 3/16/21   Scottie Diallo MD   methotrexate 2.5 mg tablet Take 8 tablets (20 mg total) by mouth once a week 3/15/21   Scottie Diallo MD   nystatin (MYCOSTATIN) powder Apply 1 application topically 2 (two) times a day 3/16/21   Scottie Diallo MD   predniSONE 5 mg tablet  8/1/22   Historical Provider, MD   saliva substitute (MOUTH KOTE) Apply 5 sprays to the mouth or throat every 4 (four) hours as needed (Dry mouth) 3/15/21   Scottie Diallo MD   senna (SENOKOT) 8.6 mg Take 1 tablet (8.6 mg total) by mouth 2 (two) times a day as needed for constipation 3/15/21   Scottie Diallo MD         CODE STATUS & ADVANCED DIRECTIVES     Code Status: Level 3 - DNAR and DNI  Advance Directive and Living Will:      Power of :    POLST:        VTE Pharmacologic Prophylaxis: Enoxaparin (Lovenox)  VTE Mechanical Prophylaxis: sequential compression device    ==  MD [facility] Res[facility], PGY-3

## 2023-08-16 NOTE — ASSESSMENT & PLAN NOTE
Hx of RA treated with methotrexate.     Plan:  - Continue home methotrexate 20 mg every Wednesday.  - Continue home Prednisone 5 mg daily  - Continue home cyclobenzaprine 10 mg at bedtime for pain

## 2023-08-16 NOTE — ASSESSMENT & PLAN NOTE
Pt states she has been having more frequent falls recently, 1-2 per day for the last 2 weeks. She does not loss consciousness or get dizzy but does not know why she continues to fall. Plan:  - PT/OT evaluation for ambulatory dysfunction  - DVT prophylaxis  - Case management consulted for possible rehab. - Neurology consulted for possible movement disorder.

## 2023-08-16 NOTE — ASSESSMENT & PLAN NOTE
70 y.o. female with cervical meningioma s/p resection 2012, frequent falls, Sjogren's disease, rheumatoid arthritis on methotrexate, HTN, hypothyroidism, gastroparesis, depression and parotid gland enlargement who presents with back and right rib pain and found to have multiple R rib fractures. Neurology consulted for concern for movement disorders. MRI brain w/wo: Moderate, chronic microangiopathy significantly increased from the prior study  MRI C-spine w/wo: No cord compression or cord signal abnormality. MRI T-spine w/wo: Mild/acute T8 compression fracture. No cord compression or cord signal abnormality. Impression: Patient has been having retropulsive falls without certain etiology at this time. Her increased in frequency of falls for the past 2 weeks might be likely due to rib fracture pain causing more balance issue and falls. She also likely has stepwise vascular dementia given progressive chronic microangiopathy on imaging. Another differential dx might be progressive supranuclear palsy (PSP) given cognitive decline, appearing as not paying attention, retropulsion falls; however, her extraocular movement is intact. Plan:  -Discussed plan with neurology attending, Dr. Chilo Valdez on board  -TSH low (likely hyperthyroidism). Pending vitamin B1, B12, A1C, SPEP. Will need to follow up with PCP  -PT/OT: post acute rehab  -Rest of care per primary team  -Follow up with movement disorder specialist outpatient  -No further management from Neurology.  Please call with questions or concerns

## 2023-08-16 NOTE — DISCHARGE INSTR - OTHER ORDERS
Skin care plans:  1-Hydraguard to bilateral heels BID and PRN  2-Elevate heels to offload pressure. 3-Ehob cushion in chair when out of bed. 4-Moisturize skin daily with skin nourishing cream.  5-Turn/reposition q2h or when medically stable for pressure re-distribution on skin.     6-Calazime paste to sacrum/buttocks BID and PRN

## 2023-08-16 NOTE — ASSESSMENT & PLAN NOTE
- Patient with chronic history of Sjogren's disease.  - Continue home medication regimen as appropriate. - Outpatient follow-up per routine.

## 2023-08-16 NOTE — PLAN OF CARE
Problem: PHYSICAL THERAPY ADULT  Goal: Performs mobility at highest level of function for planned discharge setting. See evaluation for individualized goals. Description: Treatment/Interventions: Functional transfer training, LE strengthening/ROM, Therapeutic exercise, Endurance training, Cognitive reorientation, Patient/family training, Equipment eval/education, Bed mobility, Gait training, Compensatory technique education  Equipment Recommended: Keo Phelan       See flowsheet documentation for full assessment, interventions and recommendations. 8/16/2023 1314 by Leslie Jaimes PT  Note: Prognosis: Fair  Problem List: Decreased strength, Impaired balance, Decreased mobility, Decreased coordination, Decreased cognition, Impaired judgement, Decreased safety awareness  Assessment: Ben Navarro is a 70 y.o. Female who [facility]23 due to R sided rib and back pain and diagnosis of fall. Orders for PT eval and treat received, w/ activity orders of up in chair. Comorbidities affecting pt's functional mobility at time of evaluation include: anxiety, depression, mild cognition impairment, and h/o cervical meningioma resection in 2012. Personal factors affecting DC include: inaccessible home environment, lives in multi story house, ambulating w/ assistive device, stairs to enter home, inability to ambulate household distances, inability to navigate level surfaces w/o external assistance, decreased cognition, positive fall history, limited insight into impairments and inability to perform IADLs. At baseline, pt mobilizes w/ cane, and w/ >10 fall(s) in the previous 6 months. Upon evaluation, pt presents w/ the following deficits: impaired coordination, impaired balance, impaired cognition, decreased safety awareness, decreased endurance/activity tolerance and gait deviations. Pt currently requires mod Ax1 for bed mobility, min Ax1 for transfers, mod Ax1 w/ RW for ambulation.  Pt's clinical presentation is unstable/unpredictable due to need for increased assistance w/ functional mobility compared to baseline, need for input for mobility technique, need for input for task focus, need to input for safety awareness, recent drastic decline in mobility status, recent h/o falls, ongoing medical management. From a PT/mobility standpoint given the above findings, DC recommendation is: Post-acute inpatient rehabilitation. During current admission, pt will benefit from continued skilled inpatient PT in the acute care setting in order to address the above deficits and to maximize function and mobility prior to DC from acute care. PT Discharge Recommendation: Post acute rehabilitation services    See flowsheet documentation for full assessment.

## 2023-08-16 NOTE — PHYSICAL THERAPY NOTE
PHYSICAL THERAPY EVALUATION NOTE          Patient Name: Gloria Bertrand  AZNLB'Y Date: 2023        AGE:   70 y.o.  Mrn:   [id number]  ADMIT DX:  Movement disorder [G25.9]  Back pain [M54.9]  Weakness [R53.1]  Closed fracture of multiple ribs of right side, initial encounter [S22.41XA]    Past Medical History:  Past Medical History:   Diagnosis Date    Lymphadenitis, chronic     Sialadenitis     Sjogren's disease ([address])     Stomach problems     Thyroid disorder     Xerostomia        Past Surgical History:  Past Surgical History:   Procedure Laterality Date    BRAIN SURGERY  2019    tumor removed Dr. Miguel Chaidez Stay: 1        PHYSICAL THERAPY EVALUATION:    Patient's identity confirmed via 2 patient identifiers (full name and ) at start of session       23 1002   PT Last Visit   PT Visit Date 23   Note Type   Note type Evaluation   Pain Assessment   Pain Assessment Tool 0-10   Pain Score No Pain   Restrictions/Precautions   Weight Bearing Precautions Per Order No   Other Precautions Cognitive; Chair Alarm; Bed Alarm; Fall Risk   Home Living   Type of [address] [place] [address]  Multi-level;Bed/bath upstairs;Stairs to enter with rails;1/2 bath on main level   Bathroom Shower/Tub Tub/shower unit   [employer] Grab bars in shower   Additional Comments Pt highly unreliable historian, reports living in a 3 level house w/ 7+7 MICHAEL w/ railings located on the outside of her house and states full flight of stairs to 2nd floor bedroom/bathroom   Prior Function   Level of [place] Independent with functional mobility; Independent with ADLs; Needs assistance with IADLS   Lives With Spouse; Family  (2 sons in their 35s (pt reports one is 30 yo and was unable to state age of the other))   Receives Help From Family   IADLs Family/Friend/Other provides transportation; Family/Friend/Other provides medication management   Falls in the last 6 months (S)  >10  (per chart review, pt w/ 1-2 falls/day for approx 2 weeks PTA)   Comments Pt poor/unreliable historian, reports she amb ind but was unable to recall if she uses any DME, states ind w/ ADLs, reports  is retired and home. Spoke to Geriatrics NP [employer] post eval and per pt's  pt amb w/ a cane and falls frequently due to attempting to  things/cats from the floor, worsening falls and incontinence w/in the past 2 weeks   General   Family/Caregiver Present No   Cognition   Overall Cognitive Status (S)  Impaired   Arousal/Participation Cooperative   Orientation Level Oriented to person;Oriented to place; Disoriented to time;Disoriented to situation  ("I can't think", reported month was April and states she is at hospital due to pain in legs)   Memory Decreased short term memory;Decreased recall of recent events;Decreased recall of precautions   Following Commands Follows one step commands with increased time or repetition   Comments Pt ID via name and  and pt agreeable to PT eval/mobility.  Appears to have poor attention to task, word finding difficulty, impaired sequencing, and impaired short-term memory   Subjective   Subjective "I can't think right now"   RLE Assessment   RLE Assessment X  (repeated VC required, appears to have poor motor planning)   Strength RLE   R Hip Flexion 3/5   R Knee Flexion 3+/5   R Knee Extension 3+/5   R Ankle Dorsiflexion 4/5   R Ankle Plantar Flexion 4/5   LLE Assessment   LLE Assessment X  (repeated VC required, appears to have poor motor planning)   Strength LLE   L Hip Flexion 3/5   L Knee Flexion 3+/5   L Knee Extension 3+/5   L Ankle Dorsiflexion 4/5   L Ankle Plantar Flexion 4/5   Vision-Basic Assessment   Current Vision Wears glasses all the time   Patient Visual Report   (pt unable to read clock, able to read OT namebage; poor visual tracking)   Coordination   Movements are Fluid and Coordinated 0   Coordination and Movement Description pt w/ poor coordination of BUE/LUE   Finger to Nose & Finger to Finger  Impaired  (dysmetria, impaired equally bilaterally; vs impaired cognition)   Heel to Shin Impaired  (dysmetria, impaired equally bilaterally; vs impaired cognition)   Light Touch   RLE Light Touch Grossly intact   LLE Light Touch Grossly intact   Bed Mobility   Supine to Sit 3  Moderate assistance   Additional items Assist x 1;HOB elevated; Bedrails; Increased time required;Verbal cues;LE management  (to pt's R, trunk management)   Additional Comments Fluctuated between mod Ax1 and supervision w/ BUE support on bedrails to maintain sitting balance at EOB. Pt w/ posterior lean/restropuslive, poor trunk control, and poor righting reactions   Transfers   Sit to Stand 4  Minimal assistance   Additional items Assist x 1; Increased time required;Verbal cues   Stand to Sit 4  Minimal assistance   Additional items Assist x 1; Armrests; Increased time required;Verbal cues   Additional Comments retropulsive in standing, VC for hand placement   Ambulation/Elevation   Gait pattern Improper Weight shift;Retropulsion;Decreased foot clearance; Short stride; Excessively slow;Decreased hip extension;Decreased heel strike;Decreased toe off   Gait Assistance 3  Moderate assist   Additional items Assist x 1;Verbal cues   Assistive Device Rolling walker   Distance 5'  (w/ change in position to align w/ chair)   Ambulation/Elevation Additional Comments assistance required w/ RW progression/management   Balance   Static Sitting Poor   Dynamic Sitting Poor   Static Standing Poor +  (w/ RW)   Dynamic Standing Poor  (w/ RW)   Ambulatory Poor  (w/ RW)   Activity Tolerance   Activity Tolerance Other (Comment)  (pt limited by cognition)   Medical Staff Made Aware Pt benefited from PT/OT care coordination w/ OT Jazmyne due to signficant level of assistance required w/ mobility, cognitive/behavioral impairments affecting functional mobility and to allow for challenge of pt's activity tolerance, PT and OT goals were addressed individually during session; Geriatrics NP Kush Verduzco, Neurology Resident Marc Rose, [facility]   Nurse Made Aware RN Maritza Lyon   Assessment   Prognosis Fair   Problem List Decreased strength; Impaired balance;Decreased mobility; Decreased coordination;Decreased cognition; Impaired judgement;Decreased safety awareness   Assessment Phuong Philip is a 70 y.o. Female who [facility] due to R sided rib and back pain and diagnosis of fall. Orders for PT eval and treat received, w/ activity orders of up in chair. Comorbidities affecting pt's functional mobility at time of evaluation include: anxiety, depression, mild cognition impairment, and h/o cervical meningioma resection in 2012. Personal factors affecting DC include: inaccessible home environment, lives in multi story house, ambulating w/ assistive device, stairs to enter home, inability to ambulate household distances, inability to navigate level surfaces w/o external assistance, decreased cognition, positive fall history, limited insight into impairments and inability to perform IADLs. At baseline, pt mobilizes w/ cane, and w/ >10 fall(s) in the previous 6 months. Upon evaluation, pt presents w/ the following deficits: impaired coordination, impaired balance, impaired cognition, decreased safety awareness, decreased endurance/activity tolerance and gait deviations. Pt currently requires mod Ax1 for bed mobility, min Ax1 for transfers, mod Ax1 w/ RW for ambulation. Pt's clinical presentation is unstable/unpredictable due to need for increased assistance w/ functional mobility compared to baseline, need for input for mobility technique, need for input for task focus, need to input for safety awareness, recent drastic decline in mobility status, recent h/o falls, ongoing medical management.  From a PT/mobility standpoint given the above findings, DC recommendation is: Post-acute inpatient rehabilitation. During current admission, pt will benefit from continued skilled inpatient PT in the acute care setting in order to address the above deficits and to maximize function and mobility prior to DC from acute care. Goals   STG Expiration Date 08/26/23   Short Term Goal #1 Pt will: perform supine<>sit mobility w/ supervision to increase pt's independence w/ functional mobility; sit at EOB w/ supervision for at least 25 minutes to increase pt's tolerance to an upright sitting position and prepare for OOB transfers; perform transfers w/ supervision to increase pt's OOB mobility; ambulate at least 75' w/ LRAD and supervision to increase pt's ambulatory endurance/tolerance; increase balance ratings by at least 1 grade to decrease pt's risk of falls   PT Treatment Day 1  (PT tx note below)   Plan   Treatment/Interventions Functional transfer training;LE strengthening/ROM; Therapeutic exercise; Endurance training;Cognitive reorientation;Patient/family training;Equipment eval/education; Bed mobility;Gait training; Compensatory technique education   PT Frequency 3-5x/wk   Recommendation   PT Discharge Recommendation Post acute rehabilitation services   Equipment Recommended [facility] Recommended Wheeled walker   Change/add to First To File? Yes, Change Size   Walker Size Will (Ht <5'1")   AM-PAC Basic Mobility Inpatient   Turning in Flat Bed Without Bedrails 3   Lying on Back to Sitting on Edge of Flat Bed Without Bedrails 2   Moving Bed to Chair 2   Standing Up From Chair Using Arms 2   Walk in Room 2   Climb 3-5 Stairs With Railing 1   Basic Mobility Inpatient Raw Score 12   Basic Mobility Standardized Score 32.23   Highest Level Of Mobility   JH-HLM Goal 4: Move to chair/commode   JH-HLM Achieved 4: Move to chair/commode   Additional Treatment Session   Start Time 0950   End Time 1002   Treatment Assessment Pt seen for additional PT intervention w/ pt agreeable to participate.  Pt performed sit<>stand transfer to/from recliner chair w/ min Ax1. Using RW, pt ambulate 10' straight w/ min Ax1, and chair follow. Pt demonstrated improved ambulatory balance due to pt not negotiating turns while mobilizing. Pt continued to be retropulsive and required assistance w/ RW progression as pt walked into RW crossbar. Pt continues to be functioning below baseline level, and remains limited in functional mobility due to impaired cognition, impaired balance, gait deviations. Pt will continue benefit from PT to promote independence w/ functional mobility and progress towards set goals. Continue to recommend DC post acute rehabilitation services when medically cleared. Equipment Use RW   Additional Treatment Day 1   End of Consult   Patient Position at End of Consult Bedside chair;Bed/Chair alarm activated; All needs within reach       The patient's AM-PAC Basic Mobility Inpatient Short Form Raw Score is 12. A Raw score of less than or equal to 16 suggests the patient may benefit from discharge to post-acute rehabilitation services. Please also refer to the recommendation of the Physical Therapist for safe discharge planning.     Pt will benefit from skilled inpatient PT during this admission in order to facilitate progress towards goals and to maximize functional independence prior to [facility] rec: post acute rehab        Leslie Jaimes, PT, DPT  08/16/23

## 2023-08-16 NOTE — ASSESSMENT & PLAN NOTE
Pt with frequent falls recently found to have multiple R rib fx 3-5.     Plan:  - Multimodal pain regimen.  - PT/OT evaluation  - Encourage incentive spirometry  - DVT prophylaxis with lovenox

## 2023-08-16 NOTE — OCCUPATIONAL THERAPY NOTE
Occupational Therapy Evaluation     Patient Name: Beverly Moore  GEVLZ'N Date: 8/16/2023  Problem List  Principal Problem:    Fall  Active Problems:    Sjogren's disease ([address])    Anxiety and depression    Hypothyroidism    Rheumatoid arteritis ([address])    Closed fracture of multiple ribs of right side    Movement disorder    Past Medical History  Past Medical History:   Diagnosis Date    Lymphadenitis, chronic     Sialadenitis     Sjogren's disease ([address])     Stomach problems     Thyroid disorder     Xerostomia      Past Surgical History  Past Surgical History:   Procedure Laterality Date    BRAIN SURGERY  2019    tumor removed Dr. Kelby Juarez          08/16/23 0928   OT Last Visit   OT Visit Date 08/16/23   Note Type   Note type Evaluation   Pain Assessment   Pain Assessment Tool 0-10   Pain Score No Pain   Restrictions/Precautions   Weight Bearing Precautions Per Order No   Other Precautions Cognitive; Chair Alarm; Bed Alarm; Fall Risk   Home Living   Type of [address] [place] [address]  Multi-level;1/2 bath on main level;Bed/bath upstairs  (7+7 MICHAEL c HR. FOS c HR to access bedroom/ full bathroom.)   Bathroom Shower/Tub Tub/shower unit   [employer] Grab bars in shower;Grab bars around toilet   Bathroom Accessibility   (1/2 bath on main floor)   Home Equipment Walker;Cane;Wheelchair-manual   Prior Function   Level of [place] Independent with ADLs; Independent with functional mobility; Needs assistance with IADLS   Lives With Spouse;Son  (34 y/o son)   Receives Help From Family   IADLs Family/Friend/Other provides medication management  (spouse completes all IADLs including med management, however pt takes out of pill box daily without supervision.  (-) driving)   Falls in the last 6 months (S)  >10  (per EMR, pt has been falling 1-2 x  over the past 2 weeks, pt unable to report accurately)   Vocational Retired   Comments pt is a poor historian at time of eval. Reports she is (I) with ADLs, unable to recall with AD/DME using at home. Home set up provided c pt different from home set up in EMR, will confirm PLOF and home set up c CM. Per communication c Geriatrics Ramos Sarmiento, spouse reports pt is (I) c bathing, ambulates primarily with SPC. Baseline cognitive and mobility deficits have been worsening x 2 weeks , new onset urinary incontinence. Lifestyle   Autonomy At baseline, pt is (I) with ADLs, A with IADLs. Ambulates mod (I) c SPC. Lives a home c spouse and son. (-) driving. Frequent falls   Reciprocal Relationships spouse, 2 sons   Service to Others retired   [address] [place] [address] Avenue enjoys spending time with her cats   General   Additional Pertinent History Pt admitted d/t frequent falls, R 3-5 rib fractures. Concern for movement disorder, neuro and geriatrics consulted. PMHx: MCI, sjogrens disease. Family/Caregiver Present No   Subjective   Subjective pt pleasantly confused throughout session. ADL   Eating Assistance 6  Modified independent   Grooming Assistance 5  Supervision/Setup   UB Bathing Assistance 4  Minimal Assistance   LB Bathing Assistance 3  Moderate Assistance   UB Dressing Assistance 4  Minimal Assistance   LB Dressing Assistance 3  Moderate Assistance   LB Dressing Deficit Don/doff R sock; Don/doff L sock; Supervision/safety;Verbal cueing;Setup; Requires assistive device for steadying;Steadying   Toileting Assistance  3  Moderate Assistance   Functional Assistance 3  Moderate Assistance   Additional Comments Did not observe grooming, UB bathing, LB bathing, UB dressing and toileting at time of evaluation, with use of clinical reasoning, pt's performance throughout evaluation indicates that pt may be able to perform these tasks at the levels listed above   Bed Mobility   Supine to Sit 3  Moderate assistance   Additional items Assist x 1; Increased time required;Verbal cues; Bedrails  (trunk management)   Additional Comments Variable supervision to Mod A to maintain static sitting balance, pt with poor righting reactions and insight to postural positionings, persistantly retropulsive. Transfers   Sit to Stand 4  Minimal assistance   Additional items Assist x 1; Increased time required;Verbal cues   Stand to Sit 4  Minimal assistance   Additional items Assist x 1; Increased time required;Verbal cues   Additional Comments min - mod A for static standing tasks d/t persistent retropulsion , poor corrections. BUE on RW   Functional Mobility   Functional Mobility 3  Moderate assistance   Additional Comments household distance trials x2 , Mod A progressing to Min A with MAX cues for RW management, sequencing, postural corrections. Additional items Rolling walker   Balance   Static Sitting Poor +   Dynamic Sitting Poor   Static Standing Poor -   Dynamic Standing Poor -   Activity Tolerance   Activity Tolerance Patient limited by fatigue  (cognitive deficits)   Medical Staff Made Aware Care coordination c PT, Geriatrics CRNP, Neuro resident, CM   Nurse Made Aware RN pre/post sesion   RUE Assessment   RUE Assessment WFL  (Full AROM, MMT 4/5 throughout)   LUE Assessment   LUE Assessment WFL  (Full AROM, MMT 4/5 throughout)   Hand Function   Gross Motor Coordination Impaired   Fine Motor Coordination Impaired   Hand Function Comments significant dysmetria noted in BUE per finger> nose assessment. Poor FM control, motor planning per writing task (pt asked to wrist name / address)   Sensation   Light Touch No apparent deficits  (denies)   Vision-Basic Assessment   Current Vision Wears glasses all the time   Patient Visual Report Difficulty maintaining concentration with focus;Balance difficulty; Past pointing/reaching   Vision - Complex Assessment   Tracking Impaired  (decreased visual attention)   Acuity Able to read employee name badge without difficulty  (cues for tracking to identify clock on wall, reads time incorrectly on 2/2 attempts)   Visual Screen Results Visual processing deficits   Perception Spatial Orientation Pt with poor postural righting, persistent retropulsive leaning in standing, sitting , and ambulation tasks with decreased awareness, intermittent corrects c tactile and verbal cues   Motor Planning Cues to use objects appropriately  (poor motor planning noted during functional and formal tasks, sequences through habitual tasks intermittently without cues)   Cognition   Overall Cognitive Status (S)  Impaired   Arousal/Participation Alert; Cooperative   Attention Difficulty attending to directions   Orientation Level Oriented to person;Disoriented to situation;Disoriented to time;Oriented to place  (unable to recall month, "I cant think", unable to recall reason for hospitalization. Persistent reorientation througout session with poor recall)   Memory Decreased recall of precautions;Decreased recall of recent events;Decreased short term memory;Decreased long term memory   Following Commands Follows one step commands with increased time or repetition   Comments pt with poor insight to deficits, difficulty finishing sentences d/t poor attention. Poor recall of recent events. Limited abstract thinking noted per functional assessment. Poor problem solving and judgement. Per EMR, Hx of 17/30 on MoCA in 2021, Dx of MCI. Recommend continued formal cognitive assessments   Assessment   Limitation Decreased ADL status; Decreased UE strength;Decreased Safe judgement during ADL;Decreased endurance;Decreased cognition;Decreased self-care trans;Decreased high-level ADLs; Visual deficit; Decreased fine motor control  (motor planning, sequencing, postural awareness, functional reach, static and dynamic balance, trunk control, decreased attention, insight / judgement, problem solving, memory.)   Prognosis Fair   Assessment Patient is a 70 y.o. female seen for OT evaluation at [address] [place] [address] following admission on 8/15/2023  s/p Fall.  Please see above for comprehensive list of comorbidities and significant PMHx impacting functional performance. At baseline, pt is (I) with ADLs, mod (I) c SPC. Upon initial evaluation, pt appears to be performing below baseline functional status. Occupational performance is affected by the following deficits:  decreased muscular strength , acute change in mobility status , impaired coordination , motor planning, decreased balance , decreased standing tolerance for self care tasks , poor postural control , decreased trunk control , decreased activity tolerance , impaired memory , impaired judgement and problem solving , impaired sequencing , poor spatial awareness , impaired safety awareness , impaired global mental status and direction following. Personal/Environmental factors impacting D/C include: Assistance needed for ADLs and functional mobility, High fall risk , decreased insight toward deficits  and baseline cognitive deficits. Supporting factors include: support system available and attitude towards recovery Patient would benefit from OT services within the acute care setting to maximize level of functional independence in the following areas self-care transfers, functional mobility, formal cognitive evaluation and ADLs. From OT standpoint, recommendation at time of D/C would be post-acute rehabilitation . Goals   Patient Goals to reduce falls   Plan   Treatment Interventions ADL retraining;Functional transfer training;UE strengthening/ROM; Endurance training;Patient/family training;Equipment evaluation/education; Compensatory technique education;Continued evaluation; Fine motor coordination activities; Neuromuscular reeducation;Cognitive reorientation   Goal Expiration Date 08/26/23   OT Treatment Day 0   OT Frequency 3-5x/wk   Recommendation   OT Discharge Recommendation Post acute rehabilitation services   Additional Comments  The patient's raw score on the -PAC Daily Activity Inpatient Short Form is 15.  A raw score of less than 19 suggests the patient may benefit from discharge to post-acute rehabilitation services. Please refer to the recommendation of the Occupational Therapist for safe discharge planning. AM-PAC Daily Activity Inpatient   Lower Body Dressing 2   Bathing 2   Toileting 2   Upper Body Dressing 3   Grooming 3   Eating 3   Daily Activity Raw Score 15   Daily Activity Standardized Score (Calc for Raw Score >=11) 34.69   AM-PAC Applied Cognition Inpatient   Following a Speech/Presentation 1   Understanding Ordinary Conversation 3   Taking Medications 2   Remembering Where Things Are Placed or Put Away 2   Remembering List of 4-5 Errands 2   Taking Care of Complicated Tasks 1   Applied Cognition Raw Score 11   Applied Cognition Standardized Score 27.03   End of Consult   Education Provided Yes   Patient Position at End of Consult Bed/Chair alarm activated; All needs within reach; Bedside chair   Nurse Communication Nurse aware of consult   Goals established on initial evaluation in order to achieve pt's goal of reducing number of falls      Pt will complete grooming tasks standing at sink c Supervision  for increased ADL independence within 10 days. Pt will complete LB ADLs Min A  for increased ADL independence within 10 days. Pt will complete toileting Min A  with use of DME for increased ADL independence within 10 days. Pt will demonstrate proper body mechanics to complete self-care transfers and functional mobility to/from bathroom with Supervision and use of LRAD for increased safety and functional independence within 10 days. Pt will demonstrate standing tolerance of 5 min with Supervision and use of LRAD for increased activity tolerance during ADL/IADL tasks within 10 days. Pt will complete bed mobility Supervision for increased independence in repositioning, pressure offloading, and managing comfort.      Pt will demonstrate proper body mechanics and fall prevention strategies during 100% of tx sessions for increased safety awareness during ADL/IADLs    Pt will demonstrate activity tolerance of 30 min in therapeutic tasks for increased participation in meaningful activities upon D/C. Pt will attend to treatment task or activity for 3 minutes without need for redirection to improve activity engagement within 10 days. Pt will participate in ongoing cognitive assessments to assist with safe D/C planning and supervision/assistance recommendations. Pt will demonstrate OOB sitting tolerance of 2-4 hr/day for increased activity tolerance and engagement in leisure activities within 10 days. Pt benefited from co-session of skilled OT and PT therapists in order to most appropriately address functional deficits d/t extensive physical assistance required for safe mobility  and decreased activity tolerance. OT/PT objectives were addressed separately; please see PT note for specific goal areas targeted.     Robert Walters, OT

## 2023-08-16 NOTE — CONSULTS
Consultation - Geriatric Medicine   Colleen Matute Rashelmi 70 y.o. female MRN: [id number]  Unit/Bed#: W -01 Encounter: [id number]      Assessment/Plan   Fall  · Has been having more frequent falls recently  · Reports she been having 1-2 falls per day over the last 2 weeks  · Patient denies any dizziness, lightheadedness, or LOC  · Trauma work-up revealed multiple rib fractures of right side  Foley fall precautions   Assess patient frequently for physical needs, encourage use of assistant devices as needed and directed by PT/OT  Identify cognitive and physical deficits and behaviors that affect risk of falls  · Consider moving patient closer to nursing station to monitor more closely for impulsive behavior which may increase risk of falls  · Educate patient/family on patient safety including physical limitations and importance of using call bell for assistance   · Modify environment to reduce risk of injury including disconnecting from pole when not in use, ensuring adequate lighting in room and restroom, ensuring that path to restroom is clear and free of trip hazards  · PT/OT consulted to assist with strengthening/mobility and assist with discharge planning to appropriate level of care    Closed fracture of multiple ribs of right side  · S/p fall  · CT chest showed-  Acute nondisplaced anterior right 3rd-5th rib fractures. No pleural effusion or pneumothorax.   · Rib fracture protocol  · Encourage coughing, deep eating, and incentive spirometry  · Multimodal pain regimen including geriatric pain protocol  · Patient is currently on Tylenol 975 every 8, lidocaine patch daily, oxycodone 2.5 mg every 4 as needed for moderate pain, and oxycodone 5 mg every 4 as needed for severe pain  · PT/OT consult pending  · On Lovenox for DVT prophylaxis  · Management per trauma    Rheumatoid arthritis  · Known history treated with methotrexate  · Takes methotrexate 20 mg every Wednesday, prednisone 5 mg daily, and cyclobenzaprine 10 mg nightly  · Follow-up outpatient    Movement disorder  · Questionable movement disorder contributing to recent falls  · Neurology consult pending  · PT/OT consult pending    Hypothyroidism  · No recent TSH on file  · Currently on levothyroxine 100 mcg daily  · Recommend rechecking TSH level while hospitalized    Cognitive impairment  Patient is alert oriented to person, place, partial situation-disoriented to time  Previous MOCA 17 out of 30 on 3/16/21  OT to do repeat cognitive testing while hospitalized  · CT of the head shows- PARENCHYMA: Decreased attenuation is noted in periventricular and subcortical white matter demonstrating an appearance that is statistically most likely to represent moderate microangiopathic change. No CT signs of acute infarction. No intracranial mass, mass effect or midline shift.   No acute parenchymal hemorrhage.   No recent TSH or vitamin b12 levels on file-recommend checking with next set of lab work  Most recent QTc stable at 442  Pending what neurology says patient should follow-up outpatient with [facility] or neurology for further work-up and management on cognitive impairment  At risk for delirium due to cognitive panic, hospitalization, medications, sleep disturbance, acute pain  Recommend delirium precautions  Maintain sleep-wake cycle, avoid nighttime interruptions  minimize overnight interruptions, group overnight vitals/labs/nursing checks as possible  dim lights, close blinds and turn off tv to minimize stimulation and encourage sleep environment in evenings  Provide adequate pain control  Avoid urinary retention and constipation  Provide frequent and early mobilization  Provide frequent redirection and reorientation as needed  Avoid medications that may worsen or precipitate delirium such as tramadol, benzodiazepines, anticholinergics, and Benadryl  Redirect unwanted behaviors as first-line therapy, avoid physical restraints as able to  · Continue to monitor    Anxiety/depression  · Chronic  · Currently on Prozac 20 mg daily and Xanax 1 mg 3 times daily as needed  · Patient's  reports that patient only takes Xanax 1 mg at bedtime to help her with sleep  · Do not recommend benzodiazepines in older adults due to increased risk of falls and confusion  · Discussed with patient's  about following up outpatient with her PCP to try to have this medication weaned off  · Would need to wean this medication off slowly to avoid withdrawal  · Continue to provide emotional support    Insomnia  First-line treatment is behavior modification  Maintain sleep-wake cycle, avoid nighttime interruptions  Avoid caffeine throughout the day  Avoid napping throughout the day  Encourage physical activity throughout the day  · Avoid sedative hypnotics including benzodiazepines and benadryl  · Patient's  reports patient take Xanax 1 mg nightly to help with sleep  · Patient also takes Flexeril 10 mg nightly to help with pain and sleep-would recommend discontinuing  · I again do not recommend benzodiazepines in older adults due to increased risk of falls and confusion    Urinary incontinence  · Has been reports patient has had urinary leakage, mostly at night for which she wears a depends  · He reports over the last 2 weeks or so she has had worsening urinary incontinence for which she is worn a depends during the day  · Would recommend checking UA to rule out UTI as source of worsening cognitive impairment and urinary incontinence  · Monitor for signs symptoms of urinary retention  · Management per primary    Vision impairment  Patient does have vision impairment  Wears glasses at baseline   Recommend use of corrective lenses at all appropriate times  Encourage adequate lighting and encourage use of assistance with ambulation  Keep personal belongings close to avoid reaching  Encourage appropriate footwear at all times  Recommend large font for printed materials provided to patient    Frailty  Clinical Frail Scale: 6 living with moderate frailty  Total protein 7.8 and albumin 3.9  Encourage adequate hydration and nutrition, including protein in meals  OOB as tolerated  PT/OT consulted  · Encourage early and frequent mobilization    Ambulatory dysfunction  At a baseline ambulates with cane  PT/OT following  Fall precautions  Out of bed as tolerated  Encourage early and frequent mobilization  Encourage adequate hydration and nutrition  Provide adequate pain management   Goal is undetermined  · Continue with PT/OT for continued strength and balance training       Home medication review  Folic acid 1mg daily  levothyroxine daily  Cyclobenzaprine 10mg qhs  liptor 10-mg daily  alprazolam 1mg tid  Atenolol 50mg daily  methotrexate 2.5mg takes 8 tablets weekly  Potassium chloride 20meq daily  Prednisone 5mg daily  fluoxetine 20mg daily    Personally confirmed with Papo Ortiz [facility] [phone]    History of Present Illness   Physician Requesting Consult: Syl Phillips MD  Reason for Consult / Principal Problem: Fall  Hx and PE limited by: None  Additional history obtained from: Chart review and patient evaluation      HPI: Kim Warren is a 70y.o. year old female who presents with history of sjorgen disease, RA, hypothyroidism, cognitive impairment, and ambulatory dysfunction. She presented to the ER with right-sided rib and back pain. She has had multiple falls over the last 2 weeks. Unclear which fall led to the broken ribs and pain. She lives at home with her  in a split level home. Has about a half a flight of stairs to go upstairs and downstairs. She usually uses a cane to darshana around, they do have a walker which she does not frequently use. He reports over the last 2 weeks she's had multiple falls, especially when she was trying to ambulate with her cane and holding things. She has eyeglasses and full mouth of implants, no hearing aids.   She is independent for ADLs.  She has occasionally urinary leakage at home for which she wears a depends.  reports she was mostly using the depends at night, but over the last 1-2 weeks she started wearing it during the day. There is a shower chair in the bathroom, no grab bars. Her  does all the cooking, cleaning, medications, and manages the finances. The  reports he puts her medications into weekly pill boxes and she takes the medications daily on her own. He did report that last week she took 2 extra doses of her pills. Appetite has been slight decreased- eats several small meals/snacks throughout the day , no weight loss. She does not drive, her  takes her anywhere she needs to go.  reports memory overall has been stable, although has had a gradual decline over the last 2 weeks. Information provided by patient's  via phone. Upon examination patient was out of bed in the chair, resting. She appeared comfortable and was in no acute distress. She was alert to person, place, and partial situation-disoriented to time. She thought it was wintertime. She reports some pain in her right ribs. She slept well overnight. Her appetite has been stable. She has not yet moved her bowels since hospitalized. Per nursing no other acute concerns or issues at this time. Inpatient consult to Gerontology  Consult performed by: Ceil Snellen, CRNP  Consult ordered by: Haroldo De La Rosa MD          Review of Systems   Constitutional: Negative for activity change, appetite change, chills, fatigue and fever. HENT: Negative for trouble swallowing. Eyes: Negative for pain and visual disturbance. Respiratory: Negative for cough and shortness of breath. Cardiovascular: Negative for chest pain and palpitations. Gastrointestinal: Positive for diarrhea. Negative for abdominal pain, constipation, nausea and vomiting. Genitourinary: Negative for difficulty urinating, dysuria and hematuria. Musculoskeletal: Positive for arthralgias and gait problem. Negative for back pain. Skin: Negative for color change and rash. Neurological: Positive for weakness. Negative for dizziness, seizures, syncope, light-headedness and headaches. Psychiatric/Behavioral: Positive for confusion and sleep disturbance. Negative for hallucinations. The patient is not nervous/anxious. Historical Information   Past Medical History:   Diagnosis Date   • Lymphadenitis, chronic    • Sialadenitis    • Sjogren's disease ([address])    • Stomach problems    • Thyroid disorder    • Xerostomia      Past Surgical History:   Procedure Laterality Date   • BRAIN SURGERY  2019    tumor removed Dr. Marilyn Neely   • CHOLECYSTECTOMY       Social History   Social History     Substance and Sexual Activity   Alcohol Use Yes     Social History     Substance and Sexual Activity   Drug Use Never     Social History     Tobacco Use   Smoking Status Former   Smokeless Tobacco Never     Family History:   Family History   Problem Relation Age of Onset   • Diabetes Other    • Hypertension Other    • Heart disease Other    • Arthritis Other    • Heart disease Mother    • Kidney disease Mother    • Alcohol abuse Father        Meds/Allergies   all current active meds have been reviewed    Allergies   Allergen Reactions   • Codeine Vomiting   • Hydroxyquinoline Sulfate [Oxyquinoline] Vomiting   • Leucovorin Vomiting   • Tizanidine Vomiting       Objective     Intake/Output Summary (Last 24 hours) at 8/16/2023 0942  Last data filed at 8/16/2023 0301  Gross per 24 hour   Intake 490 ml   Output 300 ml   Net 190 ml     Invasive Devices     Peripheral Intravenous Line  Duration           Peripheral IV 08/15/23 Right;Ventral (anterior) Forearm <1 day                Physical Exam  Vitals reviewed. Constitutional:       General: She is not in acute distress. Appearance: She is well-developed. She is not ill-appearing.       Comments: Frail appearing    HENT: Head: Normocephalic and atraumatic. Cardiovascular:      Rate and Rhythm: Normal rate and regular rhythm. Heart sounds: No murmur heard. Pulmonary:      Effort: Pulmonary effort is normal. No respiratory distress. Breath sounds: Normal breath sounds. Abdominal:      General: Bowel sounds are normal. There is no distension. Palpations: Abdomen is soft. Tenderness: There is no abdominal tenderness. Musculoskeletal:         General: No swelling. Right lower leg: No edema. Left lower leg: No edema. Skin:     General: Skin is warm and dry. Coloration: Skin is pale. Neurological:      General: No focal deficit present. Mental Status: She is alert. Mental status is at baseline. Cranial Nerves: No cranial nerve deficit. Motor: Weakness present. Gait: Gait abnormal.      Comments: Alert to person, place, and partial situation-disoriented to time   Psychiatric:         Mood and Affect: Mood normal.         Behavior: Behavior is slowed. Cognition and Memory: Memory is impaired. Lab Results:   I have personally reviewed pertinent lab results including the following:  -CBC, BMP, magnesium, phosphorus, troponin    I have personally reviewed the following imaging study reports in PACS:  -CT head, CT spine, CT chest      Therapies:   PT: consulted  OT: consulted    VTE Prophylaxis: Sequential compression device (Venodyne)  and Enoxaparin (Lovenox)    Code Status: Level 3 - DNAR and DNI  Advance Directive and Living Will:    No  Power of :  No  POLST:  No    Family and Social Support:   Living arrangements: Lives at home with her  and 2 sons  Support system:  and children  Assistance needed: Yes    Goals of Care: Undetermined at this time    Please note:  Voice-recognition software may have been used in the preparation of this document.   Occasional wrong word or "sound-alike" substitutions may have occurred due to the inherent limitations of voice recognition software. Interpretation should be guided by context.

## 2023-08-16 NOTE — PROGRESS NOTES
[address] [facility]  Progress Note  Name: Rhonda Ireland I  MRN: [id number]  Unit/Bed#: W -64 I Date of Admission: 8/15/2023   Date of Service: 8/16/2023 I Hospital Day: 1    Assessment/Plan   * Fall  Assessment & Plan  Pt states she has been having more frequent falls recently, 1-2 per day for the last 2 weeks. She does not loss consciousness or get dizzy but does not know why she continues to fall. Plan:  - PT/OT evaluation for ambulatory dysfunction  - DVT prophylaxis  - Case management consulted for possible rehab. - Neurology consulted for possible movement disorder. Closed fracture of multiple ribs of right side  Assessment & Plan  Pt with frequent falls recently found to have multiple R rib fx 3-5. Plan:  - Multimodal pain regimen.  - PT/OT evaluation  - Encourage incentive spirometry  - DVT prophylaxis with lovenox    Movement disorder  Assessment & Plan  Concern for possible movement disorder contributing to recent falls. Plan:  - Neurology consulted  - PT/OT evaluation    Rheumatoid arteritis ([address])  Assessment & Plan  Hx of RA treated with methotrexate. Plan:  - Continue home methotrexate 20 mg every Wednesday.  - Continue home Prednisone 5 mg daily  - Continue home cyclobenzaprine 10 mg at bedtime for pain    Hypothyroidism  Assessment & Plan  Continue home Levothyroxine. Anxiety and depression  Assessment & Plan  Continue home Xanax and Prozac    Sjogren's disease [facility])  Assessment & Plan  Monitor symptoms and continue home medications as previously stated. Bowel Regimen: senokot  VTE Prophylaxis:Enoxaparin (Lovenox)     Disposition: inpatient for PT/OT eval    Subjective   Chief Complaint: Back/rib pain    Subjective: 67-year-old female with past medical history of Sjogren's disease and rheumatoid arthritis on methotrexate presented with back and right rib pain to the emergency department and found to have multiple right rib fractures.   Patient has been falling more recently, 1-2 times per day for the last 2 weeks. Patient does not recall the exact fall at hurt her ribs and back but states that she has hit her head but denies LOC. Otherwise patient endorses ambulatory dysfunction and thinks she tripped over her own feet. Denies any recent illness, fever, chills, nausea, vomiting, vision changes. No events overnight, patient stable admitted for PT OT evaluations. Objective   Vitals:   Temp:  [97.5 °F (36.4 °C)-98.3 °F (36.8 °C)] 97.6 °F (36.4 °C)  HR:  [63-78] 78  Resp:  [16-19] 16  BP: (137-196)/(65-81) 147/77    I/O       08/14 0701  08/15 0700 08/15 0701  08/16 0700    P. O.  480    I.V. (mL/kg)  10 (0.2)    Total Intake(mL/kg)  490 (8)    Urine (mL/kg/hr)  150    Total Output  150    Net  +340                 Physical Exam:   Physical Exam  Vitals and nursing note reviewed. Constitutional:       General: She is not in acute distress. Appearance: She is well-developed. HENT:      Head: Normocephalic and atraumatic. Nose: Nose normal.      Mouth/Throat:      Mouth: Mucous membranes are moist.      Pharynx: Oropharynx is clear. Eyes:      Extraocular Movements: Extraocular movements intact. Conjunctiva/sclera: Conjunctivae normal.      Pupils: Pupils are equal, round, and reactive to light. Cardiovascular:      Rate and Rhythm: Normal rate and regular rhythm. Pulses: Normal pulses. Heart sounds: No murmur heard. Pulmonary:      Effort: Pulmonary effort is normal. No respiratory distress. Breath sounds: Normal breath sounds. Chest:      Chest wall: Tenderness (Tenderness to palpation of right chest.,  No external signs of trauma.) present. Abdominal:      General: Abdomen is flat. Palpations: Abdomen is soft. Tenderness: There is no abdominal tenderness. Musculoskeletal:         General: No swelling or tenderness. Cervical back: Normal range of motion and neck supple.  No rigidity or tenderness. Right lower leg: No edema. Left lower leg: No edema. Skin:     General: Skin is warm and dry. Capillary Refill: Capillary refill takes less than 2 seconds. Neurological:      Mental Status: She is alert. Psychiatric:         Mood and Affect: Mood normal.         Invasive Devices     Peripheral Intravenous Line  Duration           Peripheral IV 08/15/23 Right;Ventral (anterior) Forearm <1 day                 PIC Score  PIC Pain Score: 3 (8/16/2023  1:00 AM)  PIC Incentive Spirometry Score: 4 (8/16/2023  1:00 AM)  PIC Cough Description: 1 (8/16/2023  1:00 AM)  PIC Total Score: 8 (8/16/2023  1:00 AM)       If the Total PIC Score </=5, did you consult APS and evaluate patient for further intervention?: no      Pain:    Incentive Spirometry  Cough  3 = Controlled  4 = Above goal volume 3 = Strong  2 = Moderate  3 = Goal to alert volume 2 = Weak  1 = Severe  2 = Below alert volume 1 = Absent     1 = Unable to perform IS         Lab Results:   BMP/CMP:   Lab Results   Component Value Date    SODIUM 134 (L) 08/15/2023    K 3.9 08/15/2023     08/15/2023    CO2 24 08/15/2023    BUN 12 08/15/2023    CREATININE 0.85 08/15/2023    CALCIUM 9.6 08/15/2023    AST 15 08/15/2023    ALT 15 08/15/2023    ALKPHOS 110 (H) 08/15/2023    EGFR 70 08/15/2023    and CBC:   Lab Results   Component Value Date    WBC 5.31 08/15/2023    HGB 12.4 08/15/2023    HCT 37.6 08/15/2023    MCV 98 08/15/2023     08/15/2023    RBC 3.83 08/15/2023    MCH 32.4 08/15/2023    MCHC 33.0 08/15/2023    RDW 13.6 08/15/2023    MPV 9.8 08/15/2023    NRBC 0 08/15/2023     Imaging: I have personally reviewed pertinent reports.      Other Studies:

## 2023-08-16 NOTE — CASE MANAGEMENT
Case Management Assessment & Discharge Planning Note    Patient name Raphael Smith W /W -87 MRN [id number]  : 1955 Date 2023       Current Admission Date: 8/15/2023  Current Admission Diagnosis:Fall   Patient Active Problem List    Diagnosis Date Noted   • Rheumatoid arteritis ([address]) 08/15/2023   • Closed fracture of multiple ribs of right side 08/15/2023   • Movement disorder 08/15/2023   • Fungal skin infection 2021   • Mild cognitive impairment 2021   • Hyponatremia 2021   • Adenopathy 03/10/2021   • Abnormal CT scan, gastrointestinal tract 03/10/2021   • Dermatitis associated with moisture 2021   • Onychomycosis 2021   • Enlarged and hypertrophic nails 2021   • Low HDL (under 40)    • Anemia 2021   • Hypokalemia 2021   • WELLS (dyspnea on exertion) 2021   • Lightheadedness 2021   • Pain 2021   • Abnormal ECG 2021   • Chest pain 2021   • Pleural effusion on right 2021   • Atelectasis of right lung 2021   • Potential for cognitive impairment 2021   • Oral dyskinesia 2021   • Gastroparesis 2021   • Anxiety and depression 2021   • Hypothyroidism 2021   • Abnormal findings on imaging test 2021   • Hypertension 2021   • Pain of right upper extremity 2021   • Multiple closed fractures of pelvis ([address]) 2021   • Closed nondisplaced fracture of anterior wall of right acetabulum ([address]) 2021   • Closed nondisplaced fracture of right pubis ([address]) 2021   • Fall 2021   • Parotid gland enlargement 2019   • Sjogren's disease ([address]) 2019      LOS (days): 1  Geometric Mean LOS (GMLOS) (days): 3.00  Days to GMLOS:2.1     OBJECTIVE:    Risk of Unplanned Readmission Score: 14.72         Current admission status: Inpatient       Preferred Pharmacy:   [id number] [facility], [address] [facility] [address]  Phone: [phone] Fax: [phone]    Primary Care Provider: Néstor Kingston MD    Primary Insurance: MEDICARE  Secondary Insurance: AARP    ASSESSMENT:  Active Health Care Proxies    There are no active Health Care Proxies on file. Readmission Root Cause  30 Day Readmission: No    Patient Information  Admitted from[de-identified] Home  Mental Status: Other (Comment) (N/A)  During Assessment patient was accompanied by: Other-Comment  Assessment information provided by[de-identified] Spouse  Primary Caregiver: Self  Support Systems: Children, Spouse/significant other  [facility]: [address] do you live in?: One [employer] entry access options.  Select all that apply.: No steps to enter home  Type of Current Residence: Bi-level  Upon entering residence, is there a bedroom on the main floor (no further steps)?: No  A bedroom is located on the following floor levels of residence (select all that apply):: 2nd Floor  Upon entering residence, is there a bathroom on the main floor (no further steps)?: No  Indicate which floors of current residence have a bathroom (select all the apply):: 2nd Floor  Number of steps to 2nd floor from main floor: 7  In the last 12 months, was there a time when you were not able to pay the mortgage or rent on time?: No  In the last 12 months, how many places have you lived?: 1  In the last 12 months, was there a time when you did not have a steady place to sleep or slept in a shelter (including now)?: No  Homeless/housing insecurity resource given?: N/A  Living Arrangements: Lives w/ Spouse/significant other    Activities of Daily Living Prior to Admission  Functional Status: Independent  Completes ADLs independently?: Yes  Ambulates independently?: Yes  Does patient use assisted devices?: Yes  Assisted Devices (DME) used: Reinaldo Costello  Does patient currently own DME?: Yes  What DME does the patient currently own?: Reinaldo Mayo  Does patient have a history of Outpatient Therapy (PT/OT)?: Yes  Does the patient have a history of Short-Term Rehab?: Yes  Does patient have a history of HHC?: Yes  Does patient currently have [facility]?: No    Patient Information Continued  Income Source: Pension/detention  Does patient have prescription coverage?: Yes  Within the past 12 months, you worried that your food would run out before you got the money to buy more.: Never true  Within the past 12 months, the food you bought just didn't last and you didn't have money to get more.: Never true  Food insecurity resource given?: N/A  Does patient receive dialysis treatments?: No    PHQ 2/9 Screening   Reviewed PHQ 2/9 Depression Screening Score?: No    Means of Transportation  Means of Transport to Appts[de-identified] Family transport  In the past 12 months, has lack of transportation kept you from medical appointments or from getting medications?: No  In the past 12 months, has lack of transportation kept you from meetings, work, or from getting things needed for daily living?: No  Was application for public transport provided?: N/A        DISCHARGE DETAILS:    Discharge planning discussed with[de-identified] patients , James Farrell over phone  Freedom of Choice: Yes     CM contacted family/caregiver?: Yes  Were Treatment Team discharge recommendations reviewed with patient/caregiver?: Yes  Did patient/caregiver verbalize understanding of patient care needs?: Yes  Were patient/caregiver advised of the risks associated with not following Treatment Team discharge recommendations?: Yes    Contacts  Patient Contacts: James Farrell  Relationship to Patient[de-identified] Family ()  Contact Method: Phone  Phone Number: [phone]  Reason/Outcome: Continuity of Care, Emergency Contact, Referral, Discharge [facility]         Is the patient interested in [facility] at discharge?: No    DME Referral Provided  Referral made for DME?: No    Other Referral/Resources/Interventions Provided:  Interventions: Short Term Rehab, Acute Rehab  Referral Comments: [facility] due to re-current falls. Call made to patients , Dinesh Mckeon as patient noted to be disoriented. Patient lives with her  in a split level home 7 steps to living level, another 7 to bed and bath, 0 MICHAEL. patient uses a cane at baseline, also owns  walker. Patient has a history of STR and HHC.  aware of PT/OT rec for STR, agreeable to referrals.  stating patient has been to [facility], would be agreeable to those facilities again if available. CM stating they will place referrals to both acute and subacute and follow up as able to continue with dcp.     Would you like to participate in our [address] service program?  : No - Declined    Treatment Team Recommendation: Short Term Rehab  Discharge Destination Plan[de-identified] Short Term Rehab

## 2023-08-16 NOTE — WOUND OSTOMY CARE
Consult Note - Wound   Lethia Rinku Viscomi 70 y.o. female MRN: [id number]  Unit/Bed#: W -01 Encounter: [id number]        History and Present Illness:  Patient is 69 yo female [facility] with fall. Patient has history of movement disorder, RA, sjogren's disease, and hypothyroid. Patient is continent of bowel and bladder with occasional accidents. Patient is min assist with turning from side to side for assessment. Patient is independent with meals. Assessment Findings:   B/L heels intact and blanching, preventative skin care orders placed. 1. MASD sacral area- scattered partial thickness wound with pink tissue with periwound hyperpigmentation, no drainage. No induration, fluctuance, odor, warmth/temperature differences, redness, or purulence noted to the above noted wounds and skin areas assessed. New dressings applied per orders listed below. Patient tolerated well- no s/s of non-verbal pain or discomfort observed during the encounter. Bedside nurse aware of plan of care. See flow sheets for more detailed assessment findings. Wound care will continue to follow. Skin care Plan:  1-Cleanse sacro-buttocks with soap and water. Apply Allevyn foam. Artie with T for treatment. Change every three days and PRN. 2-Turn/reposition q2h or when medically stable for pressure re-distribution on skin . 3-Elevate heels to offload pressure  4-Moisturize skin daily with skin nourishing cream  5-Ehob cushion in chair when out of bed. 6-Hydraguard to bilateral heels BID and PRN.        Wounds:  Wound 08/16/23 Sacrum (Active)   Wound Image   08/16/23 1037   Wound Description Epithelialization;Fragile;Pink 08/16/23 1037   Kristin-wound Assessment Hyperpigmented 08/16/23 1037   Wound Length (cm) 7 cm 08/16/23 1037   Wound Width (cm) 3 cm 08/16/23 1037   Wound Depth (cm) 0.1 cm 08/16/23 1037   Wound Surface Area (cm^2) 21 cm^2 08/16/23 1037   Wound Volume (cm^3) 2.1 cm^3 08/16/23 1037   Calculated Wound Volume (cm^3) 2.1 cm^3 08/16/23 1037   Tunneling 0 cm 08/16/23 1037   Tunneling in depth located at 0 08/16/23 1037   Undermining 0 08/16/23 1037   Undermining is depth extending from 0 08/16/23 1037   Wound Site Closure CAMPOS 08/16/23 1037   Drainage Amount Scant 08/16/23 1037   Drainage Description Serosanguineous 08/16/23 1037   Non-staged Wound Description Partial thickness 08/16/23 1037   Treatments Cleansed 08/16/23 1037   Dressing Foam, Silicon (eg. Allevyn, etc) 08/16/23 1037   Wound packed? No 08/16/23 1037   Packing- # removed 0 08/16/23 1037   Packing- # inserted 0 08/16/23 1037   Dressing Changed New 08/16/23 1037   Patient Tolerance Tolerated well 08/16/23 1037   Dressing Status Clean;Dry; Intact 08/16/23 1037         Surekha Ordaz BSN, RN, [employer]

## 2023-08-16 NOTE — H&P
[address]  H&P  Name: Lauri Ansari 70 y.o. female I MRN: [id number]  Unit/Bed#: ED-40 I Date of Admission: 8/15/2023   Date of Service: 8/15/2023 I Hospital Day: 0      Assessment/Plan   Fall  Assessment & Plan  Pt states she has been having more frequent falls recently, 1-2 per day for the last 2 weeks. She does not loss consciousness or get dizzy but does not know why she continues to fall. Plan:  - PT/OT evaluation for ambulatory dysfunction  - DVT prophylaxis  - Case management consulted for possible rehab. - Neurology consulted for possible movement disorder. Movement disorder  Assessment & Plan  Concern for possible movement disorder contributing to recent falls. Plan:  - Neurology consulted  - PT/OT evaluation    Closed fracture of multiple ribs of right side  Assessment & Plan  Pt with frequent falls recently found to have multiple R rib fx 3-5. Plan:  - Multimodal pain regimen.  - PT/OT evaluation  - Encourage incentive spirometry  - DVT prophylaxis with lovenox    Rheumatoid arteritis ([address])  Assessment & Plan  Hx of RA treated with methotrexate. Plan:  - Continue home methotrexate 20 mg every Wednesday.  - Continue home Prednisone 5 mg daily  - Continue home cyclobenzaprine 10 mg at bedtime for pain    Hypothyroidism  Assessment & Plan  Continue home Levothyroxine. Anxiety and depression  Assessment & Plan  Continue home xanax and Prozac    Sjogren's disease [facility])  Assessment & Plan  Monitor symptoms and continue home medications as previously stated. History of Present Illness     HPI: Lauri Ansari is a 70 y.o. who presents with right sided rib and back pain. Hx of Sjogren disease and RA on methotrexate, pt states she has been fall ing a lot recently stating 1-2 times per day for the last 2 weeks.   Pt does not know which exact fall she injured herself but thinks it was about a week ago and  states she did have a fall where her right side hit a chair. Pt has hit her head when she fell previously but no LOC. Denies blood thinners. History obtained from chart review and the patient. Review of Systems   Constitutional: Negative for chills and fever. HENT: Negative for sore throat. Eyes: Negative for pain and visual disturbance. Respiratory: Negative for cough and shortness of breath. Cardiovascular: Positive for chest pain (right ribs). Negative for palpitations. Gastrointestinal: Negative for abdominal pain, diarrhea, nausea and vomiting. Genitourinary: Negative for dysuria and hematuria. Musculoskeletal: Positive for back pain. Negative for arthralgias and neck pain. Skin: Negative for color change and rash. Neurological: Negative for dizziness, seizures, syncope, weakness and headaches. All other systems reviewed and are negative. Historical Information   Past Medical History:  No date: Lymphadenitis, chronic  No date: Sialadenitis  No date: Sjogren's disease ([address])  No date: Stomach problems  No date: Thyroid disorder  No date: Xerostomia Past Surgical History:  2019: BRAIN SURGERY      Comment:  tumor removed Dr. Jennifer Maharaj  No date: CHOLECYSTECTOMY   Current Outpatient Medications   Medication Instructions   • acetaminophen (TYLENOL) 650 mg, Oral, Every 6 hours PRN   • ALPRAZolam (XANAX) 1 mg, Oral, Daily   • amLODIPine (NORVASC) 5 mg, Oral, Daily   • atenolol (TENORMIN) 50 mg, Oral, Daily   • atorvastatin (LIPITOR) 10 mg, Oral, Daily   • cyclobenzaprine (FLEXERIL) 10 mg, Oral, Daily at bedtime,     • FLUoxetine (PROZAC) 20 mg, Oral, Daily   • folic acid (FOLVITE) 1 mg, Oral, Daily   • furosemide (LASIX) 40 mg, Oral, Daily   • levothyroxine 88 mcg, Oral, Daily   • levothyroxine 75 mcg, Oral, Daily   • linaCLOtide (Linzess) 145 MCG CAPS 1 capsule, Oral, Daily   • losartan (COZAAR) 25 mg, Oral, Daily   • methotrexate 20 mg, Oral, Weekly   • nystatin (MYCOSTATIN) powder 1 application. , Topical, 2 times daily • predniSONE 5 mg tablet No dose, route, or frequency recorded. • saliva substitute (MOUTH KOTE) 5 sprays, Mouth/Throat, Every 4 hours PRN   • senna (SENOKOT) 8.6 mg, Oral, 2 times daily PRN    Allergies   Allergen Reactions   • Codeine Vomiting   • Hydroxyquinoline Sulfate [Oxyquinoline] Vomiting   • Leucovorin Vomiting   • Tizanidine Vomiting      Social History     Tobacco Use   • Smoking status: Former   • Smokeless tobacco: Never   Substance Use Topics   • Alcohol use: Yes   • Drug use: Never    Family History   Problem Relation Age of Onset   • Diabetes Other    • Hypertension Other    • Heart disease Other    • Arthritis Other    • Heart disease Mother    • Kidney disease Mother    • Alcohol abuse Father           Objective                            Vitals I/O      Most Recent Min/Max in 24hrs   Temp 98.3 °F (36.8 °C) Temp  Min: 98.3 °F (36.8 °C)  Max: 98.3 °F (36.8 °C)   Pulse 68 Pulse  Min: 63  Max: 68   Resp 18 Resp  Min: 16  Max: 19   /72 BP  Min: 143/66  Max: 196/81   O2 Sat 93 % SpO2  Min: 93 %  Max: 96 %    No intake or output data in the 24 hours ending 08/15/23 2102      Diet Regular; Regular House     Invasive Monitoring Physical exam    Physical Exam  Eyes:      Extraocular Movements: Extraocular movements intact. Conjunctiva/sclera: Conjunctivae normal.      Pupils: Pupils are equal, round, and reactive to light. Skin:     General: Skin is warm and dry. HENT:      Head: Normocephalic and atraumatic. Nose: No congestion or rhinorrhea. Mouth/Throat:      Mouth: Mucous membranes are moist.   Neck: no midline tenderness Cardiovascular:      Rate and Rhythm: Normal rate and regular rhythm. Pulses: Normal pulses. Musculoskeletal:         General: No swelling, tenderness, deformity or signs of injury. Normal range of motion. Abdominal:      General: Bowel sounds are normal. There is no distension. Palpations: Abdomen is soft. Tenderness:  There is no abdominal tenderness. Constitutional:       General: She is not in acute distress. Pulmonary:      Effort: Pulmonary effort is normal. No respiratory distress. Breath sounds: Normal breath sounds. Chest:      Chest wall: No tenderness. Neurological:      Mental Status: She is alert. Genitourinary/Anorectal:  external catheter         Diagnostic Studies      EKG: NSR  Imaging:  I have personally reviewed pertinent reports.        Medications:  Scheduled PRN   acetaminophen, 975 mg, Q8H [address]  [START ON 8/16/2023] atenolol, 50 mg, Daily  [START ON 8/16/2023] atorvastatin, 10 mg, Daily  cyclobenzaprine, 10 mg, HS  enoxaparin, 30 mg, Q12H [address]  [START ON 8/16/2023] FLUoxetine, 20 mg, Daily  [START ON 8/88/1447] folic acid, 1 mg, Daily  [START ON 8/16/2023] levothyroxine, 100 mcg, Early Morning  [START ON 8/16/2023] lidocaine, 1 patch, Daily  [START ON 8/16/2023] methotrexate, 20 mg, Weekly  predniSONE, 5 mg, HS      ALPRAZolam, 1 mg, HS PRN  oxyCODONE, 5 mg, Q4H PRN  oxyCODONE, 2.5 mg, Q4H PRN       Continuous          Labs:    CBC    Recent Labs     08/15/23  1247   WBC 5.31   HGB 12.4   HCT 37.6        BMP    Recent Labs     08/15/23  1247   SODIUM 134*   K 3.9      CO2 24   AGAP 7   BUN 12   CREATININE 0.85   CALCIUM 9.6       Coags    No recent results     Additional Electrolytes  No recent results       Blood Gas    No recent results  No recent results LFTs  Recent Labs     08/15/23  1247   ALT 15   AST 15   ALKPHOS 110*   ALB 3.9   TBILI 0.51       Infectious  No recent results  Glucose  Recent Labs     08/15/23  1247   GLUC 97             Anticipated Length of Stay is > 2 midnights  Michelle Carlton MD

## 2023-08-16 NOTE — PLAN OF CARE
Problem: MOBILITY - ADULT  Goal: Maintain or return to baseline ADL function  Description: INTERVENTIONS:  -  Assess patient's ability to carry out ADLs; assess patient's baseline for ADL function and identify physical deficits which impact ability to perform ADLs (bathing, care of mouth/teeth, toileting, grooming, dressing, etc.)  - Assess/evaluate cause of self-care deficits   - Assess range of motion  - Assess patient's mobility; develop plan if impaired  - Assess patient's need for assistive devices and provide as appropriate  - Encourage maximum independence but intervene and supervise when necessary  - Involve family in performance of ADLs  - Assess for home care needs following discharge   - Consider OT consult to assist with ADL evaluation and planning for discharge  - Provide patient education as appropriate  Outcome: Progressing  Goal: Maintains/Returns to pre admission functional level  Description: INTERVENTIONS:  - Perform BMAT or MOVE assessment daily.   - Set and communicate daily mobility goal to care team and patient/family/caregiver. - Collaborate with rehabilitation services on mobility goals if consulted  - Perform Range of Motion 3 times a day. - Reposition patient every 2 hours.   - Dangle patient 3 times a day  - Stand patient 3 times a day  - Ambulate patient 3 times a day  - Out of bed to chair 3 times a day   - Out of bed for meals 3 times a day  - Out of bed for toileting  - Record patient progress and toleration of activity level   Outcome: Progressing     Problem: Prexisting or High Potential for Compromised Skin Integrity  Goal: Skin integrity is maintained or improved  Description: INTERVENTIONS:  - Identify patients at risk for skin breakdown  - Assess and monitor skin integrity  - Assess and monitor nutrition and hydration status  - Monitor labs   - Assess for incontinence   - Turn and reposition patient  - Assist with mobility/ambulation  - Relieve pressure over bony prominences  - Avoid friction and shearing  - Provide appropriate hygiene as needed including keeping skin clean and dry  - Evaluate need for skin moisturizer/barrier cream  - Collaborate with interdisciplinary team   - Patient/family teaching  - Consider wound care consult   Outcome: Progressing     Problem: PAIN - ADULT  Goal: Verbalizes/displays adequate comfort level or baseline comfort level  Description: Interventions:  - Encourage patient to monitor pain and request assistance  - Assess pain using appropriate pain scale  - Administer analgesics based on type and severity of pain and evaluate response  - Implement non-pharmacological measures as appropriate and evaluate response  - Consider cultural and social influences on pain and pain management  - Notify physician/advanced practitioner if interventions unsuccessful or patient reports new pain  Outcome: Progressing     Problem: SAFETY ADULT  Goal: Maintain or return to baseline ADL function  Description: INTERVENTIONS:  -  Assess patient's ability to carry out ADLs; assess patient's baseline for ADL function and identify physical deficits which impact ability to perform ADLs (bathing, care of mouth/teeth, toileting, grooming, dressing, etc.)  - Assess/evaluate cause of self-care deficits   - Assess range of motion  - Assess patient's mobility; develop plan if impaired  - Assess patient's need for assistive devices and provide as appropriate  - Encourage maximum independence but intervene and supervise when necessary  - Involve family in performance of ADLs  - Assess for home care needs following discharge   - Consider OT consult to assist with ADL evaluation and planning for discharge  - Provide patient education as appropriate  Outcome: Progressing  Goal: Maintains/Returns to pre admission functional level  Description: INTERVENTIONS:  - Perform BMAT or MOVE assessment daily.   - Set and communicate daily mobility goal to care team and patient/family/caregiver. - Collaborate with rehabilitation services on mobility goals if consulted  - Perform Range of Motion 3 times a day. - Reposition patient every 2 hours.   - Dangle patient 3 times a day  - Stand patient 3 times a day  - Ambulate patient 3 times a day  - Out of bed to chair 3 times a day   - Out of bed for meals 3 times a day  - Out of bed for toileting  - Record patient progress and toleration of activity level   Outcome: Progressing  Goal: Patient will remain free of falls  Description: INTERVENTIONS:  - Educate patient/family on patient safety including physical limitations  - Instruct patient to call for assistance with activity   - Consult OT/PT to assist with strengthening/mobility   - Keep Call bell within reach  - Keep bed low and locked with side rails adjusted as appropriate  - Keep care items and personal belongings within reach  - Initiate and maintain comfort rounds  - Make Fall Risk Sign visible to staff  - Offer Toileting every 2 Hours, in advance of need  - Initiate/Maintain alarm  - Obtain necessary fall risk management equipment  - Apply yellow socks and bracelet for high fall risk patients  - Consider moving patient to room near nurses station  Outcome: Progressing

## 2023-08-16 NOTE — PLAN OF CARE
Problem: OCCUPATIONAL THERAPY ADULT  Goal: Performs self-care activities at highest level of function for planned discharge setting. See evaluation for individualized goals. Description: Treatment Interventions: ADL retraining, Functional transfer training, UE strengthening/ROM, Endurance training, Patient/family training, Equipment evaluation/education, Compensatory technique education, Continued evaluation, Fine motor coordination activities, Neuromuscular reeducation, Cognitive reorientation          See flowsheet documentation for full assessment, interventions and recommendations. 8/16/2023 1259 by Kam Scott OT  Note: Limitation: Decreased ADL status, Decreased UE strength, Decreased Safe judgement during ADL, Decreased endurance, Decreased cognition, Decreased self-care trans, Decreased high-level ADLs, Visual deficit, Decreased fine motor control (motor planning, sequencing, postural awareness, functional reach, static and dynamic balance, trunk control, decreased attention, insight / judgement, problem solving, memory.)  Prognosis: Fair  Assessment: Patient is a 70 y.o. female seen for OT evaluation at [address] [place] [address] following admission on 8/15/2023  s/p Fall. Please see above for comprehensive list of comorbidities and significant PMHx impacting functional performance. At baseline, pt is (I) with ADLs, mod (I) c SPC. Upon initial evaluation, pt appears to be performing below baseline functional status.    Occupational performance is affected by the following deficits:  decreased muscular strength , acute change in mobility status , impaired coordination , motor planning, decreased balance , decreased standing tolerance for self care tasks , poor postural control , decreased trunk control , decreased activity tolerance , impaired memory , impaired judgement and problem solving , impaired sequencing , poor spatial awareness , impaired safety awareness , impaired global mental status and direction following. Personal/Environmental factors impacting D/C include: Assistance needed for ADLs and functional mobility, High fall risk , decreased insight toward deficits  and baseline cognitive deficits. Supporting factors include: support system available and attitude towards recovery Patient would benefit from OT services within the acute care setting to maximize level of functional independence in the following areas self-care transfers, functional mobility, formal cognitive evaluation and ADLs. From OT standpoint, recommendation at time of D/C would be post-acute rehabilitation . OT Discharge Recommendation: Post acute rehabilitation services       8/16/2023 1259 by Evert Pavon OT  Note: Limitation: Decreased ADL status, Decreased UE strength, Decreased Safe judgement during ADL, Decreased endurance, Decreased cognition, Decreased self-care trans, Decreased high-level ADLs, Visual deficit, Decreased fine motor control (motor planning, sequencing, postural awareness, functional reach, static and dynamic balance, trunk control, decreased attention, insight / judgement, problem solving, memory.)  Prognosis: Fair  Assessment: Patient is a 70 y.o. female seen for OT evaluation at [address] [place] [address] following admission on 8/15/2023  s/p Fall. Please see above for comprehensive list of comorbidities and significant PMHx impacting functional performance. At baseline, pt is (I) with ADLs, mod (I) c SPC. Upon initial evaluation, pt appears to be performing below baseline functional status.    Occupational performance is affected by the following deficits:  decreased muscular strength , acute change in mobility status , impaired coordination , motor planning, decreased balance , decreased standing tolerance for self care tasks , poor postural control , decreased trunk control , decreased activity tolerance , impaired memory , impaired judgement and problem solving , impaired sequencing , poor spatial awareness , impaired safety awareness , impaired global mental status and direction following. Personal/Environmental factors impacting D/C include: Assistance needed for ADLs and functional mobility, High fall risk , decreased insight toward deficits  and baseline cognitive deficits. Supporting factors include: support system available and attitude towards recovery Patient would benefit from OT services within the acute care setting to maximize level of functional independence in the following areas self-care transfers, functional mobility, formal cognitive evaluation and ADLs. From OT standpoint, recommendation at time of D/C would be post-acute rehabilitation .      OT Discharge Recommendation: Post acute rehabilitation services        Jacek Sanchez OT

## 2023-08-17 ENCOUNTER — APPOINTMENT (INPATIENT)
Dept: RADIOLOGY | Facility: HOSPITAL | Age: 68
DRG: 184 | End: 2023-08-17
Payer: MEDICARE

## 2023-08-17 LAB
ANION GAP SERPL CALCULATED.3IONS-SCNC: 6 MMOL/L
BASOPHILS # BLD AUTO: 0.02 THOUSANDS/ÂΜL (ref 0–0.1)
BASOPHILS NFR BLD AUTO: 0 % (ref 0–1)
BUN SERPL-MCNC: 14 MG/DL (ref 5–25)
CALCIUM SERPL-MCNC: 9.4 MG/DL (ref 8.4–10.2)
CHLORIDE SERPL-SCNC: 104 MMOL/L (ref 96–108)
CO2 SERPL-SCNC: 26 MMOL/L (ref 21–32)
CREAT SERPL-MCNC: 0.74 MG/DL (ref 0.6–1.3)
EOSINOPHIL # BLD AUTO: 0.05 THOUSAND/ÂΜL (ref 0–0.61)
EOSINOPHIL NFR BLD AUTO: 1 % (ref 0–6)
ERYTHROCYTE [DISTWIDTH] IN BLOOD BY AUTOMATED COUNT: 13.4 % (ref 11.6–15.1)
EST. AVERAGE GLUCOSE BLD GHB EST-MCNC: 114 MG/DL
GFR SERPL CREATININE-BSD FRML MDRD: 82 ML/MIN/1.73SQ M
GLUCOSE SERPL-MCNC: 105 MG/DL (ref 65–140)
HBA1C MFR BLD: 5.6 %
HCT VFR BLD AUTO: 36.2 % (ref 34.8–46.1)
HGB BLD-MCNC: 12 G/DL (ref 11.5–15.4)
IMM GRANULOCYTES # BLD AUTO: 0.01 THOUSAND/UL (ref 0–0.2)
IMM GRANULOCYTES NFR BLD AUTO: 0 % (ref 0–2)
LYMPHOCYTES # BLD AUTO: 0.32 THOUSANDS/ÂΜL (ref 0.6–4.47)
LYMPHOCYTES NFR BLD AUTO: 6 % (ref 14–44)
MAGNESIUM SERPL-MCNC: 2.1 MG/DL (ref 1.9–2.7)
MCH RBC QN AUTO: 32.4 PG (ref 26.8–34.3)
MCHC RBC AUTO-ENTMCNC: 33.1 G/DL (ref 31.4–37.4)
MCV RBC AUTO: 98 FL (ref 82–98)
MONOCYTES # BLD AUTO: 0.39 THOUSAND/ÂΜL (ref 0.17–1.22)
MONOCYTES NFR BLD AUTO: 8 % (ref 4–12)
NEUTROPHILS # BLD AUTO: 4.27 THOUSANDS/ÂΜL (ref 1.85–7.62)
NEUTS SEG NFR BLD AUTO: 85 % (ref 43–75)
NRBC BLD AUTO-RTO: 0 /100 WBCS
PHOSPHATE SERPL-MCNC: 4.1 MG/DL (ref 2.3–4.1)
PLATELET # BLD AUTO: 174 THOUSANDS/UL (ref 149–390)
PMV BLD AUTO: 9.7 FL (ref 8.9–12.7)
POTASSIUM SERPL-SCNC: 4.4 MMOL/L (ref 3.5–5.3)
RBC # BLD AUTO: 3.7 MILLION/UL (ref 3.81–5.12)
SODIUM SERPL-SCNC: 136 MMOL/L (ref 135–147)
T4 FREE SERPL-MCNC: 1.29 NG/DL (ref 0.61–1.12)
TSH SERPL DL<=0.05 MIU/L-ACNC: 0.31 UIU/ML (ref 0.45–4.5)
VIT B12 SERPL-MCNC: 153 PG/ML (ref 180–914)
WBC # BLD AUTO: 5.06 THOUSAND/UL (ref 4.31–10.16)

## 2023-08-17 PROCEDURE — 82746 ASSAY OF FOLIC ACID SERUM: CPT | Performed by: PHYSICAL MEDICINE & REHABILITATION

## 2023-08-17 PROCEDURE — 99233 SBSQ HOSP IP/OBS HIGH 50: CPT | Performed by: NURSE PRACTITIONER

## 2023-08-17 PROCEDURE — 84443 ASSAY THYROID STIM HORMONE: CPT

## 2023-08-17 PROCEDURE — 84439 ASSAY OF FREE THYROXINE: CPT

## 2023-08-17 PROCEDURE — 97530 THERAPEUTIC ACTIVITIES: CPT

## 2023-08-17 PROCEDURE — 83735 ASSAY OF MAGNESIUM: CPT | Performed by: PHYSICIAN ASSISTANT

## 2023-08-17 PROCEDURE — 72072 X-RAY EXAM THORAC SPINE 3VWS: CPT

## 2023-08-17 PROCEDURE — 99232 SBSQ HOSP IP/OBS MODERATE 35: CPT | Performed by: SURGERY

## 2023-08-17 PROCEDURE — 82607 VITAMIN B-12: CPT

## 2023-08-17 PROCEDURE — 97110 THERAPEUTIC EXERCISES: CPT

## 2023-08-17 PROCEDURE — 99222 1ST HOSP IP/OBS MODERATE 55: CPT | Performed by: PHYSICIAN ASSISTANT

## 2023-08-17 PROCEDURE — 83036 HEMOGLOBIN GLYCOSYLATED A1C: CPT

## 2023-08-17 PROCEDURE — 84165 PROTEIN E-PHORESIS SERUM: CPT

## 2023-08-17 PROCEDURE — 84100 ASSAY OF PHOSPHORUS: CPT | Performed by: PHYSICIAN ASSISTANT

## 2023-08-17 PROCEDURE — 80048 BASIC METABOLIC PNL TOTAL CA: CPT | Performed by: PHYSICIAN ASSISTANT

## 2023-08-17 PROCEDURE — 84425 ASSAY OF VITAMIN B-1: CPT

## 2023-08-17 PROCEDURE — 85025 COMPLETE CBC W/AUTO DIFF WBC: CPT | Performed by: PHYSICIAN ASSISTANT

## 2023-08-17 RX ORDER — METOPROLOL TARTRATE 5 MG/5ML
5 INJECTION INTRAVENOUS ONCE
Status: DISCONTINUED | OUTPATIENT
Start: 2023-08-17 | End: 2023-08-18 | Stop reason: HOSPADM

## 2023-08-17 RX ADMIN — SENNOSIDES AND DOCUSATE SODIUM 1 TABLET: 50; 8.6 TABLET ORAL at 21:33

## 2023-08-17 RX ADMIN — OXYCODONE HYDROCHLORIDE 5 MG: 5 TABLET ORAL at 05:43

## 2023-08-17 RX ADMIN — FLUOXETINE 20 MG: 20 CAPSULE ORAL at 09:04

## 2023-08-17 RX ADMIN — ENOXAPARIN SODIUM 30 MG: 30 INJECTION SUBCUTANEOUS at 21:34

## 2023-08-17 RX ADMIN — ACETAMINOPHEN 975 MG: 325 TABLET, FILM COATED ORAL at 14:44

## 2023-08-17 RX ADMIN — ACETAMINOPHEN 975 MG: 325 TABLET, FILM COATED ORAL at 05:39

## 2023-08-17 RX ADMIN — LIDOCAINE 1 PATCH: 700 PATCH TOPICAL at 09:04

## 2023-08-17 RX ADMIN — CYCLOBENZAPRINE 10 MG: 10 TABLET, FILM COATED ORAL at 21:33

## 2023-08-17 RX ADMIN — ATENOLOL 50 MG: 50 TABLET ORAL at 09:04

## 2023-08-17 RX ADMIN — ATORVASTATIN CALCIUM 10 MG: 10 TABLET, FILM COATED ORAL at 09:04

## 2023-08-17 RX ADMIN — ACETAMINOPHEN 975 MG: 325 TABLET, FILM COATED ORAL at 21:34

## 2023-08-17 RX ADMIN — PREDNISONE 5 MG: 5 TABLET ORAL at 21:33

## 2023-08-17 RX ADMIN — ENOXAPARIN SODIUM 30 MG: 30 INJECTION SUBCUTANEOUS at 09:04

## 2023-08-17 RX ADMIN — LEVOTHYROXINE SODIUM 100 MCG: 100 TABLET ORAL at 05:39

## 2023-08-17 RX ADMIN — FOLIC ACID 1 MG: 1 TABLET ORAL at 09:04

## 2023-08-17 NOTE — [facility] ADMISSION
Referral received for patient consideration of [facility] placement for rehab. Reviewed case with [address] [place] [address] physician. Patient has been approved for [address] [place] [address] pending medical stability and bed availability. CM has been updated.

## 2023-08-17 NOTE — PROGRESS NOTES
NEUROLOGY RESIDENCY PROGRESS NOTE     Name: Lauri Ansari   Age & Sex: 70 y.o. female   MRN: [id number]  Unit/Bed#: W -76   Encounter: [id number]    Lauri Ansari will need follow up in in 6 weeks with movement disorder and memory with Dr. Micky Wyatt. She will not require outpatient neurological testing. Pending for discharge: None from neuro standpoint. Will defer to primary team    ASSESSMENT & PLAN     * Fall  Assessment & Plan  70 y.o. female with cervical meningioma s/p resection 2012, frequent falls, Sjogren's disease, rheumatoid arthritis on methotrexate, HTN, hypothyroidism, gastroparesis, depression and parotid gland enlargement who presents with back and right rib pain and found to have multiple R rib fractures. Neurology consulted for concern for movement disorders. MRI brain w/wo: Moderate, chronic microangiopathy significantly increased from the prior study  MRI C-spine w/wo: No cord compression or cord signal abnormality. MRI T-spine w/wo: Mild/acute T8 compression fracture. No cord compression or cord signal abnormality. Impression: Patient has been having retropulsive falls without certain etiology at this time. Her increased in frequency of falls for the past 2 weeks might be likely due to rib fracture pain causing more balance issue and falls. She also likely has stepwise vascular dementia given progressive chronic microangiopathy on imaging. Another differential dx might be progressive supranuclear palsy (PSP) given cognitive decline, appearing as not paying attention, retropulsion falls; however, her extraocular movement is intact. Plan:  -Discussed plan with neurology attending, Dr. Roddy Benavidez on board  -TSH low (likely hyperthyroidism). Pending vitamin B1, B12, A1C, SPEP.  Will need to follow up with PCP  -PT/OT: post acute rehab  -Rest of care per primary team  -Follow up with movement disorder specialist outpatient  -No further management from Neurology. Please call with questions or concerns    SUBJECTIVE     Patient was seen and examined. No acute events overnight. Patient is alert and oriented x3    Talk to patient's  on the phone for more information. Patient had some balance issue prior to meningioma resection in 2012, which was a 13-hour operation. However her balance issue has been worsened since the resection. He does not think that patient loses any consciousness. He thinks that it is mostly due to balance. Patient usually sits and watches TV at home without much physical activity. He only witnessed a few falls since he was not around normally. Patient has roughly average 1 fall per month however for the past 2 weeks, she has been consistently falling every day. He also notices that about 3 weeks ago, patient has more difficulty with her concentration. She usually prepared her pillbox and took her own pills however  found that 2 days worth of pills were missing in the pillbox likely patient took extra pills. Since then he has been monitoring her pillbox. He reports that at least for the last 2 years, patient has been having short-term memory loss and leaves her fridge door open. He usually cooks. Patient does not get lost in the house. Review of Systems   Constitutional: Negative for chills and fever. HENT: Negative for ear pain and sore throat. Eyes: Negative for pain and visual disturbance. Respiratory: Negative for cough and shortness of breath. Cardiovascular: Negative for chest pain and palpitations. Gastrointestinal: Negative for abdominal pain and vomiting. Genitourinary: Negative for dysuria and hematuria. Musculoskeletal: Positive for back pain. Negative for arthralgias. Right rib pain   Skin: Negative for color change and rash. Neurological: Negative for dizziness, seizures, syncope, weakness and headaches. All other systems reviewed and are negative.       OBJECTIVE Patient ID: Harrison Rosenthal is a 70 y.o. female. Vitals:    23 2325 23 0418 23 0726 23 0911   BP: (!) 172/78 158/77 (!) 174/82 (!) 188/88   BP Location: Left arm   Left arm   Pulse:  70 66    Resp:   17    Temp:   97.7 °F (36.5 °C)    TempSrc:   Oral    SpO2:  93% 92%    Weight:       Height:          Temperature:   Temp (24hrs), Av.7 °F (36.5 °C), Min:97.7 °F (36.5 °C), Max:97.7 °F (36.5 °C)    Temperature: 97.7 °F (36.5 °C)      Physical Exam  Vitals and nursing note reviewed. Constitutional:       General: She is not in acute distress. Appearance: She is well-developed. HENT:      Head: Normocephalic and atraumatic. Eyes:      Extraocular Movements: Extraocular movements intact and EOM normal.      Conjunctiva/sclera: Conjunctivae normal.      Pupils: Pupils are equal, round, and reactive to light. Cardiovascular:      Rate and Rhythm: Normal rate. Pulmonary:      Effort: Pulmonary effort is normal. No respiratory distress. Abdominal:      Palpations: Abdomen is soft. Tenderness: There is no abdominal tenderness. Musculoskeletal:         General: No swelling. Cervical back: Neck supple. Skin:     General: Skin is warm. Neurological:      Mental Status: She is alert and oriented to person, place, and time. Deep Tendon Reflexes:      Reflex Scores:       Tricep reflexes are 3+ on the right side and 3+ on the left side. Bicep reflexes are 3+ on the right side and 3+ on the left side. Brachioradialis reflexes are 3+ on the right side and 3+ on the left side. Achilles reflexes are 2+ on the right side and 2+ on the left side. Neurologic Exam     Mental Status   Oriented to person, place, and time. Speech: (Abnormal. Like forced speech)  Level of consciousness: alert    Cranial Nerves     CN II   Visual fields full to confrontation. CN III, IV, VI   Pupils are equal, round, and reactive to light.   Extraocular motions are normal.     CN V   Facial sensation intact. CN VII   Facial expression full, symmetric. CN VIII   CN VIII normal.     CN IX, X   CN IX normal.   CN X normal.     CN XI   CN XI normal.     CN XII   CN XII normal.     Motor Exam   Muscle bulk: normal    Strength   Strength 5/5 except as noted. Sensory Exam   Light touch normal.     Gait, Coordination, and Reflexes     Reflexes   Right brachioradialis: 3+  Left brachioradialis: 3+  Right biceps: 3+  Left biceps: 3+  Right triceps: 3+  Left triceps: 3+  Right achilles: 2+  Left achilles: 2+  Right plantar: equivocal  Left plantar: equivocal  Right Pan: present  Left Pan: present  Right ankle clonus: absent  Left ankle clonus: absentDifficult to assess patellar since patient is tense      LABORATORY DATA     Labs: I have personally reviewed pertinent reports. Results from last 7 days   Lab Units 08/17/23 0444 08/16/23  0602 08/15/23  1247   WBC Thousand/uL 5.06 5.38 5.31   HEMOGLOBIN g/dL 12.0 13.1 12.4   HEMATOCRIT % 36.2 39.7 37.6   PLATELETS Thousands/uL 174 187 205   NEUTROS PCT % 85* 85* 78*   MONOS PCT % 8 7 12   EOS PCT % 1 0 1      Results from last 7 days   Lab Units 08/17/23 0444 08/16/23  0602 08/15/23  1247   SODIUM mmol/L 136 136 134*   POTASSIUM mmol/L 4.4 4.8 3.9   CHLORIDE mmol/L 104 103 103   CO2 mmol/L 26 27 24   BUN mg/dL 14 10 12   CREATININE mg/dL 0.74 0.85 0.85   CALCIUM mg/dL 9.4 9.7 9.6   ALK PHOS U/L  --   --  110*   ALT U/L  --   --  15   AST U/L  --   --  15     Results from last 7 days   Lab Units 08/17/23 0444 08/16/23  0602   MAGNESIUM mg/dL 2.1 2.0     Results from last 7 days   Lab Units 08/17/23 0444 08/16/23  0602   PHOSPHORUS mg/dL 4.1 4.3*      Results from last 7 days   Lab Units 08/16/23  0602   INR  1.14   PTT seconds 31               IMAGING & DIAGNOSTIC TESTING     Radiology Results: I have personally reviewed pertinent reports.       MRI brain w wo contrast   Final Result by Alexander Reza Patricai Peterson MD (08/17 1624)         1. Moderate, chronic microangiopathy is significantly increased from the prior study. 2. A few foci of blooming artifact implicate amyloid angiopathy. No acute intracranial hemorrhage. 3. No acute infarction, intracranial hemorrhage or mass. 4. Right greater than left parotid cystic changes similar to the prior ultrasound, correlating to the history of Sjogren's disease. Workstation performed: [id number]         MRI cervical spine w wo contrast   Final Result by Richi Borges MD (10/47 1462)         1. No cord compression or cord signal abnormality. No epidural hematoma or paraspinal edema. 2. Scattered spondylotic changes with reversal of the cervical lordosis at the C5 level are stable. 3. Sessile enhancing extra-axial lesion anterior to the upper cervical cord dorsal to the odontoid process is stable possibly meningioma and/or pannus, stable. No cord compression or mass effect. 4. Progressive cystic changes in both parotid glands right greater than left, correlating to the history of Sjogren's disease. Workstation performed: [id number]         MRI thoracic spine w wo contrast   Final Result by Richi Borges MD (45/39 7553)         1. Mild, acute compression fracture of T8 with approximately 25% loss of vertebral body height. No bony retropulsion or epidural hematoma. No cord compression or cord signal abnormality. 2. Mild spondylotic changes elsewhere. 3. Abnormal 2.2 cm right retrocrural lymph node adjacent to the descending thoracic aorta just proximal to the diaphragmatic hiatus. Both benign and malignant etiologies are in the differential diagnosis. Follow-up CT of the chest in 3 to 6 months is    suggested to assess for stability. Further clinical assessment advised. Workstation performed: [id number]         CT head without contrast   Final Result by Dominga Sweeney DO (08/15 7397)      No acute intracranial abnormality. Sinus disease                  Workstation performed: [id number]         CT spine cervical without contrast   Final Result by Lynette Dobson DO (08/15 1800)      No cervical spine fracture or traumatic malalignment. Workstation performed: [id number]         CT chest abdomen pelvis w contrast   Final Result by Lanie Macias MD (08/15 1856)      Acute nondisplaced anterior right 3rd-5th rib fractures. No pleural effusion or pneumothorax. Stable hepatomegaly. Stable right renal cyst.            Workstation performed: [id number]         CT recon only thoracolumbar   Final Result by Lanie Macias MD (08/15 1859)      No fracture or traumatic subluxation. Workstation performed: [id number]             Other Diagnostic Testing: I have personally reviewed pertinent reports. ACTIVE MEDICATIONS     Current Facility-Administered Medications   Medication Dose Route Frequency   • acetaminophen (TYLENOL) tablet 975 mg  975 mg Oral Q8H [address]   • ALPRAZolam (XANAX) tablet 1 mg  1 mg Oral HS PRN   • atenolol (TENORMIN) tablet 50 mg  50 mg Oral Daily   • atorvastatin (LIPITOR) tablet 10 mg  10 mg Oral Daily   • cyclobenzaprine (FLEXERIL) tablet 10 mg  10 mg Oral HS   • enoxaparin (LOVENOX) subcutaneous injection 30 mg  30 mg Subcutaneous Q12H [address]   • FLUoxetine (PROzac) capsule 20 mg  20 mg Oral Daily   • folic acid (FOLVITE) tablet 1 mg  1 mg Oral Daily   • levothyroxine tablet 100 mcg  100 mcg Oral Early Morning   • lidocaine (LIDODERM) 5 % patch 1 patch  1 patch Topical Daily   • methotrexate tablet 20 mg  20 mg Oral Weekly   • oxyCODONE (ROXICODONE) IR tablet 5 mg  5 mg Oral Q4H PRN   • oxyCODONE (ROXICODONE) split tablet 2.5 mg  2.5 mg Oral Q4H PRN   • predniSONE tablet 5 mg  5 mg Oral HS   • senna-docusate sodium (SENOKOT S) 8.6-50 mg per tablet 1 tablet  1 tablet Oral HS       Prior to Admission medications    Medication Sig Start Date End Date Taking?  Authorizing Provider acetaminophen (TYLENOL) 325 mg tablet Take 2 tablets (650 mg total) by mouth every 6 (six) hours as needed for mild pain or moderate pain 3/15/21   Sneha Duarte MD   ALPRAZolam Georgette Poke) 1 mg tablet Take 1 mg by mouth in the morning 5/30/22   Historical Provider, MD   amLODIPine (NORVASC) 5 mg tablet Take 1 tablet (5 mg total) by mouth daily 3/16/21   Sneha Duarte MD   atenolol (TENORMIN) 50 mg tablet Take 1 tablet (50 mg total) by mouth daily 3/16/21   Sneha Duarte MD   atorvastatin (LIPITOR) 10 mg tablet Take 10 mg by mouth daily 7/6/19   Historical Provider, MD   cyclobenzaprine (FLEXERIL) 10 mg tablet Take 10 mg by mouth daily at bedtime 4/23/22   Historical Provider, MD   FLUoxetine (PROzac) 20 mg capsule Take 20 mg by mouth daily 8/8/19   Historical Provider, MD   folic acid (FOLVITE) 1 mg tablet Take 1 mg by mouth daily     Historical Provider, MD   furosemide (LASIX) 40 mg tablet Take 40 mg by mouth daily  Patient not taking: Reported on 8/15/2023 6/16/22   Historical Provider, MD   levothyroxine 75 mcg tablet Take 75 mcg by mouth daily 6/23/22   Historical Provider, MD   levothyroxine 88 mcg tablet Take 1 tablet (88 mcg total) by mouth daily 3/15/21   Sneha Duarte MD   linaCLOtide (Linzess) 145 MCG CAPS Take 1 capsule by mouth daily 2/2/17   Historical Provider, MD   losartan (COZAAR) 25 mg tablet Take 1 tablet (25 mg total) by mouth daily 3/16/21   Sneha Duarte MD   methotrexate 2.5 mg tablet Take 8 tablets (20 mg total) by mouth once a week 3/15/21   Sneha Duarte MD   nystatin (MYCOSTATIN) powder Apply 1 application topically 2 (two) times a day 3/16/21   Sneha Duarte MD   predniSONE 5 mg tablet  8/1/22   Historical Provider, MD   saliva substitute (MOUTH KOTE) Apply 5 sprays to the mouth or throat every 4 (four) hours as needed (Dry mouth) 3/15/21   Sneha Duarte MD   senna (SENOKOT) 8.6 mg Take 1 tablet (8.6 mg total) by mouth 2 (two) times a day as needed for constipation 3/15/21   Valentin Victor MD       VTE Pharmacologic Prophylaxis: Enoxaparin (Lovenox)  VTE Mechanical Prophylaxis: sequential compression device    ==  MD Lakhwinder Miguel [facility], PGY-3

## 2023-08-17 NOTE — PLAN OF CARE
Problem: MOBILITY - ADULT  Goal: Maintain or return to baseline ADL function  Description: INTERVENTIONS:  -  Assess patient's ability to carry out ADLs; assess patient's baseline for ADL function and identify physical deficits which impact ability to perform ADLs (bathing, care of mouth/teeth, toileting, grooming, dressing, etc.)  - Assess/evaluate cause of self-care deficits   - Assess range of motion  - Assess patient's mobility; develop plan if impaired  - Assess patient's need for assistive devices and provide as appropriate  - Encourage maximum independence but intervene and supervise when necessary  - Involve family in performance of ADLs  - Assess for home care needs following discharge   - Consider OT consult to assist with ADL evaluation and planning for discharge  - Provide patient education as appropriate  Outcome: Progressing  Goal: Maintains/Returns to pre admission functional level  Description: INTERVENTIONS:  - Perform BMAT or MOVE assessment daily.   - Set and communicate daily mobility goal to care team and patient/family/caregiver. - Collaborate with rehabilitation services on mobility goals if consulted  - Perform Range of Motion 3 times a day. - Reposition patient every 3 hours.   - Dangle patient 3 times a day  - Stand patient 3 times a day  - Ambulate patient 3 times a day  - Out of bed to chair 3 times a day   - Out of bed for meals 3 times a day  - Out of bed for toileting  - Record patient progress and toleration of activity level   Outcome: Progressing

## 2023-08-17 NOTE — ASSESSMENT & PLAN NOTE
- Patient with chronic history of rheumatoid arthritis. - Continue home medication regimen including methotrexate and prednisone.  - Outpatient follow-up per routine.

## 2023-08-17 NOTE — PHYSICAL THERAPY NOTE
PHYSICAL THERAPY NOTE          Patient Name: Joe Severino  QBNPW'T Date: 23 0940   PT Last Visit   PT Visit Date 23   Note Type   Note Type Treatment   Pain Assessment   Pain Assessment Tool 0-10   Pain Score No Pain   Patient's Stated Pain Goal No pain   Hospital Pain Intervention(s) Repositioned; Ambulation/increased activity; Elevated; Rest   Multiple Pain Sites No   Pain Rating: FLACC (Rest) - Face 0   Pain Rating: FLACC (Rest) - Legs 0   Pain Rating: FLACC (Rest) - Activity 0   Pain Rating: FLACC (Rest) - Cry 0   Pain Rating: FLACC (Rest) - Consolability 0   Score: FLACC (Rest) 0   Restrictions/Precautions   Weight Bearing Precautions Per Order No   Other Precautions Cognitive; Chair Alarm; Bed Alarm; Fall Risk;Impulsive   General   Chart Reviewed Yes   Response to Previous Treatment Patient with no complaints from previous session. Family/Caregiver Present No   Cognition   Overall Cognitive Status (S)  Impaired   Arousal/Participation Alert; Responsive; Cooperative   Attention Difficulty attending to directions   Orientation Level Oriented X4   Memory Decreased short term memory;Decreased recall of recent events;Decreased recall of precautions   Following Commands Follows one step commands with increased time or repetition   Comments pt was identified by name and    Subjective   Subjective pt was agreeable to participate in PT intervention. pt stated 0/10 pain pre/post tx session   Bed Mobility   Supine to Sit 4  Minimal assistance   Additional items Assist x 1;HOB elevated; Bedrails; Increased time required;Verbal cues;LE management   Sit to Supine Unable to assess   Additional Comments pt seated OOB in the recliner post tx session with call bell, legs elevated, chair alarm activated and all pt needs met   Transfers   Sit to Stand 4  Minimal assistance   Additional items Assist x 1; Increased time required;Verbal cues  (w/ RW)   Stand to Sit 4  Minimal assistance   Additional items Assist x 1; Armrests; Increased time required;Verbal cues   Stand pivot 3  Moderate assistance   Additional items Assist x 1; Armrests; Increased time required;Verbal cues  (VC's for RW management, stepping sequence and hand placement while descening into recliner)   Additional Comments pt with continued retropulsion after completing STS from EOB that required min Ax1 to correct. pt also impulsive after completing SPT pt abandoned RW before completing transfer to recliner   Ambulation/Elevation   Gait pattern Decreased foot clearance; Improper Weight shift; Short stride; Excessively slow; Shuffling;Decreased hip extension;Decreased heel strike;Decreased toe off   Gait Assistance 3  Moderate assist   Additional items Assist x 1;Verbal cues   Assistive Device Rolling walker   Distance 5'  (w/ RW)   Ambulation/Elevation Additional Comments continues assistance required for RW advancement/management   Balance   Static Sitting Poor +   Dynamic Sitting Poor   Static Standing Poor +   Dynamic Standing Poor   Ambulatory Poor  (w/ RW)   Endurance Deficit   Endurance Deficit Yes   Endurance Deficit Description limited activity tolerance, functional mobility, standing tolerance and ambulation distance   Activity Tolerance   Activity Tolerance Patient limited by fatigue; Other (Comment)   Nurse Made Aware Spoke to RN   Exercises   Quad Sets Supine;10 reps;AROM; Bilateral   Heelslides Supine;10 reps;AROM; Bilateral   Hip Abduction Supine;10 reps;AROM; Bilateral   Hip Adduction Sitting;10 reps;AROM; Bilateral   Knee AROM Short Arc Quad Supine;10 reps;AROM; Bilateral   Knee AROM Long Arc Quad Sitting;10 reps;AROM; Bilateral   Ankle Pumps Sitting;20 reps;AROM; Bilateral   Assessment   Prognosis Fair   Problem List Decreased strength; Impaired balance;Decreased mobility; Decreased coordination; Impaired judgement;Decreased safety awareness   Assessment pt began tx session lying supine in the bed and was agreeable to participate in PT intervention. Slight progress w/ bed mobility as pt was able to sit EOB from supine w/ min Ax1 and VC's for bed rail use and LE management. pt continues to demonstrate decreased safety awareness that required continual VC's and education in order to facilitate increased safety and balance in todays PT intervention. pt continues to remain consistant with min ax1 in oder to complete STS transfers and mod Ax1 to complete SpT w/ RW, RW management and VC's for safety and stepping sequence towards recliner. pt continues to be limited with functional mobility, activity tolerance and ambulation distance. pt ambulated 5' with RW and required mod ax1 for safety and balance as pt abandoned RW and was impulsive prior to sitting in recliner. Continue to recommend post acute rehab services at the time of D/C in order to maximize pt functional independence and safety with all OOB mobility. Post tx pt in recliner with call bell, legs elevated and chair alarm activated   Goals   Patient Goals to go home   STG Expiration Date 08/26/23   PT Treatment Day 2   Plan   Treatment/Interventions Functional transfer training;LE strengthening/ROM; Therapeutic exercise; Endurance training;Elevations;Cognitive reorientation;Patient/family training;Equipment eval/education; Bed mobility;Gait training; Compensatory technique education;Spoke to nursing   Progress Slow progress, decreased activity tolerance   PT Frequency 3-5x/wk   Recommendation   PT Discharge Recommendation Post acute rehabilitation services   Equipment Recommended Vikas Carney Recommended Wheeled walker   Change/add to [employer]?  Yes, Change Size   Walker Size Will (Ht <5'1")   AM-PAC Basic Mobility Inpatient   Turning in Flat Bed Without Bedrails 3   Lying on Back to Sitting on Edge of Flat Bed Without Bedrails 3   Moving Bed to Chair 2   Standing Up From Chair Using Arms 3   Walk in Room 2 Climb 3-5 Stairs With Railing 1   Basic Mobility Inpatient Raw Score 14   Basic Mobility Standardized Score 35.55   Highest Level Of Mobility   -HLM Goal 4: Move to chair/commode   JH-HLM Achieved 4: Move to chair/commode   Education   Education Provided Mobility training;Assistive device   Patient Reinforcement needed   End of Consult   Patient Position at End of Consult Bedside chair;Bed/Chair alarm activated; All needs within reach   The patient's AM-PAC Basic Mobility Inpatient Short Form Raw Score is 14. A Raw score of less than or equal to 16 suggests the patient may benefit from discharge to post-acute rehabilitation services. Please also refer to the recommendation of the Physical Therapist for safe discharge planning.      Herrera Amos

## 2023-08-17 NOTE — PLAN OF CARE
Problem: PHYSICAL THERAPY ADULT  Goal: Performs mobility at highest level of function for planned discharge setting. See evaluation for individualized goals. Description: Treatment/Interventions: Functional transfer training, LE strengthening/ROM, Therapeutic exercise, Endurance training, Cognitive reorientation, Patient/family training, Equipment eval/education, Bed mobility, Gait training, Compensatory technique education  Equipment Recommended: Aashish Chavez       See flowsheet documentation for full assessment, interventions and recommendations. Outcome: Progressing  Note: Prognosis: Fair  Problem List: Decreased strength, Impaired balance, Decreased mobility, Decreased coordination, Impaired judgement, Decreased safety awareness  Assessment: pt began tx session lying supine in the bed and was agreeable to participate in PT intervention. Slight progress w/ bed mobility as pt was able to sit EOB from supine w/ min Ax1 and VC's for bed rail use and LE management. pt continues to demonstrate decreased safety awareness that required continual VC's and education in order to facilitate increased safety and balance in todays PT intervention. pt continues to remain consistant with min ax1 in oder to complete STS transfers and mod Ax1 to complete SpT w/ RW, RW management and VC's for safety and stepping sequence towards recliner. pt continues to be limited with functional mobility, activity tolerance and ambulation distance. pt ambulated 5' with RW and required mod ax1 for safety and balance as pt abandoned RW and was impulsive prior to sitting in recliner. Continue to recommend post acute rehab services at the time of D/C in order to maximize pt functional independence and safety with all OOB mobility. Post tx pt in recliner with call bell, legs elevated and chair alarm activated        PT Discharge Recommendation: Post acute rehabilitation services    See flowsheet documentation for full assessment.

## 2023-08-17 NOTE — ASSESSMENT & PLAN NOTE
- Multiple right-sided rib fractures (3rd-5th), present on admission.  - Continue rib fracture protocol.  - Continue to encourage incentive spirometer use and adequate pulmonary hygiene. Currently pulling 1,000 mL on I.S.  - PIC score is 7.  - Continue multimodal analgesic regimen.  - Supplemental oxygen via nasal cannula as needed to maintain saturations greater than or equal to 94%. - Patient currently maintaining oxygen saturations on room air.  - PT and OT evaluation and treatment as indicated. - Outpatient follow-up in the trauma clinic for re-evaluation in approximately 2 weeks.

## 2023-08-17 NOTE — QUICK NOTE
Patient seen with  at bedside to provide update regarding MRI findings. MRI of the thoracic spine did identify a T8 vertebral compression fracture. Patient not currently having back pain and did not have midline thoracic spine tenderness on my evaluation. We did discuss that Neurosurgery has been consulted and we are awaiting their evaluation and input. We discussed possible need for TLSO brace and that she would need upright thoracic spine x-rays, which have been ordered. Anticipate patient will remain appropriate for discharge to acute rehab on 8/18/2023.   Case management updated and pickup tentatively set for 8/18/2023 at [facility]  8/17/2023  3:48 PM

## 2023-08-17 NOTE — ASSESSMENT & PLAN NOTE
- Patient with chronic history of hypothyroidism.  - Continue home regimen with levothyroxine.  - Outpatient follow-up per routine.

## 2023-08-17 NOTE — ASSESSMENT & PLAN NOTE
- Concern for possible movement disorder contributing to recent falls. - Appreciate Neurology evaluation, recommendations and assistance with work-up and management. - Maintain fall precautions. - Review MRI results with Neurology. - Continue PT and OT evaluation and treatment as indicated. - Will need outpatient follow-up for further evaluation and ongoing management.

## 2023-08-17 NOTE — PLAN OF CARE
Problem: MOBILITY - ADULT  Goal: Maintain or return to baseline ADL function  Description: INTERVENTIONS:  -  Assess patient's ability to carry out ADLs; assess patient's baseline for ADL function and identify physical deficits which impact ability to perform ADLs (bathing, care of mouth/teeth, toileting, grooming, dressing, etc.)  - Assess/evaluate cause of self-care deficits   - Assess range of motion  - Assess patient's mobility; develop plan if impaired  - Assess patient's need for assistive devices and provide as appropriate  - Encourage maximum independence but intervene and supervise when necessary  - Involve family in performance of ADLs  - Assess for home care needs following discharge   - Consider OT consult to assist with ADL evaluation and planning for discharge  - Provide patient education as appropriate  Outcome: Progressing  Goal: Maintains/Returns to pre admission functional level  Description: INTERVENTIONS:  - Perform BMAT or MOVE assessment daily.   - Set and communicate daily mobility goal to care team and patient/family/caregiver. - Collaborate with rehabilitation services on mobility goals if consulted  - Perform Range of Motion 3 times a day. - Reposition patient every 3 hours.   - Dangle patient 3 times a day  - Stand patient 3 times a day  - Ambulate patient 3 times a day  - Out of bed to chair 3 times a day   - Out of bed for meals 3 times a day  - Out of bed for toileting  - Record patient progress and toleration of activity level   Outcome: Progressing     Problem: Prexisting or High Potential for Compromised Skin Integrity  Goal: Skin integrity is maintained or improved  Description: INTERVENTIONS:  - Identify patients at risk for skin breakdown  - Assess and monitor skin integrity  - Assess and monitor nutrition and hydration status  - Monitor labs   - Assess for incontinence   - Turn and reposition patient  - Assist with mobility/ambulation  - Relieve pressure over bony prominences  - Avoid friction and shearing  - Provide appropriate hygiene as needed including keeping skin clean and dry  - Evaluate need for skin moisturizer/barrier cream  - Collaborate with interdisciplinary team   - Patient/family teaching  - Consider wound care consult   Outcome: Progressing     Problem: PAIN - ADULT  Goal: Verbalizes/displays adequate comfort level or baseline comfort level  Description: Interventions:  - Encourage patient to monitor pain and request assistance  - Assess pain using appropriate pain scale  - Administer analgesics based on type and severity of pain and evaluate response  - Implement non-pharmacological measures as appropriate and evaluate response  - Consider cultural and social influences on pain and pain management  - Notify physician/advanced practitioner if interventions unsuccessful or patient reports new pain  Outcome: Progressing     Problem: SAFETY ADULT  Goal: Maintain or return to baseline ADL function  Description: INTERVENTIONS:  -  Assess patient's ability to carry out ADLs; assess patient's baseline for ADL function and identify physical deficits which impact ability to perform ADLs (bathing, care of mouth/teeth, toileting, grooming, dressing, etc.)  - Assess/evaluate cause of self-care deficits   - Assess range of motion  - Assess patient's mobility; develop plan if impaired  - Assess patient's need for assistive devices and provide as appropriate  - Encourage maximum independence but intervene and supervise when necessary  - Involve family in performance of ADLs  - Assess for home care needs following discharge   - Consider OT consult to assist with ADL evaluation and planning for discharge  - Provide patient education as appropriate  Outcome: Progressing  Goal: Maintains/Returns to pre admission functional level  Description: INTERVENTIONS:  - Perform BMAT or MOVE assessment daily.   - Set and communicate daily mobility goal to care team and patient/family/caregiver. - Collaborate with rehabilitation services on mobility goals if consulted  - Perform Range of Motion 3 times a day. - Reposition patient every 2 hours. - Dangle patient 3 times a day  - Stand patient 3 times a day  - Ambulate patient 3 times a day  - Out of bed to chair 3 times a day   - Out of bed for meals 3 times a day  - Out of bed for toileting  - Record patient progress and toleration of activity level   Outcome: Progressing  Goal: Patient will remain free of falls  Description: INTERVENTIONS:  - Educate patient/family on patient safety including physical limitations  - Instruct patient to call for assistance with activity   - Consult OT/PT to assist with strengthening/mobility   - Keep Call bell within reach  - Keep bed low and locked with side rails adjusted as appropriate  - Keep care items and personal belongings within reach  - Initiate and maintain comfort rounds  - Make Fall Risk Sign visible to staff  - Offer Toileting every 2 Hours, in advance of need  - Initiate/Maintain alarm  - Obtain necessary fall risk management equipment  - Apply yellow socks and bracelet for high fall risk patients  - Consider moving patient to room near nurses station  Outcome: Progressing     Problem: Nutrition/Hydration-ADULT  Goal: Nutrient/Hydration intake appropriate for improving, restoring or maintaining nutritional needs  Description: Monitor and assess patient's nutrition/hydration status for malnutrition. Collaborate with interdisciplinary team and initiate plan and interventions as ordered. Monitor patient's weight and dietary intake as ordered or per policy. Utilize nutrition screening tool and intervene as necessary. Determine patient's food preferences and provide high-protein, high-caloric foods as appropriate.      INTERVENTIONS:  - Monitor oral intake, urinary output, labs, and treatment plans  - Assess nutrition and hydration status and recommend course of action  - Evaluate amount of meals eaten  - Assist patient with eating if necessary   - Allow adequate time for meals  - Recommend/ encourage appropriate diets, oral nutritional supplements, and vitamin/mineral supplements  - Order, calculate, and assess calorie counts as needed  - Recommend, monitor, and adjust tube feedings and TPN/PPN based on assessed needs  - Assess need for intravenous fluids  - Provide specific nutrition/hydration education as appropriate  - Include patient/family/caregiver in decisions related to nutrition  Outcome: Progressing

## 2023-08-17 NOTE — PROGRESS NOTES
Progress Note - Geriatric Medicine   Anymarta Fine 70 y.o. female MRN: [id number]  Unit/Bed#:  W -18 Encounter: [id number]      Assessment/Plan:  Fall  · With multiple falls at home over the last 2 weeks as per her   · Denies any dizziness, lightheadedness, or loss of consciousness during this falls  · Has not reports she usually falls when she is trying to carry something or lift something up while using her cane  · Trauma work-up revealed multiple rib fractures on the right side  [place] fall precautions   Assess patient frequently for physical needs, encourage use of assistant devices as needed and directed by PT/OT  Identify cognitive and physical deficits and behaviors that affect risk of falls  · Consider moving patient closer to nursing station to monitor more closely for impulsive behavior which may increase risk of falls  · Educate patient/family on patient safety including physical limitations and importance of using call bell for assistance   · Modify environment to reduce risk of injury including disconnecting from pole when not in use, ensuring adequate lighting in room and restroom, ensuring that path to restroom is clear and free of trip hazard  · PT/OT consulted to assist with strengthening/mobility and assist with discharge planning to appropriate level of care  · Patient being considered for [facility] admission once medically stable    Closed fracture of multiple ribs of right side  · S/p fall  · Rib fracture protocol  · Encourage coughing, deep breathing, and incentive spirometry  · Multimodal pain regimen including geriatric pain protocol  · Currently on Tylenol 975 every 8, lidocaine patch daily, oxycodone 2.5 mg every 4 as needed for moderate pain, and oxycodone 5 mg every 4 as needed for severe pain  · PT/OT following  · Patient being considered for [facility] admission once medically stable  · Currently on Lovenox for DVT prophylaxis  · Management per trauma  Movement disorder  · Questionable movement disorder contributing to multiple recent falls  · Neurology consulted and following  · Neurology ordered MRI brain, thoracic spine, cervical spine-see final reports under imaging  · PT/OT following-likely needs rehab at discharge  · Further management per neurology    Cognitive impairment  Patient is alert oriented to person, place, and time disoriented to situation  Previous MoCA 17 out of 30 on 3/16/2021-likely decline since then  Discussed with OT yesterday with plans to do repeat cognitive testing  Vitamin B12 level pending  TSH low at 0.314 and T4 free pending  At risk for delirium due to hospitalization, cognitive impairment, medications  Recommend delirium precautions  Maintain sleep-wake cycle, avoid nighttime interruptions  minimize overnight interruptions, group overnight vitals/labs/nursing checks as possible  dim lights, close blinds and turn off tv to minimize stimulation and encourage sleep environment in evenings  Provide adequate pain control  Avoid urinary retention and constipation  Provide frequent and early mobilization  Provide frequent redirection and reorientation as needed  Avoid medications that may worsen or precipitate delirium such as tramadol, benzodiazepines, anticholinergics, and Benadryl  Redirect unwanted behaviors as first-line therapy, avoid physical restraints as able to  · Continue to monitor    Anxiety/depression  · chronic   · Currently maintained on Prozac 20 mg daily and Xanax 1 mg 3 times daily as needed, has reports she only takes the 1 mg at bedtime to help with sleep  · Do not recommend benzodiazepines in older adults due to increased risk of falls and confusion  · Previously discussed with patient's  about having this medication weaned off in an outpatient setting under the care of her PCP  · Do not abruptly stop this medication as patient can have withdrawal symptoms from this  · Continue to provide emotional support    Insomnia  First-line treatment is behavior modification  Maintain sleep-wake cycle, avoid nighttime interruptions  Avoid caffeine throughout the day  Avoid napping throughout the day  Encourage physical activity throughout the day  · Avoid sedative hypnotics including benzodiazepines and benadryl  · Reports she did not sleep well last night  · Could add as needed melatonin 3 mg  · Also has as needed Xanax ordered for sleep/anxiety    Ambulatory dysfunction  At a baseline ambulates with cane  PT/OT following  Fall precautions  Out of bed as tolerated  Encourage early and frequent mobilization  Encourage adequate hydration and nutrition  Provide adequate pain management   Goal is rehab   · Continue with PT/OT for continued strength and balance training     Subjective:   Patient is being seen for a geriatrics follow-up. Upon examination patient was at bed in the chair, resting. She appeared comfortable and was in no acute distress. She denied any pain on exam.  She reports he did not sleep well overnight. Appetite seems decreased, she reports she ate some of her breakfast but refused to eat any lunch. She received an Ensure shake on her lunch, states she did not like the vanilla-I switched to the chocolate version. Last bowel movement was 8/14/2023. Per nursing she has been intermittently confused and impulsive, otherwise no acute concerns or issues at this time. Review of Systems   Reason unable to perform ROS: Limited by confusion. Constitutional: Negative for activity change, appetite change, chills, fatigue and fever. Eyes: Positive for visual disturbance. Respiratory: Negative for cough and shortness of breath. Cardiovascular: Negative for chest pain and palpitations. Gastrointestinal: Positive for diarrhea. Negative for abdominal pain, constipation, nausea and vomiting. Genitourinary: Negative for difficulty urinating and dysuria.    Musculoskeletal: Positive for arthralgias and gait problem. Negative for back pain. Skin: Negative for color change. Neurological: Positive for weakness. Negative for dizziness, light-headedness and headaches. Psychiatric/Behavioral: Positive for confusion and sleep disturbance. Negative for hallucinations. The patient is not nervous/anxious. Objective:     Vitals: Blood pressure (!) 188/88, pulse 66, temperature 97.7 °F (36.5 °C), temperature source Oral, resp. rate 17, height 4' 10" (1.473 m), weight 61.5 kg (135 lb 9.3 oz), last menstrual period 01/13/2005, SpO2 92 %, not currently breastfeeding. ,Body mass index is 28.34 kg/m². Intake/Output Summary (Last 24 hours) at 8/17/2023 1130  Last data filed at 8/16/2023 1500  Gross per 24 hour   Intake 480 ml   Output --   Net 480 ml       Current Medications: Reviewed    Physical Exam:   Physical Exam  Vitals reviewed. Constitutional:       General: She is not in acute distress. Appearance: She is well-developed. She is not ill-appearing. Comments: Frail appearing    HENT:      Head: Normocephalic and atraumatic. Cardiovascular:      Rate and Rhythm: Normal rate and regular rhythm. Heart sounds: No murmur heard. Pulmonary:      Effort: Pulmonary effort is normal. No respiratory distress. Breath sounds: Normal breath sounds. Abdominal:      General: Bowel sounds are normal. There is no distension. Palpations: Abdomen is soft. Tenderness: There is no abdominal tenderness. Musculoskeletal:         General: No swelling. Right lower leg: No edema. Left lower leg: No edema. Skin:     General: Skin is warm and dry. Coloration: Skin is pale. Neurological:      General: No focal deficit present. Mental Status: She is alert. Mental status is at baseline. Cranial Nerves: No cranial nerve deficit. Motor: Weakness present.       Gait: Gait abnormal.      Comments: Alert to person, place, and time-disoriented to situation   Psychiatric: Mood and Affect: Mood normal.         Behavior: Behavior is slowed. Cognition and Memory: Memory is impaired. Invasive Devices     Peripheral Intravenous Line  Duration           Peripheral IV 08/15/23 Right;Ventral (anterior) Forearm 1 day                Lab, Imaging and other studies:    Lab Results:   I have personally reviewed pertinent lab results including the following:  -CBC, BMP, magnesium, phosphorus    I have personally reviewed the following imaging study reports in PACS:  MRI thoracic spine, MRI cervical, MRI brain  - I have personally reviewed pertinent reports. Please note:  Voice-recognition software may have been used in the preparation of this document. Occasional wrong word or "sound-alike" substitutions may have occurred due to the inherent limitations of voice recognition software. Interpretation should be guided by context.

## 2023-08-17 NOTE — ASSESSMENT & PLAN NOTE
- Status post fall with the below noted injuries. - Patient noted an increased frequency of falls recently with 1-2 falls daily over the past 2 weeks; she is unsure why she continues to fall frequently. - Neurology consultation and evaluation appreciated. - Fall precautions. - Geriatric Medicine consultation for evaluation, medication review and recommendations.  - PT and OT evaluation and treatment as indicated. - Case Management consultation for disposition planning.

## 2023-08-17 NOTE — PROGRESS NOTES
[facility]  Progress Note  Name: Saray Marino I  MRN: [id number]  Unit/Bed#: W -37 I Date of Admission: 8/15/2023   Date of Service: 8/17/2023 I Hospital Day: 2    Assessment/Plan   * Fall  Assessment & Plan  - Status post fall with the below noted injuries. - Patient noted an increased frequency of falls recently with 1-2 falls daily over the past 2 weeks; she is unsure why she continues to fall frequently. - Neurology consultation and evaluation appreciated. - Fall precautions. - Geriatric Medicine consultation for evaluation, medication review and recommendations.  - PT and OT evaluation and treatment as indicated. - Case Management consultation for disposition planning. Closed fracture of multiple ribs of right side  Assessment & Plan  - Multiple right-sided rib fractures (3rd-5th), present on admission.  - Continue rib fracture protocol.  - Continue to encourage incentive spirometer use and adequate pulmonary hygiene. Currently pulling 1,000 mL on I.S.  - PIC score is 7.  - Continue multimodal analgesic regimen.  - Supplemental oxygen via nasal cannula as needed to maintain saturations greater than or equal to 94%. - Patient currently maintaining oxygen saturations on room air.  - PT and OT evaluation and treatment as indicated. - Outpatient follow-up in the trauma clinic for re-evaluation in approximately 2 weeks. Movement disorder  Assessment & Plan  - Concern for possible movement disorder contributing to recent falls. - Appreciate Neurology evaluation, recommendations and assistance with work-up and management. - Maintain fall precautions. - Review MRI results with Neurology. - Continue PT and OT evaluation and treatment as indicated. - Will need outpatient follow-up for further evaluation and ongoing management.     Sjogren's disease [facility])  Assessment & Plan  - Patient with chronic history of Sjogren's disease.  - Continue home medication regimen as appropriate. - Outpatient follow-up per routine. Rheumatoid arteritis ([address])  Assessment & Plan  - Patient with chronic history of rheumatoid arthritis. - Continue home medication regimen including methotrexate and prednisone.  - Outpatient follow-up per routine. Anxiety and depression  Assessment & Plan  - Patient with chronic history of anxiety and depression.  - Continue home medication regimen including Xanax and Prozac. - Outpatient follow-up per routine. Hypothyroidism  Assessment & Plan  - Patient with chronic history of hypothyroidism.  - Continue home regimen with levothyroxine.  - Outpatient follow-up per routine. Bowel Regimen: On Senokot-S.  VTE Prophylaxis:Sequential compression device (Venodyne)  and Enoxaparin (Lovenox)     Disposition: Continue current level of care. Continue therapy evaluation and treatment as indicated. Case management following for disposition planning. Subjective   Chief Complaint: "I'm doing alright."    Subjective: Patient is doing pretty well this morning. She does continue to have chest wall pain, but notes that it is currently manageable. She did have difficulty sleeping overnight due to trouble finding a comfortable position. She notes some difficulty taking deep breaths still, but overall her respiratory status is okay. She is tolerating oral intake without nausea or vomiting. She has no new complaints. Objective   Vitals:   Temp:  [97.7 °F (36.5 °C)] 97.7 °F (36.5 °C)  HR:  [66-70] 66  Resp:  [17-18] 17  BP: (154-188)/(69-89) 188/88    I/O       08/15 0701  08/16 0700 08/16 0701  08/17 0700 08/17 0701  08/18 0700    P. O. 480 960     I.V. (mL/kg) 10 (0.2)      Total Intake(mL/kg) 490 (8) 960 (15.6)     Urine (mL/kg/hr) 300      Total Output 300      Net +190 +960                   Physical Exam:   GENERAL APPEARANCE: Patient in no acute distress.   HEENT: NCAT; EOMs intact; Mucous membranes moist  NECK / BACK: Right neck mass consistent with known parotid lesion per patient. No tenderness or external signs of trauma at this site. CV: Regular rate and rhythm; no murmur/gallops/rubs appreciated. CHEST / LUNGS: Clear to auscultation; no wheezes/rales/rhonci. Mild to moderate diffuse right lateral chest wall tenderness without crepitus or deformities. ABD: NABS; soft; non-distended; non-tender. : Voiding spontaneously. EXT: +2 pulses bilaterally upper & lower extremities; no edema. NEURO: GCS 15; no focal neurologic deficits; neurovascularly intact. SKIN: Warm, dry and well perfused; no rash; no jaundice.     Invasive Devices     Peripheral Intravenous Line  Duration           Peripheral IV 08/15/23 Right;Ventral (anterior) Forearm 1 day                 PIC Score  PIC Pain Score: 3 (8/17/2023  9:00 AM)  PIC Incentive Spirometry Score: 4 (8/17/2023  8:00 AM)  PIC Cough Description: 3 (8/17/2023  8:00 AM)  PIC Total Score: 10 (8/17/2023  8:00 AM)       If the Total PIC Score </=5, did you consult APS and evaluate patient for further intervention?: N/A      Pain:    Incentive Spirometry  Cough  3 = Controlled  4 = Above goal volume 3 = Strong  2 = Moderate  3 = Goal to alert volume 2 = Weak  1 = Severe  2 = Below alert volume 1 = Absent     1 = Unable to perform IS         Lab Results:   Results: I have personally reviewed all pertinent laboratory/tests results, BMP/CMP:   Lab Results   Component Value Date    SODIUM 136 08/17/2023    K 4.4 08/17/2023     08/17/2023    CO2 26 08/17/2023    BUN 14 08/17/2023    CREATININE 0.74 08/17/2023    CALCIUM 9.4 08/17/2023    EGFR 82 08/17/2023    and CBC:   Lab Results   Component Value Date    WBC 5.06 08/17/2023    HGB 12.0 08/17/2023    HCT 36.2 08/17/2023    MCV 98 08/17/2023     08/17/2023    RBC 3.70 (L) 08/17/2023    MCH 32.4 08/17/2023    MCHC 33.1 08/17/2023    RDW 13.4 08/17/2023    MPV 9.7 08/17/2023    NRBC 0 08/17/2023     Imaging: I have personally reviewed pertinent reports.      Other Studies: N/A      Ernie Morris PA-C  8/17/2023  07:58 AM

## 2023-08-17 NOTE — ASSESSMENT & PLAN NOTE
- Patient with chronic history of anxiety and depression.  - Continue home medication regimen including Xanax and Prozac. - Outpatient follow-up per routine.

## 2023-08-17 NOTE — CONSULTS
Consultation - Neurosurgery   Colleen Matute Rody 70 y.o. female MRN: [id number]  Unit/Bed#: W [place] 57399 Encounter: [id number]      Inpatient consult to Neurosurgery  Consult performed by: Dana Bill PA-C  Consult ordered by: Nathaniel Costello PA-C          Assessment/Plan               Assessment:  1. Acute T8 compression fracture  2. History of falls      Plan:   · Exam: Patient alert and oriented x2. Moves all extremities. Strength is 5/5 bilaterally and sensation to light touch intact throughout. Slight weakness in the left quads 4+/5. DTR 2+ without clonus bilaterally. Nontender on palpation of thoracic spine. · Images:Thoracic spine with and without contrast demonstrates mild acute compression fracture of T8 with approximately 25% loss of vertebral body height. No bony retropulsion or epidural hematoma. No cord compression or cord signal abnormality  · Pain control: Per Primary team  · DVT ppx: SCDs bilateral legs. With pharmacological DVT prophylaxis  · Activity: As Tolerated  · PT/OT evaluation & treatment  · Brace: Wear TLSO brace when upright and at 45 degree head of bed  · Medical Mx: Per primary team  · Neurocheck: Routine  · Patient fell after she lost balance and severe back pain, but felt better after she was given morphine. Has gait issues at baseline uses walker/cane for ambulation. No radicular symptoms, slight left quad weakness still able to lift her legs, lower extremities strength 5/5 bilaterally. Commend upright x-rays and wearing TLSO brace, pending pain control and PT/OT. Reviewed the image upon completion. Consider 2 weeks follow-up outpatient neurosurgery clinic follow-up images.       History of Present Illness     HPI: Heydi Frost is a 70 y.o. female with PMHx status post cervical meningioma resection in 2022, Sjogren's disease, thyroid disorder, moderate arthritis on methotrexate, hypertension, prophylaxis, depression, xerostomia, gait instability and fall presented with wanting acute T8 compression deformity. Reports that she lost balance and fell down her back. Patient complains of central thoracic back pain, right denies any radicular symptoms, numbness, weakness or paresthesia in the lower extremities. She has urinary content but denies any bowel issues. Patient reports feeling better after she gets morphine. Denies history of fever, chills, rigors, cough or chest pain. Denies history of mellitus, congestive heart failure, stroke, seizures, bleeding disorder or taking anticoagulant medications. History of smoking cigarettes or drinking alcohol. Review of Systems   Constitutional: Negative for activity change, chills, fatigue and fever. HENT: Negative for trouble swallowing and voice change. Eyes: Negative for photophobia and visual disturbance. Respiratory: Negative for cough, shortness of breath and wheezing. Cardiovascular: Negative for chest pain, palpitations and leg swelling. Gastrointestinal: Negative for nausea and vomiting. Genitourinary:        Urinary incontinent   Musculoskeletal: Positive for back pain and gait problem. Neurological: Positive for weakness. Negative for dizziness, tremors, seizures, syncope, facial asymmetry, speech difficulty, light-headedness, numbness and headaches. Psychiatric/Behavioral: Negative for confusion.        Historical Information   Past Medical History:   Diagnosis Date   • Lymphadenitis, chronic    • Sialadenitis    • Sjogren's disease ([facility])    • Stomach problems    • Thyroid disorder    • Xerostomia      Past Surgical History:   Procedure Laterality Date   • BRAIN SURGERY  2019    tumor removed Dr. Josy Maxwell   • CHOLECYSTECTOMY       Social History     Substance and Sexual Activity   Alcohol Use Yes     Social History     Substance and Sexual Activity   Drug Use Never     Social History     Tobacco Use   Smoking Status Former   Smokeless Tobacco Never     Family History   Problem Relation Age of Onset   • Diabetes Other    • Hypertension Other    • Heart disease Other    • Arthritis Other    • Heart disease Mother    • Kidney disease Mother    • Alcohol abuse Father        Meds/Allergies   all current active meds have been reviewed, current meds:   Current Facility-Administered Medications   Medication Dose Route Frequency   • acetaminophen (TYLENOL) tablet 975 mg  975 mg Oral Q8H [address]   • ALPRAZolam (XANAX) tablet 1 mg  1 mg Oral HS PRN   • atenolol (TENORMIN) tablet 50 mg  50 mg Oral Daily   • atorvastatin (LIPITOR) tablet 10 mg  10 mg Oral Daily   • cyclobenzaprine (FLEXERIL) tablet 10 mg  10 mg Oral HS   • enoxaparin (LOVENOX) subcutaneous injection 30 mg  30 mg Subcutaneous Q12H [address]   • FLUoxetine (PROzac) capsule 20 mg  20 mg Oral Daily   • folic acid (FOLVITE) tablet 1 mg  1 mg Oral Daily   • levothyroxine tablet 100 mcg  100 mcg Oral Early Morning   • lidocaine (LIDODERM) 5 % patch 1 patch  1 patch Topical Daily   • methotrexate tablet 20 mg  20 mg Oral Weekly   • metoprolol (LOPRESSOR) injection 5 mg  5 mg Intravenous Once   • oxyCODONE (ROXICODONE) IR tablet 5 mg  5 mg Oral Q4H PRN   • oxyCODONE (ROXICODONE) split tablet 2.5 mg  2.5 mg Oral Q4H PRN   • predniSONE tablet 5 mg  5 mg Oral HS   • senna-docusate sodium (SENOKOT S) 8.6-50 mg per tablet 1 tablet  1 tablet Oral HS    and PTA meds:   Prior to Admission Medications   Prescriptions Last Dose Informant Patient Reported? Taking?    ALPRAZolam (XANAX) 1 mg tablet  Spouse/Significant Other Yes No   Sig: Take 1 mg by mouth in the morning   FLUoxetine (PROzac) 20 mg capsule  Spouse/Significant Other Yes No   Sig: Take 20 mg by mouth daily   acetaminophen (TYLENOL) 325 mg tablet  Spouse/Significant Other No No   Sig: Take 2 tablets (650 mg total) by mouth every 6 (six) hours as needed for mild pain or moderate pain   amLODIPine (NORVASC) 5 mg tablet Unknown Spouse/Significant Other No No   Sig: Take 1 tablet (5 mg total) by mouth daily   atenolol (TENORMIN) 50 mg tablet  Spouse/Significant Other No No   Sig: Take 1 tablet (50 mg total) by mouth daily   atorvastatin (LIPITOR) 10 mg tablet  Spouse/Significant Other Yes No   Sig: Take 10 mg by mouth daily   cyclobenzaprine (FLEXERIL) 10 mg tablet Unknown Spouse/Significant Other Yes No   Sig: Take 10 mg by mouth daily at bedtime   folic acid (FOLVITE) 1 mg tablet  Spouse/Significant Other Yes No   Sig: Take 1 mg by mouth daily    furosemide (LASIX) 40 mg tablet Not Taking Spouse/Significant Other Yes No   Sig: Take 40 mg by mouth daily   Patient not taking: Reported on 8/15/2023   levothyroxine 75 mcg tablet  Spouse/Significant Other Yes No   Sig: Take 75 mcg by mouth daily   levothyroxine 88 mcg tablet  Spouse/Significant Other No No   Sig: Take 1 tablet (88 mcg total) by mouth daily   linaCLOtide (Linzess) 145 MCG CAPS  Spouse/Significant Other Yes No   Sig: Take 1 capsule by mouth daily   losartan (COZAAR) 25 mg tablet  Spouse/Significant Other No No   Sig: Take 1 tablet (25 mg total) by mouth daily   methotrexate 2.5 mg tablet  Spouse/Significant Other No No   Sig: Take 8 tablets (20 mg total) by mouth once a week   nystatin (MYCOSTATIN) powder  Spouse/Significant Other No No   Sig: Apply 1 application topically 2 (two) times a day   predniSONE 5 mg tablet   Yes No   saliva substitute (MOUTH KOTE)  Spouse/Significant Other No No   Sig: Apply 5 sprays to the mouth or throat every 4 (four) hours as needed (Dry mouth)   senna (SENOKOT) 8.6 mg  Spouse/Significant Other No No   Sig: Take 1 tablet (8.6 mg total) by mouth 2 (two) times a day as needed for constipation      Facility-Administered Medications: None     Allergies   Allergen Reactions   • Codeine Vomiting   • Hydroxyquinoline Sulfate [Oxyquinoline] Vomiting   • Leucovorin Vomiting   • Tizanidine Vomiting       Objective   I/O       08/15 0701  08/16 0700 08/16 0701 08/17 0700 08/17 0701 08/18 0700    P. O. 480 960     I.V. (mL/kg) 10 (0.2)      Total Intake(mL/kg) 490 (8) 960 (15.6)     Urine (mL/kg/hr) 300  950 (1.7)    Total Output 300  950    Net +190 +960 -950                 Physical Exam  Constitutional:       Appearance: Normal appearance. HENT:      Head: Normocephalic and atraumatic. Cardiovascular:      Rate and Rhythm: Normal rate. Pulses: Normal pulses. Pulmonary:      Effort: Pulmonary effort is normal.   Musculoskeletal:         General: No tenderness. Cervical back: Normal range of motion. Neurological:      Mental Status: She is alert. GCS: GCS eye subscore is 4. GCS verbal subscore is 5. GCS motor subscore is 6. Cranial Nerves: Cranial nerves 2-12 are intact. Sensory: Sensation is intact. Motor: Weakness present. Coordination: Finger-Nose-Finger Test normal.      Deep Tendon Reflexes: Reflexes are normal and symmetric. Reflex Scores:       Tricep reflexes are 2+ on the right side and 2+ on the left side. Bicep reflexes are 2+ on the right side and 2+ on the left side. Brachioradialis reflexes are 2+ on the right side and 2+ on the left side. Patellar reflexes are 2+ on the right side and 2+ on the left side. Achilles reflexes are 2+ on the right side and 2+ on the left side. Psychiatric:         Speech: Speech normal.       Neurologic Exam     Mental Status   Speech: speech is normal   Level of consciousness: alert    Cranial Nerves   Cranial nerves II through XII intact.      CN III, IV, VI   Nystagmus: none     CN XI   CN XI normal.     Motor Exam   Muscle bulk: normal  Overall muscle tone: normal  Right arm tone: normal  Left arm tone: normal  Right arm pronator drift: absent  Left arm pronator drift: absent  Right leg tone: normal  Left leg tone: normal    Sensory Exam   Light touch normal.     Gait, Coordination, and Reflexes     Coordination   Finger to nose coordination: normal    Reflexes   Right brachioradialis: 2+  Left brachioradialis: 2+  Right biceps: 2+  Left biceps: 2+  Right triceps: 2+  Left triceps: 2+  Right patellar: 2+  Left patellar: 2+  Right achilles: 2+  Left achilles: 2+  Right : 2+  Left : 2+  Right Pan: absent  Left Pan: absent  Right ankle clonus: absent  Left pendular knee jerk: absent      Vitals:Blood pressure (!) 186/82, pulse 64, temperature 97.7 °F (36.5 °C), resp. rate 17, height 4' 10" (1.473 m), weight 61.5 kg (135 lb 9.3 oz), last menstrual period 01/13/2005, SpO2 95 %, not currently breastfeeding. ,Body mass index is 28.34 kg/m².      Lab Results:   Results from last 7 days   Lab Units 08/17/23 0444 08/16/23  0602 08/15/23  1247   WBC Thousand/uL 5.06 5.38 5.31   HEMOGLOBIN g/dL 12.0 13.1 12.4   HEMATOCRIT % 36.2 39.7 37.6   PLATELETS Thousands/uL 174 187 205   NEUTROS PCT % 85* 85* 78*   MONOS PCT % 8 7 12   EOS PCT % 1 0 1     Results from last 7 days   Lab Units 08/17/23 0444 08/16/23  0602 08/15/23  1247   POTASSIUM mmol/L 4.4 4.8 3.9   CHLORIDE mmol/L 104 103 103   CO2 mmol/L 26 27 24   BUN mg/dL 14 10 12   CREATININE mg/dL 0.74 0.85 0.85   CALCIUM mg/dL 9.4 9.7 9.6   ALK PHOS U/L  --   --  110*   ALT U/L  --   --  15   AST U/L  --   --  15     Results from last 7 days   Lab Units 08/17/23 0444 08/16/23  0602   MAGNESIUM mg/dL 2.1 2.0     Results from last 7 days   Lab Units 08/17/23 0444 08/16/23  0602   PHOSPHORUS mg/dL 4.1 4.3*     Results from last 7 days   Lab Units 08/16/23  0602   INR  1.14   PTT seconds 31     No results found for: "TROPONINT"  ABG:No results found for: "PHART", "DQA1ASB", "PO2ART", "QUU2RYO", "N4QNWGEC", "BEART", "SOURCE"    Imaging Studies: I have personally reviewed pertinent reports.  , I have personally reviewed pertinent films in PACS and I have personally reviewed pertinent films in PACS with a Radiologist.    EKG, Pathology, and Other Studies: I have personally reviewed pertinent reports.  , I have personally reviewed pertinent films in PACS and I have personally reviewed pertinent films in PACS with a Radiologist.    VTE Prophylaxis: Sequential compression device (Venodyne)     Code Status: Level 3 - DNAR and DNI  Advance Directive and Living Will:      Power of :    POLST:      Counseling / Coordination of Care  I spent 20 minutes with the patient.

## 2023-08-18 ENCOUNTER — TELEPHONE (OUTPATIENT)
Dept: NEUROSURGERY | Facility: CLINIC | Age: 68
End: 2023-08-18

## 2023-08-18 ENCOUNTER — HOSPITAL ENCOUNTER (INPATIENT)
Facility: HOSPITAL | Age: 68
LOS: 12 days | Discharge: HOME HEALTH | DRG: 949 | End: 2023-08-30
Attending: PHYSICAL MEDICINE & REHABILITATION | Admitting: PHYSICAL MEDICINE & REHABILITATION
Payer: MEDICARE

## 2023-08-18 VITALS
OXYGEN SATURATION: 92 % | BODY MASS INDEX: 28.46 KG/M2 | HEIGHT: 58 IN | HEART RATE: 70 BPM | TEMPERATURE: 97.6 F | WEIGHT: 135.58 LBS | RESPIRATION RATE: 16 BRPM | DIASTOLIC BLOOD PRESSURE: 92 MMHG | SYSTOLIC BLOOD PRESSURE: 167 MMHG

## 2023-08-18 DIAGNOSIS — K59.00 CONSTIPATION, UNSPECIFIED CONSTIPATION TYPE: ICD-10-CM

## 2023-08-18 DIAGNOSIS — G25.9 MOVEMENT DISORDER: ICD-10-CM

## 2023-08-18 DIAGNOSIS — E53.8 VITAMIN B12 DEFICIENCY: ICD-10-CM

## 2023-08-18 DIAGNOSIS — E03.8 OTHER SPECIFIED HYPOTHYROIDISM: Chronic | ICD-10-CM

## 2023-08-18 DIAGNOSIS — F09 PROGRESSIVE COGNITIVE DYSFUNCTION: Primary | ICD-10-CM

## 2023-08-18 DIAGNOSIS — E87.1 HYPONATREMIA: ICD-10-CM

## 2023-08-18 DIAGNOSIS — E46 PROTEIN-CALORIE MALNUTRITION, UNSPECIFIED SEVERITY (HCC): ICD-10-CM

## 2023-08-18 DIAGNOSIS — E03.4 HYPOTHYROIDISM DUE TO ACQUIRED ATROPHY OF THYROID: ICD-10-CM

## 2023-08-18 DIAGNOSIS — M35.00 SJOGREN'S SYNDROME, WITH UNSPECIFIED ORGAN INVOLVEMENT (HCC): Chronic | ICD-10-CM

## 2023-08-18 DIAGNOSIS — I10 PRIMARY HYPERTENSION: Chronic | ICD-10-CM

## 2023-08-18 DIAGNOSIS — B35.1 ONYCHOMYCOSIS: ICD-10-CM

## 2023-08-18 DIAGNOSIS — S22.060D COMPRESSION FRACTURE OF T8 VERTEBRA WITH ROUTINE HEALING, SUBSEQUENT ENCOUNTER: ICD-10-CM

## 2023-08-18 DIAGNOSIS — M80.80XD OTHER OSTEOPOROSIS WITH CURRENT PATHOLOGICAL FRACTURE WITH ROUTINE HEALING, SUBSEQUENT ENCOUNTER: ICD-10-CM

## 2023-08-18 DIAGNOSIS — S22.41XD CLOSED FRACTURE OF MULTIPLE RIBS OF RIGHT SIDE WITH ROUTINE HEALING, SUBSEQUENT ENCOUNTER: ICD-10-CM

## 2023-08-18 PROBLEM — S22.060A COMPRESSION FRACTURE OF T8 VERTEBRA (HCC): Status: ACTIVE | Noted: 2023-08-18

## 2023-08-18 PROBLEM — R39.15 URINARY URGENCY: Status: ACTIVE | Noted: 2023-08-18

## 2023-08-18 PROBLEM — R93.5 ABNORMAL CT OF THE ABDOMEN: Status: ACTIVE | Noted: 2021-03-10

## 2023-08-18 LAB
FLUAV RNA RESP QL NAA+PROBE: NEGATIVE
FLUBV RNA RESP QL NAA+PROBE: NEGATIVE
FOLATE SERPL-MCNC: >22.3 NG/ML
RSV RNA RESP QL NAA+PROBE: NEGATIVE
SARS-COV-2 RNA RESP QL NAA+PROBE: NEGATIVE

## 2023-08-18 PROCEDURE — 97535 SELF CARE MNGMENT TRAINING: CPT

## 2023-08-18 PROCEDURE — 99238 HOSP IP/OBS DSCHRG MGMT 30/<: CPT | Performed by: PHYSICIAN ASSISTANT

## 2023-08-18 PROCEDURE — 97116 GAIT TRAINING THERAPY: CPT

## 2023-08-18 PROCEDURE — 99222 1ST HOSP IP/OBS MODERATE 55: CPT | Performed by: PHYSICAL MEDICINE & REHABILITATION

## 2023-08-18 PROCEDURE — 0241U HB NFCT DS VIR RESP RNA 4 TRGT: CPT | Performed by: PHYSICIAN ASSISTANT

## 2023-08-18 PROCEDURE — NC001 PR NO CHARGE: Performed by: SURGERY

## 2023-08-18 PROCEDURE — 99222 1ST HOSP IP/OBS MODERATE 55: CPT | Performed by: INTERNAL MEDICINE

## 2023-08-18 PROCEDURE — 1124F ACP DISCUSS-NO DSCNMKR DOCD: CPT | Performed by: INTERNAL MEDICINE

## 2023-08-18 PROCEDURE — NC001 PR NO CHARGE

## 2023-08-18 PROCEDURE — 97760 ORTHOTIC MGMT&TRAING 1ST ENC: CPT

## 2023-08-18 RX ORDER — OXYCODONE HYDROCHLORIDE 5 MG/1
2.5 TABLET ORAL EVERY 4 HOURS PRN
Qty: 15 TABLET | Refills: 0
Start: 2023-08-18 | End: 2023-08-30

## 2023-08-18 RX ORDER — POLYETHYLENE GLYCOL 3350 17 G/17G
17 POWDER, FOR SOLUTION ORAL DAILY
Status: DISCONTINUED | OUTPATIENT
Start: 2023-08-19 | End: 2023-08-30 | Stop reason: HOSPADM

## 2023-08-18 RX ORDER — ATENOLOL 50 MG/1
50 TABLET ORAL DAILY
Status: DISCONTINUED | OUTPATIENT
Start: 2023-08-19 | End: 2023-08-30 | Stop reason: HOSPADM

## 2023-08-18 RX ORDER — QUETIAPINE FUMARATE 25 MG/1
12.5 TABLET, FILM COATED ORAL
Status: DISCONTINUED | OUTPATIENT
Start: 2023-08-18 | End: 2023-08-30 | Stop reason: HOSPADM

## 2023-08-18 RX ORDER — ACETAMINOPHEN 325 MG/1
975 TABLET ORAL EVERY 8 HOURS SCHEDULED
Status: DISCONTINUED | OUTPATIENT
Start: 2023-08-18 | End: 2023-08-21

## 2023-08-18 RX ORDER — FOLIC ACID 1 MG/1
1 TABLET ORAL DAILY
Status: DISCONTINUED | OUTPATIENT
Start: 2023-08-19 | End: 2023-08-30 | Stop reason: HOSPADM

## 2023-08-18 RX ORDER — LIDOCAINE 50 MG/G
1 PATCH TOPICAL DAILY
Status: DISCONTINUED | OUTPATIENT
Start: 2023-08-19 | End: 2023-08-30 | Stop reason: HOSPADM

## 2023-08-18 RX ORDER — CYANOCOBALAMIN 1000 UG/ML
1000 INJECTION, SOLUTION INTRAMUSCULAR; SUBCUTANEOUS ONCE
Status: COMPLETED | OUTPATIENT
Start: 2023-08-18 | End: 2023-08-18

## 2023-08-18 RX ORDER — ENOXAPARIN SODIUM 100 MG/ML
30 INJECTION SUBCUTANEOUS EVERY 12 HOURS SCHEDULED
Status: DISCONTINUED | OUTPATIENT
Start: 2023-08-18 | End: 2023-08-30 | Stop reason: HOSPADM

## 2023-08-18 RX ORDER — CYCLOBENZAPRINE HCL 10 MG
10 TABLET ORAL
Status: DISCONTINUED | OUTPATIENT
Start: 2023-08-18 | End: 2023-08-18

## 2023-08-18 RX ORDER — BISACODYL 10 MG
10 SUPPOSITORY, RECTAL RECTAL DAILY PRN
Status: DISCONTINUED | OUTPATIENT
Start: 2023-08-18 | End: 2023-08-30 | Stop reason: HOSPADM

## 2023-08-18 RX ORDER — AMOXICILLIN 250 MG
1 CAPSULE ORAL
Status: DISCONTINUED | OUTPATIENT
Start: 2023-08-18 | End: 2023-08-18

## 2023-08-18 RX ORDER — OXYCODONE HYDROCHLORIDE 5 MG/1
5 TABLET ORAL EVERY 4 HOURS PRN
Status: DISCONTINUED | OUTPATIENT
Start: 2023-08-18 | End: 2023-08-29

## 2023-08-18 RX ORDER — CYCLOBENZAPRINE HCL 5 MG
5 TABLET ORAL
Status: DISCONTINUED | OUTPATIENT
Start: 2023-08-18 | End: 2023-08-21

## 2023-08-18 RX ORDER — CYANOCOBALAMIN 1000 UG/ML
1000 INJECTION, SOLUTION INTRAMUSCULAR; SUBCUTANEOUS EVERY OTHER DAY
Status: COMPLETED | OUTPATIENT
Start: 2023-08-20 | End: 2023-08-26

## 2023-08-18 RX ORDER — BISACODYL 5 MG/1
5 TABLET, DELAYED RELEASE ORAL DAILY PRN
Status: DISCONTINUED | OUTPATIENT
Start: 2023-08-18 | End: 2023-08-30 | Stop reason: HOSPADM

## 2023-08-18 RX ORDER — ALPRAZOLAM 0.5 MG/1
1 TABLET ORAL
Status: DISCONTINUED | OUTPATIENT
Start: 2023-08-18 | End: 2023-08-21

## 2023-08-18 RX ORDER — PREDNISONE 5 MG/1
5 TABLET ORAL
Status: DISCONTINUED | OUTPATIENT
Start: 2023-08-18 | End: 2023-08-30 | Stop reason: HOSPADM

## 2023-08-18 RX ORDER — LEVOTHYROXINE SODIUM 0.1 MG/1
100 TABLET ORAL
Status: DISCONTINUED | OUTPATIENT
Start: 2023-08-19 | End: 2023-08-19

## 2023-08-18 RX ORDER — FLUOXETINE HYDROCHLORIDE 20 MG/1
20 CAPSULE ORAL DAILY
Status: DISCONTINUED | OUTPATIENT
Start: 2023-08-19 | End: 2023-08-30 | Stop reason: HOSPADM

## 2023-08-18 RX ORDER — HYDRALAZINE HYDROCHLORIDE 25 MG/1
25 TABLET, FILM COATED ORAL EVERY 8 HOURS PRN
Status: DISCONTINUED | OUTPATIENT
Start: 2023-08-18 | End: 2023-08-30 | Stop reason: HOSPADM

## 2023-08-18 RX ORDER — ATORVASTATIN CALCIUM 10 MG/1
10 TABLET, FILM COATED ORAL
Status: DISCONTINUED | OUTPATIENT
Start: 2023-08-19 | End: 2023-08-30 | Stop reason: HOSPADM

## 2023-08-18 RX ADMIN — FLUOXETINE 20 MG: 20 CAPSULE ORAL at 09:20

## 2023-08-18 RX ADMIN — ACETAMINOPHEN 975 MG: 325 TABLET, FILM COATED ORAL at 06:01

## 2023-08-18 RX ADMIN — OXYCODONE HYDROCHLORIDE 5 MG: 5 TABLET ORAL at 17:45

## 2023-08-18 RX ADMIN — CYCLOBENZAPRINE HYDROCHLORIDE 5 MG: 5 TABLET, FILM COATED ORAL at 21:19

## 2023-08-18 RX ADMIN — CYANOCOBALAMIN 1000 MCG: 1000 INJECTION, SOLUTION INTRAMUSCULAR; SUBCUTANEOUS at 09:20

## 2023-08-18 RX ADMIN — ATORVASTATIN CALCIUM 10 MG: 10 TABLET, FILM COATED ORAL at 09:20

## 2023-08-18 RX ADMIN — FOLIC ACID 1 MG: 1 TABLET ORAL at 09:19

## 2023-08-18 RX ADMIN — ENOXAPARIN SODIUM 30 MG: 30 INJECTION SUBCUTANEOUS at 09:19

## 2023-08-18 RX ADMIN — ALPRAZOLAM 1 MG: 0.5 TABLET ORAL at 02:36

## 2023-08-18 RX ADMIN — LEVOTHYROXINE SODIUM 100 MCG: 100 TABLET ORAL at 06:01

## 2023-08-18 RX ADMIN — PREDNISONE 5 MG: 5 TABLET ORAL at 21:19

## 2023-08-18 RX ADMIN — ENOXAPARIN SODIUM 30 MG: 30 INJECTION SUBCUTANEOUS at 21:20

## 2023-08-18 RX ADMIN — ACETAMINOPHEN 975 MG: 325 TABLET, FILM COATED ORAL at 21:19

## 2023-08-18 RX ADMIN — ATENOLOL 50 MG: 50 TABLET ORAL at 09:19

## 2023-08-18 NOTE — QUICK NOTE
Patient ripped out IV. 4 attempts were made to place an IV with ultrasound, all were unsuccessful. Patient currently without IV, Provider is aware. VAS consult placed.

## 2023-08-18 NOTE — CASE MANAGEMENT
Case Management Discharge Planning Note    Patient name Arturo Manuel  Location W /W -88 MRN [id number]  : 1955 Date 2023       Current Admission Date: 8/15/2023  Current Admission Diagnosis:Fall   Patient Active Problem List    Diagnosis Date Noted   • Rheumatoid arteritis ([address]) 08/15/2023   • Closed fracture of multiple ribs of right side 08/15/2023   • Movement disorder 08/15/2023   • Fungal skin infection 2021   • Mild cognitive impairment 2021   • Hyponatremia 2021   • Adenopathy 03/10/2021   • Abnormal CT scan, gastrointestinal tract 03/10/2021   • Dermatitis associated with moisture 2021   • Onychomycosis 2021   • Enlarged and hypertrophic nails 2021   • Low HDL (under 40)    • Anemia 2021   • Hypokalemia 2021   • WELLS (dyspnea on exertion) 2021   • Lightheadedness 2021   • Pain 2021   • Abnormal ECG 2021   • Chest pain 2021   • Pleural effusion on right 2021   • Atelectasis of right lung 2021   • Potential for cognitive impairment 2021   • Oral dyskinesia 2021   • Gastroparesis 2021   • Anxiety and depression 2021   • Hypothyroidism 2021   • Abnormal findings on imaging test 2021   • Hypertension 2021   • Pain of right upper extremity 2021   • Multiple closed fractures of pelvis ([address]) 2021   • Closed nondisplaced fracture of anterior wall of right acetabulum ([address]) 2021   • Closed nondisplaced fracture of right pubis ([address]) 2021   • Fall 2021   • Parotid gland enlargement 2019   • Sjogren's disease ([address]) 2019      LOS (days): 3  Geometric Mean LOS (GMLOS) (days): 3.00  Days to GMLOS:0.4     OBJECTIVE:  Risk of Unplanned Readmission Score: 14.93         Current admission status: Inpatient   Preferred Pharmacy:   [id number] [facility], [address] [address] Dates    [address] [facility] [address]  Phone: [phone] Fax: [phone]    Primary Care Provider: Néstor Kingston MD    Primary Insurance: MEDICARE  Secondary Insurance: AARP    DISCHARGE DETAILS:  CM sent updated referral to [facility] that patient is medically stable for discharge. [facility] is able to accept for inpatient rehab. [facility] is requesting a Covid swab. Trauma updated. CM spoke with patient at the bedside. CM introduced self and role. Patient updated [facility] able to accept for inpatient rehab at discharge. Patient updated location is at [facility], Room 973. Patient aware facility is able to accept today around [id number]. Patient aware CM can assist with transport also. CM reviewed with patient at the bedside re:IMM. Patient expressed understanding of her Medicare rights. All questions/concerns answered at this time. IMM reviewed with patient, patient agrees with discharge determination. CM spoke with patient's spouse James Farrell on the phone, [phone]. CM introduced self and role. Patient's spouse updated [facility] is able to accept today for inpatient rehab. CM will work on transport for around 1430. CM provided room number at facility in [facility]. Spouse stated he will be at hospital around [id number]. CM will f/u at bedside with transport time and contact number. All questions/concerns answered at this time. CM updated trauma and nursing with plan of care. CM sent referral via [facility] for [facility]. Waiting for  time.

## 2023-08-18 NOTE — DISCHARGE INSTR - AVS FIRST PAGE
Neurosurgery discharge instructions following spine fracture:     TLSO brace to be worn when out of bed of head of bed greater than 45 degrees. May place brace on while sitting on edge of bed. May be removed for showering. No bending, twisting or heavy lifting. No strenuous activities. No Driving. Follow-up as scheduled in two weeks with repeat spine x-rays to be completed 2-3 days prior to visit. Prescription has been entered electronically. **Please notify MD immediately if you have increased back or leg pain. New numbness, tingling, weakness in your legs and/or bowel/bladder incontinence**     Rib Fractures:   Seek medical attention if you develop worsening chest pain or shortness of breath, dizziness/lightheadness, fevers/chills or sweats. No heavy lifting, pushing or pulling >10 pounds and no strenuous physical activity until cleared by trauma. No work or driving while taking narcotic pain medications and until cleared by trauma. Use your incentive spirometer at least hourly while awake. Neurology Recommendations:  Cyanocobalamin 1000 mcg every other day starting on 8/19/2023 for 3 dosages.   May then take a vitamin B12 tablet 1000 mcg every day  Please follow-up with neurology team in 6 weeks for movement disorder discussion

## 2023-08-18 NOTE — PROGRESS NOTES
PHYSICAL MEDICINE AND REHABILITATION   PREADMISSION ASSESSMENT     Projected [facility] Diagnoses:  Impairment of mobility, safety and Activities of Daily Living (ADLs) due to Orthopedic Disorders:  08.9  Other Orthopedic      Etiologic: Fall with multiple rib fractures and T8 compression fracture    Date of Onset: 8/15/2023   Date of surgery: N/A    PATIENT INFORMATION  Name: Joe Severino Phone #: [phone] (home)   Address: [address] [place] [id number]  YOB: 1955 Age: 70 y.o. #   Marital Status: /Civil Union  Ethnicity:   Employment Status: Not employed  Extended Emergency Contact Information  Primary Emergency Contact: Shaan Fine  Address: [address] [place] [address] Dr Sara Gallardo of [address] [place] Phone: [phone]  Mobile Phone: [phone]  Relation: Spouse  Advance Directive: Level 3 - DNAR and DNI (No advanced directives on file)    INSURANCE/COVERAGE:     Primary Payor: MEDICARE / Plan: MEDICARE A AND B / Product Type: Medicare A & B Fee for Service /   Secondary Payer: Nico Frias  Member ID: [id number]     Payer Contact:  Payer Contact:   Contact Phone:  Contact Phone:       MEDICARE #: [id number]  Medicare Days: 60/30/60  Medical Record #: [id number]    REFERRAL SOURCE:   Referring provider: Manisha Catherine MD  Referring facility: [address] [place] [address]  Room: [place] [address]/[place] [address]  PCP: Anabell Arevalo MD PCP phone number: [phone]    MEDICAL INFORMATION  HPI:  Jose Wilson is a 69 yo female who presented to the [address] [place] [address] ED due to having 1-2 falls every day for past two weeks. Patient has a history of Sjogren's disease, RA on methotrexate, Hypertension, Hypothyroidism, Gastroparesis, Depression and anxiety. She presents with back and right rib pain and was found to have multiple rib fractures. Neurology was consulted for concern for movement disorders.   MRI of brain, c-spine, and t-spine were completed. MRI of brain showed moderate, chronic microangiopathy is significantly increased from the prior study (12/22/2016). A few foci of blooming artifact implicate amyloid angiopathy. No acute intracranial hemorrhage. No acute infarction, intracranial hemorrhage or mass. Right greater than left parotid cystic changes similar to the prior ultrasound, correlating to the history of Sjogren's disease. MRI of c-spine showed No cord compression or cord signal abnormality. No epidural hematoma or paraspinal edema. Scattered spondylotic changes with reversal of the cervical lordosis at the C5 level are stable. Sessile enhancing extra-axial lesion anterior to the upper cervical cord dorsal to the odontoid process is stable possibly meningioma and/or pannus, stable. No cord compression or mass effect. Progressive cystic changes in both parotid glands right greater than left, correlating to the history of Sjogren's disease. MRI of t-spine showed mild, acute compression fracture of T8 with approximately 25% loss of vertebral body height. No bony retropulsion or epidural hematoma. No cord compression or cord signal abnormality. Mild spondylotic changes elsewhere. Abnormal 2.2 cm right retrocrural lymph node adjacent to the descending thoracic aorta just proximal to the diaphragmatic hiatus. Both benign and malignant etiologies are in the differential diagnosis. Follow-up CT of the chest in 3 to 6 months is   suggested to assess for stability. Further clinical assessment advised. Neurology follow-up stated - Patient has been having retropulsive falls without certain etiology at this time. Her increased frequency of falls for past 2 weeks might be likely due to rib fracture pain causing more balance issues. She also likely has stepwise vascular dementia given progressive chronic microangiopathy on imaging.   Another differential diagnosis might be progressive supranuclear palsy given cognitive decline, appearing as not paying attention, retropulsion falls; however, her extraocular movement is intact. Neurology is recommending patient follow-up with movement disorder specialist in the outpatient setting. No further Neurology needs were warranted. Neurosurgery was consulted due to the acute T8 compression fracture found on MRI of t-spine. TLSO brace was issued with instructions for patient to wear when upright and at 45 degrees in bed. No neurosurgical interventions are required. Patient is AxOx2, oriented to person and situation but disoriented to time and place. She is pleasantly confused but is alert and cooperative. She has visual processing deficits and decreased visual attention. She presents also with poor postural righting and retropulsive leaning in standing and sitting. She has been deemed hemodynamically stable at this time. PT/OT therapies were consulted and continue to follow patient at this time and are recommending inpatient acute rehab when medically stable. All involved medical disciplines feel/agree patient is medically ready for discharge at this time. Inpatient acute rehabilitation physician was consulted. Upon review of patient’s case and correspondence with PT/OT therapy services, [address] feels she will benefit and is a good candidate and appropriate for inpatient acute rehab at this time. She has demonstrated the willingness, desire and tolerance to participate in the required 3 hours or more of therapies per day. [facility] VAX:  [address] vax on 2/17/21'  [facility] testing completed 8/18/23 - pending    Past Medical History:   Past Surgical History:    Allergies:     Past Medical History:   Diagnosis Date   • Lymphadenitis, chronic    • Sialadenitis    • Sjogren's disease ([address])    • Stomach problems    • Thyroid disorder    • Xerostomia     Past Surgical History:   Procedure Laterality Date   • BRAIN SURGERY  2019    tumor removed Dr. Denis Vasquez Allergies   Allergen Reactions   • Codeine Vomiting   • Hydroxyquinoline Sulfate [Oxyquinoline] Vomiting   • Leucovorin Vomiting   • Tizanidine Vomiting         Medical/functional conditions requiring inpatient rehabilitation: Impaired balance, ambulation and mobility and ADL self-care    Risk for medical/clinical complications: Risk for Falls, uncontrolled pain, DVT/PE, skin breakdown, infection, hypo/hypertension    Comorbidities:  Sjogren's disease, anxiety and depression, hypothyroidism, RA, Closed fx of multiple right sided ribs, movement disorder    CURRENT VITAL SIGNS:   Temp:  [97.6 °F (36.4 °C)-97.7 °F (36.5 °C)] 97.6 °F (36.4 °C)  HR:  [64-71] 70  Resp:  [16-17] 16  BP: (130-201)/(82-95) 167/92   Intake/Output Summary (Last 24 hours) at 8/18/2023 1330  Last data filed at 8/18/2023 0601  Gross per 24 hour   Intake --   Output 1850 ml   Net -1850 ml        LABORATORY RESULTS:      Lab Results   Component Value Date    HGB 12.0 08/17/2023    HGB 12.2 01/08/2015    HCT 36.2 08/17/2023    HCT 37.3 01/08/2015    WBC 5.06 08/17/2023    WBC 5.13 01/08/2015     Lab Results   Component Value Date    BUN 14 08/17/2023    BUN 10 01/11/2018     01/08/2015    K 4.4 08/17/2023    K 3.4 (L) 01/08/2015     08/17/2023     01/08/2015    GLUCOSE 89 01/08/2015    CREATININE 0.74 08/17/2023    CREATININE 0.85 01/11/2018     Lab Results   Component Value Date    PROTIME 15.2 (H) 08/16/2023    INR 1.14 08/16/2023        DIAGNOSTIC STUDIES:  MRI thoracic spine w wo contrast    Result Date: 8/17/2023  Impression: 1. Mild, acute compression fracture of T8 with approximately 25% loss of vertebral body height. No bony retropulsion or epidural hematoma. No cord compression or cord signal abnormality. 2. Mild spondylotic changes elsewhere. 3. Abnormal 2.2 cm right retrocrural lymph node adjacent to the descending thoracic aorta just proximal to the diaphragmatic hiatus.  Both benign and malignant etiologies are in the differential diagnosis. Follow-up CT of the chest in 3 to 6 months is suggested to assess for stability. Further clinical assessment advised. Workstation performed: [id number]     MRI cervical spine w wo contrast    Result Date: 8/17/2023  Impression: 1. No cord compression or cord signal abnormality. No epidural hematoma or paraspinal edema. 2. Scattered spondylotic changes with reversal of the cervical lordosis at the C5 level are stable. 3. Sessile enhancing extra-axial lesion anterior to the upper cervical cord dorsal to the odontoid process is stable possibly meningioma and/or pannus, stable. No cord compression or mass effect. 4. Progressive cystic changes in both parotid glands right greater than left, correlating to the history of Sjogren's disease. Workstation performed: [id number]     MRI brain w wo contrast    Result Date: 8/17/2023  Impression: 1. Moderate, chronic microangiopathy is significantly increased from the prior study. 2. A few foci of blooming artifact implicate amyloid angiopathy. No acute intracranial hemorrhage. 3. No acute infarction, intracranial hemorrhage or mass. 4. Right greater than left parotid cystic changes similar to the prior ultrasound, correlating to the history of Sjogren's disease. Workstation performed: [id number]     CT recon only thoracolumbar    Result Date: 8/15/2023  Impression: No fracture or traumatic subluxation. Workstation performed: [id number]     CT chest abdomen pelvis w contrast    Result Date: 8/15/2023  Impression: Acute nondisplaced anterior right 3rd-5th rib fractures. No pleural effusion or pneumothorax. Stable hepatomegaly. Stable right renal cyst. Workstation performed: [id number]     CT spine cervical without contrast    Result Date: 8/15/2023  Impression: No cervical spine fracture or traumatic malalignment. Workstation performed: [id number]     CT head without contrast    Result Date: 8/15/2023  Impression: No acute intracranial abnormality.  Sinus disease Workstation performed: [id number]       PRECAUTIONS/SPECIAL NEEDS:  Tobacco:   Social History     Tobacco Use   Smoking Status Former   Smokeless Tobacco Never   , Alcohol:    Social History     Substance and Sexual Activity   Alcohol Use Yes   , Weight Bearing Precautions:  full weight bearing, Splints/Braces: TLSO when upright and at 45 degree in bed, Anticoagulation:  Lovenox, Safety Concerns, Pain Management, Visually Impaired and Language Preference: English    MEDICATIONS:     Current Facility-Administered Medications:   •  acetaminophen (TYLENOL) tablet 975 mg, 975 mg, Oral, Q8H [address], Glenny Rome MD, 975 mg at 08/18/23 0601  •  ALPRAZolam (XANAX) tablet 1 mg, 1 mg, Oral, HS PRN, Glenny Rome MD, 1 mg at 08/18/23 0236  •  atenolol (TENORMIN) tablet 50 mg, 50 mg, Oral, Daily, Glenny Rome MD, 50 mg at 08/18/23 0919  •  atorvastatin (LIPITOR) tablet 10 mg, 10 mg, Oral, Daily, Glenny Rome MD, 10 mg at 08/18/23 0920  •  cyclobenzaprine (FLEXERIL) tablet 10 mg, 10 mg, Oral, HS, Glenny Rome MD, 10 mg at 08/17/23 2133  •  enoxaparin (LOVENOX) subcutaneous injection 30 mg, 30 mg, Subcutaneous, Q12H [address], Glenny Rome MD, 30 mg at 08/18/23 0919  •  FLUoxetine (PROzac) capsule 20 mg, 20 mg, Oral, Daily, Glenny Rome MD, 20 mg at 68/93/66 3193  •  folic acid (FOLVITE) tablet 1 mg, 1 mg, Oral, Daily, Glenny Rome MD, 1 mg at 08/18/23 6640  •  levothyroxine tablet 100 mcg, 100 mcg, Oral, Early Morning, Glenny Rome MD, 100 mcg at 08/18/23 0601  •  lidocaine (LIDODERM) 5 % patch 1 patch, 1 patch, Topical, Daily, Glenny Rome MD, 1 patch at 08/17/23 7352  •  methotrexate tablet 20 mg, 20 mg, Oral, Weekly, Glenny Rome MD, 20 mg at 08/16/23 1029  •  metoprolol (LOPRESSOR) injection 5 mg, 5 mg, Intravenous, Once, Michelle Haile PA-C  •  oxyCODONE (ROXICODONE) IR tablet 5 mg, 5 mg, Oral, Q4H PRN, Glenny Rome MD, 5 mg at 08/17/23 05  •  oxyCODONE (ROXICODONE) split tablet 2.5 mg, 2.5 mg, Oral, Q4H PRN, Ronaldo Cardenas MD  •  predniSONE tablet 5 mg, 5 mg, Oral, HS, Ronaldo Cardenas MD, 5 mg at 23  •  senna-docusate sodium (SENOKOT S) 8.6-50 mg per tablet 1 tablet, 1 tablet, Oral, HS, Ronaldo Cardenas MD, 1 tablet at 23    SKIN INTEGRITY:   no rashes, no erythema, no peripheral edema    PRIOR LEVEL OF FUNCTION:  She lives in a split level home  Beverly Moore is  and lives with their spouse and adult son. Self Care: Independent, Indoor Mobility: Independent, Stairs (in/outdoor): Independent and Cognition: Needed some help    FALLS IN THE LAST 6 MONTHS: > 10    HOME ENVIRONMENT:  The living area: bedroom on 2nd floor floor and bathroom on 2nd floor floor  There are 0 steps to enter the home but there are 7 + 7 steps to bedroom/bathroom. The patient will have 24 hour supervision/physical assistance available upon discharge. PREVIOUS DME:  Equipment in home (previous DME): Rolling Walker, Manual Wheelchair and Single [employer]    FUNCTIONAL STATUS:  Physical Therapy Occupational Therapy Speech Therapy    As per PT 23:    General   Chart Reviewed Yes   Additional Pertinent History Neurosurgery: wear TLSO brace when upright and at 45* HOB, T8 compression fx   Response to Previous Treatment Patient with no complaints from previous session. Family/Caregiver Present No   Cognition   Overall Cognitive Status Impaired   Arousal/Participation Alert; Cooperative   Attention Attends with cues to redirect   Orientation Level Oriented to person;Oriented to place; Disoriented to situation;Disoriented to time  (oriented to date initially (reported 17th), when asked at end of session pt unable to recall)   Memory Decreased short term memory;Decreased recall of recent events;Decreased recall of precautions   Following Commands Follows one step commands with increased time or repetition   Comments Pt ID via name and ; pt agreeable to TLSO brace and PT tx.  Pt continues to have limited problem solving, impaired short-term memory, but was able to recall her 2 sons names and states one son lives in [place]   Bed Mobility   Supine to Sit 5  Supervision   Additional items Assist x 1;HOB elevated; Bedrails; Increased time required;Verbal cues   Additional Comments TLSO donned at EOB w/ total assistance. pt able to maintain static sitting balance w/ supervision and intermittent   Transfers   Sit to Stand 4  Minimal assistance   Additional items Assist x 1; Increased time required;Verbal cues   Stand to Sit 4  Minimal assistance   Additional items Assist x 1; Armrests; Increased time required;Verbal cues   Toilet transfer 4  Minimal assistance   Additional items Assist x 1; Increased time required;Standard toilet   Additional Comments pt retropuslive in standing, requires mod Ax1 to maintain upright standing balance at bathroom sink w/o UE supportto wash hands   Ambulation/Elevation   Gait pattern Improper Weight shift;Narrow KAREEN; Decreased foot clearance; Short stride; Excessively slow;Retropulsion   Gait Assistance 4  Minimal assist   Additional items Assist x 1;Verbal cues   Assistive Device Rolling walker   Distance 15'x2+40'   Ambulation/Elevation Additional Comments VC for RW management, to widden KAREEN while ambulating   Balance   Static Sitting Fair   Dynamic Sitting Fair -   Static Standing Poor  (w/o UE support, Poor+ w/ UE support on RW)   Dynamic Standing Poor +   Ambulatory Poor +  (w/ RW)   Activity Tolerance   Activity Tolerance Patient tolerated treatment well   Medical Staff Made Aware Spoke to JUSTIN Mcarthur   Nurse Made Aware JORGE Bradley   Assessment   Prognosis Fair   Problem List Decreased strength; Impaired balance;Decreased mobility; Decreased cognition; Impaired judgement;Decreased safety awareness   Assessment PT interventions provided include: bed mobility, transfer, gait, endurance, balance, and TLSO brace fitting in order to challenge pt's activity tolerance and to maximize pt independence w/ functional mobility. In comparison to previous session, pt improvement in overall tolerance to activity and improved balance. Pt able to progress to min Ax1 w/ RW for ambulation; however, pt continues to be retropulsive in standing and while mobilizing. Pt fitting for TLSO brace and provided w/ education regarding brace fit/alignment and wearing schedule. Due to pt's impaired cognition, anticipate pt will continue to require assistance w/ donning/doffing brace. Pt continues to be functioning below baseline level, and remains limited in functional mobility due to impaired cognition, impaired balance, decreased safety awareness, and gait deviations. Pt will continue benefit from PT to promote independence w/ functional mobility and progress towards set goals. Continue to recommend DC post acute rehabilitation services when medically cleared. As per OT 8/18/23:    ADL   Grooming Assistance 5  Supervision/Setup   Grooming Deficit Setup; Increased time to complete   Grooming Comments standing at sink to comb hair   UB Bathing Comments declining stating she completed this AM   LB Bathing Comments declining stating she completed this AM   UB Dressing Assistance 4  Minimal Assistance   UB Dressing Deficit Increased time to complete;Supervision/safety;Verbal cueing; Thread RUE; Thread LUE;Pull around back   UB Dressing Comments don/doffing TLSO brace, requiring extensive education on technique and A with re-adjusting brace in front   LB Dressing Assistance 4  Minimal Assistance   LB Dressing Deficit Increased time to complete;Supervision/safety;Verbal cueing; Thread RLE into pants; Thread LLE into pants;Pull up over hips   LB Dressing Comments Able to thread B LE into pants and stand, A with retrieving pants from floor when they fell s/p stand, able to pull over hips   Toileting Comments declining need to void   Transfers   Sit to Stand 4  Minimal assistance   Additional items Assist x 1; Increased time required;Verbal cues   Stand to Sit 4  Minimal assistance   Additional items Assist x 1; Increased time required;Verbal cues   Additional Comments retropulsive upson standing, requiring VC and TC for maintaining upright posture   Functional Mobility   Functional Mobility 4  Minimal assistance   Additional Comments Ax1, recliner >< bathroom, requiring consistent TC and A for upright posture due to posterior leaning. Pt with x2 LOB laterally requiring mod A x1 to correct   Additional items Rolling walker   Subjective   Subjective "Oh who put this brace on me?" Pt stating s/p she eas educated and donned her own brace   Cognition   Overall Cognitive Status Impaired   Arousal/Participation Alert; Cooperative   Attention Attends with cues to redirect   Orientation Level Oriented to person;Oriented to place; Disoriented to time;Disoriented to situation  (States Feb 2023)   Memory Decreased short term memory;Decreased recall of recent events   Following Commands Follows one step commands with increased time or repetition   Comments Pt with repetative conversation, unable to recall donning her brace prior to standing up. Poor safety awareness and insight into deficits   Activity Tolerance   Activity Tolerance Patient tolerated treatment well   Medical Staff Made Aware JORGE Bradley   Assessment   Assessment Pt seen on this date for skilled OT treatment session. At start of session pt sitting in recliner chair. Pt agreeable and motivated to participate in session. Pt required decreased assistance with LB dressing tasks, able to complete with min A. Pt demonstrating fair understanding of spinal precautions and requiring extensive education on how to maintain with dressing tasks. Pt provided with extensive edu on don/doffing TLSO brace, and able to complete with VC throughout. Requiring reminders that she completed donning brace s/p donning.  Pt's overall performance limited by STM deficits, problem solving, safety, insight, forward functional reach, activity tolerance, standing balance. Pt would continue to benefit from skilled OT treatment sessions in order to address remaining deficits and continue to recommend d/c to rehab when medically stable. N/A     CARE SCORES:  Self Care:  Eatin: Supervision or touching  assistance  Oral hygiene: 04: Supervision or touching  assistance  Toilet hygiene: 03: Partial/moderate assistance  Shower/bathing self: 03: Partial/moderate assistance  Upper body dressin: Supervision or touching  assistance  Lower body dressin: Partial/moderate assistance  Putting on/taking off footwear: 04: Supervision or touching  assistance  Transfers:  Roll left and right: 04: Supervision or touching  assistance  Sit to lyin: Supervision or touching  assistance  Lying to sitting on side of bed: 04: Supervision or touching  assistance  Sit to stand: 04: Supervision or touching  assistance  Chair/bed to chair transfer: 04: Supervision or touching  assistance  Toilet transfer: 04: Supervision or touching  assistance  Mobility:  Walk 10 ft: 04: Supervision or touching  assistance  Walk 50 ft with two turns: 09: Not applicable  Walk 806ML: 09: Not applicable    CURRENT GAP IN FUNCTION  Prior to Admission: Functional Status: Patient was independent with mobility/ambulation, transfers, ADL's, IADL's. Expected functional outcomes: It is expected that with skilled acute rehabilitation services the patient will progress to Supervision for self care and Supervision for mobility     Estimated length of stay: 10 to 14 days    Anticipated Post-Discharge Disposition/Treatment  Disposition: Return to previous home/apartment.   Outpatient Services: Physical Therapy (PT) and Occupational Therapy (OT)    BARRIERS TO DISCHARGE  Lovenox, Weakness, Balance Difficulty, Fatigue, Financial Resources, Equipment Needs and Resource Availability    INTERVENTIONS FOR DISCHARGE  Adaptive equipment, Patient/Family/Caregiver Education, Financial Assistance, Arrange DME needs, Medication Changes as per MD recommendations, Therapy exercises, Center of balance support  and Energy conservation education     REQUIRED THERAPY:  Patient will require PT and OT 90 minutes each per day, five days per week to achieve rehab goals. REQUIRED FUNCTIONAL AND MEDICAL MANAGEMENT FOR INPATIENT REHABILITATION:  Pain Management: Overall pain is well controlled, Deep Vein Thrombosis (DVT) Prophylaxis:  SCD's while in bed and Lovenox, Nursing education and bowel/bladder management, internal medicine to manage/monitor medical conditions, PM&R to maximize function and provide medical oversight, PT/OT intervention, patient/family education/training, and any consults as needed. RECOMMENDED LEVEL OF CARE:  Jose Wilson is a 69 yo female who [facility] due to having 1-2 falls every day for past two weeks. Patient has a history of Sjogren's disease, RA on methotrexate, Hypertension, Hypothyroidism, Gastroparesis, Depression and anxiety. She presents with back and right rib pain and was found to have multiple rib fractures. Neurology was consulted for concern for movement disorders. MRI of brain, c-spine, and t-spine were completed. MRI of brain showed moderate, chronic microangiopathy is significantly increased from the prior study (12/22/2016). A few foci of blooming artifact implicate amyloid angiopathy. No acute intracranial hemorrhage. No acute infarction, intracranial hemorrhage or mass. Right greater than left parotid cystic changes similar to the prior ultrasound, correlating to the history of Sjogren's disease. MRI of c-spine showed No cord compression or cord signal abnormality. No epidural hematoma or paraspinal edema. Scattered spondylotic changes with reversal of the cervical lordosis at the C5 level are stable.  Sessile enhancing extra-axial lesion anterior to the upper cervical cord dorsal to the odontoid process is stable possibly meningioma and/or pannus, stable. No cord compression or mass effect. Progressive cystic changes in both parotid glands right greater than left, correlating to the history of Sjogren's disease. MRI of t-spine showed mild, acute compression fracture of T8 with approximately 25% loss of vertebral body height. No bony retropulsion or epidural hematoma. No cord compression or cord signal abnormality. Mild spondylotic changes elsewhere. Abnormal 2.2 cm right retrocrural lymph node adjacent to the descending thoracic aorta just proximal to the diaphragmatic hiatus. Both benign and malignant etiologies are in the differential diagnosis. Follow-up CT of the chest in 3 to 6 months is   suggested to assess for stability. Further clinical assessment advised. Neurology follow-up stated - Patient has been having retropulsive falls without certain etiology at this time. Her increased frequency of falls for past 2 weeks might be likely due to rib fracture pain causing more balance issues. She also likely has stepwise vascular dementia given progressive chronic microangiopathy on imaging. Another differential diagnosis might be progressive supranuclear palsy given cognitive decline, appearing as not paying attention, retropulsion falls; however, her extraocular movement is intact. Neurology is recommending patient follow-up with movement disorder specialist in the outpatient setting. No further Neurology needs were warranted. Neurosurgery was consulted due to the acute T8 compression fracture found on MRI of t-spine. TLSO brace was issued with instructions for patient to wear when upright and at 45 degrees in bed. No neurosurgical interventions are required. Patient is AxOx2, oriented to person and situation but disoriented to time and place. She is pleasantly confused but is alert and cooperative. She has visual processing deficits and decreased visual attention.  She presents also with poor postural righting and retropulsive leaning in standing and sitting. Prior to admission / hospital stay patient was independent with all ADLs, functional mobility and required assistance with IADLs. Currently with PT therapies: Patient requires S for bed mobility, Min A for transfers and ambulation with RW. Currently with OT therapies: Patient is requiring S for grooming, Min A with UB ADL's and transfers. Patient is D to don TLSO brace. Nursing is being recommended for medication distribution and management, bowel and bladder management and educational purposes, internal medicine to continue to monitor and manage medical conditions, PM&R to maximize  function and provide medical oversight, and inpatient rehab to maximize self-care, mobility, strength and endurance upon discharge to home.

## 2023-08-18 NOTE — PHYSICAL THERAPY NOTE
PHYSICAL THERAPY TREATMENT NOTE          Patient Name: Shimon Ackerman  WVXQU'S Date: 8/18/2023          AGE:   70 y.o.  Mrn:   [id number]  ADMIT DX:  Movement disorder [G25.9]  Back pain [M54.9]  Weakness [R53.1]  Closed fracture of multiple ribs of right side, initial encounter [S22.41XA]    Past Medical History:  Past Medical History:   Diagnosis Date    Lymphadenitis, chronic     Sialadenitis     Sjogren's disease ([address])     Stomach problems     Thyroid disorder     Xerostomia            Orthotic Fitting: Total Time: 8 min    Orthotic Ordered: TLSO  Orthotic Ordered by: Neurosurgery WHIT Tavera  Wearing Schedule: when OOB or HOB >45*    Objective: Pt educated on TLSO brace fit, alignment, and wearing schedule w/ pt agreeable to brace. Brace measured and fit for [place] [address] backpack TLSO brace while pt sitting at EOB. Pt requires total assistance for donning/doffing brace at this time, as well as for adjustment of brace as needed. Pt verbalized understanding of donning/doffing brace, when to wear brace. RN aware. Educational handout provided. Due to pt's impaired cognition, anticipate pt will continue to require assistance w/ donning/doffing brace. Eriberto Gauthier, PT, DPT  08/18/23 08/18/23 0830   PT Last Visit   PT Visit Date 08/18/23   Note Type   Note Type Treatment  (and orthotic management/training)   Type of Brace   Brace Applied [employer] Back Pack TLSO   Patient Position When Brace Applied Seated  (at [employer])   Education   Education Provided Yes   Pain Assessment   Pain Assessment Tool 0-10   Pain Score No Pain   Restrictions/Precautions   Weight Bearing Precautions Per Order No   Braces or Orthoses TLSO   Other Precautions Cognitive; Chair Alarm; Bed Alarm; Fall Risk;Spinal precautions   General   Chart Reviewed Yes   Additional Pertinent History Neurosurgery: wear TLSO brace when upright and at 45* HOB, T8 compression fx Response to Previous Treatment Patient with no complaints from previous session. Family/Caregiver Present No   Cognition   Overall Cognitive Status Impaired   Arousal/Participation Alert; Cooperative   Attention Attends with cues to redirect   Orientation Level Oriented to person;Oriented to place; Disoriented to situation;Disoriented to time  (oriented to date initially (reported 17th), when asked at end of session pt unable to recall)   Memory Decreased short term memory;Decreased recall of recent events;Decreased recall of precautions   Following Commands Follows one step commands with increased time or repetition   Comments Pt ID via name and ; pt agreeable to TLSO brace and PT tx. Pt continues to have limited problem solving, impaired short-term memory, but was able to recall her 2 sons names and states one son lives in [place]   Bed Mobility   Supine to Sit 5  Supervision   Additional items Assist x 1;HOB elevated; Bedrails; Increased time required;Verbal cues   Additional Comments TLSO donned at EOB w/ total assistance. pt able to maintain static sitting balance w/ supervision and intermittent   Transfers   Sit to Stand 4  Minimal assistance   Additional items Assist x 1; Increased time required;Verbal cues   Stand to Sit 4  Minimal assistance   Additional items Assist x 1; Armrests; Increased time required;Verbal cues   Toilet transfer 4  Minimal assistance   Additional items Assist x 1; Increased time required;Standard toilet   Additional Comments pt retropuslive in standing, requires mod Ax1 to maintain upright standing balance at bathroom sink w/o UE supportto wash hands   Ambulation/Elevation   Gait pattern Improper Weight shift;Narrow KAREEN; Decreased foot clearance; Short stride; Excessively slow;Retropulsion   Gait Assistance 4  Minimal assist   Additional items Assist x 1;Verbal cues   Assistive Device Rolling walker   Distance 15'x2+40'   Ambulation/Elevation Additional Comments VC for RW management, to widden KAREEN while ambulating   Balance   Static Sitting Fair   Dynamic Sitting Fair -   Static Standing Poor  (w/o UE support, Poor+ w/ UE support on RW)   Dynamic Standing Poor +   Ambulatory Poor +  (w/ RW)   Activity Tolerance   Activity Tolerance Patient tolerated treatment well   Medical Staff Made Aware Spoke to Prashant Webb   Nurse Made Aware JORGE Bradley   Assessment   Prognosis Fair   Problem List Decreased strength; Impaired balance;Decreased mobility; Decreased cognition; Impaired judgement;Decreased safety awareness   Assessment PT interventions provided include: bed mobility, transfer, gait, endurance, balance, and TLSO brace fitting in order to challenge pt's activity tolerance and to maximize pt independence w/ functional mobility. In comparison to previous session, pt improvement in overall tolerance to activity and improved balance. Pt able to progress to min Ax1 w/ RW for ambulation; however, pt continues to be retropulsive in standing and while mobilizing. Pt fitting for TLSO brace and provided w/ education regarding brace fit/alignment and wearing schedule. Due to pt's impaired cognition, anticipate pt will continue to require assistance w/ donning/doffing brace. Pt continues to be functioning below baseline level, and remains limited in functional mobility due to impaired cognition, impaired balance, decreased safety awareness, and gait deviations. Pt will continue benefit from PT to promote independence w/ functional mobility and progress towards set goals. Continue to recommend DC post acute rehabilitation services when medically cleared.    Goals   Patient Goals "well to go home"   [facility] Expiration Date 08/28/23   Short Term Goal #1 Pt will: christen/doff TLSO brace w/ supervision to increase pt's independence; perform bilateral rolling bed mobility w/ mod I to promote repositioning in a supine position; perform supine<>sitting at EOB mobility w/ mod I to increase pt's independence w/ functional mobility; perform transfers w/ supervision to increase pt's OOB mobility; ambulate at least 150' w/ LRAD and supervision to increase pt's ambulatory endurance; negotiate 7 steps w/ UE support and min Ax1 to facilitate pt returning to home living environment; increase all balance ratings by at least 1 grade to decrease pt's risk of falls   PT Treatment Day 3   Plan   Treatment/Interventions Functional transfer training;LE strengthening/ROM; Elevations; Therapeutic exercise;Cognitive reorientation;Patient/family training;Equipment eval/education; Bed mobility;Gait training; Compensatory technique education   PT Frequency 3-5x/wk   Recommendation   PT Discharge Recommendation Post acute rehabilitation services   Equipment Recommended [facility] Recommended Wheeled walker   Change/add to [employer]? Yes, Change Size   Walker Size Will (Ht <5'1")   AM-PAC Basic Mobility Inpatient   Turning in Flat Bed Without Bedrails 3   Lying on Back to Sitting on Edge of Flat Bed Without Bedrails 3   Moving Bed to Chair 3   Standing Up From Chair Using Arms 3   Walk in Room 3   Climb 3-5 Stairs With Railing 1   Basic Mobility Inpatient Raw Score 16   Basic Mobility Standardized Score 38.32   Highest Level Of Mobility   -[facility] Goal 5: Stand one or more mins   Education   Education Provided Mobility training;Assistive device  (TLSO brace)   Patient Explanation/teachback used; Reinforcement needed   End of Consult   Patient Position at End of Consult Bedside chair;Bed/Chair alarm activated; All needs within reach         The patient's AM-PAC Basic Mobility Inpatient Short Form Raw Score is 16. A Raw score of less than or equal to 16 suggests the patient may benefit from discharge to post-acute rehabilitation services. Please also refer to the recommendation of the Physical Therapist for safe discharge planning.     Pt will continue to benefit from skilled inpatient PT during this admission in order to facilitate progress towards goals and to maximize pt's independence w/ functional mobility.     DC rec: post acute rehab      Stone Arita PT, DPT  08/18/23

## 2023-08-18 NOTE — INCIDENTAL FINDINGS
The following findings require follow up:  Radiographic finding   Findin. Stable paraesophageal lymph nodes distally.     2. LIVER/BILIARY TREE: Stable hepatomegaly with focus of capsular enhancement in the right dome posteriorly. Stable cyst in the inferior right lobe.     3. KIDNEYS/URETERS: Stable right renal cyst. No hydronephrosis. 4. 3. Abnormal 2.2 cm right retrocrural lymph node adjacent to the descending thoracic aorta just proximal to the diaphragmatic hiatus. Both benign and malignant etiologies are in the differential diagnosis. Follow-up CT of the chest in 3 to 6 months is suggested to assess for stability. Further clinical assessment advised. 5. Sessile enhancing extra-axial lesion anterior to the upper cervical cord dorsal to the odontoid process is stable possibly meningioma and/or pannus, stable. No cord compression or mass effect. Follow up required: Yes   Follow up should be done within 2-4 week(s)    Please notify the following clinician to assist with the follow up:   Discussed with patient PCP as there is concern with patient ability to recall all above findings. Faxed report to PCP to discuss with patient at transition of care appointment.

## 2023-08-18 NOTE — PROGRESS NOTES
[facility]  Progress Note  Name: Lauri Ansari I  MRN: [id number]  Unit/Bed#: W -06 I Date of Admission: 8/15/2023   Date of Service: 8/17/2023 I Hospital Day: 2    Assessment/Plan   * Fall  Assessment & Plan  - Status post fall with the below noted injuries. - Patient noted an increased frequency of falls recently with 1-2 falls daily over the past 2 weeks; she is unsure why she continues to fall frequently. - Neurology consultation and evaluation appreciated. - Fall precautions. - Geriatric Medicine consultation for evaluation, medication review and recommendations.  - PT and OT evaluation and treatment as indicated. - Case Management consultation for disposition planning. Closed fracture of multiple ribs of right side  Assessment & Plan  - Multiple right-sided rib fractures (3rd-5th), present on admission.  - Continue rib fracture protocol.  - Continue to encourage incentive spirometer use and adequate pulmonary hygiene. Currently pulling 1,000 mL on I.S.  - PIC score is 7.  - Continue multimodal analgesic regimen.  - Supplemental oxygen via nasal cannula as needed to maintain saturations greater than or equal to 94%. - Patient currently maintaining oxygen saturations on room air.  - PT and OT evaluation and treatment as indicated. - Outpatient follow-up in the trauma clinic for re-evaluation in approximately 2 weeks. Movement disorder  Assessment & Plan  - Concern for possible movement disorder contributing to recent falls. - Appreciate Neurology evaluation, recommendations and assistance with work-up and management. - Maintain fall precautions. - Review MRI results with Neurology. - Continue PT and OT evaluation and treatment as indicated. - Will need outpatient follow-up for further evaluation and ongoing management. Rheumatoid arteritis ([address])  Assessment & Plan  - Patient with chronic history of rheumatoid arthritis.   - Continue home medication regimen including methotrexate and prednisone.  - Outpatient follow-up per routine. Hypothyroidism  Assessment & Plan  - Patient with chronic history of hypothyroidism.  - Continue home regimen with levothyroxine.  - Outpatient follow-up per routine. Anxiety and depression  Assessment & Plan  - Patient with chronic history of anxiety and depression.  - Continue home medication regimen including Xanax and Prozac. - Outpatient follow-up per routine. Sjogren's disease [facility])  Assessment & Plan  - Patient with chronic history of Sjogren's disease.  - Continue home medication regimen as appropriate. - Outpatient follow-up per routine. Bowel Regimen: senokot  VTE Prophylaxis:Enoxaparin (Lovenox)     Disposition: inpatient for PT/OT eval    Subjective   Chief Complaint: "I'm fine"    Subjective: 70 yoF here for ambulatory dysfnx and freq falls with rib fractures and T* fx seens on MRI. Pt states she has some pain in her ribs with inspiration but otherwise feels fine. Seems confused and keeps asking where cezar  is and why he left her. Overnight pt pulled out her IV and access was not able to be achieved. Venous access was consulted. Objective   Vitals:   Temp:  [97.7 °F (36.5 °C)] 97.7 °F (36.5 °C)  HR:  [64-71] 71  Resp:  [17] 17  BP: (130-201)/(77-95) 130/95    I/O       08/14 0701  08/15 0700 08/15 0701  08/16 0700    P. O.  480    I.V. (mL/kg)  10 (0.2)    Total Intake(mL/kg)  490 (8)    Urine (mL/kg/hr)  150    Total Output  150    Net  +340                 Physical Exam:   Physical Exam  Vitals and nursing note reviewed. Constitutional:       General: She is not in acute distress. Appearance: She is well-developed. HENT:      Head: Normocephalic and atraumatic. Nose: Nose normal.      Mouth/Throat:      Mouth: Mucous membranes are moist.      Pharynx: Oropharynx is clear. Eyes:      Extraocular Movements: Extraocular movements intact.       Conjunctiva/sclera: Conjunctivae normal.      Pupils: Pupils are equal, round, and reactive to light. Cardiovascular:      Rate and Rhythm: Normal rate and regular rhythm. Pulses: Normal pulses. Heart sounds: No murmur heard. Pulmonary:      Effort: Pulmonary effort is normal. No respiratory distress. Breath sounds: Normal breath sounds. Chest:      Chest wall: Tenderness (Tenderness to palpation of right chest.,  No external signs of trauma.) present. Abdominal:      General: Abdomen is flat. Palpations: Abdomen is soft. Tenderness: There is no abdominal tenderness. Musculoskeletal:         General: No swelling or tenderness. Cervical back: Normal range of motion and neck supple. No rigidity or tenderness. Right lower leg: No edema. Left lower leg: No edema. Comments: No spinal tenderness present   Skin:     General: Skin is warm and dry. Capillary Refill: Capillary refill takes less than 2 seconds. Neurological:      Mental Status: She is alert. She is disoriented. Comments: Oriented to self and year but not place. Keeps asking where her  is and doesn't know what is going on.    Psychiatric:         Mood and Affect: Mood normal.         Invasive Devices     None                  PIC Score  PIC Pain Score: 3 (8/17/2023  9:00 PM)  PIC Incentive Spirometry Score: 2 (8/17/2023  9:00 PM)  PIC Cough Description: 3 (8/17/2023  9:00 PM)  PIC Total Score: 8 (8/17/2023  9:00 PM)       If the Total PIC Score </=5, did you consult APS and evaluate patient for further intervention?: no      Pain:    Incentive Spirometry  Cough  3 = Controlled  4 = Above goal volume 3 = Strong  2 = Moderate  3 = Goal to alert volume 2 = Weak  1 = Severe  2 = Below alert volume 1 = Absent     1 = Unable to perform IS         Lab Results:   BMP/CMP:   Lab Results   Component Value Date    SODIUM 136 08/17/2023    K 4.4 08/17/2023     08/17/2023    CO2 26 08/17/2023    BUN 14 08/17/2023 CREATININE 0.74 08/17/2023    CALCIUM 9.4 08/17/2023    EGFR 82 08/17/2023    and CBC:   Lab Results   Component Value Date    WBC 5.06 08/17/2023    HGB 12.0 08/17/2023    HCT 36.2 08/17/2023    MCV 98 08/17/2023     08/17/2023    RBC 3.70 (L) 08/17/2023    MCH 32.4 08/17/2023    MCHC 33.1 08/17/2023    RDW 13.4 08/17/2023    MPV 9.7 08/17/2023    NRBC 0 08/17/2023     Imaging: I have personally reviewed pertinent reports.      Other Studies:

## 2023-08-18 NOTE — PLAN OF CARE
Problem: PHYSICAL THERAPY ADULT  Goal: Performs mobility at highest level of function for planned discharge setting. See evaluation for individualized goals. Description: Treatment/Interventions: Functional transfer training, LE strengthening/ROM, Therapeutic exercise, Endurance training, Cognitive reorientation, Patient/family training, Equipment eval/education, Bed mobility, Gait training, Compensatory technique education  Equipment Recommended: [place] [employer]       See flowsheet documentation for full assessment, interventions and recommendations. Note: Prognosis: Fair  Problem List: Decreased strength, Impaired balance, Decreased mobility, Decreased cognition, Impaired judgement, Decreased safety awareness  Assessment: PT interventions provided include: bed mobility, transfer, gait, endurance, balance, and TLSO brace fitting in order to challenge pt's activity tolerance and to maximize pt independence w/ functional mobility. In comparison to previous session, pt improvement in overall tolerance to activity and improved balance. Pt able to progress to min Ax1 w/ RW for ambulation; however, pt continues to be retropulsive in standing and while mobilizing. Pt fitting for TLSO brace and provided w/ education regarding brace fit/alignment and wearing schedule. Due to pt's impaired cognition, anticipate pt will continue to require assistance w/ donning/doffing brace. Pt continues to be functioning below baseline level, and remains limited in functional mobility due to impaired cognition, impaired balance, decreased safety awareness, and gait deviations. Pt will continue benefit from PT to promote independence w/ functional mobility and progress towards set goals. Continue to recommend DC post acute rehabilitation services when medically cleared. PT Discharge Recommendation: Post acute rehabilitation services    See flowsheet documentation for full assessment.

## 2023-08-18 NOTE — CASE MANAGEMENT
Case Management Discharge Planning Note    Patient name Harrison Galo  Location W /W -32 MRN [id number]  : 1955 Date 2023       Current Admission Date: 8/15/2023  Current Admission Diagnosis:Fall   Patient Active Problem List    Diagnosis Date Noted   • Rheumatoid arteritis ([address]) 08/15/2023   • Closed fracture of multiple ribs of right side 08/15/2023   • Movement disorder 08/15/2023   • Fungal skin infection 2021   • Mild cognitive impairment 2021   • Hyponatremia 2021   • Adenopathy 03/10/2021   • Abnormal CT scan, gastrointestinal tract 03/10/2021   • Dermatitis associated with moisture 2021   • Onychomycosis 2021   • Enlarged and hypertrophic nails 2021   • Low HDL (under 40)    • Anemia 2021   • Hypokalemia 2021   • WELLS (dyspnea on exertion) 2021   • Lightheadedness 2021   • Pain 2021   • Abnormal ECG 2021   • Chest pain 2021   • Pleural effusion on right 2021   • Atelectasis of right lung 2021   • Potential for cognitive impairment 2021   • Oral dyskinesia 2021   • Gastroparesis 2021   • Anxiety and depression 2021   • Hypothyroidism 2021   • Abnormal findings on imaging test 2021   • Hypertension 2021   • Pain of right upper extremity 2021   • Multiple closed fractures of pelvis ([address]) 2021   • Closed nondisplaced fracture of anterior wall of right acetabulum ([address]) 2021   • Closed nondisplaced fracture of right pubis ([address]) 2021   • Fall 2021   • Parotid gland enlargement 2019   • Sjogren's disease ([address]) 2019      LOS (days): 3  Geometric Mean LOS (GMLOS) (days): 3.00  Days to GMLOS:0.3     OBJECTIVE:  Risk of Unplanned Readmission Score: 14.53         Current admission status: Inpatient   Preferred Pharmacy:   [id number] [id number] [facility], [address] [address] Dates    [address] [facility] [address]  Phone: [phone] Fax: [phone]    Primary Care Provider: Nahomi Escalera MD    Primary Insurance: MEDICARE  Secondary Insurance: [facility]    DISCHARGE DETAILS:  CM updated patient and spouse at the bedside with transport time of 1400 to inpatient rehab facility. All questions answered at this time.

## 2023-08-18 NOTE — H&P
PHYSICAL MEDICINE AND REHABILITATION H&P/ADMISSION NOTE  Justyn Fine 70 y.o. female MRN: [id number]  Unit/Bed#: -01 Encounter: [id number]     Rehab Diagnosis: Impairment of mobility, safety, Activities of Daily Living (ADLs), and cognitive/communication skills due to Brain Dysfunction:  02.1  Non-Traumatic Vascular Dementia, compounded/exacerbated by Vitamin B12 deficiency which may be contributing to a peripheral neuropathy with patchy impairment in sensation in feet/hands (inconsistent), markedly impaired proprioception in bilateral toes, impaired balance, ataxia, motor planning difficulties, acute on chronic progression of cognitive impairment compounded further by likely polypharmacy/mismanagement of meds as patient has cognitive impairment, hyperthyroidism (iatrogenic vs. Acutely in setting of injury/illness) and now with component of acute pain from compression fracture exacerbating gait abnormality. History of Present Illness:   Jil Bray is a 70 y.o. female with Sjogrens and RA (on methotrexate), HTN, hypothyroidism, gastroparesis, depression, previous mild cognitive impairment ([facility] 17/30 in 2021), history of meningioma s/p resection in 2012, pelvic fracture after fall down stairs in 2021, who presented to the [facility]15 after falling backwards and landing on the ground 6 days prior. She had headaches and severe back pain and went to the hospital. She also seemed to have multiple more falls. She was found to have multiple R rib fractures (3-5). CTH was unremarkable. There was a question of possible movement disorder and Neurology was consulted who ordered MRI Brain, C-Spine and T-Spine. Of note she is on Xanax and Flexeril chronically (mostly taken during bedtime). Geriatrics recommended a UA which was not done inpatient. She was treated for MASD as well.  MRI Brain showed moderate chronic microangiopathy, significantly increased, C-Spine without cord compression or cord signal abnormality, and T-Spine with a mild/acte T8 compression fracture without cord compression or cord signal abnormality. She most likely, per Neurology has a Vascular Dementia. There was also some discussion of possible PSP. Her  had also reported that it didn't seem the patient was keeping track of her medications properly, and certainly with her medication regimen is at risk for polypharmacy. Neurosurgery was consulted for her T8 compression fracture, and ordered TLSO when OOB/ > 45 degrees and follow-up in 2 weeks. She has a Vitamin B1 pending, SPEP pending,  TSH was 0.314 with T4 1.29, A1C was 5.6, and B12 was 153 (she was started on 5 doses of Vitamin B12 - first dose given today, and to be given every other day, followed by daily 1000mcg Vitamin B12). She was admitted to the [facility] on 8/18/2023. Subjective:  After further discussion with her  - who is a bit overwhelmed by everything - her symptoms began 2-3 weeks prior to hospitalization with increased frequency in falls. Around that time, he checked her medication box, and noticed that several days were prematurely empty - leading him to think that she had been potentially taking too many of her medications. He reports that she had been having progressive cognitive impairment, but that seemed to accelerate from this point forward. At that point he took over her medications. Her falls decreased slightly, but then she had the main fall which caused her pain - and then he started providing her with round the clock supervision and assistance. After a few days, her pain worsened and they went to the hospital. He was worried about the meningioma, but stated that after her rehab from that surgery, she improved and was good for a long time.  She's always gotten up at night to eat, as a habit, but he also noticed that as her fall frequency increased, they tended to happen more often at night on her way to midnight snacks. He notes that she also had a tendency to hold her urine, and then have urgency - needing to go to the bathroom right away. He notes that she only takes the flexeril and the xanax at night, and has done so for a long while now. Of note, she has not been receiving the xanax consistently in the hospital - her first dose was last night. She told me she was in the hospital for falls, but forgot she was transferred to [facility]. She feels comfortable now, but does report back pain, and R rib pain. She denies CP, SOB, lightheadedness, dizziness. Eye issues are related to sjogrens, and she has chronically had these dyskinetic oral movements - this was noted at her last [facility] stay. She has been constipated, and does have gastroparesis. She wasn't able to tell me when she had her last bowel movement - but the last recorded one was 8/14. She has already tried to get up a few times, and has been redirectable and cooperative in sitting back down. Review of Systems: A 10-point review of systems was performed. Negative except as listed above. Plan:     * Progressive cognitive dysfunction  Assessment & Plan   reports progressive cognitive decline that accelerated the past two weeks  - Likely vascular dementia given increased moderate chronic microangiopathy  - ? Amyloid angiopathy per radiology  - Cognitive impairment resulting in mismanagement of her medications - which include steroids, xanax, flexeril, prozac with polypharmacy contributing - particularly since she takes the Flexeril/Xanax at night and that is when most of her falls were occurring.   - Significant Vitamin B12 deficiency contributing  - Also found so far to have slightly high T4, and low TSH  - Of note has hepatomegaly (albeit stable) and increased alk phos on admission.  - Exam with patchy sensory impairment, with clear impairment in proprioception in great toes. Ataxia/motor planning difficulties.  Clearly has impaired balance - this could be exacerbated by the B12 deficiency as well. - Recommend outpatient Neuro/Senior Care follow-up  - Consult Endocrine to comment on TSH/T4 - adjustment of Levothyroxine potentially vs. In setting of recent hospitalization/fall.  - Follow-up work-up: B1, SPEP   - Normal: A1C, MRI C-Spine, T-Spine shows no cord compression/cord signal abnormality  - Added: Folate, B6, UA as recommended by Geriatrics   - Treatment: PT/OT/SLP 3-5 hours/day, 5-7 days/week. See Vitamin B12 deficiency   - Discussed polypharmacy   - Will not scheduled alprazolam at this time, as so far seems to be tolerating (did not take it during hospitalization). Watch closely for signs of withdrawal. Did receive a dose last night. - Will try to decrease dose of alprazolam, but anticipate slow wean. - Tonight will decrease Flexeril to 5mg - and try to wean off. Monitor pain. For tonight, given that she has tried to get up a few times already, will have a 1:1. Will try to wean while here. Compression fracture of T8 vertebra (HCC)  Assessment & Plan  In the past has broken her pelvis after a fall  Chronically on steroids, and likely should have DXA and osteoporosis work-up   - Outpatient f/u with Endocrinology   - Added Vitamin D level while here. - Should probably be on calcium/D supplement. Pain control as below  TLSO when OOB and HOB > 45 degrees. Thoracic spine precautions  Plan for repeat XR ~9/1 with follow-up with Neurosurgery at that time. Vitamin B12 deficiency  Assessment & Plan  8/17 level 153  8/18 received 1000mcg IM shot  - Will give 4 more doses every other day starting on 8/20.  - Then start 1000mcg daily.  - Monitor proprioception, sensation, ataxia/motor planning.   - Outpatient f/u with Neurology  - Nutrition consult to evaluate dietary intake. Closed fracture of multiple ribs of right side  Assessment & Plan  2/2 fall  R 3-5  Currently on RA  Pain control as below  Incentive spirometry.     Urinary urgency  Assessment & Plan  She reports episodes of urgency at times  - Checking UA  -  reports a pattern of patient holding her urine until the last minute and then rushing to the bathroom  - Will start timed voids here and check PVRs. Abnormal CT of the abdomen  Assessment & Plan  1. Stable hepatomegaly - checking CMP in the AM. Alk phos elevated on admission, but stable. If still elevated, will add GGT given recent T8 fracture. Currently on Tylenol Q8hrs scheduled. May need to decrease dose. 2. Stable R renal cyst    Outpatient f/u with PCP    Adenopathy  Assessment & Plan  In the past has had "stable distal paraesophageal and gastrohepatic ligament adenopathy of uncertain etiology""  Here also noted to have R retrocrural lymph node adjacent to the descending thoracic aorta, juts proximal to the diaphragmatic hiatus - recommended for CT of the chest in 3-6 months to assess for stability. Onychomycosis  Assessment & Plan  Consult podiatry for very overgrown toenails    Oral dyskinesia  Assessment & Plan  This is stable and was noted in her [address] admission, where it was felt to be due to reglan. She is no longer on this medication. Patient attributes to her Sjogrens. Would not add additional medication to address this at this time. Can f/u with Neurology outpatient    Gastroparesis  Assessment & Plan  Was previously on reglan, does not appear to be on this medication at this time. May need small, more frequent meals  Monitor for symptoms. Hypothyroidism  Assessment & Plan  8/17 TSH 0.314 and T4 1.29  Previously appeared to be on 88-75mcg daily. Not sure when 100mcg was initiated and by who. Consulted endocrine to comment on adjusting dose vs. In setting of recent fall/injury/hospitalization    Anxiety and depression  Assessment & Plan  Currently on prozac 20mg daily  Also on xanax 1mg QHS PRN chronically, which it seems she was taking scheduled at home.   Has not been on this only PRN here.    - Monitor for now PRN - but monitor closely for any symptoms of withdrawal or increased agitation off this medication. Question of this contributed to her worsening falls (as she was likely not managing her meds well at home given her cognitive impairment)  Would recommend a wean of this medication over time. That being said, this medication is also not showing up in her PDMP. Hypertension  Assessment & Plan  Home: Atenolol 50mg daily, Losartan 25mg daily  Here: Atenolol 50mg daily, Hydralazine PRN  Monitor and adjust as appropriate  IM consulted and assisting with management. Sjogren's disease [facility])  Assessment & Plan  Follows with Dr. Kimble Bence outpatient  Chronically on prednisone and methotrexate. Continue for now. Health Maintenance  #Pain: Tylenol Q8hrs scheduled, Flexeril (will decrease to 5mg QHS), Lidoderm patch, Oxycodone 2.5-5mg (will try to de-escalate sooner rather than later). #Bowel: Last BM 8/14. Miralax to daily. Added Bisacodyl suppository and tabs PRN. #Bladder: Voiding and appears to be continent. Checking PVR x3.   #Skin/Pressure Injury Prevention: Turn Q2hr in bed, with weight shifts T29-52izo in wheelchair. Float heels in bed. #DVT Prophylaxis: Lovenox, SCDs. #GI Prophylaxis: While on lovenox, given that she is also on prozac, and on prednisone chronically, will add protonix 20mg daily. #Code Status:  DNR/DNI  #FEN: Regular/Thins  #Dispo:  ELOS 10-14 days - will need to get up a flight of stairs potentially.  may be able to provide some supervision, anticipate at least supervision level. With thoracic spine and brace may need assistance with some ADLs.      55 minutes or greater spent for this encounter which included a combination of face-to-face time with patient and non-face-to-face time which in part specifically includes management of ambulatory/gait dysfunction, confusion - likely premorbid vascular dementia exacerbated by possible polypharmacy and with a component of significant B12 deficiency. which can also lead to peripheral neuropathy, ataxia, and memory loss/cognitive impairment. .  Face-to-face time included extended discussion with patient regarding current condition, medical history, mood, medical/rehabilitation management, and disposition. Non face-to-face time included coordination of care with patient's co-managing AP and/or physician(s) thru communication and review of their recent documentation as well as reviewing vitals, bowel/bladder function, recent labs, and notes from therapy, CM, and nursing.       Drug regimen reviewed, all potential adverse effects identified and addressed:    Scheduled Meds:  Current Facility-Administered Medications   Medication Dose Route Frequency Provider Last Rate   • acetaminophen  975 mg Oral [facility] Elza Galindo MD     • ALPRAZolam  1 mg Oral HS PRN Elza Galindo MD     • [START ON 8/19/2023] atenolol  50 mg Oral Daily Elza Galindo MD     • [START ON 8/19/2023] atorvastatin  10 mg Oral Daily With Christiano Weaver MD     • cyclobenzaprine  10 mg Oral HS Elza Galindo MD     • enoxaparin  30 mg Subcutaneous Q12H [address] Elza Galindo MD     • [START ON 8/19/2023] FLUoxetine  20 mg Oral Daily Elza Galindo MD     • [START ON 7/07/2204] folic acid  1 mg Oral Daily Elza Galindo MD     • hydrALAZINE  25 mg Oral Q8H PRN DARIO Collado     • [START ON 8/19/2023] levothyroxine  100 mcg Oral Early Morning Elza Galindo MD     • [START ON 8/19/2023] lidocaine  1 patch Topical Daily Elza Galindo MD     • [START ON 8/23/2023] methotrexate  20 mg Oral Weekly Elza Galindo MD     • oxyCODONE  5 mg Oral Q4H PRN Elza Galindo MD     • oxyCODONE  2.5 mg Oral Q4H PRN Elza Galindo MD     • predniSONE  5 mg Oral HS Elza Galindo MD     • senna-docusate sodium  1 tablet Oral HS Elza Galindo MD          Restrictions include:  Thoracic spine precautions Fall precautions      Functional History/Home Set-up - Prior to Admission:    Lives with spouse. Has 2 sons in their 35s. Lives in a [address] Bed/bath upstairs. 1/2 bath on main level. Grab bars in shower  3 level house   7+7 MICHAEL with railings  Was supposedly independent with functional mobility, ADLs, and needs assistance with IADLs. Was falling somewhat frequently the past two weeks prior to admission. Functional Status Upon Admission to [facility]:  Mobility: Ebony bed mobility, Ebony ambulation 15' with RW  Transfers: Ebony transfers  ADLs: Sup grooming, Ebony UB dressing, Ebony LB dressing     Physical Exam:  Temp:  [97.6 °F (36.4 °C)-98.6 °F (37 °C)] 98.6 °F (37 °C)  HR:  [70-71] 71  Resp:  [16-18] 18  BP: (130-167)/(67-95) 165/67  SpO2:  [92 %-96 %] 96 %    Gen: No acute distress, Well-nourished, well-appearing. HEENT: Dry tongue. Normocephalic/Atraumatic  Cardiovascular: Regular rate, rhythm, S1/S2. Distal pulses palpable  Heme/Extr: No edema  Pulmonary: Non-labored breathing. Lungs CTAB  : No medeiros  GI: Soft, non-tender, non-distended. BS+  MSK: Arthritic joints. Wearing TLSO. Integumentary: Skin is warm, dry. No rashes or ulcers. Neuro: AAO to self and month. Thought she was at [facility], and wasn't sure of the date and year, CN 2-12 intact, save for some visual deficit, and she has oral dyskinesia, eye rolling that occurs on a regular basis. Sensation is patchy but present in her hands and feet - exam is not always consistent with particular distribution. Speech is affected by the dyskinesia, but she intelligible. Appropriate to questioning - but get the sense that she may not be answering more complex questions accurately. She has impaired recall. At times impulsive. She has inconsistent ataxia, seems to be worse with FTN on the R. Difficult to test in her LE due to hip arthritis/decreased hip ROM. She has difficulty with rapid alternating motion. Her proprioception in her great toes is markedly impaired.  She seems to have some motor planning difficulties at times. MMT:   Strength:   Right  Left  Site  Right  Left  Site    5 5  S Ab: Shoulder Abductors  3  3  HF: Hip Flexors    5 5  EF: Elbow Flexors  NT  NT KF: Knee Flexors    4  5  EE: Elbow Extensors  4-  4-  KE: Knee Extensors    5  5  WE: Wrist Extensors  5  5  DR: Dorsi Flexors    4  4  FF: Finger Flexors  5  5  PF: Plantar Flexors    4  4  HI: Hand Intrinsics  4 4  EHL: Extensor Hallucis Longus   Psych: Normal mood and affect. Laboratory:    Results from last 7 days   Lab Units 08/17/23  0444 08/16/23  0602 08/15/23  1247   HEMOGLOBIN g/dL 12.0 13.1 12.4   HEMATOCRIT % 36.2 39.7 37.6   WBC Thousand/uL 5.06 5.38 5.31     Results from last 7 days   Lab Units 08/17/23  0444 08/16/23  0602 08/15/23  1247   BUN mg/dL 14 10 12   SODIUM mmol/L 136 136 134*   POTASSIUM mmol/L 4.4 4.8 3.9   CHLORIDE mmol/L 104 103 103   CREATININE mg/dL 0.74 0.85 0.85   AST U/L  --   --  15   ALT U/L  --   --  15     Results from last 7 days   Lab Units 08/16/23  0602   PROTIME seconds 15.2*   INR  1.14        Wt Readings from Last 1 Encounters:   08/18/23 60.1 kg (132 lb 9.6 oz)     Estimated body mass index is 27.71 kg/m² as calculated from the following:    Height as of this encounter: 4' 10" (1.473 m). Weight as of this encounter: 60.1 kg (132 lb 9.6 oz). Imaging: reviewed   8/15 CTH:  No acute intracranial abnormality. Sinus disease    8/15 CT C-Spine:  No cervical spine fracture or traumatic malalignment. 8/15 CT CAP:  Acute nondisplaced anterior right 3rd-5th rib fractures. No pleural effusion or pneumothorax.     Stable hepatomegaly.     Stable right renal cyst.    8/15 CT Thoracic Recon:  No fracture or traumatic subluxation    8/16 MRI T-Spine:  1. Mild, acute compression fracture of T8 with approximately 25% loss of vertebral body height. No bony retropulsion or epidural hematoma. No cord compression or cord signal abnormality. 2. Mild spondylotic changes elsewhere.   3. Abnormal 2.2 cm right retrocrural lymph node adjacent to the descending thoracic aorta just proximal to the diaphragmatic hiatus. Both benign and malignant etiologies are in the differential diagnosis. Follow-up CT of the chest in 3 to 6 months is   suggested to assess for stability. Further clinical assessment advised. 8/16 MRI C-Spine:  1. No cord compression or cord signal abnormality. No epidural hematoma or paraspinal edema. 2. Scattered spondylotic changes with reversal of the cervical lordosis at the C5 level are stable. 3. Sessile enhancing extra-axial lesion anterior to the upper cervical cord dorsal to the odontoid process is stable possibly meningioma and/or pannus, stable. No cord compression or mass effect. 4. Progressive cystic changes in both parotid glands right greater than left, correlating to the history of Sjogren's disease. 8/16 MRI Brain:  1. Moderate, chronic microangiopathy is significantly increased from the prior study. 2. A few foci of blooming artifact implicate amyloid angiopathy. No acute intracranial hemorrhage. 3. No acute infarction, intracranial hemorrhage or mass. 4. Right greater than left parotid cystic changes similar to the prior ultrasound, correlating to the history of Sjogren's disease. 8/17 XR T-Spine:  Stable mild anterior wedging of the T8 vertebral body. Rehabilitation Prognosis: good     Tolerance for three hours of therapy a day: good     Family/Patient Goals:  Patient/family's goals: Return to previous home/apartment. Patient will receive PT, OT, and ST 60 minutes each per day, five days per week to achieve rehab goals or participate in 900 minutes of therapy within a 7 day week period. Mobility Goals: Supervision / Standby Assist  Transfer Goals: Supervision / Standby Assist  Activities of Daily Living (ADLs) Goals: Sup-Ebony    Discharge Planning:  Rehabilitation and discharge goals discussed with the patient and/or family.     Case Managment and Social Work to review patient/family resources and to coordinate Discharge Planning. Estimated length of stay: 10 to 14 days    Patient and Family Education and Training:  Rehabilitation and discharge goals discussed with the patient and/or family. Patient/family education/training needs to be discussed in weekly team meeting. Equipment/DME needs: Therapy teams to assess and evaluate for additional equipment/DME needs throughout rehabilitation stay    Past Medical History:   Past Surgical History:   Family History:   Social history:   Past Medical History:   Diagnosis Date   • Lymphadenitis, chronic    • Sialadenitis    • Sjogren's disease ([address])    • Stomach problems    • Thyroid disorder    • Xerostomia     Past Surgical History:   Procedure Laterality Date   • BRAIN SURGERY  2019    tumor removed Dr. Bj Coyne   • CHOLECYSTECTOMY       Family History   Problem Relation Age of Onset   • Diabetes Other    • Hypertension Other    • Heart disease Other    • Arthritis Other    • Heart disease Mother    • Kidney disease Mother    • Alcohol abuse Father       Social History     Socioeconomic History   • Marital status: /Civil Union     Spouse name: Not on file   • Number of children: Not on file   • Years of education: Not on file   • Highest education level: Not on file   Occupational History   • Not on file   Tobacco Use   • Smoking status: Former   • Smokeless tobacco: Never   Substance and Sexual Activity   • Alcohol use:  Yes   • Drug use: Never   • Sexual activity: Not Currently     Partners: Male   Other Topics Concern   • Not on file   Social History Narrative   • Not on file     Social Determinants of Health     Financial Resource Strain: Not on file   Food Insecurity: No Food Insecurity (8/16/2023)    Hunger Vital Sign    • Worried About Running Out of Food in the Last Year: Never true    • Ran Out of Food in the Last Year: Never true   Transportation Needs: No Transportation Needs (8/16/2023) PRAPARE - Transportation    • Lack of Transportation (Medical): No    • Lack of Transportation (Non-Medical):  No   Physical Activity: Not on file   Stress: Not on file   Social Connections: Not on file   Intimate Partner Violence: Not on file   Housing Stability: Low Risk  (8/16/2023)    Housing Stability Vital Sign    • Unable to Pay for Housing in the Last Year: No    • Number of Places Lived in the Last Year: 1    • Unstable Housing in the Last Year: No          Current Medical Diagnosis Allergies   Patient Active Problem List   Diagnosis   • Parotid gland enlargement   • Sjogren's disease ([address])   • Closed nondisplaced fracture of anterior wall of right acetabulum ([address])   • Closed nondisplaced fracture of right pubis ([address])   • Fall   • Hypertension   • Pain of right upper extremity   • Multiple closed fractures of pelvis ([address])   • Anxiety and depression   • Hypothyroidism   • Abnormal findings on imaging test   • Gastroparesis   • Oral dyskinesia   • Potential for cognitive impairment   • Anemia   • Hypokalemia   • WELLS (dyspnea on exertion)   • Lightheadedness   • Pain   • Abnormal ECG   • Chest pain   • Pleural effusion on right   • Atelectasis of right lung   • Low HDL (under 40)   • Dermatitis associated with moisture   • Onychomycosis   • Enlarged and hypertrophic nails   • Adenopathy   • Abnormal CT scan, gastrointestinal tract   • Hyponatremia   • Mild cognitive impairment   • Fungal skin infection   • Rheumatoid arteritis (HCC)   • Closed fracture of multiple ribs of right side   • Movement disorder    Allergies   Allergen Reactions   • Codeine Vomiting   • Hydroxyquinoline Sulfate [Oxyquinoline] Vomiting   • Leucovorin Vomiting   • Tizanidine Vomiting           Medical Necessity Criteria for [facility] Admission: Vascular dementia compounded by polypharmacy (patient with chronic pain and also anxiety on benzo and muscle relaxer), siginficant Vitamin B12 deficiency, possible hyperthyroidism (needs adjusting of regimen), urinary urgency (checking for UA), Sjogrens/RA, HLD, Anxiety/depressoin, T8 compression fracture with acute on chronic pain, R rib fractures monitoring respiratory status, high risk of delirium, falls, VTE, further fracture, skin breakdown . In addition, the preadmission screen, post-admission physical evaluation, overall plan of care and admissions order demonstrate a reasonable expectation that the following criteria were met at the time of admission to the [facility]. 1. The patient requires active and ongoing therapeutic intervention of multiple therapy disciplines (physical therapy, occupational therapy, speech-language pathology, or prosthetics/orthotics), one of which is physical or occupational therapy. 2. Patient requires an intensive rehabilitation therapy program, as defined in Chapter 1, section 110.2.2 of the CMS Medicare Policy Manual. This intensive rehabilitation therapy program will consist of at least 3 hours of therapy per day at least 5 days per week or at least 15 hours of intensive rehabilitation therapy within a 7 consecutive day period, beginning with the date of admission to the [facility]. 3. The patient is reasonably expected to actively participate in, and benefit significantly from, the intensive rehabilitation therapy program as defined in Chapter 1, section 110.2.2 of the CMS Medicare Policy Manual at this time of admission to the [facility]. She can reasonably be expected to make measurable improvement (that will be of practical value to improve the patient’s functional capacity or adaptation to impairments) as a result of the rehabilitation treatment, as defined in section 110.3, and such improvement can be expected to be made within the prescribed period of time.  As noted in the CMS Medicare Policy Manual, the patient need not be expected to achieve complete independence in the domain of self-care nor be expected to return to his or her prior level of functioning in order to meet this standard. 4. The patient must require physician supervision by a rehabilitation physician. As such, a rehabilitation physician will conduct face-to-face visits with the patient at least 3 days per week throughout the patient’s stay in the [facility] to assess the patient both medically and functionally, as well as to modify the course of treatment as needed to maximize the patient’s capacity to benefit from the rehabilitation process. 5. The patient requires an intensive and coordinated interdisciplinary approach to providing rehabilitation, as defined in Chapter 1, section 110.2.5 of the CMS Medicare Policy Manual. This will be achieved through periodic team conferences, conducted at least once in a 7-day period, and comprising of an interdisciplinary team of medical professionals consisting of: a rehabilitation physician, registered nurse,  and/or , and a licensed/certified therapist from each therapy discipline involved in treating the patient. Bon Gan MD  Physical Medicine and Rehabilitation    ** Please Note: Fluency Direct voice to text software may have been used in the creation of this document.  **

## 2023-08-18 NOTE — PLAN OF CARE
Problem: MOBILITY - ADULT  Goal: Maintain or return to baseline ADL function  Description: INTERVENTIONS:  -  Assess patient's ability to carry out ADLs; assess patient's baseline for ADL function and identify physical deficits which impact ability to perform ADLs (bathing, care of mouth/teeth, toileting, grooming, dressing, etc.)  - Assess/evaluate cause of self-care deficits   - Assess range of motion  - Assess patient's mobility; develop plan if impaired  - Assess patient's need for assistive devices and provide as appropriate  - Encourage maximum independence but intervene and supervise when necessary  - Involve family in performance of ADLs  - Assess for home care needs following discharge   - Consider OT consult to assist with ADL evaluation and planning for discharge  - Provide patient education as appropriate  Outcome: Progressing  Goal: Maintains/Returns to pre admission functional level  Description: INTERVENTIONS:  - Perform BMAT or MOVE assessment daily.   - Set and communicate daily mobility goal to care team and patient/family/caregiver. - Collaborate with rehabilitation services on mobility goals if consulted  - Perform Range of Motion 2 times a day. - Reposition patient every 2 hours.   - Dangle patient 2 times a day  - Stand patient 2 times a day  - Ambulate patient 2 times a day  - Out of bed to chair 2 times a day   - Out of bed for meals 2 times a day  - Out of bed for toileting  - Record patient progress and toleration of activity level   Outcome: Progressing     Problem: Prexisting or High Potential for Compromised Skin Integrity  Goal: Skin integrity is maintained or improved  Description: INTERVENTIONS:  - Identify patients at risk for skin breakdown  - Assess and monitor skin integrity  - Assess and monitor nutrition and hydration status  - Monitor labs   - Assess for incontinence   - Turn and reposition patient  - Assist with mobility/ambulation  - Relieve pressure over bony prominences  - Avoid friction and shearing  - Provide appropriate hygiene as needed including keeping skin clean and dry  - Evaluate need for skin moisturizer/barrier cream  - Collaborate with interdisciplinary team   - Patient/family teaching  - Consider wound care consult   Outcome: Progressing     Problem: PAIN - ADULT  Goal: Verbalizes/displays adequate comfort level or baseline comfort level  Description: Interventions:  - Encourage patient to monitor pain and request assistance  - Assess pain using appropriate pain scale  - Administer analgesics based on type and severity of pain and evaluate response  - Implement non-pharmacological measures as appropriate and evaluate response  - Consider cultural and social influences on pain and pain management  - Notify physician/advanced practitioner if interventions unsuccessful or patient reports new pain  Outcome: Progressing     Problem: SAFETY ADULT  Goal: Maintain or return to baseline ADL function  Description: INTERVENTIONS:  -  Assess patient's ability to carry out ADLs; assess patient's baseline for ADL function and identify physical deficits which impact ability to perform ADLs (bathing, care of mouth/teeth, toileting, grooming, dressing, etc.)  - Assess/evaluate cause of self-care deficits   - Assess range of motion  - Assess patient's mobility; develop plan if impaired  - Assess patient's need for assistive devices and provide as appropriate  - Encourage maximum independence but intervene and supervise when necessary  - Involve family in performance of ADLs  - Assess for home care needs following discharge   - Consider OT consult to assist with ADL evaluation and planning for discharge  - Provide patient education as appropriate  Outcome: Progressing  Goal: Maintains/Returns to pre admission functional level  Description: INTERVENTIONS:  - Perform BMAT or MOVE assessment daily.   - Set and communicate daily mobility goal to care team and patient/family/caregiver. - Collaborate with rehabilitation services on mobility goals if consulted  - Perform Range of Motion 2 times a day. - Reposition patient every 2 hours. - Dangle patient 2 times a day  - Stand patient 2 times a day  - Ambulate patient 2 times a day  - Out of bed to chair 2 times a day   - Out of bed for meals 2 times a day  - Out of bed for toileting  - Record patient progress and toleration of activity level   Outcome: Progressing  Goal: Patient will remain free of falls  Description: INTERVENTIONS:  - Educate patient/family on patient safety including physical limitations  - Instruct patient to call for assistance with activity   - Consult OT/PT to assist with strengthening/mobility   - Keep Call bell within reach  - Keep bed low and locked with side rails adjusted as appropriate  - Keep care items and personal belongings within reach  - Initiate and maintain comfort rounds  - Make Fall Risk Sign visible to staff  - Offer Toileting every 2 Hours, in advance of need  - Initiate/Maintain bed alarm  - Obtain necessary fall risk management equipment: yellow socks  - Apply yellow socks and bracelet for high fall risk patients  - Consider moving patient to room near nurses station  Outcome: Progressing     Problem: Nutrition/Hydration-ADULT  Goal: Nutrient/Hydration intake appropriate for improving, restoring or maintaining nutritional needs  Description: Monitor and assess patient's nutrition/hydration status for malnutrition. Collaborate with interdisciplinary team and initiate plan and interventions as ordered. Monitor patient's weight and dietary intake as ordered or per policy. Utilize nutrition screening tool and intervene as necessary. Determine patient's food preferences and provide high-protein, high-caloric foods as appropriate.      INTERVENTIONS:  - Monitor oral intake, urinary output, labs, and treatment plans  - Assess nutrition and hydration status and recommend course of action  - Evaluate amount of meals eaten  - Assist patient with eating if necessary   - Allow adequate time for meals  - Recommend/ encourage appropriate diets, oral nutritional supplements, and vitamin/mineral supplements  - Order, calculate, and assess calorie counts as needed  - Recommend, monitor, and adjust tube feedings and TPN/PPN based on assessed needs  - Assess need for intravenous fluids  - Provide specific nutrition/hydration education as appropriate  - Include patient/family/caregiver in decisions related to nutrition  Outcome: Progressing

## 2023-08-18 NOTE — CONSULTS
Internal Medicine Consultation Note    Patient: Elda Serrano  Age/sex: 70 y.o. female  Medical Record #: [id number]      Assessment/Plan:    Frequent fall, r/o movement disorder  · See Dr Andrew Dodd as OP    Right rib fractures  · Ribs 3-5th  · Continue IS    T8 fracture  · Non-op  · TLSO brace  · For upright t-spine xrays in brace in 2 weeks    Vitamin B12 deficiency  · For IM replacement x 5 doses then to PO    HTN  · Home:  Atenolol 50mg qd  · Here:  Atenolol 50mg qd  · Add prn Hydralazine  · Will likely need an additional medication to obtain good control but will trend for today    Cognitive impairment  · MOCA 3/2021 was 17/30  · Likely has worsened since prev MOCA and is confused here    Hypothyroidism  · TSH 0.314/free T4 1.29  · For repeat TFTs as OP 4 weeks  · Continue Levothyroxine 100 mcg qd    RA  · At home takes Methotrexate 20mg qWednesday/Prednisone 5mg qd/Flexeril 10mg qhs    HLD  · Lipitor 10mg qd    Anxiety/depression  · At home she was taking Xananx 1mg TID and Prozac 20mg qd  · Per PMR    Enlarged right retrocrural lymph node  · Found on CT  · For repeat CT chest as OP in 3-6 months      Discharge date:  Team       Subjective/HPI:     Sandhya Crouch is a 70year old patient with a history of HLD, RA, Sjogren's, hypothyroidism, anxiety/depression, cervical meningioma resection 2012, HTN, cognitive impairment with [facility] 17/30 3/2021 and frequent falls who was admitted after falling and hitting a chair on her right side. She had been having falls 1-2/day for 2 weeks prior to admission. She was found to have right 3-5th rib fractures and a T8 compression fracture. She was seen by Neurosurgery and the T8 fracture was managed non-operatively with a TLSO brace. Neurology saw in consult for possible movement disorder with workup with MRI of the brain and cervical spine, TSH, vitamin B12, vitamin B1, SPEP, HbA1C.   MRI of the brain showed moderate, chronic microangiopathy is significantly increased from prior study; a few foci of blooming artifact implicate amyloid angiopathy. No acute intracranial hemorrhage/infarction/mass. There was right greater than left parotid cystic changes similar to the prior ultrasound, correlating to the patient's history of Sjogren's disease. MRI of the cervical spine showed no cord compression/cord signal abnormality/epidural hematoma or paraspinal edema. there was scattered spondylotic changes with reversal of the cervical lordosis at the C5 level that were stable. There was sessile enhancing extra-axial lesion anterior to the upper cervical cord dorsal to the odontoid process is stable possibly meningioma and/or pannus which was felt to be stable and there was no cord compression or mass effect. TSH was 0.314/free T4 1.29, Vitamin B12 153, HbA1C was 5.6. SPEP and Vitamin B1 are pending. [facility] and we are asked to assist with medical management. Currently there are no complaints of CP, SOB, dizziness, N/V/D.     ROS:   A 10 point ROS was performed; negative except as noted above.      Social History:    Substance Use History:   Social History     Substance and Sexual Activity   Alcohol Use Yes     Social History     Tobacco Use   Smoking Status Former   Smokeless Tobacco Never     Social History     Substance and Sexual Activity   Drug Use Never       Family History:    Family History   Problem Relation Age of Onset   • Diabetes Other    • Hypertension Other    • Heart disease Other    • Arthritis Other    • Heart disease Mother    • Kidney disease Mother    • Alcohol abuse Father          Review of Scheduled Meds:  Current Facility-Administered Medications   Medication Dose Route Frequency Provider Last Rate   • acetaminophen  975 mg Oral [facility] Joaquin Davidson MD     • ALPRAZolam  1 mg Oral HS PRN Joaquin Davidson MD     • [START ON 8/19/2023] atenolol  50 mg Oral Daily Joaquin Davidson MD     • [START ON 8/19/2023] atorvastatin  10 mg Oral Daily With Scotty Marshall MD     • cyclobenzaprine  10 mg Oral HS Rasheeda Hence, MD     • enoxaparin  30 mg Subcutaneous Q12H [address] Rasheeda Hence, MD     • [START ON 8/19/2023] FLUoxetine  20 mg Oral Daily Rasheeda Hence, MD     • [START ON 6/29/2038] folic acid  1 mg Oral Daily Rasheeda Hence, MD     • hydrALAZINE  25 mg Oral Q8H PRN DARIO Fernandez     • [START ON 8/19/2023] levothyroxine  100 mcg Oral Early Morning Rasheeda Hence, MD     • [START ON 8/19/2023] lidocaine  1 patch Topical Daily Rasheeda Hence, MD     • [START ON 8/23/2023] methotrexate  20 mg Oral Weekly Rasheeda Hence, MD     • oxyCODONE  5 mg Oral Q4H PRN Rasheeda Hence, MD     • oxyCODONE  2.5 mg Oral Q4H PRN Rasheeda Hence, MD     • predniSONE  5 mg Oral HS Rasheeda Hence, MD     • senna-docusate sodium  1 tablet Oral HS Rasheeda Hence, MD         Labs:     Results from last 7 days   Lab Units 08/17/23  0444 08/16/23  0602   WBC Thousand/uL 5.06 5.38   HEMOGLOBIN g/dL 12.0 13.1   HEMATOCRIT % 36.2 39.7   PLATELETS Thousands/uL 174 187     Results from last 7 days   Lab Units 08/17/23  0444 08/16/23  0602   SODIUM mmol/L 136 136   POTASSIUM mmol/L 4.4 4.8   CHLORIDE mmol/L 104 103   CO2 mmol/L 26 27   BUN mg/dL 14 10   CREATININE mg/dL 0.74 0.85   CALCIUM mg/dL 9.4 9.7     Results from last 7 days   Lab Units 08/17/23  0444   HEMOGLOBIN A1C % 5.6     Results from last 7 days   Lab Units 08/16/23  0602   INR  1.14              No results found for: "Nacho Rued", "Meryle Primer", "WOUNDCULT", "Parley Tank"    Input and Output Summary (last 24 hours):     No intake or output data in the 24 hours ending 08/18/23 1642    Imaging:     No orders to display       *Labs /Radiology studies reviewed  *Medications reviewed and reconciled as needed  *Please refer to order section for additional ordered labs studies  *Case discussed with primary attending during morning huddle case rounds    Vitals:   Temp (24hrs), Av.6 °F (36.4 °C), Min:97.6 °F (36.4 °C), Max:97.6 °F (36.4 °C)    Temp:  [97.6 °F (36.4 °C)] 97.6 °F (36.4 °C)  HR:  [67-71] 70  Resp:  [16] 16  BP: (130-184)/(87-95) 167/92  SpO2:  [92 %-95 %] 92 %  There is no height or weight on file to calculate BMI. Physical Exam:   General Appearance: no distress, conversive  HEENT:  External ear normal.  Nose normal w/o drainage. Mucous membranes are moist. Oropharynx is clear. Conjunctiva clear w/o icterus or redness. Neck:  Supple, normal ROM  Lungs: BBS without crackles/wheeze/rhonchi; respirations unlabored with normal inspiratory/expiratory effort. No retractions noted. On RA  CV: regular rate and rhythm; no rubs/murmurs/gallops, PMI normal   ABD: Abdomen is soft. Bowel sounds all quadrants. Nontender with no distention. EXT: no edema  Skin: normal turgor, normal texture, no rashes  Psych: affect normal, mood normal  Neuro: AA; confused         Invasive Devices     None                  VTE Pharmacologic Prophylaxis: Enoxaparin (Lovenox)  Code Status: Level 3 - DNAR and DNI  Current Length of Stay: 0 day(s)    Total floor / unit time spent today 1 hour with more than 50% spent counseling/coordinating care. Counseling includes discussion with patient re: progress  and discussion with patient of his/her current medical state/information. Coordination of patient's care was performed in conjunction with primary service. Time invested included review of patient's labs, vitals, and management of their comorbidities with continued monitoring. In addition, this patient was discussed with medical team including physician and advanced extenders. The care of the patient was extensively discussed and appropriate treatment plan was formulated unique for this patient by supervising physician unless stated otherwise in their attestation statement. ** Please Note: voice to text software may have been used in the creation of this document.  Audio transcription errors may occur**

## 2023-08-18 NOTE — PLAN OF CARE
Problem: OCCUPATIONAL THERAPY ADULT  Goal: Performs self-care activities at highest level of function for planned discharge setting. See evaluation for individualized goals. Description: Treatment Interventions: ADL retraining, Functional transfer training, UE strengthening/ROM, Endurance training, Patient/family training, Equipment evaluation/education, Compensatory technique education, Continued evaluation, Fine motor coordination activities, Neuromuscular reeducation, Cognitive reorientation          See flowsheet documentation for full assessment, interventions and recommendations. Outcome: Progressing  Note: Limitation: Decreased ADL status, Decreased UE strength, Decreased Safe judgement during ADL, Decreased endurance, Decreased cognition, Decreased self-care trans, Decreased high-level ADLs, Visual deficit, Decreased fine motor control (motor planning, sequencing, postural awareness, functional reach, static and dynamic balance, trunk control, decreased attention, insight / judgement, problem solving, memory.)  Prognosis: Fair  Assessment: Pt seen on this date for skilled OT treatment session. At start of session pt sitting in recliner chair. Pt agreeable and motivated to participate in session. Pt required decreased assistance with LB dressing tasks, able to complete with min A. Pt demonstrating fair understanding of spinal precautions and requiring extensive education on how to maintain with dressing tasks. Pt provided with extensive edu on don/doffing TLSO brace, and able to complete with VC throughout. Requiring reminders that she completed donning brace s/p donning. Pt's overall performance limited by STM deficits, problem solving, safety, insight, forward functional reach, activity tolerance, standing balance. Pt would continue to benefit from skilled OT treatment sessions in order to address remaining deficits and continue to recommend d/c to rehab when medically stable.      OT Discharge Recommendation: Post acute rehabilitation services

## 2023-08-18 NOTE — DISCHARGE SUMMARY
Discharge Summary - Svetlana Fine 70 y.o. female MRN: [id number]    Unit/Bed#: W -00 Encounter: [id number]    Admission Date:   Admission Orders (From admission, onward)     Ordered        08/15/23 2007  Inpatient Admission  Once                        Admitting Diagnosis: Movement disorder [G25.9]  Back pain [M54.9]  Weakness [R53.1]  Closed fracture of multiple ribs of right side, initial encounter [S22.41XA]    HPI: Per Cesar Leonardo, "Luis A Metzger is a 70 y.o. who presents with right sided rib and back pain. Hx of Sjogren disease and RA on methotrexate, pt states she has been fall ing a lot recently stating 1-2 times per day for the last 2 weeks. Pt does not know which exact fall she injured herself but thinks it was about a week ago and  states she did have a fall where her right side hit a chair. Pt has hit her head when she fell previously but no LOC. Denies blood thinners."    Procedures Performed: No orders of the defined types were placed in this encounter. Summary of Hospital Course: Patient is a 44-year-old female who comes in status post fall. She reported history of multiple falls during the course of her home stay. She was admitted with rib fractures and thoracic spine fractures. She was consulted to trauma and neurosurgery. The patient did well in the trauma floor. She ultimately required rehab based on PT and OT recommendations. She would be discharged to acute care rehab on 8/18/2023. During her hospital course as well there was concern over a movement disorder underlying with a cognitive ability. The patient was then consulted to neurology of which they recommended a vitamin B-12 injection for total of a 5 dose administration. She would then complete a daily vitamin B12 tablet after completion of the injectable.   She would follow-up outpatient in 6 weeks with neurology team.  Due to concern of patient's ability to recall all her incidental findings and follow-up; and AVS was faxed to the patient PCP and confirmed to be received. They will follow-up with the patient for transition of care appointment to discuss all incidental findings as well as ongoing management. Significant Findings, Care, Treatment and Services Provided:   XR spine thoracic 3 vw    Result Date: 8/18/2023  Impression: Stable mild anterior wedging of the T8 vertebral body. Workstation performed: [id number]     MRI thoracic spine w wo contrast    Result Date: 8/17/2023  Impression: 1. Mild, acute compression fracture of T8 with approximately 25% loss of vertebral body height. No bony retropulsion or epidural hematoma. No cord compression or cord signal abnormality. 2. Mild spondylotic changes elsewhere. 3. Abnormal 2.2 cm right retrocrural lymph node adjacent to the descending thoracic aorta just proximal to the diaphragmatic hiatus. Both benign and malignant etiologies are in the differential diagnosis. Follow-up CT of the chest in 3 to 6 months is suggested to assess for stability. Further clinical assessment advised. Workstation performed: [id number]     MRI cervical spine w wo contrast    Result Date: 8/17/2023  Impression: 1. No cord compression or cord signal abnormality. No epidural hematoma or paraspinal edema. 2. Scattered spondylotic changes with reversal of the cervical lordosis at the C5 level are stable. 3. Sessile enhancing extra-axial lesion anterior to the upper cervical cord dorsal to the odontoid process is stable possibly meningioma and/or pannus, stable. No cord compression or mass effect. 4. Progressive cystic changes in both parotid glands right greater than left, correlating to the history of Sjogren's disease. Workstation performed: [id number]     MRI brain w wo contrast    Result Date: 8/17/2023  Impression: 1. Moderate, chronic microangiopathy is significantly increased from the prior study. 2. A few foci of blooming artifact implicate amyloid angiopathy.  No acute intracranial hemorrhage. 3. No acute infarction, intracranial hemorrhage or mass. 4. Right greater than left parotid cystic changes similar to the prior ultrasound, correlating to the history of Sjogren's disease. Workstation performed: [id number]     CT recon only thoracolumbar    Result Date: 8/15/2023  Impression: No fracture or traumatic subluxation. Workstation performed: [id number]     CT chest abdomen pelvis w contrast    Result Date: 8/15/2023  Impression: Acute nondisplaced anterior right 3rd-5th rib fractures. No pleural effusion or pneumothorax. Stable hepatomegaly. Stable right renal cyst. Workstation performed: [id number]     CT spine cervical without contrast    Result Date: 8/15/2023  Impression: No cervical spine fracture or traumatic malalignment. Workstation performed: [id number]     CT head without contrast    Result Date: 8/15/2023  Impression: No acute intracranial abnormality.  Sinus disease Workstation performed: [id number]       Complications: No complications    Discharge Diagnosis:   Patient Active Problem List   Diagnosis   • Parotid gland enlargement   • Sjogren's disease ([address])   • Closed nondisplaced fracture of anterior wall of right acetabulum ([address])   • Closed nondisplaced fracture of right pubis ([address])   • Fall   • Hypertension   • Pain of right upper extremity   • Multiple closed fractures of pelvis (HCC)   • Anxiety and depression   • Hypothyroidism   • Abnormal findings on imaging test   • Gastroparesis   • Oral dyskinesia   • Potential for cognitive impairment   • Anemia   • Hypokalemia   • WELLS (dyspnea on exertion)   • Lightheadedness   • Pain   • Abnormal ECG   • Chest pain   • Pleural effusion on right   • Atelectasis of right lung   • Low HDL (under 40)   • Dermatitis associated with moisture   • Onychomycosis   • Enlarged and hypertrophic nails   • Adenopathy   • Abnormal CT scan, gastrointestinal tract   • Hyponatremia   • Mild cognitive impairment   • Fungal skin infection   • Rheumatoid arteritis (HCC)   • Closed fracture of multiple ribs of right side   • Movement disorder         Medical Problems     Resolved Problems  Date Reviewed: 8/17/2023   None         Condition at Discharge: good         Discharge instructions/Information to patient and family:   See after visit summary for information provided to patient and family. Provisions for Follow-Up Care:  See after visit summary for information related to follow-up care and any pertinent home health orders. PCP: Maria E Krishna MD    Disposition: Acute care rehab    Planned Readmission: No      Discharge Statement   I spent 23 minutes discharging the patient. This time was spent on the day of discharge. I had direct contact with the patient on the day of discharge. Additional documentation is required if more than 30 minutes were spent on discharge. Discharge Medications:  See after visit summary for reconciled discharge medications provided to patient and family.

## 2023-08-18 NOTE — TREATMENT PLAN
Baseline upright thoracic spine x-rays in brace demonstrate stable T8 compression deformity. From neurosurgery perspective, no procedure is anticipated at this time. Recommend follow-up in 2 weeks with upright thoracic spine x-rays in brace. Continue wearing brace as instructed,  pain control and PT/OT. NSx will sign off.   Call with question or concern

## 2023-08-18 NOTE — TELEPHONE ENCOUNTER
8/18/23: INPATIENT    2WK HFU W/ AP  9/1/23 / 9:00 / Jean-Claude Cadena    IMAGING:  XRAY ANTHONYINE    PER: [facility]

## 2023-08-18 NOTE — CASE MANAGEMENT
Case Management Discharge Planning Note    Patient name Shimon OTOOLE /W -74 MRN [id number]  : 1955 Date 2023       Current Admission Date: 8/15/2023  Current Admission Diagnosis:Fall   Patient Active Problem List    Diagnosis Date Noted   • Rheumatoid arteritis ([address]) 08/15/2023   • Closed fracture of multiple ribs of right side 08/15/2023   • Movement disorder 08/15/2023   • Fungal skin infection 2021   • Mild cognitive impairment 2021   • Hyponatremia 2021   • Adenopathy 03/10/2021   • Abnormal CT scan, gastrointestinal tract 03/10/2021   • Dermatitis associated with moisture 2021   • Onychomycosis 2021   • Enlarged and hypertrophic nails 2021   • Low HDL (under 40)    • Anemia 2021   • Hypokalemia 2021   • WELLS (dyspnea on exertion) 2021   • Lightheadedness 2021   • Pain 2021   • Abnormal ECG 2021   • Chest pain 2021   • Pleural effusion on right 2021   • Atelectasis of right lung 2021   • Potential for cognitive impairment 2021   • Oral dyskinesia 2021   • Gastroparesis 2021   • Anxiety and depression 2021   • Hypothyroidism 2021   • Abnormal findings on imaging test 2021   • Hypertension 2021   • Pain of right upper extremity 2021   • Multiple closed fractures of pelvis ([address]) 2021   • Closed nondisplaced fracture of anterior wall of right acetabulum ([address]) 2021   • Closed nondisplaced fracture of right pubis ([address]) 2021   • Fall 2021   • Parotid gland enlargement 2019   • Sjogren's disease ([address]) 2019      LOS (days): 3  Geometric Mean LOS (GMLOS) (days): 3.00  Days to GMLOS:0.3     OBJECTIVE:  Risk of Unplanned Readmission Score: 14.53         Current admission status: Inpatient   Preferred Pharmacy:   [id number], [address] [address] Dates    [address] [place] [address]  Phone: [phone] Fax: [phone]    Primary Care Provider: Aliya Lagunas MD    Primary Insurance: MEDICARE  Secondary Insurance: [facility]    DISCHARGE DETAILS:  CM updated per [facility], patient's transport time is 1400 via [facility] with [facility]. CM updated nursing, receiving facility and trauma with transport time.

## 2023-08-18 NOTE — OCCUPATIONAL THERAPY NOTE
Occupational Therapy Progress Note     Patient Name: Renée MARTINEZ Date: 8/18/2023  Problem List  Principal Problem:    Fall  Active Problems:    Sjogren's disease ([address])    Anxiety and depression    Hypothyroidism    Rheumatoid arteritis ([address])    Closed fracture of multiple ribs of right side    Movement disorder              08/18/23 1044   OT Last Visit   OT Visit Date 08/18/23   Note Type   Note Type Treatment for insurance authorization   Pain Assessment   Pain Assessment Tool 0-10   Pain Score No Pain   Restrictions/Precautions   Weight Bearing Precautions Per Order No   Braces or Orthoses (S)  TLSO   Other Precautions Cognitive; Chair Alarm; Bed Alarm; Fall Risk;Spinal precautions   Lifestyle   Autonomy At baseline, pt is (I) with ADLs, A with IADLs. Ambulates mod (I) c SPC. Lives a home c spouse and son. (-) driving. Frequent falls   Reciprocal Relationships spouse, 2 sons   Service to Others retired   Intrinsic Gratification enjoys spending time with her cats   ADL   Grooming Assistance 5  Supervision/Setup   Grooming Deficit Setup; Increased time to complete   Grooming Comments standing at sink to comb hair   UB Bathing Comments declining stating she completed this AM   LB Bathing Comments declining stating she completed this AM   UB Dressing Assistance 4  Minimal Assistance   UB Dressing Deficit Increased time to complete;Supervision/safety;Verbal cueing; Thread RUE; Thread LUE;Pull around back   UB Dressing Comments don/doffing TLSO brace, requiring extensive education on technique and A with re-adjusting brace in front   LB Dressing Assistance 4  Minimal Assistance   LB Dressing Deficit Increased time to complete;Supervision/safety;Verbal cueing; Thread RLE into pants; Thread LLE into pants;Pull up over hips   LB Dressing Comments Able to thread B LE into pants and stand, A with retrieving pants from floor when they fell s/p stand, able to pull over hips   Toileting Comments declining need to void   Transfers   Sit to Stand 4  Minimal assistance   Additional items Assist x 1; Increased time required;Verbal cues   Stand to Sit 4  Minimal assistance   Additional items Assist x 1; Increased time required;Verbal cues   Additional Comments retropulsive upson standing, requiring VC and TC for maintaining upright posture   Functional Mobility   Functional Mobility 4  Minimal assistance   Additional Comments Ax1, recliner >< bathroom, requiring consistent TC and A for upright posture due to posterior leaning. Pt with x2 LOB laterally requiring mod A x1 to correct   Additional items Rolling walker   Subjective   Subjective "Oh who put this brace on me?" Pt stating s/p she eas educated and donned her own brace   Cognition   Overall Cognitive Status Impaired   Arousal/Participation Alert; Cooperative   Attention Attends with cues to redirect   Orientation Level Oriented to person;Oriented to place; Disoriented to time;Disoriented to situation  (States Feb 2023)   Memory Decreased short term memory;Decreased recall of recent events   Following Commands Follows one step commands with increased time or repetition   Comments Pt with repetative conversation, unable to recall donning her brace prior to standing up. Poor safety awareness and insight into deficits   Activity Tolerance   Activity Tolerance Patient tolerated treatment well   Medical Staff Made Aware JORGE Bradley   Assessment   Assessment Pt seen on this date for skilled OT treatment session. At start of session pt sitting in recliner chair. Pt agreeable and motivated to participate in session. Pt required decreased assistance with LB dressing tasks, able to complete with min A. Pt demonstrating fair understanding of spinal precautions and requiring extensive education on how to maintain with dressing tasks. Pt provided with extensive edu on don/doffing TLSO brace, and able to complete with VC throughout.  Requiring reminders that she completed donning brace s/p gareth. Pt's overall performance limited by STM deficits, problem solving, safety, insight, forward functional reach, activity tolerance, standing balance. Pt would continue to benefit from skilled OT treatment sessions in order to address remaining deficits and continue to recommend d/c to rehab when medically stable. Plan   Goal Expiration Date 08/26/23   OT Treatment Day 1   OT Frequency 3-5x/wk   Recommendation   OT Discharge Recommendation Post acute rehabilitation services   AM-PAC Daily Activity Inpatient   Lower Body Dressing 3   Bathing 2   Toileting 2   Upper Body Dressing 3   Grooming 3   Eating 3   Daily Activity Raw Score 16   Daily Activity Standardized Score (Calc for Raw Score >=11) 35.96   AM-PAC Applied Cognition Inpatient   Following a Speech/Presentation 1   Understanding Ordinary Conversation 3   Taking Medications 2   Remembering Where Things Are Placed or Put Away 2   Remembering List of 4-5 Errands 1   Taking Care of Complicated Tasks 1   Applied Cognition Raw Score 10   Applied Cognition Standardized Score 24.98   End of Consult   Patient Position at End of Consult Bedside chair;Bed/Chair alarm activated; All needs within reach       Goals established on initial evaluation in order to achieve pt's goal of reducing number of falls       Pt will complete grooming tasks standing at sink c Supervision  for increased ADL independence within 10 days. PROGRESSING: continue for consistency     Pt will complete LB ADLs Min A  for increased ADL independence within 10 days. MET: upgrade to mod I     Pt will complete toileting Min A  with use of DME for increased ADL independence within 10 days. Pt will demonstrate proper body mechanics to complete self-care transfers and functional mobility to/from bathroom with Supervision and use of LRAD for increased safety and functional independence within 10 days.  PROGRESSING     Pt will demonstrate standing tolerance of 5 min with Supervision and use of LRAD for increased activity tolerance during ADL/IADL tasks within 10 days. Pt will complete bed mobility Supervision for increased independence in repositioning, pressure offloading, and managing comfort. Pt will demonstrate proper body mechanics and fall prevention strategies during 100% of tx sessions for increased safety awareness during ADL/IADLs     Pt will demonstrate activity tolerance of 30 min in therapeutic tasks for increased participation in meaningful activities upon D/C. PROGRESSING     Pt will attend to treatment task or activity for 3 minutes without need for redirection to improve activity engagement within 10 days. PROGRESSING     Pt will participate in ongoing cognitive assessments to assist with safe D/C planning and supervision/assistance recommendations. Pt will demonstrate OOB sitting tolerance of 2-4 hr/day for increased activity tolerance and engagement in leisure activities within 10 days. PROGRESSING      The patient's raw score on the AM-PAC Daily Activity Inpatient Short Form is 16. A raw score of less than 19 suggests the patient may benefit from discharge to post-acute rehabilitation services. Please refer to the recommendation of the Occupational Therapist for safe discharge planning.     John Briceno MS, OTR/L

## 2023-08-19 LAB
25(OH)D3 SERPL-MCNC: 23.7 NG/ML (ref 30–100)
ALBUMIN SERPL ELPH-MCNC: 3.82 G/DL (ref 3.2–5.1)
ALBUMIN SERPL ELPH-MCNC: 47.7 % (ref 48–70)
ALPHA1 GLOB SERPL ELPH-MCNC: 0.42 G/DL (ref 0.15–0.47)
ALPHA1 GLOB SERPL ELPH-MCNC: 5.3 % (ref 1.8–7)
ALPHA2 GLOB SERPL ELPH-MCNC: 0.93 G/DL (ref 0.42–1.04)
ALPHA2 GLOB SERPL ELPH-MCNC: 11.6 % (ref 5.9–14.9)
BACTERIA UR QL AUTO: ABNORMAL /HPF
BETA GLOB ABNORMAL SERPL ELPH-MCNC: 0.5 G/DL (ref 0.31–0.57)
BETA1 GLOB SERPL ELPH-MCNC: 6.2 % (ref 4.7–7.7)
BETA2 GLOB SERPL ELPH-MCNC: 6.9 % (ref 3.1–7.9)
BETA2+GAMMA GLOB SERPL ELPH-MCNC: 0.55 G/DL (ref 0.2–0.58)
BILIRUB UR QL STRIP: NEGATIVE
CLARITY UR: ABNORMAL
COLOR UR: YELLOW
GAMMA GLOB ABNORMAL SERPL ELPH-MCNC: 1.78 G/DL (ref 0.4–1.66)
GAMMA GLOB SERPL ELPH-MCNC: 22.3 % (ref 6.9–22.3)
GLUCOSE UR STRIP-MCNC: NEGATIVE MG/DL
HGB UR QL STRIP.AUTO: ABNORMAL
HYALINE CASTS #/AREA URNS LPF: ABNORMAL /LPF
IGG/ALB SER: 0.91 {RATIO} (ref 1.1–1.8)
KETONES UR STRIP-MCNC: NEGATIVE MG/DL
LEUKOCYTE ESTERASE UR QL STRIP: ABNORMAL
MUCOUS THREADS UR QL AUTO: ABNORMAL
NITRITE UR QL STRIP: NEGATIVE
NON-SQ EPI CELLS URNS QL MICRO: ABNORMAL /HPF
PH UR STRIP.AUTO: 5.5 [PH]
PROT PATTERN SERPL ELPH-IMP: ABNORMAL
PROT SERPL-MCNC: 8 G/DL (ref 6.4–8.2)
PROT UR STRIP-MCNC: ABNORMAL MG/DL
PTH-INTACT SERPL-MCNC: 76.7 PG/ML (ref 12–88)
RBC #/AREA URNS AUTO: ABNORMAL /HPF
SP GR UR STRIP.AUTO: 1.02 (ref 1–1.03)
UROBILINOGEN UR STRIP-ACNC: 2 MG/DL
WBC #/AREA URNS AUTO: ABNORMAL /HPF
WBC CLUMPS # UR AUTO: PRESENT /UL

## 2023-08-19 PROCEDURE — 97167 OT EVAL HIGH COMPLEX 60 MIN: CPT

## 2023-08-19 PROCEDURE — 97161 PT EVAL LOW COMPLEX 20 MIN: CPT

## 2023-08-19 PROCEDURE — 87186 SC STD MICRODIL/AGAR DIL: CPT | Performed by: PHYSICAL MEDICINE & REHABILITATION

## 2023-08-19 PROCEDURE — 97530 THERAPEUTIC ACTIVITIES: CPT

## 2023-08-19 PROCEDURE — 99232 SBSQ HOSP IP/OBS MODERATE 35: CPT | Performed by: INTERNAL MEDICINE

## 2023-08-19 PROCEDURE — 81001 URINALYSIS AUTO W/SCOPE: CPT | Performed by: PHYSICAL MEDICINE & REHABILITATION

## 2023-08-19 PROCEDURE — 84165 PROTEIN E-PHORESIS SERUM: CPT | Performed by: PATHOLOGY

## 2023-08-19 PROCEDURE — 99222 1ST HOSP IP/OBS MODERATE 55: CPT | Performed by: INTERNAL MEDICINE

## 2023-08-19 PROCEDURE — 84482 T3 REVERSE: CPT | Performed by: INTERNAL MEDICINE

## 2023-08-19 PROCEDURE — 87077 CULTURE AEROBIC IDENTIFY: CPT | Performed by: PHYSICAL MEDICINE & REHABILITATION

## 2023-08-19 PROCEDURE — 87086 URINE CULTURE/COLONY COUNT: CPT | Performed by: PHYSICAL MEDICINE & REHABILITATION

## 2023-08-19 PROCEDURE — 0HBRXZZ EXCISION OF TOE NAIL, EXTERNAL APPROACH: ICD-10-PCS | Performed by: STUDENT IN AN ORGANIZED HEALTH CARE EDUCATION/TRAINING PROGRAM

## 2023-08-19 PROCEDURE — 97535 SELF CARE MNGMENT TRAINING: CPT

## 2023-08-19 PROCEDURE — 99232 SBSQ HOSP IP/OBS MODERATE 35: CPT

## 2023-08-19 PROCEDURE — 83970 ASSAY OF PARATHORMONE: CPT | Performed by: STUDENT IN AN ORGANIZED HEALTH CARE EDUCATION/TRAINING PROGRAM

## 2023-08-19 PROCEDURE — 82306 VITAMIN D 25 HYDROXY: CPT | Performed by: STUDENT IN AN ORGANIZED HEALTH CARE EDUCATION/TRAINING PROGRAM

## 2023-08-19 RX ORDER — LEVOTHYROXINE SODIUM 88 UG/1
88 TABLET ORAL
Status: DISCONTINUED | OUTPATIENT
Start: 2023-08-20 | End: 2023-08-30 | Stop reason: HOSPADM

## 2023-08-19 RX ORDER — CALCIUM CARBONATE 500(1250)
1 TABLET ORAL 2 TIMES DAILY WITH MEALS
Status: DISCONTINUED | OUTPATIENT
Start: 2023-08-19 | End: 2023-08-30 | Stop reason: HOSPADM

## 2023-08-19 RX ORDER — MELATONIN
2000 DAILY
Status: DISCONTINUED | OUTPATIENT
Start: 2023-08-19 | End: 2023-08-30 | Stop reason: HOSPADM

## 2023-08-19 RX ORDER — BISACODYL 10 MG
10 SUPPOSITORY, RECTAL RECTAL ONCE
Status: DISCONTINUED | OUTPATIENT
Start: 2023-08-20 | End: 2023-08-30 | Stop reason: HOSPADM

## 2023-08-19 RX ORDER — SENNOSIDES 8.6 MG
1 TABLET ORAL 2 TIMES DAILY
Status: DISCONTINUED | OUTPATIENT
Start: 2023-08-19 | End: 2023-08-24

## 2023-08-19 RX ADMIN — LEVOTHYROXINE SODIUM 100 MCG: 100 TABLET ORAL at 06:33

## 2023-08-19 RX ADMIN — POLYETHYLENE GLYCOL 3350 17 G: 17 POWDER, FOR SOLUTION ORAL at 09:00

## 2023-08-19 RX ADMIN — Medication 2000 UNITS: at 14:22

## 2023-08-19 RX ADMIN — OXYCODONE HYDROCHLORIDE 5 MG: 5 TABLET ORAL at 20:52

## 2023-08-19 RX ADMIN — ACETAMINOPHEN 975 MG: 325 TABLET, FILM COATED ORAL at 06:33

## 2023-08-19 RX ADMIN — CYCLOBENZAPRINE HYDROCHLORIDE 5 MG: 5 TABLET, FILM COATED ORAL at 21:00

## 2023-08-19 RX ADMIN — FOLIC ACID 1 MG: 1 TABLET ORAL at 09:00

## 2023-08-19 RX ADMIN — LIDOCAINE 5% 1 PATCH: 700 PATCH TOPICAL at 09:00

## 2023-08-19 RX ADMIN — ATENOLOL 50 MG: 50 TABLET ORAL at 09:00

## 2023-08-19 RX ADMIN — SENNOSIDES 8.6 MG: 8.6 TABLET, FILM COATED ORAL at 20:50

## 2023-08-19 RX ADMIN — FLUOXETINE 20 MG: 20 CAPSULE ORAL at 09:00

## 2023-08-19 RX ADMIN — ENOXAPARIN SODIUM 30 MG: 30 INJECTION SUBCUTANEOUS at 09:00

## 2023-08-19 RX ADMIN — ACETAMINOPHEN 975 MG: 325 TABLET, FILM COATED ORAL at 21:00

## 2023-08-19 RX ADMIN — SENNOSIDES 8.6 MG: 8.6 TABLET, FILM COATED ORAL at 13:30

## 2023-08-19 RX ADMIN — ACETAMINOPHEN 975 MG: 325 TABLET, FILM COATED ORAL at 13:30

## 2023-08-19 RX ADMIN — ATORVASTATIN CALCIUM 10 MG: 10 TABLET, FILM COATED ORAL at 17:01

## 2023-08-19 RX ADMIN — PREDNISONE 5 MG: 5 TABLET ORAL at 21:02

## 2023-08-19 RX ADMIN — Medication 1 TABLET: at 17:01

## 2023-08-19 RX ADMIN — ENOXAPARIN SODIUM 30 MG: 30 INJECTION SUBCUTANEOUS at 20:52

## 2023-08-19 RX ADMIN — OXYCODONE HYDROCHLORIDE 5 MG: 5 TABLET ORAL at 06:54

## 2023-08-19 NOTE — CONSULTS
Podiatry - Consultation    Patient Information:   Herrera Silva 70 y.o. female MRN: [id number]  Unit/Bed#: -01 Encounter: [id number]  PCP: Vesna Avila MD  Date of Admission:  8/18/2023  Date of Consultation: 08/19/23  Requesting Physician: Mookie Figueroa MD      ASSESSMENT:    Herrera Silva is a 70 y.o. female with:    1. Onychomycosis of bilateral hallux and second toes  2. Progressive cognitive dysfunction  3. Tray's disease  4. Closed multiple fractures of ribs of right side  5. Compression fracture of T8 vertebra    PLAN:    · Patient was seen and evaluated at bedside today. Bilateral hallux and second toe using a large nail nipper without incident. See procedure note below. Bilateral toenail trimming of toes 3 through 5, see procedure note below. Patient is stable from podiatry standpoint, please reconsult as needed. · Elevation on green foam wedges or pillows when non-ambulatory  · Rest of care per primary team.  · Will discuss this plan with my attending and update as needed. Weightbearing status: Weightbearing as tolerated    Procedure note: Toenail debridement    After verbal consent was obtained. Using a large nail nipper, bilateral hallux and second toe toenails were debrided, debulked, shortened to remove nonviable devitalized tissue without incident. Patient tolerated procedure well. Procedure note: Toenail trimming    After verbal consent was obtained. Using a large nail nipper, bilateral toenails of toes 3 through 5 were trimmed to shorten elongated toenails without incident. Patient tolerated procedure well. SUBJECTIVE:    History of Present Illness:    Herrera Silva is a 70 y.o. female who is originally admitted 8/18/2023 due to fall. Patient has a past medical history of Tray's disease, progressive cognitive dysfunction, closed fracture of multiple ribs of right side, compression fracture of T8 vertebra.     We are consulted for onychomycosis and elongated toenails. Patient reports that she does not see a podiatrist on a regular basis outside the hospital.  Patient reports that she is not able to cut her own toenails because they are too thick and she is not strong enough. Patient reports that her toenails make getting socks and shoes on difficult and uncomfortable. Review of Systems:    Constitutional: Negative. HENT: Negative. Eyes: Negative. Respiratory: Negative. Cardiovascular: Negative. Gastrointestinal: Negative. Musculoskeletal: Negative  Skin: Elongated mycotic toenails  Neurological: Negative  Psych: Negative.      Past Medical and Surgical History:     Past Medical History:   Diagnosis Date   • Lymphadenitis, chronic    • Sialadenitis    • Sjogren's disease ([address])    • Stomach problems    • Thyroid disorder    • Xerostomia        Past Surgical History:   Procedure Laterality Date   • BRAIN SURGERY  2019    tumor removed Dr. Bj Coyne   • CHOLECYSTECTOMY         Meds/Allergies:    Medications Prior to Admission   Medication   • acetaminophen (TYLENOL) 325 mg tablet   • ALPRAZolam (XANAX) 1 mg tablet   • atenolol (TENORMIN) 50 mg tablet   • atorvastatin (LIPITOR) 10 mg tablet   • cyclobenzaprine (FLEXERIL) 10 mg tablet   • FLUoxetine (PROzac) 20 mg capsule   • folic acid (FOLVITE) 1 mg tablet   • levothyroxine 75 mcg tablet   • methotrexate 2.5 mg tablet   • oxyCODONE (ROXICODONE) 5 immediate release tablet   • predniSONE 5 mg tablet       Allergies   Allergen Reactions   • Codeine Vomiting   • Hydroxyquinoline Sulfate [Oxyquinoline] Vomiting   • Leucovorin Vomiting   • Tizanidine Vomiting       Social History:     Marital Status: /Civil Union    Substance Use History:   Social History     Substance and Sexual Activity   Alcohol Use Yes     Social History     Tobacco Use   Smoking Status Former   Smokeless Tobacco Never     Social History     Substance and Sexual Activity   Drug Use Never       Family History:    Family History   Problem Relation Age of Onset   • Diabetes Other    • Hypertension Other    • Heart disease Other    • Arthritis Other    • Heart disease Mother    • Kidney disease Mother    • Alcohol abuse Father          OBJECTIVE:    Vitals:   Blood Pressure: 132/68 (08/19/23 1354)  Pulse: 65 (08/19/23 1354)  Temperature: 97.6 °F (36.4 °C) (08/19/23 1354)  Temp Source: Oral (08/19/23 1354)  Respirations: 18 (08/19/23 1354)  Height: 4' 10" (147.3 cm) (08/18/23 1530)  Weight - Scale: 60.1 kg (132 lb 9.6 oz) (08/18/23 1530)  SpO2: 98 % (08/19/23 1354)    Physical Exam:    General Appearance: Alert, cooperative, no distress. HEENT: Head normocephalic, atraumatic, without obvious abnormality. Heart: Normal rate and rhythm. Lungs: Non-labored breathing. No respiratory distress. Abdomen: Without distension. Psychiatric: AAOx3  Lower Extremity:  Vascular:   Right DP and PT pulses are present. Left DP and PT pulses are present. CRT < 3 seconds at the digits. +0/4 edema noted at bilateral lower extremities. Pedal hair is absent. Skin temperature is WNL bilaterally. Musculoskeletal:  MMT is 4/5 in all muscle compartments bilaterally. ROM at the 1st MPJ and ankle joint are reduced bilaterally with the leg extended. No Pain on palpation of toenails of toes 1 through 5 bilaterally. No gross deformities noted. Dermatological:    Bilateral hallux and second digit toenails are hypertrophic, elongated, discolored and mycotic with subungual debris. Bilateral toenails of toes 3 through 5 are elongated, but not mycotic  No open lesions were noted bilaterally    Neurological:  Gross sensation is intact. Protective sensation is intact. Patient Denies numbness and/or paresthesias.     Additional data:     Lab Results: I have personally reviewed pertinent labs including:    Results from last 7 days   Lab Units 08/17/23  0444   WBC Thousand/uL 5.06   HEMOGLOBIN g/dL 12.0   HEMATOCRIT % 36.2   PLATELETS Thousands/uL 174   NEUTROS PCT % 85*   LYMPHS PCT % 6*   MONOS PCT % 8   EOS PCT % 1     Results from last 7 days   Lab Units 08/17/23  0444 08/16/23  0602 08/15/23  1247   POTASSIUM mmol/L 4.4   < > 3.9   CHLORIDE mmol/L 104   < > 103   CO2 mmol/L 26   < > 24   BUN mg/dL 14   < > 12   CREATININE mg/dL 0.74   < > 0.85   CALCIUM mg/dL 9.4   < > 9.6   ALK PHOS U/L  --   --  110*   ALT U/L  --   --  15   AST U/L  --   --  15    < > = values in this interval not displayed. Results from last 7 days   Lab Units 08/16/23  0602   INR  1.14       Cultures: I have personally reviewed pertinent cultures including:              Imaging: I have personally reviewed pertinent reports in PACS. EKG, Pathology, and Other Studies: I have personally reviewed pertinent reports. ** Please Note: Portions of the record may have been created with voice recognition software. Occasional wrong word or "sound a like" substitutions may have occurred due to the inherent limitations of voice recognition software. Read the chart carefully and recognize, using context, where substitutions have occurred.  **

## 2023-08-19 NOTE — ASSESSMENT & PLAN NOTE
reports progressive cognitive decline that accelerated 2-3 weeks prior to admission. Multifactorial:  - Likely vascular dementia given increased moderate chronic microangiopathy, step wise decline. - ? Amyloid angiopathy per radiology  - Cognitive impairment resulting in mismanagement of her medications - which include steroids, xanax, flexeril, prozac with polypharmacy contributing - particularly since she takes the Flexeril/Xanax at night and that is when most of her falls were occurring.   - Significant Vitamin B12 deficiency contributing (ataxia, proprioceptive impairments, cognitive impairments)  - Also found so far to have slightly high T4, and low TSH  - Of note has hepatomegaly (albeit stable) and increased alk phos on admission. Ammonia level on admission was normal (18)   - UA also positive on admission for E.coli UTI.   - Exam with patchy sensory impairment, with clear impairment in proprioception in great toes. Ataxia/motor planning difficulties. Clearly has impaired balance - this could be exacerbated by the B12 deficiency as well. - B6 level just adequate - she has had poor appetite as an outpatient   - Now on multivitamin daily in addition to B12 supplement      - Recommend outpatient Neuro/[facility] follow-up  - Consult Endocrine to comment on TSH/T4 - adjustment of Levothyroxine potentially vs. In setting of recent hospitalization/fall.  - Follow-up work-up: B1   - Normal: A1C, MRI C-Spine, T-Spine shows no cord compression/cord signal abnormality, Ammonia level, SPEP, B6  - Treatment: PT/OT/SLP 3-5 hours/day, 5-7 days/week. See Vitamin B12 deficiency, UTI   - Discussed polypharmacy with family. Will plant discontinue alprazolam and flexeril at discharge. They will use Tylenol PRN at discharge for pain. Trial of Q15min frequent checks. - Restlessness at night likely 2/2 frequency/urgency with UTI   - Improved with treatment of UTI.

## 2023-08-19 NOTE — ASSESSMENT & PLAN NOTE
1. Stable hepatomegaly - checking CMP in the AM. Alk phos elevated on admission, but stable. Recheck with improvement. Recheck in 1 week. May have been related to recent fracture. Tylenol dose < 2000mg a day.    2. Stable R renal cyst    Outpatient f/u with PCP

## 2023-08-19 NOTE — ASSESSMENT & PLAN NOTE
This is stable and was noted in her [address] admission, where it was felt to be due to reglan. She is no longer on this medication. Patient attributes to her Sjogrens. Would not add additional medication to address this at this time.   Can f/u with Neurology outpatient

## 2023-08-19 NOTE — PROGRESS NOTES
Internal Medicine Progress Note  Patient: Kellie Dunne  Age/sex: 70 y.o. female  Medical Record #: [id number]      ASSESSMENT/PLAN: (Interval History)  Kellie Dunne is seen and examined and management for following issues:    Frequent fall, r/o movement disorder  • See Dr Artie Barger as OP     Right rib fractures  • Ribs 3-5th  • Continue IS     T8 fracture  • Non-op  • TLSO brace  • For upright t-spine xrays in brace in 2 weeks     Vitamin B12 deficiency  • For IM replacement x 5 doses then to PO     HTN  • Home:  Atenolol 50mg qd  • Here:  Atenolol 50mg qd  • Add prn Hydralazine  • No changes today     Cognitive impairment  • MOCA 3/2021 was 17/30  • Likely has worsened since prev MOCA and is confused here     Abnormal UA  · Dr Sherie Marroquin sent UA with reflex to cx 2/2 confusion  · UA is positive but no sx, no fever  · Will await cx results. Had moderate epi cells    Hypothyroidism  • TSH 0.314/free T4 1.29  • on Levothyroxine 100 mcg qd  • Endo following and they reduced to 88 mcg qd and sent a reverse T3  • For TSH/free T4 4-6 weeks     Severe osteoporosis  · Endo following  · For OP DEXA  · calcium 500mg BID    Vit D deficiency  · Level was 23.7  · For Vit D 2000IU qd    RA  • At home takes Methotrexate 20mg qWednesday/Prednisone 5mg qd/Flexeril 10mg qhs     HLD  • Lipitor 10mg qd     Anxiety/depression  • At home she was taking Xananx 1mg TID and Prozac 20mg qd  • Per PMR     Enlarged right retrocrural lymph node  • Found on CT  • For repeat CT chest as OP in 3-6 months        Discharge date:  Team       The above assessment and plan was reviewed and updated as determined by my evaluation of the patient on 8/19/2023.     Labs:   Results from last 7 days   Lab Units 08/17/23  0444 08/16/23  0602   WBC Thousand/uL 5.06 5.38   HEMOGLOBIN g/dL 12.0 13.1   HEMATOCRIT % 36.2 39.7   PLATELETS Thousands/uL 174 187     Results from last 7 days   Lab Units 08/17/23  0444 08/16/23  0602   SODIUM mmol/L 136 136 POTASSIUM mmol/L 4.4 4.8   CHLORIDE mmol/L 104 103   CO2 mmol/L 26 27   BUN mg/dL 14 10   CREATININE mg/dL 0.74 0.85   CALCIUM mg/dL 9.4 9.7     Results from last 7 days   Lab Units 08/17/23  0444   HEMOGLOBIN A1C % 5.6     Results from last 7 days   Lab Units 08/16/23  0602   INR  1.14           Review of Scheduled Meds:  Current Facility-Administered Medications   Medication Dose Route Frequency Provider Last Rate   • acetaminophen  975 mg Oral [facility] Shavonne Vidales MD     • ALPRAZolam  1 mg Oral HS PRN Shavonne Vidales MD     • atenolol  50 mg Oral Daily Shavonne Vidales MD     • atorvastatin  10 mg Oral Daily With Checo Bauman MD     • bisacodyl  5 mg Oral Daily PRN Shavonne Vidales MD     • bisacodyl  10 mg Rectal Daily PRN Shavonne Vidales MD     • [START ON 8/20/2023] bisacodyl  10 mg Rectal Once Ayana Calderón MD     • calcium carbonate  1 tablet Oral BID With Meals Alon Rasheed MD     • cholecalciferol  2,000 Units Oral Daily Alon Rasheed MD     • [START ON 8/20/2023] cyanocobalamin  1,000 mcg Intramuscular Every Other Day Shavonne Vidales MD     • cyclobenzaprine  5 mg Oral HS Shavonne Vidales MD     • enoxaparin  30 mg Subcutaneous Q12H [facility] Shavonne Vidales MD     • FLUoxetine  20 mg Oral Daily Shavonne Vidales MD     • folic acid  1 mg Oral Daily Shavonne Vidales MD     • hydrALAZINE  25 mg Oral Q8H PRN DARIO Brady     • [START ON 8/20/2023] levothyroxine  88 mcg Oral Early Morning Alon Rasheed MD     • lidocaine  1 patch Topical Daily Shavonne Vidales MD     • [START ON 8/23/2023] methotrexate  20 mg Oral Weekly Shavonne Vidales MD     • oxyCODONE  5 mg Oral Q4H PRN Shavonne Vidales MD     • oxyCODONE  2.5 mg Oral Q4H PRN Shavonne Vidales MD     • polyethylene glycol  17 g Oral Daily Shavonne Vidales MD     • predniSONE  5 mg Oral HS Shavonne Vidales MD     • QUEtiapine  12.5 mg Oral HS PRN Shavonne Vidales MD     • senna  1 tablet Oral BID Sher Blue Deborah Hernandez MD         Subjective/ HPI: Patient seen and examined. Patients overnight issues or events were reviewed with nursing or staff during rounds or morning huddle session. New or overnight issues include the following:     No new or overnight issues. Offers no complaints except pain    ROS:   A 10 point ROS was performed; negative except as noted above. Imaging:     No orders to display       *Labs /Radiology studies reviewed  *Medications reviewed and reconciled as needed  *Please refer to order section for additional ordered labs studies  *Case discussed with primary attending during morning huddle case rounds    Physical Examination:  Vitals:   Vitals:    08/18/23 2045 08/19/23 0617 08/19/23 0900 08/19/23 1354   BP: 135/80 134/65 152/77 132/68   BP Location: Right arm Right arm Left arm Left arm   Pulse: 77 68 73 65   Resp: 18 18 18   Temp: 98.2 °F (36.8 °C) 98 °F (36.7 °C)  97.6 °F (36.4 °C)   TempSrc: Oral Oral  Oral   SpO2: 98% 98%  98%   Weight:       Height:           General Appearance: no distress, conversive  HEENT: PERRLA, conjuctiva normal; oropharynx clear; mucous membranes moist   Neck:  Supple, normal ROM  Lungs: CTA, normal respiratory effort, no retractions, expiratory effort normal  CV: regular rate and rhythm; no rubs/murmurs/gallops, PMI normal   ABD: soft; ND/NT; +BS  EXT: no edema  Skin: normal turgor, normal texture, no rashes  Psych: affect normal, mood normal  Neuro: AA; confused as at baseline      The above physical exam was reviewed and updated as determined by my evaluation of the patient on 8/19/2023. Invasive Devices     None                    VTE Pharmacologic Prophylaxis: Enoxaparin  Code Status: Level 3 - DNAR and DNI  Current Length of Stay: 1 day(s)      Total time spent:  30 minutes with more than 50% spent counseling/coordinating care.  Counseling includes discussion with patient re: progress  and discussion with patient of his/her current medical state/information. Coordination of patient's care was performed in conjunction with primary service. Time invested included review of patient's labs, vitals, and management of their comorbidities with continued monitoring. In addition, this patient was discussed with medical team including physician and advanced extenders. The care of the patient was extensively discussed and appropriate treatment plan was formulated unique for this patient. Medical decision making for the day was made by supervising physician unless otherwise noted in their attestation statement. ** Please Note:  voice to text software may have been used in the creation of this document.  Although proof errors in transcription or interpretation are a potential of such software**

## 2023-08-19 NOTE — ASSESSMENT & PLAN NOTE
Home: Atenolol 50mg daily, Losartan 25mg daily  Here: Atenolol 50mg daily, Norvasc 5mg BID, Hydralazine PRN  Monitor and adjust as appropriate  IM consulted and assisting with management.

## 2023-08-19 NOTE — PLAN OF CARE
Problem: PAIN - ADULT  Goal: Verbalizes/displays adequate comfort level or baseline comfort level  Description: Interventions:  - Encourage patient to monitor pain and request assistance  - Assess pain using appropriate pain scale  - Administer analgesics based on type and severity of pain and evaluate response  - Implement non-pharmacological measures as appropriate and evaluate response  - Consider cultural and social influences on pain and pain management  - Notify physician/advanced practitioner if interventions unsuccessful or patient reports new pain  Outcome: Progressing     Problem: INFECTION - ADULT  Goal: Absence or prevention of progression during hospitalization  Description: INTERVENTIONS:  - Assess and monitor for signs and symptoms of infection  - Monitor lab/diagnostic results  - Monitor all insertion sites, i.e. indwelling lines, tubes, and drains  - Monitor endotracheal if appropriate and nasal secretions for changes in amount and color  - [place] appropriate cooling/warming therapies per order  - Administer medications as ordered  - Instruct and encourage patient and family to use good hand hygiene technique  - Identify and instruct in appropriate isolation precautions for identified infection/condition  Outcome: Progressing     Problem: SAFETY ADULT  Goal: Patient will remain free of falls  Description: INTERVENTIONS:  - Educate patient/family on patient safety including physical limitations  - Instruct patient to call for assistance with activity   - Consult OT/PT to assist with strengthening/mobility   - Keep Call bell within reach  - Keep bed low and locked with side rails adjusted as appropriate  - Keep care items and personal belongings within reach  - Initiate and maintain comfort rounds  - Make Fall Risk Sign visible to staff  - Offer Toileting every 2 Hours, in advance of need  - Initiate/Maintain bed/chair alarm  - Obtain necessary fall risk management equipment: alarms; 1:1 cont observation  - Apply yellow socks and bracelet for high fall risk patients  - Consider moving patient to room near nurses station  Outcome: Progressing  Goal: Maintain or return to baseline ADL function  Description: INTERVENTIONS:  -  Assess patient's ability to carry out ADLs; assess patient's baseline for ADL function and identify physical deficits which impact ability to perform ADLs (bathing, care of mouth/teeth, toileting, grooming, dressing, etc.)  - Assess/evaluate cause of self-care deficits   - Assess range of motion  - Assess patient's mobility; develop plan if impaired  - Assess patient's need for assistive devices and provide as appropriate  - Encourage maximum independence but intervene and supervise when necessary  - Involve family in performance of ADLs  - Assess for home care needs following discharge   - Consider OT consult to assist with ADL evaluation and planning for discharge  - Provide patient education as appropriate  Outcome: Progressing  Goal: Maintains/Returns to pre admission functional level  Description: INTERVENTIONS:  - Perform BMAT or MOVE assessment daily.   - Set and communicate daily mobility goal to care team and patient/family/caregiver. - Collaborate with rehabilitation services on mobility goals if consulted  - Perform Range of Motion 3 times a day. - Reposition patient every 2 hours.   - Dangle patient 3 times a day  - Stand patient 3 times a day  - Ambulate patient 3 times a day  - Out of bed to chair 3 times a day   - Out of bed for meals 3 times a day  - Out of bed for toileting  - Record patient progress and toleration of activity level   Outcome: Progressing     Problem: DISCHARGE PLANNING  Goal: Discharge to home or other facility with appropriate resources  Description: INTERVENTIONS:  - Identify barriers to discharge w/patient and caregiver  - Arrange for needed discharge resources and transportation as appropriate  - Identify discharge learning needs (meds, wound care, etc.)  - Arrange for interpretive services to assist at discharge as needed  - Refer to Case Management Department for coordinating discharge planning if the patient needs post-hospital services based on physician/advanced practitioner order or complex needs related to functional status, cognitive ability, or social support system  Outcome: Progressing     Problem: Prexisting or High Potential for Compromised Skin Integrity  Goal: Skin integrity is maintained or improved  Description: INTERVENTIONS:  - Identify patients at risk for skin breakdown  - Assess and monitor skin integrity  - Assess and monitor nutrition and hydration status  - Monitor labs   - Assess for incontinence   - Turn and reposition patient  - Assist with mobility/ambulation  - Relieve pressure over bony prominences  - Avoid friction and shearing  - Provide appropriate hygiene as needed including keeping skin clean and dry  - Evaluate need for skin moisturizer/barrier cream  - Collaborate with interdisciplinary team   - Patient/family teaching  - Consider wound care consult   Outcome: Progressing

## 2023-08-19 NOTE — CONSULTS
Consultation - Ciara Stone 70 y.o. female MRN: [id number]    Unit/Bed#: -01 Encounter: [id number]      Assessment/Plan     Assessment: This is a 70y.o.-year-old female with past medical history of hypothyroidism who presented due to sustaining a fall at home, found to have T8 compression fracture with height loss, rib fractures. Endocrinology consulted for management of hypothyroidism. Plan:  Hypothyroidism  TSH 0.314 low, free T41.29 High  Home regimen: Levothyroxine 100 mcg daily since last 1 month  Inpatient regimen: Levothyroxine 100 mcg daily    Recommendations:  Abnormal thyroid function studies are likely due to over replacement of levothyroxine versus nonthyroidal illness and hence we recommend obtaining reverse T3 and reducing levothyroxine to 88 mcg daily and repeating TSH, free T4 as outpatient in 4 to 6 weeks    Severe osteoporosis  She has had history of fractures in the past with falls from standing height and had fragility fracture on admission. Corrected calcium is 9.6, phosphorus 4.1, vitamin D 23.7 and PTH 76.7  Continue with PT/OT. She will benefit from getting DEXA scan as outpatient and will qualify for anabolic agents for treatment as outpt  We recommend starting her on calcium carbonate 500mg twice daily and will start on vitamin D3 2000IU daily    Vitamin D deficiency  25-hydroxy vitamin D level 23.7  We recommend starting vitamin D3 2000 IU daily    CC: hypothyroidism Consult    History of Present Illness     HPI: This is a 70y.o.-year-old female with past medical history of hypothyroidism who presented due to sustaining a fall at home, found to have T8 compression fracture with height loss, rib fractures. Endocrinology consulted for management of hypothyroidism. Other history is include rheumatoid arthritis, Sjogren's syndrome, gastroparesis. she reports that she was diagnosed with hypothyroidism about 20 to 30 years ago.   Denies any headache, skin, temperature, bowel habit, energy related changes. Complains of brittle nails. She denies any history of radiation to the head, neck, chest area. Reports that sister has history of hypothyroidism. She reports to have prior fractures. Noted to have prior pubic ramus fracture and history of frequent falls since the last 2 weeks. She denies having DEXA scan done in the past.  Does not take any calcium or vitamin D medications at home. She is on prednisone 5 mg and reports that she has been on it for ever for Sjogren's and rheumatoid arthritis. Denies of any antiepileptic use    Inpatient consult to Endocrinology  Consult performed by: Clifford Bah MD  Consult ordered by: Erinn Christianson MD          Review of Systems   Constitutional: Negative for activity change, appetite change and fatigue. HENT: Negative for congestion and sore throat. Eyes: Negative for redness and visual disturbance. Respiratory: Negative for cough and shortness of breath. Cardiovascular: Negative for palpitations and leg swelling. Gastrointestinal: Negative for abdominal pain, constipation, diarrhea, nausea and vomiting. Endocrine: Negative for cold intolerance, heat intolerance, polydipsia, polyphagia and polyuria. Genitourinary: Negative for difficulty urinating and dysuria. Musculoskeletal: Positive for back pain and gait problem. Negative for neck pain. Skin: Negative for color change. Neurological: Negative for dizziness and syncope. Psychiatric/Behavioral: Negative for agitation and behavioral problems. All other systems reviewed and are negative.       Historical Information   Past Medical History:   Diagnosis Date   • Lymphadenitis, chronic    • Sialadenitis    • Sjogren's disease ([address])    • Stomach problems    • Thyroid disorder    • Xerostomia      Past Surgical History:   Procedure Laterality Date   • BRAIN SURGERY  2019    tumor removed Dr. Jerson Hurst   • CHOLECYSTECTOMY       Social History   Social History Substance and Sexual Activity   Alcohol Use Yes     Social History     Substance and Sexual Activity   Drug Use Never     Social History     Tobacco Use   Smoking Status Former   Smokeless Tobacco Never     Family History:   Family History   Problem Relation Age of Onset   • Diabetes Other    • Hypertension Other    • Heart disease Other    • Arthritis Other    • Heart disease Mother    • Kidney disease Mother    • Alcohol abuse Father        Meds/Allergies   Current Facility-Administered Medications   Medication Dose Route Frequency Provider Last Rate Last Admin   • acetaminophen (TYLENOL) tablet 975 mg  975 mg Oral [facility] Elza Galindo MD   975 mg at 08/19/23 7937   • ALPRAZolam (XANAX) tablet 1 mg  1 mg Oral HS PRN Elza Galindo MD       • atenolol (TENORMIN) tablet 50 mg  50 mg Oral Daily Elza Galindo MD   50 mg at 08/19/23 0900   • atorvastatin (LIPITOR) tablet 10 mg  10 mg Oral Daily With Christiano Weaver MD       • bisacodyl (DULCOLAX) EC tablet 5 mg  5 mg Oral Daily PRN Elza Galindo MD       • bisacodyl (DULCOLAX) rectal suppository 10 mg  10 mg Rectal Daily PRN Elza Galindo MD       • [START ON 8/20/2023] cyanocobalamin injection 1,000 mcg  1,000 mcg Intramuscular Every Other Day Elza Galindo MD       • cyclobenzaprine (FLEXERIL) tablet 5 mg  5 mg Oral HS Elza Galindo MD   5 mg at 08/18/23 2119   • enoxaparin (LOVENOX) subcutaneous injection 30 mg  30 mg Subcutaneous Q12H [address] Elza Galindo MD   30 mg at 08/19/23 0900   • FLUoxetine (PROzac) capsule 20 mg  20 mg Oral Daily Elza Galindo MD   20 mg at 70/69/66 7303   • folic acid (FOLVITE) tablet 1 mg  1 mg Oral Daily Elza Galindo MD   1 mg at 08/19/23 0900   • hydrALAZINE (APRESOLINE) tablet 25 mg  25 mg Oral Q8H PRN DARIO Collado       • levothyroxine tablet 100 mcg  100 mcg Oral Early Morning Elza Galindo MD   100 mcg at 08/19/23 7906   • lidocaine (LIDODERM) 5 % patch 1 patch  1 patch Topical Daily Edis Coles MD   1 patch at 08/19/23 0900   • [START ON 8/23/2023] methotrexate tablet 20 mg  20 mg Oral Weekly Edis Coles MD       • oxyCODONE (ROXICODONE) IR tablet 5 mg  5 mg Oral Q4H PRN Edis Coles MD   5 mg at 08/19/23 1292   • oxyCODONE (ROXICODONE) split tablet 2.5 mg  2.5 mg Oral Q4H PRN Edis Coles MD       • polyethylene glycol (MIRALAX) packet 17 g  17 g Oral Daily Edis Coles MD   17 g at 08/19/23 0900   • predniSONE tablet 5 mg  5 mg Oral HS Edis Coles MD   5 mg at 08/18/23 2119   • QUEtiapine (SEROquel) tablet 12.5 mg  12.5 mg Oral HS PRN Edis Coles MD         Allergies   Allergen Reactions   • Codeine Vomiting   • Hydroxyquinoline Sulfate [Oxyquinoline] Vomiting   • Leucovorin Vomiting   • Tizanidine Vomiting       Objective   Vitals: Blood pressure 152/77, pulse 73, temperature 98 °F (36.7 °C), temperature source Oral, resp. rate 18, height 4' 10" (1.473 m), weight 60.1 kg (132 lb 9.6 oz), last menstrual period 01/13/2005, SpO2 98 %, not currently breastfeeding. Intake/Output Summary (Last 24 hours) at 8/19/2023 1015  Last data filed at 8/19/2023 0909  Gross per 24 hour   Intake 220 ml   Output 100 ml   Net 120 ml     Invasive Devices     None                 Physical Exam  Constitutional:       Appearance: Normal appearance. HENT:      Head: Normocephalic and atraumatic. Cardiovascular:      Rate and Rhythm: Normal rate and regular rhythm. Pulses: Normal pulses. Heart sounds: Normal heart sounds. No murmur heard. No gallop. Pulmonary:      Effort: Pulmonary effort is normal.      Breath sounds: Normal breath sounds. No wheezing or rales. Abdominal:      General: Bowel sounds are normal.      Palpations: Abdomen is soft. Tenderness: There is no abdominal tenderness. Musculoskeletal:         General: No swelling. Cervical back: Normal range of motion and neck supple. No tenderness. Right lower leg: No edema.       Left lower leg: No edema. Lymphadenopathy:      Cervical: No cervical adenopathy. Skin:     General: Skin is warm. Neurological:      Mental Status: She is alert and oriented to person, place, and time. Mental status is at baseline. Psychiatric:         Mood and Affect: Mood normal.         Behavior: Behavior normal.         The history was obtained from the review of the chart, patient. Lab Results:   Results from last 7 days   Lab Units 08/17/23  0444   HEMOGLOBIN A1C % 5.6     Lab Results   Component Value Date    WBC 5.06 08/17/2023    HGB 12.0 08/17/2023    HCT 36.2 08/17/2023    MCV 98 08/17/2023     08/17/2023     Lab Results   Component Value Date/Time    BUN 14 08/17/2023 04:44 AM    BUN 10 01/11/2018 08:17 AM     01/08/2015 10:12 AM    K 4.4 08/17/2023 04:44 AM    K 3.4 (L) 01/08/2015 10:12 AM     08/17/2023 04:44 AM     01/08/2015 10:12 AM    CO2 26 08/17/2023 04:44 AM    CO2 27 01/08/2015 10:12 AM    CREATININE 0.74 08/17/2023 04:44 AM    CREATININE 0.85 01/11/2018 08:17 AM    AST 15 08/15/2023 12:47 PM    ALT 15 08/15/2023 12:47 PM    TP 8.0 08/17/2023 01:10 PM    TP 7.8 08/15/2023 12:47 PM    ALB 3.9 08/15/2023 12:47 PM     No results for input(s): "CHOL", "HDL", "LDL", "TRIG", "VLDL" in the last 72 hours. No results found for: "[place] Rekha", "Maritza Stephanie"  No results found for: "POCGLU"    Imaging Studies: I have personally reviewed pertinent reports. Portions of the record may have been created with voice recognition software. Please Tigertext questions to the clinician covering the "[id number]" Role. Thank you.

## 2023-08-19 NOTE — PLAN OF CARE
Problem: PAIN - ADULT  Goal: Verbalizes/displays adequate comfort level or baseline comfort level  Description: Interventions:  - Encourage patient to monitor pain and request assistance  - Assess pain using appropriate pain scale  - Administer analgesics based on type and severity of pain and evaluate response  - Implement non-pharmacological measures as appropriate and evaluate response  - Consider cultural and social influences on pain and pain management  - Notify physician/advanced practitioner if interventions unsuccessful or patient reports new pain  Outcome: Progressing     Problem: INFECTION - ADULT  Goal: Absence or prevention of progression during hospitalization  Description: INTERVENTIONS:  - Assess and monitor for signs and symptoms of infection  - Monitor lab/diagnostic results  - Monitor all insertion sites, i.e. indwelling lines, tubes, and drains  - Monitor endotracheal if appropriate and nasal secretions for changes in amount and color  - [place] appropriate cooling/warming therapies per order  - Administer medications as ordered  - Instruct and encourage patient and family to use good hand hygiene technique  - Identify and instruct in appropriate isolation precautions for identified infection/condition  Outcome: Progressing     Problem: SAFETY ADULT  Goal: Patient will remain free of falls  Description: INTERVENTIONS:  - Educate patient/family on patient safety including physical limitations  - Instruct patient to call for assistance with activity   - Consult OT/PT to assist with strengthening/mobility   - Keep Call bell within reach  - Keep bed low and locked with side rails adjusted as appropriate  - Keep care items and personal belongings within reach  - Initiate and maintain comfort rounds  - Make Fall Risk Sign visible to staff  - Offer Toileting every 2 Hours, in advance of need  - Initiate/Maintain bed alarm  - Obtain necessary fall risk management equipment: bed alarm  - Apply yellow socks and bracelet for high fall risk patients  - Consider moving patient to room near nurses station  Outcome: Progressing  Goal: Maintain or return to baseline ADL function  Description: INTERVENTIONS:  -  Assess patient's ability to carry out ADLs; assess patient's baseline for ADL function and identify physical deficits which impact ability to perform ADLs (bathing, care of mouth/teeth, toileting, grooming, dressing, etc.)  - Assess/evaluate cause of self-care deficits   - Assess range of motion  - Assess patient's mobility; develop plan if impaired  - Assess patient's need for assistive devices and provide as appropriate  - Encourage maximum independence but intervene and supervise when necessary  - Involve family in performance of ADLs  - Assess for home care needs following discharge   - Consider OT consult to assist with ADL evaluation and planning for discharge  - Provide patient education as appropriate  Outcome: Progressing  Goal: Maintains/Returns to pre admission functional level  Description: INTERVENTIONS:  - Perform BMAT or MOVE assessment daily.   - Set and communicate daily mobility goal to care team and patient/family/caregiver. - Collaborate with rehabilitation services on mobility goals if consulted  - Perform Range of Motion 3 times a day. - Reposition patient every 2 hours.   - Dangle patient 3 times a day  - Stand patient 3 times a day  - Ambulate patient 3 times a day  - Out of bed to chair 3 times a day   - Out of bed for meals 3 times a day  - Out of bed for toileting  - Record patient progress and toleration of activity level   Outcome: Progressing     Problem: DISCHARGE PLANNING  Goal: Discharge to home or other facility with appropriate resources  Description: INTERVENTIONS:  - Identify barriers to discharge w/patient and caregiver  - Arrange for needed discharge resources and transportation as appropriate  - Identify discharge learning needs (meds, wound care, etc.)  - Arrange for interpretive services to assist at discharge as needed  - Refer to Case Management Department for coordinating discharge planning if the patient needs post-hospital services based on physician/advanced practitioner order or complex needs related to functional status, cognitive ability, or social support system  Outcome: Progressing     Problem: Prexisting or High Potential for Compromised Skin Integrity  Goal: Skin integrity is maintained or improved  Description: INTERVENTIONS:  - Identify patients at risk for skin breakdown  - Assess and monitor skin integrity  - Assess and monitor nutrition and hydration status  - Monitor labs   - Assess for incontinence   - Turn and reposition patient  - Assist with mobility/ambulation  - Relieve pressure over bony prominences  - Avoid friction and shearing  - Provide appropriate hygiene as needed including keeping skin clean and dry  - Evaluate need for skin moisturizer/barrier cream  - Collaborate with interdisciplinary team   - Patient/family teaching  - Consider wound care consult   Outcome: Progressing

## 2023-08-19 NOTE — ASSESSMENT & PLAN NOTE
Was previously on reglan, does not appear to be on this medication at this time. May need small, more frequent meals  Meal supplementation due to poor intake and nutritional risk. Monitor for symptoms.

## 2023-08-19 NOTE — ASSESSMENT & PLAN NOTE
Follows with Dr. Rachele Mathew outpatient  Chronically on prednisone and methotrexate.   - Started Folic Acid supplementation while on methotrexate. Continue for now.

## 2023-08-19 NOTE — PROGRESS NOTES
OT ICP Goals       08/19/23 0700   Rehab Team Goals   ADL Team Goal Patient will require supervision with ADLs with least restrictive device upon completion of rehab program   Rehab Team Interventions   OT Interventions Self Care; Therapeutic Exercise;Cognitive Reintegration;Cognitive Retraining;Energy Conservation   Eating Goal   Eating Goal 06. Independent - Patient completes the activity by him/herself with no assistance from a helper. Status Target goal - one week   Grooming Goal   Oral Hygiene Goal 06. Independent - Patient completes the activity by him/herself with no assistance from a helper. Status Target goal - one week   Tub/Shower Transfer Goal   Method Tub   Assist Device Seat with Back   Status Target goal - one week   Bathing Goal   Shower/bathe self Goal 04. Supervision or touching assistance- [place] provides VERBAL CUES or supervision throughout activity. Status Target goal - one week   Upper Body Dressing Goal   Upper body dressing Goal 04. Supervision or touching assistance- [place] provides VERBAL CUES or supervision throughout activity. Status Target goal - one week   Lower Body Dressing Goal   Lower body dressing Goal 04. Supervision or touching assistance- [place] provides VERBAL CUES or supervision throughout activity. Putting on/taking off footwear Goal 04. Supervision or touching assistance- [place] provides VERBAL CUES or supervision throughout activity. Status Target goal - one week   Toileting Transfer Goal   Toilet transfer Goal 04. Supervision or touching assistance- [place] provides VERBAL CUES or supervision throughout activity. Status Target goal - one week   Toileting Goal   Toileting hygiene Goal 04. Supervision or touching assistance- [place] provides VERBAL CUES or supervision throughout activity.    Status Target goal - one week   Additional Goal (other)   Goal Type (other) Pt will adhere to thoracic spinal precautions with minimal cues   Status Target goal - one week

## 2023-08-19 NOTE — ASSESSMENT & PLAN NOTE
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

## 2023-08-19 NOTE — ASSESSMENT & PLAN NOTE
In the past has broken her pelvis after a fall  Chronically on steroids, and likely should have DXA and osteoporosis work-up   - Outpatient f/u with Endocrinology - appreciate recs, patient likely with severe osteoporosis   - Vitamin D 26.6 - started on 2000 units daily    - started on calcium supplement by Endocrinology  Pain control as below  TLSO when OOB and HOB > 45 degrees. Thoracic spine precautions  Plan for repeat XR ~9/1 with follow-up with Neurosurgery at that time.

## 2023-08-19 NOTE — TREATMENT PLAN
Individualized Plan of [facility] 70 y.o. female MRN: [id number]  Unit/Bed#: -01 Encounter: [id number]     PATIENT INFORMATION  ADMISSION DATE: 8/18/2023  3:21 PM JIMENEZ CATEGORY:Brain Dysfunction:  02.1  Non-Traumatic   ADMISSION DIAGNOSIS: Multiple falls [R29.6]  EXPECTED LOS: 10 to 14 days     MEDICAL/FUNCTIONAL PROGNOSIS  Based on my assessment of the patient's medical conditions and current functional status, the prognosis for attaining medical and functional goals or the IRF stay is:  Fair    Medical Goals: Patient will be medically stable for discharge to least restrictive envrionment upon completion of rehab program. Daughter is a nurse,  has been managing meds. Will need to make sure  can manage meds, as I do not think patient should any further. 1. Adequate pain control  2. Prevention of VTE  3. Adequate secretion management, and monitoring of respiratory status  4. Bowel/bladder management  5. Maintain appropriate nutrition and hydration for healing and hemodynamic stability  6. Emotional adaptation to impairment  7. Monitor for polypharmacy  8. Fall prevention  9. Patient and family caregiver training/education  10. Skin protection and prevention of pressure injury  11. Appropriate follow-up of cognitive/balance/ataxia/motor planning work-up  12. Prevention of delirium  13. Appropriate work-up and management of numerous comorbidities.      ANTICIPATED DISCHARGE DISPOSITION AND SERVICES  COMMUNITY SETTING: Home - with supervision and Home - Assistance    ANTICIPATED FOLLOW-UP SERVICE:   Outpatient Therapy Services: PT, OT and SLP          DISCIPLINE SPECIFIC PLANS:  Required Disciplines & Services: Rehabillitation Nursing and Psychology    REQUIRED THERAPY:  Therapy Hours per Day Days per Week Total Days   Physical Therapy 1 5-6 5-6   Occupational Therapy 1 5-6 5-6   Speech/Language Therapy 1 3-5 3-5   NOTE: Additional therapy time(s) or changes to allocation of therapies as appropriate to meet patient needs and to achieve functional goals. Patient will participate in above therapy regimen consisting of PT, OT and SLP due to the following medical procedure/condition:Brain Dysfunction:  02.1  Non-Traumatic    ANTICIPATED FUNCTIONAL OUTCOMES:  ADL:  Sup-Ebony   Bladder/Bowel:  Sup-mod Ind   Transfers:   Sup-mod Ind   Locomotion:  Sup - mod Ind   Cognitive:   She will need assistance.       DISCHARGE PLANNING NEEDS  Equipment needs: Discharge needs to be reviewed with team      REHAB ANTICIPATED PARTICIPATION RESTRICTIONS:  Will need assistance with IADLs, medication management  Has thoracic spine precautions and TLSO when OOB

## 2023-08-19 NOTE — ASSESSMENT & PLAN NOTE
8/17 level 153  8/18 received 1000mcg IM shot  - Received 5 IM injections. - Then started 1000mcg daily on 8/27  - Monitor proprioception, sensation, ataxia/motor planning.   - Outpatient f/u with Neurology  - Nutrition consult to evaluate dietary intake.

## 2023-08-19 NOTE — ASSESSMENT & PLAN NOTE
Currently on prozac 20mg daily  Also on xanax 1mg QHS PRN chronically, which it seems she was taking scheduled at home. Has not been on this only PRN here. - Has not been using Prn and been fine. Would discontinue this med. Question of this contributed to her worsening falls (as she was likely not managing her meds well at home given her cognitive impairment), and her falls were worse at night when she would take it. That being said, this medication is also not showing up in her PDMP.

## 2023-08-19 NOTE — ASSESSMENT & PLAN NOTE
She reports episodes of urgency at times  - UA with pan susceptible E.coli 100k CFU   - 8/21 started on Keflex for 5 days - completed    - Pyridium at night while treated. Can discontinue now. -  reports a pattern of patient holding her urine until the last minute and then rushing to the bathroom  - PVRs have been low continued timed voids  - Discussed with family recommendation for timed voids at home to help with continence.

## 2023-08-19 NOTE — ASSESSMENT & PLAN NOTE
8/17 TSH 0.314 and T4 1.29  Previously appeared to be on 88-75mcg daily. Not sure when 100mcg was initiated and by who. Consulted endocrine to comment on adjusting dose vs. In setting of recent fall/injury/hospitalization   - Reverse T3 elevated, and dose decreased to 88mcg daily by endocrine   - Recheck thyroid panel in 4-6 weeks.

## 2023-08-19 NOTE — PROGRESS NOTES
ARC PT EVAL     08/19/23 1200   Patient Data   Rehab Impairment Impairment of mobility, safety and Activities of Daily Living (ADLs) due to Orthopedic Disorders:  08.9  Other Orthopedic   Etiologic Diagnosis Fall with multiple rib fractures and T8 compression fracture   Date of Onset 08/15/23   Support System   Name Patient lives with her , Latasha Jean. She reports having 2 sons. Her youngest son, Akanksha Huang, also lives with her. She reports Latasha Jean si retired and home with her   Able to provide 24 hour supervision Yes   Able to provide physical help?   ("he probably could but he toldme it would be too much")   Home Setup   Type of Home Multi Level   Method of Entry Stairs  (1 with no handrail + 7 with L HR up)   First Floor Bathroom Half   Second Floor Bathroom Full  (step over tub with curtain)   Home Modification Comment Patient reports staying on 1 floor when she is inside   [address] [place] [address]; Wheelchair;[employer]; [facility]. prior to Admission   (not recently)   Vocation Other  (Retired)   Transportation Family/friends drive  ("I can but I don't)   Prior Device(s) Used [employer]   Prior IADL Participation   Money Management   Latasha Jean completes)   Meal Preparation Other  Latasha Jean completes)   Laundry Other  Latasha Jean completes)   Home Cleaning Other  Latasha Jean completes)   Prior Level of Function   Self-Care 3. Independent - Patient completed the activities by him/herself, with or without an assistive device, with no assistance from a helper. Indoor-Mobility (Ambulation) 3. Independent - Patient completed the activities by him/herself, with or without an assistive device, with no assistance from a helper. Stairs 3. Independent - Patient completed the activities by him/herself, with or without an assistive device, with no assistance from a helper. Functional Cognition 3.  Independent - Patient completed the activities by him/herself, with or without an assistive device, with no assistance from a helper. Prior Assistance Needed for Driving;Household Chores/Cleaning;Meal Preparation;Medication Management;Money Management; Shopping   Prior Device Used Z. None of the above  (spc)   Falls in the Last Year   Number of falls in the past 12 months 10   Type of Injury Associated with Fall Major injury   Patient Preference   Nickname (Patient Preference) Sandhya   Restrictions/Precautions   Precautions Bed/chair alarms;1:1;Cognitive; Fall Risk;Supervision on toilet/commode;Spinal precautions;Pain   Weight Bearing Restrictions Yes   ROM Restrictions   (Spinal Precautions)   Braces or Orthoses TLSO  (when OOB or HOB > 45*)   Pain Assessment   Pain Assessment Tool 0-10   Pain Score 10 - Worst Possible Pain   Pain Location/Orientation Orientation: Mid;Orientation: Lower; Location: Back   Pain Onset/Description Onset: Ongoing   Effect of Pain on Daily Activities increased with mobility   Patient's Stated Pain Goal No pain   Hospital Pain Intervention(s) Rest  (+ medication)   Toileting   Findings Patient wears depends at home. Reports knwoing when she needs to use BR during the day but has accidents at night   Transfer Bed/Chair/Wheelchair   Positioning Concerns Cognition   Limitations Noted In Balance; Coordination; Endurance; Sequencing;Problem Solving;Sensation   Adaptive Equipment Cane   Type of Assistance Needed Physical assistance   Physical Assistance Level 26%-50%   Comment min/mod Ax1 with SPC   Chair/Bed-to-Chair Transfer CARE Score 3   Roll Left and Right   Type of Assistance Needed Physical assistance   Physical Assistance Level 26%-50%   Comment increased assist rolling L; no use of grab abars or HOB elevated feature; verbal cues for log roll technique needed   Roll Left and Right CARE Score 3   Sit to Lying   Type of Assistance Needed Physical assistance   Physical Assistance Level 25% or less   Comment sleeps on couch at home but sleeps with her head to the L; simulated this method this session; verbal cues for log roll technique needed   Sit to Lying CARE Score 3   Lying to Sitting on Side of Bed   Type of Assistance Needed Physical assistance   Physical Assistance Level 26%-50%   Comment sleeps on couch at home but sleeps with her head to the L; simulated this method this session; verbal cues for log roll technique needed   Lying to Sitting on Side of Bed CARE Score 3   Sit to Stand   Type of Assistance Needed Physical assistance   Physical Assistance Level 25% or less   Comment Ebony with no AD; slight instability initially noted   Sit to Stand CARE Score 3   Picking Up Object   Type of Assistance Needed Physical assistance   Physical Assistance Level 25% or less   Comment SPC and use of reacher which she has at home   Picking Up Object CARE Score 3   Car Transfer   Type of Assistance Needed Physical assistance   Physical Assistance Level 26%-50%   Comment poor safety getting in sideways; verbal cues needed to correct   Car Transfer CARE Score 3   Ambulation   Primary Mode of Locomotion Prior to Admission Walk   Distance Walked (feet) 150 ft  (+ 20'x3 + 120'x2)   Assist Device Cane   Gait Pattern Ataxic; Inconsistant [place];Decreased foot clearance;Narrow KAREEN; Improper weight shift   Limitations Noted In Balance; Heel Strike; Sequencing;Speed;Strength;Swing   Provided Assistance with: Balance   Walk 10 Feet   Type of Assistance Needed Physical assistance   Physical Assistance Level 26%-50%   Comment min/mod Ax1 with SPC; high guard UE; rigid movements noted   Walk 10 Feet CARE Score 3   Walk 50 Feet with Two Turns   Type of Assistance Needed Physical assistance   Physical Assistance Level 26%-50%   Comment min/mod Ax1 with SPC; high guard UE; rigid movements noted   Walk 50 Feet with Two Turns CARE Score 3   Walk 150 Feet   Type of Assistance Needed Physical assistance   Physical Assistance Level 26%-50%   Comment min/mod Ax1 with SPC; high guard UE; rigid movements noted   Walk 150 Feet CARE Score 3   Walking 10 Feet on Uneven Surfaces   Type of Assistance Needed Physical assistance   Physical Assistance Level 26%-50%   Comment min/mod Ax1 with SPC; high guard UE; rigid movements noted   Walking 10 Feet on Uneven Surfaces CARE Score 3   Wheel 50 Feet with Two Turns   Reason if not Attempted Activity not applicable   Wheel 50 Feet with Two Turns CARE Score 9   Wheel 150 Feet   Reason if not Attempted Activity not applicable   Wheel 278 Feet CARE Score 9   Curb or Single Stair   Style negotiated Single stair   Type of Assistance Needed Physical assistance   Physical Assistance Level 26%-50%   Comment mod A on  steps using L handrail and SPC in RUE   1 Step (Curb) CARE Score 3   4 Steps   Type of Assistance Needed Physical assistance   Physical Assistance Level 26%-50%   Comment mod A on  steps using L handrail and SPC in RUE   4 Steps CARE Score 3   12 Steps   Reason if not Attempted Safety concerns   12 Steps CARE Score 88   Stairs   Type  Steps   # of Steps 4   Weight Bearing Precautions Fall Risk   Assist Devices Single Rail;Cane   Comprehension   QI: Comprehension 3. Usually Understands: Understands most conversations, but misses some part/intent of message. Requires cues at times to understand. Expression   QI: Expression 3.  Exhibits some difficulty with expressing needs and ideas (e.g., some words or finishing thoughts) or speech is not clear   RLE Assessment   RLE Assessment X   Strength RLE   R Hip Flexion 4-/5   R Hip Extension 4-/5   R Knee Flexion 3+/5   R Knee Extension 3+/5   R Ankle Dorsiflexion 4/5   R Ankle Plantar Flexion 4/5   LLE Assessment   LLE Assessment X   Strength LLE   L Hip Flexion 4-/5   L Hip Extension 4-/5   L Knee Flexion 3/5   L Knee Extension 3/5   L Ankle Dorsiflexion 4/5   L Ankle Plantar Flexion 4/5   RUE Assessment   RUE Assessment WFL   LUE Assessment   LUE Assessment [facility] Coordination and Movement Description rigidity noted with movements   Sensation   Additional Comments tyrell any neuropathy, numbess, tingling   Cognition   Overall Cognitive Status Impaired   Arousal/Participation Alert; Cooperative   Attention Attends with cues to redirect   Orientation Level Oriented X4   Memory Decreased short term memory;Decreased recall of recent events;Decreased recall of precautions  (unable to recall precautions)   Following Commands Follows one step commands inconsistently   Vision   Vision Comments Wears glasses all the time   Objective Measure   PT Measure(s) Patient educated on PT POC, goal setting and purpose of rehab. Reviewed ARC scheduling and therapy expectations   PT Findings (S)  Outcome Measures: see below   Discharge Information   Vocational Plan Retired/not working   Patient's Discharge Plan Return home with family support   Patient's Rehab Expectations "I just want to get out"   Barriers to Discharge Home Decreased Cognitive Function;Decreased Strength;Decreased Endurance;Pain; Safety Considerations   Impressions Pt is 70 y.o. female seen for PT evaluation s/p admit to [address] [place] [address] on 8/18/2023 w/ Progressive cognitive dysfunction. She presented with a h/o falls (1-2 falls every day for two weeks prior to admission). She was recommended to rehab when medically appropriate and presents today with rib fractures, spinal precautions, recommendations for TLSO when OOB and HOB >45*, and fall precautions. Comorbidities affecting pt's physical performance at time of assessment include:  Sjogren's disease, RA on methotrexate, Hypertension, Hypothyroidism, Gastroparesis, Depression and anxiety. PTA, pt was independent w/ all functional mobility w/ SPC, ambulates unrestricted distances and all terrain, ambulates household distances, lives w/ spouse and son in 2 level home with MICHAEL and retired.  Personal factors affecting pt at time of IE include: ambulating w/ assistive device, stairs to enter home, decreased cognition, positive fall history, decreased initiation and engagement, impulsivity, limited insight into impairments, inability to perform IADLs and inability to perform ADLs. Please find objective findings from PT assessment regarding body systems outlined above with impairments and limitations including weakness, decreased ROM, impaired balance, decreased endurance, impaired coordination, gait deviations, pain, decreased activity tolerance, decreased functional mobility tolerance, altered sensation, decreased safety awareness, impaired judgement, fall risk, orthopedic restrictions, decreased skin integrity and decreased cognition. The following objective measures performed on IE also reveal limitations: Timed Up and Go: 23.12 seconds (positive risk for falls) and Gait Speed: 0.56  m/s (self selected; fall risk, limited community ambulator). This session, patient needing mod A with use of SPC for balance, safety and 2* impaired righting reactions. She presents as a good rehab candidate and is expected to need appx 2 weeks to achieve (S) goals using LRAD.    PT Therapy Minutes   PT Time In 1200   PT Time Out 1330   PT Total Time (minutes) 90   PT Mode of treatment - Individual (minutes) 90   PT Mode of treatment - Concurrent (minutes) 0   PT Mode of treatment - Group (minutes) 0   PT Mode of treatment - Co-treat (minutes) 0   PT Mode of Treatment - Total time(minutes) 90 minutes   PT Cumulative Minutes 90   Cumulative Minutes   Cumulative therapy minutes 180       Outcome Measure: Date:  8/19/23  Date: Date:   5X STS 30.03 seconds      TUG with SPC 23.12 seconds      Self Selected Gait Speed with SPC 0.56 m/seconds      Fast paced Gait Speed with SPC 0.61 m/seconds              Claudetta Gong PT, DPT

## 2023-08-19 NOTE — PROGRESS NOTES
OT Evaluation       08/19/23 0700   Patient Data   Rehab Impairment Impairment of mobility, safety, Activities of Daily Living (ADLs), and cognitive/communication skills due to Brain Dysfunction:  02.1  Non-Traumatic Vascular Dementia, compounded/exacerbated by Vitamin B12 deficiency which may be contributing to a peripheral neuropathy with patchy impairment in sensation in feet/hands (inconsistent), markedly impaired proprioception in bilateral toes, impaired balance, ataxia, motor planning difficulties, acute on chronic progression of cognitive impairment compounded further by likely polypharmacy/mismanagement of meds as patient has cognitive impairment, hyperthyroidism (iatrogenic vs. Acutely in setting of injury/illness) and now with component of acute pain from compression fracture exacerbating gait abnormality. Date of Onset 08/15/23   Support System   Name Sameer Vasquez   Relationship spouse   Able to provide 24 hour supervision Yes   Able to provide physical help? Yes   Home Setup   Type of Home Multi Level   Method of Entry Stairs   Number of Stairs 14   First Floor Bathroom Half   Second Floor Bathroom Tub;Combo   First Floor Setup Available Yes   Available Equipment Wheelchair;Roller Walker;Rollator;Walker Tray/basket;Single [place]; Shower Chair;Bedside Commode; Long Handled Adaptive Equipment  (hip kit)   Prior IADL Participation   Money Management   (dependent)   Meal Preparation   (dependent)   Laundry   (dependent, basement laundry)   Home Cleaning   (partial assistance-dependent)   Prior Level of Function   Self-Care 3. Independent - Patient completed the activities by him/herself, with or without an assistive device, with no assistance from a helper. Indoor-Mobility (Ambulation) 3. Independent - Patient completed the activities by him/herself, with or without an assistive device, with no assistance from a helper.  ([address])   Stairs 3.  Independent - Patient completed the activities by him/herself, with or without an assistive device, with no assistance from a helper. Prior Assistance Needed for Driving;Household Chores/Cleaning;Meal Preparation;Medication Management;Money Management; Shopping   Prior Device Used   (Straight cane)   Falls in the Last Year   Number of falls in the past 12 months   ("a lot")   Type of Injury Associated with Fall Major injury  (this admission)   Patient Preference   Nickname (Patient Preference) Sandhya   Patient Normally Wakes at 0600   Patient Normally Goes to Sleep at   [facility]sometimes I go all night")   Catholic Affiliation Yazdanism   Psychosocial   Psychosocial (WDL) WDL   Patient Behaviors/Mood Calm;Brightens with approach   Restrictions/Precautions   Precautions Bed/chair alarms; Fall Risk;Cognitive;Hard of hearing; Impulsive;Pain;Spinal precautions;Supervision on toilet/commode   ROM Restrictions Yes  (spinal precautions)   Braces or Orthoses (S)  TLSO  (OOB/>45*)   Pain Assessment   Pain Assessment Tool 0-10   Pain Score 8   Pain Location/Orientation Orientation: Mid;Location: Back   Eating Assessment   Type of Assistance Needed Set-up / clean-up   Physical Assistance Level No physical assistance   Eating CARE Score 5   Oral Hygiene   Type of Assistance Needed Incidental touching   Physical Assistance Level No physical assistance   Comment in stance at sink with RW   Oral Hygiene CARE Score 4   Grooming   Able To Comb/Brush Hair;Wash/Dry Face;Brush/Clean Teeth;Wash/Dry Hands   Findings in stance at sink with RW   Tub/Shower Transfer   Reason Not Assessed Safety   Findings Pt with active shower orders   Shower/Bathe Self   Type of Assistance Needed Physical assistance   Physical Assistance Level 26%-50%   Comment Pt engaged in sponge bath supine and seated EOB. Pt washed 8/10 parts with Min A in stance to wash buttock/groin. Pt required A to wash distal LEs due to spinal precautions. Pt required VCs for safety for adherence to spinal precautions throughout.    Shower/Bathe Self CARE Score 3   Bathing   Assessed Bath Style Sponge Bath   Able to Courtney Gregorio No   Able to [employer] Temperature No   Dressing/Undressing Clothing   Type of Assistance Needed Physical assistance   Physical Assistance Level 51%-75%   Comment A to don TLSO, Able to don undershirt and overhead shirt with Min A VCs to adhere to spinal precautions throughout   Upper Body Dressing CARE Score 2   Type of Assistance Needed Physical assistance   Physical Assistance Level 51%-75%   Comment Pt donned brief and pants in supported sitting/supported stance with A to thread and manage up in back due to spinal precautions. Lower Body Dressing CARE Score 2   Positioning Sit Edge Of Bed;Standing   Putting On/Taking Off Footwear   Type of Assistance Needed Physical assistance   Physical Assistance Level 26%-50%   Comment VCs to adhere to spinal precautions, able to don with XLeg technique at EOB, A for increased safety   Putting On/Taking Off Footwear CARE Score 3   Toileting Hygiene   Type of Assistance Needed Physical assistance   Physical Assistance Level 26%-50%   Comment A for clothing mangement of brief, able to complete hygiene with verbal cues to adhere to spinal precautions   Toileting Hygiene CARE Score 3   Toilet Transfer   Type of Assistance Needed Adaptive equipment   Physical Assistance Level 26%-50%   Comment Pt required A for toilet txf due to decreased safety awareness, spatial/body awareness and impulsivity. A to guide hips onto toilet seat for safety.    Toilet Transfer CARE Score 3   Transfer Bed/Chair/Wheelchair   Type of Assistance Needed Incidental touching   Physical Assistance Level No physical assistance   Comment CGA for inc safety with RW   Chair/Bed-to-Chair Transfer CARE Score 4   Roll Left and Right   Type of Assistance Needed Physical assistance   Physical Assistance Level 26%-50%   Comment A for rolling due to flank pain   Roll Left and Right CARE Score 3   Lying to Sitting on Side of Bed   Type of Assistance Needed Physical assistance   Physical Assistance Level 25% or less   Comment A and VC for log rolling   Lying to Sitting on Side of Bed CARE Score 3   Sit to Stand   Type of Assistance Needed Incidental touching   Physical Assistance Level No physical assistance   Comment RW   Sit to Stand CARE Score 4   Comprehension   QI: Comprehension 4. Undestands: Clear comprehension without cues or repetitions   Expression   QI: Expression 3. Exhibits some difficulty with expressing needs and ideas (e.g., some words or finishing thoughts) or speech is not clear   Social Interaction   Social Interaction (FIM) 7 - Interacts appropriately without assistive device, medication or helper   Memory   Short-Term Impaired   Recalls Precaution No   RUE Assessment   RUE Assessment WFL   LUE Assessment   LUE Assessment WFL   Coordination   Movements are Fluid and Coordinated 0   Coordination and Movement Description minimal impairment, L>R   Sensation   Light Touch No apparent deficits   Cognition   Overall Cognitive Status Impaired   Arousal/Participation Alert; Cooperative   Attention Attends with cues to redirect   Orientation Level Oriented to person;Oriented to time;Disoriented to place; Disoriented to situation   Memory Decreased short term memory;Decreased recall of recent events;Decreased recall of precautions   Following Commands Follows one step commands with increased time or repetition   Discharge Information   Patient's Rehab Expectations To go home   Barriers to Discharge [address]; Unsafe Home Setup; Decreased Cognitive Function;Decreased Strength;Decreased Endurance;Pain; Safety Considerations   Impressions Pt is a 70 y.o. female who was seen for an OT evaluation following admission to [facility] due to a fall backwards resulting in multiple R rib fxs (3-5) and a T8 compression fx without cord compression. Per neurology, pt has vascular dementia, possible PSP.  Pt has had multiple recent falls and has been mismanaging medication. Per neurology, TLSO OOB/ >45* and maintainn thoracic spinal precautions. Pt's pertinent medical hx includes: Sjogrens and RA, HTN, hypothyroidism, gastroparesis, depression, previous mild cognitive impairment (MOCA 17/30 in 2021), history of meningioma s/p resection in 2012, pelvic fracture after fall down stairs in 2021, was at [address] [place] [address] for acute rehab. Pt reports living with spouse and two sons (needs verification). PTA, pt was completing ADLs at an independent level with no AD. Spouse recently started assisting with medication management, otherwise completes all other IADLs. Pt presents as pleasant and cooperative. Currently, pt requires Max A for UB ADLs, Max A for LB ADLs, and CGA for transfers using a RW. Pt tolerated functional mob in room with a RW, frequent verbal cues required for safety and adherance to spinal precautions. Pt is currently limited due to the following deficits impacting occupational performance: cognition, strength, postural strength/control, activity tolerance, ROM, endurance, standing balance/tolerance, sitting balance/tolerance, pain, mobility and safety awareness/follow through of precautions. The patient has shown a decline from prior level of function. The patient participates in identification of personal goals to address in occupational therapy. Pt benefits from skilled OT services for I/ADL retraining to support the ability to safely participate in daily occupations safely and independently in the most least restrictive way. From OT standpoint, Pt demonstrates good rehab potential to meet LTG's. Pt is a good rehab candidate, Anticipate 7-10 day length of stay to meet supervision goals.  Occupational therapy plan of care to focus on the following areas:  ADL re-training, LHAE training, [address] [place] [address], functional transfers, functional cognition, standing tolerance, standing balance, UE strength/endurance, DME training/education, family training/education, healthy coping education, leisure pursuits and EC techniques/education.    OT Therapy Minutes   OT Time In 0700   OT Time Out 0830   OT Total Time (minutes) 90   OT Mode of treatment - Individual (minutes) 90   OT Mode of treatment - Concurrent (minutes) 0   OT Mode of treatment - Group (minutes) 0   OT Mode of treatment - Co-treat (minutes) 0   OT Mode of Treatment - Total time(minutes) 90 minutes   OT Cumulative Minutes 90   Cumulative Minutes   Cumulative therapy minutes 90

## 2023-08-20 PROCEDURE — 97129 THER IVNTJ 1ST 15 MIN: CPT

## 2023-08-20 PROCEDURE — 92523 SPEECH SOUND LANG COMPREHEN: CPT

## 2023-08-20 PROCEDURE — 99232 SBSQ HOSP IP/OBS MODERATE 35: CPT | Performed by: INTERNAL MEDICINE

## 2023-08-20 PROCEDURE — 97130 THER IVNTJ EA ADDL 15 MIN: CPT

## 2023-08-20 RX ADMIN — Medication 2000 UNITS: at 08:57

## 2023-08-20 RX ADMIN — LEVOTHYROXINE SODIUM 88 MCG: 88 TABLET ORAL at 06:22

## 2023-08-20 RX ADMIN — FOLIC ACID 1 MG: 1 TABLET ORAL at 08:57

## 2023-08-20 RX ADMIN — ACETAMINOPHEN 975 MG: 325 TABLET, FILM COATED ORAL at 13:10

## 2023-08-20 RX ADMIN — LIDOCAINE 5% 1 PATCH: 700 PATCH TOPICAL at 08:55

## 2023-08-20 RX ADMIN — Medication 1 TABLET: at 17:13

## 2023-08-20 RX ADMIN — PREDNISONE 5 MG: 5 TABLET ORAL at 21:50

## 2023-08-20 RX ADMIN — ENOXAPARIN SODIUM 30 MG: 30 INJECTION SUBCUTANEOUS at 21:50

## 2023-08-20 RX ADMIN — CYANOCOBALAMIN 1000 MCG: 1000 INJECTION, SOLUTION INTRAMUSCULAR at 09:06

## 2023-08-20 RX ADMIN — ACETAMINOPHEN 975 MG: 325 TABLET, FILM COATED ORAL at 06:22

## 2023-08-20 RX ADMIN — OXYCODONE HYDROCHLORIDE 5 MG: 5 TABLET ORAL at 17:15

## 2023-08-20 RX ADMIN — FLUOXETINE 20 MG: 20 CAPSULE ORAL at 08:56

## 2023-08-20 RX ADMIN — SENNOSIDES 8.6 MG: 8.6 TABLET, FILM COATED ORAL at 17:14

## 2023-08-20 RX ADMIN — ATENOLOL 50 MG: 50 TABLET ORAL at 08:56

## 2023-08-20 RX ADMIN — ATORVASTATIN CALCIUM 10 MG: 10 TABLET, FILM COATED ORAL at 17:13

## 2023-08-20 RX ADMIN — Medication 1 TABLET: at 08:56

## 2023-08-20 RX ADMIN — ENOXAPARIN SODIUM 30 MG: 30 INJECTION SUBCUTANEOUS at 08:55

## 2023-08-20 RX ADMIN — CYCLOBENZAPRINE HYDROCHLORIDE 5 MG: 5 TABLET, FILM COATED ORAL at 21:50

## 2023-08-20 RX ADMIN — ACETAMINOPHEN 975 MG: 325 TABLET, FILM COATED ORAL at 21:50

## 2023-08-20 RX ADMIN — SENNOSIDES 8.6 MG: 8.6 TABLET, FILM COATED ORAL at 08:56

## 2023-08-20 NOTE — PLAN OF CARE
Problem: PAIN - ADULT  Goal: Verbalizes/displays adequate comfort level or baseline comfort level  Description: Interventions:  - Encourage patient to monitor pain and request assistance  - Assess pain using appropriate pain scale  - Administer analgesics based on type and severity of pain and evaluate response  - Implement non-pharmacological measures as appropriate and evaluate response  - Consider cultural and social influences on pain and pain management  - Notify physician/advanced practitioner if interventions unsuccessful or patient reports new pain  Outcome: Progressing     Problem: INFECTION - ADULT  Goal: Absence or prevention of progression during hospitalization  Description: INTERVENTIONS:  - Assess and monitor for signs and symptoms of infection  - Monitor lab/diagnostic results  - Monitor all insertion sites, i.e. indwelling lines, tubes, and drains  - Monitor endotracheal if appropriate and nasal secretions for changes in amount and color  - [place] appropriate cooling/warming therapies per order  - Administer medications as ordered  - Instruct and encourage patient and family to use good hand hygiene technique  - Identify and instruct in appropriate isolation precautions for identified infection/condition  Outcome: Progressing     Problem: SAFETY ADULT  Goal: Patient will remain free of falls  Description: INTERVENTIONS:  - Educate patient/family on patient safety including physical limitations  - Instruct patient to call for assistance with activity   - Consult OT/PT to assist with strengthening/mobility   - Keep Call bell within reach  - Keep bed low and locked with side rails adjusted as appropriate  - Keep care items and personal belongings within reach  - Initiate and maintain comfort rounds  - Make Fall Risk Sign visible to staff  - Offer Toileting every 2 Hours, in advance of need  - Initiate/Maintain bed alarm  - Obtain necessary fall risk management equipment: bed alarm  - Apply yellow socks and bracelet for high fall risk patients  - Consider moving patient to room near nurses station  Outcome: Progressing  Goal: Maintain or return to baseline ADL function  Description: INTERVENTIONS:  -  Assess patient's ability to carry out ADLs; assess patient's baseline for ADL function and identify physical deficits which impact ability to perform ADLs (bathing, care of mouth/teeth, toileting, grooming, dressing, etc.)  - Assess/evaluate cause of self-care deficits   - Assess range of motion  - Assess patient's mobility; develop plan if impaired  - Assess patient's need for assistive devices and provide as appropriate  - Encourage maximum independence but intervene and supervise when necessary  - Involve family in performance of ADLs  - Assess for home care needs following discharge   - Consider OT consult to assist with ADL evaluation and planning for discharge  - Provide patient education as appropriate  Outcome: Progressing  Goal: Maintains/Returns to pre admission functional level  Description: INTERVENTIONS:  - Perform BMAT or MOVE assessment daily.   - Set and communicate daily mobility goal to care team and patient/family/caregiver. - Collaborate with rehabilitation services on mobility goals if consulted  - Perform Range of Motion 3 times a day. - Reposition patient every 2 hours.   - Dangle patient 3 times a day  - Stand patient 3 times a day  - Ambulate patient 3 times a day  - Out of bed to chair 3 times a day   - Out of bed for meals 3 times a day  - Out of bed for toileting  - Record patient progress and toleration of activity level   Outcome: Progressing     Problem: DISCHARGE PLANNING  Goal: Discharge to home or other facility with appropriate resources  Description: INTERVENTIONS:  - Identify barriers to discharge w/patient and caregiver  - Arrange for needed discharge resources and transportation as appropriate  - Identify discharge learning needs (meds, wound care, etc.)  - Arrange for interpretive services to assist at discharge as needed  - Refer to Case Management Department for coordinating discharge planning if the patient needs post-hospital services based on physician/advanced practitioner order or complex needs related to functional status, cognitive ability, or social support system  Outcome: Progressing     Problem: Prexisting or High Potential for Compromised Skin Integrity  Goal: Skin integrity is maintained or improved  Description: INTERVENTIONS:  - Identify patients at risk for skin breakdown  - Assess and monitor skin integrity  - Assess and monitor nutrition and hydration status  - Monitor labs   - Assess for incontinence   - Turn and reposition patient  - Assist with mobility/ambulation  - Relieve pressure over bony prominences  - Avoid friction and shearing  - Provide appropriate hygiene as needed including keeping skin clean and dry  - Evaluate need for skin moisturizer/barrier cream  - Collaborate with interdisciplinary team   - Patient/family teaching  - Consider wound care consult   Outcome: Progressing

## 2023-08-20 NOTE — PROGRESS NOTES
SLP TAA       08/20/23 1000   Patient Data   Rehab Impairment Impairment of mobility, safety and Activities of Daily Living (ADLs) due to Orthopedic Disorders:  08.9  Other Orthopedic   Etiologic Diagnosis Fall with multiple rib fractures and T8 compression fracture   Date of Onset 08/15/23   Support System   Name Pt reports living w/ spouse Jonh who is supportive. Prior Level of Function   Functional Cognition 2. Needed Some Help - Patient needed a partial assistance from another person to complete activities. Patient Preference   Nickname (Patient Preference) Sandhya   Restrictions/Precautions   Precautions 1:1;Bed/chair alarms;Cognitive; Fall Risk;Impulsive;Pain;Supervision on toilet/commode   Pain Assessment   Pain Assessment Tool 0-10   Pain Score 8   Pain Location/Orientation Orientation: Bilateral;Location: Back; Location: Rib Cage   Hospital Pain Intervention(s) Repositioned  (distractions by completing assessment)   Comprehension   Assist Devices Glasses   Auditory Basic;Complex   Visual Basic;Complex   Findings Pt completing formalized cognitive assessment. Refer to SLP Rehab note for full details. QI: Comprehension 2. Sometimes Understands: Understands only basic conversations or simple, direct phrases. Frequently requires cues to understand   Comprehension (FIM) 3 - Understands basic info/conversation 50-74% of time   Expression   Verbal Basic;Complex   Non-Verbal Basic;Complex   Intelligibility Sentence   Findings Pt completing formalized cognitive assessment. Refer to SLP Rehab note for full details. QI: Expression 3. Exhibits some difficulty with expressing needs and ideas (e.g., some words or finishing thoughts) or speech is not clear   Expression (FIM) 4 - Expresses basic info/needs 75-90% of time   Social Interaction   Cooperation with staff   Participation Individual   Behaviors observed Appropriate   Findings Pt completing formalized cognitive assessment.  Refer to SLP Rehab note for full details. Social Interaction (FIM) 4 - Interacts 75-89% of time   Problem Solving   Complex Manages discharge planning   Findings Pt completing formalized cognitive assessment. Refer to SLP Rehab note for full details. Problem solving (FIM) 2 - Needs direction more than ½ time to initiate, plan or complete simple tasks   Memory   Initiates Tasks Yes   Short-Term Impaired   Long Term Impaired   Recalls Precaution No   Findings Pt completing formalized cognitive assessment. Refer to SLP Rehab note for full details. Memory (FIM) 2 - Recognizes, recalls/performs 25-49%   Discharge Information   Vocational Plan Retired/not working   Patient's Discharge Plan home w/ family support/supervision   Patient's Rehab Expectations "I hope I'll get home soon"   Barriers to Discharge Home Decreased Cognitive Function;Decreased Strength;Decreased Endurance;Pain; Safety Considerations   Impressions Pt is a 70 y.o. female with Sjogrens and RA (on methotrexate), HTN, hypothyroidism, gastroparesis, depression, previous mild cognitive impairment ([facility] 17/30 in 2021), history of meningioma s/p resection in 2012, pelvic fracture after fall down stairs in 2021, who presented to the [facility] 8/15 after falling backwards and landing on the ground 6 days prior. She had headaches and severe back pain and went to the hospital. She also seemed to have multiple more falls. She was found to have multiple R rib fractures (3-5). CTH was unremarkable. There was a question of possible movement disorder and Neurology was consulted who ordered MRI Brain, C-Spine and T-Spine. Of note she is on Xanax and Flexeril chronically (mostly taken during bedtime). Geriatrics recommended a UA which was not done inpatient. She was treated for MASD as well.  MRI Brain showed moderate chronic microangiopathy, significantly increased, C-Spine without cord compression or cord signal abnormality, and T-Spine with a mild/acte T8 compression fracture without cord compression or cord signal abnormality. She most likely, per Neurology has a Vascular Dementia. There was also some discussion of possible PSP. Her  had also reported that it didn't seem the patient was keeping track of her medications properly, and certainly with her medication regimen is at risk for polypharmacy. Neurosurgery was consulted for her T8 compression fracture, and ordered TLSO when OOB/ > 45 degrees and follow-up in 2 weeks. She has a Vitamin B1 pending, SPEP pending,  TSH was 0.314 with T4 1.29, A1C was 5.6, and B12 was 153 (she was started on 5 doses of Vitamin B12 - first dose given today, and to be given every other day, followed by daily 1000mcg Vitamin B12). She was admitted to the [facility] on 8/18/2023. Pt is a fair to good rehab candidate to achieve min A to potentially supervision level for cognitive linguistic skill while on the acute rehab center w/ anticipated discharge home w/ family support/supervision. It is noted that there could be a possible vascular dementia vs polypharmacy which increased the cognitive decline which was reported by pt's spouse in PMR's H&P. It was noted that pt was somewhat self limiting when completing formalized assessment, requiring increased encouragement to completed multiple tasks as per assessment. However, pt did demonstrate deficits in overall attention, decreased LT and ST memory recall, decreased orientation, decreased executive function skills, including problem solving, reasoning, judgement, sequencing, organization of thoughts, decreased safety awareness which currently impacts pt's functional mobility skills. ELOS ~ 10-14 days. At time time, pt will benefit from skilled SLP services targeting functional cognitive skills as well as providing spouse education and providing strategies given pt's cognitive skills to ensure overall safety and functioning at home and decreasing overall burden of care by time of discharge. SLP Therapy Minutes   SLP Time In 1000   SLP Time Out 1100   SLP Total Time (minutes) 60   SLP Mode of treatment - Individual (minutes) 60   SLP Mode of treatment - Concurrent (minutes) 0   SLP Mode of treatment - Group (minutes) 0   SLP Mode of treatment - Co-treat (minutes) 0   SLP Mode of Treatment - Total time(minutes) 60 minutes   SLP Cumulative Minutes 60   Cumulative Minutes   Cumulative therapy minutes 240

## 2023-08-20 NOTE — PROGRESS NOTES
SLP Cognitive Assessment       08/20/23 1000   Pain Assessment   Pain Assessment Tool 0-10   Pain Score 8   Pain Location/Orientation Orientation: Bilateral;Location: Back; Location: Rib Cage   Hospital Pain Intervention(s) Repositioned  (distractions by completing assessment)   Restrictions/Precautions   Precautions 1:1;Bed/chair alarms;Cognitive; Fall Risk;Impulsive;Pain;Supervision on toilet/commode   Cognitive Linguisitic Assessments   Cognitive Linquistic Quick Test (CLQT) Refer to below for full details. Comprehension   Assist Devices Glasses   Auditory Basic;Complex   Visual Basic;Complex   Findings Pt completing formalized cognitive assessment. Refer to SLP Rehab note for full details. QI: Comprehension 2. Sometimes Understands: Understands only basic conversations or simple, direct phrases. Frequently requires cues to understand   Comprehension (FIM) 3 - Understands basic info/conversation 50-74% of time   Expression   Verbal Basic;Complex   Non-Verbal Basic;Complex   Intelligibility Sentence   Findings Pt completing formalized cognitive assessment. Refer to SLP Rehab note for full details. QI: Expression 3. Exhibits some difficulty with expressing needs and ideas (e.g., some words or finishing thoughts) or speech is not clear   Expression (FIM) 4 - Expresses basic info/needs 75-90% of time   Social Interaction   Cooperation with staff   Participation Individual   Behaviors observed Appropriate   Findings Pt completing formalized cognitive assessment. Refer to SLP Rehab note for full details. Social Interaction (FIM) 4 - Interacts 75-89% of time   Problem Solving   Complex Manages discharge planning   Findings Pt completing formalized cognitive assessment. Refer to SLP Rehab note for full details.    Problem solving (FIM) 2 - Needs direction more than ½ time to initiate, plan or complete simple tasks   Memory   Initiates Tasks Yes   Short-Term Impaired   Long Term Impaired   Recalls Precaution No Findings Pt completing formalized cognitive assessment. Refer to SLP Rehab note for full details. Memory (FIM) 2 - Recognizes, recalls/performs 25-49%   Speech/Language/Cognition Assessmetn   Treatment Assessment Pt completed the CLQT+ on initial evaluation with a Composite Severity Rating score of 1.4 out of 4.0, correlating to overall severely impaired  cognitive linguistic impairments at time of evaluation and in comparison to age matched peers ranging from 19-69 y/o. Pt scored at or above criterion cut score for 3 out of 10 tasks completed. Pt's Cognitive Domain Scores are as follows:      Cognitive Domain: Score:  Severity Rating:   Attention   24 severely impaired    Memory 124 moderately impaired        Executive Functions 6 severely impaired       Language 21 moderately impaired       Visuospatial Skills  30 severely impaired       Clock Drawing Screen    6 severely impaired    Overall Composite Severity Rating Score 1.4 out of 4.0 severely impaired       In addition to completing formalized cognitive assessment, SLP also engaged pt in initial interview/rapport building. It was noted that pt was oriented to self and only month/year. Pt needed cues for place/city as well as reasoning for hospitalization. Pt was able to recall LTM biographical information as per completion of assessment, as well as pt's recall of spouse and children's names (both have children from prior marriages). Per pt report, spouse, Sarah Blocker completes I ADL's at home. Throughout completion of assessment, pt was somewhat self limiting when completing formalized assessment, requiring increased encouragement to completed multiple tasks as per assessment.  However, pt did demonstrate deficits in overall attention, decreased ST memory recall, decreased orientation, decreased executive function skills, including problem solving, reasoning, judgement, sequencing, organization of thoughts, decreased safety awareness which currently impacts pt's functional mobility skills. Pt was educated on results of assessment with review of overall results and performance on each cognitive domain, as well as specific focus on weaker areas, which will primarily be targeted during therapy sessions. Pt was receptive to all information provided, along with recommendations for skilled SLP services during acute rehab stay to maximize overall cognitive linguistic skills. SLP Therapy Minutes   SLP Time In 1000   SLP Time Out 1100   SLP Total Time (minutes) 60   SLP Mode of treatment - Individual (minutes) 60   SLP Mode of treatment - Concurrent (minutes) 0   SLP Mode of treatment - Group (minutes) 0   SLP Mode of treatment - Co-treat (minutes) 0   SLP Mode of Treatment - Total time(minutes) 60 minutes   SLP Cumulative Minutes 60   Therapy Time missed   Time missed?  No

## 2023-08-20 NOTE — PROGRESS NOTES
Internal Medicine Progress Note  Patient: Valerio Luna  Age/sex: 70 y.o. female  Medical Record #: [id number]      ASSESSMENT/PLAN: (Interval History)  Valerio Luna is seen and examined and management for following issues:    Frequent fall, r/o movement disorder  • See Dr Claus James as OP     Right rib fractures  • Ribs 3-5th  • Continue IS     T8 fracture  • Non-op  • TLSO brace  • For upright t-spine xrays in brace in 2 weeks     Vitamin B12 deficiency  • For IM replacement x 5 doses then to PO     HTN  • Home:  Atenolol 50mg qd  • Here:  Atenolol 50mg qd  • Has prn Hydralazine  • No changes today     Cognitive impairment  • MOCA 3/2021 was 17/30  • Likely has worsened since prev MOCA and is confused here     Abnormal UA  • Dr Bozena Kang sent UA with reflex to cx 2/2 confusion  • UA is positive but no sx, no fever  • Will await cx results. UA had moderate epi cells. I would not tx based on the UA results     Hypothyroidism  • TSH 0.314/free T4 1.29  • on Levothyroxine 100 mcg qd  • Endo following and they reduced to 88 mcg qd and sent a reverse T3  • For TSH/free T4 4-6 weeks     Severe osteoporosis  • Endo following  • For OP DEXA  • calcium 500mg BID     Vit D deficiency  • Level was 23.7  • For Vit D 2000IU qd     RA  • At home takes Methotrexate 20mg qWednesday/Prednisone 5mg qd/Flexeril 10mg qhs     HLD  • Lipitor 10mg qd     Anxiety/depression  • At home she was taking Xananx 1mg TID and Prozac 20mg qd  • Per PMR     Enlarged right retrocrural lymph node  • Found on CT  • For repeat CT chest as OP in 3-6 months        Discharge date:  Team    The above assessment and plan was reviewed and updated as determined by my evaluation of the patient on 8/20/2023.     Labs:   Results from last 7 days   Lab Units 08/17/23  0444 08/16/23  0602   WBC Thousand/uL 5.06 5.38   HEMOGLOBIN g/dL 12.0 13.1   HEMATOCRIT % 36.2 39.7   PLATELETS Thousands/uL 174 187     Results from last 7 days   Lab Units 08/17/23 0444 08/16/23  0602   SODIUM mmol/L 136 136   POTASSIUM mmol/L 4.4 4.8   CHLORIDE mmol/L 104 103   CO2 mmol/L 26 27   BUN mg/dL 14 10   CREATININE mg/dL 0.74 0.85   CALCIUM mg/dL 9.4 9.7     Results from last 7 days   Lab Units 08/17/23  0444   HEMOGLOBIN A1C % 5.6     Results from last 7 days   Lab Units 08/16/23  0602   INR  1.14           Review of Scheduled Meds:  Current Facility-Administered Medications   Medication Dose Route Frequency Provider Last Rate   • acetaminophen  975 mg Oral [facility] Elza Galindo MD     • ALPRAZolam  1 mg Oral HS PRN Elza Galindo MD     • atenolol  50 mg Oral Daily Elza Galindo MD     • atorvastatin  10 mg Oral Daily With Christiano Weaver MD     • bisacodyl  5 mg Oral Daily PRN Elza Galindo MD     • bisacodyl  10 mg Rectal Daily PRN Elza Galindo MD     • bisacodyl  10 mg Rectal Once Eric López MD     • calcium carbonate  1 tablet Oral BID With Meals Sanjay Santos MD     • cholecalciferol  2,000 Units Oral Daily Sanjay Santos MD     • cyanocobalamin  1,000 mcg Intramuscular Every Other Day Elza Galindo MD     • cyclobenzaprine  5 mg Oral HS Elza Galindo MD     • enoxaparin  30 mg Subcutaneous Q12H Carlito Fajardo MD     • FLUoxetine  20 mg Oral Daily Elza Galindo MD     • folic acid  1 mg Oral Daily Elza Galindo MD     • hydrALAZINE  25 mg Oral Q8H PRN DARIO Collado     • levothyroxine  88 mcg Oral Early Morning Sanjay Santos MD     • lidocaine  1 patch Topical Daily Elza Galindo MD     • [START ON 8/23/2023] methotrexate  20 mg Oral Weekly Elza Galindo MD     • oxyCODONE  5 mg Oral Q4H PRN Elza Galindo MD     • oxyCODONE  2.5 mg Oral Q4H PRN Elza Galindo MD     • polyethylene glycol  17 g Oral Daily Elza Galindo MD     • predniSONE  5 mg Oral HS Elza Galindo MD     • QUEtiapine  12.5 mg Oral HS PRN Elza Galindo MD     • senna  1 tablet Oral BID Eric López MD         Subjective/ HPI: Patient seen and examined. Patients overnight issues or events were reviewed with nursing or staff during rounds or morning huddle session. New or overnight issues include the following:     No new or overnight issues. Offers no complaints    ROS:   A 10 point ROS was performed; negative except as noted above. Imaging:     No orders to display       *Labs /Radiology studies reviewed  *Medications reviewed and reconciled as needed  *Please refer to order section for additional ordered labs studies  *Case discussed with primary attending during morning huddle case rounds    Physical Examination:  Vitals:   Vitals:    08/19/23 2054 08/20/23 0521 08/20/23 0655 08/20/23 0856   BP: 146/80 (!) 182/66 140/80 144/85   BP Location: Left arm Right arm Left arm    Pulse: 71 69  65   Resp: 18 18     Temp: 98.2 °F (36.8 °C) 98.5 °F (36.9 °C)     TempSrc: Oral Oral     SpO2: 98% 97%     Weight:       Height:           General Appearance: no distress, conversive  HEENT:  External ear normal.  Nose normal w/o drainage. Mucous membranes are moist. Oropharynx is clear. Conjunctiva clear w/o icterus or redness. Neck:  Supple, normal ROM  Lungs: BBS without crackles/wheeze/rhonchi; respirations unlabored with normal inspiratory/expiratory effort. No retractions noted. On RA  CV: regular rate and rhythm; no rubs/murmurs/gallops, PMI normal   ABD: Abdomen is soft. Bowel sounds all quadrants. Nontender with no distention. EXT: no edema  Skin: normal turgor, normal texture, no rashes  Psych: affect normal, mood normal  Neuro: AA     The above physical exam was reviewed and updated as determined by my evaluation of the patient on 8/20/2023. Invasive Devices     None                    VTE Pharmacologic Prophylaxis: Enoxaparin  Code Status: Level 3 - DNAR and DNI  Current Length of Stay: 2 day(s)      Total time spent:  30 minutes with more than 50% spent counseling/coordinating care.  Counseling includes discussion with patient re: progress  and discussion with patient of his/her current medical state/information. Coordination of patient's care was performed in conjunction with primary service. Time invested included review of patient's labs, vitals, and management of their comorbidities with continued monitoring. In addition, this patient was discussed with medical team including physician and advanced extenders. The care of the patient was extensively discussed and appropriate treatment plan was formulated unique for this patient. Medical decision making for the day was made by supervising physician unless otherwise noted in their attestation statement. ** Please Note:  voice to text software may have been used in the creation of this document.  Although proof errors in transcription or interpretation are a potential of such software**

## 2023-08-20 NOTE — PROGRESS NOTES
Physical Medicine and Rehabilitation Progress Note   Call Coverage  Radha Fine 70 y.o. female MRN: [id number]  Unit/Bed#: -01 Encounter: [id number]      Assessment & Plan:     Decline in ADLs and mobility: Functional assessment  Mod assist toileting hygiene, bathing  Significant assist LBD, UBD  Transfers mod assist  Ambulation mod assist     PMR On-call focused problem list:  (Additional problems may be listed in primary providers weekly notes)     Gastroparesis  Assessment & Plan  Was previously on reglan, does not appear to be on this medication at this time. May need small, more frequent meals  Monitor for symptoms. Had small BM today     Urinary urgency  Assessment & Plan  She reports episodes of urgency at times; pt afebrile   - UA with significant WBC/RBC but also mod epith -await U/C unless symptoms worsen or develops fever, systemic signs   -  reports a pattern of patient holding her urine until the last minute and then rushing to the bathroom  - PVRs low     Compression fracture of T8 vertebra (HCC)  Assessment & Plan  In the past has broken her pelvis after a fall  Chronically on steroids, and likely should have DXA and osteoporosis work-up   - Outpatient f/u with Endocrinology   - Added Vitamin D level while here. > mildly low > started on Vit D and Ca by endo   "Severe osteoporosis  She has had history of fractures in the past with falls from standing height and had fragility fracture on admission. Corrected calcium is 9.6, phosphorus 4.1, vitamin D 23.7 and PTH 76.7  Continue with PT/OT. She will benefit from getting DEXA scan as outpatient and will qualify for anabolic agents for treatment as outpt  We recommend starting her on calcium carbonate 500mg twice daily and will start on vitamin D3 2000IU daily"  Per endo  Pain control as below  TLSO when OOB and HOB > 45 degrees.   Thoracic spine precautions  Plan for repeat XR ~9/1 with follow-up with Neurosurgery at that time.    Vitamin B12 deficiency  Assessment & Plan  8/17 level 153  8/18 received 1000mcg IM shot  - Will give 4 more doses every other day starting on 8/20.  - Then start 1000mcg daily.  - Monitor proprioception, sensation, ataxia/motor planning.   - Outpatient f/u with Neurology  - Nutrition consult to evaluate dietary intake. Closed fracture of multiple ribs of right side  Assessment & Plan  2/2 fall  R 3-5  Currently on RA  Pain control as below  Incentive spirometry. Abnormal CT of the abdomen  Assessment & Plan  1. Stable hepatomegaly - checking CMP in the AM. Alk phos elevated on admission, but stable. If still elevated, will add GGT given recent T8 fracture. Currently on Tylenol Q8hrs scheduled. May need to decrease dose. 2. Stable R renal cyst    Outpatient f/u with PCP    Adenopathy  Assessment & Plan  In the past has had "stable distal paraesophageal and gastrohepatic ligament adenopathy of uncertain etiology""  Here also noted to have R retrocrural lymph node adjacent to the descending thoracic aorta, juts proximal to the diaphragmatic hiatus - recommended for CT of the chest in 3-6 months to assess for stability. Onychomycosis  Assessment & Plan  Consult podiatry for very overgrown toenails    Oral dyskinesia  Assessment & Plan  This is stable and was noted in her [facility] admission, where it was felt to be due to reglan. She is no longer on this medication. Patient attributes to her Sjogrens. Would not add additional medication to address this at this time. Can f/u with Neurology outpatient    Hypothyroidism  Assessment & Plan  8/17 TSH 0.314 and T4 1.29  Previously appeared to be on 88-75mcg daily. Not sure when 100mcg was initiated and by who.   Consulted endocrine to comment on adjusting dose vs. In setting of recent fall/injury/hospitalization  Endo c/s 8/19  "Abnormal thyroid function studies are likely due to over replacement of levothyroxine versus nonthyroidal illness and hence we recommend obtaining reverse T3 and reducing levothyroxine to 88 mcg daily and repeating TSH, free T4 as outpatient in 4 to 6 weeks"    Anxiety and depression  Assessment & Plan  Currently on prozac 20mg daily  Also on xanax 1mg QHS PRN chronically, which it seems she was taking scheduled at home. Has not been on this only PRN here. - Monitor for now PRN - but monitor closely for any symptoms of withdrawal or increased agitation off this medication. Question of this contributed to her worsening falls (as she was likely not managing her meds well at home given her cognitive impairment)  Would recommend a wean of this medication over time. That being said, this medication is also not showing up in her PDMP. Hypertension  Assessment & Plan  Home: Atenolol 50mg daily, Losartan 25mg daily  Here: Atenolol 50mg daily, Hydralazine PRN  Monitor and adjust as appropriate  IM consulted and assisting with management. Sjogren's disease [facility])  Assessment & Plan  Follows with Dr. Ronal Jesus outpatient  Chronically on prednisone and methotrexate. Continue for now. * Progressive cognitive dysfunction  Assessment & Plan  Per primary attending  " reports progressive cognitive decline that accelerated the past two weeks  - Likely vascular dementia given increased moderate chronic microangiopathy  - ? Amyloid angiopathy per radiology  - Cognitive impairment resulting in mismanagement of her medications - which include steroids, xanax, flexeril, prozac with polypharmacy contributing - particularly since she takes the Flexeril/Xanax at night and that is when most of her falls were occurring.   - Significant Vitamin B12 deficiency contributing  - Also found so far to have slightly high T4, and low TSH  - Of note has hepatomegaly (albeit stable) and increased alk phos on admission.  - Exam with patchy sensory impairment, with clear impairment in proprioception in great toes. Ataxia/motor planning difficulties. Clearly has impaired balance - this could be exacerbated by the B12 deficiency as well. - Recommend outpatient Neuro/Senior Care follow-up  - Consult Endocrine to comment on TSH/T4 - adjustment of Levothyroxine potentially vs. In setting of recent hospitalization/fall.  - Follow-up work-up: B1, SPEP   - Normal: A1C, MRI C-Spine, T-Spine shows no cord compression/cord signal abnormality  - Added: Folate, B6, UA as recommended by Geriatrics   - Treatment: PT/OT/SLP 3-5 hours/day, 5-7 days/week. See Vitamin B12 deficiency   - Discussed polypharmacy   - Will not scheduled alprazolam at this time, as so far seems to be tolerating (did not take it during hospitalization). Watch closely for signs of withdrawal. Did receive a dose last night. - Will try to decrease dose of alprazolam, but anticipate slow wean. - Tonight will decrease Flexeril to 5mg - and try to wean off. Monitor pain. For tonight, given that she has tried to get up a few times already, will have a 1:1. Will try to wean while here."  - Patient was trying to get up and still high fall risk - recommend 1:1 continuing for now      Other Medical Issues:  • VTE prophylaxis - SCDs, mobilize, and lovenox    Objective:     Allergies per EMR  Diagnostic Studies: Reviewed, no new imaging  No orders to display     See above as well    Laboratory: Labs reviewed  Results from last 7 days   Lab Units 08/17/23  0444 08/16/23  0602 08/15/23  1247   HEMOGLOBIN g/dL 12.0 13.1 12.4   HEMATOCRIT % 36.2 39.7 37.6   WBC Thousand/uL 5.06 5.38 5.31     Results from last 7 days   Lab Units 08/17/23  0444 08/16/23  0602 08/15/23  1247   BUN mg/dL 14 10 12   SODIUM mmol/L 136 136 134*   POTASSIUM mmol/L 4.4 4.8 3.9   CHLORIDE mmol/L 104 103 103   CREATININE mg/dL 0.74 0.85 0.85   AST U/L  --   --  15   ALT U/L  --   --  15     Results from last 7 days   Lab Units 08/16/23  0602   PROTIME seconds 15.2*   INR  1.14        Drug regimen reviewed, all potential adverse effects identified and addressed:    Scheduled Meds:  Current Facility-Administered Medications   Medication Dose Route Frequency Provider Last Rate   • acetaminophen  975 mg Oral [facility] Royal Heavenly MD     • ALPRAZolam  1 mg Oral HS PRN Royal Heavenly MD     • atenolol  50 mg Oral Daily Royal Heavenly MD     • atorvastatin  10 mg Oral Daily With Karina Toscano MD     • bisacodyl  5 mg Oral Daily PRN Royal Heavenly MD     • bisacodyl  10 mg Rectal Daily PRN Royal Heavenly MD     • bisacodyl  10 mg Rectal Once Senait Thomas MD     • calcium carbonate  1 tablet Oral BID With Meals Marisol Aceves MD     • cholecalciferol  2,000 Units Oral Daily Marisol Aceves MD     • cyanocobalamin  1,000 mcg Intramuscular Every Other Day Royal Heavenly MD     • cyclobenzaprine  5 mg Oral HS Royal Heavenly MD     • enoxaparin  30 mg Subcutaneous Q12H Ifrah Ravi MD     • FLUoxetine  20 mg Oral Daily Royal Heavenly MD     • folic acid  1 mg Oral Daily Royal Heavenly MD     • hydrALAZINE  25 mg Oral Q8H PRN DARIO Pond     • levothyroxine  88 mcg Oral Early Morning Marisol Aceves MD     • lidocaine  1 patch Topical Daily Royal Heavenly MD     • [START ON 8/23/2023] methotrexate  20 mg Oral Weekly Royal Heavenly MD     • oxyCODONE  5 mg Oral Q4H PRN Royal Heavenly MD     • oxyCODONE  2.5 mg Oral Q4H PRN Royal Heavenly MD     • polyethylene glycol  17 g Oral Daily Royal Heavenly MD     • predniSONE  5 mg Oral HS Royal Heavenly MD     • QUEtiapine  12.5 mg Oral HS PRN Royal Heavenly MD     • senna  1 tablet Oral BID Senait Thomas MD         Chief Complaints:  Rehab follow-up     Subjective: On eval, patient reports slight increased urinary urgency from baseline without dysuria, fever, chills, SOB, increased weakness. She notes some overall tiredness however. ROS: A 10 point ROS was performed; negative except as noted above.        Physical Exam:  08/19/23 1354 97.6 °F (36.4 °C) 65 18 132/68 -- 98 % None (Room air) Ly     Vitals above reviewed on date of encounter      GEN:  Lying in bed  HEENT/NECK: MMM, slight oral dyskinesia  CARDIAC: Regular rate rhythm, no murmers, no rubs, no gallops  LUNGS:  clear to auscultation, no wheezes, rales, or rhonchi  ABDOMEN: Soft, non-tender, non-distended, normal active bowel sounds  EXTREMITIES/SKIN:  no calf edema, no calf tenderness to palpation  NEURO:   MENTAL STATUS: Oriented to self not fully to place or year; able to follow most simple commands and Strength/MMT:  BUE 4-5/5, BLE prox 4-/4 distal 5-/5  PSYCH:  Affect:  Slightly restless      ** Please Note: Fluency Direct voice to text software may have been used in the creation of this document. **    I personally performed the required components and examined the patient myself in person on 8/19/23.

## 2023-08-20 NOTE — PLAN OF CARE
Problem: PAIN - ADULT  Goal: Verbalizes/displays adequate comfort level or baseline comfort level  Description: Interventions:  - Encourage patient to monitor pain and request assistance  - Assess pain using appropriate pain scale  - Administer analgesics based on type and severity of pain and evaluate response  - Implement non-pharmacological measures as appropriate and evaluate response  - Consider cultural and social influences on pain and pain management  - Notify physician/advanced practitioner if interventions unsuccessful or patient reports new pain  Outcome: Progressing     Problem: INFECTION - ADULT  Goal: Absence or prevention of progression during hospitalization  Description: INTERVENTIONS:  - Assess and monitor for signs and symptoms of infection  - Monitor lab/diagnostic results  - Monitor all insertion sites, i.e. indwelling lines, tubes, and drains  - Monitor endotracheal if appropriate and nasal secretions for changes in amount and color  - [place] appropriate cooling/warming therapies per order  - Administer medications as ordered  - Instruct and encourage patient and family to use good hand hygiene technique  - Identify and instruct in appropriate isolation precautions for identified infection/condition  Outcome: Progressing     Problem: SAFETY ADULT  Goal: Patient will remain free of falls  Description: INTERVENTIONS:  - Educate patient/family on patient safety including physical limitations  - Instruct patient to call for assistance with activity   - Consult OT/PT to assist with strengthening/mobility   - Keep Call bell within reach  - Keep bed low and locked with side rails adjusted as appropriate  - Keep care items and personal belongings within reach  - Initiate and maintain comfort rounds  - Make Fall Risk Sign visible to staff  - Offer Toileting every 2 Hours, in advance of need  - Initiate/Maintain bed/chair alarm  - Obtain necessary fall risk management equipment: alarms; 1:1 cont observation  - Apply yellow socks and bracelet for high fall risk patients  - Consider moving patient to room near nurses station  Outcome: Progressing  Goal: Maintain or return to baseline ADL function  Description: INTERVENTIONS:  -  Assess patient's ability to carry out ADLs; assess patient's baseline for ADL function and identify physical deficits which impact ability to perform ADLs (bathing, care of mouth/teeth, toileting, grooming, dressing, etc.)  - Assess/evaluate cause of self-care deficits   - Assess range of motion  - Assess patient's mobility; develop plan if impaired  - Assess patient's need for assistive devices and provide as appropriate  - Encourage maximum independence but intervene and supervise when necessary  - Involve family in performance of ADLs  - Assess for home care needs following discharge   - Consider OT consult to assist with ADL evaluation and planning for discharge  - Provide patient education as appropriate  Outcome: Progressing  Goal: Maintains/Returns to pre admission functional level  Description: INTERVENTIONS:  - Perform BMAT or MOVE assessment daily.   - Set and communicate daily mobility goal to care team and patient/family/caregiver. - Collaborate with rehabilitation services on mobility goals if consulted  - Perform Range of Motion 3 times a day. - Reposition patient every 2 hours.   - Dangle patient 3 times a day  - Stand patient 3 times a day  - Ambulate patient 3 times a day  - Out of bed to chair 3 times a day   - Out of bed for meals 3 times a day  - Out of bed for toileting  - Record patient progress and toleration of activity level   Outcome: Progressing     Problem: DISCHARGE PLANNING  Goal: Discharge to home or other facility with appropriate resources  Description: INTERVENTIONS:  - Identify barriers to discharge w/patient and caregiver  - Arrange for needed discharge resources and transportation as appropriate  - Identify discharge learning needs (meds, wound care, etc.)  - Arrange for interpretive services to assist at discharge as needed  - Refer to Case Management Department for coordinating discharge planning if the patient needs post-hospital services based on physician/advanced practitioner order or complex needs related to functional status, cognitive ability, or social support system  Outcome: Progressing     Problem: Prexisting or High Potential for Compromised Skin Integrity  Goal: Skin integrity is maintained or improved  Description: INTERVENTIONS:  - Identify patients at risk for skin breakdown  - Assess and monitor skin integrity  - Assess and monitor nutrition and hydration status  - Monitor labs   - Assess for incontinence   - Turn and reposition patient  - Assist with mobility/ambulation  - Relieve pressure over bony prominences  - Avoid friction and shearing  - Provide appropriate hygiene as needed including keeping skin clean and dry  - Evaluate need for skin moisturizer/barrier cream  - Collaborate with interdisciplinary team   - Patient/family teaching  - Consider wound care consult   Outcome: Progressing

## 2023-08-21 PROBLEM — M81.0 OSTEOPOROSIS: Status: ACTIVE | Noted: 2023-08-21

## 2023-08-21 PROBLEM — N30.00 ACUTE CYSTITIS WITHOUT HEMATURIA: Status: ACTIVE | Noted: 2023-08-18

## 2023-08-21 LAB
25(OH)D3 SERPL-MCNC: 26.6 NG/ML (ref 30–100)
ALBUMIN SERPL BCP-MCNC: 3.5 G/DL (ref 3.5–5)
ALP SERPL-CCNC: 244 U/L (ref 46–116)
ALT SERPL W P-5'-P-CCNC: 103 U/L (ref 12–78)
AMMONIA PLAS-SCNC: 18 UMOL/L (ref 11–35)
ANION GAP SERPL CALCULATED.3IONS-SCNC: 8 MMOL/L
AST SERPL W P-5'-P-CCNC: 63 U/L (ref 5–45)
BACTERIA UR CULT: ABNORMAL
BACTERIA UR CULT: ABNORMAL
BASOPHILS # BLD AUTO: 0.01 THOUSANDS/ÂΜL (ref 0–0.1)
BASOPHILS NFR BLD AUTO: 0 % (ref 0–1)
BILIRUB SERPL-MCNC: 0.84 MG/DL (ref 0.2–1)
BUN SERPL-MCNC: 13 MG/DL (ref 5–25)
CALCIUM SERPL-MCNC: 10.3 MG/DL (ref 8.3–10.1)
CHLORIDE SERPL-SCNC: 103 MMOL/L (ref 96–108)
CO2 SERPL-SCNC: 24 MMOL/L (ref 21–32)
CREAT SERPL-MCNC: 0.76 MG/DL (ref 0.6–1.3)
EOSINOPHIL # BLD AUTO: 0.01 THOUSAND/ÂΜL (ref 0–0.61)
EOSINOPHIL NFR BLD AUTO: 0 % (ref 0–6)
ERYTHROCYTE [DISTWIDTH] IN BLOOD BY AUTOMATED COUNT: 13.1 % (ref 11.6–15.1)
GFR SERPL CREATININE-BSD FRML MDRD: 80 ML/MIN/1.73SQ M
GLUCOSE SERPL-MCNC: 107 MG/DL (ref 65–140)
HCT VFR BLD AUTO: 37.6 % (ref 34.8–46.1)
HGB BLD-MCNC: 12.9 G/DL (ref 11.5–15.4)
IMM GRANULOCYTES # BLD AUTO: 0.01 THOUSAND/UL (ref 0–0.2)
IMM GRANULOCYTES NFR BLD AUTO: 0 % (ref 0–2)
LYMPHOCYTES # BLD AUTO: 0.25 THOUSANDS/ÂΜL (ref 0.6–4.47)
LYMPHOCYTES NFR BLD AUTO: 6 % (ref 14–44)
MCH RBC QN AUTO: 33 PG (ref 26.8–34.3)
MCHC RBC AUTO-ENTMCNC: 34.3 G/DL (ref 31.4–37.4)
MCV RBC AUTO: 96 FL (ref 82–98)
MONOCYTES # BLD AUTO: 0.2 THOUSAND/ÂΜL (ref 0.17–1.22)
MONOCYTES NFR BLD AUTO: 5 % (ref 4–12)
NEUTROPHILS # BLD AUTO: 3.42 THOUSANDS/ÂΜL (ref 1.85–7.62)
NEUTS SEG NFR BLD AUTO: 89 % (ref 43–75)
NRBC BLD AUTO-RTO: 0 /100 WBCS
PLATELET # BLD AUTO: 215 THOUSANDS/UL (ref 149–390)
PMV BLD AUTO: 9.5 FL (ref 8.9–12.7)
POTASSIUM SERPL-SCNC: 3.8 MMOL/L (ref 3.5–5.3)
PROT SERPL-MCNC: 8.8 G/DL (ref 6.4–8.4)
RBC # BLD AUTO: 3.91 MILLION/UL (ref 3.81–5.12)
SODIUM SERPL-SCNC: 135 MMOL/L (ref 135–147)
WBC # BLD AUTO: 3.9 THOUSAND/UL (ref 4.31–10.16)

## 2023-08-21 PROCEDURE — 97530 THERAPEUTIC ACTIVITIES: CPT

## 2023-08-21 PROCEDURE — 97110 THERAPEUTIC EXERCISES: CPT

## 2023-08-21 PROCEDURE — 84207 ASSAY OF VITAMIN B-6: CPT | Performed by: PHYSICAL MEDICINE & REHABILITATION

## 2023-08-21 PROCEDURE — 97116 GAIT TRAINING THERAPY: CPT

## 2023-08-21 PROCEDURE — 99232 SBSQ HOSP IP/OBS MODERATE 35: CPT | Performed by: PHYSICAL MEDICINE & REHABILITATION

## 2023-08-21 PROCEDURE — 97129 THER IVNTJ 1ST 15 MIN: CPT

## 2023-08-21 PROCEDURE — 97130 THER IVNTJ EA ADDL 15 MIN: CPT

## 2023-08-21 PROCEDURE — 82306 VITAMIN D 25 HYDROXY: CPT | Performed by: PHYSICAL MEDICINE & REHABILITATION

## 2023-08-21 PROCEDURE — 80053 COMPREHEN METABOLIC PANEL: CPT | Performed by: PHYSICAL MEDICINE & REHABILITATION

## 2023-08-21 PROCEDURE — 82140 ASSAY OF AMMONIA: CPT | Performed by: PHYSICAL MEDICINE & REHABILITATION

## 2023-08-21 PROCEDURE — 85025 COMPLETE CBC W/AUTO DIFF WBC: CPT | Performed by: NURSE PRACTITIONER

## 2023-08-21 PROCEDURE — 97112 NEUROMUSCULAR REEDUCATION: CPT

## 2023-08-21 PROCEDURE — 97535 SELF CARE MNGMENT TRAINING: CPT

## 2023-08-21 PROCEDURE — 99232 SBSQ HOSP IP/OBS MODERATE 35: CPT | Performed by: INTERNAL MEDICINE

## 2023-08-21 RX ORDER — ACETAMINOPHEN 325 MG/1
650 TABLET ORAL EVERY 12 HOURS
Status: DISCONTINUED | OUTPATIENT
Start: 2023-08-21 | End: 2023-08-30 | Stop reason: HOSPADM

## 2023-08-21 RX ORDER — METHOCARBAMOL 500 MG/1
250 TABLET, FILM COATED ORAL EVERY 6 HOURS SCHEDULED
Status: DISCONTINUED | OUTPATIENT
Start: 2023-08-21 | End: 2023-08-24

## 2023-08-21 RX ORDER — ONDANSETRON 4 MG/1
4 TABLET, ORALLY DISINTEGRATING ORAL ONCE
Status: COMPLETED | OUTPATIENT
Start: 2023-08-21 | End: 2023-08-21

## 2023-08-21 RX ORDER — ONDANSETRON 4 MG/1
4 TABLET, ORALLY DISINTEGRATING ORAL EVERY 6 HOURS PRN
Status: DISCONTINUED | OUTPATIENT
Start: 2023-08-21 | End: 2023-08-30 | Stop reason: HOSPADM

## 2023-08-21 RX ORDER — CEPHALEXIN 500 MG/1
500 CAPSULE ORAL EVERY 8 HOURS SCHEDULED
Status: COMPLETED | OUTPATIENT
Start: 2023-08-21 | End: 2023-08-26

## 2023-08-21 RX ORDER — AMLODIPINE BESYLATE 5 MG/1
5 TABLET ORAL DAILY
Status: DISCONTINUED | OUTPATIENT
Start: 2023-08-21 | End: 2023-08-23

## 2023-08-21 RX ORDER — ALPRAZOLAM 0.5 MG/1
0.5 TABLET ORAL
Status: DISCONTINUED | OUTPATIENT
Start: 2023-08-21 | End: 2023-08-28

## 2023-08-21 RX ADMIN — LEVOTHYROXINE SODIUM 88 MCG: 88 TABLET ORAL at 06:20

## 2023-08-21 RX ADMIN — SENNOSIDES 8.6 MG: 8.6 TABLET, FILM COATED ORAL at 17:30

## 2023-08-21 RX ADMIN — AMLODIPINE BESYLATE 5 MG: 5 TABLET ORAL at 16:29

## 2023-08-21 RX ADMIN — ATORVASTATIN CALCIUM 10 MG: 10 TABLET, FILM COATED ORAL at 16:29

## 2023-08-21 RX ADMIN — ENOXAPARIN SODIUM 30 MG: 30 INJECTION SUBCUTANEOUS at 22:12

## 2023-08-21 RX ADMIN — ATENOLOL 50 MG: 50 TABLET ORAL at 08:44

## 2023-08-21 RX ADMIN — Medication 2000 UNITS: at 08:44

## 2023-08-21 RX ADMIN — CEPHALEXIN 500 MG: 500 CAPSULE ORAL at 13:25

## 2023-08-21 RX ADMIN — ACETAMINOPHEN 975 MG: 325 TABLET, FILM COATED ORAL at 06:20

## 2023-08-21 RX ADMIN — SENNOSIDES 8.6 MG: 8.6 TABLET, FILM COATED ORAL at 08:44

## 2023-08-21 RX ADMIN — CEPHALEXIN 500 MG: 500 CAPSULE ORAL at 22:13

## 2023-08-21 RX ADMIN — FOLIC ACID 1 MG: 1 TABLET ORAL at 08:44

## 2023-08-21 RX ADMIN — PREDNISONE 5 MG: 5 TABLET ORAL at 22:12

## 2023-08-21 RX ADMIN — METHOCARBAMOL 250 MG: 500 TABLET ORAL at 11:41

## 2023-08-21 RX ADMIN — ENOXAPARIN SODIUM 30 MG: 30 INJECTION SUBCUTANEOUS at 08:44

## 2023-08-21 RX ADMIN — Medication 1 TABLET: at 16:29

## 2023-08-21 RX ADMIN — ACETAMINOPHEN 650 MG: 325 TABLET, FILM COATED ORAL at 17:30

## 2023-08-21 RX ADMIN — ONDANSETRON 4 MG: 4 TABLET, ORALLY DISINTEGRATING ORAL at 09:27

## 2023-08-21 RX ADMIN — FLUOXETINE 20 MG: 20 CAPSULE ORAL at 08:44

## 2023-08-21 RX ADMIN — Medication 1 TABLET: at 08:44

## 2023-08-21 RX ADMIN — METHOCARBAMOL 250 MG: 500 TABLET ORAL at 17:30

## 2023-08-21 RX ADMIN — LIDOCAINE 5% 1 PATCH: 700 PATCH TOPICAL at 08:43

## 2023-08-21 NOTE — PLAN OF CARE
Problem: PAIN - ADULT  Goal: Verbalizes/displays adequate comfort level or baseline comfort level  Description: Interventions:  - Encourage patient to monitor pain and request assistance  - Assess pain using appropriate pain scale  - Administer analgesics based on type and severity of pain and evaluate response  - Implement non-pharmacological measures as appropriate and evaluate response  - Consider cultural and social influences on pain and pain management  - Notify physician/advanced practitioner if interventions unsuccessful or patient reports new pain  Outcome: Progressing     Problem: INFECTION - ADULT  Goal: Absence or prevention of progression during hospitalization  Description: INTERVENTIONS:  - Assess and monitor for signs and symptoms of infection  - Monitor lab/diagnostic results  - Monitor all insertion sites, i.e. indwelling lines, tubes, and drains  - Monitor endotracheal if appropriate and nasal secretions for changes in amount and color  - [place] appropriate cooling/warming therapies per order  - Administer medications as ordered  - Instruct and encourage patient and family to use good hand hygiene technique  - Identify and instruct in appropriate isolation precautions for identified infection/condition  Outcome: Progressing     Problem: SAFETY ADULT  Goal: Patient will remain free of falls  Description: INTERVENTIONS:  - Educate patient/family on patient safety including physical limitations  - Instruct patient to call for assistance with activity   - Consult OT/PT to assist with strengthening/mobility   - Keep Call bell within reach  - Keep bed low and locked with side rails adjusted as appropriate  - Keep care items and personal belongings within reach  - Initiate and maintain comfort rounds  - Make Fall Risk Sign visible to staff  - Offer Toileting every 2 Hours, in advance of need  - Initiate/Maintain bed alarm  - Obtain necessary fall risk management equipment: bed alarm  - Apply yellow socks and bracelet for high fall risk patients  - Consider moving patient to room near nurses station  Outcome: Progressing  Goal: Maintain or return to baseline ADL function  Description: INTERVENTIONS:  -  Assess patient's ability to carry out ADLs; assess patient's baseline for ADL function and identify physical deficits which impact ability to perform ADLs (bathing, care of mouth/teeth, toileting, grooming, dressing, etc.)  - Assess/evaluate cause of self-care deficits   - Assess range of motion  - Assess patient's mobility; develop plan if impaired  - Assess patient's need for assistive devices and provide as appropriate  - Encourage maximum independence but intervene and supervise when necessary  - Involve family in performance of ADLs  - Assess for home care needs following discharge   - Consider OT consult to assist with ADL evaluation and planning for discharge  - Provide patient education as appropriate  Outcome: Progressing  Goal: Maintains/Returns to pre admission functional level  Description: INTERVENTIONS:  - Perform BMAT or MOVE assessment daily.   - Set and communicate daily mobility goal to care team and patient/family/caregiver. - Collaborate with rehabilitation services on mobility goals if consulted  - Perform Range of Motion 3 times a day. - Reposition patient every 2 hours.   - Dangle patient 3 times a day  - Stand patient 3 times a day  - Ambulate patient 3 times a day  - Out of bed to chair 3 times a day   - Out of bed for meals 3 times a day  - Out of bed for toileting  - Record patient progress and toleration of activity level   Outcome: Progressing     Problem: DISCHARGE PLANNING  Goal: Discharge to home or other facility with appropriate resources  Description: INTERVENTIONS:  - Identify barriers to discharge w/patient and caregiver  - Arrange for needed discharge resources and transportation as appropriate  - Identify discharge learning needs (meds, wound care, etc.)  - Arrange for interpretive services to assist at discharge as needed  - Refer to Case Management Department for coordinating discharge planning if the patient needs post-hospital services based on physician/advanced practitioner order or complex needs related to functional status, cognitive ability, or social support system  Outcome: Progressing     Problem: Prexisting or High Potential for Compromised Skin Integrity  Goal: Skin integrity is maintained or improved  Description: INTERVENTIONS:  - Identify patients at risk for skin breakdown  - Assess and monitor skin integrity  - Assess and monitor nutrition and hydration status  - Monitor labs   - Assess for incontinence   - Turn and reposition patient  - Assist with mobility/ambulation  - Relieve pressure over bony prominences  - Avoid friction and shearing  - Provide appropriate hygiene as needed including keeping skin clean and dry  - Evaluate need for skin moisturizer/barrier cream  - Collaborate with interdisciplinary team   - Patient/family teaching  - Consider wound care consult   Outcome: Progressing

## 2023-08-21 NOTE — PROGRESS NOTES
Physical Medicine and Rehabilitation Progress Note  Sam Fine 70 y.o. female MRN: [id number]  Unit/Bed#: -01 Encounter: [id number]    To Review: Tish Reddy is a 70 y.o. female with Sjogrens and RA (on methotrexate), HTN, hypothyroidism, gastroparesis, depression, previous mild cognitive impairment ([facility] 17/30 in 2021), history of meningioma s/p resection in 2012, pelvic fracture after fall down stairs in 2021, who presented to the [facility] 8/15 after falling backwards and landing on the ground 6 days prior. She had headaches and severe back pain and went to the hospital. She also seemed to have multiple more falls. She was found to have multiple R rib fractures (3-5). CTH was unremarkable. There was a question of possible movement disorder and Neurology was consulted who ordered MRI Brain, C-Spine and T-Spine. Of note she is on Xanax and Flexeril chronically (mostly taken during bedtime). Geriatrics recommended a UA which was not done inpatient. She was treated for MASD as well. MRI Brain showed moderate chronic microangiopathy, significantly increased, C-Spine without cord compression or cord signal abnormality, and T-Spine with a mild/acte T8 compression fracture without cord compression or cord signal abnormality. She most likely, per Neurology has a Vascular Dementia. There was also some discussion of possible PSP. Her  had also reported that it didn't seem the patient was keeping track of her medications properly, and certainly with her medication regimen is at risk for polypharmacy. Neurosurgery was consulted for her T8 compression fracture, and ordered TLSO when OOB/ > 45 degrees and follow-up in 2 weeks.  She has a Vitamin B1 pending, SPEP pending,  TSH was 0.314 with T4 1.29, A1C was 5.6, and B12 was 153 (she was started on 5 doses of Vitamin B12 - first dose given today, and to be given every other day, followed by daily 1000mcg Vitamin B12). She was admitted to the [facility] on 8/18/2023. Chief Complaint: Back pain. Interval History/Subjective:  No major events over the weekend. Has been trying to get up through the weekend and 1:1 was kept on for this reason. Today started frequent checks - family notes so far during the day, she hasn't tried to get up although she is restless at times. Tolerating low dose robaxin so far. Stopped her flexeril. Has only used oxycodone once or twice a day at 5mg. Has not used xanax. No lightheadedness, dizziness, CP, SOB, fevers, chills, N/V, abdominal pain. Last BM 8/20     ROS:  A 10 point review of systems was negative except for what is noted in the HPI. Today's Changes:  1. Stop flexeril - switch to robaxin low dose and monitor response. 2. LFTs elevated - check ammonia (18), GGT with repeat LFTs. Decrease Tylenol to 650mg Q12hr.   - Has known hepatomegaly and no gallbladder, and currently asymptomatic. Hold on RUQ US at this time. 3. IM added Norvasc daily. 4. Reviewed results of some of her work-up with family. Started her on Keflex today for UCx from admission results in setting of increased confusion and urgency. 5. Appreciate Endo recs - checking reverse T3, and reducing levothyroxine to 88mcg with plan to repaet labs in 4-6 weeks. 6. Severe osteoporosis - Appreciate recs re: Calcium carbonate twice daily an Vitamin D3 supplementation daily. Total visit time: 35 minutes, with more than 50% spent counseling/coordinating care. Counseling includes discussion with patient re: progress in therapies, functional issues observed by therapy staff, and discussion with patient regarding their current medical state and wellbeing. Coordination of patient's care was performed in conjunction with Internal Medicine service to monitor patient's labs, vitals, and management of their comorbidities.     Assessment/Plan:    * Progressive cognitive dysfunction  Assessment & Plan   reports progressive cognitive decline that accelerated the past two weeks  - Likely vascular dementia given increased moderate chronic microangiopathy  - ? Amyloid angiopathy per radiology  - Cognitive impairment resulting in mismanagement of her medications - which include steroids, xanax, flexeril, prozac with polypharmacy contributing - particularly since she takes the Flexeril/Xanax at night and that is when most of her falls were occurring.   - Significant Vitamin B12 deficiency contributing  - Also found so far to have slightly high T4, and low TSH  - Of note has hepatomegaly (albeit stable) and increased alk phos on admission. Ammonia level on admission was normal (18)   - UA also positive on admission for E.coli UTI.   - Exam with patchy sensory impairment, with clear impairment in proprioception in great toes. Ataxia/motor planning difficulties. Clearly has impaired balance - this could be exacerbated by the B12 deficiency as well. - Recommend outpatient Neuro/Senior Care follow-up  - Consult Endocrine to comment on TSH/T4 - adjustment of Levothyroxine potentially vs. In setting of recent hospitalization/fall.  - Follow-up work-up: B1   - Normal: A1C, MRI C-Spine, T-Spine shows no cord compression/cord signal abnormality, Ammonia level, SPEP  - Added: Folate, B6,   - Treatment: PT/OT/SLP 3-5 hours/day, 5-7 days/week. See Vitamin B12 deficiency, UTI   - Discussed polypharmacy   - Will not schedule alprazolam at this time, as so far seems to be tolerating (did not take it during hospitalization). Watch closely for signs of withdrawal. Decreased PRN dose. If she does not use while here, will discontinue. - I find robaxin a better tolerated muscle relaxer, can trial here. Stopped flexeril. Will try frequent checks today. Discussed with family, if they notice she tries to get up frequently, low threshold to resume 1:1.      Compression fracture of T8 vertebra (HCC)  Assessment & Plan  In the past has broken her pelvis after a fall  Chronically on steroids, and likely should have DXA and osteoporosis work-up   - Outpatient f/u with Endocrinology - appreciate recs, patient likely with severe osteoporosis   - Vitamin D 26.6 - started on 2000 units daily    - started on calcium supplement by Endocrinology  Pain control as below  TLSO when OOB and HOB > 45 degrees. Thoracic spine precautions  Plan for repeat XR ~9/1 with follow-up with Neurosurgery at that time. Vitamin B12 deficiency  Assessment & Plan  8/17 level 153  8/18 received 1000mcg IM shot  - Will give 4 more doses every other day starting on 8/20.  - Then start 1000mcg daily on 8/27  - Monitor proprioception, sensation, ataxia/motor planning.   - Outpatient f/u with Neurology  - Nutrition consult to evaluate dietary intake. Closed fracture of multiple ribs of right side  Assessment & Plan  2/2 fall  R 3-5  Currently on RA  Pain control as below  Incentive spirometry. Osteoporosis  Assessment & Plan  Chronically on steroids, and likely should have DXA and osteoporosis work-up   - Consulted endocrine on admission.    - Vitamin D 26.6 - started on 2000 units daily    - started on calcium supplement by Endocrinology  - Outpatient f/u with endocrine   - She will benefit from getting DEXA scan as outpatient and will qualify for anabolic agents for treatment as outpt    Acute cystitis without hematuria  Assessment & Plan  She reports episodes of urgency at times  - UA with pan susceptible E.coli 100k CFU   - 8/21 started on Keflex for 5 days   -  reports a pattern of patient holding her urine until the last minute and then rushing to the bathroom  - PVRs have been low continued timed voids    Abnormal CT of the abdomen  Assessment & Plan  1. Stable hepatomegaly - checking CMP in the AM. Alk phos elevated on admission, but stable. If still elevated, will add GGT given recent T8 fracture. Currently on Tylenol Q8hrs scheduled. May need to decrease dose.    2. Stable R renal cyst    Outpatient f/u with PCP    Adenopathy  Assessment & Plan  In the past has had "stable distal paraesophageal and gastrohepatic ligament adenopathy of uncertain etiology""  Here also noted to have R retrocrural lymph node adjacent to the descending thoracic aorta, juts proximal to the diaphragmatic hiatus - recommended for CT of the chest in 3-6 months to assess for stability. Onychomycosis  Assessment & Plan  Consult podiatry for very overgrown toenails  Appreciate their debridement. Oral dyskinesia  Assessment & Plan  This is stable and was noted in her [facility] admission, where it was felt to be due to reglan. She is no longer on this medication. Patient attributes to her Sjogrens. Would not add additional medication to address this at this time. Can f/u with Neurology outpatient    Gastroparesis  Assessment & Plan  Was previously on reglan, does not appear to be on this medication at this time. May need small, more frequent meals  Monitor for symptoms. Hypothyroidism  Assessment & Plan  8/17 TSH 0.314 and T4 1.29  Previously appeared to be on 88-75mcg daily. Not sure when 100mcg was initiated and by who. Consulted endocrine to comment on adjusting dose vs. In setting of recent fall/injury/hospitalization   - Reverse T3 pending, and dose decreased to 88mcg daliy   - Recheck thyroid panel in 4-6 weeks. Anxiety and depression  Assessment & Plan  Currently on prozac 20mg daily  Also on xanax 1mg QHS PRN chronically, which it seems she was taking scheduled at home. Has not been on this only PRN here. - Monitor for now PRN - but monitor closely for any symptoms of withdrawal or increased agitation off this medication.   - Decreased dose to 0.5mg QHS PRN. - Will not make scheduled at this time.    Question of this contributed to her worsening falls (as she was likely not managing her meds well at home given her cognitive impairment)  Would recommend a wean of this medication over time.   That being said, this medication is also not showing up in her PDMP. Hypertension  Assessment & Plan  Home: Atenolol 50mg daily, Losartan 25mg daily  Here: Atenolol 50mg daily, Norvasc 5mg daily, Hydralazine PRN  Monitor and adjust as appropriate  IM consulted and assisting with management. Sjogren's disease [facility])  Assessment & Plan  Follows with Dr. Guilherme Bill outpatient  Chronically on prednisone and methotrexate. Continue for now. Health Maintenance  #Pain: Tylenol Q8hrs scheduled, Flexeril (will decrease to 5mg QHS), Lidoderm patch, Oxycodone 2.5-5mg (will try to de-escalate sooner rather than later). #Bowel: Last BM 8/20. Miralax to daily. Added Bisacodyl suppository and tabs PRN. #Bladder: Voiding and appears to be continent. PVR x3 low. #Skin/Pressure Injury Prevention: Turn Q2hr in bed, with weight shifts [id number] in wheelchair. Float heels in bed. #DVT Prophylaxis: Lovenox, SCDs. #GI Prophylaxis: While on lovenox, given that she is also on prozac, and on prednisone chronically, will add protonix 20mg daily. #Code Status:  DNR/DNI  #FEN: Regular/Thins  #Dispo:  ELOS 10-14 days - will need to get up a flight of stairs potentially.  may be able to provide some supervision, anticipate at least supervision level. With thoracic spine and brace may need assistance with some ADLs. #F/U: PCP, Neuro, Geriatrics, Rheumatology,       Objective:    Functional Update:  PT: min-modA transfers, min-modA bed mobility, min-modA ambulation 150'  OT: Ebony ADLs  SLP: CLQT 1.4 out of 4 -severely impaired cognition. Ebony for expression/social interaction, but modA for comprehension, and maxA for problem solving/memory.      Allergies per EMR    Physical Exam:  Temp:  [97.6 °F (36.4 °C)-98.2 °F (36.8 °C)] 97.6 °F (36.4 °C)  HR:  [64-73] 72  Resp:  [15-20] 18  BP: (137-188)/(77-98) 137/77  Oxygen Therapy  SpO2: 97 %    Gen: No acute distress  HEENT: Moist mucus membranes  Cardiovascular: Regular rate, rhythm, S1/S2. Distal pulses palpable  Heme/Extr: No edema  Pulmonary: Non-labored breathing  : No medeiros  GI: Soft, non-tender, non-distended. MSK: Wearing TLSO  Integumentary: Skin is warm, dry. Neuro: Awake, alert - oriented to self, but not place - typically thinks she is at [facility]. She is redirectable, restless. She has impaired insight, judgment, executive function. Psych: Normal mood and affect. Diagnostic Studies: reviewed, no new imaging  No results found.     Laboratory:  Reviewed   Results from last 7 days   Lab Units 08/21/23  0532 08/17/23  0444 08/16/23  0602   HEMOGLOBIN g/dL 12.9 12.0 13.1   HEMATOCRIT % 37.6 36.2 39.7   WBC Thousand/uL 3.90* 5.06 5.38     Results from last 7 days   Lab Units 08/21/23  0532 08/17/23  0444 08/16/23  0602 08/15/23  1247   BUN mg/dL 13 14 10 12   POTASSIUM mmol/L 3.8 4.4 4.8 3.9   CHLORIDE mmol/L 103 104 103 103   CREATININE mg/dL 0.76 0.74 0.85 0.85   AST U/L 63*  --   --  15   ALT U/L 103*  --   --  15     Results from last 7 days   Lab Units 08/16/23  0602   PROTIME seconds 15.2*   INR  1.14        Patient Active Problem List   Diagnosis   • Parotid gland enlargement   • Sjogren's disease (HCC)   • Closed nondisplaced fracture of anterior wall of right acetabulum (HCC)   • Closed nondisplaced fracture of right pubis (HCC)   • Fall   • Hypertension   • Pain of right upper extremity   • Multiple closed fractures of pelvis (HCC)   • Anxiety and depression   • Hypothyroidism   • Abnormal findings on imaging test   • Gastroparesis   • Oral dyskinesia   • Potential for cognitive impairment   • Anemia   • Hypokalemia   • WELLS (dyspnea on exertion)   • Lightheadedness   • Pain   • Abnormal ECG   • Chest pain   • Pleural effusion on right   • Atelectasis of right lung   • Low HDL (under 40)   • Dermatitis associated with moisture   • Onychomycosis   • Enlarged and hypertrophic nails   • Adenopathy   • Abnormal CT of the abdomen   • Hyponatremia   • Progressive cognitive dysfunction   • Fungal skin infection   • Rheumatoid arteritis (HCC)   • Closed fracture of multiple ribs of right side   • Movement disorder   • Vitamin B12 deficiency   • Compression fracture of T8 vertebra (HCC)   • Urinary urgency         Medications  Current Facility-Administered Medications   Medication Dose Route Frequency Provider Last Rate   • acetaminophen  975 mg Oral [facility] Edis Coles MD     • ALPRAZolam  1 mg Oral HS PRN Edis Coles MD     • atenolol  50 mg Oral Daily Edis Coles MD     • atorvastatin  10 mg Oral Daily With Delbert Wolfe MD     • bisacodyl  5 mg Oral Daily PRN Edis Coles MD     • bisacodyl  10 mg Rectal Daily PRN Edis Coles MD     • bisacodyl  10 mg Rectal Once Sandra Aly MD     • calcium carbonate  1 tablet Oral BID With Meals Geoffrey Erazo MD     • cephalexin  500 mg Oral [facility] Edis Coles MD     • cholecalciferol  2,000 Units Oral Daily Geoffrey Erazo MD     • cyanocobalamin  1,000 mcg Intramuscular Every Other Day Edis Coles MD     • cyclobenzaprine  5 mg Oral HS Edis Coles MD     • enoxaparin  30 mg Subcutaneous Q12H [address] Edis Coles MD     • FLUoxetine  20 mg Oral Daily Edis Coles MD     • folic acid  1 mg Oral Daily Edis Coles MD     • hydrALAZINE  25 mg Oral Q8H PRN DARIO Alves     • levothyroxine  88 mcg Oral Early Morning Geoffrey Erazo MD     • lidocaine  1 patch Topical Daily Edis Coles MD     • [START ON 8/23/2023] methotrexate  20 mg Oral Weekly Edis Coles MD     • oxyCODONE  5 mg Oral Q4H PRN Edis Coles MD     • oxyCODONE  2.5 mg Oral Q4H PRN Edis Coles MD     • polyethylene glycol  17 g Oral Daily Edis Coles MD     • predniSONE  5 mg Oral HS Edis Coles MD     • QUEtiapine  12.5 mg Oral HS PRN Edis Coles MD     • senna  1 tablet Oral BID Sandra Aly MD            ** Please Note: Fluency Direct voice to text software may have been used in the creation of this document.  **

## 2023-08-21 NOTE — PLAN OF CARE
Problem: PAIN - ADULT  Goal: Verbalizes/displays adequate comfort level or baseline comfort level  Description: Interventions:  - Encourage patient to monitor pain and request assistance  - Assess pain using appropriate pain scale  - Administer analgesics based on type and severity of pain and evaluate response  - Implement non-pharmacological measures as appropriate and evaluate response  - Consider cultural and social influences on pain and pain management  - Notify physician/advanced practitioner if interventions unsuccessful or patient reports new pain  Outcome: Progressing     Problem: INFECTION - ADULT  Goal: Absence or prevention of progression during hospitalization  Description: INTERVENTIONS:  - Assess and monitor for signs and symptoms of infection  - Monitor lab/diagnostic results  - Monitor all insertion sites, i.e. indwelling lines, tubes, and drains  - Monitor endotracheal if appropriate and nasal secretions for changes in amount and color  - Maddock appropriate cooling/warming therapies per order  - Administer medications as ordered  - Instruct and encourage patient and family to use good hand hygiene technique  - Identify and instruct in appropriate isolation precautions for identified infection/condition  Outcome: Progressing     Problem: SAFETY ADULT  Goal: Patient will remain free of falls  Description: INTERVENTIONS:  - Educate patient/family on patient safety including physical limitations  - Instruct patient to call for assistance with activity   - Consult OT/PT to assist with strengthening/mobility   - Keep Call bell within reach  - Keep bed low and locked with side rails adjusted as appropriate  - Keep care items and personal belongings within reach  - Initiate and maintain comfort rounds  - Make Fall Risk Sign visible to staff  - Offer Toileting every 2 Hours, in advance of need  - Initiate/Maintain bed/chair alarm  - Obtain necessary fall risk management equipment: alarms  - Apply yellow socks and bracelet for high fall risk patients  - Consider moving patient to room near nurses station  Outcome: Progressing  Goal: Maintain or return to baseline ADL function  Description: INTERVENTIONS:  -  Assess patient's ability to carry out ADLs; assess patient's baseline for ADL function and identify physical deficits which impact ability to perform ADLs (bathing, care of mouth/teeth, toileting, grooming, dressing, etc.)  - Assess/evaluate cause of self-care deficits   - Assess range of motion  - Assess patient's mobility; develop plan if impaired  - Assess patient's need for assistive devices and provide as appropriate  - Encourage maximum independence but intervene and supervise when necessary  - Involve family in performance of ADLs  - Assess for home care needs following discharge   - Consider OT consult to assist with ADL evaluation and planning for discharge  - Provide patient education as appropriate  Outcome: Progressing  Goal: Maintains/Returns to pre admission functional level  Description: INTERVENTIONS:  - Perform BMAT or MOVE assessment daily.   - Set and communicate daily mobility goal to care team and patient/family/caregiver. - Collaborate with rehabilitation services on mobility goals if consulted  - Perform Range of Motion 3 times a day. - Reposition patient every 2 hours.   - Dangle patient 3 times a day  - Stand patient 3 times a day  - Ambulate patient 3 times a day  - Out of bed to chair 3 times a day   - Out of bed for meals 3 times a day  - Out of bed for toileting  - Record patient progress and toleration of activity level   Outcome: Progressing     Problem: DISCHARGE PLANNING  Goal: Discharge to home or other facility with appropriate resources  Description: INTERVENTIONS:  - Identify barriers to discharge w/patient and caregiver  - Arrange for needed discharge resources and transportation as appropriate  - Identify discharge learning needs (meds, wound care, etc.)  - Arrange for interpretive services to assist at discharge as needed  - Refer to Case Management Department for coordinating discharge planning if the patient needs post-hospital services based on physician/advanced practitioner order or complex needs related to functional status, cognitive ability, or social support system  Outcome: Progressing     Problem: Prexisting or High Potential for Compromised Skin Integrity  Goal: Skin integrity is maintained or improved  Description: INTERVENTIONS:  - Identify patients at risk for skin breakdown  - Assess and monitor skin integrity  - Assess and monitor nutrition and hydration status  - Monitor labs   - Assess for incontinence   - Turn and reposition patient  - Assist with mobility/ambulation  - Relieve pressure over bony prominences  - Avoid friction and shearing  - Provide appropriate hygiene as needed including keeping skin clean and dry  - Evaluate need for skin moisturizer/barrier cream  - Collaborate with interdisciplinary team   - Patient/family teaching  - Consider wound care consult   Outcome: Progressing

## 2023-08-21 NOTE — PROGRESS NOTES
08/21/23 1500   Pain Assessment   Pain Assessment Tool 0-10   Pain Score No Pain   Restrictions/Precautions   Precautions Bed/chair alarms;Cognitive; Fall Risk;Pain;Spinal precautions;Supervision on toilet/commode   Weight Bearing Restrictions Yes   ROM Restrictions Yes   Braces or Orthoses TLSO   Comprehension   Comprehension (FIM) 4 - Needs words repeated   Expression   Expression (FIM) 4 - Expresses basic info/needs 75-90% of time   Social Interaction   Social Interaction (FIM) 5 - Interacts appropriately with others 90% of time   Problem Solving   Problem solving (FIM) 2 - Solves basic problems 25-49% of time   Memory   Memory (FIM) 2 - Recognizes, recalls/performs 25-49%   Speech/Language/Cognition Assessmetn   Treatment Assessment Pt seen for skilled speech therapy session targeting cognitive linguistic communication skills. Pt alert and interactive- , David Whitman, present for session. Engaged in rapport conversation with orientation and recall of events. Pt stated she was at "[employer] and "rehab" but then cued to [facility] and couldn't recall [facility] without cues from  about "the one with the star". Pt not recalling reason for admission, SLP reviewing in detail with pt regarding fall and spinal fractures. Pt with visual reminder on her table regarding spinal precautions- pt reading BLT but needing SLP to cue as to what this means for no bending, lifting, or twisting. Discussed current cognitive functioning as pt scored Severe on SLP testing as well as decreased score on MOCAs as compared to previously.  reports noting cognitive decline the last 3 weeks at home. Pt was needing more assistance with ADLs given falls as well as help with IADLs as she was doing meds herself and he was noting errors. Discussing follow up with outpt geriatrics but will benefit from structure cognitive therapy to maximize skills and compensate/educate as able.  Discussed introducing Daily Memory Log with pt tomorrow as well as coming up with structured tasks to complete at home for daily cognitive activities. All in agreement. Reviewed tomorrow's schedule as it was delivered during session. Pt stated "this is a lot of activity for me" as pt and  reported pt had been "slowing down" at home. Pt overall is improving with skilled SLP services and will cont to benefit to maximize overall cognitive linguistic communication abilities at this time. SLP Therapy Minutes   SLP Time In 1500   SLP Time Out 6726   SLP Total Time (minutes) 30   SLP Mode of treatment - Individual (minutes) 30   SLP Mode of treatment - Concurrent (minutes) 0   SLP Mode of treatment - Group (minutes) 0   SLP Mode of treatment - Co-treat (minutes) 0   SLP Mode of Treatment - Total time(minutes) 30 minutes   SLP Cumulative Minutes 90   Therapy Time missed   Time missed?  No

## 2023-08-21 NOTE — PROGRESS NOTES
Internal Medicine Progress Note  Patient: Timothy Mcgrath  Age/sex: 70 y.o. female  Medical Record #: [id number]      ASSESSMENT/PLAN: (Interval History)  Timothy Mcgrath is seen and examined and management for following issues:    Frequent fall, r/o movement disorder  • See Dr Isaura Hernandez as OP     Right rib fractures  • Ribs 3-5th  • Continue IS     T8 fracture  • Non-op  • TLSO brace  • For upright t-spine xrays in brace in 2 weeks     Vitamin B12 deficiency  • For IM replacement x 5 doses then to PO     HTN  • Home:  Atenolol 50mg qd  • Here:  Atenolol 50mg qd  • Has prn Hydralazine  • Add Norvasc 5mg qd     Cognitive impairment  • MOCA 3/2021 was 17/30  • Likely has worsened since prev MOCA and is confused here     E.coli UTI  • POA  • Dr Hattie Livingston sent UA with reflex to cx 2/2 confusion  • Urine cx had >100,000 Ecoli and 30,000-39,000 mixed contaminants  • Started on Keflex today     Hypothyroidism  • TSH 0.314/free T4 1.29  • on Levothyroxine 100 mcg qd  • Endo following and they reduced to 88 mcg qd and sent a reverse T3  • For TSH/free T4 4-6 weeks     Severe osteoporosis  • Endo following  • For OP DEXA  • Calcium 500mg BID     Vit D deficiency  • Level was 23.7  • For Vit D 2000IU qd     RA  • At home takes Methotrexate 20mg qWednesday/Prednisone 5mg qd/Flexeril 10mg qhs     HLD  • Lipitor 10mg qd     Anxiety/depression  • At home she was taking Xananx 1mg TID and Prozac 20mg qd  • Per PMR     Enlarged right retrocrural lymph node  • Found on CT  • For repeat CT chest as OP in 3-6 months     Transaminitis  · AST/ALT/AP elevated since previous CMP 8/15  · had CT C/A/P on 8/15 = hepatomegaly  · Dr Hattie Livingston cut back on Tylenol  · Will watch and get LFTs 8/24/23       Discharge date:  Team       The above assessment and plan was reviewed and updated as determined by my evaluation of the patient on 8/21/2023.     Labs:   Results from last 7 days   Lab Units 08/21/23  0532 08/17/23  0444   WBC Thousand/uL 3.90* 5.06 HEMOGLOBIN g/dL 12.9 12.0   HEMATOCRIT % 37.6 36.2   PLATELETS Thousands/uL 215 174     Results from last 7 days   Lab Units 08/21/23  0532 08/17/23  0444   SODIUM mmol/L 135 136   POTASSIUM mmol/L 3.8 4.4   CHLORIDE mmol/L 103 104   CO2 mmol/L 24 26   BUN mg/dL 13 14   CREATININE mg/dL 0.76 0.74   CALCIUM mg/dL 10.3* 9.4     Results from last 7 days   Lab Units 08/17/23  0444   HEMOGLOBIN A1C % 5.6     Results from last 7 days   Lab Units 08/16/23  0602   INR  1.14           Review of Scheduled Meds:  Current Facility-Administered Medications   Medication Dose Route Frequency Provider Last Rate   • acetaminophen  650 mg Oral Q12H Michele Sandra MD     • ALPRAZolam  0.5 mg Oral HS PRN Michele Sandra MD     • atenolol  50 mg Oral Daily Michele Sandra MD     • atorvastatin  10 mg Oral Daily With Nichole Frost MD     • bisacodyl  5 mg Oral Daily PRN Michele Sandra MD     • bisacodyl  10 mg Rectal Daily PRN Michele Sandra MD     • bisacodyl  10 mg Rectal Once Katarzyna Roy MD     • calcium carbonate  1 tablet Oral BID With Meals Silvino Lowe MD     • cephalexin  500 mg Oral [facility] Michele Sandra MD     • cholecalciferol  2,000 Units Oral Daily Silvino Lowe MD     • cyanocobalamin  1,000 mcg Intramuscular Every Other Day Michele Sandra MD     • enoxaparin  30 mg Subcutaneous Q12H Theodore Gary MD     • FLUoxetine  20 mg Oral Daily Michele Sandra MD     • folic acid  1 mg Oral Daily Michele Sandra MD     • hydrALAZINE  25 mg Oral Q8H PRN DARIO Frances     • levothyroxine  88 mcg Oral Early Morning Silvino Lowe MD     • lidocaine  1 patch Topical Daily Michele Sandra MD     • methocarbamol  250 mg Oral Q6H [address] Michele Sandra MD     • [START ON 8/23/2023] methotrexate  20 mg Oral Weekly Michele Sandra MD     • ondansetron  4 mg Oral Q6H PRN Michele Sandra MD     • oxyCODONE  5 mg Oral Q4H PRN Michele Sandra MD     • oxyCODONE  2.5 mg Oral Q4H PRN Keyla Bartlett MD     • polyethylene glycol  17 g Oral Daily Keyla Bartlett MD     • predniSONE  5 mg Oral HS Keyla Bartlett MD     • QUEtiapine  12.5 mg Oral HS PRN Keyla Bartlett MD     • senna  1 tablet Oral BID Sheryl Queen MD         Subjective/ HPI: Patient seen and examined. Patients overnight issues or events were reviewed with nursing or staff during rounds or morning huddle session. New or overnight issues include the following:     No overnight issues. Today on CMP +transaminitis. Offers no complaints except pain    ROS:   A 10 point ROS was performed; negative except as noted above. Imaging:     No orders to display       *Labs /Radiology studies reviewed  *Medications reviewed and reconciled as needed  *Please refer to order section for additional ordered labs studies  *Case discussed with primary attending during morning huddle case rounds    Physical Examination:  Vitals:   Vitals:    08/20/23 1900 08/21/23 0605 08/21/23 0635 08/21/23 0844   BP:  (!) 188/98 150/90 137/77   BP Location:  Left arm Left arm Right arm   Pulse: 66 73  72   Resp: 20 18     Temp: 98 °F (36.7 °C) 97.6 °F (36.4 °C)     TempSrc: Oral Oral     SpO2: 95% 97%     Weight:       Height:           General Appearance: no distress, conversive  HEENT: PERRLA, conjuctiva normal; oropharynx clear; mucous membranes moist   Neck:  Supple, normal ROM  Lungs: CTA, normal respiratory effort, no retractions, expiratory effort normal  CV: regular rate and rhythm; no rubs/murmurs/gallops, PMI normal   ABD: soft; ND/NT; +BS  EXT: no edema  Skin: normal turgor, normal texture, no rashes  Psych: affect normal, mood normal  Neuro: AA      The above physical exam was reviewed and updated as determined by my evaluation of the patient on 8/21/2023.     Invasive Devices     None                    VTE Pharmacologic Prophylaxis: Enoxaparin  Code Status: Level 3 - DNAR and DNI  Current Length of Stay: 3 day(s)      Total time spent:  30 minutes with more than 50% spent counseling/coordinating care. Counseling includes discussion with patient re: progress  and discussion with patient of his/her current medical state/information. Coordination of patient's care was performed in conjunction with primary service. Time invested included review of patient's labs, vitals, and management of their comorbidities with continued monitoring. In addition, this patient was discussed with medical team including physician and advanced extenders. The care of the patient was extensively discussed and appropriate treatment plan was formulated unique for this patient. Medical decision making for the day was made by supervising physician unless otherwise noted in their attestation statement. ** Please Note:  voice to text software may have been used in the creation of this document.  Although proof errors in transcription or interpretation are a potential of such software**

## 2023-08-21 NOTE — PROGRESS NOTES
08/21/23 0930   Pain Assessment   Pain Assessment Tool 0-10   Pain Score 5   Pain Location/Orientation Location: Back   Pain Onset/Description Onset: Ongoing   Restrictions/Precautions   Precautions 1:1;Bed/chair alarms;Cognitive; Fall Risk;Impulsive;Pain;Spinal precautions;Supervision on toilet/commode   Weight Bearing Restrictions Yes   ROM Restrictions Yes  (spinal precautions)   Braces or Orthoses TLSO  (OOB or > 45 degrees)   Cognition   Overall Cognitive Status Impaired   Arousal/Participation Alert; Cooperative   Attention Attends with cues to redirect   Orientation Level Oriented to person;Disoriented to place; Disoriented to time;Disoriented to situation   Memory Decreased long term memory;Decreased recall of biographical information;Decreased short term memory;Decreased recall of recent events;Decreased recall of precautions   Following Commands Follows one step commands inconsistently   Comments Pt unable to recall where she was or why she was here. Patient could only recall 1 of 3 spinal precautions before having to look at sheet on the wall to recall   Subjective   Subjective "Every body wants me to do too much.  I didn't do much at home."   Sit to Stand   Type of Assistance Needed Physical assistance   Physical Assistance Level 25% or less   Comment Jesus with SPC, imbalance noted upon standing   Sit to Stand CARE Score 3   Bed-Chair Transfer   Type of Assistance Needed Physical assistance   Physical Assistance Level 26%-50%   Comment Jesus with SPC, imbalance note, poor sequencing   Chair/Bed-to-Chair Transfer CARE Score 3   Walk 10 Feet   Type of Assistance Needed Physical assistance   Physical Assistance Level 26%-50%   Comment Min/ModA with SPC, ataxic gait, poor use/sequencing of SPC   Walk 10 Feet CARE Score 3   Walk 50 Feet with Two Turns   Type of Assistance Needed Physical assistance   Physical Assistance Level 26%-50%   Comment Min/ModA with SPC, ataxic gait, poor use/sequencing of SPC   Walk 50 Feet with Two Turns CARE Score 3   Walk 150 Feet   Comment poor endurance this session. unable   Reason if not Attempted Safety concerns   Walk 150 Feet CARE Score 88   Ambulation   Primary Mode of Locomotion Prior to Admission Walk   Distance Walked (feet) 112 ft  (x2)   Assist Device Cane   Gait Pattern Ataxic; Inconsistant [place];Decreased foot clearance;Narrow KAREEN; Improper weight shift   Limitations Noted In Balance; Coordination;Speed;Strength;Swing   Provided Assistance with: Balance   Walk Assist Level Minimum Assist;Moderate Assist   Findings Reports SOB with ambulation but SpO2 97% on RA. Reports walking is difficulty   Wheel 50 Feet with Two Turns   Reason if not Attempted Activity not applicable   Wheel 50 Feet with Two Turns CARE Score 9   Wheel 150 Feet   Reason if not Attempted Activity not applicable   Wheel 025 Feet CARE Score 9   Curb or Single Stair   Style negotiated Single stair   Type of Assistance Needed Physical assistance   Physical Assistance Level 26%-50%   Comment Min/ModA on  stairs with HR, reciprocal pattern   1 Step (Curb) CARE Score 3   4 Steps   Type of Assistance Needed Physical assistance   Physical Assistance Level 26%-50%   Comment Min/ModA on  stairs with HR, reciprocal pattern   4 Steps CARE Score 3   12 Steps   Reason if not Attempted Safety concerns   12 Steps CARE Score 88   Therapeutic Interventions   Strengthening seated hip adduction with ball 3x10, seated hip abduction with RTB , knee extension 1.5# 2x10 each LE, seated hip flexion 1.5# 2x10   Neuromuscular Re-Education 6 Inch Toe Taps with no UE support 2x10, Ambulation around cones with SPC - difficulty sequencing with SPC, Min/ModA for LOB, Backward Ambulation with HHA x20 ft, Side Step Ambulation with HHA x20 ft   Assessment   Treatment Assessment Patient participated in 60 minute skilled physical therapy session focusing on transfers, ambulation, dynamic balance, and LE strengthening.  Patient fatigued quickly during the session required frequent rest breaks. As patient fatigue, balance noted to become more impaired and required assistance correcting balance. Due to high fatigue level with standing activities, transitioned to sitting therex. High level of fatigue may due to patient having only an hour break between OT and PT Session. Pt reports she was very sedentary prior to admission to hospital although pt is poor historian at times. Patient will benefit from continued skilled physical therapy services to work on bed mobility, transfers, ambulation, stair navigation, NMR, dynamic balance, and LE strengthening. Problem List Decreased strength; Impaired balance;Decreased mobility; Decreased cognition; Impaired judgement;Decreased safety awareness   Barriers to Discharge Inaccessible home environment   PT Barriers   Functional Limitation Car transfers; Ramp negotiation;Stair negotiation;Transfers;Standing;Walking   Plan   Treatment/Interventions Functional transfer training;LE strengthening/ROM; Elevations; Therapeutic exercise; Endurance training;Cognitive reorientation;Patient/family training;Equipment eval/education; Bed mobility;Gait training   Progress Slow progress, cognitive deficits   Recommendation   PT Discharge Recommendation   (TBD pending patient's progress, likely home with family support and  services)   Equipment Recommended   (SPC vs RW)   PT Therapy Minutes   PT Time In 0930   PT Time Out 1030   PT Total Time (minutes) 60   PT Mode of treatment - Individual (minutes) 60   PT Mode of treatment - Concurrent (minutes) 0   PT Mode of treatment - Group (minutes) 0   PT Mode of treatment - Co-treat (minutes) 0   PT Mode of Treatment - Total time(minutes) 60 minutes   PT Cumulative Minutes 150   Therapy Time missed   Time missed?  No     Samantha Serrano, PT, DPT

## 2023-08-21 NOTE — NURSING NOTE
Per PCA on 1:1 continuous observation on pt, she was assisted to the bathroom and whiles brushing her teeth, she might have pushed the toothbrush further down her throat and vomited a small amount of undigested food. She mentioned to me she was briefly nauseous but not anymore. Will continue to monitor.

## 2023-08-21 NOTE — TELEPHONE ENCOUNTER
08/24/2023-PT [address] 08/23/2023-PT [address] 08/22/2023-PT Ya  09/01/2023 APT IN [facility] W/THORACIC SPINE XRAY

## 2023-08-21 NOTE — PROGRESS NOTES
Pastoral Care Progress Note    2023  Patient: Santa Louis : 1955  Admission Date & Time: 2023 1521  MRN: [id number] CSN: [id number]        Brief intro meeting with Sandhya during a break in her PT session.  will try next to visit her in her room with more time to talk.

## 2023-08-21 NOTE — ASSESSMENT & PLAN NOTE
Chronically on steroids, and likely should have DXA and osteoporosis work-up   - Consulted endocrine on admission.    - Vitamin D 26.6 - started on 2000 units daily    - started on calcium supplement by Endocrinology  - Outpatient f/u with endocrine   - She will benefit from getting DEXA scan as outpatient and will qualify for anabolic agents for treatment as outpt

## 2023-08-21 NOTE — PROGRESS NOTES
OT Daily Treatment Note       08/21/23 0700   Pain Assessment   Pain Assessment Tool 0-10   Pain Score 8   Pain Location/Orientation Location: Back   Hospital Pain Intervention(s) Shower/Bath   Restrictions/Precautions   Precautions 1:1;Bed/chair alarms;Cognitive; Fall Risk;Impulsive;Pain;Spinal precautions;Supervision on toilet/commode   ROM Restrictions Yes  (spinal precautions)   Braces or Orthoses TLSO  (OOB or > 45 degrees)   Lifestyle   Autonomy pt initially resistive to therapy this morning stating she was too weak and nauseous to get OOB. with encouragment, pt agreeable to complete ADL. Eating   Type of Assistance Needed Set-up / clean-up   Physical Assistance Level No physical assistance   Comment pt took two bites of breakfast this morning and proceeded to immediately spit it out stating that she was too nauseous to eat. pt only agreeable to drink coffee and water this am. PCA notified. Eating CARE Score 5   Oral Hygiene   Comment declined this session stating she completed OH this AM prior to therapy and that caused her to throw up. Shower/Bathe Self   Type of Assistance Needed Physical assistance   Physical Assistance Level 26%-50%   Comment SB completed supine and seated EOB. required setup and vc to initiate washing UB and to bathe all UB parts. required min A to steady in stance (TLSO on) for lorena bathing. assist for distal LE bathing 2* spinal precautions. required frequent vc to maintain spinal precautions throughout. Shower/Bathe Self CARE Score 3   Bathing   Assessed Bath Style Sponge Bath   Upper Body Dressing   Type of Assistance Needed Physical assistance   Physical Assistance Level 51%-75%   Comment able to don shirt with CGA as pt noted to have a seated posterior LOB while donning shirt. required TA to don TLSO. encouraged pt to attempt to don but pt refused stating "I wouldnt even know where to start".  OT edu that cues can be provided, pt cont to decline, requesting OT to complete task for her. pt with dec new learning and may require TA from spouse to don/doff TLSO at home. Upper Body Dressing CARE Score 2   Lower Body Dressing   Type of Assistance Needed Physical assistance   Physical Assistance Level 26%-50%   Comment able to thread underwear/pants seated EOB with cross leg technique and vc to initiate as pt was observed holding underwear and asking "what do you want me to do now?" OT responded, "youre holding your underwear and you have no pants on, what should we do?" pt stated "I dont know" and handed OT her underwear. OT then cued pt to don underwear (and eventually pants) which she was able to complete with min A to steady in stance. Lower Body Dressing CARE Score 3   Putting On/Taking Off Footwear   Type of Assistance Needed Supervision   Physical Assistance Level No physical assistance   Comment able to don/doff  socks with supv and min vc to initiate task seated EOB. pt able to complete with cross leg technique. cues required for pt to adjust socks as pt was observed putting socks on with incorrect orientation where  were on lateral sides of foot and would not provide safe . Putting On/Taking Off Footwear CARE Score 4   Roll Left and Right   Type of Assistance Needed Physical assistance   Physical Assistance Level 26%-50%   Comment min A for trunk mgmt and vc required for log rolling technique.  pt observed twisting at the hips,not maintaining spinal precautions   Roll Left and Right CARE Score 3   Lying to Sitting on Side of Bed   Type of Assistance Needed Physical assistance   Physical Assistance Level 26%-50%   Comment min A for trunk mgmt and vc required for log rolling technique   Lying to Sitting on Side of Bed CARE Score 3   Sit to Stand   Type of Assistance Needed Physical assistance   Physical Assistance Level 25% or less   Comment min A/CGA with no AD, min A to steady in stance as pt observed with mild balance deficits in stance   Sit to Stand CARE Score 3   Bed-Chair Transfer   Type of Assistance Needed Physical assistance   Physical Assistance Level 26%-50%   Comment min A SPT with SPC   Chair/Bed-to-Chair Transfer CARE Score 3   Sesar Vogt declined reporting she toileted this AM prior to therapy   Toilet Transfer   Comment declined reporting she toileted this AM prior to therapy   Cognition   Overall Cognitive Status Impaired   Arousal/Participation Alert; Cooperative   Attention Attends with cues to redirect   Orientation Level Oriented to person   Memory Decreased long term memory;Decreased recall of biographical information;Decreased short term memory;Decreased recall of recent events;Decreased recall of precautions   Following Commands Follows one step commands inconsistently   Comments pt unable to recall spinal precautions and was observed lying in bed with LB twisted to the R. edu on precautions and left multiple signs around the room with reminders of "BLT". pt demo poor attention, decreased processing and poor long and short term memory. pt completed MoCA back in 3/2021 and received a score of 17/30 (moderate cog deficits). Pt completed repeat [place] Cognitive Assessment (MoCA) version 8.1. Pt scored overall 7 / 30  indicating a severe cognitive deficit. See below for details. During this assessment, pt declined to complete serial subtraction portion of assessment, stating that she cannot do math and will not try. OT encouraged pt to take best guest but pt cont to refuse. Pt was disoriented, stating she believed we were in the "1950's" and when asked about location, pt stated she believed she was in a "nursing home". Pts  was completing all IADLs for the pt PTA but overall, pt has has had significant cognitive decline since 2021 and would benefit from f/u with geriatrics or area of aging s/p DC from [facility]. Pt demo decreased new learning and will most likely have poor carryover of spinal precautions and other safety precautions. Will need to complete repetitive cognitive retraining for all tasks. Activity Tolerance   Activity Tolerance Patient tolerated treatment well   Assessment   Treatment Assessment Pt participated in skilled OT session with focus on ADL retraining, functional transfer training, UE strengthening/ROM, cognitive reorientation, compensatory technique education, continued education and energy conservation. See flowsheet for details of session and current functional status. Pt is limited by weakness, decreased ROM, impaired balance, decreased endurance, decreased coordination, increased fall risk, decreased ADLS, decreased IADLS, pain, decreased activity tolerance, decreased safety awareness, impaired judgement, decreased cognition and decreased strength and requires skilled OT services to increase independence and safety with ADL completion in prep for MD home. Plan to continue ADL retraining, functional transfer training, UE strengthening/ROM, endurance training, cognitive reorientation, Pt/caregiver education, compensatory technique education, continued education, energy conservation and activity engagement  to address barriers mentioned above. Prognosis Fair   Problem List Decreased strength; Impaired balance;Decreased mobility; Decreased cognition; Impaired judgement;Decreased safety awareness   Barriers to Discharge Inaccessible home environment   Plan   Treatment/Interventions ADL retraining;Functional transfer training; Therapeutic exercise; Endurance training;Patient/family training; Compensatory technique education   Progress Slow progress, cognitive deficits   OT Therapy Minutes   OT Time In 0700   OT Time Out 0830   OT Total Time (minutes) 90   OT Mode of treatment - Individual (minutes) 90   OT Mode of treatment - Concurrent (minutes) 0   OT Mode of treatment - Group (minutes) 0   OT Mode of treatment - Co-treat (minutes) 0   OT Mode of Treatment - Total time(minutes) 90 minutes   OT Cumulative Minutes 180 Therapy Time missed   Time missed? No          Visuospatial/executive: 0 /5   Namin3   Memory:  (worth no points) 3/ (1st trial), / (2nd trial)  Attention:     Language: 2 / 3   Abstraction:    Delayed recall: 0  5    Memory Index Score (MIS): 3/15  Orientation:      +1 point given for less than or equal to 12 years of education? Yes       Total score 7/30, indicating a severe cognitive deficit. MoCA certified rater ID: [id number]    Shu Noel, MS OTR/L, CSRS

## 2023-08-21 NOTE — PROGRESS NOTES
08/21/23 1330   Pain Assessment   Pain Assessment Tool 0-10   Pain Score 8   Pain Location/Orientation Location: Back   Restrictions/Precautions   Precautions Bed/chair alarms;Cognitive; Fall Risk;Impulsive;Pain;Spinal precautions;Supervision on toilet/commode   Weight Bearing Restrictions Yes   ROM Restrictions Yes  (spinal precautions)   Braces or Orthoses TLSO  (OOB or > 45 degree angle)   Cognition   Overall Cognitive Status Impaired   Arousal/Participation Alert; Cooperative   Attention Attends with cues to redirect   Orientation Level Oriented to person;Oriented to place   Memory Decreased long term memory;Decreased recall of biographical information;Decreased short term memory;Decreased recall of recent events;Decreased recall of precautions   Following Commands Follows one step commands inconsistently   Sit to Stand   Type of Assistance Needed Physical assistance   Physical Assistance Level 25% or less   Comment Jesus with SPC, imbalance noted upon standing   Sit to Stand CARE Score 3   Bed-Chair Transfer   Type of Assistance Needed Physical assistance   Physical Assistance Level 26%-50%   Comment Jesus with SPC, imbalance note, ataxic, poor sequencing   Chair/Bed-to-Chair Transfer CARE Score 3   Walk 10 Feet   Type of Assistance Needed Physical assistance   Physical Assistance Level 26%-50%   Comment Min/ModA with SPC, ataxic gait, poor use/sequencing of SPC   Walk 10 Feet CARE Score 3   Walk 50 Feet with Two Turns   Type of Assistance Needed Physical assistance   Physical Assistance Level 25% or less   Comment Min/ModA with SPC, ataxic gait, poor use/sequencing of SPC   Walk 50 Feet with Two Turns CARE Score 3   Walk 150 Feet   Comment high level of fatigue   Reason if not Attempted Safety concerns   Walk 150 Feet CARE Score 88   Ambulation   Primary Mode of Locomotion Prior to Admission Walk   Distance Walked (feet) 112 ft  (x2)   Assist Device Cane   Gait Pattern Ataxic; Inconsistant [place];Decreased foot clearance;Narrow KAREEN; Improper weight shift   Limitations Noted In Balance; Coordination; Endurance;Posture; Safety;Speed;Strength; Sequencing   Provided Assistance with: Balance   Walk Assist Level Minimum Assist;Moderate Assist   Findings Poor insight into deficits with ambulation. VCs for proper sequecing and use of SPC   Does the patient walk? 2. Yes   Wheel 50 Feet with Two Turns   Reason if not Attempted Activity not applicable   Wheel 50 Feet with Two Turns CARE Score 9   Wheel 150 Feet   Reason if not Attempted Activity not applicable   Wheel 802 Feet CARE Score 9   Curb or Single Stair   Style negotiated Single stair   Type of Assistance Needed Physical assistance   Physical Assistance Level 26%-50%   Comment Min/ModA, unilateral HR, reciprocal pattern   1 Step (Curb) CARE Score 3   4 Steps   Type of Assistance Needed Physical assistance   Physical Assistance Level 26%-50%   Comment Min/ModA, unilateral HR, reciprocal pattern   4 Steps CARE Score 3   12 Steps   Reason if not Attempted Safety concerns   12 Steps CARE Score 88   Assessment   Treatment Assessment Patient participated in brief 30 minute skilled physical therapy session focusing on pt/family education, transfer training, stair navigation, and gait training wt SPC. Patient's  present at beginning and end of session. Discussed PT plan of care and right now our goal is for her to require Supervision when she returns home. Discussed with pt and  PT will continue to assess safety with [facility] as it may be best she goes home with RW if balance does not improve.  states they have two sets of 7 stairs in their home with L HR. Patient will benefit from continued skilled physical therapy services to work on bed mobility, transfers, ambulation, stair navigation, NMR, dynamic balance, and LE strengthening. Problem List Decreased strength; Impaired balance;Decreased mobility; Decreased cognition; Impaired judgement;Decreased safety awareness Barriers to Discharge Inaccessible home environment   PT Barriers   Functional Limitation Car transfers; Ramp negotiation;Stair negotiation;Transfers;Standing;Walking   Plan   Treatment/Interventions Functional transfer training;LE strengthening/ROM; Elevations; Therapeutic exercise; Endurance training;Patient/family training;Equipment eval/education; Bed mobility;Gait training   Progress Slow progress, cognitive deficits   Recommendation   PT Discharge Recommendation   (TBD pending patient's progress, likely home with family support and  services)   Equipment Recommended   (TBD pending progress)   PT Therapy Minutes   PT Time In 1330   PT Time Out 1400   PT Total Time (minutes) 30   PT Mode of treatment - Individual (minutes) 30   PT Mode of treatment - Concurrent (minutes) 0   PT Mode of treatment - Group (minutes) 0   PT Mode of treatment - Co-treat (minutes) 0   PT Mode of Treatment - Total time(minutes) 30 minutes   PT Cumulative Minutes 180   Therapy Time missed   Time missed?  No     Keisha Alon, PT, DPT

## 2023-08-21 NOTE — CASE MANAGEMENT
Met w/pt and spouse reviewed rehab routine and cm role. Pt states she remembers CM from her stay here previously. They reside in a bi level home with 7 armaan up and another 7 to the living area. Pt does not drive or do much at home per spouse. Pt has had experience with [facility] through  [facility] as well as outpt therapy at [facility]. Pt states she owns a spc and a roller walker. [facility] [address] [facility]. Reviewed team mtg process and potential los. Cm will follow up post team mtg with hopeful dc as pt would like to get home to her cats.

## 2023-08-22 PROCEDURE — 97530 THERAPEUTIC ACTIVITIES: CPT

## 2023-08-22 PROCEDURE — 99232 SBSQ HOSP IP/OBS MODERATE 35: CPT | Performed by: INTERNAL MEDICINE

## 2023-08-22 PROCEDURE — 99233 SBSQ HOSP IP/OBS HIGH 50: CPT | Performed by: PHYSICAL MEDICINE & REHABILITATION

## 2023-08-22 PROCEDURE — 97112 NEUROMUSCULAR REEDUCATION: CPT

## 2023-08-22 PROCEDURE — 97110 THERAPEUTIC EXERCISES: CPT

## 2023-08-22 PROCEDURE — 97116 GAIT TRAINING THERAPY: CPT

## 2023-08-22 PROCEDURE — 97129 THER IVNTJ 1ST 15 MIN: CPT

## 2023-08-22 PROCEDURE — 97130 THER IVNTJ EA ADDL 15 MIN: CPT

## 2023-08-22 RX ORDER — PHENAZOPYRIDINE HYDROCHLORIDE 100 MG/1
200 TABLET, FILM COATED ORAL EVERY EVENING
Status: COMPLETED | OUTPATIENT
Start: 2023-08-22 | End: 2023-08-24

## 2023-08-22 RX ADMIN — SENNOSIDES 8.6 MG: 8.6 TABLET, FILM COATED ORAL at 10:42

## 2023-08-22 RX ADMIN — AMLODIPINE BESYLATE 5 MG: 5 TABLET ORAL at 10:42

## 2023-08-22 RX ADMIN — CEPHALEXIN 500 MG: 500 CAPSULE ORAL at 05:03

## 2023-08-22 RX ADMIN — LIDOCAINE 5% 1 PATCH: 700 PATCH TOPICAL at 10:48

## 2023-08-22 RX ADMIN — FLUOXETINE 20 MG: 20 CAPSULE ORAL at 10:42

## 2023-08-22 RX ADMIN — ACETAMINOPHEN 650 MG: 325 TABLET, FILM COATED ORAL at 05:37

## 2023-08-22 RX ADMIN — ENOXAPARIN SODIUM 30 MG: 30 INJECTION SUBCUTANEOUS at 10:42

## 2023-08-22 RX ADMIN — ATENOLOL 50 MG: 50 TABLET ORAL at 10:42

## 2023-08-22 RX ADMIN — CEPHALEXIN 500 MG: 500 CAPSULE ORAL at 22:04

## 2023-08-22 RX ADMIN — METHOCARBAMOL 250 MG: 500 TABLET ORAL at 18:27

## 2023-08-22 RX ADMIN — Medication 1 TABLET: at 16:24

## 2023-08-22 RX ADMIN — SENNOSIDES 8.6 MG: 8.6 TABLET, FILM COATED ORAL at 18:27

## 2023-08-22 RX ADMIN — CYANOCOBALAMIN 1000 MCG: 1000 INJECTION, SOLUTION INTRAMUSCULAR at 10:42

## 2023-08-22 RX ADMIN — METHOCARBAMOL 250 MG: 500 TABLET ORAL at 11:39

## 2023-08-22 RX ADMIN — METHOCARBAMOL 250 MG: 500 TABLET ORAL at 05:03

## 2023-08-22 RX ADMIN — PHENAZOPYRIDINE 200 MG: 100 TABLET ORAL at 18:54

## 2023-08-22 RX ADMIN — PREDNISONE 5 MG: 5 TABLET ORAL at 22:04

## 2023-08-22 RX ADMIN — LEVOTHYROXINE SODIUM 88 MCG: 88 TABLET ORAL at 05:03

## 2023-08-22 RX ADMIN — ACETAMINOPHEN 650 MG: 325 TABLET, FILM COATED ORAL at 18:27

## 2023-08-22 RX ADMIN — OXYCODONE HYDROCHLORIDE 5 MG: 5 TABLET ORAL at 05:08

## 2023-08-22 RX ADMIN — Medication 1 TABLET: at 10:42

## 2023-08-22 RX ADMIN — Medication 2000 UNITS: at 10:42

## 2023-08-22 RX ADMIN — CEPHALEXIN 500 MG: 500 CAPSULE ORAL at 14:03

## 2023-08-22 RX ADMIN — METHOCARBAMOL 250 MG: 500 TABLET ORAL at 01:20

## 2023-08-22 RX ADMIN — ATORVASTATIN CALCIUM 10 MG: 10 TABLET, FILM COATED ORAL at 16:24

## 2023-08-22 RX ADMIN — ENOXAPARIN SODIUM 30 MG: 30 INJECTION SUBCUTANEOUS at 22:03

## 2023-08-22 RX ADMIN — FOLIC ACID 1 MG: 1 TABLET ORAL at 10:42

## 2023-08-22 NOTE — PROGRESS NOTES
Internal Medicine Progress Note  Patient: Alli Wadsworth  Age/sex: 70 y.o. female  Medical Record #: [id number]      ASSESSMENT/PLAN: (Interval History)  Alli Wadsworth is seen and examined and management for following issues:    Frequent fall, r/o movement disorder  • See Dr Mireille Christine as OP     Right rib fractures  • Ribs 3-5th  • Continue IS     T8 fracture  • Non-op  • TLSO brace  • For upright t-spine xrays in brace in 2 weeks     Vitamin B12 deficiency  • For IM replacement x 5 doses then to PO     HTN  • Home:  Atenolol 50mg qd  • Here:  Atenolol 50mg qd/Norvasc 5mg qd  • Has prn Hydralazine  • On 8/21/23, added Norvasc 5mg qd  • May need to increase Norvasc to 5mg BID but will trend today     Cognitive impairment  • MOCA 3/2021 was 17/30  • Likely has worsened since prev MOCA and is confused here     E.coli UTI  • POA  • Dr Andriy Lindsay sent UA with reflex to cx 2/2 confusion  • Urine cx had >100,000 Ecoli and 30,000-39,000 mixed contaminants  • Started on Keflex 8/21/23     Hypothyroidism  • TSH 0.314/free T4 1.29  • on Levothyroxine 100 mcg qd  • Endo following and they reduced to 88 mcg qd and sent a reverse T3  • For TSH/free T4 4-6 weeks     Severe osteoporosis  • Endo following  • For OP DEXA  • Calcium 500mg BID     Vit D deficiency  • Level was 23.7  • For Vit D 2000IU qd     RA  • At home takes Methotrexate 20mg qWednesday/Prednisone 5mg qd/Flexeril 10mg qhs     HLD  • Lipitor 10mg qd     Anxiety/depression  • At home she was taking Xananx 1mg TID and Prozac 20mg qd  • Per PMR     Enlarged right retrocrural lymph node  • Found on CT  • For repeat CT chest as OP in 3-6 months     Transaminitis  • AST/ALT/AP elevated since previous CMP 8/15  • had CT C/A/P on 8/15 = hepatomegaly  • Dr Andriy Lindsay cut back on Tylenol  • Will watch and get LFTs 8/24/23        Discharge date:  Team       The above assessment and plan was reviewed and updated as determined by my evaluation of the patient on 8/22/2023.     Labs: Results from last 7 days   Lab Units 08/21/23  0532 08/17/23  0444   WBC Thousand/uL 3.90* 5.06   HEMOGLOBIN g/dL 12.9 12.0   HEMATOCRIT % 37.6 36.2   PLATELETS Thousands/uL 215 174     Results from last 7 days   Lab Units 08/21/23  0532 08/17/23  0444   SODIUM mmol/L 135 136   POTASSIUM mmol/L 3.8 4.4   CHLORIDE mmol/L 103 104   CO2 mmol/L 24 26   BUN mg/dL 13 14   CREATININE mg/dL 0.76 0.74   CALCIUM mg/dL 10.3* 9.4     Results from last 7 days   Lab Units 08/17/23  0444   HEMOGLOBIN A1C % 5.6     Results from last 7 days   Lab Units 08/16/23  0602   INR  1.14           Review of Scheduled Meds:  Current Facility-Administered Medications   Medication Dose Route Frequency Provider Last Rate   • acetaminophen  650 mg Oral Q12H Mookie Figueroa MD     • ALPRAZolam  0.5 mg Oral HS PRN Mookie Figueroa MD     • amLODIPine  5 mg Oral Daily DARIO Castro     • atenolol  50 mg Oral Daily Mookie Figueroa MD     • atorvastatin  10 mg Oral Daily With Mukesh Rutherford MD     • bisacodyl  5 mg Oral Daily PRN Mookie Figueroa MD     • bisacodyl  10 mg Rectal Daily PRN Mookie Figueroa MD     • bisacodyl  10 mg Rectal Once Abhishek Frausto MD     • calcium carbonate  1 tablet Oral BID With Meals Ruben Rivera MD     • cephalexin  500 mg Oral [facility] Mookie Figueroa MD     • cholecalciferol  2,000 Units Oral Daily Ruben Rivera MD     • cyanocobalamin  1,000 mcg Intramuscular Every Other Day Mookie Figueroa MD     • enoxaparin  30 mg Subcutaneous Q12H [address] Mookie Figueroa MD     • FLUoxetine  20 mg Oral Daily Mookie Figueroa MD     • folic acid  1 mg Oral Daily Mookie Figueroa MD     • hydrALAZINE  25 mg Oral Q8H PRN DARIO Castro     • levothyroxine  88 mcg Oral Early Morning Ruben Rivera MD     • lidocaine  1 patch Topical Daily Mookie Figueroa MD     • methocarbamol  250 mg Oral Q6H [address] Mookie Figueroa MD     • [START ON 8/23/2023] methotrexate  20 mg Oral Weekly Valentín Gonzalez Kyla Cranker, MD     • ondansetron  4 mg Oral Q6H PRN Elza Galindo MD     • oxyCODONE  5 mg Oral Q4H PRN Elza Galindo MD     • oxyCODONE  2.5 mg Oral Q4H PRN Elza Galindo MD     • polyethylene glycol  17 g Oral Daily Elza Galindo MD     • predniSONE  5 mg Oral HS Elza Galindo MD     • QUEtiapine  12.5 mg Oral HS PRN Elza Galindo MD     • senna  1 tablet Oral BID Eric López MD         Subjective/ HPI: Patient seen and examined. Patients overnight issues or events were reviewed with nursing or staff during rounds or morning huddle session. New or overnight issues include the following:     No new or overnight issues. Offers no complaints    ROS:   A 10 point ROS was performed; negative except as noted above. Imaging:     No orders to display       *Labs /Radiology studies reviewed  *Medications reviewed and reconciled as needed  *Please refer to order section for additional ordered labs studies  *Case discussed with primary attending during morning huddle case rounds    Physical Examination:  Vitals:   Vitals:    08/21/23 2034 08/22/23 0518 08/22/23 0538 08/22/23 0900   BP: 148/63 (!) 174/80 160/90 146/62   BP Location: Right arm Left arm Left arm Right arm   Pulse: 76 74     Resp: 18 18     Temp: 98.2 °F (36.8 °C) 98.2 °F (36.8 °C)     TempSrc: Oral Oral     SpO2: 94% 95%     Weight:       Height:           General Appearance: no distress, conversive  HEENT:  External ear normal.  Nose normal w/o drainage. Mucous membranes are moist. Oropharynx is clear. Conjunctiva clear w/o icterus or redness. Neck:  Supple, normal ROM  Lungs: BBS without crackles/wheeze/rhonchi; respirations unlabored with normal inspiratory/expiratory effort. No retractions noted. On RA  CV: regular rate and rhythm; no rubs/murmurs/gallops, PMI normal   ABD: Abdomen is soft. Bowel sounds all quadrants. Nontender with no distention.     EXT: no edema  Skin: normal turgor, normal texture, no rashes  Psych: affect normal, mood normal  Neuro: AA     The above physical exam was reviewed and updated as determined by my evaluation of the patient on 8/22/2023. Invasive Devices     None                    VTE Pharmacologic Prophylaxis: Enoxaparin  Code Status: Level 3 - DNAR and DNI  Current Length of Stay: 4 day(s)      Total time spent:  30 minutes with more than 50% spent counseling/coordinating care. Counseling includes discussion with patient re: progress  and discussion with patient of his/her current medical state/information. Coordination of patient's care was performed in conjunction with primary service. Time invested included review of patient's labs, vitals, and management of their comorbidities with continued monitoring. In addition, this patient was discussed with medical team including physician and advanced extenders. The care of the patient was extensively discussed and appropriate treatment plan was formulated unique for this patient. Medical decision making for the day was made by supervising physician unless otherwise noted in their attestation statement. ** Please Note:  voice to text software may have been used in the creation of this document.  Although proof errors in transcription or interpretation are a potential of such software**

## 2023-08-22 NOTE — PCC CARE MANAGEMENT
Pt admitted s/p fall which resulted in rib and spinal fx's. Pt w/spouse who is supportive and assists with pts adls. Pt has cog deficits which are baseline. Pt has experience with [facility] and outpt therapy services. They are aware of the potential on dc for contd services.

## 2023-08-22 NOTE — PCC OCCUPATIONAL THERAPY
Pt is demonstrating fair progress with occupational therapy and is progressing toward long term goals for ADL, IADL, and functional transfers/mobility. Pts long term goals for ADLs are supervision with [address] [place] [address]. Pt continues to present with impairments in activity tolerance, endurance, standing balance/tolerance, UE strength, arousal, memory, insight, safety , judgement , and attention . Occupational performance remains limited by pain, spinal precautions, risk for falls, and home environment. Family training/education will be required prior to D/C. Pt will continue to benefit from skilled acute rehab OT services to address above mentioned barriers and maximize functional independence in baseline areas of occupation to meet established treatment goals with overall decreased burden of care. Plan of care to continue to focus on ADL Retraining ,  LHAE education/training, Functional Transfers, Functional Cognition, Functional Attention, MOCA, ACLS, Standing tolerance, Standing balance , DME training/education, Family training/education, Energy conservation training/education, healthy coping education, and Leisure and social pursuits. Goals for the upcoming week are: increasing new learning and memory with spinal precautions to increase independence with ADL completion. Anticipate Discharge date to be set. Andrews Fernandez 08/29/23: Pt has made good  progress during time of stay at the [facility]. Pt is overall functioning at Supervision or touching assistance-  for ADLs, Supervision or touching assistance-  for functional transfers and TOUCHING/ STEADYING assistance  for functional mobility. Prior to [address] [place] [address], DME recommendations include walker. DME was ordered to maximize functional independence and decrease fall risk. Based on pts functional performance, pt is safe to [address] [place] [address] w/ recommendations noted above. Pt would benefit from continued OT services in home health to maximize functional abilities.

## 2023-08-22 NOTE — PROGRESS NOTES
Pastoral Care Progress Note    2023  Patient: Tish Reddy : 1955  Admission Date & Time: 2023 1521  MRN: [id number] CSN: [id number]          Only had a brief conversation with Sandhya, who said that she is doing well, that therapy is going well. I encouraged her to keep an optimistic attitude. Prayed for her healing, and she said that she prays too.  remains available.

## 2023-08-22 NOTE — PROGRESS NOTES
OT Daily Treatment Note       08/22/23 1300   Pain Assessment   Pain Assessment Tool 0-10   Pain Score No Pain   Restrictions/Precautions   Precautions Bed/chair alarms;Cognitive; Fall Risk;Impulsive;Pain;Supervision on toilet/commode   Braces or Orthoses TLSO   Lifestyle   Autonomy "wait so where are we going?"   Sit to Stand   Type of Assistance Needed Incidental touching   Physical Assistance Level No physical assistance   Comment CG with max vc to use RW   Sit to Stand CARE Score 4   Bed-Chair Transfer   Type of Assistance Needed Physical assistance   Physical Assistance Level 25% or less   Comment Ebony SPT with RW   Chair/Bed-to-Chair Transfer CARE Score 3   Toileting Hygiene   Type of Assistance Needed Incidental touching   Physical Assistance Level No physical assistance   Comment CG in stance for CM   Luis Enrique Mayeride Score 4   Toilet Transfer   Type of Assistance Needed Incidental touching   Physical Assistance Level No physical assistance   Comment CG with RW on standard toilet height   Toilet Transfer CARE Score 4   Functional Standing Tolerance   Time 11.5   Activity card matchign activity   Comments pt engaged in card matching activity in stance at tabletop focusing on increasing pts fxl standing tolerance, unsupported standing balance, fxl activity tolerance and higher level cog with memory and problem solving. pt able to stand for 5 mins and then 6.5 minutes requiring short rest break in between for ftigue mgmt   Exercise Tools   Exercise Tools Yes   UE Ergometer pt engaged in UE ergometer for 5 mins in prograde and 5 mins in retrograde focusing on increasing UB strengthening and ROM, endurance and fxl activity tolerance to inc independence at home. pt req short rest break in between for fatigue mgmt. Cognition   Overall Cognitive Status Impaired   Arousal/Participation Alert; Cooperative   Attention Attends with cues to redirect   Orientation Level Oriented X4   Memory Decreased long term memory;Decreased recall of biographical information;Decreased short term memory;Decreased recall of recent events;Decreased recall of precautions   Following Commands Follows one step commands inconsistently   Comments when asked to recall spinal precautions, pt able to locate cheat sheet on wall and verbalize, without visual cue, pt unable to recall any of 3 spinal precautions. may benefit from visual reminders at home for safety. Additional Activities   Additional Activities Other (Comment)   Additional Activities Comments time spent speaking with pts  re: PLOF.  reports the pt had STM deficits at baseline but noted an inc in safety awareness deficits which is causing concern for the  as although he is home 24/7, he cannot provide constant supervision and is concerned for her safety. Edu spouse on hiring [facility] to decrease cg burden which  appeared receptive to. will require more edu on bed/chair alarms and other strategies to maintain safety at home prior to DC   Activity Tolerance   Activity Tolerance Patient tolerated treatment well   Assessment   Treatment Assessment Pt participated in skilled OT session with focus on ADL retraining, functional transfer training, UE strengthening/ROM and Pt/caregiver education. See flowsheet for details of session and current functional status. Pt is limited by weakness, decreased ROM, impaired balance, decreased endurance, increased fall risk, decreased ADLS, decreased IADLS, pain, decreased activity tolerance, decreased safety awareness, impaired judgement, decreased cognition, decreased strength and impaired psychosocial skills and requires skilled OT services to increase independence and safety with ADL completion in prep for DC home.  Plan to continue ADL retraining, functional transfer training, UE strengthening/ROM, endurance training, cognitive reorientation, Pt/caregiver education, equipment evaluation/education, compensatory technique education, continued education, energy conservation and activity engagement  to address barriers mentioned above. Prognosis Fair   Problem List Decreased strength;Decreased endurance; Impaired balance;Decreased mobility; Decreased coordination;Decreased cognition; Impaired judgement;Decreased safety awareness;Orthopedic restrictions;Pain   Barriers to Discharge Inaccessible home environment   Plan   Treatment/Interventions ADL retraining;Functional transfer training; Therapeutic exercise; Endurance training;Patient/family training; Compensatory technique education   Progress Slow progress, cognitive deficits   OT Therapy Minutes   OT Time In 1300   OT Time Out 1400   OT Total Time (minutes) 60   OT Mode of treatment - Individual (minutes) 60   OT Mode of treatment - Concurrent (minutes) 0   OT Mode of treatment - Group (minutes) 0   OT Mode of treatment - Co-treat (minutes) 0   OT Mode of Treatment - Total time(minutes) 60 minutes   OT Cumulative Minutes 240   Therapy Time missed   Time missed?  No

## 2023-08-22 NOTE — PROGRESS NOTES
23 1000   Pain Assessment   Pain Assessment Tool 0-10   Pain Score No Pain   Restrictions/Precautions   Precautions Bed/chair alarms;Cognitive; Fall Risk;Pain;Spinal precautions;Supervision on toilet/commode   Braces or Orthoses TLSO   Comprehension   Comprehension (FIM) 4 - Understands basic info/conversation 75-90% of time   Expression   Expression (FIM) 4 - Expresses basic info/needs 75-90% of time   Social Interaction   Social Interaction (FIM) 5 - Interacts appropriately with others 90% of time   Problem Solving   Problem solving (FIM) 2 - Needs direction more than ½ time to initiate, plan or complete simple tasks   Memory   Memory (FIM) 2 - Recognizes, recalls/performs 25-49%   Speech/Language/Cognition Assessment   Treatment Assessment Pt participated in skilled SLP session focusing on cognitive linguistic skills. Began session targeting STM recall to which pt recalled having previously completed PT session this AM, including completing "a lot of walking." Pt also recalled her bfast items. As this SLP is new to pt's care, engaged in brief rapport building which also focused on LTM recall. Pt accurately recalled her address, , age and work history. Pt also stated the names her children and where each lives. Pt oriented to person, month and year but required total to max assist with use of verbal cues and direct verbal prompts to orient to place and situation. When reviewing spinal precautions, pt did recall having a "cheatsheet" present but unable to spontaneously recall the acronym used. Given the acronym (BLT) and min verbal cues, pt able to recall the meaning of 1/3 letters, recalling no lifting. Of note, pt also recalled that she had various reminders present throughout her room. SLP then introduced a Memory Log to pt in order to assist with daily and short term recall, as well as orientation to the date.  SLP explained each portion of the Memory Log to include the date, each therapy area, place for therapists name, section to record discussions with medical team and section to record visitors. Provided examples of each section to patient. Lastly, pt was presented with a selecting categories task targeting basic/concrete categories. Given a category name and Fo10 words, pt accurately identified words which belong to categories for 21/25 targets. However, pt did demonstrate over categorizing of words by making an additional 14 errors with task. Due to time constraints, unable to review activity in its entirety, however, pt benefited from mod to max cuing and guidance to correct errors. Of note, pt also reported "forgetting" the instructions and the goal of the task midway through task, requiring repetition of instructions and frequent review of task. At end of session, SLP completed Memory Log portion at end of session and reviewed with pt. Left Memory Log on pt's tray table, in front of her. At this time, pt is recommended for further skilled SLP services targeting cognitive linguistic skills in order to maximize overall functional level of independence and safety on discharge. SLP Therapy Minutes   SLP Time In 1000   SLP Time Out 1030   SLP Total Time (minutes) 30   SLP Mode of treatment - Individual (minutes) 30   SLP Mode of treatment - Concurrent (minutes) 0   SLP Mode of treatment - Group (minutes) 0   SLP Mode of treatment - Co-treat (minutes) 0   SLP Mode of Treatment - Total time(minutes) 30 minutes   SLP Cumulative Minutes 120   Therapy Time missed   Time missed?  No

## 2023-08-22 NOTE — PROGRESS NOTES
08/22/23 0815   Pain Assessment   Pain Assessment Tool 0-10   Pain Score 4   Pain Location/Orientation Location: Back;Orientation: Mid   Hospital Pain Intervention(s) Ambulation/increased activity   Restrictions/Precautions   Precautions Bed/chair alarms;Cognitive; Fall Risk;Impulsive;Pain;Spinal precautions;Supervision on toilet/commode   Weight Bearing Restrictions Yes  (Spinal precautions)   ROM Restrictions Yes  (Spinal precautions)   Braces or Orthoses TLSO   Cognition   Overall Cognitive Status Impaired   Arousal/Participation Alert; Cooperative   Attention Attends with cues to redirect   Memory Decreased long term memory;Decreased recall of biographical information;Decreased short term memory;Decreased recall of recent events;Decreased recall of precautions   Following Commands Follows one step commands inconsistently   Subjective   Subjective Sandhya says she is doing well with no c/o and is agreeable to PT but is wondering why she has PT "at 9:00 at night." She is unaware that it is daytime despite me explaining it to her. Lying to Sitting on Side of Bed   Type of Assistance Needed Physical assistance;Verbal cues; Adaptive equipment   Physical Assistance Level 26%-50%   Comment ModAx1 HOB elevated for spinal comfort, handrails, cues for sequencing and to maintain precautions   Lying to Sitting on Side of Bed CARE Score 3   Sit to Stand   Type of Assistance Needed Physical assistance;Verbal cues; Adaptive equipment   Physical Assistance Level 26%-50%   Comment RW, modAx1, cues for sequencing of UE's and spinal precautions   Sit to Stand CARE Score 3   Bed-Chair Transfer   Type of Assistance Needed Physical assistance;Verbal cues; Adaptive equipment   Physical Assistance Level 26%-50%   Comment Stand pivot transfer EOB < > WC, RW, modAx1, cues for sequencing of UE's and spinal precautions   Chair/Bed-to-Chair Transfer CARE Score 3   Transfer Bed/Chair/Wheelchair   Limitations Noted In Confidence; Coordination;Problem Solving; Sequencing   Adaptive Equipment Roller Walker   Walk 10 Feet   Type of Assistance Needed Adaptive equipment;Physical assistance;Verbal cues   Physical Assistance Level 26%-50%   Comment ModAx1 RW cues for awareness and sequencing   Walk 10 Feet CARE Score 3   Walk 50 Feet with Two Turns   Type of Assistance Needed Adaptive equipment;Verbal cues; Physical assistance   Physical Assistance Level 26%-50%   Comment ModAx1 RW cues for awareness and sequencing   Walk 50 Feet with Two Turns CARE Score 3   Walk 150 Feet   Comment Fatigue   Reason if not Attempted Safety concerns   Walk 150 Feet CARE Score 88   Walking 10 Feet on Uneven Surfaces   Comment Fatigue   Reason if not Attempted Safety concerns   Walking 10 Feet on Uneven Surfaces CARE Score 88   Ambulation   Primary Mode of Locomotion Prior to Admission Walk   Distance Walked (feet) 75 ft  (2x50')   Assist Device Roller Walker   Gait Pattern Ataxic; Inconsistant [place]; Slow [place];Decreased foot clearance; Forward Flexion; Lateral deviation; Wide KAREEN;Shuffle;Improper weight shift   Limitations Noted In Balance; Coordination;Device Management; Endurance;Posture; Safety; Sequencing;Strength;Speed   Provided Assistance with: Balance   Does the patient walk? 2. Yes   Curb or Single Stair   Comment Fatigue   Reason if not Attempted Safety concerns   1 Step (Curb) CARE Score 88   4 Steps   Comment Fatigue   Reason if not Attempted Safety concerns   4 Steps CARE Score 88   12 Steps   Comment Fatigue   Reason if not Attempted Safety concerns   12 Steps CARE Score 88   Toilet Transfer   Type of Assistance Needed Physical assistance;Verbal cues; Adaptive equipment   Physical Assistance Level 26%-50%   Comment RW, hand rails, toilet, modAx1 and VC's for sequncing of UE's and positioning in line with toilet.    Toilet Transfer CARE Score 3   Therapeutic Interventions   Strengthening Seated circuit: Marches, ankle pumps, LAQ's x20 ea, 3 rounds throughout session during seated rest breaks. Neuromuscular Re-Education 4 cone weaves (3 ft apart) x2 laps fwd w/RW, x2 laps fwd L HHA, seated VORx1 maximal VC's 2x20 reps horizontal   Assessment   Treatment Assessment Sandhya participated in her skilled PT session focused on progressing her ambulatory capabilities and conditioning. She required frequent cues to redirect to task and maintain safety awareness throughout with no carryover of reviewed safety precautions between interventions. For this reason opted for RW use versus SPC considering performances last session  - pt board updated accordingly and SPC swapped to RW in room for transfers. Continues to experience fatigue with progressive activity but responds well to motivation. Recommend vestibular integration training to work on eye/head/cervical spine dissociation for improving vestibular integration. Continue to assess response to HHA versus AD use as this may be a better alternative for balance based on pt's probable prognosis of always requiring supervision and cuing for safety. Family/Caregiver Present No   Problem List Decreased strength;Decreased endurance; Impaired balance;Decreased mobility; Decreased coordination;Decreased cognition; Impaired judgement;Decreased safety awareness;Orthopedic restrictions;Pain   Barriers to Discharge Inaccessible home environment   PT Barriers   Physical Impairment Decreased strength;Decreased endurance; Impaired balance;Decreased mobility; Decreased coordination;Decreased cognition; Impaired judgement;Decreased safety awareness;Orthopedic restrictions;Pain   Functional Limitation Car transfers; Ramp negotiation;Stair negotiation;Standing;Walking;Transfers   Plan   Treatment/Interventions ADL retraining;Functional transfer training;LE strengthening/ROM; Therapeutic exercise; Endurance training;Cognitive reorientation; Bed mobility;Gait training;Continued evaluation;Spoke to MD   Progress Slow progress, cognitive deficits Recommendation   PT Discharge Recommendation Home with home health rehabilitation   PT Therapy Minutes   PT Time In 0815   PT Time Out 0945   PT Total Time (minutes) 90   PT Mode of treatment - Individual (minutes) 90   PT Mode of treatment - Concurrent (minutes) 0   PT Mode of treatment - Group (minutes) 0   PT Mode of treatment - Co-treat (minutes) 0   PT Mode of Treatment - Total time(minutes) 90 minutes   PT Cumulative Minutes 270   Therapy Time missed   Time missed?  No

## 2023-08-22 NOTE — PCC PHYSICAL THERAPY
8/22/23  Sandhya demonstrates significant cognitive deficits influencing her overall reception towards education on maintaining safety awareness with transfers and sequencing which is her most limiting factor to progressing in PT. She requires constant VC's for proper sequencing and demonstrates little carryover of learning between tasks significantly contributing to fall risk. May benefit from [facility] training with  due to needs for required cuing for safety versus continued RW training due to need for guidance with direction of ambulation considering current home setup and need to navigate 2x7 flights of stairs. Recommend [facility],[address] rehab services with 24/7 supervision. 08/29/2023  Pt perform all care scores and all DME 's for DC tomorrow with her  and will f/u with her family MD post DC .

## 2023-08-22 NOTE — PCC SPEECH THERAPY
Pt is currently being followed for skilled SLP services targeting cognitive linguistic skills. On initial evaluation, pt completed the CLQT+ with scores correlating to overall severely impaired cognitive linguistic skills, characterized by deficits across all cognitive linguistic domains. Limiting barriers present as deficits in the following areas: overall attention, decreased ST memory recall, decreased orientation, decreased executive function skills, including problem solving, reasoning, judgement, sequencing, organization of thoughts, decreased safety awareness which currently impacts pt's functional mobility skills. Currently, pt is functioning at min assist for comprehension and expression, supervision for social interaction and max assist for problem solving and memory. Plan to initiate family training/education in the next week as pt lives with her . At this time, pt is recommended for further skilled SLP services targeting overall cognitive linguistic and communication skills to maximize level of independence and safety on discharge. Update from week 8/29/2023: continue to follow pt for ongoing skilled SLP services targeting cognitive linguistic skills. Ongoing barriers which continue to present include: decreased attention, decreased ST memory recall, decreased orientation, decreased executive function skills, including problem solving, reasoning, judgement, sequencing, organization of thoughts, decreased safety awareness which currently impacts pt's functional mobility skills. Due to these deficits which present, family meeting had been held, to where recommendations provided by ST as well as remainder of therapy team have been provided to pt and spouse Recommendations from team are for 24hr S/A at discharge.   reported he does have supportive neighbors who offered to help as needed at discharge- encouraged  to set a schedule with them to allow time to him to have on his own and run errands/attend own appointments. Discussed plan for home therapy services (PT/OT/ST). Multiple family education and training sessions have been completed to where SLP provided pt and spouse w/ a recap of current functioning, to where at this time, still ST memory is pt's biggest barrier. SLP re-educating on using the Memory Book, as well as providing a review given the Memory Packet which is a educational resource for pt's family members in regard to strategies which can be incorporated at home, ie: keeping a place for common things such as the TV remote, keys, books, etc, providing written reminders throughout the house, ie: writing on stove/oven: "do not use" or for rooms/areas pt should not go, to have a sign which says "do not enter." Spouse did verbalize to SLP how he has purchased chair alarms, in addition to monitors which will signal if pt is not on the 2nd floor at home which is "her" level. Further confirmation and recommendations which were established by SLP was about how spouse or other family members to complete I ADL tasks, but he is to provide medications to pt at this time, which spouse did verbalize this will be moving forward. SLP suggesting a pill box (which they already have) to ease his burden in completing this moving forward. SLP did state that an HEP will be provided to pt to complete until home ST services initiated. No further questions were provided by pt nor spouse at this time. Pt is functioning at min A for comprehension, expression, supervision for social interaction, mod-max A for executive functions and memory. Pt to continue to benefit from home SLP services targeting functional cognitive skills in attempts to decrease caregiver burden over time.

## 2023-08-22 NOTE — PROGRESS NOTES
Physical Medicine and Rehabilitation Progress Note  Adelso Fine 70 y.o. female MRN: [id number]  Unit/Bed#: -01 Encounter: [id number]    To Review: Austin Duque is a 70 y.o. female with Sjogrens and RA (on methotrexate), HTN, hypothyroidism, gastroparesis, depression, previous mild cognitive impairment ([facility] 17/30 in 2021), history of meningioma s/p resection in 2012, pelvic fracture after fall down stairs in 2021, who presented to the [facility] 8/15 after falling backwards and landing on the ground 6 days prior. She had headaches and severe back pain and went to the hospital. She also seemed to have multiple more falls. She was found to have multiple R rib fractures (3-5). CTH was unremarkable. There was a question of possible movement disorder and Neurology was consulted who ordered MRI Brain, C-Spine and T-Spine. Of note she is on Xanax and Flexeril chronically (mostly taken during bedtime). Geriatrics recommended a UA which was not done inpatient. She was treated for MASD as well. MRI Brain showed moderate chronic microangiopathy, significantly increased, C-Spine without cord compression or cord signal abnormality, and T-Spine with a mild/acte T8 compression fracture without cord compression or cord signal abnormality. She most likely, per Neurology has a Vascular Dementia. There was also some discussion of possible PSP. Her  had also reported that it didn't seem the patient was keeping track of her medications properly, and certainly with her medication regimen is at risk for polypharmacy. Neurosurgery was consulted for her T8 compression fracture, and ordered TLSO when OOB/ > 45 degrees and follow-up in 2 weeks.  She has a Vitamin B1 pending, SPEP pending,  TSH was 0.314 with T4 1.29, A1C was 5.6, and B12 was 153 (she was started on 5 doses of Vitamin B12 - first dose given today, and to be given every other day, followed by daily 1000mcg Vitamin B12). She was admitted to the [facility] on 2023. Chief Complaint: No new concerns today. Interval History/Subjective:  No major events overnight, although nursing reports that she did try to get up multiple times overnight. Patient doesn't remember, thinks she slept fine and feels fine today. Was fairly confused and disoriented with therapies this AM, but able to be reoriented. She denies any new CP, SOB, fevers, chills, N/V, abdominal pain. Last BM was . ROS:  A 10 point review of systems was negative except for what is noted in the HPI. Today's Changes:  1. Tolerating low dose robaxin with good response. 2. Tolerating keflex. 3. Getting up at night as she feels she needs to urinate. For next 3 nights while on Keflex, will scheduled pyridium at night. 4. Discussed with team and nursin:1 overnight, and Q15min during the day. Total visit time: 35 minutes, with more than 50% spent counseling/coordinating care. Counseling includes discussion with patient re: progress in therapies, functional issues observed by therapy staff, and discussion with patient regarding their current medical state and wellbeing. Coordination of patient's care was performed in conjunction with Internal Medicine service to monitor patient's labs, vitals, and management of their comorbidities. Assessment/Plan:    * Progressive cognitive dysfunction  Assessment & Plan   reports progressive cognitive decline that accelerated the past two weeks  - Likely vascular dementia given increased moderate chronic microangiopathy  - ?  Amyloid angiopathy per radiology  - Cognitive impairment resulting in mismanagement of her medications - which include steroids, xanax, flexeril, prozac with polypharmacy contributing - particularly since she takes the Flexeril/Xanax at night and that is when most of her falls were occurring.   - Significant Vitamin B12 deficiency contributing  - Also found so far to have slightly high T4, and low TSH  - Of note has hepatomegaly (albeit stable) and increased alk phos on admission. Ammonia level on admission was normal (18)   - UA also positive on admission for E.coli UTI.   - Exam with patchy sensory impairment, with clear impairment in proprioception in great toes. Ataxia/motor planning difficulties. Clearly has impaired balance - this could be exacerbated by the B12 deficiency as well. - Recommend outpatient Neuro/Senior Care follow-up  - Consult Endocrine to comment on TSH/T4 - adjustment of Levothyroxine potentially vs. In setting of recent hospitalization/fall.  - Follow-up work-up: B1, B6   - Normal: A1C, MRI C-Spine, T-Spine shows no cord compression/cord signal abnormality, Ammonia level, SPEP  - Treatment: PT/OT/SLP 3-5 hours/day, 5-7 days/week. See Vitamin B12 deficiency, UTI   - Discussed polypharmacy   - Will not schedule alprazolam at this time, as so far seems to be tolerating (did not take it during hospitalization). Watch closely for signs of withdrawal. Decreased PRN dose. If she does not use while here, will discontinue. - I find robaxin a better tolerated muscle relaxer, can trial here. Stopped flexeril. Tolerating well with relief. Currently requiring Q15min checks during the day, 1:1 overnight   - Restlessness at night likely 2/2 frequency/urgency with UTI   - Adding pyridium at night for next 3 days. Compression fracture of T8 vertebra (HCC)  Assessment & Plan  In the past has broken her pelvis after a fall  Chronically on steroids, and likely should have DXA and osteoporosis work-up   - Outpatient f/u with Endocrinology - appreciate recs, patient likely with severe osteoporosis   - Vitamin D 26.6 - started on 2000 units daily    - started on calcium supplement by Endocrinology  Pain control as below  TLSO when OOB and HOB > 45 degrees. Thoracic spine precautions  Plan for repeat XR ~9/1 with follow-up with Neurosurgery at that time.     Vitamin B12 deficiency  Assessment & Plan  8/17 level 153  8/18 received 1000mcg IM shot  - Will give 4 more doses every other day starting on 8/20.  - Then start 1000mcg daily on 8/27  - Monitor proprioception, sensation, ataxia/motor planning.   - Outpatient f/u with Neurology  - Nutrition consult to evaluate dietary intake. Closed fracture of multiple ribs of right side  Assessment & Plan  2/2 fall  R 3-5  Currently on RA  Pain control as below  Incentive spirometry. Osteoporosis  Assessment & Plan  Chronically on steroids, and likely should have DXA and osteoporosis work-up   - Consulted endocrine on admission.    - Vitamin D 26.6 - started on 2000 units daily    - started on calcium supplement by Endocrinology  - Outpatient f/u with endocrine   - She will benefit from getting DEXA scan as outpatient and will qualify for anabolic agents for treatment as outpt    Acute cystitis without hematuria  Assessment & Plan  She reports episodes of urgency at times  - UA with pan susceptible E.coli 100k CFU   - 8/21 started on Keflex for 5 days   -  reports a pattern of patient holding her urine until the last minute and then rushing to the bathroom  - PVRs have been low continued timed voids    Abnormal CT of the abdomen  Assessment & Plan  1. Stable hepatomegaly - checking CMP in the AM. Alk phos elevated on admission, but stable. If still elevated, will add GGT given recent T8 fracture. Currently on Tylenol Q8hrs scheduled. May need to decrease dose. 2. Stable R renal cyst    Outpatient f/u with PCP    Adenopathy  Assessment & Plan  In the past has had "stable distal paraesophageal and gastrohepatic ligament adenopathy of uncertain etiology""  Here also noted to have R retrocrural lymph node adjacent to the descending thoracic aorta, juts proximal to the diaphragmatic hiatus - recommended for CT of the chest in 3-6 months to assess for stability.      Onychomycosis  Assessment & Plan  Consult podiatry for very overgrown toenails  Appreciate their debridement. Oral dyskinesia  Assessment & Plan  This is stable and was noted in her [facility] admission, where it was felt to be due to reglan. She is no longer on this medication. Patient attributes to her Sjogrens. Would not add additional medication to address this at this time. Can f/u with Neurology outpatient    Gastroparesis  Assessment & Plan  Was previously on reglan, does not appear to be on this medication at this time. May need small, more frequent meals  Monitor for symptoms. Hypothyroidism  Assessment & Plan  8/17 TSH 0.314 and T4 1.29  Previously appeared to be on 88-75mcg daily. Not sure when 100mcg was initiated and by who. Consulted endocrine to comment on adjusting dose vs. In setting of recent fall/injury/hospitalization   - Reverse T3 pending, and dose decreased to 88mcg daliy   - Recheck thyroid panel in 4-6 weeks. Anxiety and depression  Assessment & Plan  Currently on prozac 20mg daily  Also on xanax 1mg QHS PRN chronically, which it seems she was taking scheduled at home. Has not been on this only PRN here. - Monitor for now PRN - but monitor closely for any symptoms of withdrawal or increased agitation off this medication.   - Decreased dose to 0.5mg QHS PRN. - Will not make scheduled at this time. Question of this contributed to her worsening falls (as she was likely not managing her meds well at home given her cognitive impairment)  Would recommend a wean of this medication over time. That being said, this medication is also not showing up in her PDMP. Hypertension  Assessment & Plan  Home: Atenolol 50mg daily, Losartan 25mg daily  Here: Atenolol 50mg daily, Norvasc 5mg daily, Hydralazine PRN  Monitor and adjust as appropriate  IM consulted and assisting with management. Sjogren's disease [facility])  Assessment & Plan  Follows with Dr. Ky Yang outpatient  Chronically on prednisone and methotrexate.   Continue for now.      Health Maintenance  #Pain: Tylenol Q8hrs scheduled, Flexeril (will decrease to 5mg QHS), Lidoderm patch, Oxycodone 2.5-5mg (will try to de-escalate sooner rather than later). #Bowel: Last BM 8/20. Miralax to daily. Added Bisacodyl suppository and tabs PRN. #Bladder: Voiding and appears to be continent. PVR x3 low. #Skin/Pressure Injury Prevention: Turn Q2hr in bed, with weight shifts [id number] in wheelchair. Float heels in bed. #DVT Prophylaxis: Lovenox, SCDs. #GI Prophylaxis: While on lovenox, given that she is also on prozac, and on prednisone chronically, will add protonix 20mg daily. #Code Status:  DNR/DNI  #FEN: Regular/Thins  #Dispo:  ELOS 10-14 days - will need to get up a flight of stairs potentially.  may be able to provide some supervision, anticipate at least supervision level. With thoracic spine and brace may need assistance with some ADLs. #F/U: PCP, Neuro, Geriatrics, Rheumatology,       Objective:    Functional Update:  PT: min-modA transfers, min-modA bed mobility, min-modA ambulation 150'  OT: Ebony ADLs  SLP: QUINTINT 1.4 out of 4 -severely impaired cognition. Ebony for expression/social interaction, but modA for comprehension, and maxA for problem solving/memory. Allergies per EMR    Physical Exam:  Temp:  [97.9 °F (36.6 °C)-98.2 °F (36.8 °C)] 98.2 °F (36.8 °C)  HR:  [72-76] 74  Resp:  [18] 18  BP: (137-174)/(63-90) 160/90  Oxygen Therapy  SpO2: 95 %    Gen: No acute distress, Well-nourished, well-appearing. HEENT: Moist mucus membranes, Normocephalic/Atraumatic  Cardiovascular: Regular rate, rhythm, S1/S2. Distal pulses palpable  Heme/Extr: No edema  Pulmonary: Non-labored breathing. Lungs CTAB  : No medeiros  GI: Soft, non-tender, non-distended. BS+  MSK: Wearing TLSO. Wide based gait. Integumentary: Skin is warm, dry. Neuro: AAOx3, Speech is intact. Appropriate to questioning. Psych: Normal mood and affect.      Diagnostic Studies: Reviewed, no new imaging    Laboratory:  Reviewed   Results from last 7 days   Lab Units 08/21/23  0532 08/17/23  0444 08/16/23  0602   HEMOGLOBIN g/dL 12.9 12.0 13.1   HEMATOCRIT % 37.6 36.2 39.7   WBC Thousand/uL 3.90* 5.06 5.38     Results from last 7 days   Lab Units 08/21/23  0532 08/17/23  0444 08/16/23  0602 08/15/23  1247   BUN mg/dL 13 14 10 12   POTASSIUM mmol/L 3.8 4.4 4.8 3.9   CHLORIDE mmol/L 103 104 103 103   CREATININE mg/dL 0.76 0.74 0.85 0.85   AST U/L 63*  --   --  15   ALT U/L 103*  --   --  15     Results from last 7 days   Lab Units 08/16/23  0602   PROTIME seconds 15.2*   INR  1.14        Patient Active Problem List   Diagnosis   • Parotid gland enlargement   • Sjogren's disease (HCC)   • Closed nondisplaced fracture of anterior wall of right acetabulum (HCC)   • Closed nondisplaced fracture of right pubis (HCC)   • Fall   • Hypertension   • Pain of right upper extremity   • Multiple closed fractures of pelvis (HCC)   • Anxiety and depression   • Hypothyroidism   • Abnormal findings on imaging test   • Gastroparesis   • Oral dyskinesia   • Potential for cognitive impairment   • Anemia   • Hypokalemia   • WELLS (dyspnea on exertion)   • Lightheadedness   • Pain   • Abnormal ECG   • Chest pain   • Pleural effusion on right   • Atelectasis of right lung   • Low HDL (under 40)   • Dermatitis associated with moisture   • Onychomycosis   • Enlarged and hypertrophic nails   • Adenopathy   • Abnormal CT of the abdomen   • Hyponatremia   • Progressive cognitive dysfunction   • Fungal skin infection   • Rheumatoid arteritis (HCC)   • Closed fracture of multiple ribs of right side   • Movement disorder   • Vitamin B12 deficiency   • Compression fracture of T8 vertebra (HCC)   • Acute cystitis without hematuria   • Osteoporosis         Medications  Current Facility-Administered Medications   Medication Dose Route Frequency Provider Last Rate   • acetaminophen  650 mg Oral Q12H Celine Titus MD     • ALPRAZolam  0.5 mg Oral HS PRN Wendy Bee MD     • amLODIPine  5 mg Oral Daily DARIO Tamayo     • atenolol  50 mg Oral Daily Wendy Bee MD     • atorvastatin  10 mg Oral Daily With Agnieszka Rizvi MD     • bisacodyl  5 mg Oral Daily PRN Wendy Bee MD     • bisacodyl  10 mg Rectal Daily PRN Wendy Bee MD     • bisacodyl  10 mg Rectal Once Miroslava Hand MD     • calcium carbonate  1 tablet Oral BID With Meals Elisabeth Blevins MD     • cephalexin  500 mg Oral On license of [facility] Wendy Bee MD     • cholecalciferol  2,000 Units Oral Daily Elisabeth Blevins MD     • cyanocobalamin  1,000 mcg Intramuscular Every Other Day Wendy Bee MD     • enoxaparin  30 mg Subcutaneous Q12H [address] Wendy Bee MD     • FLUoxetine  20 mg Oral Daily Wendy Bee MD     • folic acid  1 mg Oral Daily Wendy Bee MD     • hydrALAZINE  25 mg Oral Q8H PRN DARIO Tamayo     • levothyroxine  88 mcg Oral Early Morning Elisabeth Blevins MD     • lidocaine  1 patch Topical Daily Wendy Bee MD     • methocarbamol  250 mg Oral Q6H [address] Wendy Bee MD     • [START ON 8/23/2023] methotrexate  20 mg Oral Weekly Wendy Bee MD     • ondansetron  4 mg Oral Q6H PRN Wendy Bee MD     • oxyCODONE  5 mg Oral Q4H PRN Wendy Bee MD     • oxyCODONE  2.5 mg Oral Q4H PRN Wendy Bee MD     • polyethylene glycol  17 g Oral Daily Wendy Bee MD     • predniSONE  5 mg Oral HS Wendy Bee MD     • QUEtiapine  12.5 mg Oral HS PRN Wendy Bee MD     • senna  1 tablet Oral BID Miroslava Hand MD            ** Please Note: Fluency Direct voice to text software may have been used in the creation of this document.  **

## 2023-08-22 NOTE — PLAN OF CARE
Problem: PAIN - ADULT  Goal: Verbalizes/displays adequate comfort level or baseline comfort level  Description: Interventions:  - Encourage patient to monitor pain and request assistance  - Assess pain using appropriate pain scale  - Administer analgesics based on type and severity of pain and evaluate response  - Implement non-pharmacological measures as appropriate and evaluate response  - Consider cultural and social influences on pain and pain management  - Notify physician/advanced practitioner if interventions unsuccessful or patient reports new pain  Outcome: Progressing     Problem: INFECTION - ADULT  Goal: Absence or prevention of progression during hospitalization  Description: INTERVENTIONS:  - Assess and monitor for signs and symptoms of infection  - Monitor lab/diagnostic results  - Monitor all insertion sites, i.e. indwelling lines, tubes, and drains  - Monitor endotracheal if appropriate and nasal secretions for changes in amount and color  - [place] appropriate cooling/warming therapies per order  - Administer medications as ordered  - Instruct and encourage patient and family to use good hand hygiene technique  - Identify and instruct in appropriate isolation precautions for identified infection/condition  Outcome: Progressing     Problem: SAFETY ADULT  Goal: Patient will remain free of falls  Description: INTERVENTIONS:  - Educate patient/family on patient safety including physical limitations  - Instruct patient to call for assistance with activity   - Consult OT/PT to assist with strengthening/mobility   - Keep Call bell within reach  - Keep bed low and locked with side rails adjusted as appropriate  - Keep care items and personal belongings within reach  - Initiate and maintain comfort rounds  - Make Fall Risk Sign visible to staff  - Offer Toileting every 2 Hours, in advance of need  - Initiate/Maintain alarm  - Obtain necessary fall risk management equipment:   - Apply yellow socks and bracelet for high fall risk patients  - Consider moving patient to room near nurses station  Outcome: Progressing  Goal: Maintain or return to baseline ADL function  Description: INTERVENTIONS:  -  Assess patient's ability to carry out ADLs; assess patient's baseline for ADL function and identify physical deficits which impact ability to perform ADLs (bathing, care of mouth/teeth, toileting, grooming, dressing, etc.)  - Assess/evaluate cause of self-care deficits   - Assess range of motion  - Assess patient's mobility; develop plan if impaired  - Assess patient's need for assistive devices and provide as appropriate  - Encourage maximum independence but intervene and supervise when necessary  - Involve family in performance of ADLs  - Assess for home care needs following discharge   - Consider OT consult to assist with ADL evaluation and planning for discharge  - Provide patient education as appropriate  Outcome: Progressing  Goal: Maintains/Returns to pre admission functional level  Description: INTERVENTIONS:  - Perform BMAT or MOVE assessment daily.   - Set and communicate daily mobility goal to care team and patient/family/caregiver. - Collaborate with rehabilitation services on mobility goals if consulted  - Perform Range of Motion 3 times a day. - Reposition patient every 2 hours.   - Dangle patient 3 times a day  - Stand patient 3 times a day  - Ambulate patient 3 times a day  - Out of bed to chair 3 times a day   - Out of bed for meals 3 times a day  - Out of bed for toileting  - Record patient progress and toleration of activity level   Outcome: Progressing     Problem: DISCHARGE PLANNING  Goal: Discharge to home or other facility with appropriate resources  Description: INTERVENTIONS:  - Identify barriers to discharge w/patient and caregiver  - Arrange for needed discharge resources and transportation as appropriate  - Identify discharge learning needs (meds, wound care, etc.)  - Arrange for interpretive services to assist at discharge as needed  - Refer to Case Management Department for coordinating discharge planning if the patient needs post-hospital services based on physician/advanced practitioner order or complex needs related to functional status, cognitive ability, or social support system  Outcome: Progressing     Problem: Prexisting or High Potential for Compromised Skin Integrity  Goal: Skin integrity is maintained or improved  Description: INTERVENTIONS:  - Identify patients at risk for skin breakdown  - Assess and monitor skin integrity  - Assess and monitor nutrition and hydration status  - Monitor labs   - Assess for incontinence   - Turn and reposition patient  - Assist with mobility/ambulation  - Relieve pressure over bony prominences  - Avoid friction and shearing  - Provide appropriate hygiene as needed including keeping skin clean and dry  - Evaluate need for skin moisturizer/barrier cream  - Collaborate with interdisciplinary team   - Patient/family teaching  - Consider wound care consult   Outcome: Progressing

## 2023-08-23 LAB — VIT B1 BLD-SCNC: NORMAL NMOL/L

## 2023-08-23 PROCEDURE — 97110 THERAPEUTIC EXERCISES: CPT

## 2023-08-23 PROCEDURE — 97530 THERAPEUTIC ACTIVITIES: CPT

## 2023-08-23 PROCEDURE — 97116 GAIT TRAINING THERAPY: CPT

## 2023-08-23 PROCEDURE — 97129 THER IVNTJ 1ST 15 MIN: CPT

## 2023-08-23 PROCEDURE — 99232 SBSQ HOSP IP/OBS MODERATE 35: CPT | Performed by: INTERNAL MEDICINE

## 2023-08-23 PROCEDURE — 97130 THER IVNTJ EA ADDL 15 MIN: CPT

## 2023-08-23 PROCEDURE — 97535 SELF CARE MNGMENT TRAINING: CPT

## 2023-08-23 PROCEDURE — 99233 SBSQ HOSP IP/OBS HIGH 50: CPT | Performed by: PHYSICAL MEDICINE & REHABILITATION

## 2023-08-23 RX ORDER — NYSTATIN 100000 [USP'U]/G
POWDER TOPICAL 2 TIMES DAILY
Status: DISCONTINUED | OUTPATIENT
Start: 2023-08-23 | End: 2023-08-30 | Stop reason: HOSPADM

## 2023-08-23 RX ORDER — AMLODIPINE BESYLATE 5 MG/1
5 TABLET ORAL 2 TIMES DAILY
Status: DISCONTINUED | OUTPATIENT
Start: 2023-08-23 | End: 2023-08-30 | Stop reason: HOSPADM

## 2023-08-23 RX ADMIN — LEVOTHYROXINE SODIUM 88 MCG: 88 TABLET ORAL at 05:51

## 2023-08-23 RX ADMIN — Medication 1 TABLET: at 17:40

## 2023-08-23 RX ADMIN — CEPHALEXIN 500 MG: 500 CAPSULE ORAL at 13:28

## 2023-08-23 RX ADMIN — FOLIC ACID 1 MG: 1 TABLET ORAL at 09:54

## 2023-08-23 RX ADMIN — POLYETHYLENE GLYCOL 3350 17 G: 17 POWDER, FOR SOLUTION ORAL at 09:55

## 2023-08-23 RX ADMIN — OXYCODONE HYDROCHLORIDE 5 MG: 5 TABLET ORAL at 03:22

## 2023-08-23 RX ADMIN — CEPHALEXIN 500 MG: 500 CAPSULE ORAL at 05:51

## 2023-08-23 RX ADMIN — AMLODIPINE BESYLATE 5 MG: 5 TABLET ORAL at 10:03

## 2023-08-23 RX ADMIN — OXYCODONE HYDROCHLORIDE 5 MG: 5 TABLET ORAL at 10:15

## 2023-08-23 RX ADMIN — ACETAMINOPHEN 650 MG: 325 TABLET, FILM COATED ORAL at 17:44

## 2023-08-23 RX ADMIN — METHOCARBAMOL 250 MG: 500 TABLET ORAL at 05:51

## 2023-08-23 RX ADMIN — METHOCARBAMOL 250 MG: 500 TABLET ORAL at 17:42

## 2023-08-23 RX ADMIN — Medication 2000 UNITS: at 09:54

## 2023-08-23 RX ADMIN — METHOCARBAMOL 250 MG: 500 TABLET ORAL at 00:35

## 2023-08-23 RX ADMIN — SENNOSIDES 8.6 MG: 8.6 TABLET, FILM COATED ORAL at 09:54

## 2023-08-23 RX ADMIN — SENNOSIDES 8.6 MG: 8.6 TABLET, FILM COATED ORAL at 17:41

## 2023-08-23 RX ADMIN — ATENOLOL 50 MG: 50 TABLET ORAL at 10:03

## 2023-08-23 RX ADMIN — NYSTATIN: 100000 POWDER TOPICAL at 17:46

## 2023-08-23 RX ADMIN — NYSTATIN 1 APPLICATION: 100000 POWDER TOPICAL at 13:32

## 2023-08-23 RX ADMIN — FLUOXETINE 20 MG: 20 CAPSULE ORAL at 09:54

## 2023-08-23 RX ADMIN — LIDOCAINE 5% 1 PATCH: 700 PATCH TOPICAL at 09:54

## 2023-08-23 RX ADMIN — CEPHALEXIN 500 MG: 500 CAPSULE ORAL at 21:09

## 2023-08-23 RX ADMIN — Medication 1 TABLET: at 09:54

## 2023-08-23 RX ADMIN — METHOTREXATE 20 MG: 2.5 TABLET ORAL at 09:57

## 2023-08-23 RX ADMIN — PREDNISONE 5 MG: 5 TABLET ORAL at 21:09

## 2023-08-23 RX ADMIN — AMLODIPINE BESYLATE 5 MG: 5 TABLET ORAL at 21:09

## 2023-08-23 RX ADMIN — PHENAZOPYRIDINE 200 MG: 100 TABLET ORAL at 17:45

## 2023-08-23 RX ADMIN — ENOXAPARIN SODIUM 30 MG: 30 INJECTION SUBCUTANEOUS at 09:55

## 2023-08-23 RX ADMIN — METHOCARBAMOL 250 MG: 500 TABLET ORAL at 13:29

## 2023-08-23 RX ADMIN — ACETAMINOPHEN 650 MG: 325 TABLET, FILM COATED ORAL at 05:51

## 2023-08-23 RX ADMIN — ATORVASTATIN CALCIUM 10 MG: 10 TABLET, FILM COATED ORAL at 17:42

## 2023-08-23 RX ADMIN — ENOXAPARIN SODIUM 30 MG: 30 INJECTION SUBCUTANEOUS at 21:09

## 2023-08-23 NOTE — CASE MANAGEMENT
Met w/pt and spouse and arranged a family mtg to be held tomorrow 8/24 at 1300. Spouse is able to attend and stated [place] and would not be in attendance. Cm informed of plan for dc 8/30 and recommendations and arrangements would be discussed in the mtg. Team made aware.

## 2023-08-23 NOTE — PROGRESS NOTES
Internal Medicine Progress Note  Patient: Michele Navas  Age/sex: 70 y.o. female  Medical Record #: [id number]      ASSESSMENT/PLAN: (Interval History)  Michele Navas is seen and examined and management for following issues:    Frequent fall, r/o movement disorder  • See Dr Ángel Salgado as OP     Right rib fractures  • Ribs 3-5th  • Continue IS     T8 fracture  • Non-op  • TLSO brace  • For upright t-spine xrays in brace in 2 weeks     Vitamin B12 deficiency  • For IM replacement QOD x 5 doses then to PO (had has 3 doses so far)     HTN  • Home:  Atenolol 50mg qd  • Here:  Atenolol 50mg qd/Norvasc 5mg qd  • Has prn Hydralazine  • On 8/21/23, added Norvasc 5mg qd  • Will increase Norvasc to 5mg BID      Cognitive impairment  • MOCA 3/2021 was 17/30  • Likely has worsened since prev MOCA and is confused here     E.coli UTI  • POA  • Dr Sylwia Caruso sent UA with reflex to cx 2/2 confusion  • Urine cx had >100,000 Ecoli and 30,000-39,000 mixed contaminants  • Started on Keflex 8/21/23     Hypothyroidism  • TSH 0.314/free T4 1.29  • on Levothyroxine 100 mcg qd  • Endo following and they reduced to 88 mcg qd and sent a reverse T3  • For TSH/free T4 4-6 weeks     Severe osteoporosis  • Endo following  • For OP DEXA  • Calcium 500mg BID     Vit D deficiency  • Level was 23.7  • For Vit D 2000IU qd     RA  • At home takes Methotrexate 20mg qWednesday/Prednisone 5mg qd/Flexeril 10mg qhs     HLD  • Lipitor 10mg qd     Anxiety/depression  • At home she was taking Xananx 1mg TID and Prozac 20mg qd  • Per PMR     Enlarged right retrocrural lymph node  • Found on CT  • For repeat CT chest as OP in 3-6 months     Transaminitis  • AST/ALT/AP elevated since previous CMP 8/15  • had CT C/A/P on 8/15 = hepatomegaly  • Dr Sylwia Caruso cut back on Tylenol  • Will watch and get LFTs 8/24/23        Discharge date:  8/30/23       The above assessment and plan was reviewed and updated as determined by my evaluation of the patient on 8/23/2023.     Labs:   Results from last 7 days   Lab Units 08/21/23  0532 08/17/23  0444   WBC Thousand/uL 3.90* 5.06   HEMOGLOBIN g/dL 12.9 12.0   HEMATOCRIT % 37.6 36.2   PLATELETS Thousands/uL 215 174     Results from last 7 days   Lab Units 08/21/23  0532 08/17/23  0444   SODIUM mmol/L 135 136   POTASSIUM mmol/L 3.8 4.4   CHLORIDE mmol/L 103 104   CO2 mmol/L 24 26   BUN mg/dL 13 14   CREATININE mg/dL 0.76 0.74   CALCIUM mg/dL 10.3* 9.4     Results from last 7 days   Lab Units 08/17/23  0444   HEMOGLOBIN A1C % 5.6               Review of Scheduled Meds:  Current Facility-Administered Medications   Medication Dose Route Frequency Provider Last Rate   • acetaminophen  650 mg Oral Q12H Royal Heavenly MD     • ALPRAZolam  0.5 mg Oral HS PRN Royal Heavenly MD     • amLODIPine  5 mg Oral Daily DARIO Pond     • atenolol  50 mg Oral Daily Royal Heavenly MD     • atorvastatin  10 mg Oral Daily With Karina Toscano MD     • bisacodyl  5 mg Oral Daily PRN Royal Heavenly MD     • bisacodyl  10 mg Rectal Daily PRN Royal Heavenly MD     • bisacodyl  10 mg Rectal Once Senait Thomas MD     • calcium carbonate  1 tablet Oral BID With Meals Marisol Aceves MD     • cephalexin  500 mg Oral [facility] Royal Heavenly MD     • cholecalciferol  2,000 Units Oral Daily Marisol Aceves MD     • cyanocobalamin  1,000 mcg Intramuscular Every Other Day Royal Heavenly MD     • enoxaparin  30 mg Subcutaneous Q12H [facility] Royal Heavenly MD     • FLUoxetine  20 mg Oral Daily Royal Heavenly MD     • folic acid  1 mg Oral Daily Royal Heavenly MD     • hydrALAZINE  25 mg Oral Q8H PRN DARIO Pond     • levothyroxine  88 mcg Oral Early Morning Marisol Aceves MD     • lidocaine  1 patch Topical Daily Royal Heavenly MD     • methocarbamol  250 mg Oral Q6H [facility] Royal Heavenly MD     • methotrexate  20 mg Oral Weekly Royal Heavenly MD     • ondansetron  4 mg Oral Q6H PRN Royal Burdick MD     • oxyCODONE  5 mg Oral Q4H PRN Maryanne Sullivan MD     • oxyCODONE  2.5 mg Oral Q4H PRN Maryanne Sullivna MD     • phenazopyridine  200 mg Oral QPM Maryanne Sullivan MD     • polyethylene glycol  17 g Oral Daily Maryanne Sullivan MD     • predniSONE  5 mg Oral HS Maryanne Sullivan MD     • QUEtiapine  12.5 mg Oral HS PRN Maryanne Sullivan MD     • senna  1 tablet Oral BID Hilary Bailey MD         Subjective/ HPI: Patient seen and examined. Patients overnight issues or events were reviewed with nursing or staff during rounds or morning huddle session. New or overnight issues include the following:     No new or overnight issues. Offers no complaints    ROS:   A 10 point ROS was performed; negative except as noted above.        Imaging:     No orders to display       *Labs /Radiology studies reviewed  *Medications reviewed and reconciled as needed  *Please refer to order section for additional ordered labs studies  *Case discussed with primary attending during morning huddle case rounds    Physical Examination:  Vitals:   Vitals:    08/22/23 1417 08/22/23 2112 08/23/23 0535 08/23/23 1003   BP: 140/68 143/64 158/79 150/84   BP Location: Right arm Right arm Left arm    Pulse: 65 66 69 78   Resp: 18 18 18    Temp: 98.2 °F (36.8 °C) (!) 97.4 °F (36.3 °C) (!) 97.4 °F (36.3 °C)    TempSrc: Oral Oral Oral    SpO2: 95% 91% 93%    Weight:   57.8 kg (127 lb 6.8 oz)    Height:           General Appearance: no distress, conversive  HEENT: PERRLA, conjuctiva normal; oropharynx clear; mucous membranes moist   Neck:  Supple, normal ROM  Lungs: CTA, normal respiratory effort, no retractions, expiratory effort normal  CV: regular rate and rhythm; no rubs/murmurs/gallops, PMI normal   ABD: soft; ND/NT; +BS  EXT: no edema  Skin: normal turgor, normal texture, no rashes  Psych: affect normal, mood normal  Neuro: AA      The above physical exam was reviewed and updated as determined by my evaluation of the patient on 8/23/2023. Invasive Devices     None                    VTE Pharmacologic Prophylaxis: Enoxaparin  Code Status: Level 3 - DNAR and DNI  Current Length of Stay: 5 day(s)      Total time spent:  30 minutes with more than 50% spent counseling/coordinating care. Counseling includes discussion with patient re: progress  and discussion with patient of his/her current medical state/information. Coordination of patient's care was performed in conjunction with primary service. Time invested included review of patient's labs, vitals, and management of their comorbidities with continued monitoring. In addition, this patient was discussed with medical team including physician and advanced extenders. The care of the patient was extensively discussed and appropriate treatment plan was formulated unique for this patient. Medical decision making for the day was made by supervising physician unless otherwise noted in their attestation statement. ** Please Note:  voice to text software may have been used in the creation of this document.  Although proof errors in transcription or interpretation are a potential of such software**

## 2023-08-23 NOTE — PROGRESS NOTES
OT Daily Treatment Note       08/23/23 1218   Pain Assessment   Pain Assessment Tool 0-10   Pain Score No Pain   Restrictions/Precautions   Precautions Bed/chair alarms;Cognitive; Fall Risk;Spinal precautions;Pain;Supervision on toilet/commode   ROM Restrictions Yes  (spinal precautions)   Braces or Orthoses TLSO   Lifestyle   Autonomy "so what are we going to do today? Watch some TV or something?"   Shower/Bathe Self   Type of Assistance Needed Physical assistance   Physical Assistance Level 26%-50%   Comment SB completed seated at EOB requiring CS for safety to ensure pt did not attempt to self-transfer/ambulate. pt able to bathe UB and upper LE with constant cues for sequencing as pt would continuously say "what do I do next?" and without sequencing cues, would perseverate on bathing chest. OT assisted with bathing distal LE to maintain spinal precautions and assisting with lorena bathing for throughness. noted an odor and redness in pts groin area - OT notified JORGE wilson and LLOYD Youngblood. Shower/Bathe Self CARE Score 3   Bathing   Assessed Bath Style Sponge Bath   Upper Body Dressing   Type of Assistance Needed Physical assistance   Physical Assistance Level 51%-75%   Comment able to don/doff shirt with CS for safety, TA required for TLSO brace. no new learning observed and will most likely require TA for TLSO brace mgmt at home.    Upper Body Dressing CARE Score 2   Lower Body Dressing   Type of Assistance Needed Physical assistance   Physical Assistance Level 25% or less   Comment able to thread over BLE via cross leg technique, required assistance in stand to thoroughly don over hips in stance   Lower Body Dressing CARE Score 3   Putting On/Taking Off Footwear   Type of Assistance Needed Supervision   Physical Assistance Level No physical assistance   Comment able to don/doff socks via cross leg technique   Putting On/Taking Off Footwear CARE Score 4   Sit to Stand   Type of Assistance Needed Incidental touching Physical Assistance Level No physical assistance   Comment CG with RW   Sit to Stand CARE Score 4   Bed-Chair Transfer   Type of Assistance Needed Incidental touching   Physical Assistance Level No physical assistance   Comment CG SPT w RW   Chair/Bed-to-Chair Transfer CARE Score 4   Toileting Hygiene   Type of Assistance Needed Incidental touching   Physical Assistance Level No physical assistance   Comment CG in stance during CM, can complete hygiene while seated   Toileting Hygiene CARE Score 4   Toilet Transfer   Type of Assistance Needed Incidental touching   Physical Assistance Level No physical assistance   Comment CG with RW on standard toilet height   Toilet Transfer CARE Score 4   Cognition   Overall Cognitive Status Impaired   Arousal/Participation Alert; Cooperative   Attention Attends with cues to redirect   Orientation Level Disoriented to time;Disoriented to place   Memory Decreased long term memory;Decreased recall of biographical information;Decreased short term memory;Decreased recall of recent events;Decreased recall of precautions   Following Commands Follows one step commands inconsistently   Comments pt cont to demo no new learning and decreased memory, insight, safety and problem solving. Activity Tolerance   Activity Tolerance Patient tolerated treatment well   Assessment   Treatment Assessment Pt participated in skilled OT session with focus on ADL retraining, functional transfer training, cognitive reorientation, compensatory technique education and continued education. See flowsheet for details of session and current functional status.  Pt is limited by decreased ROM, impaired balance, decreased endurance, decreased coordination, increased fall risk, decreased ADLS, decreased IADLS, decreased activity tolerance, decreased safety awareness, impaired judgement, decreased cognition and decreased strength and requires skilled OT services to increase independence and safety with ADL completion in prep for DC home. Plan to continue ADL retraining, functional transfer training, UE strengthening/ROM, endurance training, compensatory technique education, continued education, energy conservation and activity engagement  to address barriers mentioned above. Prognosis Fair   Problem List Decreased strength;Decreased endurance; Impaired balance;Decreased mobility; Decreased coordination;Decreased cognition; Impaired judgement;Decreased safety awareness;Orthopedic restrictions;Pain   Barriers to Discharge Inaccessible home environment   Plan   Treatment/Interventions ADL retraining;Functional transfer training; Therapeutic exercise; Endurance training;Patient/family training; Compensatory technique education   Progress Slow progress, cognitive deficits   OT Therapy Minutes   OT Time In 0830   OT Time Out 0910   OT Total Time (minutes) 40   OT Mode of treatment - Individual (minutes) 40   OT Mode of treatment - Concurrent (minutes) 0   OT Mode of treatment - Group (minutes) 0   OT Mode of treatment - Co-treat (minutes) 0   OT Mode of Treatment - Total time(minutes) 40 minutes   OT Cumulative Minutes 280   Therapy Time missed   Time missed?  No

## 2023-08-23 NOTE — PCC NURSING
70 y.o. female with Sjogrens and RA (on methotrexate), HTN, hypothyroidism, gastroparesis, depression, previous mild cognitive impairment (MOCA 17/30 in 2021), history of meningioma s/p resection in 2012, pelvic fracture after fall down stairs in 2021, s/p fall 8/15 after falling backwards and landing on the ground 6 days prior. She had headaches and severe back pain and went to the hospital. She also seemed to have multiple more falls. She was found to have multiple R rib fractures (3-5). CTH was unremarkable. There was a question of possible movement disorder and Neurology was consulted who ordered MRI Brain, C-Spine and T-Spine. Of note she is on Xanax and Flexeril chronically (mostly taken during bedtime). Geriatrics recommended a UA which was not done inpatient. She was treated for MASD as well. MRI Brain showed moderate chronic microangiopathy, significantly increased, C-Spine without cord compression or cord signal abnormality, and T-Spine with a mild/acte T8 compression fracture without cord compression or cord signal abnormality. Per Neurology she has a Vascular Dementia. There was also some discussion of possible PSP. Her  had also reported that it didn't seem the patient was keeping track of her medications properly, and certainly with her medication regimen is at risk for polypharmacy. Neurosurgery was consulted for her T8 compression fracture, and ordered TLSO when OOB/ > 45 degrees and follow-up in 2 weeks. 8/23/23- Pt is alert to self, is impulsive,She is continual 1:1 observation for safety. Pt requires assessment and monitoring of VS, labs, skin checks. Pt turns and repositions self in bed. She requires assistence with mgmt of TLSO brace when OOB and >45 degrees. She reports adequate pain mgmt with current regimen. She requires assistence with hygiene due to incontinence of bladder. LBM was 8/20/23. She requires po bowel regime to maintain regularity.  Skin at present is intact, sacrum is hyperpigmented requiring monitoring and maintenance of allevyn as per ordered. Safety and fall precautions are maintained and reinforced.

## 2023-08-23 NOTE — PROGRESS NOTES
08/23/23 1030   Pain Assessment   Pain Assessment Tool 0-10   Pain Score No Pain   Restrictions/Precautions   Precautions Bed/chair alarms;Cognitive; Fall Risk;Spinal precautions;Supervision on toilet/commode;Pain   ROM Restrictions Yes  (spinal precautions)   Braces or Orthoses TLSO   Comprehension   Comprehension (FIM) 4 - Needs words repeated   Expression   Expression (FIM) 4 - Expresses basic info/needs 75-90% of time   Social Interaction   Social Interaction (FIM) 5 - Interacts appropriately with others 90% of time   Problem Solving   Problem solving (FIM) 2 - Solves basic problems 25-49% of time   Memory   Memory (FIM) 2 - Recognizes, recalls/performs 25-49%   Speech/Language/Cognition Assessmetn   Treatment Assessment Pt seen for skilled speech therapy session targeting cognitive linguistic communication skills. Pt alert and interactive- completed the following skilled therapy tasks. Review of daily events- pt unable to recall therapy tasks prior and needed cuing to refer to memory log. Once reviewing OT session notes, pt stating "oh yeah I remember that". Reviewed general orientation with place/situation. Pt cont's to state "[place]" for current location but when cued to [facility] and "still being in the hospital for rehab" pt stating "well it doesn't feel like a hospital". Cued as to reason for hospitalization as well as her goals with working on rehab working on balance, walking, endurance, self care tasks, thinking skills. Pt next completed simple problem solving with ID'ing BEST thing to do. Pt was able to complete with 3/6acc increasing with verbal cues. Pt at times perseverating on previous question and needing cues to move on to next question as well as verbal cues to read over statements again as pt did not comprehend what some of the [facility] answers meant. Filled in Memory Log at end of session, all items in reach, cued to use call bell if needing assistance.  Pt overall is improving with skilled SLP services and will cont to benefit to maximize overall cognitive linguistic communication abilities at this time. SLP Therapy Minutes   SLP Time In 56   SLP Time Out 1100   SLP Total Time (minutes) 30   SLP Mode of treatment - Individual (minutes) 30   SLP Mode of treatment - Concurrent (minutes) 0   SLP Mode of treatment - Group (minutes) 0   SLP Mode of treatment - Co-treat (minutes) 0   SLP Mode of Treatment - Total time(minutes) 30 minutes   SLP Cumulative Minutes 150   Therapy Time missed   Time missed?  No

## 2023-08-23 NOTE — PROGRESS NOTES
Physical Medicine and Rehabilitation Progress Note  Wendy Fine 70 y.o. female MRN: [id number]  Unit/Bed#: -01 Encounter: [id number]    To Review: Denver Hawks is a 70 y.o. female with Sjogrens and RA (on methotrexate), HTN, hypothyroidism, gastroparesis, depression, previous mild cognitive impairment ([facility] 17/30 in 2021), history of meningioma s/p resection in 2012, pelvic fracture after fall down stairs in 2021, who presented to the [facility] 8/15 after falling backwards and landing on the ground 6 days prior. She had headaches and severe back pain and went to the hospital. She also seemed to have multiple more falls. She was found to have multiple R rib fractures (3-5). CTH was unremarkable. There was a question of possible movement disorder and Neurology was consulted who ordered MRI Brain, C-Spine and T-Spine. Of note she is on Xanax and Flexeril chronically (mostly taken during bedtime). Geriatrics recommended a UA which was not done inpatient. She was treated for MASD as well. MRI Brain showed moderate chronic microangiopathy, significantly increased, C-Spine without cord compression or cord signal abnormality, and T-Spine with a mild/acte T8 compression fracture without cord compression or cord signal abnormality. She most likely, per Neurology has a Vascular Dementia. There was also some discussion of possible PSP. Her  had also reported that it didn't seem the patient was keeping track of her medications properly, and certainly with her medication regimen is at risk for polypharmacy. Neurosurgery was consulted for her T8 compression fracture, and ordered TLSO when OOB/ > 45 degrees and follow-up in 2 weeks.  She has a Vitamin B1 pending, SPEP pending,  TSH was 0.314 with T4 1.29, A1C was 5.6, and B12 was 153 (she was started on 5 doses of Vitamin B12 - first dose given today, and to be given every other day, followed by daily 1000mcg Vitamin B12). She was admitted to the [facility] on 8/18/2023. Chief Complaint: No new concerns    Interval History/Subjective:  Poor recall, poor insight. Occasionally with diffuse pain, but tolerating therapies. Uses oxycodone sparingly. Denies any new CP, SOB, fevers, chills, N/V, abdominal pain. Last BM 8/20. Mostly continent of bowel/bladder with timed voiding. ROS:  A 10 point review of systems was negative except for what is noted in the HPI. Today's Changes:  1. Sleep logs and continue 1:1 at night with frequent checks during the day  2. Will plan to call daughter tomorrow with updates. Plan for family meeting tomorrow. 3. Nystatin powder added for groin moisture. 4. Needs one more dose of IM B12, and then will transition to oral supplements. 5. IM adjusting BP. 6. Check Folate level in addition to labs in the AM.  7. Continue Keflex for UTI. Total time spent:  50 minutes, with more than 50% spent counseling/coordinating care. Counseling includes discussion with patient re: progress in therapies, functional issues observed by therapy staff, and discussion with patient his/her current medical state/wellbeing. Coordination of patient's care was performed in conjunction with Internal Medicine service to monitor patient's labs, vitals, and management of their comorbidities. In addition, this patient was discussed by the interdisciplinary team in weekly case conference today. The care of the patient was extensively discussed with all care providers and an appropriate rehabilitation plan was formulated unique for this patient. Barriers were identified preventing progression of therapy and appropriate interventions were discussed with each discipline. Please see the team note for input from all disciplines regarding barriers, intervention, and discharge planning.     Assessment/Plan:    * Progressive cognitive dysfunction  Assessment & Plan   reports progressive cognitive decline that accelerated the past two weeks  - Likely vascular dementia given increased moderate chronic microangiopathy  - ? Amyloid angiopathy per radiology  - Cognitive impairment resulting in mismanagement of her medications - which include steroids, xanax, flexeril, prozac with polypharmacy contributing - particularly since she takes the Flexeril/Xanax at night and that is when most of her falls were occurring.   - Significant Vitamin B12 deficiency contributing  - Also found so far to have slightly high T4, and low TSH  - Of note has hepatomegaly (albeit stable) and increased alk phos on admission. Ammonia level on admission was normal (18)   - UA also positive on admission for E.coli UTI.   - Exam with patchy sensory impairment, with clear impairment in proprioception in great toes. Ataxia/motor planning difficulties. Clearly has impaired balance - this could be exacerbated by the B12 deficiency as well. - Recommend outpatient Neuro/Senior Care follow-up  - Consult Endocrine to comment on TSH/T4 - adjustment of Levothyroxine potentially vs. In setting of recent hospitalization/fall.  - Follow-up work-up: B1, B6   - Normal: A1C, MRI C-Spine, T-Spine shows no cord compression/cord signal abnormality, Ammonia level, SPEP  - Treatment: PT/OT/SLP 3-5 hours/day, 5-7 days/week. See Vitamin B12 deficiency, UTI   - Discussed polypharmacy   - Will not schedule alprazolam at this time, as so far seems to be tolerating (did not take it during hospitalization). Watch closely for signs of withdrawal. Decreased PRN dose. If she does not use while here, will discontinue. - I find robaxin a better tolerated muscle relaxer, can trial here. Stopped flexeril. Tolerating well with relief. Currently requiring Q15min checks during the day, 1:1 overnight   - Restlessness at night likely 2/2 frequency/urgency with UTI   - Adding pyridium at night for next 3 days.      Compression fracture of T8 vertebra (HCC)  Assessment & Plan  In the past has broken her pelvis after a fall  Chronically on steroids, and likely should have DXA and osteoporosis work-up   - Outpatient f/u with Endocrinology - appreciate recs, patient likely with severe osteoporosis   - Vitamin D 26.6 - started on 2000 units daily    - started on calcium supplement by Endocrinology  Pain control as below  TLSO when OOB and HOB > 45 degrees. Thoracic spine precautions  Plan for repeat XR ~9/1 with follow-up with Neurosurgery at that time. Vitamin B12 deficiency  Assessment & Plan  8/17 level 153  8/18 received 1000mcg IM shot  - Will give 4 more doses every other day starting on 8/20.  - Then start 1000mcg daily on 8/27  - Monitor proprioception, sensation, ataxia/motor planning.   - Outpatient f/u with Neurology  - Nutrition consult to evaluate dietary intake. Closed fracture of multiple ribs of right side  Assessment & Plan  2/2 fall  R 3-5  Currently on RA  Pain control as below  Incentive spirometry. Osteoporosis  Assessment & Plan  Chronically on steroids, and likely should have DXA and osteoporosis work-up   - Consulted endocrine on admission.    - Vitamin D 26.6 - started on 2000 units daily    - started on calcium supplement by Endocrinology  - Outpatient f/u with endocrine   - She will benefit from getting DEXA scan as outpatient and will qualify for anabolic agents for treatment as outpt    Acute cystitis without hematuria  Assessment & Plan  She reports episodes of urgency at times  - UA with pan susceptible E.coli 100k CFU   - 8/21 started on Keflex for 5 days    - Pyridium at night to help with urgency/frequency  -  reports a pattern of patient holding her urine until the last minute and then rushing to the bathroom  - PVRs have been low continued timed voids    Abnormal CT of the abdomen  Assessment & Plan  1. Stable hepatomegaly - checking CMP in the AM. Alk phos elevated on admission, but stable. If still elevated, will add GGT given recent T8 fracture.  Currently on Tylenol Q8hrs scheduled. May need to decrease dose. 2. Stable R renal cyst    Outpatient f/u with PCP    Adenopathy  Assessment & Plan  In the past has had "stable distal paraesophageal and gastrohepatic ligament adenopathy of uncertain etiology""  Here also noted to have R retrocrural lymph node adjacent to the descending thoracic aorta, juts proximal to the diaphragmatic hiatus - recommended for CT of the chest in 3-6 months to assess for stability. Onychomycosis  Assessment & Plan  Consult podiatry for very overgrown toenails  Appreciate their debridement. Oral dyskinesia  Assessment & Plan  This is stable and was noted in her [facility] admission, where it was felt to be due to reglan. She is no longer on this medication. Patient attributes to her Sjogrens. Would not add additional medication to address this at this time. Can f/u with Neurology outpatient    Gastroparesis  Assessment & Plan  Was previously on reglan, does not appear to be on this medication at this time. May need small, more frequent meals  Monitor for symptoms. Hypothyroidism  Assessment & Plan  8/17 TSH 0.314 and T4 1.29  Previously appeared to be on 88-75mcg daily. Not sure when 100mcg was initiated and by who. Consulted endocrine to comment on adjusting dose vs. In setting of recent fall/injury/hospitalization   - Reverse T3 pending, and dose decreased to 88mcg daliy   - Recheck thyroid panel in 4-6 weeks. Anxiety and depression  Assessment & Plan  Currently on prozac 20mg daily  Also on xanax 1mg QHS PRN chronically, which it seems she was taking scheduled at home. Has not been on this only PRN here. - Monitor for now PRN - but monitor closely for any symptoms of withdrawal or increased agitation off this medication.   - Decreased dose to 0.5mg QHS PRN. - Will not make scheduled at this time.    Question of this contributed to her worsening falls (as she was likely not managing her meds well at home given her cognitive impairment)  Would recommend a wean of this medication over time. That being said, this medication is also not showing up in her PDMP. Hypertension  Assessment & Plan  Home: Atenolol 50mg daily, Losartan 25mg daily  Here: Atenolol 50mg daily, Norvasc 5mg BID, Hydralazine PRN  Monitor and adjust as appropriate  IM consulted and assisting with management. Sjogren's disease [facility])  Assessment & Plan  Follows with Dr. Dotty Klein outpatient  Chronically on prednisone and methotrexate. Continue for now. Health Maintenance  #Pain: Tylenol Q8hrs scheduled, Flexeril (will decrease to 5mg QHS), Lidoderm patch, Oxycodone 2.5-5mg (will try to de-escalate sooner rather than later). #Bowel: Last BM 8/20. Miralax to daily. Added Bisacodyl suppository and tabs PRN. #Bladder: Voiding and appears to be continent. PVR x3 low. #Skin/Pressure Injury Prevention: Turn Q2hr in bed, with weight shifts [id number] in wheelchair. Float heels in bed. #DVT Prophylaxis: Lovenox, SCDs. #GI Prophylaxis: None at this time. #Code Status:  DNR/DNI  #FEN: Regular/Thins  #Dispo: Team 8/23: ADD 8/30 with support from family. Cognitively not demonstrating any good follow-through, recall, carry-over. Very impaired safety awareness and insight. Plan for family meeting tomorrow. #F/U: PCP, Neuro, Geriatrics, Rheumatology,       Objective:    Functional Update:  PT: modA transfers, bed mobility, modA ambulation 76' with RW, maxA stairs   OT: Sup eating, CGA grooming, Eobny bathing, Ebony LB dressing, modA UB dressing, CGA toileting, CGA toilet transfers   SLP: CLQT 1.4 out of 4 -severely impaired cognition. Ebony for expression/social interaction, but modA for comprehension, and maxA for problem solving/memory.      Allergies per EMR    Physical Exam:  Temp:  [97.4 °F (36.3 °C)-98.2 °F (36.8 °C)] 97.4 °F (36.3 °C)  HR:  [65-69] 69  Resp:  [18] 18  BP: (140-158)/(62-79) 158/79  Oxygen Therapy  SpO2: 93 %    Gen: No acute distress, Well-nourished, well-appearing. HEENT: Moist mucus membranes, Normocephalic/Atraumatic  Cardiovascular: Regular rate, rhythm, S1/S2. Distal pulses palpable  Heme/Extr: No edema  Pulmonary: Non-labored breathing. Lungs CTAB  : No medeiros  GI: Soft, non-tender, non-distended. BS+  MSK: Ambulating well with RW. Needs cues for safety. Integumentary: Skin is warm, dry  Neuro: Awake and alert. Speech is fluent and appropriate. Markedly impaired recall, judgment, insight, and safety awareness. Continues with some ataxia - most notable in therapies, proprioceptive impairments. Psych: Normal mood and affect.      Diagnostic Studies: Reviewed, no new imaging    Laboratory:  Reviewed   Results from last 7 days   Lab Units 08/21/23  0532 08/17/23  0444   HEMOGLOBIN g/dL 12.9 12.0   HEMATOCRIT % 37.6 36.2   WBC Thousand/uL 3.90* 5.06     Results from last 7 days   Lab Units 08/21/23  0532 08/17/23  0444   BUN mg/dL 13 14   POTASSIUM mmol/L 3.8 4.4   CHLORIDE mmol/L 103 104   CREATININE mg/dL 0.76 0.74   AST U/L 63*  --    ALT U/L 103*  --             Patient Active Problem List   Diagnosis   • Parotid gland enlargement   • Sjogren's disease ([address])   • Closed nondisplaced fracture of anterior wall of right acetabulum (HCC)   • Closed nondisplaced fracture of right pubis (HCC)   • Fall   • Hypertension   • Pain of right upper extremity   • Multiple closed fractures of pelvis (HCC)   • Anxiety and depression   • Hypothyroidism   • Abnormal findings on imaging test   • Gastroparesis   • Oral dyskinesia   • Potential for cognitive impairment   • Anemia   • Hypokalemia   • WELLS (dyspnea on exertion)   • Lightheadedness   • Pain   • Abnormal ECG   • Chest pain   • Pleural effusion on right   • Atelectasis of right lung   • Low HDL (under 40)   • Dermatitis associated with moisture   • Onychomycosis   • Enlarged and hypertrophic nails   • Adenopathy   • Abnormal CT of the abdomen   • Hyponatremia   • Progressive cognitive dysfunction • Fungal skin infection   • Rheumatoid arteritis (HCC)   • Closed fracture of multiple ribs of right side   • Movement disorder   • Vitamin B12 deficiency   • Compression fracture of T8 vertebra (HCC)   • Acute cystitis without hematuria   • Osteoporosis         Medications  Current Facility-Administered Medications   Medication Dose Route Frequency Provider Last Rate   • acetaminophen  650 mg Oral Q12H Dominique Bassett MD     • ALPRAZolam  0.5 mg Oral HS PRN Dominique Bassett MD     • amLODIPine  5 mg Oral Daily SUE LozanoNP     • atenolol  50 mg Oral Daily Dominique Bassett MD     • atorvastatin  10 mg Oral Daily With Rosemarie Sauceda MD     • bisacodyl  5 mg Oral Daily PRN Dominique Bassett MD     • bisacodyl  10 mg Rectal Daily PRN Dominique Bassett MD     • bisacodyl  10 mg Rectal Once Augie Gu MD     • calcium carbonate  1 tablet Oral BID With Meals Yuval Alberts MD     • cephalexin  500 mg Oral [facility] Dominique Bassett MD     • cholecalciferol  2,000 Units Oral Daily Yuval Alberts MD     • cyanocobalamin  1,000 mcg Intramuscular Every Other Day Dominique Bassett MD     • enoxaparin  30 mg Subcutaneous Q12H [facility] Dominique Bassett MD     • FLUoxetine  20 mg Oral Daily Dominique Bassett MD     • folic acid  1 mg Oral Daily Dominique Bassett MD     • hydrALAZINE  25 mg Oral Q8H PRN SUE LozanoNP     • levothyroxine  88 mcg Oral Early Morning Yuval Alberts MD     • lidocaine  1 patch Topical Daily Dominique Bassett MD     • methocarbamol  250 mg Oral Q6H [facility] Dominique Bassett MD     • methotrexate  20 mg Oral Weekly Dominique Bassett MD     • ondansetron  4 mg Oral Q6H PRN Dominique Bassett MD     • oxyCODONE  5 mg Oral Q4H PRN Dominique Bassett MD     • oxyCODONE  2.5 mg Oral Q4H PRN Dominique Bassett MD     • phenazopyridine  200 mg Oral QPM Dominique Bassett MD     • polyethylene glycol  17 g Oral Daily Dominique Bassett MD     • predniSONE  5 mg Oral HS Dominique Bassett MD • QUEtiapine  12.5 mg Oral HS PRN Malissa Colvin MD     • senna  1 tablet Oral BID Manuel Ortiz MD            ** Please Note: Fluency Direct voice to text software may have been used in the creation of this document.  **

## 2023-08-23 NOTE — PROGRESS NOTES
08/23/23 0910   Pain Assessment   Pain Assessment Tool 0-10   Pain Score No Pain   Restrictions/Precautions   Precautions 1:1;Bed/chair alarms;Cognitive; Fall Risk;Impulsive;Spinal precautions;Pain;Supervision on toilet/commode   ROM Restrictions Yes  (spinal precautions)   Braces or Orthoses TLSO   Oral Hygiene   Type of Assistance Needed Set-up / clean-up   Physical Assistance Level No physical assistance   Comment S/U A while seated to brush teeth, with A required to open toothpaste cap   Oral Hygiene CARE Score 5   Grooming   Able To Brush/Clean Teeth   Sit to Stand   Type of Assistance Needed Incidental touching;Verbal cues; Adaptive equipment   Physical Assistance Level No physical assistance   Comment CGA w/RW, max vc's to utilize RW and for hand placement   Sit to Stand CARE Score 4   Bed-Chair Transfer   Type of Assistance Needed Incidental touching;Verbal cues; Adaptive equipment   Physical Assistance Level No physical assistance   Comment CGA SPT w/RW, vc's for RW mgmt as pt tends to abandon RW prior to fully backing up to surface   Chair/Bed-to-Chair Transfer CARE Score 4   Functional Standing Tolerance   Time 4min 6sec, 1min 15sec   Activity parquetry   Comments Pt engaged in parquetry pattern matching activity in stance at raised tabletop with CGA, and unilateral UE release (LUE crossing midline) to retrieve pieces. Focus was on improving standing tolerance during fxl ADL tasks. Pt tolerated well, no LOB, able to complete approx 75% of shape/color matching correctly w/o cues, but for the remaining 25% pt required max A to correctly match shapes/colors. Exercise Tools   Exercise Tools Yes   UE Ergometer Seated w/Sup, tabletop UEB bilaterally against min resistance for 5min prograde 5min retrograde for increased UE strength and endurance, for increased indep w/fxl transfers. Pt tolerated well with rest break x1 to manage fatigue and SOB.  Vitals monitored t/o, with SPO2 between 96-97% and HR 71-74bpm with exertion. Cognition   Overall Cognitive Status Impaired   Arousal/Participation Alert; Cooperative   Attention Attends with cues to redirect   Orientation Level Disoriented to time;Disoriented to place   Memory Decreased long term memory;Decreased recall of biographical information;Decreased short term memory;Decreased recall of recent events;Decreased recall of precautions   Following Commands Follows one step commands inconsistently   Activity Tolerance   Activity Tolerance Patient tolerated treatment well   Assessment   Treatment Assessment Pt seen for 50min skilled OT session focused on oral care, fxl transfers w/RW (carryover of hand placement and RW mgmt), UE strengthening, endurance training, and standing tolerance, for increased independence w/ADLs/IADLs and decreased caregiver burden. See detailed descriptions of fxl performance above. Pt tolerated session well, although continues to be limited by decreased ROM, balance, endurance, coordination, act paulino, fxl cognition, safety awareness, judgement, and strength. Pt would benefit from continued skilled OT focused on ADL retraining, transfer training, UE strengthening/ROM, endurance training, standing balance/tolerance, compensatory technique edu, ECT edu, and activity tolerance. Pt anticipated to D/C 8/30 with family meeting scheduled for tomorrow. Prognosis Fair   Problem List Decreased strength;Decreased range of motion;Decreased endurance; Impaired balance;Decreased mobility; Decreased coordination;Decreased cognition; Impaired judgement;Decreased safety awareness;Orthopedic restrictions;Pain   Barriers to Discharge Inaccessible home environment   Plan   Treatment/Interventions ADL retraining;Functional transfer training; Therapeutic exercise; Endurance training;Cognitive reorientation;Patient/family training;Equipment eval/education; Bed mobility; Compensatory technique education   Progress Slow progress, cognitive deficits   Recommendation   OT Discharge Recommendation   (pending)   OT Therapy Minutes   OT Time In 0910   OT Time Out 1000   OT Total Time (minutes) 50   OT Mode of treatment - Individual (minutes) 50   OT Mode of treatment - Concurrent (minutes) 0   OT Mode of treatment - Group (minutes) 0   OT Mode of treatment - Co-treat (minutes) 0   OT Mode of Treatment - Total time(minutes) 50 minutes   OT Cumulative Minutes 330   Therapy Time missed   Time missed?  No

## 2023-08-23 NOTE — PLAN OF CARE
Problem: PAIN - ADULT  Goal: Verbalizes/displays adequate comfort level or baseline comfort level  Description: Interventions:  - Encourage patient to monitor pain and request assistance  - Assess pain using appropriate pain scale  - Administer analgesics based on type and severity of pain and evaluate response  - Implement non-pharmacological measures as appropriate and evaluate response  - Consider cultural and social influences on pain and pain management  - Notify physician/advanced practitioner if interventions unsuccessful or patient reports new pain  Outcome: Progressing     Problem: INFECTION - ADULT  Goal: Absence or prevention of progression during hospitalization  Description: INTERVENTIONS:  - Assess and monitor for signs and symptoms of infection  - Monitor lab/diagnostic results  - Monitor all insertion sites, i.e. indwelling lines, tubes, and drains  - Monitor endotracheal if appropriate and nasal secretions for changes in amount and color  - Cobalt appropriate cooling/warming therapies per order  - Administer medications as ordered  - Instruct and encourage patient and family to use good hand hygiene technique  - Identify and instruct in appropriate isolation precautions for identified infection/condition  Outcome: Progressing     Problem: SAFETY ADULT  Goal: Patient will remain free of falls  Description: INTERVENTIONS:  - Educate patient/family on patient safety including physical limitations  - Instruct patient to call for assistance with activity   - Consult OT/PT to assist with strengthening/mobility   - Keep Call bell within reach  - Keep bed low and locked with side rails adjusted as appropriate  - Keep care items and personal belongings within reach  - Initiate and maintain comfort rounds  - Make Fall Risk Sign visible to staff  - Offer Toiletiours, in advance of nrm  - Obtain necessary fall risk management e  - Apply yellow socks and bracelet for high fall risk patients  - Consider moving patient to room near nurses station  Outcome: Progressing  Goal: Maintain or return to baseline ADL function  Description: INTERVENTIONS:  -  Assess patient's ability to carry out ADLs; assess patient's baseline for ADL function and identify physical deficits which impact ability to perform ADLs (bathing, care of mouth/teeth, toileting, grooming, dressing, etc.)  - Assess/evaluate cause of self-care deficits   - Assess range of motion  - Assess patient's mobility; develop plan if impaired  - Assess patient's need for assistive devices and provide as appropriate  - Encourage maximum independence but intervene and supervise when necessary  - Involve family in performance of ADLs  - Assess for home care needs following discharge   - Consider OT consult to assist with ADL evaluation and planning for discharge  - Provide patient education as appropriate  Outcome: Progressing  Goal: Maintains/Returns to pre admission functional level  Description: INTERVENTIONS:  - Perform BMAT or MOVE assessment daily.   - Set and communicate daily mobility goal to care team and patient/family/caregiver. - Collaborate with rehabilitation services on mobility goals if consulted  - Perform Ratimes a day. - Reposition pours.   - D  - Amb  - Out of bed to brandon  - Out of bed fo  - Out of bed for toileting  - Record patient progress and toleration of activity level   Outcome: Progressing     Problem: DISCHARGE PLANNING  Goal: Discharge to home or other facility with appropriate resources  Description: INTERVENTIONS:  - Identify barriers to discharge w/patient and caregiver  - Arrange for needed discharge resources and transportation as appropriate  - Identify discharge learning needs (meds, wound care, etc.)  - Arrange for interpretive services to assist at discharge as needed  - Refer to Case Management Department for coordinating discharge planning if the patient needs post-hospital services based on physician/advanced practitioner order or complex needs related to functional status, cognitive ability, or social support system  Outcome: Progressing     Problem: Prexisting or High Potential for Compromised Skin Integrity  Goal: Skin integrity is maintained or improved  Description: INTERVENTIONS:  - Identify patients at risk for skin breakdown  - Assess and monitor skin integrity  - Assess and monitor nutrition and hydration status  - Monitor labs   - Assess for incontinence   - Turn and reposition patient  - Assist with mobility/ambulation  - Relieve pressure over bony prominences  - Avoid friction and shearing  - Provide appropriate hygiene as needed including keeping skin clean and dry  - Evaluate need for skin moisturizer/barrier cream  - Collaborate with interdisciplinary team   - Patient/family teaching  - Consider wound care consult   Outcome: Progressing     Problem: MOBILITY - ADULT  Goal: Maintain or return to baseline ADL function  Description: INTERVENTIONS:  -  Assess patient's ability to carry out ADLs; assess patient's baseline for ADL function and identify physical deficits which impact ability to perform ADLs (bathing, care of mouth/teeth, toileting, grooming, dressing, etc.)  - Assess/evaluate cause of self-care deficits   - Assess range of motion  - Assess patient's mobility; develop plan if impaired  - Assess patient's need for assistive devices and provide as appropriate  - Encourage maximum independence but intervene and supervise when necessary  - Involve family in performance of ADLs  - Assess for home care needs following discharge   - Consider OT consult to assist with ADL evaluation and planning for discharge  - Provide patient education as appropriate  Outcome: Progressing  Goal: Maintains/Returns to pre admission functional level  Description: INTERVENTIONS:  - Perform BMAT or MOVE assessment daily.   - Set and communicate daily mobility goal to care team and patient/family/caregiver.    - Collaborate with rehabilitation services on mobility goals if consulted  - Perform Rames a day. - Repositionurs.   - Dangle patien  - Ambulate patien  - Out of be  - Out of bed for toileting  - Record patient progress and toleration of activity level   Outcome: Progressing

## 2023-08-23 NOTE — PROGRESS NOTES
08/23/23 1324   Pain Assessment   Pain Assessment Tool 0-10   Pain Score 8   Pain Location/Orientation Location: Back   Pain Onset/Description Onset: Ongoing   Hospital Pain Intervention(s) Rest   Restrictions/Precautions   Precautions Bed/chair alarms;Cognitive; Fall Risk;Spinal precautions;Supervision on toilet/commode;Pain   ROM Restrictions Yes  (spinal precautions)   Braces or Orthoses TLSO   Cognition   Overall Cognitive Status Impaired   Arousal/Participation Alert; Cooperative   Attention Attends with cues to redirect   Orientation Level Disoriented to time;Disoriented to place   Memory Decreased long term memory;Decreased recall of biographical information;Decreased short term memory;Decreased recall of recent events;Decreased recall of precautions   Following Commands Follows one step commands inconsistently   Subjective   Subjective Sandhya agreeable to PT session. Reports fatigue   Sit to Stand   Type of Assistance Needed Incidental touching   Comment CGA with RW, VCs for proper hand placement   Sit to Stand CARE Score 4   Bed-Chair Transfer   Type of Assistance Needed Incidental touching   Comment CGA with RW, VCs for management of RW   Chair/Bed-to-Chair Transfer CARE Score 4   Car Transfer   Type of Assistance Needed Incidental touching   Comment VCs for sequencing and safety   Car Transfer CARE Score 4   Walk 10 Feet   Type of Assistance Needed Physical assistance; Adaptive equipment   Physical Assistance Level 25% or less   Comment MinAx1 with RW, VC for mangement of RW, difficulty maintaining straight path requiring assistance from PT   Walk 10 Feet CARE Score 3   Walk 50 Feet with Two Turns   Type of Assistance Needed Physical assistance; Adaptive equipment   Physical Assistance Level 25% or less   Comment MinAx1 with RW, VC for mangement of RW, difficulty maintaining straight path requiring assistance from PT   Walk 50 Feet with Two Turns CARE Score 3   Walk 150 Feet   Comment fatigue   Reason if not Attempted Safety concerns   Walk 150 Feet CARE Score 88   Ambulation   Primary Mode of Locomotion Prior to Admission Walk   Distance Walked (feet) 126 ft  (68 ft)   Assist Device Roller Walker   Gait Pattern Ataxic; Inconsistant [place];Decreased foot clearance;Narrow KAREEN   Limitations Noted In Balance; Coordination;Device Management; Safety;Strength;Swing   Provided Assistance with: Balance   Walk Assist Level Minimum Assist   Does the patient walk? 2. Yes   Wheel 50 Feet with Two Turns   Reason if not Attempted Activity not applicable   Wheel 50 Feet with Two Turns CARE Score 9   Wheel 150 Feet   Reason if not Attempted Activity not applicable   Wheel 680 Feet CARE Score 9   Curb or Single Stair   Style negotiated Single stair   Type of Assistance Needed Physical assistance   Physical Assistance Level 25% or less   Comment Ebony, b/l UE support via L HR, reciprocal pattern   1 Step (Curb) CARE Score 3   4 Steps   Type of Assistance Needed Physical assistance   Physical Assistance Level 25% or less   Comment Ebony, b/l UE support via L HR, reciprocal pattern   4 Steps CARE Score 3   Stairs   Type Stairs   # of Steps 8   Weight Bearing Precautions Fall Risk   Assist Devices Single Rail   Findings Continue to practice use of L HR when performing stairs to mimic home set up. Pt report using L HR and SPC to ascend/descend steps. Safest method will likely be b/l UE support via L HR   Picking Up Object   Type of Assistance Needed Incidental touching   Comment CGA with use of reacher and RW   Picking Up Object CARE Score 4   Therapeutic Interventions   Strengthening Seated knee extension 2# 3x10, seated hip flexion 2# 3x10, seated hip adduction 3x10, seated hip abduction with RTB 3x10   Balance Standing Reaching for Cones with unilateral UE support x20, 6 Inch Toe Taps x10, x20 Ebony for balance   Other Comments   Comments  present during session.    PT recommending use of RW vs SPC at home to decrease fall risk and improve pt's overall safety.  confirms they have RW at home. Also discussed use of transport chair or WC for community distances as patient fatigues easily.  states they WC from a realtive that they can use if needed. Assessment   Treatment Assessment Patient participated in 90 minute skilled physical therapy session focusing on transfer training, gait training, stair navigation, balance training, and LE strengthening. Stair navigation performed at beginning of session as patient can fatigue easily towards end of session and is unable to complete stairs safely. Pt was able to complete 8 stair consectutively with Jesus. She has 7 steps to get to the main living area of her home. Plan to continue to focus on stair navigation at beginning of session. Patient's cognitive status is a barrier to her overall progress as their is little carryover between sessions. Patient will benefit from continued skilled PT services for transfer training, gait training, stair navigation, LE strengthening, and family training. Problem List Decreased strength;Decreased range of motion;Decreased endurance; Impaired balance;Decreased mobility; Decreased coordination;Decreased cognition; Impaired judgement;Decreased safety awareness;Orthopedic restrictions;Pain   Barriers to Discharge Inaccessible home environment   PT Barriers   Physical Impairment Decreased strength;Decreased endurance; Impaired balance;Decreased mobility; Decreased coordination;Decreased cognition; Impaired judgement;Decreased safety awareness;Orthopedic restrictions;Pain   Functional Limitation Car transfers; Ramp negotiation;Stair negotiation;Standing;Walking;Transfers   Plan   Treatment/Interventions Functional transfer training;LE strengthening/ROM; Elevations; Therapeutic exercise; Endurance training;Patient/family training;Equipment eval/education; Bed mobility;Gait training; Compensatory technique education   Progress Slow progress, cognitive deficits Recommendation   PT Discharge Recommendation Home with home health rehabilitation   Equipment Recommended [employer] [place]  (confirmed with  they have a walker at home)   PT Therapy Minutes   PT Time In 1324   PT Time Out 1454   PT Total Time (minutes) 90   PT Mode of treatment - Individual (minutes) 90   PT Mode of treatment - Concurrent (minutes) 0   PT Mode of treatment - Group (minutes) 0   PT Mode of treatment - Co-treat (minutes) 0   PT Mode of Treatment - Total time(minutes) 90 minutes   PT Cumulative Minutes 360   Therapy Time missed   Time missed?  No     Mora Garcia, PT, DPT

## 2023-08-23 NOTE — TEAM CONFERENCE
Acute RehabilitationTe Conference Note  Date: 8/23/2023   Time: 11:58 AM       Patient Name:  Monae Coker       Medical Record Number: [id number]   YOB: 1955  Sex: Female          Room/Bed:  [place] [address]/[facility]3-01  Payor Info:  Payor: MEDICARE / Plan: MEDICARE A AND B / Product Type: Medicare A & B Fee for Service /      Admitting Diagnosis: Multiple falls [R29.6]   Admit Date/Time:  8/18/2023  3:21 PM  Admission Comments: No comment available     Primary Diagnosis:  Progressive cognitive dysfunction  Principal Problem: Progressive cognitive dysfunction    Patient Active Problem List    Diagnosis Date Noted   • Osteoporosis 08/21/2023   • Vitamin B12 deficiency 08/18/2023   • Compression fracture of T8 vertebra ([address]) 08/18/2023   • Acute cystitis without hematuria 08/18/2023   • Rheumatoid arteritis ([address]) 08/15/2023   • Closed fracture of multiple ribs of right side 08/15/2023   • Movement disorder 08/15/2023   • Fungal skin infection 03/17/2021   • Progressive cognitive dysfunction 03/16/2021   • Hyponatremia 03/11/2021   • Adenopathy 03/10/2021   • Abnormal CT of the abdomen 03/10/2021   • Dermatitis associated with moisture 03/09/2021   • Onychomycosis 03/09/2021   • Enlarged and hypertrophic nails 03/09/2021   • Low HDL (under 40)    • Anemia 03/04/2021   • Hypokalemia 03/04/2021   • WELLS (dyspnea on exertion) 03/04/2021   • Lightheadedness 03/04/2021   • Pain 03/04/2021   • Abnormal ECG 03/04/2021   • Chest pain 03/04/2021   • Pleural effusion on right 03/04/2021   • Atelectasis of right lung 03/04/2021   • Potential for cognitive impairment 03/03/2021   • Oral dyskinesia 03/02/2021   • Gastroparesis 03/01/2021   • Anxiety and depression 02/28/2021   • Hypothyroidism 02/28/2021   • Abnormal findings on imaging test 02/28/2021   • Hypertension 02/27/2021   • Pain of right upper extremity 02/27/2021   • Multiple closed fractures of pelvis ([address]) 02/27/2021   • Closed nondisplaced fracture of anterior wall of right acetabulum ([address]) 02/23/2021   • Closed nondisplaced fracture of right pubis ([address]) 02/23/2021   • Fall 02/23/2021   • Parotid gland enlargement 08/29/2019   • Sjogren's disease ([address]) 08/29/2019       Physical Therapy:    Weight Bearing Status: Full Weight Bearing (Spinal precautions)  Transfers: Moderate Assistance  Bed Mobility: Moderate Assistance  Amulation Distance (ft): 75 feet  Ambulation: Moderate Assistance  Assistive Device for Ambulation: Roller Walker  Number of Stairs: 3  Stair Assistance: Maximum Assistance    8/22/23  Sandhya demonstrates significant cognitive deficits influencing her overall reception towards education on maintaining safety awareness with transfers and sequencing which is her most limiting factor to progressing in PT. She requires constant VC's for proper sequencing and demonstrates little carryover of learning between tasks significantly contributing to fall risk. May benefit from [facility] training with  due to needs for required cuing for safety versus continued RW training due to need for guidance with direction of ambulation considering current home setup and need to navigate 2x7 flights of stairs. [facility] with 24/7 supervision. Occupational Therapy:  Eating: Supervision  Grooming: Incidental Touching  Bathing: Minimal Assistance  Bathing: Minimal Assistance  Upper Body Dressing:  Moderate Assistance  Lower Body Dressing: Minimal Assistance  Toileting: Incidental Touching  Toilet Transfer: Incidental Touching  Cognition: Exceptions to WNL  Cognition: Decreased Memory, Decreased Executive Functions, Decreased Attention, Decreased Comprehension, Decreased Safety, Impulsive, Behavioral Considerations  Orientation: Person  Discharge Recommendations: Home with:  [address] [place] [address] with[de-identified] Family Support       Pt is demonstrating fair progress with occupational therapy and is progressing toward long term goals for ADL, IADL, and functional transfers/mobility. Pts long term goals for ADLs are supervision with [address] [place] [address]. Pt continues to present with impairments in activity tolerance, endurance, standing balance/tolerance, UE strength, arousal, memory, insight, safety , judgement , and attention . Occupational performance remains limited by pain, spinal precautions, risk for falls, and home environment. Family training/education will be required prior to D/C. Pt will continue to benefit from skilled acute rehab OT services to address above mentioned barriers and maximize functional independence in baseline areas of occupation to meet established treatment goals with overall decreased burden of care. Plan of care to continue to focus on ADL Retraining ,  LHAE education/training, Functional Transfers, Functional Cognition, Functional Attention, MOCA, ACLS, Standing tolerance, Standing balance , DME training/education, Family training/education, Energy conservation training/education, healthy coping education, and Leisure and social pursuits. Goals for the upcoming week are: increasing new learning and memory with spinal precautions to increase independence with ADL completion. Anticipate Discharge date to be set. Joseluis Holland Speech Therapy:  Mode of Communication: Verbal  Cognition: Exceptions to WNL  Cognition: Decreased Memory, Decreased Executive Functions, Decreased Attention, Decreased Comprehension, Decreased Safety  Orientation: Person, Time  Discharge Recommendations: Home with:  [address] [place] [address] with[de-identified] 24 Hour Supervision, 24 Hour Assisteance, Family Support, Outpatient Speech Therapy (pending pt progress)  Pt is currently being followed for skilled SLP services targeting cognitive linguistic skills. On initial evaluation, pt completed the CLQT+ with scores correlating to overall severely impaired cognitive linguistic skills, characterized by deficits across all cognitive linguistic domains.  Limiting barriers present as deficits in the following areas: overall attention, decreased ST memory recall, decreased orientation, decreased executive function skills, including problem solving, reasoning, judgement, sequencing, organization of thoughts, decreased safety awareness which currently impacts pt's functional mobility skills. Currently, pt is functioning at min assist for comprehension and expression, supervision for social interaction and max assist for problem solving and memory. Plan to initiate family training/education in the next week as pt lives with her . At this time, pt is recommended for further skilled SLP services targeting overall cognitive linguistic and communication skills to maximize level of independence and safety on discharge. Nursing Notes:  Appetite: Good  Diet Type: Regular/House                                                                     Pain Location/Orientation: Location: Back, Orientation: Mid  Pain Score: 8                       Hospital Pain Intervention(s): Ambulation/increased activity          70 y.o. female with Sjogrens and RA (on methotrexate), HTN, hypothyroidism, gastroparesis, depression, previous mild cognitive impairment (MOCA 17/30 in 2021), history of meningioma s/p resection in 2012, pelvic fracture after fall down stairs in 2021, s/p fall 8/15 after falling backwards and landing on the ground 6 days prior. She had headaches and severe back pain and went to the hospital. She also seemed to have multiple more falls. She was found to have multiple R rib fractures (3-5). CTH was unremarkable. There was a question of possible movement disorder and Neurology was consulted who ordered MRI Brain, C-Spine and T-Spine. Of note she is on Xanax and Flexeril chronically (mostly taken during bedtime). Geriatrics recommended a UA which was not done inpatient. She was treated for MASD as well.  MRI Brain showed moderate chronic microangiopathy, significantly increased, C-Spine without cord compression or cord signal abnormality, and T-Spine with a mild/acte T8 compression fracture without cord compression or cord signal abnormality. Per Neurology she has a Vascular Dementia. There was also some discussion of possible PSP. Her  had also reported that it didn't seem the patient was keeping track of her medications properly, and certainly with her medication regimen is at risk for polypharmacy. Neurosurgery was consulted for her T8 compression fracture, and ordered TLSO when OOB/ > 45 degrees and follow-up in 2 weeks. 8/23/23- Pt is alert to self, is impulsive,She is continual 1:1 observation for safety. Pt requires assessment and monitoring of VS, labs, skin checks. Pt turns and repositions self in bed. She requires assistence with mgmt of TLSO brace when OOB and >45 degrees. She reports adequate pain mgmt with current regimen. She requires assistence with hygiene due to incontinence of bladder. LBM was 8/20/23. She requires po bowel regime to maintain regularity. Skin at present is intact, sacrum is hyperpigmented requiring monitoring and maintenance of allevyn as per ordered. Safety and fall precautions are maintained and reinforced. Case Management:     Discharge Planning  Living Arrangements: Lives w/ Spouse/significant other  Support Systems: Spouse/significant other  Assistance Needed: TBD  Type of Current Residence: Private residence  Current Home Care Services: No  Pt admitted s/p fall which resulted in rib and spinal fx's. Pt w/spouse who is supportive and assists with pts adls. Pt has cog deficits which are baseline. Pt has experience with [facility] and outpt therapy services. They are aware of the potential on dc for contd services. Is the patient actively participating in therapies?  yes  List any modifications to the treatment plan:     Barriers Interventions   cognitive impairment moca 7/30 Family education, medication adjustments   tlso bracing Pt family education on don/doff Impulsivity, 1:1 Recommend q 15 min checks to eliminate 1:1   Safety awareness Family eduation, safety education techniaques         Is the patient making expected progress toward goals? yes  List any update or changes to goals:     Medical Goals: Patient will be medically stable for discharge to [facility] upon completion of rehab program and Patient will be able to manage medical conditions and comorbid conditions with medications and follow up upon completion of rehab program    Weekly Team Goals:   Rehab Team Goals  ADL Team Goal: Patient will require supervision with ADLs with least restrictive device upon completion of rehab program  Transfer Team Goal: Patient will require supervision with transfers with least restrictive device upon completion of rehab program  Locomotion Team Goal: Patient will require supervision with locomotion with least restrictive device upon completion of rehab program  Cognitive Team Goal: Patient will require supervision for basic and complex tasks upon completion of rehab program    Discussion: pt presents with the above barriers and has no evidence of carryover of new learning. This is making tlso education difficult. Family to be provided education. Spouse present daily and has expressed concern about her safety in the home depsite his presence all day/daily. Pt is functioning contact guard/ min a adls but has poor balance. Pt is mod a with a roller walker for mobility. Pt is min to mod on stairs and fatigues easily which increases safety risk. Overall cog min to max a. Family mtg  scheduled tomorrow for 1pm to address dc planning and understand pts current cog status. Recommendations are for contd [facility] for pt ot and speech    Anticipated Discharge Date:  8/30/23  [facility] Team Members Present: The following team members are supervising care for this patient and were present during this Weekly Team Conference.     Physician: Dr. Nelli Rolle MD  : Kassy Aguayo MS TenW  Registered Nurse: Montserrat Leonard, RN  Physical Therapist: STEPHANIE DelgadoT  Occupational Therapist: Seferino Schroeder MS, OTR/L  Speech Therapist: Blayne Denise, [facility] [address]

## 2023-08-24 LAB
ALBUMIN SERPL BCP-MCNC: 3.9 G/DL (ref 3.5–5)
ALP SERPL-CCNC: 182 U/L (ref 34–104)
ALT SERPL W P-5'-P-CCNC: 42 U/L (ref 7–52)
ANION GAP SERPL CALCULATED.3IONS-SCNC: 10 MMOL/L
AST SERPL W P-5'-P-CCNC: 36 U/L (ref 13–39)
BASOPHILS # BLD AUTO: 0.01 THOUSANDS/ÂΜL (ref 0–0.1)
BASOPHILS NFR BLD AUTO: 0 % (ref 0–1)
BILIRUB DIRECT SERPL-MCNC: 0.15 MG/DL (ref 0–0.2)
BILIRUB SERPL-MCNC: 0.68 MG/DL (ref 0.2–1)
BUN SERPL-MCNC: 19 MG/DL (ref 5–25)
CALCIUM SERPL-MCNC: 9.4 MG/DL (ref 8.4–10.2)
CHLORIDE SERPL-SCNC: 99 MMOL/L (ref 96–108)
CO2 SERPL-SCNC: 23 MMOL/L (ref 21–32)
CREAT SERPL-MCNC: 0.8 MG/DL (ref 0.6–1.3)
EOSINOPHIL # BLD AUTO: 0.02 THOUSAND/ÂΜL (ref 0–0.61)
EOSINOPHIL NFR BLD AUTO: 0 % (ref 0–6)
ERYTHROCYTE [DISTWIDTH] IN BLOOD BY AUTOMATED COUNT: 13.4 % (ref 11.6–15.1)
FOLATE SERPL-MCNC: >22.3 NG/ML
GFR SERPL CREATININE-BSD FRML MDRD: 75 ML/MIN/1.73SQ M
GGT SERPL-CCNC: 435 U/L (ref 9–64)
GLUCOSE P FAST SERPL-MCNC: 112 MG/DL (ref 65–99)
GLUCOSE SERPL-MCNC: 112 MG/DL (ref 65–140)
HCT VFR BLD AUTO: 34.8 % (ref 34.8–46.1)
HGB BLD-MCNC: 12.2 G/DL (ref 11.5–15.4)
IMM GRANULOCYTES # BLD AUTO: 0.02 THOUSAND/UL (ref 0–0.2)
IMM GRANULOCYTES NFR BLD AUTO: 0 % (ref 0–2)
LYMPHOCYTES # BLD AUTO: 0.32 THOUSANDS/ÂΜL (ref 0.6–4.47)
LYMPHOCYTES NFR BLD AUTO: 6 % (ref 14–44)
MCH RBC QN AUTO: 33.2 PG (ref 26.8–34.3)
MCHC RBC AUTO-ENTMCNC: 35.1 G/DL (ref 31.4–37.4)
MCV RBC AUTO: 95 FL (ref 82–98)
MONOCYTES # BLD AUTO: 0.4 THOUSAND/ÂΜL (ref 0.17–1.22)
MONOCYTES NFR BLD AUTO: 8 % (ref 4–12)
NEUTROPHILS # BLD AUTO: 4.36 THOUSANDS/ÂΜL (ref 1.85–7.62)
NEUTS SEG NFR BLD AUTO: 86 % (ref 43–75)
NRBC BLD AUTO-RTO: 0 /100 WBCS
PLATELET # BLD AUTO: 182 THOUSANDS/UL (ref 149–390)
PMV BLD AUTO: 10.2 FL (ref 8.9–12.7)
POTASSIUM SERPL-SCNC: 3.8 MMOL/L (ref 3.5–5.3)
PROT SERPL-MCNC: 7.7 G/DL (ref 6.4–8.4)
RBC # BLD AUTO: 3.67 MILLION/UL (ref 3.81–5.12)
SODIUM SERPL-SCNC: 132 MMOL/L (ref 135–147)
T3REVERSE SERPL-MCNC: 58.6 NG/DL (ref 9.2–24.1)
VIT B6 SERPL-MCNC: 5.6 UG/L (ref 3.4–65.2)
WBC # BLD AUTO: 5.13 THOUSAND/UL (ref 4.31–10.16)

## 2023-08-24 PROCEDURE — 97535 SELF CARE MNGMENT TRAINING: CPT

## 2023-08-24 PROCEDURE — 97110 THERAPEUTIC EXERCISES: CPT

## 2023-08-24 PROCEDURE — 85025 COMPLETE CBC W/AUTO DIFF WBC: CPT | Performed by: NURSE PRACTITIONER

## 2023-08-24 PROCEDURE — 99233 SBSQ HOSP IP/OBS HIGH 50: CPT | Performed by: PHYSICAL MEDICINE & REHABILITATION

## 2023-08-24 PROCEDURE — 97129 THER IVNTJ 1ST 15 MIN: CPT

## 2023-08-24 PROCEDURE — 80048 BASIC METABOLIC PNL TOTAL CA: CPT | Performed by: NURSE PRACTITIONER

## 2023-08-24 PROCEDURE — 97530 THERAPEUTIC ACTIVITIES: CPT

## 2023-08-24 PROCEDURE — 99232 SBSQ HOSP IP/OBS MODERATE 35: CPT | Performed by: INTERNAL MEDICINE

## 2023-08-24 PROCEDURE — 82977 ASSAY OF GGT: CPT | Performed by: NURSE PRACTITIONER

## 2023-08-24 PROCEDURE — 82746 ASSAY OF FOLIC ACID SERUM: CPT | Performed by: PHYSICAL MEDICINE & REHABILITATION

## 2023-08-24 PROCEDURE — 80076 HEPATIC FUNCTION PANEL: CPT | Performed by: NURSE PRACTITIONER

## 2023-08-24 PROCEDURE — 97130 THER IVNTJ EA ADDL 15 MIN: CPT

## 2023-08-24 RX ORDER — BISACODYL 5 MG/1
5 TABLET, DELAYED RELEASE ORAL ONCE
Status: COMPLETED | OUTPATIENT
Start: 2023-08-24 | End: 2023-08-24

## 2023-08-24 RX ORDER — METHOCARBAMOL 500 MG/1
250 TABLET, FILM COATED ORAL EVERY 6 HOURS PRN
Status: DISCONTINUED | OUTPATIENT
Start: 2023-08-24 | End: 2023-08-30 | Stop reason: HOSPADM

## 2023-08-24 RX ORDER — DOCUSATE SODIUM 100 MG/1
100 CAPSULE, LIQUID FILLED ORAL 2 TIMES DAILY
Status: DISCONTINUED | OUTPATIENT
Start: 2023-08-24 | End: 2023-08-30 | Stop reason: HOSPADM

## 2023-08-24 RX ORDER — SENNOSIDES 8.6 MG
2 TABLET ORAL
Status: DISCONTINUED | OUTPATIENT
Start: 2023-08-24 | End: 2023-08-30 | Stop reason: HOSPADM

## 2023-08-24 RX ADMIN — METHOCARBAMOL 250 MG: 500 TABLET ORAL at 00:42

## 2023-08-24 RX ADMIN — Medication 1 TABLET: at 16:00

## 2023-08-24 RX ADMIN — Medication 2000 UNITS: at 11:53

## 2023-08-24 RX ADMIN — PREDNISONE 5 MG: 5 TABLET ORAL at 22:31

## 2023-08-24 RX ADMIN — CEPHALEXIN 500 MG: 500 CAPSULE ORAL at 06:29

## 2023-08-24 RX ADMIN — CYANOCOBALAMIN 1000 MCG: 1000 INJECTION, SOLUTION INTRAMUSCULAR at 12:00

## 2023-08-24 RX ADMIN — ACETAMINOPHEN 650 MG: 325 TABLET, FILM COATED ORAL at 06:29

## 2023-08-24 RX ADMIN — FOLIC ACID 1 MG: 1 TABLET ORAL at 11:53

## 2023-08-24 RX ADMIN — Medication 1 TABLET: at 11:53

## 2023-08-24 RX ADMIN — ACETAMINOPHEN 650 MG: 325 TABLET, FILM COATED ORAL at 18:37

## 2023-08-24 RX ADMIN — CEPHALEXIN 500 MG: 500 CAPSULE ORAL at 15:59

## 2023-08-24 RX ADMIN — ATORVASTATIN CALCIUM 10 MG: 10 TABLET, FILM COATED ORAL at 15:59

## 2023-08-24 RX ADMIN — AMLODIPINE BESYLATE 5 MG: 5 TABLET ORAL at 20:24

## 2023-08-24 RX ADMIN — ATENOLOL 50 MG: 50 TABLET ORAL at 11:53

## 2023-08-24 RX ADMIN — BISACODYL 5 MG: 5 TABLET, COATED ORAL at 11:53

## 2023-08-24 RX ADMIN — ENOXAPARIN SODIUM 30 MG: 30 INJECTION SUBCUTANEOUS at 20:24

## 2023-08-24 RX ADMIN — FLUOXETINE 20 MG: 20 CAPSULE ORAL at 11:53

## 2023-08-24 RX ADMIN — NYSTATIN 1 APPLICATION: 100000 POWDER TOPICAL at 12:09

## 2023-08-24 RX ADMIN — POLYETHYLENE GLYCOL 3350 17 G: 17 POWDER, FOR SOLUTION ORAL at 11:56

## 2023-08-24 RX ADMIN — CEPHALEXIN 500 MG: 500 CAPSULE ORAL at 22:31

## 2023-08-24 RX ADMIN — SENNOSIDES 17.2 MG: 8.6 TABLET, FILM COATED ORAL at 22:31

## 2023-08-24 RX ADMIN — LEVOTHYROXINE SODIUM 88 MCG: 88 TABLET ORAL at 06:30

## 2023-08-24 RX ADMIN — AMLODIPINE BESYLATE 5 MG: 5 TABLET ORAL at 11:53

## 2023-08-24 RX ADMIN — DOCUSATE SODIUM 100 MG: 100 CAPSULE, LIQUID FILLED ORAL at 18:37

## 2023-08-24 RX ADMIN — PHENAZOPYRIDINE 200 MG: 100 TABLET ORAL at 18:37

## 2023-08-24 RX ADMIN — LIDOCAINE 5% 1 PATCH: 700 PATCH TOPICAL at 12:06

## 2023-08-24 RX ADMIN — OXYCODONE HYDROCHLORIDE 5 MG: 5 TABLET ORAL at 20:28

## 2023-08-24 RX ADMIN — METHOCARBAMOL 250 MG: 500 TABLET ORAL at 06:30

## 2023-08-24 RX ADMIN — DOCUSATE SODIUM 100 MG: 100 CAPSULE, LIQUID FILLED ORAL at 11:53

## 2023-08-24 RX ADMIN — NYSTATIN: 100000 POWDER TOPICAL at 18:36

## 2023-08-24 RX ADMIN — OXYCODONE HYDROCHLORIDE 5 MG: 5 TABLET ORAL at 00:42

## 2023-08-24 RX ADMIN — ENOXAPARIN SODIUM 30 MG: 30 INJECTION SUBCUTANEOUS at 11:53

## 2023-08-24 RX ADMIN — BISACODYL 5 MG: 5 TABLET, COATED ORAL at 15:59

## 2023-08-24 NOTE — PROGRESS NOTES
Internal Medicine Progress Note  Patient: Ciara Stone  Age/sex: 70 y.o. female  Medical Record #: [id number]      ASSESSMENT/PLAN: (Interval History)  Ciara Stone is seen and examined and management for following issues:    Frequent fall, r/o movement disorder  • See Dr Kassy Rodgers as OP     Right rib fractures  • Ribs 3-5th  • Continue IS     T8 fracture  • Non-op  • TLSO brace  • For upright t-spine xrays in brace in 2 weeks     Vitamin B12 deficiency  • For IM replacement QOD x 5 doses then to PO daily     HTN  • Home:  Atenolol 50mg qd  • Here:  Atenolol 50mg qd/Norvasc 5mg 2x daily  • Has prn Hydralazine  • Stable.     Cognitive impairment  • MOCA 3/2021 was 17/30  • Likely has worsened since prev MOCA and is confused here     E.coli UTI  • POA  • Dr Arnoldo Zhong sent UA with reflex to cx 2/2 confusion  • Urine cx had >100,000 Ecoli and 30,000-39,000 mixed contaminants  • Started on Keflex 8/21/23 for 5 days.     Hypothyroidism  • TSH 0.314/free T4 1.29  • on Levothyroxine 100 mcg qd  • Endo following and they reduced to 88 mcg qd and sent a reverse T3  • For TSH/free T4 4-6 weeks     Severe osteoporosis  • Endo following  • For OP DEXA  • Calcium 500mg BID     Vit D deficiency  • Level was 23.7  • For Vit D 2000IU qd     RA  • At home takes Methotrexate 20mg qWednesday/Prednisone 5mg qd/Flexeril 10mg qhs     HLD  • Lipitor 10mg qd     Anxiety/depression  • At home she was taking Xananx 1mg TID and Prozac 20mg qd  • Per PMR     Enlarged right retrocrural lymph node  • Found on CT  • For repeat CT chest as OP in 3-6 months     Transaminitis  • AST/ALT/AP elevated since previous CMP 8/15  • had CT C/A/P on 8/15 = hepatomegaly  • Dr Arnoldo Zhong cut back on Tylenol  • LFTs trending down        Discharge date:  8/30/23       The above assessment and plan was reviewed and updated as determined by my evaluation of the patient on 8/24/2023.     Labs:   Results from last 7 days   Lab Units 08/24/23  0554 08/21/23  0532 WBC Thousand/uL 5.13 3.90*   HEMOGLOBIN g/dL 12.2 12.9   HEMATOCRIT % 34.8 37.6   PLATELETS Thousands/uL 182 215     Results from last 7 days   Lab Units 08/24/23  0554 08/21/23  0532   SODIUM mmol/L 132* 135   POTASSIUM mmol/L 3.8 3.8   CHLORIDE mmol/L 99 103   CO2 mmol/L 23 24   BUN mg/dL 19 13   CREATININE mg/dL 0.80 0.76   CALCIUM mg/dL 9.4 10.3*                   Review of Scheduled Meds:  Current Facility-Administered Medications   Medication Dose Route Frequency Provider Last Rate   • acetaminophen  650 mg Oral Q12H Bon Gan MD     • ALPRAZolam  0.5 mg Oral HS PRN Bon Gan MD     • amLODIPine  5 mg Oral BID DARIO Ward     • atenolol  50 mg Oral Daily Bon Gan MD     • atorvastatin  10 mg Oral Daily With German Pacheco MD     • bisacodyl  5 mg Oral Daily PRN Bon Gan MD     • bisacodyl  5 mg Oral Once Bon Gan MD     • bisacodyl  10 mg Rectal Daily PRN Bon Gna MD     • bisacodyl  10 mg Rectal Once Ilan Liu MD     • calcium carbonate  1 tablet Oral BID With Meals Andrew Dorsey MD     • cephalexin  500 mg Oral [facility] Bon Gan MD     • cholecalciferol  2,000 Units Oral Daily Andrew Dorsey MD     • cyanocobalamin  1,000 mcg Intramuscular Every Other Day Bon Gan MD     • docusate sodium  100 mg Oral BID Bon Gan MD     • enoxaparin  30 mg Subcutaneous Q12H [address] Bon Gan MD     • FLUoxetine  20 mg Oral Daily Bon Gan MD     • folic acid  1 mg Oral Daily Bon Gan MD     • hydrALAZINE  25 mg Oral Q8H PRN DARIO Ward     • levothyroxine  88 mcg Oral Early Morning Andrew Dorsey MD     • lidocaine  1 patch Topical Daily Bon Gan MD     • methotrexate  20 mg Oral Weekly Bon Gan MD     • nystatin   Topical BID Bon Gan MD     • ondansetron  4 mg Oral Q6H PRN Bon Gan MD     • oxyCODONE  5 mg Oral Q4H PRN Bon Gan MD     • oxyCODONE  2.5 mg Oral Q4H PRN Mookie Figueroa MD     • phenazopyridine  200 mg Oral QPM Mookie Figueroa MD     • polyethylene glycol  17 g Oral Daily Mookie Figueroa MD     • predniSONE  5 mg Oral HS Mookie Figueroa MD     • QUEtiapine  12.5 mg Oral HS PRN Mookie Figueroa MD     • senna  2 tablet Oral HS Mookie Figueroa MD         Subjective/ HPI: Patient seen and examined. Patients overnight issues or events were reviewed with nursing or staff during rounds or morning huddle session. New or overnight issues include the following:     Pt seen in her room. She denies any current complaints. ROS:   A 10 point ROS was performed; negative except as noted above. Imaging:     No orders to display       *Labs /Radiology studies reviewed  *Medications reviewed and reconciled as needed  *Please refer to order section for additional ordered labs studies  *Case discussed with primary attending during morning huddle case rounds    Physical Examination:  Vitals:   Vitals:    08/23/23 1003 08/23/23 1406 08/23/23 2045 08/24/23 0550   BP: 150/84 140/82 130/54 141/65   BP Location:  Left arm Right arm Left arm   Pulse: 78 70 60 88   Resp:  18 16 18   Temp:  97.9 °F (36.6 °C) (!) 97.2 °F (36.2 °C) 98.2 °F (36.8 °C)   TempSrc:  Oral Oral Oral   SpO2:  95% 91% 94%   Weight:       Height:           General Appearance: no distress, conversive, interactive  HEENT: PERRLA, conjuctiva normal; oropharynx clear; mucous membranes moist   Neck:  Supple, normal ROM  Lungs: CTA, normal respiratory effort, no retractions, expiratory effort normal  CV: regular rate and rhythm; no rubs/murmurs/gallops, PMI normal   ABD: soft; ND/NT; +BS  EXT: no edema  Skin: normal turgor, normal texture, no rashes  Psych: affect normal, mood normal  Neuro: AA      The above physical exam was reviewed and updated as determined by my evaluation of the patient on 8/24/2023.     Invasive Devices     None                    VTE Pharmacologic Prophylaxis: Enoxaparin  Code Status: Level 3 - DNAR and DNI  Current Length of Stay: 6 day(s)      Total time spent:  30 minutes with more than 50% spent counseling/coordinating care. Counseling includes discussion with patient re: progress  and discussion with patient of his/her current medical state/information. Coordination of patient's care was performed in conjunction with primary service. Time invested included review of patient's labs, vitals, and management of their comorbidities with continued monitoring. In addition, this patient was discussed with medical team including physician and advanced extenders. The care of the patient was extensively discussed and appropriate treatment plan was formulated unique for this patient. Medical decision making for the day was made by supervising physician unless otherwise noted in their attestation statement. ** Please Note:  voice to text software may have been used in the creation of this document.  Although proof errors in transcription or interpretation are a potential of such software**

## 2023-08-24 NOTE — PROGRESS NOTES
OT Daily Treatment Note       08/24/23 1300   Assessment   Treatment Assessment Session grossly focused on family mtg with pt, pts , CM Eduard Segura, PT Adwoa and SLP Ruben. OT provided edu on CLOF and recommendations for DC home including 24 hr supv with overnight supv/assist with toileting, min A for all ADLs tasks and TA for TLSO brace mgmt. Edu on use of bed/chair alarms for overnight use. SLP then provided edu on pts current cog fxing and PT edu on updates re: pts mobility. Pts  had some questions re: medications and how to avoid pt taking too many medications at once/increasing confusion. Team edu that MD will f/u with  re: this concern. CM then inquired about having assist from others at home to avoid cg burden -  reported that he has two beighbors that have offered to help and the pts son lives with them and although he works during the day, he can assist/supervise in the evenings. Formalized family training has been schedulded starting on Monday at 10am to receive hands on training. Pt/ have no further questions at this time. Pt would beneift from cont skilled OT services to increase independence and safety with ADL completion in prep for DC home Wednesday 8/30. Pt was present from 1-130 but only 10 mins of that time was billable from an OT standpoint. Prognosis Fair   Problem List Decreased strength;Decreased range of motion;Decreased endurance; Impaired balance;Decreased mobility; Decreased coordination;Decreased cognition; Impaired judgement;Decreased safety awareness;Orthopedic restrictions;Pain   Barriers to Discharge Inaccessible home environment;Decreased caregiver support   Plan   Treatment/Interventions ADL retraining;Functional transfer training; Therapeutic exercise; Endurance training;Patient/family training; Compensatory technique education   Progress Slow progress, cognitive deficits   OT Therapy Minutes   OT Time In 1300   OT Time Out 1330   OT Total Time (minutes) 30 OT Mode of treatment - Individual (minutes) 10   OT Mode of treatment - Concurrent (minutes) 0   OT Mode of treatment - Group (minutes) 0   OT Mode of treatment - Co-treat (minutes) 0   OT Mode of Treatment - Total time(minutes) 10 minutes   OT Cumulative Minutes 430   Therapy Time missed   Time missed?  No

## 2023-08-24 NOTE — PROGRESS NOTES
08/24/23 1000   Pain Assessment   Pain Assessment Tool 0-10   Pain Score 7   Pain Location/Orientation Location: Back   Restrictions/Precautions   Precautions Bed/chair alarms;Cognitive; Fall Risk;Impulsive;Pain;Supervision on toilet/commode;Spinal precautions   Braces or Orthoses TLSO   Subjective   Subjective pt agreeable to perform skilled PT   Sit to Stand   Type of Assistance Needed Incidental touching   Comment RW   Sit to Stand CARE Score 4   Bed-Chair Transfer   Type of Assistance Needed Incidental touching; Adaptive equipment   Comment RW   Chair/Bed-to-Chair Transfer CARE Score 4   Transfer Bed/Chair/Wheelchair   Adaptive Equipment Roller Walker   Car Transfer   Type of Assistance Needed Incidental touching; Adaptive equipment   Comment RW   Car Transfer CARE Score 4   Walk 10 Feet   Type of Assistance Needed Physical assistance   Physical Assistance Level 25% or less   Comment RW   Walk 10 Feet CARE Score 3   Walk 50 Feet with Two Turns   Type of Assistance Needed Physical assistance; Adaptive equipment   Physical Assistance Level 25% or less   Comment RW   Walk 50 Feet with Two Turns CARE Score 3   Walk 150 Feet   Type of Assistance Needed Physical assistance; Adaptive equipment   Physical Assistance Level 26%-50%   Comment RW fatigue   Walk 150 Feet CARE Score 3   Walking 10 Feet on Uneven Surfaces   Type of Assistance Needed Physical assistance   Physical Assistance Level 26%-50%   Comment floor mat w RW   Walking 10 Feet on Uneven Surfaces CARE Score 3   Ambulation   Primary Mode of Locomotion Prior to Admission Walk   Distance Walked (feet) 150 ft  (x2)   Assist Device Roller Walker   Does the patient walk? 2.  Yes   Wheel 50 Feet with Two Turns   Reason if not Attempted Activity not applicable   Wheel 50 Feet with Two Turns CARE Score 9   Wheel 150 Feet   Reason if not Attempted Activity not applicable   Wheel 321 Feet CARE Score 9   Curb or Single Stair   Style negotiated Single stair   Type of Assistance Needed Physical assistance   Physical Assistance Level 25% or less   Comment LHR SPC   1 Step (Curb) CARE Score 3   4 Steps   Type of Assistance Needed Physical assistance   Physical Assistance Level 25% or less   Comment L HR SPC 10 steps   4 Steps CARE Score 3   Stairs   Type Stairs   # of Steps 10   Weight Bearing Precautions Fall Risk   Assist Devices Single Rail;Cane   Picking Up Object   Type of Assistance Needed Incidental touching; Adaptive equipment   Comment reacher marker   Picking Up Object CARE Score 4   Therapeutic Interventions   Strengthening LAQ AP marching gluts 20 reps   Flexibility HS and calfs manual stretch   Balance standing balance   Other ball toss   Assessment   Treatment Assessment pt focus on inc dynamic balance standing and RW inc longer distance but pt fatigues , pt perform TE LE and energy conservation . Pt will cont to need skilled PT and all needs in reach with alarm on . Barriers to Discharge Inaccessible home environment;Decreased caregiver support   Plan   Progress Slow progress, cognitive deficits   Recommendation   PT Discharge Recommendation Home with home health rehabilitation   PT Therapy Minutes   PT Time In 1000   PT Time Out 1130   PT Total Time (minutes) 90   PT Mode of treatment - Individual (minutes) 90   PT Mode of treatment - Concurrent (minutes) 0   PT Mode of treatment - Group (minutes) 0   PT Mode of treatment - Co-treat (minutes) 0   PT Mode of Treatment - Total time(minutes) 90 minutes   PT Cumulative Minutes 450   Therapy Time missed   Time missed?  No

## 2023-08-24 NOTE — CASE MANAGEMENT
Family mtg occurred with pt, spouse, cm, sunita jack, slp; denice roach, ot and zabrina vidal pt. Each discipline reviewed pts current abilities and goals for rehab. Team reiterated need for someone to be with pt at all times due to safety concerns, pts memory. Pt is recommended to use a roller walker although pt wishes to use a cane. Spouse mentioned concerns about medications and how he can be taught to space them evenly throughout the day. [facility] services recommended first as pt is a two person assist to exit the home. Spouse stated he has two retired neighbors who are fit and able to provide assist and have offered to assist him in supervision of pt. Spouse was encouraged to create a schedule of actual times to be sure and utilize offerings of help from the neighbors. Referral made to [facility] for contd rn pt ot and speech services.

## 2023-08-24 NOTE — PROGRESS NOTES
08/24/23 1301   Assessment   Family/Caregiver Present Family meeting held today with pt, pt's  Aaron Maharaj, ; Judy Barclay, SLP; [place]; and PT. Physical therapy present for 30 minutes but only billing for 10 minutes. PT, OT, and SLP recommending 24/7 supervision at home as patient is high fall risk. PT made the following recommendations: use of RW at home for ambulation NOT SPC, hands on assistance ([facility] - Jesus) for ambulation with RW and stair navgation. Discussed use of WC for longer distance as patient fatigues quickly.  confirms they have RW and WC at home.  had no additional questions at this time. Family training to be set up for next Monday.    PT Therapy Minutes   PT Time In 1310   PT Time Out 1320   PT Total Time (minutes) 10   PT Mode of treatment - Individual (minutes) 10   PT Mode of treatment - Concurrent (minutes) 0   PT Mode of treatment - Group (minutes) 0   PT Mode of treatment - Co-treat (minutes) 0   PT Mode of Treatment - Total time(minutes) 10 minutes   PT Cumulative Minutes [address], PT, DPT

## 2023-08-24 NOTE — PLAN OF CARE
Problem: PAIN - ADULT  Goal: Verbalizes/displays adequate comfort level or baseline comfort level  Description: Interventions:  - Encourage patient to monitor pain and request assistance  - Assess pain using appropriate pain scale  - Administer analgesics based on type and severity of pain and evaluate response  - Implement non-pharmacological measures as appropriate and evaluate response  - Consider cultural and social influences on pain and pain management  - Notify physician/advanced practitioner if interventions unsuccessful or patient reports new pain  Outcome: Progressing     Problem: INFECTION - ADULT  Goal: Absence or prevention of progression during hospitalization  Description: INTERVENTIONS:  - Assess and monitor for signs and symptoms of infection  - Monitor lab/diagnostic results  - Monitor all insertion sites, i.e. indwelling lines, tubes, and drains  - Monitor endotracheal if appropriate and nasal secretions for changes in amount and color  - Shafter appropriate cooling/warming therapies per order  - Administer medications as ordered  - Instruct and encourage patient and family to use good hand hygiene technique  - Identify and instruct in appropriate isolation precautions for identified infection/condition  Outcome: Progressing     Problem: SAFETY ADULT  Goal: Patient will remain free of falls  Description: INTERVENTIONS:  - Educate patient/family on patient safety including physical limitations  - Instruct patient to call for assistance with activity   - Consult OT/PT to assist with strengthening/mobility   - Keep Call bell within reach  - Keep bed low and locked with side rails adjusted as appropriate  - Keep care items and personal belongings within reach  - Initiate and maintain comfort rounds  - Make Fall Risk Sign visible to staff  - Offer Toileting every 2 Hours, in advance of need  - Initiate/Maintain bed/chair alarm  - Obtain necessary fall risk management equipment: Bandtastic footwear  - Apply yellow socks and bracelet for high fall risk patients  - Consider moving patient to room near nurses station  Outcome: Progressing  Goal: Maintain or return to baseline ADL function  Description: INTERVENTIONS:  -  Assess patient's ability to carry out ADLs; assess patient's baseline for ADL function and identify physical deficits which impact ability to perform ADLs (bathing, care of mouth/teeth, toileting, grooming, dressing, etc.)  - Assess/evaluate cause of self-care deficits   - Assess range of motion  - Assess patient's mobility; develop plan if impaired  - Assess patient's need for assistive devices and provide as appropriate  - Encourage maximum independence but intervene and supervise when necessary  - Involve family in performance of ADLs  - Assess for home care needs following discharge   - Consider OT consult to assist with ADL evaluation and planning for discharge  - Provide patient education as appropriate  Outcome: Progressing  Goal: Maintains/Returns to pre admission functional level  Description: INTERVENTIONS:  - Perform BMAT or MOVE assessment daily.   - Set and communicate daily mobility goal to care team and patient/family/caregiver. - Collaborate with rehabilitation services on mobility goals if consulted  - Perform Range of Motion 3 times a day. - Reposition patient every 2 hours.   - Dangle patient 3 times a day  - Stand patient 3 times a day  - Ambulate patient 3 times a day  - Out of bed to chair 3 times a day   - Out of bed for meals 3 times a day  - Out of bed for toileting  - Record patient progress and toleration of activity level   Outcome: Progressing     Problem: DISCHARGE PLANNING  Goal: Discharge to home or other facility with appropriate resources  Description: INTERVENTIONS:  - Identify barriers to discharge w/patient and caregiver  - Arrange for needed discharge resources and transportation as appropriate  - Identify discharge learning needs (meds, wound care, etc.)  - Arrange for interpretive services to assist at discharge as needed  - Refer to Case Management Department for coordinating discharge planning if the patient needs post-hospital services based on physician/advanced practitioner order or complex needs related to functional status, cognitive ability, or social support system  Outcome: Progressing     Problem: Prexisting or High Potential for Compromised Skin Integrity  Goal: Skin integrity is maintained or improved  Description: INTERVENTIONS:  - Identify patients at risk for skin breakdown  - Assess and monitor skin integrity  - Assess and monitor nutrition and hydration status  - Monitor labs   - Assess for incontinence   - Turn and reposition patient  - Assist with mobility/ambulation  - Relieve pressure over bony prominences  - Avoid friction and shearing  - Provide appropriate hygiene as needed including keeping skin clean and dry  - Evaluate need for skin moisturizer/barrier cream  - Collaborate with interdisciplinary team   - Patient/family teaching  - Consider wound care consult   Outcome: Progressing     Problem: MOBILITY - ADULT  Goal: Maintain or return to baseline ADL function  Description: INTERVENTIONS:  -  Assess patient's ability to carry out ADLs; assess patient's baseline for ADL function and identify physical deficits which impact ability to perform ADLs (bathing, care of mouth/teeth, toileting, grooming, dressing, etc.)  - Assess/evaluate cause of self-care deficits   - Assess range of motion  - Assess patient's mobility; develop plan if impaired  - Assess patient's need for assistive devices and provide as appropriate  - Encourage maximum independence but intervene and supervise when necessary  - Involve family in performance of ADLs  - Assess for home care needs following discharge   - Consider OT consult to assist with ADL evaluation and planning for discharge  - Provide patient education as appropriate  Outcome: Progressing  Goal: Maintains/Returns to pre admission functional level  Description: INTERVENTIONS:  - Perform BMAT or MOVE assessment daily.   - Set and communicate daily mobility goal to care team and patient/family/caregiver. - Collaborate with rehabilitation services on mobility goals if consulted  - Perform Range of Motion 3 times a day. - Reposition patient every 2 hours.   - Dangle patient 3 times a day  - Stand patient 3 times a day  - Ambulate patient 3 times a day  - Out of bed to chair 3 times a day   - Out of bed for meals 3 times a day  - Out of bed for toileting  - Record patient progress and toleration of activity level   Outcome: Progressing

## 2023-08-24 NOTE — PROGRESS NOTES
Physical Medicine and Rehabilitation Progress Note  Basil Fine 70 y.o. female MRN: [id number]  Unit/Bed#: -01 Encounter: [id number]    To Review: Val Ferguson is a 70 y.o. female with Sjogrens and RA (on methotrexate), HTN, hypothyroidism, gastroparesis, depression, previous mild cognitive impairment ([facility] 17/30 in 2021), history of meningioma s/p resection in 2012, pelvic fracture after fall down stairs in 2021, who presented to the [facility] 8/15 after falling backwards and landing on the ground 6 days prior. She had headaches and severe back pain and went to the hospital. She also seemed to have multiple more falls. She was found to have multiple R rib fractures (3-5). CTH was unremarkable. There was a question of possible movement disorder and Neurology was consulted who ordered MRI Brain, C-Spine and T-Spine. Of note she is on Xanax and Flexeril chronically (mostly taken during bedtime). Geriatrics recommended a UA which was not done inpatient. She was treated for MASD as well. MRI Brain showed moderate chronic microangiopathy, significantly increased, C-Spine without cord compression or cord signal abnormality, and T-Spine with a mild/acte T8 compression fracture without cord compression or cord signal abnormality. She most likely, per Neurology has a Vascular Dementia. There was also some discussion of possible PSP. Her  had also reported that it didn't seem the patient was keeping track of her medications properly, and certainly with her medication regimen is at risk for polypharmacy. Neurosurgery was consulted for her T8 compression fracture, and ordered TLSO when OOB/ > 45 degrees and follow-up in 2 weeks.  She has a Vitamin B1 pending, SPEP pending,  TSH was 0.314 with T4 1.29, A1C was 5.6, and B12 was 153 (she was started on 5 doses of Vitamin B12 - first dose given today, and to be given every other day, followed by daily 1000mcg Vitamin B12). She was admitted to the [facility] on 8/18/2023. Chief Complaint: Feels fine today. Interval History/Subjective:  No sleep log was done last night. She thinks she slept, but she's not sure. She was confused this AM - and when I told her she says "Oh I'm not surprised - that happens sometimes". She denies any new lightheadedness, dizziness, CP, Sob, fevers, chills, N/V, abdominal pain. Her pain is intermittent, and right now she seems comfortable working with therapy. Last BM was 8/20. ROS:  A 10 point review of systems was negative except for what is noted in the HPI. Today's Changes:  1. Reviewed with nursing need for sleep log for this patient. 2. Continue 1:1 at this time. 3. One more dose of IM B12 on Saturday, then start oral B12 daily. 4. Folate normal. LFts improving after adjutsing dose of scheduled tylenol  5. Give dulc tablet today. Last BM 8/20.   6. Calling daughter today with updates. 7. Make robaxin PRN. 8. Reverse T3 58.6 -outpatient f/u endo. 9. B1 specimen never received by lab. Will reorder with next labs. B6 pending. 10. Therapy and CM to discuss functional progress and expectations for needs at home with  today. Total time spent:  50 minutes, with more than 50% spent counseling/coordinating care. Counseling includes discussion with patient re: progress in therapies, functional issues observed by therapy staff, and discussion with patient his/her current medical state/wellbeing. Coordination of patient's care was performed in conjunction with Internal Medicine service to monitor patient's labs, vitals, and management of their comorbidities. Assessment/Plan:    * Progressive cognitive dysfunction  Assessment & Plan   reports progressive cognitive decline that accelerated the past two weeks  - Likely vascular dementia given increased moderate chronic microangiopathy  - ?  Amyloid angiopathy per radiology  - Cognitive impairment resulting in mismanagement of her medications - which include steroids, xanax, flexeril, prozac with polypharmacy contributing - particularly since she takes the Flexeril/Xanax at night and that is when most of her falls were occurring.   - Significant Vitamin B12 deficiency contributing  - Also found so far to have slightly high T4, and low TSH  - Of note has hepatomegaly (albeit stable) and increased alk phos on admission. Ammonia level on admission was normal (18)   - UA also positive on admission for E.coli UTI.   - Exam with patchy sensory impairment, with clear impairment in proprioception in great toes. Ataxia/motor planning difficulties. Clearly has impaired balance - this could be exacerbated by the B12 deficiency as well. - Recommend outpatient Neuro/Senior Care follow-up  - Consult Endocrine to comment on TSH/T4 - adjustment of Levothyroxine potentially vs. In setting of recent hospitalization/fall.  - Follow-up work-up: B1, B6   - Normal: A1C, MRI C-Spine, T-Spine shows no cord compression/cord signal abnormality, Ammonia level, SPEP  - Treatment: PT/OT/SLP 3-5 hours/day, 5-7 days/week. See Vitamin B12 deficiency, UTI   - Discussed polypharmacy   - Will not schedule alprazolam at this time, as so far seems to be tolerating (did not take it during hospitalization). Watch closely for signs of withdrawal. Decreased PRN dose. If she does not use while here, will discontinue. - I find robaxin a better tolerated muscle relaxer, can trial here. Stopped flexeril. Tolerating well with relief. Currently requiring Q15min checks during the day, 1:1 overnight   - Restlessness at night likely 2/2 frequency/urgency with UTI   - Adding pyridium at night for next 3 days.      Compression fracture of T8 vertebra (HCC)  Assessment & Plan  In the past has broken her pelvis after a fall  Chronically on steroids, and likely should have DXA and osteoporosis work-up   - Outpatient f/u with Endocrinology - appreciate recs, patient likely with severe osteoporosis   - Vitamin D 26.6 - started on 2000 units daily    - started on calcium supplement by Endocrinology  Pain control as below  TLSO when OOB and HOB > 45 degrees. Thoracic spine precautions  Plan for repeat XR ~9/1 with follow-up with Neurosurgery at that time. Vitamin B12 deficiency  Assessment & Plan  8/17 level 153  8/18 received 1000mcg IM shot  - Will give 4 more doses every other day starting on 8/20.  - Then start 1000mcg daily on 8/27  - Monitor proprioception, sensation, ataxia/motor planning.   - Outpatient f/u with Neurology  - Nutrition consult to evaluate dietary intake. Closed fracture of multiple ribs of right side  Assessment & Plan  2/2 fall  R 3-5  Currently on RA  Pain control as below  Incentive spirometry. Osteoporosis  Assessment & Plan  Chronically on steroids, and likely should have DXA and osteoporosis work-up   - Consulted endocrine on admission.    - Vitamin D 26.6 - started on 2000 units daily    - started on calcium supplement by Endocrinology  - Outpatient f/u with endocrine   - She will benefit from getting DEXA scan as outpatient and will qualify for anabolic agents for treatment as outpt    Acute cystitis without hematuria  Assessment & Plan  She reports episodes of urgency at times  - UA with pan susceptible E.coli 100k CFU   - 8/21 started on Keflex for 5 days    - Pyridium at night to help with urgency/frequency  -  reports a pattern of patient holding her urine until the last minute and then rushing to the bathroom  - PVRs have been low continued timed voids    Abnormal CT of the abdomen  Assessment & Plan  1. Stable hepatomegaly - checking CMP in the AM. Alk phos elevated on admission, but stable. If still elevated, will add GGT given recent T8 fracture. Currently on Tylenol Q8hrs scheduled. May need to decrease dose.    2. Stable R renal cyst    Outpatient f/u with PCP    Adenopathy  Assessment & Plan  In the past has had "stable distal paraesophageal and gastrohepatic ligament adenopathy of uncertain etiology""  Here also noted to have R retrocrural lymph node adjacent to the descending thoracic aorta, juts proximal to the diaphragmatic hiatus - recommended for CT of the chest in 3-6 months to assess for stability. Onychomycosis  Assessment & Plan  Consult podiatry for very overgrown toenails  Appreciate their debridement. Oral dyskinesia  Assessment & Plan  This is stable and was noted in her [facility] admission, where it was felt to be due to reglan. She is no longer on this medication. Patient attributes to her Sjogrens. Would not add additional medication to address this at this time. Can f/u with Neurology outpatient    Gastroparesis  Assessment & Plan  Was previously on reglan, does not appear to be on this medication at this time. May need small, more frequent meals  Monitor for symptoms. Hypothyroidism  Assessment & Plan  8/17 TSH 0.314 and T4 1.29  Previously appeared to be on 88-75mcg daily. Not sure when 100mcg was initiated and by who. Consulted endocrine to comment on adjusting dose vs. In setting of recent fall/injury/hospitalization   - Reverse T3 pending, and dose decreased to 88mcg daliy   - Recheck thyroid panel in 4-6 weeks. Anxiety and depression  Assessment & Plan  Currently on prozac 20mg daily  Also on xanax 1mg QHS PRN chronically, which it seems she was taking scheduled at home. Has not been on this only PRN here. - Monitor for now PRN - but monitor closely for any symptoms of withdrawal or increased agitation off this medication.   - Decreased dose to 0.5mg QHS PRN. - Will not make scheduled at this time. Question of this contributed to her worsening falls (as she was likely not managing her meds well at home given her cognitive impairment)  Would recommend a wean of this medication over time. That being said, this medication is also not showing up in her PDMP.      Hypertension  Assessment & Plan  Home: Atenolol 50mg daily, Losartan 25mg daily  Here: Atenolol 50mg daily, Norvasc 5mg BID, Hydralazine PRN  Monitor and adjust as appropriate  IM consulted and assisting with management. Sjogren's disease [facility])  Assessment & Plan  Follows with Dr. Trice Tatum outpatient  Chronically on prednisone and methotrexate. Continue for now. Health Maintenance  #Pain: Tylenol Q8hrs scheduled, Flexeril (will decrease to 5mg QHS), Lidoderm patch, Oxycodone 2.5-5mg (will try to de-escalate sooner rather than later). #Bowel: Last BM 8/20. Miralax to daily. Added Bisacodyl suppository and tabs PRN. #Bladder: Voiding and appears to be continent. PVR x3 low. #Skin/Pressure Injury Prevention: Turn Q2hr in bed, with weight shifts [id number] in wheelchair. Float heels in bed. #DVT Prophylaxis: Lovenox, SCDs. #GI Prophylaxis: None at this time. #Code Status:  DNR/DNI  #FEN: Regular/Thins  #Dispo: Team 8/23: ADD 8/30 with support from family. Cognitively not demonstrating any good follow-through, recall, carry-over. Very impaired safety awareness and insight. Plan for family meeting tomorrow. #F/U: PCP, Neuro, Geriatrics, Rheumatology,       Objective:    Functional Update:  PT: modA transfers, bed mobility, modA ambulation 76' with RW, maxA stairs   OT: Sup eating, CGA grooming, Ebony bathing, Ebony LB dressing, modA UB dressing, CGA toileting, CGA toilet transfers   SLP: CLQT 1.4 out of 4 -severely impaired cognition. Ebony for expression/social interaction, but modA for comprehension, and maxA for problem solving/memory. Allergies per EMR    Physical Exam:  Temp:  [97.2 °F (36.2 °C)-98.2 °F (36.8 °C)] 98.2 °F (36.8 °C)  HR:  [60-88] 88  Resp:  [16-18] 18  BP: (130-150)/(54-84) 141/65  Oxygen Therapy  SpO2: 94 %    Gen: No acute distress, Well-nourished, well-appearing. HEENT: Moist mucus membranes, Normocephalic/Atraumatic  Cardiovascular: Regular rate, rhythm, S1/S2.  Distal pulses palpable  Heme/Extr: No edema  Pulmonary: Non-labored breathing. Lungs CTAB  : No medeiros  GI: Soft, non-tender, non-distended. BS+  MSK: Wearing TLSO  Integumentary: Skin is warm, dry. Neuro: Awake, alert. Extremely impaired recall. Ambulating well with therapies. Psych: Normal mood and affect.      Diagnostic Studies: Reviewed, no new imaging    Laboratory: Reviewed   Results from last 7 days   Lab Units 08/24/23  0554 08/21/23  0532   HEMOGLOBIN g/dL 12.2 12.9   HEMATOCRIT % 34.8 37.6   WBC Thousand/uL 5.13 3.90*     Results from last 7 days   Lab Units 08/24/23  0554 08/21/23  0532   BUN mg/dL 19 13   POTASSIUM mmol/L 3.8 3.8   CHLORIDE mmol/L 99 103   CREATININE mg/dL 0.80 0.76   AST U/L 36 63*   ALT U/L 42 103*            Patient Active Problem List   Diagnosis   • Parotid gland enlargement   • Sjogren's disease (HCC)   • Closed nondisplaced fracture of anterior wall of right acetabulum (HCC)   • Closed nondisplaced fracture of right pubis (HCC)   • Fall   • Hypertension   • Pain of right upper extremity   • Multiple closed fractures of pelvis (HCC)   • Anxiety and depression   • Hypothyroidism   • Abnormal findings on imaging test   • Gastroparesis   • Oral dyskinesia   • Potential for cognitive impairment   • Anemia   • Hypokalemia   • WELLS (dyspnea on exertion)   • Lightheadedness   • Pain   • Abnormal ECG   • Chest pain   • Pleural effusion on right   • Atelectasis of right lung   • Low HDL (under 40)   • Dermatitis associated with moisture   • Onychomycosis   • Enlarged and hypertrophic nails   • Adenopathy   • Abnormal CT of the abdomen   • Hyponatremia   • Progressive cognitive dysfunction   • Fungal skin infection   • Rheumatoid arteritis (HCC)   • Closed fracture of multiple ribs of right side   • Movement disorder   • Vitamin B12 deficiency   • Compression fracture of T8 vertebra (HCC)   • Acute cystitis without hematuria   • Osteoporosis         Medications  Current Facility-Administered Medications   Medication Dose Route Frequency Provider Last Rate   • acetaminophen  650 mg Oral Q12H Lam Mcnulty MD     • ALPRAZolam  0.5 mg Oral HS PRN Lam Mcnulty MD     • amLODIPine  5 mg Oral BID DARIO Spain     • atenolol  50 mg Oral Daily Lam Mcnulty MD     • atorvastatin  10 mg Oral Daily With David Ang MD     • bisacodyl  5 mg Oral Daily PRN Lam Mcnulty MD     • bisacodyl  5 mg Oral Once Lam Mcnulty MD     • bisacodyl  10 mg Rectal Daily PRN Lam Mcnulty MD     • bisacodyl  10 mg Rectal Once Eric Dennison MD     • calcium carbonate  1 tablet Oral BID With Meals Henry Coburn MD     • cephalexin  500 mg Oral [facility] Lam Mcnulty MD     • cholecalciferol  2,000 Units Oral Daily Henry Coburn MD     • cyanocobalamin  1,000 mcg Intramuscular Every Other Day Lam Mcnulty MD     • docusate sodium  100 mg Oral BID Lam Mcnulty MD     • enoxaparin  30 mg Subcutaneous Q12H Kelli Ojeda MD     • FLUoxetine  20 mg Oral Daily Lam Mcnulty MD     • folic acid  1 mg Oral Daily Lam Mcnulty MD     • hydrALAZINE  25 mg Oral Q8H PRN DARIO [place]     • levothyroxine  88 mcg Oral Early Morning Henry Coburn MD     • lidocaine  1 patch Topical Daily Lam Mcnulty MD     • methocarbamol  250 mg Oral Q6H [facility] Lam Mcnulty MD     • methotrexate  20 mg Oral Weekly Lam Mcnulty MD     • nystatin   Topical BID Lam Mcnulty MD     • ondansetron  4 mg Oral Q6H PRN Lam Mcnulty MD     • oxyCODONE  5 mg Oral Q4H PRN Lam Mcnulty MD     • oxyCODONE  2.5 mg Oral Q4H PRN Lam Mcnulty MD     • phenazopyridine  200 mg Oral QPM Lam Mcnulty MD     • polyethylene glycol  17 g Oral Daily Lam Mcnulty MD     • predniSONE  5 mg Oral HS Lam Mcnulty MD     • QUEtiapine  12.5 mg Oral HS PRN Lam Mcnulty MD     • senna  2 tablet Oral HS Lam Mcnulty MD            ** Please Note: Fluency Direct voice to text software may have been used in the creation of this document.  **

## 2023-08-24 NOTE — PROGRESS NOTES
08/24/23 1320   Pain Assessment   Pain Assessment Tool 0-10   Pain Score No Pain   Restrictions/Precautions   Precautions Bed/chair alarms;Cognitive; Fall Risk;Impulsive;Pain;Supervision on toilet/commode;Spinal precautions   ROM Restrictions Yes  (spinal precautions)   Braces or Orthoses TLSO   Comprehension   Comprehension (FIM) 4 - Understands basic info/conversation 75-90% of time   Expression   Expression (FIM) 5 - Needs help/cues only RARELY (< 10% of the time)   Social Interaction   Social Interaction (FIM) 5 - Interacts appropriately with others 90% of time   Problem Solving   Problem solving (FIM) 3 - Solves basic problmes 50-74% of time   Memory   Memory (FIM) 2 - Recognizes, recalls/performs 25-49%   Speech/Language/Cognition Assessmetn   Treatment Assessment Family Meeting  Family meeting held with this SLP, Edis Read OT, Verner Pronto PT and Lisa CM, to discuss details of discharge with pt and , Mitch Weaver. SLP reviewed progress and current deficits regarding cognition- decreased attention, problem solving, memory, reasoning, safety and insight as well as orientation at times. Pt reports noting decline over the last few weeks prior to fall and hospitalization as well as had started taking over IADL tasks due to safety (e.g., meds).  inquiring about discussing meds in more detail with medical team regarding timing of meds and purpose as he stated there was discussion of her being on some meds that might not be necessary or at least needs to be timed better. Medical team to follow up. Recommendations from team are for 24hr S/A at discharge.  reported he does have supportive neighbors who offered to help as needed at discharge- encouraged  to set a schedule with them to allow time to him to have on his own and run errands/attend own appointments. Discussed plan for home therapy services (PT/OT/ST).  receptive to all recommendations.  Plan for more family training Monday at [address]. Cognitive tx session and Family Training  Directly following meeting, pt seen for skilled speech therapy session targeting cognitive linguistic communication skills. Pt alert and interactive- engaged in review of events and using Memory Log. Pt unable to recall events of today with therapy and needing SLP to cue pt by physically handing her the log to read outloud. Pt noted to perseverate on only one section, needing cues to read on to next therapy section and tasks completed. Pt stated "I know it says this but I don't remember doing it". During OT session, they created further narrative regarding reason for hospitalization. Pt reading again outloud regarding her fall and hurting her back/ribs to which she stating "I don't think that happened". SLP needed to directly cue as her fall and point to brace to support her back. Pt was able to recall her precautions with SLP prompting to the things she is not allowed to do to protect her back- recalling 3/3 of her spinal precautions. Next filled in more orientation papers for wall to allow pt to reference regarding current location ([facility] [address]) and room number as well as month/year. Signed taped on wall under clock for pt to reference. Pt next completed structured problem solving task with determining BEST solutions- pt was able to complete task from yesterday's session with 9/13acc increasing with verbal cues to select one answer as pt often reading all answers and not picking one specifically. Pt next completed word deduction task with exclusion (concrete)- given 4 words, pt was able to select which word did not belong with 8/10acc increasing with verbal cues. Pt at times perseverating on previous words needing cues to attend to current list on page. Last engaged in education/training regarding memory strategies. Provided pt and  with Memory Tips packet- reviewed levels of memory including LT, ST and working/immediate memory. Discussed then strategies to implement to compensate including repetition, rehearsal, written, associations, and visual cues to aid. Discussed strategies to use at home as well. Last, reviewed plan again with  regarding things to assist with given cognitive deficits-  receptive and understanding to this. Educating on home therapy services and outpt (unsure if home cognitive therapy will be offered). Encouraged following up with SLP to cont to target cognition at discharge. Reviewed HEP of tasks to complete at home including puzzle books, apps, and jiggsaw puzzles. Pt commented she enjoys watching a lot of TV therefore SLP encouraging to do an hour a day of a puzzle/cognitive tasks at home. Pt/ in agreement. Plan to cont to target orientation, use/implementation of memory log, Pt overall is improving with skilled SLP services and will cont to benefit to maximize overall cognitive linguistic communication abilities at this time. SLP Therapy Minutes   SLP Time In 1320   SLP Time Out 1430   SLP Total Time (minutes) 70   SLP Mode of treatment - Individual (minutes) 70   SLP Mode of treatment - Concurrent (minutes) 0   SLP Mode of treatment - Group (minutes) 0   SLP Mode of treatment - Co-treat (minutes) 0   SLP Mode of Treatment - Total time(minutes) 70 minutes   SLP Cumulative Minutes 220   Therapy Time missed   Time missed?  No

## 2023-08-24 NOTE — PROGRESS NOTES
OT Daily Treatment Note       08/24/23 0700   Pain Assessment   Pain Assessment Tool 0-10   Pain Score 7   Pain Location/Orientation Location: Back   Hospital Pain Intervention(s) Other (Comment)  (RN Yari Pitts notified)   Restrictions/Precautions   Precautions Bed/chair alarms;Cognitive; Fall Risk;Impulsive;Pain;Supervision on toilet/commode   ROM Restrictions Yes  (spinal precautions)   Braces or Orthoses TLSO   Lifestyle   Autonomy "why am I in a hospital bed in my [facility] kitchen"   Eating   Type of Assistance Needed Independent   Physical Assistance Level No physical assistance   Comment seated upright in bed   Eating CARE Score 6   Oral Hygiene   Comment refused to complete, read below for details   Shower/Bathe Self   Comment refused to complete, read below for details   Upper Body Dressing   Type of Assistance Needed Physical assistance   Physical Assistance Level 51%-75%   Comment able to don/doff shirt with CS for safety, TA required for TLSO brace. no new learning observed and will most likely require TA for TLSO brace mgmt at home. Upper Body Dressing CARE Score 2   Lower Body Dressing   Type of Assistance Needed Physical assistance   Physical Assistance Level 25% or less   Comment able to thread over BLE via cross leg technique, required assistance in stand to thoroughly don over hips in stance   Lower Body Dressing CARE Score 3   Putting On/Taking Off Footwear   Type of Assistance Needed Supervision   Physical Assistance Level No physical assistance   Comment able to don/doff socks via cross leg technique. supv required to ensure pt maintains spinal precautions.    Putting On/Taking Off Footwear CARE Score 4   Lying to Sitting on Side of Bed   Type of Assistance Needed Supervision   Physical Assistance Level No physical assistance   Comment with HOB slightly elevated   Lying to Sitting on Side of Bed CARE Score 4   Sit to Stand   Type of Assistance Needed Incidental touching   Physical Assistance Level No physical assistance   Comment CG with RW   Sit to Stand CARE Score 4   Bed-Chair Transfer   Type of Assistance Needed Incidental touching   Physical Assistance Level No physical assistance   Comment CG SPT with RW   Chair/Bed-to-Chair Transfer CARE Score 4   Toileting Hygiene   Type of Assistance Needed Incidental touching   Physical Assistance Level No physical assistance   Comment CG in stance during CM   [place] Score 4   Toilet Transfer   Type of Assistance Needed Incidental touching   Physical Assistance Level No physical assistance   Comment CG with RW on standard toilet height - pt amb to bathroom   Toilet Transfer CARE Score 4   Cognition   Overall Cognitive Status Impaired   Arousal/Participation Alert; Cooperative   Attention Attends with cues to redirect   Orientation Level Oriented to person;Oriented to situation   Memory Decreased long term memory;Decreased recall of biographical information;Decreased short term memory;Decreased recall of recent events;Decreased recall of precautions   Following Commands Follows one step commands inconsistently   Comments pt presented with worsening cognition today. upon arrival, pt was initially refusing therapy stating it was 7pm, she needed to go to bed and she inquired about why she was "in a hospital bed in her [facility] damn kitchen" and asked where her  was because she believed he left. OT spent extensive time reorienting pt, explaining where she was, why she was in the hospital and opened the windows to show daylight. pt then became agitated stating this was the "worst hospital she has ever been to" because she has "never seen a nurse or a doctor". OT created an orientation sheet providing a short paragraph as to where she is, why she is here and left lines open to write in todays date and therapy times. Will cont to reorient during her time here. pt was also resistive to completing full ADL session stating she "gets cleaned 3x/day".  OT edu that pt has been getting washed and dressed with therapy every morning but pt cont to refuse bath. she was agreeable to get dressed and toilet only. Activity Tolerance   Activity Tolerance Treatment limited secondary to agitation;Patient tolerated treatment well   Assessment   Treatment Assessment Pt participated minimally in skilled OT session with focus on ADL retraining, functional transfer training and cognitive reorientation. See flowsheet for details of session and current functional status. Pt is limited by impaired balance, decreased endurance, increased fall risk, pain, decreased activity tolerance, decreased safety awareness, impaired judgement and decreased cognition and requires skilled OT services to increase independence and safety with ADL completion in prep for DC home. Plan to continue ADL retraining, functional transfer training, UE strengthening/ROM, endurance training, cognitive reorientation, Pt/caregiver education, compensatory technique education, continued education, energy conservation and activity engagement  to address barriers mentioned above. Prognosis Fair   Problem List Decreased strength;Decreased range of motion;Decreased endurance; Impaired balance;Decreased mobility; Decreased coordination;Decreased cognition; Impaired judgement;Decreased safety awareness;Orthopedic restrictions;Pain   Barriers to Discharge Inaccessible home environment   Plan   Treatment/Interventions ADL retraining;Functional transfer training; Therapeutic exercise; Endurance training;Patient/family training; Compensatory technique education   Progress Slow progress, cognitive deficits   OT Therapy Minutes   OT Time In 0700   OT Time Out 0830   OT Total Time (minutes) 90   OT Mode of treatment - Individual (minutes) 90   OT Mode of treatment - Concurrent (minutes) 0   OT Mode of treatment - Group (minutes) 0   OT Mode of treatment - Co-treat (minutes) 0   OT Mode of Treatment - Total time(minutes) 90 minutes   OT Cumulative Minutes 420   Therapy Time missed   Time missed?  No

## 2023-08-25 LAB
DME PARACHUTE DELIVERY DATE REQUESTED: NORMAL
DME PARACHUTE DELIVERY NOTE: NORMAL
DME PARACHUTE ITEM DESCRIPTION: NORMAL
DME PARACHUTE ORDER STATUS: NORMAL
DME PARACHUTE SUPPLIER NAME: NORMAL
DME PARACHUTE SUPPLIER PHONE: NORMAL

## 2023-08-25 PROCEDURE — 97110 THERAPEUTIC EXERCISES: CPT

## 2023-08-25 PROCEDURE — 99232 SBSQ HOSP IP/OBS MODERATE 35: CPT | Performed by: INTERNAL MEDICINE

## 2023-08-25 PROCEDURE — 97530 THERAPEUTIC ACTIVITIES: CPT

## 2023-08-25 PROCEDURE — 97129 THER IVNTJ 1ST 15 MIN: CPT

## 2023-08-25 PROCEDURE — 99233 SBSQ HOSP IP/OBS HIGH 50: CPT | Performed by: PHYSICAL MEDICINE & REHABILITATION

## 2023-08-25 PROCEDURE — 97116 GAIT TRAINING THERAPY: CPT

## 2023-08-25 PROCEDURE — 97535 SELF CARE MNGMENT TRAINING: CPT

## 2023-08-25 PROCEDURE — 97130 THER IVNTJ EA ADDL 15 MIN: CPT

## 2023-08-25 RX ORDER — LACTULOSE 20 G/30ML
20 SOLUTION ORAL ONCE
Status: COMPLETED | OUTPATIENT
Start: 2023-08-25 | End: 2023-08-25

## 2023-08-25 RX ADMIN — CEPHALEXIN 500 MG: 500 CAPSULE ORAL at 13:31

## 2023-08-25 RX ADMIN — DOCUSATE SODIUM 100 MG: 100 CAPSULE, LIQUID FILLED ORAL at 08:04

## 2023-08-25 RX ADMIN — DOCUSATE SODIUM 100 MG: 100 CAPSULE, LIQUID FILLED ORAL at 17:30

## 2023-08-25 RX ADMIN — SENNOSIDES 17.2 MG: 8.6 TABLET, FILM COATED ORAL at 21:21

## 2023-08-25 RX ADMIN — LACTULOSE 20 G: 20 SOLUTION ORAL at 17:29

## 2023-08-25 RX ADMIN — POLYETHYLENE GLYCOL 3350 17 G: 17 POWDER, FOR SOLUTION ORAL at 08:02

## 2023-08-25 RX ADMIN — CEPHALEXIN 500 MG: 500 CAPSULE ORAL at 05:49

## 2023-08-25 RX ADMIN — FLUOXETINE 20 MG: 20 CAPSULE ORAL at 08:04

## 2023-08-25 RX ADMIN — BISACODYL 10 MG: 10 SUPPOSITORY RECTAL at 15:53

## 2023-08-25 RX ADMIN — NYSTATIN 1 APPLICATION: 100000 POWDER TOPICAL at 17:32

## 2023-08-25 RX ADMIN — CEPHALEXIN 500 MG: 500 CAPSULE ORAL at 21:21

## 2023-08-25 RX ADMIN — ENOXAPARIN SODIUM 30 MG: 30 INJECTION SUBCUTANEOUS at 08:02

## 2023-08-25 RX ADMIN — ACETAMINOPHEN 650 MG: 325 TABLET, FILM COATED ORAL at 05:49

## 2023-08-25 RX ADMIN — NYSTATIN: 100000 POWDER TOPICAL at 08:07

## 2023-08-25 RX ADMIN — ACETAMINOPHEN 650 MG: 325 TABLET, FILM COATED ORAL at 17:30

## 2023-08-25 RX ADMIN — FOLIC ACID 1 MG: 1 TABLET ORAL at 08:04

## 2023-08-25 RX ADMIN — LEVOTHYROXINE SODIUM 88 MCG: 88 TABLET ORAL at 05:49

## 2023-08-25 RX ADMIN — OXYCODONE HYDROCHLORIDE 5 MG: 5 TABLET ORAL at 21:22

## 2023-08-25 RX ADMIN — Medication 1 TABLET: at 15:46

## 2023-08-25 RX ADMIN — ATORVASTATIN CALCIUM 10 MG: 10 TABLET, FILM COATED ORAL at 15:46

## 2023-08-25 RX ADMIN — ONDANSETRON 4 MG: 4 TABLET, ORALLY DISINTEGRATING ORAL at 18:58

## 2023-08-25 RX ADMIN — AMLODIPINE BESYLATE 5 MG: 5 TABLET ORAL at 08:05

## 2023-08-25 RX ADMIN — Medication 1 TABLET: at 08:04

## 2023-08-25 RX ADMIN — PREDNISONE 5 MG: 5 TABLET ORAL at 21:21

## 2023-08-25 RX ADMIN — ATENOLOL 50 MG: 50 TABLET ORAL at 08:05

## 2023-08-25 RX ADMIN — Medication 2000 UNITS: at 08:04

## 2023-08-25 RX ADMIN — ENOXAPARIN SODIUM 30 MG: 30 INJECTION SUBCUTANEOUS at 21:21

## 2023-08-25 RX ADMIN — LIDOCAINE 5% 1 PATCH: 700 PATCH TOPICAL at 08:05

## 2023-08-25 RX ADMIN — OXYCODONE HYDROCHLORIDE 5 MG: 5 TABLET ORAL at 08:10

## 2023-08-25 RX ADMIN — Medication 2.5 MG: at 03:22

## 2023-08-25 RX ADMIN — AMLODIPINE BESYLATE 5 MG: 5 TABLET ORAL at 21:21

## 2023-08-25 RX ADMIN — OXYCODONE HYDROCHLORIDE 5 MG: 5 TABLET ORAL at 17:31

## 2023-08-25 NOTE — PLAN OF CARE
Problem: INFECTION - ADULT  Goal: Absence or prevention of progression during hospitalization  Description: INTERVENTIONS:  - Assess and monitor for signs and symptoms of infection  - Monitor lab/diagnostic results  - Monitor all insertion sites, i.e. indwelling lines, tubes, and drains  - Monitor endotracheal if appropriate and nasal secretions for changes in amount and color  - [place] appropriate cooling/warming therapies per order  - Administer medications as ordered  - Instruct and encourage patient and family to use good hand hygiene technique  - Identify and instruct in appropriate isolation precautions for identified infection/condition  Outcome: Progressing

## 2023-08-25 NOTE — PROGRESS NOTES
Physical Medicine and Rehabilitation Progress Note  Basil Fine 70 y.o. female MRN: [id number]  Unit/Bed#: -01 Encounter: [id number]    To Review: Val Ferguson is a 70 y.o. female with Sjogrens and RA (on methotrexate), HTN, hypothyroidism, gastroparesis, depression, previous mild cognitive impairment ([facility] 17/30 in 2021), history of meningioma s/p resection in 2012, pelvic fracture after fall down stairs in 2021, who presented to the [facility] 8/15 after falling backwards and landing on the ground 6 days prior. She had headaches and severe back pain and went to the hospital. She also seemed to have multiple more falls. She was found to have multiple R rib fractures (3-5). CTH was unremarkable. There was a question of possible movement disorder and Neurology was consulted who ordered MRI Brain, C-Spine and T-Spine. Of note she is on Xanax and Flexeril chronically (mostly taken during bedtime). Geriatrics recommended a UA which was not done inpatient. She was treated for MASD as well. MRI Brain showed moderate chronic microangiopathy, significantly increased, C-Spine without cord compression or cord signal abnormality, and T-Spine with a mild/acte T8 compression fracture without cord compression or cord signal abnormality. She most likely, per Neurology has a Vascular Dementia. There was also some discussion of possible PSP. Her  had also reported that it didn't seem the patient was keeping track of her medications properly, and certainly with her medication regimen is at risk for polypharmacy. Neurosurgery was consulted for her T8 compression fracture, and ordered TLSO when OOB/ > 45 degrees and follow-up in 2 weeks.  She has a Vitamin B1 pending, SPEP pending,  TSH was 0.314 with T4 1.29, A1C was 5.6, and B12 was 153 (she was started on 5 doses of Vitamin B12 - first dose given today, and to be given every other day, followed by daily 1000mcg Vitamin B12). She was admitted to the [facility] on 8/18/2023. Chief Complaint: Sleep interrupted, but did get sleep last night. Interval History/Subjective:  No acute events overnight. Sleep log shows intermittently interrupted sleep . She is feeling fine today. I remind her who I am today, she recognizes me as someone, but doesn't always remember who I am. Her pain is stable and manageable. She tolerates low doses of oxycodone. She denies any new CP, SOB, fevers, chills, N/V, abdominal pain. Last BM was 8/20. She received dulc tabs yesterday and getting a suppository today. ROS:  A 10 point review of systems was negative except for what is noted in the HPI. Today's Changes:  1. Sleep log  2. Continue 1:1 - she has no safety awareness and can be impulsive. 3. B6 level 5.6 adequate. Likely would still benefit from B complex given her poor appetite  4. BP is fine. Manuals very different from dynamap. 5. Sunday transition to Cyanocobalamin 1000mcg daily. 6. I am going to start a multivitamin. 7. Asked nursing to give suppository today. Also giving lactulose. Got dulc tabs yesterday. Abdomen is not acute. 8. Went to call daughter for updates, but her phone number is not actually in our records. Did have family meeting with  yesterday, reviewing her current levels of function and expected levels of function at discharge. - I asked the patient on the off chance if she had her daughter's phone number, stated she did not have a daughter. Possibly 's daughter?   - Will try to get her contact information. I know she has access to [employer] and can read the notes here. 9. Continue treatment for UTI. Pyridium added at night to help with urinary frequency impacting sleep. 10. Poor appetite, gastroparesis, elevated reverse T3, significant B12 deficiency - concern for nutritional status/malnutrtion. Consulted nutrition for additional recs.       Total time spent:  50 minutes, with more than 50% spent counseling/coordinating care. Counseling includes discussion with patient re: progress in therapies, functional issues observed by therapy staff, and discussion with patient his/her current medical state/wellbeing. Coordination of patient's care was performed in conjunction with Internal Medicine service to monitor patient's labs, vitals, and management of their comorbidities. Assessment/Plan:    * Progressive cognitive dysfunction  Assessment & Plan   reports progressive cognitive decline that accelerated 2-3 weeks prior to admission. Multifactorial:  - Likely vascular dementia given increased moderate chronic microangiopathy, step wise decline. - ? Amyloid angiopathy per radiology  - Cognitive impairment resulting in mismanagement of her medications - which include steroids, xanax, flexeril, prozac with polypharmacy contributing - particularly since she takes the Flexeril/Xanax at night and that is when most of her falls were occurring.   - Significant Vitamin B12 deficiency contributing (ataxia, proprioceptive impairments, cognitive impairments)  - Also found so far to have slightly high T4, and low TSH  - Of note has hepatomegaly (albeit stable) and increased alk phos on admission. Ammonia level on admission was normal (18)   - UA also positive on admission for E.coli UTI.   - Exam with patchy sensory impairment, with clear impairment in proprioception in great toes. Ataxia/motor planning difficulties. Clearly has impaired balance - this could be exacerbated by the B12 deficiency as well.   - B6 level just adequate - she has had poor appetite as an outpatient   - Now on multivitamin daily in addition to B12 supplement      - Recommend outpatient Neuro/Senior Care follow-up  - Consult Endocrine to comment on TSH/T4 - adjustment of Levothyroxine potentially vs. In setting of recent hospitalization/fall.  - Follow-up work-up: B1, B6   - Normal: A1C, MRI C-Spine, T-Spine shows no cord compression/cord signal abnormality, Ammonia level, SPEP  - Treatment: PT/OT/SLP 3-5 hours/day, 5-7 days/week. See Vitamin B12 deficiency, UTI   - Discussed polypharmacy   - Will not schedule alprazolam at this time, as so far seems to be tolerating (did not take it during hospitalization). Watch closely for signs of withdrawal. Decreased PRN dose. If she does not use while here, will discontinue. - I find robaxin a better tolerated muscle relaxer, can trial here. Stopped flexeril. Tolerating well with relief. Currently requiring Q15min checks during the day, 1:1 overnight   - Restlessness at night likely 2/2 frequency/urgency with UTI   - Adding pyridium at night for next 3 days. Compression fracture of T8 vertebra (HCC)  Assessment & Plan  In the past has broken her pelvis after a fall  Chronically on steroids, and likely should have DXA and osteoporosis work-up   - Outpatient f/u with Endocrinology - appreciate recs, patient likely with severe osteoporosis   - Vitamin D 26.6 - started on 2000 units daily    - started on calcium supplement by Endocrinology  Pain control as below  TLSO when OOB and HOB > 45 degrees. Thoracic spine precautions  Plan for repeat XR ~9/1 with follow-up with Neurosurgery at that time. Vitamin B12 deficiency  Assessment & Plan  8/17 level 153  8/18 received 1000mcg IM shot  - Will give 4 more doses every other day starting on 8/20.  - Then start 1000mcg daily on 8/27  - Monitor proprioception, sensation, ataxia/motor planning.   - Outpatient f/u with Neurology  - Nutrition consult to evaluate dietary intake. Closed fracture of multiple ribs of right side  Assessment & Plan  2/2 fall  R 3-5  Currently on RA  Pain control as below  Incentive spirometry.     Osteoporosis  Assessment & Plan  Chronically on steroids, and likely should have DXA and osteoporosis work-up   - Consulted endocrine on admission.    - Vitamin D 26.6 - started on 2000 units daily    - started on calcium supplement by Endocrinology  - Outpatient f/u with endocrine   - She will benefit from getting DEXA scan as outpatient and will qualify for anabolic agents for treatment as outpt    Acute cystitis without hematuria  Assessment & Plan  She reports episodes of urgency at times  - UA with pan susceptible E.coli 100k CFU   - 8/21 started on Keflex for 5 days    - Pyridium at night to help with urgency/frequency  -  reports a pattern of patient holding her urine until the last minute and then rushing to the bathroom  - PVRs have been low continued timed voids    Abnormal CT of the abdomen  Assessment & Plan  1. Stable hepatomegaly - checking CMP in the AM. Alk phos elevated on admission, but stable. If still elevated, will add GGT given recent T8 fracture. Currently on Tylenol Q8hrs scheduled. May need to decrease dose. 2. Stable R renal cyst    Outpatient f/u with PCP    Adenopathy  Assessment & Plan  In the past has had "stable distal paraesophageal and gastrohepatic ligament adenopathy of uncertain etiology""  Here also noted to have R retrocrural lymph node adjacent to the descending thoracic aorta, juts proximal to the diaphragmatic hiatus - recommended for CT of the chest in 3-6 months to assess for stability. Onychomycosis  Assessment & Plan  Consult podiatry for very overgrown toenails  Appreciate their debridement. Oral dyskinesia  Assessment & Plan  This is stable and was noted in her [address] [facility] admission, where it was felt to be due to reglan. She is no longer on this medication. Patient attributes to her Sjogrens. Would not add additional medication to address this at this time. Can f/u with Neurology outpatient    Gastroparesis  Assessment & Plan  Was previously on reglan, does not appear to be on this medication at this time. May need small, more frequent meals  Monitor for symptoms.       Hypothyroidism  Assessment & Plan  8/17 TSH 0.314 and T4 1.29  Previously appeared to be on 88-75mcg daily. Not sure when 100mcg was initiated and by who. Consulted endocrine to comment on adjusting dose vs. In setting of recent fall/injury/hospitalization   - Reverse T3 elevated, and dose decreased to 88mcg daily by endocrine   - Recheck thyroid panel in 4-6 weeks. Anxiety and depression  Assessment & Plan  Currently on prozac 20mg daily  Also on xanax 1mg QHS PRN chronically, which it seems she was taking scheduled at home. Has not been on this only PRN here. - Monitor for now PRN - but monitor closely for any symptoms of withdrawal or increased agitation off this medication.   - Decreased dose to 0.5mg QHS PRN. - Will not make scheduled at this time. Question of this contributed to her worsening falls (as she was likely not managing her meds well at home given her cognitive impairment)  Would recommend a wean of this medication over time. That being said, this medication is also not showing up in her PDMP. Hypertension  Assessment & Plan  Home: Atenolol 50mg daily, Losartan 25mg daily  Here: Atenolol 50mg daily, Norvasc 5mg BID, Hydralazine PRN  Monitor and adjust as appropriate  IM consulted and assisting with management. Sjogren's disease [facility])  Assessment & Plan  Follows with Dr. Brit Camacho outpatient  Chronically on prednisone and methotrexate. Continue for now. Health Maintenance  #Pain: Tylenol Q8hrs scheduled, Flexeril (will decrease to 5mg QHS), Lidoderm patch, Oxycodone 2.5-5mg (will try to de-escalate sooner rather than later). #Bowel: Last BM 8/20. Senna/ Docusate/Miralax scheduled. 8/24 dulc tabs, 8/25 lactulose and suppository. #Bladder: Voiding and appears to be continent. PVR x3 low. #Skin/Pressure Injury Prevention: Turn Q2hr in bed, with weight shifts [id number] in wheelchair. Float heels in bed. #DVT Prophylaxis: Lovenox, SCDs. #GI Prophylaxis: None at this time.    #Code Status:  DNR/DNI  #FEN: Regular/Thins  #Dispo: Team 8/23: ADD 8/30 with support from family. Cognitively not demonstrating any good follow-through, recall, carry-over. Very impaired safety awareness and insight. Plan for family meeting tomorrow. #F/U: PCP, Neuro, Geriatrics, Rheumatology,       Objective:    Functional Update:  PT: modA transfers, bed mobility, modA ambulation 76' with RW, maxA stairs   OT: Sup eating, CGA grooming, Ebony bathing, Ebony LB dressing, modA UB dressing, CGA toileting, CGA toilet transfers   SLP: CLQT 1.4 out of 4 -severely impaired cognition. Ebony for expression/social interaction, but modA for comprehension, and maxA for problem solving/memory. Allergies per EMR    Physical Exam:  Temp:  [97.4 °F (36.3 °C)-98 °F (36.7 °C)] 97.4 °F (36.3 °C)  HR:  [64-74] 74  Resp:  [17-18] 18  BP: (134-170)/(72-86) 148/78  Oxygen Therapy  SpO2: 96 %    Gen: No acute distress  HEENT: Moist mucus membranes, Normocephalic/Atraumatic  Cardiovascular: Regular rate, rhythm, S1/S2. Distal pulses palpable  Heme/Extr: No edema  Pulmonary: Non-labored breathing. Lungs CTAB  : No medeiros  GI: Soft, non-tender, non-distended. BS+  MSK: Wearing TLSO  Integumentary: Skin is warm, dry. Neuro:Awake, alert oriented to self. Fluctuant orientation. Cooperative, but impulsive at times and lacks safety awareness. No focal/lateralizing deficits. Psych: Normal mood and affect.        Diagnostic Studies: Reviewed, no new imaging    Laboratory: Reviewed   Results from last 7 days   Lab Units 08/24/23  0554 08/21/23  0532   HEMOGLOBIN g/dL 12.2 12.9   HEMATOCRIT % 34.8 37.6   WBC Thousand/uL 5.13 3.90*     Results from last 7 days   Lab Units 08/24/23  0554 08/21/23  0532   BUN mg/dL 19 13   POTASSIUM mmol/L 3.8 3.8   CHLORIDE mmol/L 99 103   CREATININE mg/dL 0.80 0.76   AST U/L 36 63*   ALT U/L 42 103*            Patient Active Problem List   Diagnosis   • Parotid gland enlargement   • Sjogren's disease (HCC)   • Closed nondisplaced fracture of anterior wall of right acetabulum (HCC)   • Closed nondisplaced fracture of right pubis ([address])   • Fall   • Hypertension   • Pain of right upper extremity   • Multiple closed fractures of pelvis (HCC)   • Anxiety and depression   • Hypothyroidism   • Abnormal findings on imaging test   • Gastroparesis   • Oral dyskinesia   • Potential for cognitive impairment   • Anemia   • Hypokalemia   • WELLS (dyspnea on exertion)   • Lightheadedness   • Pain   • Abnormal ECG   • Chest pain   • Pleural effusion on right   • Atelectasis of right lung   • Low HDL (under 40)   • Dermatitis associated with moisture   • Onychomycosis   • Enlarged and hypertrophic nails   • Adenopathy   • Abnormal CT of the abdomen   • Hyponatremia   • Progressive cognitive dysfunction   • Fungal skin infection   • Rheumatoid arteritis (HCC)   • Closed fracture of multiple ribs of right side   • Movement disorder   • Vitamin B12 deficiency   • Compression fracture of T8 vertebra (HCC)   • Acute cystitis without hematuria   • Osteoporosis         Medications  Current Facility-Administered Medications   Medication Dose Route Frequency Provider Last Rate   • acetaminophen  650 mg Oral Q12H Karlene Rollins MD     • ALPRAZolam  0.5 mg Oral HS PRN Karlene Rollins MD     • amLODIPine  5 mg Oral BID DARIO Hernandez     • atenolol  50 mg Oral Daily Karlene Rollins MD     • atorvastatin  10 mg Oral Daily With Sean De La Rosa MD     • bisacodyl  5 mg Oral Daily PRN Karlene Rollins MD     • bisacodyl  10 mg Rectal Daily PRN Karlene Rollins MD     • bisacodyl  10 mg Rectal Once Chucho Saldana MD     • calcium carbonate  1 tablet Oral BID With Meals Evaristo Canela MD     • cephalexin  500 mg Oral [facility] Karlene Rollins MD     • cholecalciferol  2,000 Units Oral Daily Evaristo Canela MD     • [START ON 8/27/2023] cyanocobalamin  1,000 mcg Oral Daily Karlene Rollins MD     • cyanocobalamin  1,000 mcg Intramuscular Every Other Day Karlene Rollins MD     • docusate sodium  100 mg Oral BID lEza Galindo MD     • enoxaparin  30 mg Subcutaneous Q12H Carlito Fajardo MD     • FLUoxetine  20 mg Oral Daily Elza Galindo MD     • folic acid  1 mg Oral Daily Elza Galindo MD     • hydrALAZINE  25 mg Oral Q8H PRN DARIO Collado     • levothyroxine  88 mcg Oral Early Morning Sanjay Santos MD     • lidocaine  1 patch Topical Daily Elza Galindo MD     • methocarbamol  250 mg Oral Q6H PRN Elza Galindo MD     • methotrexate  20 mg Oral Weekly Elza Galindo MD     • nystatin   Topical BID Elza Galindo MD     • ondansetron  4 mg Oral Q6H PRN Elza Galindo MD     • oxyCODONE  5 mg Oral Q4H PRN Elza Galindo MD     • oxyCODONE  2.5 mg Oral Q4H PRN Elza Galindo MD     • polyethylene glycol  17 g Oral Daily Elza Galindo MD     • predniSONE  5 mg Oral HS Elza Galindo MD     • QUEtiapine  12.5 mg Oral HS PRN Elza Galindo MD     • senna  2 tablet Oral HS Elza Galindo MD            ** Please Note: Fluency Direct voice to text software may have been used in the creation of this document.  **

## 2023-08-25 NOTE — PROGRESS NOTES
08/25/23 1400   Pain Assessment   Pain Assessment Tool 0-10   Restrictions/Precautions   Precautions Fall Risk;Bed/chair alarms; Impulsive;Supervision on toilet/commode;Visual deficit;Cognitive;Spinal precautions   Braces or Orthoses TLSO   Subjective   Subjective Pt agreeable to perform skilled PT   Sit to Stand   Type of Assistance Needed Incidental touching; Adaptive equipment   Sit to Stand CARE Score 4   Bed-Chair Transfer   Type of Assistance Needed Incidental touching; Adaptive equipment   Comment RW   Chair/Bed-to-Chair Transfer CARE Score 4   Transfer Bed/Chair/Wheelchair   Adaptive Equipment Roller Walker   Walk 10 Feet   Type of Assistance Needed Physical assistance; Adaptive equipment   Physical Assistance Level 25% or less   Comment RW   Walk 10 Feet CARE Score 3   Walk 50 Feet with Two Turns   Type of Assistance Needed Physical assistance; Adaptive equipment   Physical Assistance Level 25% or less   Walk 50 Feet with Two Turns CARE Score 3   Walk 150 Feet   Type of Assistance Needed Physical assistance; Adaptive equipment   Physical Assistance Level 25% or less   Comment RW   Walk 150 Feet CARE Score 3   Ambulation   Primary Mode of Locomotion Prior to Admission Walk   Distance Walked (feet) 150 ft   Assist Device Roller Walker   Findings (S)  trail SPC 50 feet x3   Does the patient walk? 2. Yes   Wheel 50 Feet with Two Turns   Reason if not Attempted Activity not applicable   Wheel 50 Feet with Two Turns CARE Score 9   Curb or Single Stair   Style negotiated Single stair   Type of Assistance Needed Physical assistance; Adaptive equipment   Physical Assistance Level 25% or less   Comment LHR w [facility] FF steps NW9   1 Step (Curb) CARE Score 3   4 Steps   Type of Assistance Needed Physical assistance; Adaptive equipment   Physical Assistance Level 25% or less   Comment R w [facility] FF steps NW9 with her    4 Steps CARE Score 3   12 Steps   Type of Assistance Needed Physical assistance; Adaptive equipment Physical Assistance Level 25% or less   Comment LHR w [facility] FF steps NW9 with her    12 Steps CARE Score 3   Stairs   Type Stairs   # of Steps 14   Weight Bearing Precautions Fall Risk   Assist Devices Single Rail;Cane   Findings Mimic FF for home   Therapeutic Interventions   Flexibility HS and calf stretch   Other FF stairs LHR and SPC   Equipment Use   NuStep lvl 1 for 10 min   Assessment   Treatment Assessment Pt working on FF stairs NW9 with her  LHR SPC and VC for sequence and pattern. Pt VC for awareness for high fall risk and cont working on RW vs [facility] for home DC . Pt ion recliner with all needs in reach alarm on . Barriers to Discharge Inaccessible home environment;Decreased caregiver support   Plan   Progress Progressing toward goals   Recommendation   PT Discharge Recommendation Home with home health rehabilitation   PT Therapy Minutes   PT Time In 1400   PT Time Out 1530   PT Total Time (minutes) 90   PT Mode of treatment - Individual (minutes) 90   PT Mode of treatment - Concurrent (minutes) 0   PT Mode of treatment - Group (minutes) 0   PT Mode of treatment - Co-treat (minutes) 0   PT Mode of Treatment - Total time(minutes) 90 minutes   PT Cumulative Minutes 580   Therapy Time missed   Time missed?  No

## 2023-08-25 NOTE — PROGRESS NOTES
08/25/23 1000   Pain Assessment   Pain Assessment Tool 0-10   Restrictions/Precautions   Precautions Cardiac/sternal;Fall Risk;Impulsive   Braces or Orthoses TLSO   Subjective   Subjective pt agreeable to perfomr ARNIE pike   Sit to Stand   Type of Assistance Needed Adaptive equipment; Incidental touching   Sit to Stand CARE Score 4   Bed-Chair Transfer   Type of Assistance Needed Incidental touching; Adaptive equipment   Chair/Bed-to-Chair Transfer CARE Score 4   Transfer Bed/Chair/Wheelchair   Adaptive Equipment Roller Walker   Walk 10 Feet   Type of Assistance Needed Physical assistance; Adaptive equipment   Physical Assistance Level 25% or less   Walk 10 Feet CARE Score 3   Walk 50 Feet with Two Turns   Type of Assistance Needed Physical assistance; Adaptive equipment   Physical Assistance Level 25% or less   Walk 50 Feet with Two Turns CARE Score 3   Walk 150 Feet   Type of Assistance Needed Physical assistance; Adaptive equipment   Physical Assistance Level 25% or less   Walk 150 Feet CARE Score 3   Ambulation   Primary Mode of Locomotion Prior to Admission Walk   Distance Walked (feet) 150 ft   Assist Device Roller Walker   Does the patient walk? 2.  Yes   Wheel 50 Feet with Two Turns   Reason if not Attempted Activity not applicable   Wheel 50 Feet with Two Turns CARE Score 9   Wheel 150 Feet   Reason if not Attempted Activity not applicable   Wheel 387 Feet CARE Score 9   Curb or Single Stair   Style negotiated Single stair   Type of Assistance Needed Physical assistance   Physical Assistance Level 25% or less   Comment LHR SPC   1 Step (Curb) CARE Score 3   4 Steps   Type of Assistance Needed Physical assistance   Physical Assistance Level 25% or less   Comment LHR SPC   4 Steps CARE Score 3   Therapeutic Interventions   Strengthening LAQ AP 20 reps mini squats 5 x2 glust 20 reps HS and calf stretch   Flexibility HS manual   Balance dynamic balance in standing   Assessment   Treatment Assessment pt focus on skilled PT perform TE for TE inc functional standing balance and instructed pt on fall awareness and safety . Pt back in her room with all needs in reach and recliner with alarm on . Cont POC   Barriers to Discharge Inaccessible home environment;Decreased caregiver support   Plan   Progress Progressing toward goals   Recommendation   PT Discharge Recommendation Home with home health rehabilitation   PT Therapy Minutes   PT Time In 1000   PT Time Out 1030   PT Total Time (minutes) 30   PT Mode of treatment - Individual (minutes) 30   PT Mode of treatment - Concurrent (minutes) 0   PT Mode of treatment - Group (minutes) 0   PT Mode of treatment - Co-treat (minutes) 0   PT Mode of Treatment - Total time(minutes) 30 minutes   PT Cumulative Minutes 490   Therapy Time missed   Time missed?  No

## 2023-08-25 NOTE — PROGRESS NOTES
Internal Medicine Progress Note  Patient: Clark Aguilar  Age/sex: 70 y.o. female  Medical Record #: [id number]      ASSESSMENT/PLAN: (Interval History)  Clark Aguilar is seen and examined and management for following issues:    Frequent fall, r/o movement disorder  • Sees Dr Marco Florian as OP     Right rib fractures  • Ribs 3-5th  • Continue IS     T8 fracture  • Non-op  • TLSO brace  • For upright t-spine xrays in brace in 2 weeks     Vitamin B12 deficiency  • For IM replacement QOD x 5 doses then to PO daily     HTN  • Home:  Atenolol 50mg qd  • Here:  Atenolol 50mg qd/Norvasc 5mg 2x daily  • Has prn Hydralazine  • Stable.     Cognitive impairment  • MOCA 3/2021 was 17/30  • Likely has worsened since prev MOCA and is confused here     E.coli UTI  • POA  • Dr Tish Noel sent UA with reflex to cx 2/2 confusion  • Urine cx had >100,000 Ecoli and 30,000-39,000 mixed contaminants  • Started on Keflex 8/21/23 for 5 days.     Hypothyroidism  • TSH 0.314/free T4 1.29  • on Levothyroxine 100 mcg qd  • Endo following and they reduced to 88 mcg qd and sent a reverse T3  • For TSH/free T4 4-6 weeks     Severe osteoporosis  • Endo following  • For OP DEXA  • Calcium 500mg BID     Vit D deficiency  • Level was 23.7  • For Vit D 2000IU qd     RA  • At home takes Methotrexate 20mg qWednesday/Prednisone 5mg qd/Flexeril 10mg qhs     HLD  • Lipitor 10mg qd     Anxiety/depression  • At home she was taking Xananx 1mg TID and Prozac 20mg qd  • Per PMR     Enlarged right retrocrural lymph node  • Found on CT  • For repeat CT chest as OP in 3-6 months     Transaminitis  • AST/ALT/AP elevated since previous CMP 8/15  • had CT C/A/P on 8/15 = hepatomegaly  • Dr Tish Noel cut back on Tylenol  • LFTs trending down        Discharge date:  8/30/23       The above assessment and plan was reviewed and updated as determined by my evaluation of the patient on 8/25/2023.     Labs:   Results from last 7 days   Lab Units 08/24/23  0554 08/21/23  0532 WBC Thousand/uL 5.13 3.90*   HEMOGLOBIN g/dL 12.2 12.9   HEMATOCRIT % 34.8 37.6   PLATELETS Thousands/uL 182 215     Results from last 7 days   Lab Units 08/24/23  0554 08/21/23  0532   SODIUM mmol/L 132* 135   POTASSIUM mmol/L 3.8 3.8   CHLORIDE mmol/L 99 103   CO2 mmol/L 23 24   BUN mg/dL 19 13   CREATININE mg/dL 0.80 0.76   CALCIUM mg/dL 9.4 10.3*                   Review of Scheduled Meds:  Current Facility-Administered Medications   Medication Dose Route Frequency Provider Last Rate   • acetaminophen  650 mg Oral Q12H Malissa Colvin MD     • ALPRAZolam  0.5 mg Oral HS PRN Malissa Colvin MD     • amLODIPine  5 mg Oral BID Reesa Stage, CRNP     • atenolol  50 mg Oral Daily Malissa Colvin MD     • atorvastatin  10 mg Oral Daily With Blanca Agarwal MD     • bisacodyl  5 mg Oral Daily PRN Malissa Colvin MD     • bisacodyl  10 mg Rectal Daily PRN Malissa Colvin MD     • bisacodyl  10 mg Rectal Once Manuel Ortiz MD     • calcium carbonate  1 tablet Oral BID With Meals Vasile Eason MD     • cephalexin  500 mg Oral [facility] Malissa Colvin MD     • cholecalciferol  2,000 Units Oral Daily Vasile Eason MD     • [START ON 8/27/2023] cyanocobalamin  1,000 mcg Oral Daily Malissa Colvin MD     • cyanocobalamin  1,000 mcg Intramuscular Every Other Day Malissa Colvin MD     • docusate sodium  100 mg Oral BID Malissa Colvin MD     • enoxaparin  30 mg Subcutaneous Q12H [address]issa Colvin MD     • FLUoxetine  20 mg Oral Daily Malissa Colvin MD     • folic acid  1 mg Oral Daily Malissa Colvin MD     • hydrALAZINE  25 mg Oral Q8H PRN Gianna Ellington, CRNP     • levothyroxine  88 mcg Oral Early Morning Vasile Eason MD     • lidocaine  1 patch Topical Daily Malissa Colvin MD     • methocarbamol  250 mg Oral Q6H PRN Malissa Colvin MD     • methotrexate  20 mg Oral Weekly Malissa Colvin MD     • nystatin   Topical BID Malissa Colvin MD     • ondansetron  4 mg Oral Q6H PRN Enrrique Mccann MD     • oxyCODONE  5 mg Oral Q4H PRN Enrrique Mccann MD     • oxyCODONE  2.5 mg Oral Q4H PRN Enrrique Mccann MD     • polyethylene glycol  17 g Oral Daily Enrrique Mccann MD     • predniSONE  5 mg Oral HS Enrrique Mccann MD     • QUEtiapine  12.5 mg Oral HS PRN Enrrique Mccann MD     • senna  2 tablet Oral HS Enrrique Mccann MD         Subjective/ HPI: Patient seen and examined. Patients overnight issues or events were reviewed with nursing or staff during rounds or morning huddle session. New or overnight issues include the following:     Pt seen in her room. She continues to report dizziness with position changes. BP has been normal. She denies any other complaints. ROS:   A 10 point ROS was performed; negative except as noted above. Imaging:     No orders to display       *Labs /Radiology studies reviewed  *Medications reviewed and reconciled as needed  *Please refer to order section for additional ordered labs studies  *Case discussed with primary attending during morning huddle case rounds    Physical Examination:  Vitals:   Vitals:    08/24/23 1529 08/24/23 2024 08/24/23 2057 08/25/23 0556   BP: 152/86 164/72 164/72 170/79   BP Location: Left arm  Left arm    Pulse: 67  64 66   Resp: 17  18 18   Temp: 98 °F (36.7 °C)  (!) 97.4 °F (36.3 °C) (!) 97.4 °F (36.3 °C)   TempSrc: Oral  Oral Oral   SpO2: 94%  96% 96%   Weight:       Height:           General Appearance: no distress, conversive, interactive  HEENT: PERRLA, conjuctiva normal; oropharynx clear; mucous membranes moist   Neck:  Supple, normal ROM  Lungs: CTA, normal respiratory effort, no retractions, expiratory effort normal  CV: regular rate and rhythm; no rubs/murmurs/gallops, PMI normal   ABD: soft; ND/NT; +BS  EXT: No edema. TLSO brace intact.   Skin: normal turgor, normal texture, no rashes  Psych: affect normal, mood normal  Neuro: AA      The above physical exam was reviewed and updated as determined by my evaluation of the patient on 8/25/2023. Invasive Devices     None                    VTE Pharmacologic Prophylaxis: Enoxaparin  Code Status: Level 3 - DNAR and DNI  Current Length of Stay: 7 day(s)      Total time spent:  30 minutes with more than 50% spent counseling/coordinating care. Counseling includes discussion with patient re: progress  and discussion with patient of his/her current medical state/information. Coordination of patient's care was performed in conjunction with primary service. Time invested included review of patient's labs, vitals, and management of their comorbidities with continued monitoring. In addition, this patient was discussed with medical team including physician and advanced extenders. The care of the patient was extensively discussed and appropriate treatment plan was formulated unique for this patient. Medical decision making for the day was made by supervising physician unless otherwise noted in their attestation statement. ** Please Note:  voice to text software may have been used in the creation of this document.  Although proof errors in transcription or interpretation are a potential of such software**

## 2023-08-25 NOTE — PROGRESS NOTES
08/25/23 1030   Pain Assessment   Pain Assessment Tool 0-10   Pain Score No Pain   Comprehension   Comprehension (FIM) 3 - Understands basic info/conversation 50-74% of time   Expression   Expression (FIM) 4 - Expresses basic info/needs 75-90% of time   Social Interaction   Social Interaction (FIM) 4 - Needs redirecting for appropriate language or to initiate interaction   Problem Solving   Problem solving (FIM) 2 - Solves basic problems 25-49% of time   Memory   Memory (FIM) 3 - Recognizes, recalls/performs 50-74%   Speech/Language/Cognition Assessment   Treatment Assessment Pt sitting in recliner upon SLP entering, agreeable to skilled ST session. SLP introducing self, role, and objectives for session. SLP initially assessing pt's orientation and problem solving. Pt stating it was yesterday's date, but SLP with "not quite, where can you find out?" Pt saying "I don't know." SLP verbally cued pt to look in several places (whiteboard, memory book, daily schedule, phone). Following this, SLP probing pt about therapies she had today, what therapies do what activities, who the therapist was, and what times she had each therapy. SLP utilized her laminated memory sheet that also includes what happened and what her goals are here at the [facility]. SLP asked pt to read the paragraph and the times of her therapies. Pt while reading stating "this doesn't seem like me, in here I'm strange." SLP reminding pt of her brace and that she is not "strange" while here, but she is recovering from a fall and we are working on getting her stronger and most independent as possible as it also relates to her thinking skills. Pt nodding but suspect requiring cont education and repeated readings to orient herself to her situation. Next pt given trail making task in order to work on her attention. She completed with accuracy for two sets, up to 10 units.  When given one that was up to 20 units (longer attention span), she got distracted x2 throughout completion, requiring 2 verbal cues to where she left off and where her error was, followed by tips on how to better attend (count out loud, keep pen on number until you find the next number). Next SLP and pt reviewing BLT for spinal precautions. Pt recalling 3/3 i'ly, and when probed for "why" these are important to her, she said "because I'm healing." Next, pt with continuation of activity from yesterday targeting category exclusion. Pt w/ 5/5. Next pt given visual scene and cued to use rehearsal strategy (modeled how to complete) and was reminded she will be asked questions and have to answer about the picture from memory. Pt completing 5/10 i'ly, increasing to 7/10 where semantic cues or F02 options provided. Following, pt read aloud a list of four words, then asked to, from memory, identify placement of a request word or two. She completed with 2.5/10. She benefits from repetition strategy. Next, pt given language activity where she had four categories as a visual aid, and then SLP read aloud words. Pt to state which category each word belongs to. She performed with 100% acc. Finally, pt given category and then 10 words with that category. She was instructed to [place] the items in that group of 10 that would be included in that category and ignore the remaining that do not apply. She committed errors of omission AND commission. When each word was completed with SLP reading it one at a time, pt demonstrating awareness to her errors and correcting appropriately. Recommend the pt cont to receive skilled ST while at the [facility] to further optimize her memory, use of external aids, attention, language, and reasoning abilities to reduce burden of care for family upon discharge.     SLP Therapy Minutes   SLP Time In 1030   SLP Time Out 1130   SLP Total Time (minutes) 60   SLP Mode of treatment - Individual (minutes) 60   SLP Mode of treatment - Concurrent (minutes) 0   SLP Mode of treatment - Group (minutes) 0 SLP Mode of treatment - Co-treat (minutes) 0   SLP Mode of Treatment - Total time(minutes) 60 minutes   SLP Cumulative Minutes 280   Therapy Time missed   Time missed?  No

## 2023-08-25 NOTE — PROGRESS NOTES
OT Daily Treatment Note       08/25/23 0700   Pain Assessment   Pain Assessment Tool 0-10   Pain Score 8   Pain Location/Orientation Location: Back   Hospital Pain Intervention(s) Emotional support; Other (Comment)  (JORGE Watters notified)   Restrictions/Precautions   Precautions Bed/chair alarms;Cognitive; Fall Risk;Impulsive;Pain;Spinal precautions   ROM Restrictions Yes  (spinal precautions)   Braces or Orthoses TLSO   Lifestyle   Autonomy "why does all of this make me so tired?"   Eating   Type of Assistance Needed Independent   Physical Assistance Level No physical assistance   Comment seated in recliner   Eating CARE Score 6   Oral Hygiene   Type of Assistance Needed Incidental touching   Physical Assistance Level No physical assistance   Comment CG in stance at sink   Oral Hygiene CARE Score 4   Grooming   Findings seated EOB to brush and style hair   Shower/Bathe Self   Type of Assistance Needed Physical assistance   Physical Assistance Level 25% or less   Comment pt completed full shower seated on TTB. pt required CS for safety and mod vc for sequencing and thoroughness as pt was observed asking "what do I do next?" and observed briefly washing over body parts. pt did require min A to steady in brief stance for lorena bathing. Shower/Bathe Self CARE Score 3   Bathing   Assessed Bath Style Shower   Tub/Shower Transfer   Findings CGA lateral step in to walk in shower   Upper Body Dressing   Type of Assistance Needed Physical assistance   Physical Assistance Level 51%-75%   Comment able to don/doff shirt with CS for safety, TA required for TLSO brace. no new learning observed and will most likely require TA for TLSO brace mgmt at home. Upper Body Dressing CARE Score 2   Lower Body Dressing   Type of Assistance Needed Incidental touching   Physical Assistance Level No physical assistance   Comment able to thread over BLE via cross leg technique.  able to don over hips in stance with CGA for safety   Lower Body Dressing CARE Score 4   Putting On/Taking Off Footwear   Type of Assistance Needed Supervision   Physical Assistance Level No physical assistance   Comment don/doff  socks. supv req to ensure pt was doing cross leg technique rather than flexing forward at the trunk   Putting On/Taking Off Footwear CARE Score 4   Lying to Sitting on Side of Bed   Type of Assistance Needed Supervision   Physical Assistance Level No physical assistance   Comment HOB slightly elevated   Lying to Sitting on Side of Bed CARE Score 4   Sit to Stand   Type of Assistance Needed Incidental touching   Physical Assistance Level No physical assistance   Comment RW   Sit to Stand CARE Score 4   Bed-Chair Transfer   Type of Assistance Needed Incidental touching   Physical Assistance Level No physical assistance   Comment SPT with RW   Chair/Bed-to-Chair Transfer CARE Score 4   Toileting Hygiene   Type of Assistance Needed Incidental touching   Physical Assistance Level No physical assistance   Comment CG in stance during CM up/down   [place] Score 4   Toilet Transfer   Type of Assistance Needed Incidental touching   Physical Assistance Level No physical assistance   Comment CG with RW on standard toilet height   Toilet Transfer CARE Score 4   Cognition   Overall Cognitive Status Impaired   Arousal/Participation Alert; Cooperative   Attention Attends with cues to redirect   Orientation Level Oriented to person   Memory Decreased long term memory;Decreased recall of biographical information;Decreased short term memory;Decreased recall of recent events;Decreased recall of precautions   Following Commands Follows one step commands inconsistently   Comments pt better today compared to yesterday with orientation and being agreeable to participate in skilled therapy today   Activity Tolerance   Activity Tolerance Patient tolerated treatment well   Assessment   Treatment Assessment Pt participated in skilled OT session with focus on ADL retraining, functional transfer training, cognitive reorientation, compensatory technique education, continued education and energy conservation. See flowsheet for details of session and current functional status. Pt is limited by impaired balance, increased fall risk, pain, decreased activity tolerance, decreased safety awareness, impaired judgement, decreased cognition and decreased strength and requires skilled OT services to increase independence and safety with ADL completion in prep for DC home. Session cut short by 15 mins 2* OT needing to emergently assist with another pt. Pt is on for extra therapy therapy today and it will not impact her meeting time for today. Plan to continue ADL retraining, functional transfer training, UE strengthening/ROM, endurance training, neuro re-ed, compensatory technique education, continued education, energy conservation and activity engagement  to address barriers mentioned above.     +   Prognosis Fair   Problem List Decreased strength;Decreased range of motion;Decreased endurance; Impaired balance;Decreased mobility; Decreased coordination;Decreased cognition; Impaired judgement;Decreased safety awareness;Orthopedic restrictions;Pain   Barriers to Discharge Inaccessible home environment;Decreased caregiver support   Plan   Treatment/Interventions ADL retraining;Functional transfer training; Therapeutic exercise; Endurance training;Patient/family training; Compensatory technique education   Progress Improving as expected   Recommendation   OT Discharge Recommendation Home with home health rehabilitation   OT Therapy Minutes   OT Time In 0700   OT Time Out 0815   OT Total Time (minutes) 75   OT Mode of treatment - Individual (minutes) 75   OT Mode of treatment - Concurrent (minutes) 0   OT Mode of treatment - Group (minutes) 0   OT Mode of treatment - Co-treat (minutes) 0   OT Mode of Treatment - Total time(minutes) 75 minutes   OT Cumulative Minutes 505   Therapy Time missed Time missed?  No

## 2023-08-25 NOTE — PLAN OF CARE
Problem: PAIN - ADULT  Goal: Verbalizes/displays adequate comfort level or baseline comfort level  Description: Interventions:  - Encourage patient to monitor pain and request assistance  - Assess pain using appropriate pain scale  - Administer analgesics based on type and severity of pain and evaluate response  - Implement non-pharmacological measures as appropriate and evaluate response  - Consider cultural and social influences on pain and pain management  - Notify physician/advanced practitioner if interventions unsuccessful or patient reports new pain  Outcome: Progressing     Problem: INFECTION - ADULT  Goal: Absence or prevention of progression during hospitalization  Description: INTERVENTIONS:  - Assess and monitor for signs and symptoms of infection  - Monitor lab/diagnostic results  - Monitor all insertion sites, i.e. indwelling lines, tubes, and drains  - Monitor endotracheal if appropriate and nasal secretions for changes in amount and color  - [place] appropriate cooling/warming therapies per order  - Administer medications as ordered  - Instruct and encourage patient and family to use good hand hygiene technique  - Identify and instruct in appropriate isolation precautions for identified infection/condition  Outcome: Progressing

## 2023-08-26 PROCEDURE — 97530 THERAPEUTIC ACTIVITIES: CPT

## 2023-08-26 PROCEDURE — 97535 SELF CARE MNGMENT TRAINING: CPT

## 2023-08-26 PROCEDURE — 97110 THERAPEUTIC EXERCISES: CPT

## 2023-08-26 PROCEDURE — 99232 SBSQ HOSP IP/OBS MODERATE 35: CPT | Performed by: INTERNAL MEDICINE

## 2023-08-26 PROCEDURE — 97116 GAIT TRAINING THERAPY: CPT

## 2023-08-26 RX ADMIN — NYSTATIN 1 APPLICATION: 100000 POWDER TOPICAL at 08:45

## 2023-08-26 RX ADMIN — AMLODIPINE BESYLATE 5 MG: 5 TABLET ORAL at 08:36

## 2023-08-26 RX ADMIN — DOCUSATE SODIUM 100 MG: 100 CAPSULE, LIQUID FILLED ORAL at 08:36

## 2023-08-26 RX ADMIN — LIDOCAINE 5% 1 PATCH: 700 PATCH TOPICAL at 08:36

## 2023-08-26 RX ADMIN — LEVOTHYROXINE SODIUM 88 MCG: 88 TABLET ORAL at 05:48

## 2023-08-26 RX ADMIN — DOCUSATE SODIUM 100 MG: 100 CAPSULE, LIQUID FILLED ORAL at 18:03

## 2023-08-26 RX ADMIN — ACETAMINOPHEN 650 MG: 325 TABLET, FILM COATED ORAL at 18:03

## 2023-08-26 RX ADMIN — PREDNISONE 5 MG: 5 TABLET ORAL at 21:07

## 2023-08-26 RX ADMIN — SENNOSIDES 17.2 MG: 8.6 TABLET, FILM COATED ORAL at 21:07

## 2023-08-26 RX ADMIN — Medication 1 TABLET: at 16:19

## 2023-08-26 RX ADMIN — CEPHALEXIN 500 MG: 500 CAPSULE ORAL at 05:48

## 2023-08-26 RX ADMIN — FOLIC ACID 1 MG: 1 TABLET ORAL at 08:36

## 2023-08-26 RX ADMIN — Medication 2000 UNITS: at 08:36

## 2023-08-26 RX ADMIN — ENOXAPARIN SODIUM 30 MG: 30 INJECTION SUBCUTANEOUS at 21:06

## 2023-08-26 RX ADMIN — FLUOXETINE 20 MG: 20 CAPSULE ORAL at 08:37

## 2023-08-26 RX ADMIN — ATORVASTATIN CALCIUM 10 MG: 10 TABLET, FILM COATED ORAL at 16:19

## 2023-08-26 RX ADMIN — B-COMPLEX W/ C & FOLIC ACID TAB 1 TABLET: TAB at 08:36

## 2023-08-26 RX ADMIN — ATENOLOL 50 MG: 50 TABLET ORAL at 08:37

## 2023-08-26 RX ADMIN — Medication 1 TABLET: at 08:36

## 2023-08-26 RX ADMIN — OXYCODONE HYDROCHLORIDE 5 MG: 5 TABLET ORAL at 11:34

## 2023-08-26 RX ADMIN — ACETAMINOPHEN 650 MG: 325 TABLET, FILM COATED ORAL at 05:48

## 2023-08-26 RX ADMIN — AMLODIPINE BESYLATE 5 MG: 5 TABLET ORAL at 21:07

## 2023-08-26 RX ADMIN — CYANOCOBALAMIN 1000 MCG: 1000 INJECTION, SOLUTION INTRAMUSCULAR at 08:38

## 2023-08-26 RX ADMIN — OXYCODONE HYDROCHLORIDE 5 MG: 5 TABLET ORAL at 18:03

## 2023-08-26 RX ADMIN — POLYETHYLENE GLYCOL 3350 17 G: 17 POWDER, FOR SOLUTION ORAL at 08:36

## 2023-08-26 RX ADMIN — ENOXAPARIN SODIUM 30 MG: 30 INJECTION SUBCUTANEOUS at 08:36

## 2023-08-26 NOTE — PLAN OF CARE
Problem: PAIN - ADULT  Goal: Verbalizes/displays adequate comfort level or baseline comfort level  Description: Interventions:  - Encourage patient to monitor pain and request assistance  - Assess pain using appropriate pain scale  - Administer analgesics based on type and severity of pain and evaluate response  - Implement non-pharmacological measures as appropriate and evaluate response  - Consider cultural and social influences on pain and pain management  - Notify physician/advanced practitioner if interventions unsuccessful or patient reports new pain  Outcome: Progressing     Problem: INFECTION - ADULT  Goal: Absence or prevention of progression during hospitalization  Description: INTERVENTIONS:  - Assess and monitor for signs and symptoms of infection  - Monitor lab/diagnostic results  - Monitor all insertion sites, i.e. indwelling lines, tubes, and drains  - Monitor endotracheal if appropriate and nasal secretions for changes in amount and color  - [place] appropriate cooling/warming therapies per order  - Administer medications as ordered  - Instruct and encourage patient and family to use good hand hygiene technique  - Identify and instruct in appropriate isolation precautions for identified infection/condition  Outcome: Progressing     Problem: SAFETY ADULT  Goal: Patient will remain free of falls  Description: INTERVENTIONS:  - Educate patient/family on patient safety including physical limitations  - Instruct patient to call for assistance with activity   - Consult OT/PT to assist with strengthening/mobility   - Keep Call bell within reach  - Keep bed low and locked with side rails adjusted as appropriate  - Keep care items and personal belongings within reach  - Initiate and maintain comfort rounds  - Make Fall Risk Sign visible to staff  - Offer Toileting every 2 Hours, in advance of need  - Initiate/Maintain bed alarm  - Obtain necessary fall risk management equipment: bed alarm  - Apply yellow socks and bracelet for high fall risk patients  - Consider moving patient to room near nurses station  Outcome: Progressing  Goal: Maintain or return to baseline ADL function  Description: INTERVENTIONS:  -  Assess patient's ability to carry out ADLs; assess patient's baseline for ADL function and identify physical deficits which impact ability to perform ADLs (bathing, care of mouth/teeth, toileting, grooming, dressing, etc.)  - Assess/evaluate cause of self-care deficits   - Assess range of motion  - Assess patient's mobility; develop plan if impaired  - Assess patient's need for assistive devices and provide as appropriate  - Encourage maximum independence but intervene and supervise when necessary  - Involve family in performance of ADLs  - Assess for home care needs following discharge   - Consider OT consult to assist with ADL evaluation and planning for discharge  - Provide patient education as appropriate  Outcome: Progressing  Goal: Maintains/Returns to pre admission functional level  Description: INTERVENTIONS:  - Perform BMAT or MOVE assessment daily.   - Set and communicate daily mobility goal to care team and patient/family/caregiver. - Collaborate with rehabilitation services on mobility goals if consulted  - Perform Range of Motion 3 times a day. - Reposition patient every 2 hours.   - Dangle patient 3 times a day  - Stand patient 3 times a day  - Ambulate patient 3 times a day  - Out of bed to chair 3 times a day   - Out of bed for meals 3 times a day  - Out of bed for toileting  - Record patient progress and toleration of activity level   Outcome: Progressing     Problem: DISCHARGE PLANNING  Goal: Discharge to home or other facility with appropriate resources  Description: INTERVENTIONS:  - Identify barriers to discharge w/patient and caregiver  - Arrange for needed discharge resources and transportation as appropriate  - Identify discharge learning needs (meds, wound care, etc.)  - Arrange for interpretive services to assist at discharge as needed  - Refer to Case Management Department for coordinating discharge planning if the patient needs post-hospital services based on physician/advanced practitioner order or complex needs related to functional status, cognitive ability, or social support system  Outcome: Progressing     Problem: Prexisting or High Potential for Compromised Skin Integrity  Goal: Skin integrity is maintained or improved  Description: INTERVENTIONS:  - Identify patients at risk for skin breakdown  - Assess and monitor skin integrity  - Assess and monitor nutrition and hydration status  - Monitor labs   - Assess for incontinence   - Turn and reposition patient  - Assist with mobility/ambulation  - Relieve pressure over bony prominences  - Avoid friction and shearing  - Provide appropriate hygiene as needed including keeping skin clean and dry  - Evaluate need for skin moisturizer/barrier cream  - Collaborate with interdisciplinary team   - Patient/family teaching  - Consider wound care consult   Outcome: Progressing     Problem: MOBILITY - ADULT  Goal: Maintain or return to baseline ADL function  Description: INTERVENTIONS:  -  Assess patient's ability to carry out ADLs; assess patient's baseline for ADL function and identify physical deficits which impact ability to perform ADLs (bathing, care of mouth/teeth, toileting, grooming, dressing, etc.)  - Assess/evaluate cause of self-care deficits   - Assess range of motion  - Assess patient's mobility; develop plan if impaired  - Assess patient's need for assistive devices and provide as appropriate  - Encourage maximum independence but intervene and supervise when necessary  - Involve family in performance of ADLs  - Assess for home care needs following discharge   - Consider OT consult to assist with ADL evaluation and planning for discharge  - Provide patient education as appropriate  Outcome: Progressing  Goal: Maintains/Returns to pre admission functional level  Description: INTERVENTIONS:  - Perform BMAT or MOVE assessment daily.   - Set and communicate daily mobility goal to care team and patient/family/caregiver. - Collaborate with rehabilitation services on mobility goals if consulted  - Perform Range of Motion 3 times a day. - Reposition patient every 2 hours.   - Dangle patient 3 times a day  - Stand patient 3 times a day  - Ambulate patient 3 times a day  - Out of bed to chair 3 times a day   - Out of bed for meals 3 times a day  - Out of bed for toileting  - Record patient progress and toleration of activity level   Outcome: Progressing

## 2023-08-26 NOTE — PROGRESS NOTES
08/26/23 1230   Pain Assessment   Pain Assessment Tool 0-10   Pain Score 8   Pain Location/Orientation Orientation: Bilateral;Location: Back;Orientation: Lower   Pain Onset/Description Onset: Ongoing;Frequency: Constant/Continuous   Effect of Pain on Daily Activities tolerated   Patient's Stated Pain Goal No pain   Hospital Pain Intervention(s) Rest;Medication (See MAR); Relaxation technique   Restrictions/Precautions   Precautions Fall Risk;Cognitive;Bed/chair alarms;1:1;Pain;Spinal precautions; Visual deficit;Supervision on toilet/commode  (1:1 at HS)   ROM Restrictions Yes   Braces or Orthoses TLSO  (HOB 45 deg)   Cognition   Overall Cognitive Status Impaired   Arousal/Participation Alert; Cooperative   Attention Attends with cues to redirect   Subjective   Subjective pt reported 8/10 back pain throughout  tx session   Roll Left and Right   Type of Assistance Needed Physical assistance;Verbal cues   Physical Assistance Level 25% or less   Comment VC for log roll technique initially completed at min A then progressed to S with repeated practice although used bedrail   Roll Left and Right CARE Score 3   Sit to Lying   Type of Assistance Needed Physical assistance;Verbal cues; Adaptive equipment   Physical Assistance Level 25% or less   Comment max VC for log roll technique with guiding assist on trunk   Sit to Lying CARE Score 3   Lying to Sitting on Side of Bed   Type of Assistance Needed Incidental touching;Verbal cues   Comment HOB flat with VC for log roll technique   Lying to Sitting on Side of Bed CARE Score 4   Sit to Stand   Type of Assistance Needed Incidental touching;Verbal cues; Adaptive equipment   Comment CG-CS to stand up, min-CG to sit down with RW, max VC for hand placement (mino prior to sitting down) and for controlled sitting for has tendency to plop down so provided re-ed on safe practices   Sit to Stand CARE Score 4   Bed-Chair Transfer   Type of Assistance Needed Incidental touching;Verbal cues;Adaptive equipment   Comment CG -CS with RW   Chair/Bed-to-Chair Transfer CARE Score 4   Transfer Bed/Chair/Wheelchair   Findings CG-min with SPC mostly for hand placement during STS transition  while holding the cane   Car Transfer   Type of Assistance Needed Verbal cues; Adaptive equipment;Physical assistance   Physical Assistance Level 25% or less   Comment stabilizing assist on trunk to prevent from overleaning bwd as pt lifted LE into the car   Car Transfer CARE Score 3   Walk 10 Feet   Type of Assistance Needed Incidental touching;Verbal cues; Adaptive equipment   Walk 10 Feet CARE Score 4   Walk 50 Feet with Two Turns   Type of Assistance Needed Incidental touching;Verbal cues; Adaptive equipment   Comment 135' with RW   Walk 50 Feet with Two Turns CARE Score 4   Walk 150 Feet   Type of Assistance Needed Incidental touching;Verbal cues; Adaptive equipment   Comment 150' with RW, max VC for directions and safe walker management. demos SOB but SpO@ ranged 94-06% with CT 61-62   Walk 150 Feet CARE Score 4   Walking 10 Feet on Uneven Surfaces   Type of Assistance Needed Physical assistance;Verbal cues; Adaptive equipment   Physical Assistance Level 25% or less   Comment foam mat with RW   Walking 10 Feet on Uneven Surfaces CARE Score 3   Ambulation   Findings also trialed SPC x 65' x 2 reps without reported excerbation of pain nor demonstrated LOB although due to dec STM and being highly distracted requires frequent redirection to attend to task. Picking Up Object   Type of Assistance Needed Incidental touching;Verbal cues; Adaptive equipment   Comment CG with reacher, VC to bend at the knees to  pen off the floor   Picking Up Object CARE Score 4   Toilet Transfer   Type of Assistance Needed Physical assistance;Verbal cues; Adaptive equipment   Physical Assistance Level 25% or less   Comment CG to min A for walker management due to dec attention, safety and visual deficit pt snagged walker front L post on the chair leg requiring assist to correct due to noted difficulty   Toilet Transfer CARE Score 3   Equipment Use   NuStep lvl 1 x 10 mins all extremities without reported exacerbation of pain, SPM 40-70   Assessment   Treatment Assessment Skilled PT focused on improving safety and indep during functional mobilities. However due to cognitive with STM impairment, dec safety awareness and dec task attention pt continues to demo poor carry over of proper techniques and safety practices requiring max VC to complete all mobilities safely. Regardless of AD to be use at d/c strongly recommend pt will have 24/7 S/assist for she is highly at risk for falls. Family/Caregiver Present  Rachelle Calzada stayed in pt's room although observed bed transfer training and in room amb using a RW   Barriers to Discharge Inaccessible home environment;Decreased caregiver support   PT Barriers   Functional Limitation Car transfers; Ramp negotiation;Stair negotiation;Standing;Transfers; Walking   Plan   Treatment/Interventions Functional transfer training;LE strengthening/ROM; Therapeutic exercise; Endurance training;Gait training;Spoke to nursing;OT   Progress Progressing toward goals   Recommendation   PT Discharge Recommendation Home with home health rehabilitation   PT Therapy Minutes   PT Time In 1230   PT Time Out 1400   PT Total Time (minutes) 90   PT Mode of treatment - Individual (minutes) 90   PT Mode of treatment - Concurrent (minutes) 0   PT Mode of treatment - Group (minutes) 0   PT Mode of treatment - Co-treat (minutes) 0   PT Mode of Treatment - Total time(minutes) 90 minutes   PT Cumulative Minutes 670   Therapy Time missed   Time missed?  No

## 2023-08-26 NOTE — PROGRESS NOTES
Internal Medicine Progress Note  Patient: Ciara Stone  Age/sex: 70 y.o. female  Medical Record #: [id number]      ASSESSMENT/PLAN: (Interval History)  Ciara Stone is seen and examined and management for following issues:    Frequent fall, r/o movement disorder  • Sees Dr Kassy Rodgers as OP     Right rib fractures  • Ribs 3-5th  • Continue IS     T8 fracture  • Non-op  • TLSO brace  • For upright t-spine xrays in brace in 2 weeks     Vitamin B12 deficiency  • For IM replacement QOD x 5 doses then to PO daily     HTN  • Home:  Atenolol 50mg qd  • Here:  Atenolol 50mg qd/Norvasc 5mg 2x daily  • Has prn Hydralazine  • Stable.     Cognitive impairment  • MOCA 3/2021 was 17/30  • Likely has worsened since prev MOCA and is confused here     E.coli UTI  • POA  • Dr Arnoldo Zhong sent UA with reflex to cx 2/2 confusion  • Urine cx had >100,000 Ecoli and 30,000-39,000 mixed contaminants  • Keflex completed.     Hypothyroidism  • TSH 0.314/free T4 1.29  • on Levothyroxine 100 mcg qd  • Endo following and they reduced to 88 mcg qd and sent a reverse T3  • For TSH/free T4 4-6 weeks     Severe osteoporosis  • Endo following  • For OP DEXA  • Calcium 500mg BID     Vit D deficiency  • Level was 23.7  • For Vit D 2000IU qd     RA  • At home takes Methotrexate 20mg qWednesday/Prednisone 5mg qd/Flexeril 10mg qhs     HLD  • Lipitor 10mg qd     Anxiety/depression  • At home she was taking Xananx 1mg TID and Prozac 20mg qd  • Per PMR     Enlarged right retrocrural lymph node  • Found on CT  • For repeat CT chest as OP in 3-6 months     Transaminitis  • AST/ALT/AP elevated since previous CMP 8/15  • had CT C/A/P on 8/15 = hepatomegaly  • Dr Arnoldo Zhong cut back on Tylenol  • LFTs trending down    Hyponatremia  · Na 132. · Will continue to monitor.        Discharge date:  8/30/23       The above assessment and plan was reviewed and updated as determined by my evaluation of the patient on 8/26/2023.     Labs:   Results from last 7 days   Lab Units 08/24/23  0554 08/21/23  0532   WBC Thousand/uL 5.13 3.90*   HEMOGLOBIN g/dL 12.2 12.9   HEMATOCRIT % 34.8 37.6   PLATELETS Thousands/uL 182 215     Results from last 7 days   Lab Units 08/24/23  0554 08/21/23  0532   SODIUM mmol/L 132* 135   POTASSIUM mmol/L 3.8 3.8   CHLORIDE mmol/L 99 103   CO2 mmol/L 23 24   BUN mg/dL 19 13   CREATININE mg/dL 0.80 0.76   CALCIUM mg/dL 9.4 10.3*                   Review of Scheduled Meds:  Current Facility-Administered Medications   Medication Dose Route Frequency Provider Last Rate   • acetaminophen  650 mg Oral Q12H Bon Gan MD     • ALPRAZolam  0.5 mg Oral HS PRN Bon Gan MD     • amLODIPine  5 mg Oral BID DARIO Ward     • atenolol  50 mg Oral Daily Bon Gan MD     • atorvastatin  10 mg Oral Daily With German Pacheco MD     • bisacodyl  5 mg Oral Daily PRN Bon Gan MD     • bisacodyl  10 mg Rectal Daily PRN Bon Gan MD     • bisacodyl  10 mg Rectal Once Ilan Liu MD     • calcium carbonate  1 tablet Oral BID With Meals Andrew Dorsey MD     • cholecalciferol  2,000 Units Oral Daily Andrew Dorsey MD     • [START ON 8/27/2023] cyanocobalamin  1,000 mcg Oral Daily Bon Gan MD     • docusate sodium  100 mg Oral BID Bon aGn MD     • enoxaparin  30 mg Subcutaneous Q12H Lupe Liu MD     • FLUoxetine  20 mg Oral Daily Bon Gan MD     • folic acid  1 mg Oral Daily Bon Gan MD     • hydrALAZINE  25 mg Oral Q8H PRN DARIO Ward     • levothyroxine  88 mcg Oral Early Morning Andrew Dorsey MD     • lidocaine  1 patch Topical Daily Bon Gan MD     • methocarbamol  250 mg Oral Q6H PRN Bon Gan MD     • methotrexate  20 mg Oral Weekly Bon Gan MD     • multivitamin stress formula  1 tablet Oral Daily Bon Gan MD     • nystatin   Topical BID Bon Gan MD     • ondansetron  4 mg Oral Q6H PRN Bon Gan MD • oxyCODONE  5 mg Oral Q4H PRN Ciara Leonardo MD     • oxyCODONE  2.5 mg Oral Q4H PRN Ciara Leonardo MD     • polyethylene glycol  17 g Oral Daily Ciara Leonardo MD     • predniSONE  5 mg Oral HS Ciara Leonardo MD     • QUEtiapine  12.5 mg Oral HS PRN Ciara Leonardo MD     • senna  2 tablet Oral HS Ciara Leonardo MD         Subjective/ HPI: Patient seen and examined. Patients overnight issues or events were reviewed with nursing or staff during rounds or morning huddle session. New or overnight issues include the following:     Pt seen in OT. She states that she is doing well. She denies any current complaints. ROS:   A 10 point ROS was performed; negative except as noted above. Imaging:     No orders to display       *Labs /Radiology studies reviewed  *Medications reviewed and reconciled as needed  *Please refer to order section for additional ordered labs studies  *Case discussed with primary attending during morning huddle case rounds    Physical Examination:  Vitals:   Vitals:    08/25/23 0805 08/25/23 1516 08/25/23 2038 08/26/23 0521   BP: 148/78 144/65 138/60 119/51   BP Location: Left arm Right arm Right arm Right arm   Pulse: 74 60 63 67   Resp:  17 20 19   Temp:  98.4 °F (36.9 °C) (!) 97.3 °F (36.3 °C) 98.4 °F (36.9 °C)   TempSrc:  Oral Oral Oral   SpO2:  93% 92% 92%   Weight:       Height:           General Appearance: no distress, conversive, interactive  HEENT: PERRLA, conjuctiva normal; oropharynx clear; mucous membranes moist   Neck:  Supple, normal ROM  Lungs: CTA, normal respiratory effort, no retractions, expiratory effort normal  CV: regular rate and rhythm; no rubs/murmurs/gallops, PMI normal   ABD: soft; ND/NT; +BS  EXT: No edema. TLSO brace intact. Skin: normal turgor, normal texture, no rashes  Psych: affect normal, mood normal  Neuro: Awake and alert. The above physical exam was reviewed and updated as determined by my evaluation of the patient on 8/26/2023.     Invasive Devices     None                    VTE Pharmacologic Prophylaxis: Enoxaparin  Code Status: Level 3 - DNAR and DNI  Current Length of Stay: 8 day(s)      Total time spent:  30 minutes with more than 50% spent counseling/coordinating care. Counseling includes discussion with patient re: progress  and discussion with patient of his/her current medical state/information. Coordination of patient's care was performed in conjunction with primary service. Time invested included review of patient's labs, vitals, and management of their comorbidities with continued monitoring. In addition, this patient was discussed with medical team including physician and advanced extenders. The care of the patient was extensively discussed and appropriate treatment plan was formulated unique for this patient. Medical decision making for the day was made by supervising physician unless otherwise noted in their attestation statement. ** Please Note:  voice to text software may have been used in the creation of this document.  Although proof errors in transcription or interpretation are a potential of such software**

## 2023-08-26 NOTE — PROGRESS NOTES
08/26/23 1000   Pain Assessment   Pain Assessment Tool 0-10   Pain Score 7   Pain Location/Orientation Orientation: Lower; Location: Back   Restrictions/Precautions   Precautions Bed/chair alarms;Cognitive; Fall Risk;Spinal precautions;Supervision on toilet/commode;Visual deficit; Impulsive   ROM Restrictions Yes  (spinal precautions)   Braces or Orthoses TLSO   Lifestyle   Autonomy "I think this is too much, I never have a break."   Eating   Type of Assistance Needed Independent   Physical Assistance Level No physical assistance   Eating CARE Score 6   Upper Body Dressing   Type of Assistance Needed Physical assistance   Physical Assistance Level 26%-50%   Comment time spent reviewing TLSO management. Pt reporting difficulty seeing tightening straps, therefore marked w/ blue and orange tape for contrast. Pt able to doff w/ vc's and extended time. Pt able to thread LUE req A to thread RUE. Pt able to tighten abdominal band, req vc's to tighten velcro straps w/ cues. will benefit from repetitive practice and trial of memory aide. Upper Body Dressing CARE Score 3   Putting On/Taking Off Footwear   Type of Assistance Needed Supervision;Set-up / clean-up   Physical Assistance Level No physical assistance   Comment seated w/ crossed leg technique to don/doff socks. Putting On/Taking Off Footwear CARE Score 4   Picking Up Object   Type of Assistance Needed Adaptive equipment;Set-up / clean-up; Verbal cues; Incidental touching   Physical Assistance Level No physical assistance   Comment CGA w/ RW and reacher, item retreival from ground level x7 req Min cues to improve technique to inc safety and dec fall risk. Additional cues req for pacing   Picking Up Object CARE Score 4   Sit to Stand   Type of Assistance Needed Incidental touching   Physical Assistance Level No physical assistance   Comment cues for hand placement.    Sit to Stand CARE Score 4   Bed-Chair Transfer   Type of Assistance Needed Incidental touching; Adaptive equipment   Physical Assistance Level No physical assistance   Comment CGA w/ RW, ambulated<> bathroom, <>OT gym. Chair/Bed-to-Chair Transfer CARE Score 4   Toileting Hygiene   Type of Assistance Needed Incidental touching   Physical Assistance Level No physical assistance   Comment managed lorena hygiene while seated, CGA in stance at RW for clothing management req inc time. Toileting Hygiene CARE Score 4   Toilet Transfer   Type of Assistance Needed Incidental touching; Adaptive equipment   Physical Assistance Level No physical assistance   Comment CGA w/ RW to standard toilet w/ grab bar. cues for proper hand placement. Toilet Transfer CARE Score 4   Functional Standing Tolerance   Time 1'52, 2'17", 2'36"   Activity Pt engaged in parquetry ther act while in stance at table w/ focus on fxnl reach crossing midline, alternating unilateral release, standing endurance, and static standing balance. Tolerated well w/ CGA and no LOB noted. Seated rest breaks provided between stand trials to manage fatigue and lower back pain. Exercise Tools   Other Exercise Tool 1 BUE ther ex to maximize strength and endurance for ADLs and fxnl mobility. Completed w/ 2# dowel, 3x10 bicep curls and chest press. Pt tolerated well w/ Min vc's to maximize technique and A w/ rep count. Rest breaks between sets to manage fatigue. Cognition   Overall Cognitive Status Impaired   Comments tangential, impaired insight, dec fxnl problem-solving, impaired STM, poor cognitive flexibility, cognitive fatigue, poor divided attention req frequent redireciton. Pt able to recall 3/3 spinal precautions without memory aide when provided w/ inc time, reiterated fxnl implications. During fxnl act, pt adheres to no lifting and no bending, req cues to adhere to twisting precautions, especially when distracted and attempts to watch others.    Activity Tolerance   Medical Staff Made Aware RN made aware of pt's lower back pain and request for medication. Assessment   Treatment Assessment Pt seen for skilled OT session focusing on short distance fxnl mobility w/ RW, BUE strengthening, fxnl standing tolerance w/ alternating unilateral release, fxnl cognitive retraining, and toileting. Time spent rapport building, pt anxiously awaiting d/c date. Pt cont to be limited by cognitive deficits, req cues to adhere to spinal precaution, specifically twisting, and pacing w/ fxnl act to inc safety and dec fall risk. Pt add'lly limited by lower back pain, req frequent seated rest breaks to manage. Cont OT POC: fxnl cognitive retraining to maximize carryover of spinal precautions, TLSO management, fxnl standing tolerance, endurance work, repetitive safety training, and ADL retraining. At this time, FT scheduled for Monday. Pt was left resting in recliner w/ all needs within reach and alarm activated. From OT standpoint, pt is on track to d/c home on Wednesday w/ family to provide 24 hour S, recommending HHOT to maximize fxnl indep, dec caregiver burden, and ease transition to home context. Prognosis Fair   Problem List Decreased strength;Decreased range of motion;Decreased endurance; Impaired balance;Decreased mobility; Decreased coordination;Decreased cognition; Impaired judgement;Decreased safety awareness;Orthopedic restrictions;Pain   Plan   Treatment/Interventions ADL retraining;Functional transfer training; Therapeutic exercise; Endurance training;Patient/family training   Progress Progressing toward goals   Recommendation   OT Discharge Recommendation Home with home health rehabilitation   OT Therapy Minutes   OT Time In 1000   OT Time Out 1130   OT Total Time (minutes) 90   OT Mode of treatment - Individual (minutes) 90   OT Mode of treatment - Concurrent (minutes) 0   OT Mode of treatment - Group (minutes) 0   OT Mode of treatment - Co-treat (minutes) 0   OT Mode of Treatment - Total time(minutes) 90 minutes   OT Cumulative Minutes 595   Therapy Time missed   Time missed?  No

## 2023-08-26 NOTE — PLAN OF CARE
Problem: PAIN - ADULT  Goal: Verbalizes/displays adequate comfort level or baseline comfort level  Description: Interventions:  - Encourage patient to monitor pain and request assistance  - Assess pain using appropriate pain scale  - Administer analgesics based on type and severity of pain and evaluate response  - Implement non-pharmacological measures as appropriate and evaluate response  - Consider cultural and social influences on pain and pain management  - Notify physician/advanced practitioner if interventions unsuccessful or patient reports new pain  Outcome: Progressing     Problem: INFECTION - ADULT  Goal: Absence or prevention of progression during hospitalization  Description: INTERVENTIONS:  - Assess and monitor for signs and symptoms of infection  - Monitor lab/diagnostic results  - Monitor all insertion sites, i.e. indwelling lines, tubes, and drains  - Monitor endotracheal if appropriate and nasal secretions for changes in amount and color  - Roll appropriate cooling/warming therapies per order  - Administer medications as ordered  - Instruct and encourage patient and family to use good hand hygiene technique  - Identify and instruct in appropriate isolation precautions for identified infection/condition  Outcome: Progressing     Problem: SAFETY ADULT  Goal: Patient will remain free of falls  Description: INTERVENTIONS:  - Educate patient/family on patient safety including physical limitations  - Instruct patient to call for assistance with activity   - Consult OT/PT to assist with strengthening/mobility   - Keep Call bell within reach  - Keep bed low and locked with side rails adjusted as appropriate  - Keep care items and personal belongings within reach  - Initiate and maintain comfort rounds  - Make Fall Risk Sign visible to staff  - Offer Toileting every  Hours, in advance of need  - Initiate/Maintain alarm  - Obtain necessary fall risk management equipment:   - Apply yellow socks and bracelet for high fall risk patients  - Consider moving patient to room near nurses station  Outcome: Progressing  Goal: Maintain or return to baseline ADL function  Description: INTERVENTIONS:  -  Assess patient's ability to carry out ADLs; assess patient's baseline for ADL function and identify physical deficits which impact ability to perform ADLs (bathing, care of mouth/teeth, toileting, grooming, dressing, etc.)  - Assess/evaluate cause of self-care deficits   - Assess range of motion  - Assess patient's mobility; develop plan if impaired  - Assess patient's need for assistive devices and provide as appropriate  - Encourage maximum independence but intervene and supervise when necessary  - Involve family in performance of ADLs  - Assess for home care needs following discharge   - Consider OT consult to assist with ADL evaluation and planning for discharge  - Provide patient education as appropriate  Outcome: Progressing  Goal: Maintains/Returns to pre admission functional level  Description: INTERVENTIONS:  - Perform BMAT or MOVE assessment daily.   - Set and communicate daily mobility goal to care team and patient/family/caregiver. - Collaborate with rehabilitation services on mobility goals if consulted  - Perform Range of Motion times a day. - Reposition patient every  hours.   - Dangle patient  times a day  - Stand patient  times a day  - Ambulate patient  times a day  - Out of bed to chair times a day   - Out of bed for meals  times a day  - Out of bed for toileting  - Record patient progress and toleration of activity level   Outcome: Progressing     Problem: DISCHARGE PLANNING  Goal: Discharge to home or other facility with appropriate resources  Description: INTERVENTIONS:  - Identify barriers to discharge w/patient and caregiver  - Arrange for needed discharge resources and transportation as appropriate  - Identify discharge learning needs (meds, wound care, etc.)  - Arrange for interpretive services to assist at discharge as needed  - Refer to Case Management Department for coordinating discharge planning if the patient needs post-hospital services based on physician/advanced practitioner order or complex needs related to functional status, cognitive ability, or social support system  Outcome: Progressing     Problem: Prexisting or High Potential for Compromised Skin Integrity  Goal: Skin integrity is maintained or improved  Description: INTERVENTIONS:  - Identify patients at risk for skin breakdown  - Assess and monitor skin integrity  - Assess and monitor nutrition and hydration status  - Monitor labs   - Assess for incontinence   - Turn and reposition patient  - Assist with mobility/ambulation  - Relieve pressure over bony prominences  - Avoid friction and shearing  - Provide appropriate hygiene as needed including keeping skin clean and dry  - Evaluate need for skin moisturizer/barrier cream  - Collaborate with interdisciplinary team   - Patient/family teaching  - Consider wound care consult   Outcome: Progressing     Problem: MOBILITY - ADULT  Goal: Maintain or return to baseline ADL function  Description: INTERVENTIONS:  -  Assess patient's ability to carry out ADLs; assess patient's baseline for ADL function and identify physical deficits which impact ability to perform ADLs (bathing, care of mouth/teeth, toileting, grooming, dressing, etc.)  - Assess/evaluate cause of self-care deficits   - Assess range of motion  - Assess patient's mobility; develop plan if impaired  - Assess patient's need for assistive devices and provide as appropriate  - Encourage maximum independence but intervene and supervise when necessary  - Involve family in performance of ADLs  - Assess for home care needs following discharge   - Consider OT consult to assist with ADL evaluation and planning for discharge  - Provide patient education as appropriate  Outcome: Progressing  Goal: Maintains/Returns to pre admission functional level  Description: INTERVENTIONS:  - Perform BMAT or MOVE assessment daily.   - Set and communicate daily mobility goal to care team and patient/family/caregiver. - Collaborate with rehabilitation services on mobility goals if consulted  - Perform Range of Motion times a day. - Reposition patient every  hours.   - Dangle patient  times a day  - Stand patient  times a day  - Ambulate patient  times a day  - Out of bed to chair  times a day   - Out of bed for meals times a day  - Out of bed for toileting  - Record patient progress and toleration of activity level   Outcome: Progressing

## 2023-08-27 PROCEDURE — 97112 NEUROMUSCULAR REEDUCATION: CPT

## 2023-08-27 PROCEDURE — 97110 THERAPEUTIC EXERCISES: CPT

## 2023-08-27 PROCEDURE — 99232 SBSQ HOSP IP/OBS MODERATE 35: CPT | Performed by: INTERNAL MEDICINE

## 2023-08-27 PROCEDURE — NC001 PR NO CHARGE: Performed by: PHYSICAL MEDICINE & REHABILITATION

## 2023-08-27 PROCEDURE — 97530 THERAPEUTIC ACTIVITIES: CPT

## 2023-08-27 RX ADMIN — CYANOCOBALAMIN TAB 500 MCG 1000 MCG: 500 TAB at 09:05

## 2023-08-27 RX ADMIN — NYSTATIN 1 APPLICATION: 100000 POWDER TOPICAL at 17:35

## 2023-08-27 RX ADMIN — Medication 1 TABLET: at 09:05

## 2023-08-27 RX ADMIN — SENNOSIDES 17.2 MG: 8.6 TABLET, FILM COATED ORAL at 21:09

## 2023-08-27 RX ADMIN — Medication 2000 UNITS: at 09:05

## 2023-08-27 RX ADMIN — OXYCODONE HYDROCHLORIDE 5 MG: 5 TABLET ORAL at 09:07

## 2023-08-27 RX ADMIN — AMLODIPINE BESYLATE 5 MG: 5 TABLET ORAL at 21:09

## 2023-08-27 RX ADMIN — LIDOCAINE 5% 1 PATCH: 700 PATCH TOPICAL at 09:05

## 2023-08-27 RX ADMIN — ATORVASTATIN CALCIUM 10 MG: 10 TABLET, FILM COATED ORAL at 16:24

## 2023-08-27 RX ADMIN — FOLIC ACID 1 MG: 1 TABLET ORAL at 09:05

## 2023-08-27 RX ADMIN — B-COMPLEX W/ C & FOLIC ACID TAB 1 TABLET: TAB at 09:05

## 2023-08-27 RX ADMIN — ACETAMINOPHEN 650 MG: 325 TABLET, FILM COATED ORAL at 05:55

## 2023-08-27 RX ADMIN — POLYETHYLENE GLYCOL 3350 17 G: 17 POWDER, FOR SOLUTION ORAL at 09:05

## 2023-08-27 RX ADMIN — FLUOXETINE 20 MG: 20 CAPSULE ORAL at 09:05

## 2023-08-27 RX ADMIN — OXYCODONE HYDROCHLORIDE 5 MG: 5 TABLET ORAL at 19:24

## 2023-08-27 RX ADMIN — LEVOTHYROXINE SODIUM 88 MCG: 88 TABLET ORAL at 05:56

## 2023-08-27 RX ADMIN — ACETAMINOPHEN 650 MG: 325 TABLET, FILM COATED ORAL at 17:33

## 2023-08-27 RX ADMIN — Medication 1 TABLET: at 16:24

## 2023-08-27 RX ADMIN — ENOXAPARIN SODIUM 30 MG: 30 INJECTION SUBCUTANEOUS at 09:05

## 2023-08-27 RX ADMIN — ATENOLOL 50 MG: 50 TABLET ORAL at 09:05

## 2023-08-27 RX ADMIN — ENOXAPARIN SODIUM 30 MG: 30 INJECTION SUBCUTANEOUS at 21:09

## 2023-08-27 RX ADMIN — DOCUSATE SODIUM 100 MG: 100 CAPSULE, LIQUID FILLED ORAL at 09:05

## 2023-08-27 RX ADMIN — DOCUSATE SODIUM 100 MG: 100 CAPSULE, LIQUID FILLED ORAL at 17:33

## 2023-08-27 RX ADMIN — AMLODIPINE BESYLATE 5 MG: 5 TABLET ORAL at 09:05

## 2023-08-27 NOTE — MALNUTRITION/BMI
This medical record reflects one or more clinical indicators suggestive of malnutrition and/or morbid obesity. Malnutrition Findings:   Adult Malnutrition type: Acute illness  Adult Degree of Malnutrition: Malnutrition of moderate degree  Malnutrition Characteristics: Inadequate energy, Weight loss                  360 Statement: Moderate protein calorie malnutrition related to acute illness/progressive cognitive dysfunction as evidenced by wt loss >2 % over 1 week, and intake < 75% for > 1 week. Treatment: adding snacks in between regular meals    BMI Findings: Body mass index is 26.63 kg/m². See Nutrition note dated 8/27/23 for additional details. Completed nutrition assessment is viewable in the nutrition documentation.

## 2023-08-27 NOTE — PROGRESS NOTES
08/27/23 0830   Pain Assessment   Pain Assessment Tool 0-10   Pain Score 5  (At start of session pt initially reported no pain. However, with mobility pt reported back pain)   Pain Location/Orientation Orientation: Mid;Location: Back   Pain Onset/Description Onset: Ongoing   Patient's Stated Pain Goal No pain   Hospital Pain Intervention(s) Medication (See MAR); Rest   Restrictions/Precautions   Precautions Cognitive; Fall Risk;Bed/chair alarms;Pain;Spinal precautions;Supervision on toilet/commode   Weight Bearing Restrictions No   Braces or Orthoses TLSO  (OOB >45degrees)   Cognition   Overall Cognitive Status Impaired   Arousal/Participation Alert; Cooperative   Attention Attends with cues to redirect   Orientation Level Oriented X4   Memory Decreased long term memory;Decreased short term memory;Decreased recall of biographical information;Decreased recall of recent events;Decreased recall of precautions   Following Commands Follows one step commands inconsistently   Subjective   Subjective Pt reported no pain at start of session, but reported back with occasionally with mobility   Lying to Sitting on Side of Bed   Type of Assistance Needed Incidental touching   Physical Assistance Level No physical assistance   Lying to Sitting on Side of Bed CARE Score 4   Sit to Stand   Type of Assistance Needed Incidental touching;Verbal cues   Physical Assistance Level No physical assistance   Comment   (CS/CG with cues for hand placement and safety - pt tends to attempt to sit prior to being near chair)   Sit to Stand CARE Score 4   Bed-Chair Transfer   Type of Assistance Needed Incidental touching;Verbal cues   Physical Assistance Level No physical assistance   Comment CS/CG with RW, cues for safety   Chair/Bed-to-Chair Transfer CARE Score 4   Walk 10 Feet   Type of Assistance Needed Incidental touching;Verbal cues   Physical Assistance Level No physical assistance   Comment RW   Walk 10 Feet CARE Score 4   Walk 50 Feet with Two Turns   Type of Assistance Needed Incidental touching;Verbal cues   Physical Assistance Level No physical assistance   Comment RW   Walk 50 Feet with Two Turns CARE Score 4   Walk 150 Feet   Type of Assistance Needed Incidental touching;Verbal cues   Physical Assistance Level No physical assistance   Comment RW   Walk 150 Feet CARE Score 4   Walking 10 Feet on Uneven Surfaces   Type of Assistance Needed Incidental touching;Verbal cues   Physical Assistance Level No physical assistance   Comment foam mat with RW, cues for safe RW management   Walking 10 Feet on Uneven Surfaces CARE Score 4   Ambulation   Primary Mode of Locomotion Prior to Admission Walk   Distance Walked (feet) 350 ft  (150ft x3, 350ft x1)   Assist Device Roller Walker   Gait Pattern Inconsistant [place]; Slow [place]; Forward Flexion;Narrow KAREEN   Limitations Noted In Balance; Coordination;Device Management; Safety   Provided Assistance with: Balance;Direction   Walk Assist Level Contact Guard   Findings Pt completed multiple ambulation trials, cues for safe RW management and obstacle negotiation   Does the patient walk? 2. Yes   Wheel 50 Feet with Two Turns   Reason if not Attempted Activity not applicable   Wheel 50 Feet with Two Turns CARE Score 9   Wheel 150 Feet   Reason if not Attempted Activity not applicable   Wheel 314 Feet CARE Score 9   Wheelchair mobility   Does the patient use a wheelchair? 0.  No   Curb or Single Stair   Style negotiated Curb   Type of Assistance Needed Incidental touching;Verbal cues   Physical Assistance Level No physical assistance   Comment up/down 6" curb step x2 with RW and CS/CG, cues for safety and sequencing   1 Step (Curb) CARE Score 4   Toilet Transfer   Type of Assistance Needed Incidental touching;Verbal cues   Physical Assistance Level No physical assistance   Comment CGA with max cues for sequencing and safety as pt attempted to sti prior to being near toilet and not using walker   Toilet Transfer CARE Score 4   Therapeutic Interventions   Strengthening LAQ and hip flexion with 2# wts, hip abd with green TB 2 x 15   Neuromuscular Re-Education obstacle course weaving through cones and stepping over SPCs with RW and CGA; alternating cone taps to 2 cones focusing on single leg stance; fwd/bwd stepping over SPC on ground with CGA; sidestepping in hallway 30ft x4; forward/backward walking with no device 30ft x4 with CGA   Assessment   Treatment Assessment Patient presented in bed agreeable to PT session, however reported needing to put pants on. Pt required assist to thread pants on LEs, then pt able to pull pants up with CGA. TLSO donned prior to OOB. Session focused on mobility and balance training, however pt continues to be limited by cognitive impairments and decreased memory. Patietn with poor carryover of safety techniques during session often requiring max verbal and tactile cues. Patient with impaired RW navigation and obstacle negotation with verbal ceus needed for attention to her environment and for obstacle avoidance. Patient demonstrates good dynamic balance when focused on task, however impaired balance while distracted or in open environment. Due to safety concerns and balance deficits, pt requires CS/CGA for all tasks. Patient would continue to benefit from skilled PT intervnetions to increase safety, balance, and overall independence with functional mobility. Problem List Decreased strength;Decreased endurance; Impaired balance;Decreased mobility; Decreased coordination;Decreased cognition; Impaired judgement;Decreased safety awareness; Impaired vision;Orthopedic restrictions;Pain   Barriers to Discharge Decreased caregiver support   Plan   Treatment/Interventions Functional transfer training;LE strengthening/ROM; Elevations; Therapeutic exercise; Endurance training;Cognitive reorientation;Patient/family training;Gait training   Progress Progressing toward goals   PT Therapy Minutes   PT Time In 0830 PT Time Out 1000   PT Total Time (minutes) 90   PT Mode of treatment - Individual (minutes) 90   PT Mode of treatment - Concurrent (minutes) 0   PT Mode of treatment - Group (minutes) 0   PT Mode of treatment - Co-treat (minutes) 0   PT Mode of Treatment - Total time(minutes) 90 minutes   PT Cumulative Minutes 760   Therapy Time missed   Time missed?  No

## 2023-08-27 NOTE — PLAN OF CARE
Problem: PAIN - ADULT  Goal: Verbalizes/displays adequate comfort level or baseline comfort level  Description: Interventions:  - Encourage patient to monitor pain and request assistance  - Assess pain using appropriate pain scale  - Administer analgesics based on type and severity of pain and evaluate response  - Implement non-pharmacological measures as appropriate and evaluate response  - Consider cultural and social influences on pain and pain management  - Notify physician/advanced practitioner if interventions unsuccessful or patient reports new pain  Outcome: Progressing     Problem: INFECTION - ADULT  Goal: Absence or prevention of progression during hospitalization  Description: INTERVENTIONS:  - Assess and monitor for signs and symptoms of infection  - Monitor lab/diagnostic results  - Monitor all insertion sites, i.e. indwelling lines, tubes, and drains  - Monitor endotracheal if appropriate and nasal secretions for changes in amount and color  - [place] appropriate cooling/warming therapies per order  - Administer medications as ordered  - Instruct and encourage patient and family to use good hand hygiene technique  - Identify and instruct in appropriate isolation precautions for identified infection/condition  Outcome: Progressing     Problem: SAFETY ADULT  Goal: Patient will remain free of falls  Description: INTERVENTIONS:  - Educate patient/family on patient safety including physical limitations  - Instruct patient to call for assistance with activity   - Consult OT/PT to assist with strengthening/mobility   - Keep Call bell within reach  - Keep bed low and locked with side rails adjusted as appropriate  - Keep care items and personal belongings within reach  - Initiate and maintain comfort rounds  - Make Fall Risk Sign visible to staff  - Offer Toileting every  Hours, in advance of need  - Initiate/Maintain alarm  - Obtain necessary fall risk management equipment: *  - Apply yellow socks and bracelet for high fall risk patients  - Consider moving patient to room near nurses station  Outcome: Progressing  Goal: Maintain or return to baseline ADL function  Description: INTERVENTIONS:  -  Assess patient's ability to carry out ADLs; assess patient's baseline for ADL function and identify physical deficits which impact ability to perform ADLs (bathing, care of mouth/teeth, toileting, grooming, dressing, etc.)  - Assess/evaluate cause of self-care deficits   - Assess range of motion  - Assess patient's mobility; develop plan if impaired  - Assess patient's need for assistive devices and provide as appropriate  - Encourage maximum independence but intervene and supervise when necessary  - Involve family in performance of ADLs  - Assess for home care needs following discharge   - Consider OT consult to assist with ADL evaluation and planning for discharge  - Provide patient education as appropriate  Outcome: Progressing  Goal: Maintains/Returns to pre admission functional level  Description: INTERVENTIONS:  - Perform BMAT or MOVE assessment daily.   - Set and communicate daily mobility goal to care team and patient/family/caregiver. - Collaborate with rehabilitation services on mobility goals if consulted  - Perform Range of Motion  times a day. - Reposition patient every  hours.   - Dangle patient  times a day  - Stand patient  times a day  - Ambulate patient times a day  - Out of bed to chair  times a day   - Out of bed for meals  times a day  - Out of bed for toileting  - Record patient progress and toleration of activity level   Outcome: Progressing     Problem: DISCHARGE PLANNING  Goal: Discharge to home or other facility with appropriate resources  Description: INTERVENTIONS:  - Identify barriers to discharge w/patient and caregiver  - Arrange for needed discharge resources and transportation as appropriate  - Identify discharge learning needs (meds, wound care, etc.)  - Arrange for interpretive services to assist at discharge as needed  - Refer to Case Management Department for coordinating discharge planning if the patient needs post-hospital services based on physician/advanced practitioner order or complex needs related to functional status, cognitive ability, or social support system  Outcome: Progressing     Problem: Prexisting or High Potential for Compromised Skin Integrity  Goal: Skin integrity is maintained or improved  Description: INTERVENTIONS:  - Identify patients at risk for skin breakdown  - Assess and monitor skin integrity  - Assess and monitor nutrition and hydration status  - Monitor labs   - Assess for incontinence   - Turn and reposition patient  - Assist with mobility/ambulation  - Relieve pressure over bony prominences  - Avoid friction and shearing  - Provide appropriate hygiene as needed including keeping skin clean and dry  - Evaluate need for skin moisturizer/barrier cream  - Collaborate with interdisciplinary team   - Patient/family teaching  - Consider wound care consult   Outcome: Progressing     Problem: MOBILITY - ADULT  Goal: Maintain or return to baseline ADL function  Description: INTERVENTIONS:  -  Assess patient's ability to carry out ADLs; assess patient's baseline for ADL function and identify physical deficits which impact ability to perform ADLs (bathing, care of mouth/teeth, toileting, grooming, dressing, etc.)  - Assess/evaluate cause of self-care deficits   - Assess range of motion  - Assess patient's mobility; develop plan if impaired  - Assess patient's need for assistive devices and provide as appropriate  - Encourage maximum independence but intervene and supervise when necessary  - Involve family in performance of ADLs  - Assess for home care needs following discharge   - Consider OT consult to assist with ADL evaluation and planning for discharge  - Provide patient education as appropriate  Outcome: Progressing  Goal: Maintains/Returns to pre admission functional level  Description: INTERVENTIONS:  - Perform BMAT or MOVE assessment daily.   - Set and communicate daily mobility goal to care team and patient/family/caregiver. - Collaborate with rehabilitation services on mobility goals if consulted  - Perform Range of Motion times a day. - Reposition patient every  hours. - Dangle patient  times a day  - Stand patient  times a day  - Ambulate patient  times a day  - Out of bed to chair  times a day   - Out of bed for meals  times a day  - Out of bed for toileting  - Record patient progress and toleration of activity level   Outcome: Progressing     Problem: Nutrition/Hydration-ADULT  Goal: Nutrient/Hydration intake appropriate for improving, restoring or maintaining nutritional needs  Description: Monitor and assess patient's nutrition/hydration status for malnutrition. Collaborate with interdisciplinary team and initiate plan and interventions as ordered. Monitor patient's weight and dietary intake as ordered or per policy. Utilize nutrition screening tool and intervene as necessary. Determine patient's food preferences and provide high-protein, high-caloric foods as appropriate.      INTERVENTIONS:  - Monitor oral intake, urinary output, labs, and treatment plans  - Assess nutrition and hydration status and recommend course of action  - Evaluate amount of meals eaten  - Assist patient with eating if necessary   - Allow adequate time for meals  - Recommend/ encourage appropriate diets, oral nutritional supplements, and vitamin/mineral supplements  - Order, calculate, and assess calorie counts as needed  - Recommend, monitor, and adjust tube feedings and TPN/PPN based on assessed needs  - Assess need for intravenous fluids  - Provide specific nutrition/hydration education as appropriate  - Include patient/family/caregiver in decisions related to nutrition  Outcome: Progressing

## 2023-08-27 NOTE — PROGRESS NOTES
Internal Medicine Progress Note  Patient: Kya Squires  Age/sex: 70 y.o. female  Medical Record #: [id number]      ASSESSMENT/PLAN: (Interval History)  Kya Squires is seen and examined and management for following issues:    Frequent fall, r/o movement disorder  • Sees Dr Linda Granda as OP     Right rib fractures  • Ribs 3-5th  • Continue IS     T8 fracture  • Non-op  • TLSO brace  • For upright t-spine xrays in brace in 2 weeks     Vitamin B12 deficiency  • For IM replacement QOD x 5 doses then to PO daily     HTN  • Home:  Atenolol 50mg qd  • Here:  Atenolol 50mg qd/Norvasc 5mg 2x daily  • Has prn Hydralazine  • Stable.     Cognitive impairment  • MOCA 3/2021 was 17/30  • Likely has worsened since prev MOCA and is confused here     E.coli UTI  • POA  • Dr Nikole Danielson sent UA with reflex to cx 2/2 confusion  • Urine cx had >100,000 Ecoli and 30,000-39,000 mixed contaminants  • Keflex completed.     Hypothyroidism  • TSH 0.314/free T4 1.29  • on Levothyroxine 100 mcg qd  • Endo following and they reduced to 88 mcg qd and sent a reverse T3  • For TSH/free T4 4-6 weeks     Severe osteoporosis  • Endo following  • For OP DEXA  • Calcium 500mg BID     Vit D deficiency  • Level was 23.7  • For Vit D 2000IU qd     RA  • At home takes Methotrexate 20mg qWednesday/Prednisone 5mg qd/Flexeril 10mg qhs     HLD  • Lipitor 10mg qd     Anxiety/depression  • At home she was taking Xananx 1mg TID and Prozac 20mg qd  • Per PMR     Enlarged right retrocrural lymph node  • Found on CT  • For repeat CT chest as OP in 3-6 months     Transaminitis  • AST/ALT/AP elevated since previous CMP 8/15  • had CT C/A/P on 8/15 = hepatomegaly  • Dr Nikole Danielson cut back on Tylenol  • LFTs trending down    Hyponatremia  · Na 132. · Will continue to monitor.        Discharge date:  8/30/23       The above assessment and plan was reviewed and updated as determined by my evaluation of the patient on 8/27/2023.     Labs:   Results from last 7 days   Lab Units 08/24/23  0554 08/21/23  0532   WBC Thousand/uL 5.13 3.90*   HEMOGLOBIN g/dL 12.2 12.9   HEMATOCRIT % 34.8 37.6   PLATELETS Thousands/uL 182 215     Results from last 7 days   Lab Units 08/24/23  0554 08/21/23  0532   SODIUM mmol/L 132* 135   POTASSIUM mmol/L 3.8 3.8   CHLORIDE mmol/L 99 103   CO2 mmol/L 23 24   BUN mg/dL 19 13   CREATININE mg/dL 0.80 0.76   CALCIUM mg/dL 9.4 10.3*                   Review of Scheduled Meds:  Current Facility-Administered Medications   Medication Dose Route Frequency Provider Last Rate   • acetaminophen  650 mg Oral Q12H Trisha Perez MD     • ALPRAZolam  0.5 mg Oral HS PRN Trisha Perez MD     • amLODIPine  5 mg Oral BID DARIO Jackson     • atenolol  50 mg Oral Daily Trisha Perez MD     • atorvastatin  10 mg Oral Daily With Macarthur Meigs, MD     • bisacodyl  5 mg Oral Daily PRN Trisha Perez MD     • bisacodyl  10 mg Rectal Daily PRN Trisha Perez MD     • bisacodyl  10 mg Rectal Once Melania Moran MD     • calcium carbonate  1 tablet Oral BID With Meals Preethi Nunes MD     • cholecalciferol  2,000 Units Oral Daily Preethi Nunes MD     • cyanocobalamin  1,000 mcg Oral Daily Trisha Perez MD     • docusate sodium  100 mg Oral BID Trisha Perez MD     • enoxaparin  30 mg Subcutaneous Q12H [facility] Trisha Perez MD     • FLUoxetine  20 mg Oral Daily Trisha Perez MD     • folic acid  1 mg Oral Daily Trisha Perez MD     • hydrALAZINE  25 mg Oral Q8H PRN DARIO Jackson     • levothyroxine  88 mcg Oral Early Morning Preethi Nunes MD     • lidocaine  1 patch Topical Daily Trisha Perez MD     • methocarbamol  250 mg Oral Q6H PRN Trisha Perez MD     • methotrexate  20 mg Oral Weekly Trisha Perez MD     • multivitamin stress formula  1 tablet Oral Daily Trisha Perez MD     • nystatin   Topical BID Trisha Perez MD     • ondansetron  4 mg Oral Q6H PRN Trisha Perez MD     • oxyCODONE  5 mg Oral Q4H PRN Mookie Figueroa MD     • oxyCODONE  2.5 mg Oral Q4H PRN Mookie Figueroa MD     • polyethylene glycol  17 g Oral Daily Mookie Figueroa MD     • predniSONE  5 mg Oral HS Mookie Figueroa MD     • QUEtiapine  12.5 mg Oral HS PRN Mookie Figueroa MD     • senna  2 tablet Oral HS Mookie Figueroa MD         Subjective/ HPI: Patient seen and examined. Patients overnight issues or events were reviewed with nursing or staff during rounds or morning huddle session. New or overnight issues include the following:     Pt seen in her room. She denies any current complaints. ROS:   A 10 point ROS was performed; negative except as noted above. Imaging:     No orders to display       *Labs /Radiology studies reviewed  *Medications reviewed and reconciled as needed  *Please refer to order section for additional ordered labs studies  *Case discussed with primary attending during morning huddle case rounds    Physical Examination:  Vitals:   Vitals:    08/26/23 0521 08/26/23 1300 08/26/23 2107 08/27/23 0434   BP: 119/51 118/80 140/70 135/61   BP Location: Right arm Right arm  Left arm   Pulse: 67 61 61 74   Resp: 19 16  20   Temp: 98.4 °F (36.9 °C) 97.9 °F (36.6 °C) 97.5 °F (36.4 °C) 98.6 °F (37 °C)   TempSrc: Oral Oral Oral Oral   SpO2: 92% 95% 94% 98%   Weight:       Height:           General Appearance: no distress, conversive, interactive  HEENT: PERRLA, conjuctiva normal; oropharynx clear; mucous membranes moist   Neck:  Supple, normal ROM  Lungs: CTA, normal respiratory effort, no retractions, expiratory effort normal  CV: regular rate and rhythm; no rubs/murmurs/gallops, PMI normal   ABD: soft; ND/NT; +BS  EXT: No edema. TLSO brace intact. Skin: normal turgor, normal texture, no rashes  Psych: affect normal, mood normal  Neuro: Awake and alert    The above physical exam was reviewed and updated as determined by my evaluation of the patient on 8/27/2023.     Invasive Devices     None                    VTE Pharmacologic Prophylaxis: Enoxaparin  Code Status: Level 3 - DNAR and DNI  Current Length of Stay: 9 day(s)      Total time spent:  30 minutes with more than 50% spent counseling/coordinating care. Counseling includes discussion with patient re: progress  and discussion with patient of his/her current medical state/information. Coordination of patient's care was performed in conjunction with primary service. Time invested included review of patient's labs, vitals, and management of their comorbidities with continued monitoring. In addition, this patient was discussed with medical team including physician and advanced extenders. The care of the patient was extensively discussed and appropriate treatment plan was formulated unique for this patient. Medical decision making for the day was made by supervising physician unless otherwise noted in their attestation statement. ** Please Note:  voice to text software may have been used in the creation of this document.  Although proof errors in transcription or interpretation are a potential of such software**

## 2023-08-27 NOTE — PROGRESS NOTES
PM&R Brief Note:    +BM 8/25/23 (LARGE)  +BM 8/26/23 (small)  +BM 8/27/23 (smear)    Coreen Merlin, MD   PM&R

## 2023-08-28 PROBLEM — E44.0 MODERATE PROTEIN-CALORIE MALNUTRITION (HCC): Status: ACTIVE | Noted: 2023-08-28

## 2023-08-28 LAB
BASOPHILS # BLD AUTO: 0.01 THOUSANDS/ÂΜL (ref 0–0.1)
BASOPHILS NFR BLD AUTO: 0 % (ref 0–1)
EOSINOPHIL # BLD AUTO: 0.01 THOUSAND/ÂΜL (ref 0–0.61)
EOSINOPHIL NFR BLD AUTO: 0 % (ref 0–6)
ERYTHROCYTE [DISTWIDTH] IN BLOOD BY AUTOMATED COUNT: 13.5 % (ref 11.6–15.1)
HCT VFR BLD AUTO: 35 % (ref 34.8–46.1)
HGB BLD-MCNC: 11.6 G/DL (ref 11.5–15.4)
IMM GRANULOCYTES # BLD AUTO: 0.03 THOUSAND/UL (ref 0–0.2)
IMM GRANULOCYTES NFR BLD AUTO: 1 % (ref 0–2)
LYMPHOCYTES # BLD AUTO: 0.47 THOUSANDS/ÂΜL (ref 0.6–4.47)
LYMPHOCYTES NFR BLD AUTO: 8 % (ref 14–44)
MCH RBC QN AUTO: 32.5 PG (ref 26.8–34.3)
MCHC RBC AUTO-ENTMCNC: 33.1 G/DL (ref 31.4–37.4)
MCV RBC AUTO: 98 FL (ref 82–98)
MONOCYTES # BLD AUTO: 0.39 THOUSAND/ÂΜL (ref 0.17–1.22)
MONOCYTES NFR BLD AUTO: 7 % (ref 4–12)
NEUTROPHILS # BLD AUTO: 4.68 THOUSANDS/ÂΜL (ref 1.85–7.62)
NEUTS SEG NFR BLD AUTO: 84 % (ref 43–75)
NRBC BLD AUTO-RTO: 0 /100 WBCS
PLATELET # BLD AUTO: 201 THOUSANDS/UL (ref 149–390)
PMV BLD AUTO: 9.7 FL (ref 8.9–12.7)
RBC # BLD AUTO: 3.57 MILLION/UL (ref 3.81–5.12)
WBC # BLD AUTO: 5.59 THOUSAND/UL (ref 4.31–10.16)

## 2023-08-28 PROCEDURE — 97110 THERAPEUTIC EXERCISES: CPT

## 2023-08-28 PROCEDURE — 84425 ASSAY OF VITAMIN B-1: CPT | Performed by: PHYSICAL MEDICINE & REHABILITATION

## 2023-08-28 PROCEDURE — 97530 THERAPEUTIC ACTIVITIES: CPT

## 2023-08-28 PROCEDURE — 99232 SBSQ HOSP IP/OBS MODERATE 35: CPT | Performed by: INTERNAL MEDICINE

## 2023-08-28 PROCEDURE — 97535 SELF CARE MNGMENT TRAINING: CPT

## 2023-08-28 PROCEDURE — 97129 THER IVNTJ 1ST 15 MIN: CPT

## 2023-08-28 PROCEDURE — 99233 SBSQ HOSP IP/OBS HIGH 50: CPT | Performed by: PHYSICAL MEDICINE & REHABILITATION

## 2023-08-28 PROCEDURE — 85025 COMPLETE CBC W/AUTO DIFF WBC: CPT | Performed by: NURSE PRACTITIONER

## 2023-08-28 RX ADMIN — OXYCODONE HYDROCHLORIDE 5 MG: 5 TABLET ORAL at 11:59

## 2023-08-28 RX ADMIN — LIDOCAINE 5% 1 PATCH: 700 PATCH TOPICAL at 08:01

## 2023-08-28 RX ADMIN — Medication 1 TABLET: at 17:15

## 2023-08-28 RX ADMIN — OXYCODONE HYDROCHLORIDE 5 MG: 5 TABLET ORAL at 21:42

## 2023-08-28 RX ADMIN — ACETAMINOPHEN 650 MG: 325 TABLET, FILM COATED ORAL at 05:30

## 2023-08-28 RX ADMIN — ENOXAPARIN SODIUM 30 MG: 30 INJECTION SUBCUTANEOUS at 08:08

## 2023-08-28 RX ADMIN — ATORVASTATIN CALCIUM 10 MG: 10 TABLET, FILM COATED ORAL at 17:16

## 2023-08-28 RX ADMIN — CYANOCOBALAMIN TAB 500 MCG 1000 MCG: 500 TAB at 07:59

## 2023-08-28 RX ADMIN — PREDNISONE 5 MG: 5 TABLET ORAL at 21:29

## 2023-08-28 RX ADMIN — ATENOLOL 50 MG: 50 TABLET ORAL at 08:01

## 2023-08-28 RX ADMIN — NYSTATIN: 100000 POWDER TOPICAL at 08:09

## 2023-08-28 RX ADMIN — ENOXAPARIN SODIUM 30 MG: 30 INJECTION SUBCUTANEOUS at 21:36

## 2023-08-28 RX ADMIN — NYSTATIN: 100000 POWDER TOPICAL at 17:18

## 2023-08-28 RX ADMIN — FLUOXETINE 20 MG: 20 CAPSULE ORAL at 08:00

## 2023-08-28 RX ADMIN — ACETAMINOPHEN 650 MG: 325 TABLET, FILM COATED ORAL at 17:16

## 2023-08-28 RX ADMIN — DOCUSATE SODIUM 100 MG: 100 CAPSULE, LIQUID FILLED ORAL at 08:00

## 2023-08-28 RX ADMIN — OXYCODONE HYDROCHLORIDE 5 MG: 5 TABLET ORAL at 17:17

## 2023-08-28 RX ADMIN — SENNOSIDES 17.2 MG: 8.6 TABLET, FILM COATED ORAL at 21:29

## 2023-08-28 RX ADMIN — AMLODIPINE BESYLATE 5 MG: 5 TABLET ORAL at 08:01

## 2023-08-28 RX ADMIN — B-COMPLEX W/ C & FOLIC ACID TAB 1 TABLET: TAB at 08:00

## 2023-08-28 RX ADMIN — Medication 2000 UNITS: at 08:00

## 2023-08-28 RX ADMIN — FOLIC ACID 1 MG: 1 TABLET ORAL at 08:00

## 2023-08-28 RX ADMIN — Medication 1 TABLET: at 08:00

## 2023-08-28 RX ADMIN — AMLODIPINE BESYLATE 5 MG: 5 TABLET ORAL at 21:29

## 2023-08-28 RX ADMIN — LEVOTHYROXINE SODIUM 88 MCG: 88 TABLET ORAL at 05:30

## 2023-08-28 NOTE — PROGRESS NOTES
Physical Medicine and Rehabilitation Progress Note  Sam Fine 70 y.o. female MRN: [id number]  Unit/Bed#: -01 Encounter: [id number]    To Review: Tish Reddy is a 70 y.o. female with Sjogrens and RA (on methotrexate), HTN, hypothyroidism, gastroparesis, depression, previous mild cognitive impairment ([facility] in 2021), history of meningioma s/p resection in 2012, pelvic fracture after fall down stairs in 2021, who presented to the [facility] 8/15 after falling backwards and landing on the ground 6 days prior. She had headaches and severe back pain and went to the hospital. She also seemed to have multiple more falls. She was found to have multiple R rib fractures (3-5). CTH was unremarkable. There was a question of possible movement disorder and Neurology was consulted who ordered MRI Brain, C-Spine and T-Spine. Of note she is on Xanax and Flexeril chronically (mostly taken during bedtime). Geriatrics recommended a UA which was not done inpatient. She was treated for MASD as well. MRI Brain showed moderate chronic microangiopathy, significantly increased, C-Spine without cord compression or cord signal abnormality, and T-Spine with a mild/acte T8 compression fracture without cord compression or cord signal abnormality. She most likely, per Neurology has a Vascular Dementia. There was also some discussion of possible PSP. Her  had also reported that it didn't seem the patient was keeping track of her medications properly, and certainly with her medication regimen is at risk for polypharmacy. Neurosurgery was consulted for her T8 compression fracture, and ordered TLSO when OOB/ > 45 degrees and follow-up in 2 weeks.  She has a Vitamin B1 pending, SPEP pending,  TSH was 0.314 with T4 1.29, A1C was 5.6, and B12 was 153 (she was started on 5 doses of Vitamin B12 - first dose given today, and to be given every other day, followed by daily 1000mcg Vitamin B12). She was admitted to the [address] [place] [address] on 8/18/2023. Chief Complaint: Feeling well, Family Training today. Interval History/Subjective:  No acute events overnight. Sleep has been ok over the weekend. Sleep logs inconsistent. Her pain is adequately controlled. She denies any new CP, SOB, fevers, chills, N/V, abdominal pain. Last BM 8/27. ROS:  A 10 point review of systems was negative except for what is noted in the HPI. Today's Changes:  1. Discussed with nursing team: frequent checks today and overnight. - Low threshold to add 1:1 if patient gets up in the middle of the night. 2. Reviewed labs, stable. 3. Continue lower dose of levothyroxine. 4. B1 pending. 5. Consider rechecking BMP prior to discharge. 6. Had extended discussion with her step-daughter, Rosa. Reviewed meds and outpatient follow-up. In agreement with plan moving forward to limit pain medications, muscle relaxers, and to stop xanax. - Reviewed recommendation for f/u with Neurology and Geriatrics. 7. Outpatient f/u with Endocrine for bone density and Thyroid follow-up. Total time spent:  50 minutes, with more than 50% spent counseling/coordinating care. Counseling includes discussion with patient re: progress in therapies, functional issues observed by therapy staff, and discussion with patient his/her current medical state/wellbeing. Coordination of patient's care was performed in conjunction with Internal Medicine service to monitor patient's labs, vitals, and management of their comorbidities. Assessment/Plan:    * Progressive cognitive dysfunction  Assessment & Plan   reports progressive cognitive decline that accelerated 2-3 weeks prior to admission. Multifactorial:  - Likely vascular dementia given increased moderate chronic microangiopathy, step wise decline.   - ? Amyloid angiopathy per radiology  - Cognitive impairment resulting in mismanagement of her medications - which include steroids, xanax, flexeril, prozac with polypharmacy contributing - particularly since she takes the Flexeril/Xanax at night and that is when most of her falls were occurring.   - Significant Vitamin B12 deficiency contributing (ataxia, proprioceptive impairments, cognitive impairments)  - Also found so far to have slightly high T4, and low TSH  - Of note has hepatomegaly (albeit stable) and increased alk phos on admission. Ammonia level on admission was normal (18)   - UA also positive on admission for E.coli UTI.   - Exam with patchy sensory impairment, with clear impairment in proprioception in great toes. Ataxia/motor planning difficulties. Clearly has impaired balance - this could be exacerbated by the B12 deficiency as well. - B6 level just adequate - she has had poor appetite as an outpatient   - Now on multivitamin daily in addition to B12 supplement      - Recommend outpatient Neuro/Senior Care follow-up  - Consult Endocrine to comment on TSH/T4 - adjustment of Levothyroxine potentially vs. In setting of recent hospitalization/fall.  - Follow-up work-up: B1   - Normal: A1C, MRI C-Spine, T-Spine shows no cord compression/cord signal abnormality, Ammonia level, SPEP, B6  - Treatment: PT/OT/SLP 3-5 hours/day, 5-7 days/week. See Vitamin B12 deficiency, UTI   - Discussed polypharmacy with family. Will plant discontinue alprazolam and flexeril at discharge. They will use Tylenol PRN at discharge for pain. Trial of Q15min frequent checks. - Restlessness at night likely 2/2 frequency/urgency with UTI   - Improved with treatment of UTI.      Compression fracture of T8 vertebra (HCC)  Assessment & Plan  In the past has broken her pelvis after a fall  Chronically on steroids, and likely should have DXA and osteoporosis work-up   - Outpatient f/u with Endocrinology - appreciate recs, patient likely with severe osteoporosis   - Vitamin D 26.6 - started on 2000 units daily    - started on calcium supplement by Endocrinology  Pain control as below  TLSO when OOB and HOB > 45 degrees. Thoracic spine precautions  Plan for repeat XR ~9/1 with follow-up with Neurosurgery at that time. Vitamin B12 deficiency  Assessment & Plan  8/17 level 153  8/18 received 1000mcg IM shot  - Will give 4 more doses every other day starting on 8/20.  - Then start 1000mcg daily on 8/27  - Monitor proprioception, sensation, ataxia/motor planning.   - Outpatient f/u with Neurology  - Nutrition consult to evaluate dietary intake. Closed fracture of multiple ribs of right side  Assessment & Plan  2/2 fall  R 3-5  Currently on RA  Pain control as below  Incentive spirometry. Osteoporosis  Assessment & Plan  Chronically on steroids, and likely should have DXA and osteoporosis work-up   - Consulted endocrine on admission.    - Vitamin D 26.6 - started on 2000 units daily    - started on calcium supplement by Endocrinology  - Outpatient f/u with endocrine   - She will benefit from getting DEXA scan as outpatient and will qualify for anabolic agents for treatment as outpt    Acute cystitis without hematuria  Assessment & Plan  She reports episodes of urgency at times  - UA with pan susceptible E.coli 100k CFU   - 8/21 started on Keflex for 5 days - completed    - Pyridium at night while treated. Can discontinue now. -  reports a pattern of patient holding her urine until the last minute and then rushing to the bathroom  - PVRs have been low continued timed voids  - Discussed with family recommendation for timed voids at home to help with continence. Abnormal CT of the abdomen  Assessment & Plan  1. Stable hepatomegaly - checking CMP in the AM. Alk phos elevated on admission, but stable. If still elevated, will add GGT given recent T8 fracture. Currently on Tylenol Q8hrs scheduled. May need to decrease dose.    2. Stable R renal cyst    Outpatient f/u with PCP    Adenopathy  Assessment & Plan  In the past has had "stable distal paraesophageal and gastrohepatic ligament adenopathy of uncertain etiology""  Here also noted to have R retrocrural lymph node adjacent to the descending thoracic aorta, juts proximal to the diaphragmatic hiatus - recommended for CT of the chest in 3-6 months to assess for stability. Onychomycosis  Assessment & Plan  Consult podiatry for very overgrown toenails  Appreciate their debridement. Oral dyskinesia  Assessment & Plan  This is stable and was noted in her [facility] admission, where it was felt to be due to reglan. She is no longer on this medication. Patient attributes to her Sjogrens. Would not add additional medication to address this at this time. Can f/u with Neurology outpatient    Gastroparesis  Assessment & Plan  Was previously on reglan, does not appear to be on this medication at this time. May need small, more frequent meals  Monitor for symptoms. Hypothyroidism  Assessment & Plan  8/17 TSH 0.314 and T4 1.29  Previously appeared to be on 88-75mcg daily. Not sure when 100mcg was initiated and by who. Consulted endocrine to comment on adjusting dose vs. In setting of recent fall/injury/hospitalization   - Reverse T3 elevated, and dose decreased to 88mcg daily by endocrine   - Recheck thyroid panel in 4-6 weeks. Anxiety and depression  Assessment & Plan  Currently on prozac 20mg daily  Also on xanax 1mg QHS PRN chronically, which it seems she was taking scheduled at home. Has not been on this only PRN here. - Has not been using Prn and been fine. Would discontinue this med. Question of this contributed to her worsening falls (as she was likely not managing her meds well at home given her cognitive impairment), and her falls were worse at night when she would take it. That being said, this medication is also not showing up in her PDMP.      Hypertension  Assessment & Plan  Home: Atenolol 50mg daily, Losartan 25mg daily  Here: Atenolol 50mg daily, Norvasc 5mg BID, Hydralazine PRN  Monitor and adjust as appropriate  IM consulted and assisting with management. Sjogren's disease [facility])  Assessment & Plan  Follows with Dr. Marvin Ca outpatient  Chronically on prednisone and methotrexate. Continue for now. Health Maintenance  #Pain: Tylenol Q8hrs scheduled, Flexeril (will decrease to 5mg QHS), Lidoderm patch, Oxycodone 2.5-5mg (will try to de-escalate sooner rather than later). #Bowel: Last BM 8/27. Senna/ Docusate/Miralax scheduled. 8/24 dulc tabs, 8/25 lactulose and suppository. #Bladder: Voiding and appears to be continent. PVR x3 low. #Skin/Pressure Injury Prevention: Turn Q2hr in bed, with weight shifts M23-62ize in wheelchair. Float heels in bed. #DVT Prophylaxis: Lovenox, SCDs. #GI Prophylaxis: None at this time. #Code Status:  DNR/DNI  #FEN: Regular/Thins  #Dispo: Team 8/23: ADD 8/30 with support from family. Cognitively not demonstrating any good follow-through, recall, carry-over. Very impaired safety awareness and insight. Plan for family meeting tomorrow. #F/U: PCP, Neuro, Geriatrics, Rheumatology,       Objective:    Functional Update:  PT: modA transfers, bed mobility, modA ambulation 76' with RW, maxA stairs   OT: Sup eating, CGA grooming, Ebony bathing, Ebony LB dressing, modA UB dressing, CGA toileting, CGA toilet transfers   SLP: CLQT 1.4 out of 4 -severely impaired cognition. Ebony for expression/social interaction, but modA for comprehension, and maxA for problem solving/memory. Allergies per EMR    Physical Exam:  Temp:  [97.6 °F (36.4 °C)-98.4 °F (36.9 °C)] 98.4 °F (36.9 °C)  HR:  [63-76] 68  Resp:  [16-18] 16  BP: (105-152)/(59-72) 122/68  Oxygen Therapy  SpO2: 97 %    Gen: No acute distress, Well-nourished, well-appearing. HEENT: Moist mucus membranes, Normocephalic/Atraumatic  Cardiovascular: Regular rate, rhythm, S1/S2. Distal pulses palpable  Heme/Extr: No edema  Pulmonary: Non-labored breathing. Lungs CTAB  : No medeiros  GI: Soft, non-tender, non-distended. BS+  MSK: wearing TLSO  Integumentary: Skin is warm, dry. Neuro: Awake, alert. Oral dyskinetic movements. Some ataxia/proprioceptive deficits yet, overall ambulation has improved. Psych: Normal mood and affect.      Diagnostic Studies: Reviewed, no new imaging    Laboratory: Reviewed   Results from last 7 days   Lab Units 08/28/23  0503 08/24/23  0554   HEMOGLOBIN g/dL 11.6 12.2   HEMATOCRIT % 35.0 34.8   WBC Thousand/uL 5.59 5.13     Results from last 7 days   Lab Units 08/24/23  0554   BUN mg/dL 19   POTASSIUM mmol/L 3.8   CHLORIDE mmol/L 99   CREATININE mg/dL 0.80   AST U/L 36   ALT U/L 42            Patient Active Problem List   Diagnosis   • Parotid gland enlargement   • Sjogren's disease (HCC)   • Closed nondisplaced fracture of anterior wall of right acetabulum (HCC)   • Closed nondisplaced fracture of right pubis (HCC)   • Fall   • Hypertension   • Pain of right upper extremity   • Multiple closed fractures of pelvis ([facility])   • Anxiety and depression   • Hypothyroidism   • Abnormal findings on imaging test   • Gastroparesis   • Oral dyskinesia   • Potential for cognitive impairment   • Anemia   • Hypokalemia   • WELLS (dyspnea on exertion)   • Lightheadedness   • Pain   • Abnormal ECG   • Chest pain   • Pleural effusion on right   • Atelectasis of right lung   • Low HDL (under 40)   • Dermatitis associated with moisture   • Onychomycosis   • Enlarged and hypertrophic nails   • Adenopathy   • Abnormal CT of the abdomen   • Hyponatremia   • Progressive cognitive dysfunction   • Fungal skin infection   • Rheumatoid arteritis ([facility])   • Closed fracture of multiple ribs of right side   • Movement disorder   • Vitamin B12 deficiency   • Compression fracture of T8 vertebra ([facility])   • Acute cystitis without hematuria   • Osteoporosis         Medications  Current Facility-Administered Medications   Medication Dose Route Frequency Provider Last Rate   • acetaminophen  650 mg Oral Q12H Dom Carroll MD     • ALPRAZolam 0.5 mg Oral HS PRN Travon Morejon MD     • amLODIPine  5 mg Oral BID DARIO Bennett     • atenolol  50 mg Oral Daily Travon Morejon MD     • atorvastatin  10 mg Oral Daily With Robert Craft MD     • bisacodyl  5 mg Oral Daily PRN Travon Morejon MD     • bisacodyl  10 mg Rectal Daily PRN Travon Morejon MD     • bisacodyl  10 mg Rectal Once Heather Myers MD     • calcium carbonate  1 tablet Oral BID With Meals Vasu Malone MD     • cholecalciferol  2,000 Units Oral Daily Vasu Malone MD     • cyanocobalamin  1,000 mcg Oral Daily Travon Morejon MD     • docusate sodium  100 mg Oral BID Travon Morejon MD     • enoxaparin  30 mg Subcutaneous Q12H Jose Galeano MD     • FLUoxetine  20 mg Oral Daily Travon Morejon MD     • folic acid  1 mg Oral Daily Travon Morejon MD     • hydrALAZINE  25 mg Oral Q8H PRN DARIO Bennett     • levothyroxine  88 mcg Oral Early Morning Vasu Malone MD     • lidocaine  1 patch Topical Daily Travon Morejon MD     • methocarbamol  250 mg Oral Q6H PRN Travon Morejon MD     • methotrexate  20 mg Oral Weekly Travon Morejon MD     • multivitamin stress formula  1 tablet Oral Daily Travon Morejon MD     • nystatin   Topical BID Travon Morejon MD     • ondansetron  4 mg Oral Q6H PRN Travon Morejon MD     • oxyCODONE  5 mg Oral Q4H PRN Travon Morejon MD     • oxyCODONE  2.5 mg Oral Q4H PRN Travon Morejon MD     • polyethylene glycol  17 g Oral Daily Travon Morejon MD     • predniSONE  5 mg Oral HS Travon Morejon MD     • QUEtiapine  12.5 mg Oral HS PRN Travon Morejon MD     • senna  2 tablet Oral HS Travon Morejon MD            ** Please Note: Fluency Direct voice to text software may have been used in the creation of this document.  **

## 2023-08-28 NOTE — PROGRESS NOTES
OT Daily Treatment Note       08/28/23 1000   Pain Assessment   Pain Assessment Tool 0-10   Pain Score 9   Pain Location/Orientation Location: Back   Hospital Pain Intervention(s) Other (Comment)  (RN Segundo Pichardo notified)   Restrictions/Precautions   Precautions Bed/chair alarms;Cognitive; Fall Risk;Impulsive;Pain;Spinal precautions;Supervision on toilet/commode   ROM Restrictions Yes  (spinal precautions)   Braces or Orthoses TLSO   Lifestyle   Autonomy "I always forget how I need to move my back"   Oral Hygiene   Type of Assistance Needed Supervision   Physical Assistance Level No physical assistance   Comment in stance at sink   Oral Hygiene CARE Score 4   Shower/Bathe Self   Type of Assistance Needed Physical assistance   Physical Assistance Level 25% or less   Comment SB completed seated in recliner with SO being primary cg during this session. offered pt to complete shower which she declined stating she just wanted to sponge bathe today. pt req assistance with buttock and distal LE bathing to maintain spinal precautions   Shower/Bathe Self CARE Score 3   Bathing   Assessed Bath Style Sponge Bath   Tub/Shower Transfer   Reason Not Assessed Sponge Bath   Upper Body Dressing   Type of Assistance Needed Physical assistance   Physical Assistance Level 26%-50%   Comment able to don/doff shirt with supv, mod A required for donning/doffing TLSO brace which pt and SO completed x4 throughout this session which pts SO is comfortable with completing   Upper Body Dressing CARE Score 3   Lower Body Dressing   Type of Assistance Needed Supervision   Physical Assistance Level No physical assistance   Comment able to thread BLE through pant let with supv with cross leg technique.  CS required in stance as pt donned over hips   Lower Body Dressing CARE Score 4   Putting On/Taking Off Footwear   Type of Assistance Needed Supervision   Physical Assistance Level No physical assistance   Comment seated with cross leg technique   Putting On/Taking Off Footwear CARE Score 4   Sit to Stand   Type of Assistance Needed Supervision   Physical Assistance Level No physical assistance   Comment CS with RW   Sit to Stand CARE Score 4   Bed-Chair Transfer   Type of Assistance Needed Incidental touching   Physical Assistance Level No physical assistance   Comment CG SPT with RW   Chair/Bed-to-Chair Transfer CARE Score 4   Toileting Hygiene   Type of Assistance Needed Incidental touching   Physical Assistance Level No physical assistance   Comment CG in stance during CM, vc to thoroughly clean buttock s/p BM. able to complete via lateral weight shifting, maintaining spinal precautions   Toileting Hygiene CARE Score 4   Toilet Transfer   Type of Assistance Needed Supervision   Physical Assistance Level No physical assistance   Comment CS with RW on standard toilet height   Toilet Transfer CARE Score 4   Cognition   Overall Cognitive Status Impaired   Arousal/Participation Alert; Cooperative   Attention Attends with cues to redirect   Orientation Level Oriented to person;Oriented to place   Memory Decreased long term memory;Decreased short term memory;Decreased recall of biographical information;Decreased recall of recent events;Decreased recall of precautions   Following Commands Follows one step commands inconsistently   Activity Tolerance   Activity Tolerance Patient tolerated treatment well   Assessment   Treatment Assessment Session grossly focused on completing formalized family training with pt and pts significant other uLke Soto. Luke Soto and pt completed full ADL session with verbalize and physical demonstrate from OT on correct technique, ergonomics and safety awareness. Edu on spinal precautions which Luke Soto was thoroughly with providing reminders to the pt of her precautions during fxl tasks. Luke Soto states he is comfortable with her CLOF and is ok for DC home on Wednesday.  OT recommended bed/chair alarms and TTB for use at home which Luke Soto wrote down to purchase privately. Pt would benefit from cont skilled OT services to increase independence and safety with ADL completion in prep for DC home towards end of the week. OT Family training done with: Raheel Jones   Assessment of family training see above   Prognosis Fair   Problem List Decreased strength;Decreased endurance; Impaired balance;Decreased mobility; Decreased coordination;Decreased cognition; Impaired judgement;Decreased safety awareness; Impaired vision;Orthopedic restrictions;Pain   Barriers to Discharge Decreased caregiver support   Plan   Treatment/Interventions ADL retraining;Functional transfer training; Therapeutic exercise; Endurance training;Patient/family training;Cognitive reorientation; Compensatory technique education   Progress Progressing toward goals   OT Therapy Minutes   OT Time In 1000   OT Time Out 1100   OT Total Time (minutes) 60   OT Mode of treatment - Individual (minutes) 60   OT Mode of treatment - Concurrent (minutes) 0   OT Mode of treatment - Group (minutes) 0   OT Mode of treatment - Co-treat (minutes) 0   OT Mode of Treatment - Total time(minutes) 60 minutes   OT Cumulative Minutes 655   Therapy Time missed   Time missed?  No

## 2023-08-28 NOTE — PROGRESS NOTES
08/28/23 1210   Pain Assessment   Pain Assessment Tool 0-10   Pain Score 5   Pain Location/Orientation Location: Back   Hospital Pain Intervention(s) Repositioned  (distractions)   Restrictions/Precautions   Precautions Bed/chair alarms;Cognitive; Fall Risk;Spinal precautions;Supervision on toilet/commode   Braces or Orthoses TLSO   Comprehension   Comprehension (FIM) 4 - Understands basic info/conversation 75-90% of time   Expression   Expression (FIM) 4 - Expresses basic info/needs 75-90% of time   Social Interaction   Social Interaction (FIM) 5 - Interacts appropriately with others 90% of time   Problem Solving   Problem solving (FIM) 3 - Solves basic problmes 50-74% of time   Memory   Memory (FIM) 2 - Recognizes, recalls/performs 25-49%   Speech/Language/Cognition Assessmetn   Treatment Assessment Pt and spouse, Yang Mckeon, present for family training/education session. SLP re-introducing self to pt as current SLP had not seen pt since initial assessment. SLP further introducing self to spouse and re-educating on role of services w/ pt since her admission to the [facility]. SLP was noted to provide pt and spouse w/ a recap of current functioning, to where at this time, still ST memory is pt's biggest barrier. SLP re-educating on using the Memory Book, as well as providing a review given the Memory Packet which is a educational resource for pt's family members in regard to strategies which can be incorporated at home, ie: keeping a place for common things such as the TV remote, keys, books, etc, providing written reminders throughout the house, ie: writing on stove/oven: "do not use" or for rooms/areas pt should not go, to have a sign which says "do not enter." Spouse did verbalize to SLP how he has purchased chair alarms, in addition to monitors which will signal if pt is not on the 2nd floor at home which is "her" level.  Further confirmation and recommendations which were established by SLP was about how spouse or other family members to complete I ADL tasks, but he is to provide medications to pt at this time, which spouse did verbalize this will be moving forward. SLP suggesting a pill box (which they already have) to ease his burden in completing this moving forward. SLP did state that an HEP will be provided to pt to complete until home ST services initiated. No further questions were provided by pt nor spouse at this time. Pt to continue to benefit from skilled SLP services targeting functional cognitive skills in attempts to decrease caregiver burden over time. SLP Therapy Minutes   SLP Time In 1210   SLP Time Out 1230   SLP Total Time (minutes) 20   SLP Mode of treatment - Individual (minutes) 20   SLP Mode of treatment - Concurrent (minutes) 0   SLP Mode of treatment - Group (minutes) 0   SLP Mode of treatment - Co-treat (minutes) 0   SLP Mode of Treatment - Total time(minutes) 20 minutes   SLP Cumulative Minutes 300   Therapy Time missed   Time missed?  No

## 2023-08-28 NOTE — PROGRESS NOTES
Internal Medicine Progress Note  Patient: Kellie Dunne  Age/sex: 70 y.o. female  Medical Record #: [id number]      ASSESSMENT/PLAN: (Interval History)  Kellie Dunne is seen and examined and management for following issues:    Frequent fall, r/o movement disorder  • Sees Dr Artie Barger as OP     Right rib fractures  • Ribs 3-5th  • Continue IS     T8 fracture  • Non-op  • TLSO brace  • For upright t-spine xrays in brace in 2 weeks     Vitamin B12 deficiency  • For IM replacement QOD x 5 doses then to PO daily     HTN  • Home:  Atenolol 50mg qd  • Here:  Atenolol 50mg qd/Norvasc 5mg BID  • Has prn Hydralazine  • Stable.     Cognitive impairment  • MOCA 3/2021 was 17/30  • Likely has worsened since prev MOCA and is confused here     E.coli UTI  • POA  • Dr Sherie Marroquin sent UA with reflex to cx 2/2 confusion  • Urine cx had >100,000 Ecoli and 30,000-39,000 mixed contaminants  • Keflex completed.     Hypothyroidism  • TSH 0.314/free T4 1.29  • Was on Levothyroxine 100 mcg qd  • Endo saw and they reduced to 88 mcg qd on 8/19. They are aware of the reverse T3 results (d/w them today)  • For TSH/free T4 in 4-6 weeks     Severe osteoporosis  • Endo following  • For OP DEXA  • Calcium 500mg BID     Vit D deficiency  • Level was 23.7  • For Vit D 2000IU qd     RA  • At home takes Methotrexate 20mg qWednesday/Prednisone 5mg qd/Flexeril 10mg qhs     HLD  • Lipitor 10mg qd     Anxiety/depression  • At home she was taking Xananx 1mg TID and Prozac 20mg qd  • Per PMR     Enlarged right retrocrural lymph node  • Found on CT  • For repeat CT chest as OP in 3-6 months     Transaminitis  • AST/ALT/AP elevated since previous CMP 8/15  • had CT C/A/P on 8/15 = hepatomegaly  • Dr Sherie Marroquin cut back on Tylenol  • LFTs trending down     Hyponatremia  • Na 132 on 8/24        Discharge date:  8/30/23       The above assessment and plan was reviewed and updated as determined by my evaluation of the patient on 8/28/2023.     Labs:   Results from last 7 days   Lab Units 08/28/23  0503 08/24/23  0554   WBC Thousand/uL 5.59 5.13   HEMOGLOBIN g/dL 11.6 12.2   HEMATOCRIT % 35.0 34.8   PLATELETS Thousands/uL 201 182     Results from last 7 days   Lab Units 08/24/23  0554   SODIUM mmol/L 132*   POTASSIUM mmol/L 3.8   CHLORIDE mmol/L 99   CO2 mmol/L 23   BUN mg/dL 19   CREATININE mg/dL 0.80   CALCIUM mg/dL 9.4                   Review of Scheduled Meds:  Current Facility-Administered Medications   Medication Dose Route Frequency Provider Last Rate   • acetaminophen  650 mg Oral Q12H Trisha Perez MD     • ALPRAZolam  0.5 mg Oral HS PRN Trisha Perez MD     • amLODIPine  5 mg Oral BID DARIO Jackson     • atenolol  50 mg Oral Daily Trisha Perez MD     • atorvastatin  10 mg Oral Daily With Macarthur Meigs, MD     • bisacodyl  5 mg Oral Daily PRN Trisha Perez MD     • bisacodyl  10 mg Rectal Daily PRN Trisha Perez MD     • bisacodyl  10 mg Rectal Once Melania Moran MD     • calcium carbonate  1 tablet Oral BID With Meals Preethi Nunes MD     • cholecalciferol  2,000 Units Oral Daily Preethi Nunes MD     • cyanocobalamin  1,000 mcg Oral Daily Trisha Perez MD     • docusate sodium  100 mg Oral BID Trisha Perez MD     • enoxaparin  30 mg Subcutaneous Q12H [address] Trisha Perez MD     • FLUoxetine  20 mg Oral Daily Trisha Perez MD     • folic acid  1 mg Oral Daily Trisha Perez MD     • hydrALAZINE  25 mg Oral Q8H PRN DARIO Jackson     • levothyroxine  88 mcg Oral Early Morning Preethi Nunes MD     • lidocaine  1 patch Topical Daily Trisha Perez MD     • methocarbamol  250 mg Oral Q6H PRN Trisha Perez MD     • methotrexate  20 mg Oral Weekly Trisha Perez MD     • multivitamin stress formula  1 tablet Oral Daily Trisha Perez MD     • nystatin   Topical BID Trisha Perez MD     • ondansetron  4 mg Oral Q6H PRN Trisha Perez MD     • oxyCODONE  5 mg Oral Q4H PRN Trisha Perez MD     • oxyCODONE  2.5 mg Oral Q4H PRN Ren Mayer MD     • polyethylene glycol  17 g Oral Daily Ren Mayer MD     • predniSONE  5 mg Oral HS Ren Mayer MD     • QUEtiapine  12.5 mg Oral HS PRN Ren Mayer MD     • senna  2 tablet Oral HS Ren Mayer MD         Subjective/ HPI: Patient seen and examined. Patients overnight issues or events were reviewed with nursing or staff during rounds or morning huddle session. New or overnight issues include the following:     No new or overnight issues. Offers no complaints    ROS:   A 10 point ROS was performed; negative except as noted above. Imaging:     No orders to display       *Labs /Radiology studies reviewed  *Medications reviewed and reconciled as needed  *Please refer to order section for additional ordered labs studies  *Case discussed with primary attending during morning huddle case rounds    Physical Examination:  Vitals:   Vitals:    08/27/23 2046 08/28/23 0503 08/28/23 0523 08/28/23 0801   BP: 152/71 140/70 143/72 122/68   BP Location: Left arm Right arm Left arm    Pulse: 67 76  68   Resp: 18 16     Temp: 97.6 °F (36.4 °C) 98.4 °F (36.9 °C)     TempSrc: Oral Oral     SpO2: 95% 97%     Weight:       Height:           General Appearance: no distress, conversive  HEENT: PERRLA, conjuctiva normal; oropharynx clear; mucous membranes moist   Neck:  Supple, normal ROM  Lungs: CTA, normal respiratory effort, no retractions, expiratory effort normal  CV: regular rate and rhythm; no rubs/murmurs/gallops, PMI normal   ABD: soft; ND/NT; +BS  EXT: no edema  Skin: normal turgor, normal texture, no rashes  Psych: affect normal, mood normal  Neuro: AA      The above physical exam was reviewed and updated as determined by my evaluation of the patient on 8/28/2023.     Invasive Devices     None                    VTE Pharmacologic Prophylaxis: Enoxaparin  Code Status: Level 3 - DNAR and DNI  Current Length of Stay: 10 day(s)      Total time spent: 30 minutes with more than 50% spent counseling/coordinating care. Counseling includes discussion with patient re: progress  and discussion with patient of his/her current medical state/information. Coordination of patient's care was performed in conjunction with primary service. Time invested included review of patient's labs, vitals, and management of their comorbidities with continued monitoring. In addition, this patient was discussed with medical team including physician and advanced extenders. The care of the patient was extensively discussed and appropriate treatment plan was formulated unique for this patient. Medical decision making for the day was made by supervising physician unless otherwise noted in their attestation statement. ** Please Note:  voice to text software may have been used in the creation of this document.  Although proof errors in transcription or interpretation are a potential of such software**

## 2023-08-28 NOTE — PROGRESS NOTES
OT Daily Treatment Note       08/28/23 1300   Pain Assessment   Pain Assessment Tool 0-10   Pain Score 5   Pain Location/Orientation Location: Back   Hospital Pain Intervention(s) Emotional support  (LLOYD Monique was aware)   Restrictions/Precautions   Precautions Bed/chair alarms;Cognitive; Fall Risk;Impulsive;Pain;Supervision on toilet/commode;Spinal precautions   ROM Restrictions   (spinal precautions)   Braces or Orthoses TLSO   Lifestyle   Autonomy "I know Armen Glass will keep me in check"   Grooming   Findings assisted pt with grooming nails included filing and cleaning 2* there being fecal matter under her nails and them breaking off due to being so long   Sit to Stand   Type of Assistance Needed Supervision   Physical Assistance Level No physical assistance   Comment CS with RW   Sit to Stand CARE Score 4   Bed-Chair Transfer   Type of Assistance Needed Incidental touching   Physical Assistance Level No physical assistance   Comment CG SPT with RW   Chair/Bed-to-Chair Transfer CARE Score 4   Toileting Hygiene   Type of Assistance Needed Incidental touching   Physical Assistance Level No physical assistance   Comment CG in stance during CM, vc to thoroughly clean buttock s/p BM. able to complete via lateral weight shifting, maintaining spinal precautions   Toileting Hygiene CARE Score 4   Toilet Transfer   Type of Assistance Needed Supervision   Physical Assistance Level No physical assistance   Comment CS with RW on standard toilet height   Toilet Transfer CARE Score 4   Commmunity Re-entry   Community Re-entry Level [facility] Re-entry Level of Assistance Close supervision   Community Re-entry Pt participated in functional mobility task with use of RW to simulate home and community distances required for ADL/IADL completion. Task focused on increasing functional activity tolerance, endurance, strength and dynamic standing balance to improve independence with ADL completion.    Exercise Tools   UE Ergometer Pt engaged in UE ergometer for 6 mins in prograde and 6 mins in retrograde with minimal resistance focusing on increasing UB strength, endurance and fxl activity tolerance to increase independence with ADL completion. pt required rest break in between sets for fatigue mgmt   Cognition   Overall Cognitive Status Impaired   Arousal/Participation Alert; Cooperative   Attention Attends with cues to redirect   Orientation Level Oriented to person;Oriented to place   Memory Decreased long term memory;Decreased short term memory;Decreased recall of biographical information;Decreased recall of recent events;Decreased recall of precautions   Following Commands Follows one step commands inconsistently   Comments pt engaged in category activity focusing on problem solving, working memory, processing/comprehension and higher level cog in general. task graded up on each trial as OT placed more category cards down that the pt had to sort through. pt was able to complete with min vc and increased time for processing. attempted to have pt complete jigsaw puzzle but pt declined stating she was "not a puzzle gal"   Activity Tolerance   Activity Tolerance Patient tolerated treatment well   Assessment   Treatment Assessment Pt participated in skilled OT session with focus on ADL retraining, functional transfer training, UE strengthening/ROM, endurance training, cognitive reorientation, Pt/caregiver education and continued education. See flowsheet for details of session and current functional status. Pt is limited by weakness, decreased ROM, impaired balance, decreased endurance, increased fall risk, decreased ADLS, decreased IADLS, decreased activity tolerance, decreased safety awareness, impaired judgement and decreased strength and requires skilled OT services to increase independence and safety with ADL completion in prep for [place] home.  Plan to continue ADL retraining, functional transfer training, UE strengthening/ROM, endurance training, cognitive reorientation, compensatory technique education, continued education, energy conservation and activity engagement  to address barriers mentioned above. Prognosis Fair   Problem List Decreased strength;Decreased endurance; Impaired balance;Decreased mobility; Decreased coordination;Decreased cognition; Impaired judgement;Decreased safety awareness; Impaired vision;Orthopedic restrictions;Pain   Barriers to Discharge Decreased caregiver support   Plan   Treatment/Interventions ADL retraining;Functional transfer training; Therapeutic exercise; Endurance training;Patient/family training; Compensatory technique education   Progress Progressing toward goals   Recommendation   OT Discharge Recommendation Home with home health rehabilitation   OT Therapy Minutes   OT Time In 1300   OT Time Out 1430   OT Total Time (minutes) 90   OT Mode of treatment - Individual (minutes) 90   OT Mode of treatment - Concurrent (minutes) 0   OT Mode of treatment - Group (minutes) 0   OT Mode of treatment - Co-treat (minutes) 0   OT Mode of Treatment - Total time(minutes) 90 minutes   OT Cumulative Minutes 745   Therapy Time missed   Time missed?  No

## 2023-08-28 NOTE — PROGRESS NOTES
Updated PT ARC Goals     Due to impaired balance, high fall risk, and impaired safety awareness, pt will require CGA for transfers, ambulation, and stair navigation      08/28/23 1100   PT Transfer Goal   Roll left and right Goal 04. Supervision or touching assistance- [place] provides VERBAL CUES or supervision throughout activity. Sit to lying Goal 03. Partial/moderate assistance - [place] does less than half the effort. [place] lifts or holds trunk or limbs and provides more than half the effort. (Jesus)   Lying to sitting on side of bed Goal 03. Partial/moderate assistance - [place] does less than half the effort. [place] lifts or holds trunk or limbs and provides more than half the effort. (Jesus)   Sit to stand Goal 04. Supervision or touching assistance- [place] provides VERBAL CUES or supervision throughout activity. Chair/bed-to-chair transfer Goal 04. TOUCHING/ STEADYING assistance as patient completes activity. Car Transfer Goal 04. TOUCHING/ STEADYING assistance as patient completes activity. Assistive Device   (LRAD)   Locomotion Goal   Primary discharge mode of locomotion Walking   Target Walk Distance 150 ft  (increase distance as able to work on stamina/endurance)   Walk 10 feet Goal 04. TOUCHING/ STEADYING assistance as patient completes activity. Walk 50 feet with 2 turns Goal 04. TOUCHING/ STEADYING assistance as patient completes activity. Walk 150 feet Goal 04. TOUCHING/ STEADYING assistance as patient completes activity. Walking 10 feet on uneven surface 04. TOUCHING/ STEADYING assistance as patient completes activity. Wheel 50 feet with 2 turns Goal 09. Not applicable   Wheel 274 feet Goal 09. Not applicable   Stairs Goal   1 step or curb goal 04. TOUCHING/ STEADYING assistance as patient completes activity. 4 steps Goal 04. TOUCHING/ STEADYING assistance as patient completes activity. 12 steps Goal 04. TOUCHING/ STEADYING assistance as patient completes activity.    Object Retrieval Goal   Picking up object Goal 04. TOUCHING/ STEADYING assistance as patient completes activity.    Assistive Device  Reacher   Small Object Picked Up marker

## 2023-08-28 NOTE — PROGRESS NOTES
08/28/23 1100   Pain Assessment   Pain Assessment Tool 0-10   Pain Score 9  (By end of session)   Pain Location/Orientation Location: Back   Pain Onset/Description Onset: Ongoing   Hospital Pain Intervention(s) Rest  (communicated with SN)   Restrictions/Precautions   Precautions Bed/chair alarms;Cognitive; Fall Risk;Impulsive;Pain;Spinal precautions;Supervision on toilet/commode   Weight Bearing Restrictions No   ROM Restrictions Yes  (spinal precautions)   Braces or Orthoses TLSO   Cognition   Overall Cognitive Status Impaired   Arousal/Participation Alert; Cooperative   Attention Attends with cues to redirect   Orientation Level Oriented to person;Oriented to place   Memory Decreased long term memory;Decreased short term memory;Decreased recall of biographical information;Decreased recall of recent events;Decreased recall of precautions   Following Commands Follows one step commands inconsistently   Subjective   Subjective Pt reports she is tired from the session with OT this AM.   Sit to Stand   Type of Assistance Needed Supervision   Comment CS with RW, occassional VCs for arm placement   Sit to Stand CARE Score 4   Bed-Chair Transfer   Type of Assistance Needed Incidental touching   Comment CGA with RW   Chair/Bed-to-Chair Transfer CARE Score 4   Car Transfer   Type of Assistance Needed Physical assistance   Physical Assistance Level 25% or less   Comment Practice with mock car with . Jesus/CGA with RW, VCs for sequencing.  assist to stabilize RW when standing from car seat   Car Transfer CARE Score 3   Walk 10 Feet   Type of Assistance Needed Incidental touching   Comment CGA with RW   Walk 10 Feet CARE Score 4   Walk 50 Feet with Two Turns   Type of Assistance Needed Incidental touching   Comment CGA with RW   Walk 50 Feet with Two Turns CARE Score 4   Walk 150 Feet   Type of Assistance Needed Incidental touching   Comment CGA with RW   Walk 150 Feet CARE Score 4   Walking 10 Feet on Uneven Surfaces Type of Assistance Needed Incidental touching   Comment foam mat x20 ft with RW   Walking 10 Feet on Uneven Surfaces CARE Score 4   Ambulation   Primary Mode of Locomotion Prior to Admission Walk   Distance Walked (feet) 200 ft  (188 ft, 112 ft)   Assist Device Roller Walker   Gait Pattern Ataxic; Inconsistant [place];Narrow KAREEN   Limitations Noted In Balance;Device Management; Coordination; Safety   Provided Assistance with: Balance;Direction   Walk Assist Level Contact Guard   Findings Focused on family training with . Demonstrated to  how to properly guard pt when ambulating. Educated on the importance of hand on assistance due to her high fall risk. Then had  return demonstration of guarding technique. Pt requires redirection to task at times as she is easily distracted during ambulation. As she fatigues, RW note to veer to the side at time. Does the patient walk? 2. Yes   Wheel 50 Feet with Two Turns   Reason if not Attempted Activity not applicable   Wheel 50 Feet with Two Turns CARE Score 9   Wheel 150 Feet   Reason if not Attempted Activity not applicable   Wheel 637 Feet CARE Score 9   Curb or Single Stair   Style negotiated Curb   Type of Assistance Needed Incidental touching   Comment 6 inch curb with RW, CGA, requires mod-max verbal cues for management of RW (getting close to the curb, lifting RW up onto the curb, then stepping up with LE)   1 Step (Curb) CARE Score 4   4 Steps   Type of Assistance Needed Incidental touching   Comment CGA  RHR descending, LHR ascending, with SPC, gait belt donned, step to pattern. performed on P9 stairs   4 Steps CARE Score 4   12 Steps   Type of Assistance Needed Incidental touching   Comment CGA  RHR descending, LHR ascending, with SPC, gait belt donned, step to pattern. performed on P9 stairs. Pt performed two sets of 11 stairs. First trial, PT demonstrated technique to . Second trial,  returned demonstration.  Provided VCs for more hand on assistance when descending the stairs. 12 Steps CARE Score 4   Stairs   Type Stairs   # of Steps 11  (x2)   Weight Bearing Precautions Fall Risk   Assist Devices Single Rail;Cane   Therapeutic Interventions   Neuromuscular Re-Education Weaving around cones with RW and SPC 2x40ft, CGA for safety. Improved turning noted with RW as pt stayed with RW frame. Assessment   Treatment Assessment Patient and  participated in 60 minute skilled physical therapy session primarily focusing on family training in preparation for upcoming discharge on Wednesday, 8/30. Instructed pt and , pt is to have hands on assistance/contact guard for all functional mobility as she is a high fall risk. Pt and  verbalized understanding. Demonstrated to  how to properly guard pt during transfers, ambulation, and stair navigation. He demonstrates good technique and feels comfortable providing her this level of assistance. Trialed both SPC and RW during session. Pt could ambulate with either SPC or RW upon returning home as she requires CGA with both and did not experience LOB with SPC. Pt will continue to be high risk for fall due to poor safety awareness, decreased insight into deficits, and is easily distracted during functional mobility. Patient will benefit from continued PT services to maximize safety and independence with all functional mobility. Problem List Decreased strength;Decreased endurance; Impaired balance;Decreased mobility; Decreased coordination;Decreased cognition; Impaired judgement;Decreased safety awareness; Impaired vision;Orthopedic restrictions;Pain   Barriers to Discharge   (family training completed with . All barriers to home have been resolved. )   PT Barriers   Physical Impairment Decreased strength;Decreased endurance; Impaired balance;Decreased mobility; Decreased coordination;Decreased cognition; Impaired judgement;Decreased safety awareness;Orthopedic restrictions;Pain Plan   Treatment/Interventions Functional transfer training;LE strengthening/ROM; Elevations; Therapeutic exercise; Endurance training;Patient/family training;Equipment eval/education; Bed mobility;Gait training; Compensatory technique education   Progress Progressing toward goals   Recommendation   PT Discharge Recommendation Home with home health rehabilitation   Equipment Recommended Walker   PT Therapy Minutes   PT Time In 1100   PT Time Out 1200   PT Total Time (minutes) 60   PT Mode of treatment - Individual (minutes) 60   PT Mode of treatment - Concurrent (minutes) 0   PT Mode of treatment - Group (minutes) 0   PT Mode of treatment - Co-treat (minutes) 0   PT Mode of Treatment - Total time(minutes) 60 minutes   PT Cumulative Minutes 820   Therapy Time missed   Time missed?  No     Manuel Arch, PT, DPT

## 2023-08-29 ENCOUNTER — HOME HEALTH ADMISSION (OUTPATIENT)
Dept: HOME HEALTH SERVICES | Facility: HOME HEALTHCARE | Age: 68
End: 2023-08-29
Payer: MEDICARE

## 2023-08-29 PROBLEM — N30.00 ACUTE CYSTITIS WITHOUT HEMATURIA: Status: RESOLVED | Noted: 2023-08-18 | Resolved: 2023-08-29

## 2023-08-29 PROCEDURE — 97110 THERAPEUTIC EXERCISES: CPT

## 2023-08-29 PROCEDURE — 97530 THERAPEUTIC ACTIVITIES: CPT

## 2023-08-29 PROCEDURE — 97535 SELF CARE MNGMENT TRAINING: CPT

## 2023-08-29 PROCEDURE — 97130 THER IVNTJ EA ADDL 15 MIN: CPT

## 2023-08-29 PROCEDURE — 97129 THER IVNTJ 1ST 15 MIN: CPT

## 2023-08-29 PROCEDURE — 99232 SBSQ HOSP IP/OBS MODERATE 35: CPT | Performed by: INTERNAL MEDICINE

## 2023-08-29 PROCEDURE — 99232 SBSQ HOSP IP/OBS MODERATE 35: CPT | Performed by: PHYSICAL MEDICINE & REHABILITATION

## 2023-08-29 RX ADMIN — NYSTATIN: 100000 POWDER TOPICAL at 09:05

## 2023-08-29 RX ADMIN — ACETAMINOPHEN 650 MG: 325 TABLET, FILM COATED ORAL at 05:42

## 2023-08-29 RX ADMIN — ENOXAPARIN SODIUM 30 MG: 30 INJECTION SUBCUTANEOUS at 20:59

## 2023-08-29 RX ADMIN — AMLODIPINE BESYLATE 5 MG: 5 TABLET ORAL at 20:59

## 2023-08-29 RX ADMIN — B-COMPLEX W/ C & FOLIC ACID TAB 1 TABLET: TAB at 09:01

## 2023-08-29 RX ADMIN — Medication 2.5 MG: at 20:58

## 2023-08-29 RX ADMIN — SENNOSIDES 17.2 MG: 8.6 TABLET, FILM COATED ORAL at 21:00

## 2023-08-29 RX ADMIN — FOLIC ACID 1 MG: 1 TABLET ORAL at 09:01

## 2023-08-29 RX ADMIN — Medication 1 TABLET: at 09:02

## 2023-08-29 RX ADMIN — Medication 1 TABLET: at 16:26

## 2023-08-29 RX ADMIN — FLUOXETINE 20 MG: 20 CAPSULE ORAL at 09:01

## 2023-08-29 RX ADMIN — LEVOTHYROXINE SODIUM 88 MCG: 88 TABLET ORAL at 05:42

## 2023-08-29 RX ADMIN — Medication 2000 UNITS: at 09:01

## 2023-08-29 RX ADMIN — LIDOCAINE 5% 1 PATCH: 700 PATCH TOPICAL at 09:01

## 2023-08-29 RX ADMIN — ATORVASTATIN CALCIUM 10 MG: 10 TABLET, FILM COATED ORAL at 16:26

## 2023-08-29 RX ADMIN — ENOXAPARIN SODIUM 30 MG: 30 INJECTION SUBCUTANEOUS at 09:01

## 2023-08-29 RX ADMIN — PREDNISONE 5 MG: 5 TABLET ORAL at 21:00

## 2023-08-29 RX ADMIN — METHOCARBAMOL 250 MG: 500 TABLET ORAL at 20:58

## 2023-08-29 RX ADMIN — DOCUSATE SODIUM 100 MG: 100 CAPSULE, LIQUID FILLED ORAL at 09:02

## 2023-08-29 RX ADMIN — ATENOLOL 50 MG: 50 TABLET ORAL at 09:01

## 2023-08-29 RX ADMIN — AMLODIPINE BESYLATE 5 MG: 5 TABLET ORAL at 09:01

## 2023-08-29 RX ADMIN — CYANOCOBALAMIN TAB 500 MCG 1000 MCG: 500 TAB at 09:01

## 2023-08-29 NOTE — PLAN OF CARE
Problem: PAIN - ADULT  Goal: Verbalizes/displays adequate comfort level or baseline comfort level  Description: Interventions:  - Encourage patient to monitor pain and request assistance  - Assess pain using appropriate pain scale  - Administer analgesics based on type and severity of pain and evaluate response  - Implement non-pharmacological measures as appropriate and evaluate response  - Consider cultural and social influences on pain and pain management  - Notify physician/advanced practitioner if interventions unsuccessful or patient reports new pain  Outcome: Progressing     Problem: INFECTION - ADULT  Goal: Absence or prevention of progression during hospitalization  Description: INTERVENTIONS:  - Assess and monitor for signs and symptoms of infection  - Monitor lab/diagnostic results  - Monitor all insertion sites, i.e. indwelling lines, tubes, and drains  - Monitor endotracheal if appropriate and nasal secretions for changes in amount and color  - [place] appropriate cooling/warming therapies per order  - Administer medications as ordered  - Instruct and encourage patient and family to use good hand hygiene technique  - Identify and instruct in appropriate isolation precautions for identified infection/condition  Outcome: Progressing

## 2023-08-29 NOTE — PLAN OF CARE
Problem: PAIN - ADULT  Goal: Verbalizes/displays adequate comfort level or baseline comfort level  Description: Interventions:  - Encourage patient to monitor pain and request assistance  - Assess pain using appropriate pain scale  - Administer analgesics based on type and severity of pain and evaluate response  - Implement non-pharmacological measures as appropriate and evaluate response  - Consider cultural and social influences on pain and pain management  - Notify physician/advanced practitioner if interventions unsuccessful or patient reports new pain  Outcome: Progressing     Problem: INFECTION - ADULT  Goal: Absence or prevention of progression during hospitalization  Description: INTERVENTIONS:  - Assess and monitor for signs and symptoms of infection  - Monitor lab/diagnostic results  - Monitor all insertion sites, i.e. indwelling lines, tubes, and drains  - Monitor endotracheal if appropriate and nasal secretions for changes in amount and color  - [place] appropriate cooling/warming therapies per order  - Administer medications as ordered  - Instruct and encourage patient and family to use good hand hygiene technique  - Identify and instruct in appropriate isolation precautions for identified infection/condition  Outcome: Progressing     Problem: SAFETY ADULT  Goal: Patient will remain free of falls  Description: INTERVENTIONS:  - Educate patient/family on patient safety including physical limitations  - Instruct patient to call for assistance with activity   - Consult OT/PT to assist with strengthening/mobility   - Keep Call bell within reach  - Keep bed low and locked with side rails adjusted as appropriate  - Keep care items and personal belongings within reach  - Initiate and maintain comfort rounds  - Make Fall Risk Sign visible to staff  - Offer Toileting every 2 Hours, in advance of need  - Initiate/Maintain bed/chair alarm  - Obtain necessary fall risk management equipment: non skid footwear  - Apply yellow socks and bracelet for high fall risk patients  - Consider moving patient to room near nurses station  Outcome: Progressing  Goal: Maintain or return to baseline ADL function  Description: INTERVENTIONS:  -  Assess patient's ability to carry out ADLs; assess patient's baseline for ADL function and identify physical deficits which impact ability to perform ADLs (bathing, care of mouth/teeth, toileting, grooming, dressing, etc.)  - Assess/evaluate cause of self-care deficits   - Assess range of motion  - Assess patient's mobility; develop plan if impaired  - Assess patient's need for assistive devices and provide as appropriate  - Encourage maximum independence but intervene and supervise when necessary  - Involve family in performance of ADLs  - Assess for home care needs following discharge   - Consider OT consult to assist with ADL evaluation and planning for discharge  - Provide patient education as appropriate  Outcome: Progressing  Goal: Maintains/Returns to pre admission functional level  Description: INTERVENTIONS:  - Perform BMAT or MOVE assessment daily.   - Set and communicate daily mobility goal to care team and patient/family/caregiver. - Collaborate with rehabilitation services on mobility goals if consulted  - Perform Range of Motion 3 times a day. - Reposition patient every 2 hours.   - Dangle patient 3 times a day  - Stand patient 3 times a day  - Ambulate patient 3 times a day  - Out of bed to chair 3 times a day   - Out of bed for meals 3 times a day  - Out of bed for toileting  - Record patient progress and toleration of activity level   Outcome: Progressing     Problem: DISCHARGE PLANNING  Goal: Discharge to home or other facility with appropriate resources  Description: INTERVENTIONS:  - Identify barriers to discharge w/patient and caregiver  - Arrange for needed discharge resources and transportation as appropriate  - Identify discharge learning needs (meds, wound care, etc.)  - Arrange for interpretive services to assist at discharge as needed  - Refer to Case Management Department for coordinating discharge planning if the patient needs post-hospital services based on physician/advanced practitioner order or complex needs related to functional status, cognitive ability, or social support system  Outcome: Progressing     Problem: Prexisting or High Potential for Compromised Skin Integrity  Goal: Skin integrity is maintained or improved  Description: INTERVENTIONS:  - Identify patients at risk for skin breakdown  - Assess and monitor skin integrity  - Assess and monitor nutrition and hydration status  - Monitor labs   - Assess for incontinence   - Turn and reposition patient  - Assist with mobility/ambulation  - Relieve pressure over bony prominences  - Avoid friction and shearing  - Provide appropriate hygiene as needed including keeping skin clean and dry  - Evaluate need for skin moisturizer/barrier cream  - Collaborate with interdisciplinary team   - Patient/family teaching  - Consider wound care consult   Outcome: Progressing     Problem: MOBILITY - ADULT  Goal: Maintain or return to baseline ADL function  Description: INTERVENTIONS:  -  Assess patient's ability to carry out ADLs; assess patient's baseline for ADL function and identify physical deficits which impact ability to perform ADLs (bathing, care of mouth/teeth, toileting, grooming, dressing, etc.)  - Assess/evaluate cause of self-care deficits   - Assess range of motion  - Assess patient's mobility; develop plan if impaired  - Assess patient's need for assistive devices and provide as appropriate  - Encourage maximum independence but intervene and supervise when necessary  - Involve family in performance of ADLs  - Assess for home care needs following discharge   - Consider OT consult to assist with ADL evaluation and planning for discharge  - Provide patient education as appropriate  Outcome: Progressing  Goal: Maintains/Returns to pre admission functional level  Description: INTERVENTIONS:  - Perform BMAT or MOVE assessment daily.   - Set and communicate daily mobility goal to care team and patient/family/caregiver. - Collaborate with rehabilitation services on mobility goals if consulted  - Perform Range of Motion 3 times a day. - Reposition patient every 2 hours. - Dangle patient 3 times a day  - Stand patient 3 times a day  - Ambulate patient 3 times a day  - Out of bed to chair 3 times a day   - Out of bed for meals 3 times a day  - Out of bed for toileting  - Record patient progress and toleration of activity level   Outcome: Progressing     Problem: Nutrition/Hydration-ADULT  Goal: Nutrient/Hydration intake appropriate for improving, restoring or maintaining nutritional needs  Description: Monitor and assess patient's nutrition/hydration status for malnutrition. Collaborate with interdisciplinary team and initiate plan and interventions as ordered. Monitor patient's weight and dietary intake as ordered or per policy. Utilize nutrition screening tool and intervene as necessary. Determine patient's food preferences and provide high-protein, high-caloric foods as appropriate.      INTERVENTIONS:  - Monitor oral intake, urinary output, labs, and treatment plans  - Assess nutrition and hydration status and recommend course of action  - Evaluate amount of meals eaten  - Assist patient with eating if necessary   - Allow adequate time for meals  - Recommend/ encourage appropriate diets, oral nutritional supplements, and vitamin/mineral supplements  - Order, calculate, and assess calorie counts as needed  - Recommend, monitor, and adjust tube feedings and TPN/PPN based on assessed needs  - Assess need for intravenous fluids  - Provide specific nutrition/hydration education as appropriate  - Include patient/family/caregiver in decisions related to nutrition  Outcome: Progressing

## 2023-08-29 NOTE — CASE MANAGEMENT
[facility] was not able to accept referral last week due to staffing. Cm resent referral today to see if they are able to now accept. A referral was sent to [facility] on 8/25 by [facility] with no determination yet. [facility] can accept pt for services. Info placed on dc instructions.

## 2023-08-29 NOTE — PROGRESS NOTES
08/29/23 1000   Pain Assessment   Pain Assessment Tool 0-10   Pain Score No Pain   Restrictions/Precautions   Precautions Bed/chair alarms;Cognitive; Fall Risk;Impulsive;Pain;Supervision on toilet/commode   Braces or Orthoses TLSO   Comprehension   Comprehension (FIM) 4 - Understands basic info/conversation 75-90% of time   Expression   Expression (FIM) 5 - Needs help/cues only RARELY (< 10% of the time)   Social Interaction   Social Interaction (FIM) 5 - Interacts appropriately with others 90% of time   Problem Solving   Problem solving (FIM) 3 - Solves basic problmes 50-74% of time   Memory   Memory (FIM) 3 - Recognizes, recalls/performs 50-74%   Speech/Language/Cognition Assessmetn   Treatment Assessment Pt was awake, alert and easily engaged in session today. Pt was "excited" about going home tomorrow. It was noted that pt easily engaged in task which was initiated a few sessions ago, pt was provided an abstract category (ie: things that are flat, things that jump, etc) and within each of these categories provided was a list of 10 words. Pt was to identify the words which fit the stated category. It was noted that pt's ability to ID the words independently was 18/25 accuracy, needing minimal probing question for a few of the words to determine if they word belong to the category provided, increasing to 23/25 accuracy. There were 2 words which did require moderate cues to determine whether they would belong or not to stated category. The next task which was presented and completed a picture recall task. SLP hoping that pt's visual memory would be a means to increase recall given items, in this case pictures of items in 3 different categories. The idea for the pt to recall a total of 12 pictures was to group them by category (clothing, food, animals). Pt was allow ~5 minutes to review and "study" these items.  When SLP would ask pt to recall the pictures on the page, pt was able to recall 10/12 pictures, needing semantic cues to recall the last 2 pictures. Overall, this appeared to be a good strategy for pt to recall items in the short term and would still benefit from targeting this strategy moving forward. In addition to activities completed in this session, SLP collecting a number of tasks for pt to complete at home until initiation of home ST services. Additionally, included HEP which OT provided as well as signs for "B-L-T" in regards to pt's back precautions (which pt's spouse requested). Pt is planned for discharge home w/ family support/supervision on 8/30/2023 to where pt would continue to benefit from ongoing home ST to attempt to maximize overall functional cognitive skills to decrease caregiver burden over time. SLP Therapy Minutes   SLP Time In 1000   SLP Time Out 1030   SLP Total Time (minutes) 30   SLP Mode of treatment - Individual (minutes) 30   SLP Mode of treatment - Concurrent (minutes) 0   SLP Mode of treatment - Group (minutes) 0   SLP Mode of treatment - Co-treat (minutes) 0   SLP Mode of Treatment - Total time(minutes) 30 minutes   SLP Cumulative Minutes 330   Therapy Time missed   Time missed?  No

## 2023-08-29 NOTE — PROGRESS NOTES
08/29/23 1330   Pain Assessment   Pain Assessment Tool 0-10   Pain Score No Pain   Restrictions/Precautions   Precautions Bed/chair alarms;Cognitive; Fall Risk;Impulsive;Pain;Supervision on toilet/commode   Braces or Orthoses TLSO   Subjective   Subjective Pt agreeable to perform skilled PT   Roll Left and Right   Type of Assistance Needed Supervision   Comment S lvl   Roll Left and Right CARE Score 4   Sit to Lying   Type of Assistance Needed Supervision   Comment S lvl   Sit to Lying CARE Score 4   Lying to Sitting on Side of Bed   Type of Assistance Needed Set-up / clean-up   Comment progressing   Lying to Sitting on Side of Bed CARE Score 5   Sit to Stand   Type of Assistance Needed Supervision; Adaptive equipment   Sit to Stand CARE Score 4   Bed-Chair Transfer   Type of Assistance Needed Incidental touching; Adaptive equipment   Comment RW   Chair/Bed-to-Chair Transfer CARE Score 4   Transfer Bed/Chair/Wheelchair   Adaptive Equipment Roller Walker   Car Transfer   Type of Assistance Needed Incidental touching   Comment VC for LE management   Car Transfer CARE Score 4   Walk 10 Feet   Type of Assistance Needed Incidental touching   Comment CgA RW   Walk 10 Feet CARE Score 4   Walk 50 Feet with Two Turns   Type of Assistance Needed Incidental touching; Adaptive equipment   Comment RW   Walk 50 Feet with Two Turns CARE Score 4   Walk 150 Feet   Type of Assistance Needed Incidental touching; Adaptive equipment   Comment RW   Walk 150 Feet CARE Score 4   Ambulation   Primary Mode of Locomotion Prior to Admission Walk   Distance Walked (feet) 200 ft   Assist Device Roller Walker  (x2)   Walk Assist Level Contact Guard   Does the patient walk? 2.  Yes   Wheel 50 Feet with Two Turns   Reason if not Attempted Activity not applicable   Wheel 50 Feet with Two Turns CARE Score 9   Wheel 150 Feet   Reason if not Attempted Activity not applicable   Wheel 633 Feet CARE Score 9   Wheelchair mobility   Does the patient use a Opioid use disorder, severe, dependence (CMS/HCC)  - DRUG SCREEN COMPLETE, URINE  - buprenorphine-naLOXone (SUBOXONE FILM) 12-3 MG sublingual film; Place 1 strip under the tongue 2 times daily.  Dispense: 56 each; Refill: 0    Allergic reaction, subsequent encounter  - EPINEPHrine 0.3 MG/0.3ML auto-injector; Inject 0.3 mLs into the muscle 1 time as needed for Anaphylaxis.  Dispense: 2 each; Refill: 3      I reviewed the patient with the resident, reviewed their findings, assessment, and plan, and agree with the content of their clinical note for today.     Patient is doing well on MAT, PDMP and UDS results are consistent with history, will continue MAT at same dose for 4 weeks.      wheelchair? 0. No   Picking Up Object   Type of Assistance Needed Incidental touching; Adaptive equipment   Comment RW with reacher   Picking Up Object CARE Score 4   Toilet Transfer   Type of Assistance Needed Supervision; Adaptive equipment   Comment RW   Toilet Transfer CARE Score 4   Therapeutic Interventions   Strengthening TE LAQ AP ball gluts 10-20 reps STS x10   Flexibility HS and calf stretch manual   Balance dynamic balance stretch   Equipment Use   NuStep lvl 1 for 10 min   Assessment   Treatment Assessment pt focus on funcitonal ambulation bed mobility has above and care scores , overall fall risk and precaution for back area with TLSO. Pt perform TE for LE and cont with strengthening and condition outpt services . Based on pts functional performance, pt is safe to [address] [place] [address] w/ recommendations noted above. Pt would benefit from continued PT services in home health to maximize functional abilities. Barriers to Discharge None   Plan   Progress Progressing toward goals   Recommendation   PT Discharge Recommendation Home with home health rehabilitation   PT Therapy Minutes   PT Time In 1330   PT Time Out 1500   PT Total Time (minutes) 90   PT Mode of treatment - Individual (minutes) 60   PT Mode of treatment - Concurrent (minutes) 30   PT Mode of treatment - Group (minutes) 0   PT Mode of treatment - Co-treat (minutes) 0   PT Mode of Treatment - Total time(minutes) 90 minutes   PT Cumulative Minutes 910   Therapy Time missed   Time missed?  No

## 2023-08-29 NOTE — ASSESSMENT & PLAN NOTE
Malnutrition Findings:   Adult Malnutrition type: Acute illness  Adult Degree of Malnutrition: Malnutrition of moderate degree  Malnutrition Characteristics: Inadequate energy, Weight loss                  360 Statement: Moderate protein calorie malnutrition related to acute illness/progressive cognitive dysfunction as evidenced by wt loss >2 % over 1 week, and intake < 75% for > 1 week. Treatment: adding snacks in between regular meals    BMI Findings: Body mass index is 26.63 kg/m².

## 2023-08-29 NOTE — PROGRESS NOTES
OT Daily Treatment Note       08/29/23 0700   Pain Assessment   Pain Assessment Tool 0-10   Pain Score No Pain   Restrictions/Precautions   Precautions Bed/chair alarms;Cognitive; Fall Risk;Impulsive;Pain;Supervision on toilet/commode   ROM Restrictions   (spinal precautions)   Braces or Orthoses TLSO   Lifestyle   Autonomy "I cant wait to get the heck out of here"   Eating   Type of Assistance Needed Independent   Physical Assistance Level No physical assistance   Eating CARE Score 6   Oral Hygiene   Type of Assistance Needed Supervision   Physical Assistance Level No physical assistance   Comment standing at sink   Oral Hygiene CARE Score 4   Shower/Bathe Self   Type of Assistance Needed Supervision   Physical Assistance Level No physical assistance   Comment full shower completed seated on TTB, supb and min vc required to ensure pt remains seated throughout and cues to use cross leg technique for distal LE bathing.  pt able to bathe lorena area via lateral weight shifting   Shower/Bathe Self CARE Score 4   Bathing   Assessed Bath Style Shower   Tub/Shower Transfer   Findings CGA lateral step in to walk in shower   Upper Body Dressing   Type of Assistance Needed Physical assistance   Physical Assistance Level 25% or less   Comment able to don/doff shirt with setup, req min A to properly velcro straps and orient brace correctly   Upper Body Dressing CARE Score 3   Lower Body Dressing   Type of Assistance Needed Supervision   Physical Assistance Level No physical assistance   Comment seated to thread BLE and CS in stance when donning over hips   Lower Body Dressing CARE Score 4   Putting On/Taking Off Footwear   Type of Assistance Needed Supervision   Physical Assistance Level No physical assistance   Comment seated with cross leg technique   Putting On/Taking Off Footwear CARE Score 4   Lying to Sitting on Side of Bed   Type of Assistance Needed Supervision   Physical Assistance Level No physical assistance   Lying to Sitting on Side of Bed CARE Score 4   Sit to Stand   Type of Assistance Needed Supervision   Physical Assistance Level No physical assistance   Comment RW   Sit to Stand CARE Score 4   Bed-Chair Transfer   Type of Assistance Needed Incidental touching   Physical Assistance Level No physical assistance   Comment CG SPT with RW   Chair/Bed-to-Chair Transfer CARE Score 4   Toileting Hygiene   Type of Assistance Needed Supervision   Physical Assistance Level No physical assistance   Comment hygiene and CM   Toileting Hygiene CARE Score 4   Toilet Transfer   Type of Assistance Needed Supervision   Physical Assistance Level No physical assistance   Comment RW on standard toilet height   Toilet Transfer CARE Score 4   Therapeutic Excerise-Strength   UE Strength Yes  (provided pt with UE HEP - reviewed extensively. provided pt with red theraband for use at home. please see below for HEP details)   Cognition   Overall Cognitive Status Impaired   Arousal/Participation Alert; Cooperative   Attention Attends with cues to redirect   Orientation Level Oriented X4   Memory Decreased long term memory;Decreased short term memory;Decreased recall of biographical information;Decreased recall of recent events;Decreased recall of precautions   Following Commands Follows one step commands inconsistently   Activity Tolerance   Activity Tolerance Patient tolerated treatment well   Assessment   Treatment Assessment Pt participated in skilled OT session focusing on ADL performance in prep for DC home tomorrow. Pt has made good  progress during time of stay at the [facility]. Pt is overall functioning at Supervision or touching assistance-  for ADLs, Supervision or touching assistance-  for functional transfers and TOUCHING/ STEADYING assistance  for functional mobility. Prior to [facility], DME recommendations include walker. DME was ordered to maximize functional independence and decrease fall risk.  Based on pts functional performance, pt is safe to DC Home w/ recommendations noted above. Pt would benefit from continued OT services in home health to maximize functional abilities. Barriers to Discharge None   Recommendation   OT Discharge Recommendation Home with home health rehabilitation   OT Therapy Minutes   OT Time In 0700   OT Time Out 0830   OT Total Time (minutes) 90   OT Mode of treatment - Individual (minutes) 90   OT Mode of treatment - Concurrent (minutes) 0   OT Mode of treatment - Group (minutes) 0   OT Mode of treatment - Co-treat (minutes) 0   OT Mode of Treatment - Total time(minutes) 90 minutes   OT Cumulative Minutes 835   Therapy Time missed   Time missed? No     Access Code: [email or web address]  URL: [email or web address]  Date: 08/29/2023  Prepared by: Emmanuel Islas    Exercises  - Seated Shoulder Horizontal Abduction with Resistance  - 1 x daily - 7 x weekly - 3 sets - 10 reps  - Seated Shoulder Flexion with Self-Anchored Resistance  - 1 x daily - 7 x weekly - 3 sets - 10 reps  - Seated Single Shoulder PNF D2 with Resistance  - 1 x daily - 7 x weekly - 3 sets - 10 reps  - Seated Single Shoulder Retraction with Resistance   - 1 x daily - 7 x weekly - 3 sets - 10 reps  - Seated Elbow Extension with Self-Anchored Resistance  - 1 x daily - 7 x weekly - 3 sets - 10 reps  - Seated Elbow Flexion with Self-Anchored Resistance  - 1 x daily - 7 x weekly - 3 sets - 10 reps  - Seated Shoulder Row with Resistance Anchored at Feet  - 1 x daily - 7 x weekly - 3 sets - 10 reps

## 2023-08-29 NOTE — PROGRESS NOTES
Internal Medicine Progress Note  Patient: Clark Aguilar  Age/sex: 70 y.o. female  Medical Record #: [id number]      ASSESSMENT/PLAN: (Interval History)  Clark Aguilar is seen and examined and management for following issues:    Frequent fall, r/o movement disorder  • Sees Dr Marco Florian as OP     Right rib fractures  • Ribs 3-5th  • Continue IS     T8 fracture  • Non-op  • TLSO brace  • For upright t-spine xrays in brace in 2 weeks     Vitamin B12 deficiency  • s/p IM replacement QOD x 5 doses now on PO daily     HTN  • Home:  Atenolol 50mg qd  • Here:  Atenolol 50mg qd/Norvasc 5mg BID  • Has prn Hydralazine  • Stable.     Cognitive impairment  • MOCA 3/2021 was 17/30  • Likely has worsened since prev MOCA and is confused here     E.coli UTI  • POA  • Dr Tish Noel sent UA with reflex to cx 2/2 confusion  • Urine cx had >100,000 Ecoli and 30,000-39,000 mixed contaminants  • Keflex completed.     Hypothyroidism  • TSH 0.314/free T4 1.29  • Was on Levothyroxine 100 mcg qd  • Endo saw and they reduced to 88 mcg qd on 8/19.   They are aware of the reverse T3 results (d/w them 8/28)  • For TSH/free T4 in 4-6 weeks     Severe osteoporosis  • Endo following  • For OP DEXA  • Calcium 500mg BID     Vit D deficiency  • Level was 23.7  • For Vit D 2000IU qd     RA  • At home takes Methotrexate 20mg q Wednesday/Prednisone 5mg qd/Flexeril 10mg qhs  • Here she is on Methotrexate 20mg q Wednesday/Prednisone 5mg qhs     HLD  • Lipitor 10mg qd     Anxiety/depression  • At home she was taking Xananx 1mg TID and Prozac 20mg qd  • Per PMR     Enlarged right retrocrural lymph node  • Found on CT  • For repeat CT chest as OP in 3-6 months     Transaminitis  • AST/ALT/AP elevated since previous CMP 8/15  • had CT C/A/P on 8/15 = hepatomegaly  • Dr Tish Noel cut back on Tylenol  • LFTs trending down     Hyponatremia  • Na 132 on 8/24        Discharge date:  8/30/23       The above assessment and plan was reviewed and updated as determined by my evaluation of the patient on 8/29/2023.     Labs:   Results from last 7 days   Lab Units 08/28/23  0503 08/24/23  0554   WBC Thousand/uL 5.59 5.13   HEMOGLOBIN g/dL 11.6 12.2   HEMATOCRIT % 35.0 34.8   PLATELETS Thousands/uL 201 182     Results from last 7 days   Lab Units 08/24/23  0554   SODIUM mmol/L 132*   POTASSIUM mmol/L 3.8   CHLORIDE mmol/L 99   CO2 mmol/L 23   BUN mg/dL 19   CREATININE mg/dL 0.80   CALCIUM mg/dL 9.4                   Review of Scheduled Meds:  Current Facility-Administered Medications   Medication Dose Route Frequency Provider Last Rate   • acetaminophen  650 mg Oral Q12H Shavonne Vidales MD     • amLODIPine  5 mg Oral BID DARIO Brady     • atenolol  50 mg Oral Daily Shavonne Vidales MD     • atorvastatin  10 mg Oral Daily With Checo Bauman MD     • bisacodyl  5 mg Oral Daily PRN Shavonne Vidales MD     • bisacodyl  10 mg Rectal Daily PRN Shavonne Vidales MD     • bisacodyl  10 mg Rectal Once Ayana Calderón MD     • calcium carbonate  1 tablet Oral BID With Meals Alon Rasheed MD     • cholecalciferol  2,000 Units Oral Daily Alon Rasheed MD     • cyanocobalamin  1,000 mcg Oral Daily Shavonne Vidales MD     • docusate sodium  100 mg Oral BID Shavonne Vidales MD     • enoxaparin  30 mg Subcutaneous Q12H [address] Shavonne Vidales MD     • FLUoxetine  20 mg Oral Daily Shavonne Vidales MD     • folic acid  1 mg Oral Daily Shavonne Vidales MD     • hydrALAZINE  25 mg Oral Q8H PRN DARIO Brady     • levothyroxine  88 mcg Oral Early Morning Alon Rasheed MD     • lidocaine  1 patch Topical Daily Shavonne Vidales MD     • methocarbamol  250 mg Oral Q6H PRN Shavonne Vidales MD     • methotrexate  20 mg Oral Weekly Shavonne Vidales MD     • multivitamin stress formula  1 tablet Oral Daily Shavonne Vidales MD     • nystatin   Topical BID Shavonne Vidales MD     • ondansetron  4 mg Oral Q6H PRN Shavonne Vidales MD     • oxyCODONE  5 mg Oral Q4H PRN Natalie Bob MD     • oxyCODONE  2.5 mg Oral Q4H PRN Natalie Bob MD     • polyethylene glycol  17 g Oral Daily Natalie Bob MD     • predniSONE  5 mg Oral HS Natalie Bob MD     • QUEtiapine  12.5 mg Oral HS PRN Natalie Bob MD     • senna  2 tablet Oral HS Natalie Bob MD         Subjective/ HPI: Patient seen and examined. Patients overnight issues or events were reviewed with nursing or staff during rounds or morning huddle session. New or overnight issues include the following:     No new or overnight issues. Offers no complaints    ROS:   A 10 point ROS was performed; negative except as noted above. Imaging:     No orders to display       *Labs /Radiology studies reviewed  *Medications reviewed and reconciled as needed  *Please refer to order section for additional ordered labs studies  *Case discussed with primary attending during morning huddle case rounds    Physical Examination:  Vitals:   Vitals:    08/28/23 1945 08/28/23 2129 08/29/23 0532 08/29/23 0900   BP: 134/64 140/80 158/78 148/88   BP Location: Left arm  Left arm Left arm   Pulse: 65  80 78   Resp: 19  18    Temp: (!) 97.3 °F (36.3 °C)  (!) 97.4 °F (36.3 °C)    TempSrc: Oral  Oral    SpO2: 97%  97%    Weight:       Height:           General Appearance: no distress, conversive  HEENT:  External ear normal.  Nose normal w/o drainage. Mucous membranes are moist. Oropharynx is clear. Conjunctiva clear w/o icterus or redness. Neck:  Supple, normal ROM  Lungs: BBS without crackles/wheeze/rhonchi; respirations unlabored with normal inspiratory/expiratory effort. No retractions noted. On RA  CV: regular rate and rhythm; no rubs/murmurs/gallops, PMI normal   ABD: Abdomen is soft. Bowel sounds all quadrants. Nontender with no distention.     EXT: no edema  Skin: normal turgor, normal texture, no rashes  Psych: affect normal, mood normal  Neuro: AA     The above physical exam was reviewed and updated as determined by my evaluation of the patient on 8/29/2023. Invasive Devices     None                    VTE Pharmacologic Prophylaxis: Enoxaparin  Code Status: Level 3 - DNAR and DNI  Current Length of Stay: 11 day(s)      Total time spent:  30 minutes with more than 50% spent counseling/coordinating care. Counseling includes discussion with patient re: progress  and discussion with patient of his/her current medical state/information. Coordination of patient's care was performed in conjunction with primary service. Time invested included review of patient's labs, vitals, and management of their comorbidities with continued monitoring. In addition, this patient was discussed with medical team including physician and advanced extenders. The care of the patient was extensively discussed and appropriate treatment plan was formulated unique for this patient. Medical decision making for the day was made by supervising physician unless otherwise noted in their attestation statement. ** Please Note:  voice to text software may have been used in the creation of this document.  Although proof errors in transcription or interpretation are a potential of such software**

## 2023-08-29 NOTE — PROGRESS NOTES
Physical Medicine and Rehabilitation Progress Note  Lum Sor Viscomi 70 y.o. female MRN: [id number]  Unit/Bed#: -01 Encounter: [id number]    To Review: Kimberly Mcdonnell is a 70 y.o. female with Sjogrens and RA (on methotrexate), HTN, hypothyroidism, gastroparesis, depression, previous mild cognitive impairment ([facility] 17/30 in 2021), history of meningioma s/p resection in 2012, pelvic fracture after fall down stairs in 2021, who presented to the [facility] 8/15 after falling backwards and landing on the ground 6 days prior. She had headaches and severe back pain and went to the hospital. She also seemed to have multiple more falls. She was found to have multiple R rib fractures (3-5). CTH was unremarkable. There was a question of possible movement disorder and Neurology was consulted who ordered MRI Brain, C-Spine and T-Spine. Of note she is on Xanax and Flexeril chronically (mostly taken during bedtime). Geriatrics recommended a UA which was not done inpatient. She was treated for MASD as well. MRI Brain showed moderate chronic microangiopathy, significantly increased, C-Spine without cord compression or cord signal abnormality, and T-Spine with a mild/acte T8 compression fracture without cord compression or cord signal abnormality. She most likely, per Neurology has a Vascular Dementia. There was also some discussion of possible PSP. Her  had also reported that it didn't seem the patient was keeping track of her medications properly, and certainly with her medication regimen is at risk for polypharmacy. Neurosurgery was consulted for her T8 compression fracture, and ordered TLSO when OOB/ > 45 degrees and follow-up in 2 weeks.  She has a Vitamin B1 pending, SPEP pending,  TSH was 0.314 with T4 1.29, A1C was 5.6, and B12 was 153 (she was started on 5 doses of Vitamin B12 - first dose given today, and to be given every other day, followed by daily 1000mcg Vitamin B12). She was admitted to the [facility] on 8/18/2023. Chief Complaint: Feeling well. Interval History/Subjective:  No acute events overnight. Did well with frequent checks and off 1:1. Pain is adequate, but using oxycodone with some regularity. Will decrease to half tablet dose. Denies any new CP, Sob, fevers, chills, N/V, abdominal pain. Last BM 8/28. Continent of bowel/bladder. ROS:  A 10 point review of systems was negative except for what is noted in the HPI. Today's Changes:  1. Discussed with nursing team: frequent checks today and overnight. - Low threshold to add 1:1 if patient gets up in the middle of the night. 2. B1 is pending. Made oxycodone 2.5mg BID PRN for now. 3. Will start preparing discharge for tomorrow.  would like meds sent to [facility] on [address]. Total visit time: 35 minutes, with more than 50% spent counseling/coordinating care. Counseling includes discussion with patient re: progress in therapies, functional issues observed by therapy staff, and discussion with patient regarding their current medical state and wellbeing. Coordination of patient's care was performed in conjunction with Internal Medicine service to monitor patient's labs, vitals, and management of their comorbidities. Assessment/Plan:    * Progressive cognitive dysfunction  Assessment & Plan   reports progressive cognitive decline that accelerated 2-3 weeks prior to admission. Multifactorial:  - Likely vascular dementia given increased moderate chronic microangiopathy, step wise decline.   - ? Amyloid angiopathy per radiology  - Cognitive impairment resulting in mismanagement of her medications - which include steroids, xanax, flexeril, prozac with polypharmacy contributing - particularly since she takes the Flexeril/Xanax at night and that is when most of her falls were occurring.   - Significant Vitamin B12 deficiency contributing (ataxia, proprioceptive impairments, cognitive impairments)  - Also found so far to have slightly high T4, and low TSH  - Of note has hepatomegaly (albeit stable) and increased alk phos on admission. Ammonia level on admission was normal (18)   - UA also positive on admission for E.coli UTI.   - Exam with patchy sensory impairment, with clear impairment in proprioception in great toes. Ataxia/motor planning difficulties. Clearly has impaired balance - this could be exacerbated by the B12 deficiency as well. - B6 level just adequate - she has had poor appetite as an outpatient   - Now on multivitamin daily in addition to B12 supplement      - Recommend outpatient Neuro/Senior Care follow-up  - Consult Endocrine to comment on TSH/T4 - adjustment of Levothyroxine potentially vs. In setting of recent hospitalization/fall.  - Follow-up work-up: B1   - Normal: A1C, MRI C-Spine, T-Spine shows no cord compression/cord signal abnormality, Ammonia level, SPEP, B6  - Treatment: PT/OT/SLP 3-5 hours/day, 5-7 days/week. See Vitamin B12 deficiency, UTI   - Discussed polypharmacy with family. Will plant discontinue alprazolam and flexeril at discharge. They will use Tylenol PRN at discharge for pain. Trial of Q15min frequent checks. - Restlessness at night likely 2/2 frequency/urgency with UTI   - Improved with treatment of UTI. Compression fracture of T8 vertebra (HCC)  Assessment & Plan  In the past has broken her pelvis after a fall  Chronically on steroids, and likely should have DXA and osteoporosis work-up   - Outpatient f/u with Endocrinology - appreciate recs, patient likely with severe osteoporosis   - Vitamin D 26.6 - started on 2000 units daily    - started on calcium supplement by Endocrinology  Pain control as below  TLSO when OOB and HOB > 45 degrees. Thoracic spine precautions  Plan for repeat XR ~9/1 with follow-up with Neurosurgery at that time. Vitamin B12 deficiency  Assessment & Plan  8/17 level 153  8/18 received 1000mcg IM shot  - Received 5 IM injections. - Then started 1000mcg daily on 8/27  - Monitor proprioception, sensation, ataxia/motor planning.   - Outpatient f/u with Neurology  - Nutrition consult to evaluate dietary intake. Closed fracture of multiple ribs of right side  Assessment & Plan  2/2 fall  R 3-5  Currently on RA  Pain control as below  Incentive spirometry. Moderate protein-calorie malnutrition ([address])  Assessment & Plan  Malnutrition Findings:   Adult Malnutrition type: Acute illness  Adult Degree of Malnutrition: Malnutrition of moderate degree  Malnutrition Characteristics: Inadequate energy, Weight loss                  360 Statement: Moderate protein calorie malnutrition related to acute illness/progressive cognitive dysfunction as evidenced by wt loss >2 % over 1 week, and intake < 75% for > 1 week. Treatment: adding snacks in between regular meals    BMI Findings: Body mass index is 26.63 kg/m². Osteoporosis  Assessment & Plan  Chronically on steroids, and likely should have DXA and osteoporosis work-up   - Consulted endocrine on admission.    - Vitamin D 26.6 - started on 2000 units daily    - started on calcium supplement by Endocrinology  - Outpatient f/u with endocrine   - She will benefit from getting DEXA scan as outpatient and will qualify for anabolic agents for treatment as outpt    Abnormal CT of the abdomen  Assessment & Plan  1. Stable hepatomegaly - checking CMP in the AM. Alk phos elevated on admission, but stable. If still elevated, will add GGT given recent T8 fracture. Currently on Tylenol Q8hrs scheduled. May need to decrease dose.    2. Stable R renal cyst    Outpatient f/u with PCP    Adenopathy  Assessment & Plan  In the past has had "stable distal paraesophageal and gastrohepatic ligament adenopathy of uncertain etiology""  Here also noted to have R retrocrural lymph node adjacent to the descending thoracic aorta, juts proximal to the diaphragmatic hiatus - recommended for CT of the chest in 3-6 months to assess for stability. Onychomycosis  Assessment & Plan  Consult podiatry for very overgrown toenails  Appreciate their debridement. Oral dyskinesia  Assessment & Plan  This is stable and was noted in her [facility] admission, where it was felt to be due to reglan. She is no longer on this medication. Patient attributes to her Sjogrens. Would not add additional medication to address this at this time. Can f/u with Neurology outpatient    Gastroparesis  Assessment & Plan  Was previously on reglan, does not appear to be on this medication at this time. May need small, more frequent meals  Monitor for symptoms. Hypothyroidism  Assessment & Plan  8/17 TSH 0.314 and T4 1.29  Previously appeared to be on 88-75mcg daily. Not sure when 100mcg was initiated and by who. Consulted endocrine to comment on adjusting dose vs. In setting of recent fall/injury/hospitalization   - Reverse T3 elevated, and dose decreased to 88mcg daily by endocrine   - Recheck thyroid panel in 4-6 weeks. Anxiety and depression  Assessment & Plan  Currently on prozac 20mg daily  Also on xanax 1mg QHS PRN chronically, which it seems she was taking scheduled at home. Has not been on this only PRN here. - Has not been using Prn and been fine. Would discontinue this med. Question of this contributed to her worsening falls (as she was likely not managing her meds well at home given her cognitive impairment), and her falls were worse at night when she would take it. That being said, this medication is also not showing up in her PDMP. Hypertension  Assessment & Plan  Home: Atenolol 50mg daily, Losartan 25mg daily  Here: Atenolol 50mg daily, Norvasc 5mg BID, Hydralazine PRN  Monitor and adjust as appropriate  IM consulted and assisting with management. Sjogren's disease [facility])  Assessment & Plan  Follows with Dr. Cassie Gomez outpatient  Chronically on prednisone and methotrexate. Continue for now.     Acute cystitis without hematuria-resolved as of 8/29/2023  Assessment & Plan  She reports episodes of urgency at times  - UA with pan susceptible E.coli 100k CFU   - 8/21 started on Keflex for 5 days - completed    - Pyridium at night while treated. Can discontinue now. -  reports a pattern of patient holding her urine until the last minute and then rushing to the bathroom  - PVRs have been low continued timed voids  - Discussed with family recommendation for timed voids at home to help with continence. Health Maintenance  #Pain: Tylenol Q8hrs scheduled, Flexeril (will decrease to 5mg QHS), Lidoderm patch, Oxycodone 2.5-5mg (will try to de-escalate sooner rather than later). #Bowel: Last BM 8/27. Senna/ Docusate/Miralax scheduled. 8/24 dulc tabs, 8/25 lactulose and suppository. #Bladder: Voiding and appears to be continent. PVR x3 low. #Skin/Pressure Injury Prevention: Turn Q2hr in bed, with weight shifts Q40-70dqx in wheelchair. Float heels in bed. #DVT Prophylaxis: Lovenox, SCDs. #GI Prophylaxis: None at this time. #Code Status:  DNR/DNI  #FEN: Regular/Thins  #Dispo: Team 8/23: ADD 8/30 with support from family. Cognitively not demonstrating any good follow-through, recall, carry-over. Very impaired safety awareness and insight. Plan for family meeting tomorrow. #F/U: PCP, Neuro, Geriatrics, Rheumatology, Endocrine      Objective:    Functional Update:  PT: modA transfers, bed mobility, modA ambulation 76' with RW, maxA stairs   OT: Sup eating, CGA grooming, Ebony bathing, Ebony LB dressing, modA UB dressing, CGA toileting, CGA toilet transfers   SLP: CLQT 1.4 out of 4 -severely impaired cognition. Ebony for expression/social interaction, but modA for comprehension, and maxA for problem solving/memory.      Allergies per EMR    Physical Exam:  Temp:  [97.3 °F (36.3 °C)-97.8 °F (36.6 °C)] 97.4 °F (36.3 °C)  HR:  [61-80] 80  Resp:  [17-19] 18  BP: (122-158)/(64-80) 158/78  Oxygen Therapy  SpO2: 97 %    Gen: No acute distress, Well-nourished, well-appearing. HEENT: Moist mucus membranes, Normocephalic/Atraumatic  Cardiovascular: Regular rate, rhythm, S1/S2. Distal pulses palpable  Heme/Extr: No edema  Pulmonary: Non-labored breathing. Lungs CTAB  : No medeiros  GI: Soft, non-tender, non-distended. BS+  MSK: ROM is WFL in all extremities. No effusions or deformities. Bulk is symmetric. See below for MMT scores. Integumentary: Skin is warm, dry. .  Neuro: Awake, alert. Orientation fluctuates. Redirectable and appropriate. Impulsive at times. Psych: Normal mood and affect.      Diagnostic Studies: Reviewed, no new imaging    Laboratory: Reviewed   Results from last 7 days   Lab Units 08/28/23  0503 08/24/23  0554   HEMOGLOBIN g/dL 11.6 12.2   HEMATOCRIT % 35.0 34.8   WBC Thousand/uL 5.59 5.13     Results from last 7 days   Lab Units 08/24/23  0554   BUN mg/dL 19   POTASSIUM mmol/L 3.8   CHLORIDE mmol/L 99   CREATININE mg/dL 0.80   AST U/L 36   ALT U/L 42            Patient Active Problem List   Diagnosis   • Parotid gland enlargement   • Sjogren's disease (HCC)   • Closed nondisplaced fracture of anterior wall of right acetabulum (HCC)   • Closed nondisplaced fracture of right pubis (HCC)   • Fall   • Hypertension   • Pain of right upper extremity   • Multiple closed fractures of pelvis (HCC)   • Anxiety and depression   • Hypothyroidism   • Abnormal findings on imaging test   • Gastroparesis   • Oral dyskinesia   • Potential for cognitive impairment   • Anemia   • Hypokalemia   • WELLS (dyspnea on exertion)   • Lightheadedness   • Pain   • Abnormal ECG   • Chest pain   • Pleural effusion on right   • Atelectasis of right lung   • Low HDL (under 40)   • Dermatitis associated with moisture   • Onychomycosis   • Enlarged and hypertrophic nails   • Adenopathy   • Abnormal CT of the abdomen   • Hyponatremia   • Progressive cognitive dysfunction   • Fungal skin infection   • Rheumatoid arteritis (HCC)   • Closed fracture of multiple ribs of right side   • Movement disorder   • Vitamin B12 deficiency   • Compression fracture of T8 vertebra (HCC)   • Acute cystitis without hematuria   • Osteoporosis   • Moderate protein-calorie malnutrition (HCC)         Medications  Current Facility-Administered Medications   Medication Dose Route Frequency Provider Last Rate   • acetaminophen  650 mg Oral Q12H Keyla Bartlett MD     • amLODIPine  5 mg Oral BID Nestora Crigler, CRNP     • atenolol  50 mg Oral Daily Keyla Bartlett MD     • atorvastatin  10 mg Oral Daily With Esteban Vyas MD     • bisacodyl  5 mg Oral Daily PRN Keyla Bartlett MD     • bisacodyl  10 mg Rectal Daily PRN Keyla Bartlett MD     • bisacodyl  10 mg Rectal Once Sheryl Queen MD     • calcium carbonate  1 tablet Oral BID With Meals Lavonne Leavitt MD     • cholecalciferol  2,000 Units Oral Daily Lavonne Leavitt MD     • cyanocobalamin  1,000 mcg Oral Daily Keyla Bartlett MD     • docusate sodium  100 mg Oral BID Keyla Bartlett MD     • enoxaparin  30 mg Subcutaneous Q12H Keena Moseley MD     • FLUoxetine  20 mg Oral Daily Keyla Bartlett MD     • folic acid  1 mg Oral Daily Keyla Bartlett MD     • hydrALAZINE  25 mg Oral Q8H PRN Nestora Crigler, CRNP     • levothyroxine  88 mcg Oral Early Morning Lavonne Leavitt MD     • lidocaine  1 patch Topical Daily Keyla Bartlett MD     • methocarbamol  250 mg Oral Q6H PRN Keyla Bartlett MD     • methotrexate  20 mg Oral Weekly Keyla Bartlett MD     • multivitamin stress formula  1 tablet Oral Daily Keyla Bartlett MD     • nystatin   Topical BID Keyla Bartlett MD     • ondansetron  4 mg Oral Q6H PRN Keyla Bartlett MD     • oxyCODONE  5 mg Oral Q4H PRN Keyla Bartlett MD     • oxyCODONE  2.5 mg Oral Q4H PRN Keyla Bartlett MD     • polyethylene glycol  17 g Oral Daily Keyla Bartlett MD     • predniSONE  5 mg Oral HS Keyla Bartlett MD     • QUEtiapine  12.5 mg Oral HS PRN Saira Reynoso Bruno Bishop MD     • senna  2 tablet Oral HS Arnulfo Shetty MD            ** Please Note: Fluency Direct voice to text software may have been used in the creation of this document.  **

## 2023-08-30 ENCOUNTER — APPOINTMENT (OUTPATIENT)
Dept: RADIOLOGY | Facility: HOSPITAL | Age: 68
DRG: 949 | End: 2023-08-30
Attending: PHYSICAL MEDICINE & REHABILITATION
Payer: MEDICARE

## 2023-08-30 VITALS
SYSTOLIC BLOOD PRESSURE: 112 MMHG | OXYGEN SATURATION: 95 % | DIASTOLIC BLOOD PRESSURE: 68 MMHG | WEIGHT: 127 LBS | RESPIRATION RATE: 18 BRPM | BODY MASS INDEX: 26.66 KG/M2 | HEART RATE: 76 BPM | HEIGHT: 58 IN | TEMPERATURE: 98.2 F

## 2023-08-30 PROBLEM — E87.6 HYPOKALEMIA: Status: RESOLVED | Noted: 2021-03-04 | Resolved: 2023-08-30

## 2023-08-30 PROBLEM — S32.501A CLOSED NONDISPLACED FRACTURE OF RIGHT PUBIS (HCC): Status: RESOLVED | Noted: 2021-02-23 | Resolved: 2023-08-30

## 2023-08-30 PROBLEM — R07.9 CHEST PAIN: Status: RESOLVED | Noted: 2021-03-04 | Resolved: 2023-08-30

## 2023-08-30 PROBLEM — S32.414A CLOSED NONDISPLACED FRACTURE OF ANTERIOR WALL OF RIGHT ACETABULUM (HCC): Status: RESOLVED | Noted: 2021-02-23 | Resolved: 2023-08-30

## 2023-08-30 PROBLEM — E87.1 HYPONATREMIA: Status: RESOLVED | Noted: 2021-03-11 | Resolved: 2023-08-30

## 2023-08-30 PROBLEM — D64.9 ANEMIA: Status: RESOLVED | Noted: 2021-03-04 | Resolved: 2023-08-30

## 2023-08-30 PROBLEM — R06.09 DOE (DYSPNEA ON EXERTION): Status: RESOLVED | Noted: 2021-03-04 | Resolved: 2023-08-30

## 2023-08-30 PROBLEM — B36.9 FUNGAL SKIN INFECTION: Status: RESOLVED | Noted: 2021-03-17 | Resolved: 2023-08-30

## 2023-08-30 PROBLEM — M79.601 PAIN OF RIGHT UPPER EXTREMITY: Status: RESOLVED | Noted: 2021-02-27 | Resolved: 2023-08-30

## 2023-08-30 PROBLEM — S32.82XA MULTIPLE CLOSED FRACTURES OF PELVIS (HCC): Status: RESOLVED | Noted: 2021-02-27 | Resolved: 2023-08-30
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
## 2023-08-30 LAB — VIT B1 BLD-SCNC: 98.8 NMOL/L (ref 66.5–200)

## 2023-08-30 PROCEDURE — 99239 HOSP IP/OBS DSCHRG MGMT >30: CPT | Performed by: PHYSICAL MEDICINE & REHABILITATION

## 2023-08-30 PROCEDURE — 72072 X-RAY EXAM THORAC SPINE 3VWS: CPT

## 2023-08-30 RX ORDER — LIDOCAINE 50 MG/G
1 PATCH TOPICAL DAILY
Refills: 0
Start: 2023-08-31

## 2023-08-30 RX ORDER — POLYETHYLENE GLYCOL 3350 17 G/17G
17 POWDER, FOR SOLUTION ORAL DAILY
Qty: 30 EACH | Refills: 0 | Status: SHIPPED | OUTPATIENT
Start: 2023-08-31

## 2023-08-30 RX ORDER — LEVOTHYROXINE SODIUM 88 UG/1
88 TABLET ORAL
Qty: 30 TABLET | Refills: 0 | Status: SHIPPED | OUTPATIENT
Start: 2023-08-31

## 2023-08-30 RX ORDER — MELATONIN
2000 DAILY
Qty: 60 TABLET | Refills: 0 | Status: SHIPPED | OUTPATIENT
Start: 2023-08-31

## 2023-08-30 RX ORDER — AMLODIPINE BESYLATE 5 MG/1
5 TABLET ORAL 2 TIMES DAILY
Qty: 60 TABLET | Refills: 0 | Status: SHIPPED | OUTPATIENT
Start: 2023-08-30

## 2023-08-30 RX ORDER — FOLIC ACID 1 MG/1
1 TABLET ORAL DAILY
Qty: 30 TABLET | Refills: 0 | Status: SHIPPED | OUTPATIENT
Start: 2023-08-30

## 2023-08-30 RX ORDER — PREDNISONE 5 MG/1
5 TABLET ORAL
Refills: 0
Start: 2023-08-30

## 2023-08-30 RX ORDER — DOCUSATE SODIUM 100 MG/1
100 CAPSULE, LIQUID FILLED ORAL 2 TIMES DAILY
Qty: 60 CAPSULE | Refills: 0 | Status: SHIPPED | OUTPATIENT
Start: 2023-08-30

## 2023-08-30 RX ORDER — OXYCODONE HYDROCHLORIDE 5 MG/1
2.5 TABLET ORAL 2 TIMES DAILY PRN
Qty: 7 TABLET | Refills: 0 | Status: SHIPPED | OUTPATIENT
Start: 2023-08-30 | End: 2023-09-09

## 2023-08-30 RX ORDER — CALCIUM CARBONATE 500(1250)
1 TABLET ORAL 2 TIMES DAILY WITH MEALS
Qty: 60 TABLET | Refills: 0 | Status: SHIPPED | OUTPATIENT
Start: 2023-08-30

## 2023-08-30 RX ADMIN — ATENOLOL 50 MG: 50 TABLET ORAL at 08:09

## 2023-08-30 RX ADMIN — Medication 1 TABLET: at 08:07

## 2023-08-30 RX ADMIN — Medication 2.5 MG: at 08:07

## 2023-08-30 RX ADMIN — FOLIC ACID 1 MG: 1 TABLET ORAL at 08:09

## 2023-08-30 RX ADMIN — AMLODIPINE BESYLATE 5 MG: 5 TABLET ORAL at 08:09

## 2023-08-30 RX ADMIN — ENOXAPARIN SODIUM 30 MG: 30 INJECTION SUBCUTANEOUS at 08:07

## 2023-08-30 RX ADMIN — LEVOTHYROXINE SODIUM 88 MCG: 88 TABLET ORAL at 06:25

## 2023-08-30 RX ADMIN — CYANOCOBALAMIN TAB 500 MCG 1000 MCG: 500 TAB at 08:09

## 2023-08-30 RX ADMIN — FLUOXETINE 20 MG: 20 CAPSULE ORAL at 08:09

## 2023-08-30 RX ADMIN — ACETAMINOPHEN 650 MG: 325 TABLET, FILM COATED ORAL at 06:25

## 2023-08-30 RX ADMIN — Medication 2000 UNITS: at 08:09

## 2023-08-30 RX ADMIN — B-COMPLEX W/ C & FOLIC ACID TAB 1 TABLET: TAB at 08:07

## 2023-08-30 RX ADMIN — NYSTATIN: 100000 POWDER TOPICAL at 08:24

## 2023-08-30 RX ADMIN — METHOTREXATE 20 MG: 2.5 TABLET ORAL at 08:24

## 2023-08-30 RX ADMIN — LIDOCAINE 5% 1 PATCH: 700 PATCH TOPICAL at 08:08

## 2023-08-30 NOTE — DISCHARGE INSTR - AVS FIRST PAGE
DISCHARGE INSTRUCTIONS: [id number]    Bring these instructions with you to your Outpatient Physician appointments so they can order and follow-up any additional lab work or imaging recommended at time of discharge. It  is you or your caregivers responsibility to obtain follow-up MEDICATION REFILLS  As indicated through your Primary Care Physician (PCP) and other outpatient specialty provider(s) after discharge. Please follow-up with your PCP as soon as possible after discharge to set-up follow-up management and when appropriate refills. You have been determined to have some cognitive impairments. - It is YOUR CAREGIVER'S RESPONSIBILITY to ensure appropriate follow-up which includes:  - APPOINTMENTS are scheduled and safe transportation is arranged  - LABS and IMAGING are completed  - MEDICATION MANAGEMENT at home is carried out appropriately     You remain a fall and injury risk which could be severe. - Your risk of fall has decreased however since admission to acute rehab. Caregiver training has been completed with our staff. - Appropriate supervision +/- assistance as instructed during your rehab course is recommended to decrease risk of fall and injury. - Continue skilled therapy as discussed after discharge to further decrease this risk    If you (or your health care proxy) have any questions or concerns regarding your acute rehabilitation stay including issues with medications, rehabilitation, and follow-up plan, please call:          [address] [place] [address] Unit in [facility] at [phone] or [phone]. Should you develop fevers, chills, new weakness, changes in sensation, difficulty speaking, facial weakness, confusion, shortness of breath, chest pain, or other concerning symptoms please call 911 and/or obtain transportation to nearest ER immediately.     PHYSICIANS to see:  Please see your doctors listed in the follow up providers section of your discharge paperwork, and take the discharge paperwork with you to your appointments. Home health has been ordered for you: Your home health agency should reach out to you or your family soon to arrange follow-up. (If [facility] health is your provider their phone number is [phone])    If you are unable to get in touch with home health you may contact your  at [phone]. LAB WORK recommended after discharge: Follow-up lab work at discretion of your outpatient physicians to be determined at time of your future appointments. Please follow up your B1 level with Neurology and/or Geriatrics   Repeat Thyroid Function in 4-6 weeks and follow-up with Endocrinology  Repeat Liver Function Testing in 1 week and follow-up with your primary care physician and/or Geriatrics  Repeat CMP to monitor your sodium level in 1 week. Excessive delay in labs and appropriate follow-up could potentially increase your risk of complications which could be severe and even life-threatening. IMAGING to follow-up:  Follow-up imaging as discussed with your recent physicians or at discretion of your outpatient physicians to be determined at time of your appointments. Your follow-up Spine XR were completed prior to discharge. Please review the results of these images with Neurosurgery at your appointment on 9/1. IMAGING, ADDITIONAL FINDINGS and ISSUES to follow-up:  Check under the "DISCHARGE PROVIDER" section of these DISCHARGE INSTRUCTIONS for provider contact information and specific issues as well. Bone Health: Please follow-up Endocrinology to evaluate your bone health for osteoporosis. Adenopathy:  Some enlarged lymph nodes. Follow-up with Primary Care Physician with CT scan of Chest in 3-6 months to make sure these do not get larger.      Excessive delay or lack of appropriate follow-up could potentially increase your risk of complications which could be severe and even life-threatening. It is you or your caregiver's responsibility to ensure these tests are ordered by your outpatient providers and followed up with accordingly. WEIGHTBEARING/ACTIVITY PRECAUTIONS to follow:  Patient needs supervision given her cognitive impairments. Please wear your brace when out of bed  and upright by your surgeon until told otherwise by your surgeon. No bending, twisting, heavy lifting >10lbs. Driving restrictions: You are recommended against driving. Work restrictions: You should NOT return to work (if working) until cleared by an outpatient physician. You should not operate heavy machinery (if applicable) until cleared by an outpatient physician. Alcohol restrictions: You are recommended to not drink alcohol at this time unless cleared by an outpatient physician. Drinking alcohol in your current functional condition can increase your risk of injury which could be severe. Drinking alcohol given your current health problems can lead to increased medical complications which could be severe. Combining alcohol with your current medications can increase your risk of injury which could be severe. Smoking restrictions: You are recommended to not smoke nicotine. Smoking increases your risk of heart attack, stroke, emphysema/COPD, and lung cancer. MEDICATIONS:  Please see a full list of your medications outlined in the After Visit Summary that is attached to these Discharge Instructions. Please note changes may have been made to your medications please refer to your discharge paperwork for your current medications and take this list with you to all your doctors appointments for your doctors to review.   Please do not resume a home medication unless the medication reconciliation sheet indicates to do so, please do not assume that a medication that you were given a prescription for is the same as a medication you have at home based on both medications having the same name as dosages and frequency may have changed. Unless specifically noted in your medication list provided to you in your discharge paper work do not resume prior vitamins, minerals, or supplements you may have been taking prior to your hospitalization unless instructed by an outpatient physician in the future. Discuss with your primary care at next visit if applicable. Sedating Medications with increased risk of complications: These medications were started prior to your acute rehabilitation course as recommended by your prior physicians. It was continued during your acute rehabilitation course. This(these) medication(s) will need to be managed by your outpatient physician(s) after discharge. Follow-up with the appropriate provider as soon as possible to ensure appropriate use. Your goal will be to wean off of opiate medications and then onto acetaminophen only and then off of acetaminophen as well if possible. Oxycodone (opiate) pain medication has been used to help your acute pain. You tolerated this medication adequately during your recent hospital stay. You will be given a limited supply of opiate pain medications on discharge; should you require additional refills/medications, your PCP and/or surgeon may prescribe at his/her discretion. This can be quite helpful particularly for severe acute pain. There are risks associated with opioid medications. They can increase your risk of falling, injury, and breathing difficulties which can be severe and even life-threatening. Note it is advisable to limit use of opiate pain medications as they can become habit forming and lead to addiction. Other potential side effects of the medication include, but are not limited to, constipation, drowsiness, impaired judgment and risk of fatal overdose if not taken as prescribed.  You should not drive while taking this medication  The patient was warned against driving while taking sedation medications. Sharing medications is a felony. At this point in time, the patient is showing no signs of addiction, abuse, diversion or suicidal ideation. The patient (you/or relevant caregiver) understands and agrees to use this medication only as prescribed. MEDICAL MANAGEMENT AT HOME specific to you:  Hypertension Management:  Only take the medications prescribed for you at time of discharge - overly high or low blood pressure increases your risk for health complications    Follow-up with PCP/family doctor regularly to ensure blood pressure remains adequately controlled. Please check your blood pressure prior to taking your blood pressure medications and keep a log that you will bring with you to your follow-up doctors' appointments. >Please contact your family doctor or cardiologist immediately for a blood pressure below 100/50 and do not take your blood pressure medications until speaking with them. >Please contact your family doctor or cardiologist as soon as possible for blood pressure greater than 160/100. Urinary frequency management:  If you develop increased urinary frequency, burning, cramping, or difficulty urinating this may be a sign of an infection or other acute urologic issue. Notify your PCP or urologist right away. If it does not improve, worsen, or you develop fever, chills, sweats, confusion, or other concerning signs or symptoms, please obtain transportation to nearest emergency room. Because of Sandhya's cognitive difficulties, I recommend bringing her to the bathroom every few hours to urinate so that she doesn't have accidents. Elevated liver enzymes management:  Follow-up with primary care physician at next visit. Continue to monitor liver enzymes intermittently.   Follow-up management at discretion of PCP and if needed referral to GI (gastroenterology)  >If you develop increased abdominal pain, confusion, nausea, vomiting, difficulty staying awake, or other concerning signs or symptoms obtain transport to nearest emergency room as soon as possible. Acetaminophen (Tylenol) Dosing Warning: You may have up to 2000 mg of acetaminophen (Tylenol) from all sources spread out over a 24 hours period. Do not have more than that, as this can increase your risk of liver injury which can be serious. Bowel management:  - Ideally you would have 1 well formed BM every 1-2 days. Adjust bowel regimen based on that goal or as close to that as possible  - Start with taking miralax 1 time per day. (Over the counter medication)  - If still constipated, and senna 2 tabs at night time. (Over the counter medication)  - Continue colace twice daily (stool softener, over the counter medications)  - If still constipated you can take either oral or by rectum dulcolax  - If still constipated after dulcolax than you may temporarily take mag citrate x1 (but should try to avoid this if possible as this can occasionally cause abnormal electrolytes and other complications and notify your physician if you need to take)  - If unable to go for more than 4 days notify your physician for additional recommendations    - Follow-up with your primary care physician at next visit    Cognitive impairment - based on recent assessments, you may have a degree of impaired cognition and are recommended to follow-up with neurology to discuss potential additional work-up and management. Your family has been recommended to assist you with oversight and supervision to decrease risk of fall and injury, optimize day-to-day activities, and to ensure appropriate medical management. Hyponatremia management (Low blood sodium level): Your outside physicians will need to check you blood sodium levels and adjust management as needed after you discharge. Please follow-up with them promptly to ensure optimal management.     If you develop increased swelling, lightheadedness, dizziness contact your physicians as soon as possible. If you develop confusion, weakness, difficulty staying awake, or other significant symptoms obtain transportation to nearest emergency room. Please note a summary of your hospital stay with relevant information for your doctors will try to be sent to them. Please confirm with your doctors at your follow up visits that they have received this summary and have them contact [address] if they have not received them along with any other medical records they may require.      [facility] Phone Number:  [phone]

## 2023-08-30 NOTE — SPEECH THERAPY NOTE
SLP Discharge Summary    Pt was discharged home w/ family support/supervision on 8/30/2023. Throughout pt's stay on the acute rehab center, pt was making slower but steady progress towards cognitive linguistic goals to where pt still was functioning at min A for comprehension and expression, supervision for social interactions and mod A for executive functions and memory by time of discharge. On initial evaluation, pt completed the CLQT+ with scores correlating to overall severely impaired cognitive linguistic skills, characterized by deficits across all cognitive linguistic domains. Limiting barriers present as deficits in the following areas: overall attention, decreased ST memory recall, decreased orientation, decreased executive function skills, including problem solving, reasoning, judgement, sequencing, organization of thoughts, decreased safety awareness which currently impacts pt's functional mobility skills. Pt was followed for ongoing skilled SLP services targeting cognitive linguistic skills. Ongoing barriers which continue to present include: decreased attention, decreased ST memory recall, decreased orientation, decreased executive function skills, including problem solving, reasoning, judgement, sequencing, organization of thoughts, decreased safety awareness which currently impacts pt's functional mobility skills. Due to these deficits which present, family meeting had been held, to where recommendations provided by ST as well as remainder of therapy team have been provided to pt and spouse Recommendations from team are for 24hr S/A at discharge.  reported he does have supportive neighbors who offered to help as needed at discharge- encouraged  to set a schedule with them to allow time to him to have on his own and run errands/attend own appointments. Discussed plan for home therapy services (PT/OT/ST).  Multiple family education and training sessions have been completed to where SLP provided pt and spouse w/ a recap of current functioning, to where at this time, still ST memory is pt's biggest barrier. SLP re-educating on using the Memory Book, as well as providing a review given the Memory Packet which is a educational resource for pt's family members in regard to strategies which can be incorporated at home, ie: keeping a place for common things such as the TV remote, keys, books, etc, providing written reminders throughout the house, ie: writing on stove/oven: "do not use" or for rooms/areas pt should not go, to have a sign which says "do not enter." Spouse did verbalize to SLP how he has purchased chair alarms, in addition to monitors which will signal if pt is not on the 2nd floor at home which is "her" level. Further confirmation and recommendations which were established by SLP was about how spouse or other family members to complete I ADL tasks, but he is to provide medications to pt at this time, which spouse did verbalize this will be moving forward. SLP suggesting a pill box (which they already have) to ease his burden in completing this moving forward. SLP did state that an HEP will be provided to pt to complete until home ST services initiated. No further questions were provided by pt nor spouse at this time. Pt to continue to benefit from home SLP services targeting functional cognitive skills in attempts to decrease caregiver burden over time.

## 2023-08-30 NOTE — DISCHARGE SUMMARY
Discharge Summary - PMR   Lorene Sullivan 70 y.o. female MRN: [id number]  Unit/Bed#: -01 Encounter: [id number]    Admission Date: 8/18/2023     Discharge Date: 8/30/2023    Etiologic/Rehabilitation Diagnosis: Impairment of mobility, safety and Activities of Daily Living (ADLs) due to Brain Dysfunction:  02.1  Non-Traumatic    HPI: Lorene Sullivan is a 70 y.o. female with Sjogrens and RA (on methotrexate), HTN, hypothyroidism, gastroparesis, depression, previous mild cognitive impairment ([facility] 17/30 in 2021), history of meningioma s/p resection in 2012, pelvic fracture after fall down stairs in 2021, who presented to the [facility] 8/15 after falling backwards and landing on the ground 6 days prior. She had headaches and severe back pain and went to the hospital. She also seemed to have multiple more falls. She was found to have multiple R rib fractures (3-5). CTH was unremarkable. There was a question of possible movement disorder and Neurology was consulted who ordered MRI Brain, C-Spine and T-Spine. Of note she is on Xanax and Flexeril chronically (mostly taken during bedtime). Geriatrics recommended a UA which was not done inpatient. She was treated for MASD as well. MRI Brain showed moderate chronic microangiopathy, significantly increased, C-Spine without cord compression or cord signal abnormality, and T-Spine with a mild/acte T8 compression fracture without cord compression or cord signal abnormality. She most likely, per Neurology has a Vascular Dementia. There was also some discussion of possible PSP. Her  had also reported that it didn't seem the patient was keeping track of her medications properly, and certainly with her medication regimen is at risk for polypharmacy. Neurosurgery was consulted for her T8 compression fracture, and ordered TLSO when OOB/ > 45 degrees and follow-up in 2 weeks.  She has a Vitamin B1 pending, SPEP pending,  TSH was 0.314 with T4 1.29, A1C was 5.6, and B12 was 153 (she was started on 5 doses of Vitamin B12 - first dose given today, and to be given every other day, followed by daily 1000mcg Vitamin B12). She was admitted to the [facility] on 8/18/2023. Procedures Performed During ARC Admission: None    Acute Rehabilitation Center Course: Patient participated in a comprehensive interdisciplinary inpatient rehabilitation program which included involvment of MD, therapies (PT, OT, and/or SLP), RN, CM, SW, dietary, and psychology services. She was able to be advanced to an overall supervision level of assist and considered safe for discharge home with family. Please see below for patient's day to day management of rehabilitation needs. Please refer to Internal Medicine notes during [facility] stay for day to day management of patient's medical co-morbidities. * Progressive cognitive dysfunction  Assessment & Plan   reports progressive cognitive decline that accelerated 2-3 weeks prior to admission. Multifactorial:  - Likely vascular dementia given increased moderate chronic microangiopathy, step wise decline. - ? Amyloid angiopathy per radiology  - Cognitive impairment resulting in mismanagement of her medications - which include steroids, xanax, flexeril, prozac with polypharmacy contributing - particularly since she takes the Flexeril/Xanax at night and that is when most of her falls were occurring.   - Significant Vitamin B12 deficiency contributing (ataxia, proprioceptive impairments, cognitive impairments)  - Also found so far to have slightly high T4, and low TSH  - Of note has hepatomegaly (albeit stable) and increased alk phos on admission. Ammonia level on admission was normal (18)   - UA also positive on admission for E.coli UTI.   - Exam with patchy sensory impairment, with clear impairment in proprioception in great toes. Ataxia/motor planning difficulties.  Clearly has impaired balance - this could be exacerbated by the B12 deficiency as well. - B6 level just adequate - she has had poor appetite as an outpatient   - Now on multivitamin daily in addition to B12 supplement      - Recommend outpatient Neuro/Senior Care follow-up  - Consult Endocrine to comment on TSH/T4 - adjustment of Levothyroxine potentially vs. In setting of recent hospitalization/fall.  - Follow-up work-up: B1   - Normal: A1C, MRI C-Spine, T-Spine shows no cord compression/cord signal abnormality, Ammonia level, SPEP, B6  - Treatment: PT/OT/SLP 3-5 hours/day, 5-7 days/week. See Vitamin B12 deficiency, UTI   - Discussed polypharmacy with family. Will plant discontinue alprazolam and flexeril at discharge. They will use Tylenol PRN at discharge for pain. Trial of Q15min frequent checks. - Restlessness at night likely 2/2 frequency/urgency with UTI   - Improved with treatment of UTI. Compression fracture of T8 vertebra (HCC)  Assessment & Plan  In the past has broken her pelvis after a fall  Chronically on steroids, and likely should have DXA and osteoporosis work-up   - Outpatient f/u with Endocrinology - appreciate recs, patient likely with severe osteoporosis   - Vitamin D 26.6 - started on 2000 units daily    - started on calcium supplement by Endocrinology  Pain control as below  TLSO when OOB and HOB > 45 degrees. Thoracic spine precautions  Plan for repeat XR ~9/1 with follow-up with Neurosurgery at that time. Vitamin B12 deficiency  Assessment & Plan  8/17 level 153  8/18 received 1000mcg IM shot  - Received 5 IM injections. - Then started 1000mcg daily on 8/27  - Monitor proprioception, sensation, ataxia/motor planning.   - Outpatient f/u with Neurology  - Nutrition consult to evaluate dietary intake. Closed fracture of multiple ribs of right side  Assessment & Plan  2/2 fall  R 3-5  Currently on RA  Pain control as below  Incentive spirometry.     Moderate protein-calorie malnutrition ([address])  Assessment & Plan  Malnutrition Findings:   Adult Malnutrition type: Acute illness  Adult Degree of Malnutrition: Malnutrition of moderate degree  Malnutrition Characteristics: Inadequate energy, Weight loss                  360 Statement: Moderate protein calorie malnutrition related to acute illness/progressive cognitive dysfunction as evidenced by wt loss >2 % over 1 week, and intake < 75% for > 1 week. Treatment: adding snacks in between regular meals    BMI Findings: Body mass index is 26.63 kg/m². Osteoporosis  Assessment & Plan  Chronically on steroids, and likely should have DXA and osteoporosis work-up   - Consulted endocrine on admission.    - Vitamin D 26.6 - started on 2000 units daily    - started on calcium supplement by Endocrinology  - Outpatient f/u with endocrine   - She will benefit from getting DEXA scan as outpatient and will qualify for anabolic agents for treatment as outpt    Abnormal CT of the abdomen  Assessment & Plan  1. Stable hepatomegaly - checking CMP in the AM. Alk phos elevated on admission, but stable. Recheck with improvement. Recheck in 1 week. May have been related to recent fracture. Tylenol dose < 2000mg a day. 2. Stable R renal cyst    Outpatient f/u with PCP    Adenopathy  Assessment & Plan  In the past has had "stable distal paraesophageal and gastrohepatic ligament adenopathy of uncertain etiology""  Here also noted to have R retrocrural lymph node adjacent to the descending thoracic aorta, juts proximal to the diaphragmatic hiatus - recommended for CT of the chest in 3-6 months to assess for stability. Onychomycosis  Assessment & Plan  Consult podiatry for very overgrown toenails  Appreciate their debridement. Oral dyskinesia  Assessment & Plan  This is stable and was noted in her [facility] admission, where it was felt to be due to reglan. She is no longer on this medication. Patient attributes to her Sjogrens. Would not add additional medication to address this at this time.   Can f/u with Neurology outpatient    Gastroparesis  Assessment & Plan  Was previously on reglan, does not appear to be on this medication at this time. May need small, more frequent meals  Meal supplementation due to poor intake and nutritional risk. Monitor for symptoms. Hypothyroidism  Assessment & Plan  8/17 TSH 0.314 and T4 1.29  Previously appeared to be on 88-75mcg daily. Not sure when 100mcg was initiated and by who. Consulted endocrine to comment on adjusting dose vs. In setting of recent fall/injury/hospitalization   - Reverse T3 elevated, and dose decreased to 88mcg daily by endocrine   - Recheck thyroid panel in 4-6 weeks. Anxiety and depression  Assessment & Plan  Currently on prozac 20mg daily  Also on xanax 1mg QHS PRN chronically, which it seems she was taking scheduled at home. Has not been on this only PRN here. - Has not been using Prn and been fine. Would discontinue this med. Question of this contributed to her worsening falls (as she was likely not managing her meds well at home given her cognitive impairment), and her falls were worse at night when she would take it. That being said, this medication is also not showing up in her PDMP. Hypertension  Assessment & Plan  Home: Atenolol 50mg daily, Losartan 25mg daily  Here: Atenolol 50mg daily, Norvasc 5mg BID, Hydralazine PRN  Monitor and adjust as appropriate  IM consulted and assisting with management. Sjogren's disease [facility])  Assessment & Plan  Follows with Dr. Ky Yang outpatient  Chronically on prednisone and methotrexate.   - Started Folic Acid supplementation while on methotrexate. Continue for now. Acute cystitis without hematuria-resolved as of 8/29/2023  Assessment & Plan  She reports episodes of urgency at times  - UA with pan susceptible E.coli 100k CFU   - 8/21 started on Keflex for 5 days - completed    - Pyridium at night while treated. Can discontinue now.    -  reports a pattern of patient holding her urine until the last minute and then rushing to the bathroom  - PVRs have been low continued timed voids  - Discussed with family recommendation for timed voids at home to help with continence. Discharge Physical Examination:  /69 (BP Location: Left arm)   Pulse 80   Temp 98.2 °F (36.8 °C) (Oral)   Resp 18   Ht 4' 10" (1.473 m)   Wt 57.6 kg (127 lb)   LMP 01/13/2005   SpO2 95%   BMI 26.54 kg/m²     Gen: No acute distress, Well-nourished, well-appearing. HEENT: Moist mucus membranes, Normocephalic/Atraumatic  Cardiovascular: Regular rate, rhythm, S1/S2. Distal pulses palpable  Heme/Extr: No edema  Pulmonary: Non-labored breathing. Lungs CTAB  : No medeiros  GI: Soft, non-tender, non-distended. BS+  MSK: Arthritic changes in hands, feet. Integumentary: Skin is warm, dry. Neuro: AAOx2, CN 2-12 intact Some oral motor dyskinetic movements. Proprioception impaired in great toes, but fine in ankles. Patchy sensory impairment. No peri dysmetria/ataxia on FTN - improved. Ataxia/proprioceptive difficulties more evident in higher level activities. But overall improved. Impaired recall, cognition, insight, but appropriate and cooperative. MMT:   Strength:   Right  Left  Site  Right  Left  Site    5 5  S Ab: Shoulder Abductors  4- 4- HF: Hip Flexors    5 5  EF: Elbow Flexors  NT  NT KF: Knee Flexors    4  5  EE: Elbow Extensors  4  4 KE: Knee Extensors    5  5  WE: Wrist Extensors  5  5  DR: Dorsi Flexors    4  4  FF: Finger Flexors  5  5  PF: Plantar Flexors    5  4  HI: Hand Intrinsics  4  4  EHL: Extensor Hallucis Longus   Psych: Normal mood and affect. Significant Findings, Care, Treatment and Services Provided: Acute comprehensive interdisciplinary inpatient rehabilitation including PT, OT, SLP, RN, CM, SW, dietary, psychology, etc.    Complications: None    Functional Status Upon Admission to ARC:  Mobility: Ebony bed mobility, Ebony ambulation 15' with RW  Transfers: Ebony transfers  ADLs:  Sup grooming, Ebony UB dressing, Ebony LB dressing    Functional Status Upon Discharge from ARC:   PT: Sup bed mobility, Sup transfers, CGA car transfers, CGA ambulation 200' with RW  OT: Ind eating, Sup oral hygiene, Sup shower/bathe, CGA tub/shower transfers, CGA Ub dressing, Sup LB dressing, Sup footwear, Sup toileting hygiene and toilet transfers  SLP: QUINTINT 1.4 out of 4 -severely impaired cognition. Ebony-modA comprehension, Sup-Ebony expression/social interaction, modA problem solving/memory    Discharge Diagnosis: Impairment of mobility, safety and Activities of Daily Living (ADLs) due to Brain Dysfunction:  02.1  Non-Traumatic    Discharge Medications:   See after visit summary for reconciled discharge medications provided to patient and family. Condition at Discharge: good     Discharge instructions/Information to patient and family:   See after visit summary for information provided to patient and family. Provisions for Follow-Up Care:  See after visit summary for information related to follow-up care and any pertinent home health orders. Future Appointments   Date Time Provider [address] [place] [address]   9/1/2023  9:00 AM CHARLES Burks [facility]   9/5/2023  1:30 PM [facility]       Disposition: Home with family support and Home PT with  [facility]. Planned Readmission: No    Discharge Statement   I spent 45 minutes discharging the patient. This time was spent on the day of discharge. I had direct contact with the patient on the day of discharge. Greater than 50% of the total time was spent examining patient, answering all patient questions, arranging and discussing plan of care with patient as well as directly providing post-discharge instructions. Additional time then spent on discharge activities. Discharge Medications:  See after visit summary for reconciled discharge medications provided to patient and family.       Facility Administered Medications Prior to Discharge:    Current Facility-Administered Medications   Medication Dose Route Frequency Provider Last Rate   • acetaminophen  650 mg Oral Q12H Michele Sandra MD     • amLODIPine  5 mg Oral BID DARIO Frances     • atenolol  50 mg Oral Daily Michele Sandra MD     • atorvastatin  10 mg Oral Daily With Nichole Frost MD     • bisacodyl  5 mg Oral Daily PRN Michele Sandra MD     • bisacodyl  10 mg Rectal Daily PRN Michele Sandra MD     • bisacodyl  10 mg Rectal Once Katarzyna Roy MD     • calcium carbonate  1 tablet Oral BID With Meals Silvino Lowe MD     • cholecalciferol  2,000 Units Oral Daily Silvino Lowe MD     • cyanocobalamin  1,000 mcg Oral Daily Michele Sandra MD     • docusate sodium  100 mg Oral BID Michele Sandra MD     • enoxaparin  30 mg Subcutaneous Q12H Theodore Gary MD     • FLUoxetine  20 mg Oral Daily Michele Sandra MD     • folic acid  1 mg Oral Daily Michele Sandra MD     • hydrALAZINE  25 mg Oral Q8H PRN DARIO Frances     • levothyroxine  88 mcg Oral Early Morning Silvino Lowe MD     • lidocaine  1 patch Topical Daily Michele Sandra MD     • methocarbamol  250 mg Oral Q6H PRN Michele Sandra MD     • methotrexate  20 mg Oral Weekly Michele Sandra MD     • multivitamin stress formula  1 tablet Oral Daily Michele Sandra MD     • nystatin   Topical BID Michele Sandra MD     • ondansetron  4 mg Oral Q6H PRN Michele Sandra MD     • oxyCODONE  2.5 mg Oral BID PRN Michele Sandra MD     • polyethylene glycol  17 g Oral Daily Michele Sandra MD     • predniSONE  5 mg Oral HS Michele Sandra MD     • QUEtiapine  12.5 mg Oral HS PRN Michele Sandra MD     • senna  2 tablet Oral HS Michele Sandra MD

## 2023-08-30 NOTE — NURSING NOTE
D/c instructions reviewed with pt and pt's . Verbalized understanding, all questions answered. Belongings and DME packed up by . Pt had spine XRAY prior to discharge. Accompanied by pca down to lobby and assisted into car.  Pt stable upon discharge

## 2023-08-31 ENCOUNTER — HOME CARE VISIT (OUTPATIENT)
Dept: HOME HEALTH SERVICES | Facility: HOME HEALTHCARE | Age: 68
End: 2023-08-31
Payer: MEDICARE

## 2023-08-31 VITALS
TEMPERATURE: 98.7 F | DIASTOLIC BLOOD PRESSURE: 76 MMHG | BODY MASS INDEX: 26.66 KG/M2 | SYSTOLIC BLOOD PRESSURE: 118 MMHG | HEIGHT: 58 IN | OXYGEN SATURATION: 99 % | RESPIRATION RATE: 18 BRPM | WEIGHT: 127 LBS | HEART RATE: 72 BPM

## 2023-08-31 PROCEDURE — 400013 VN SOC

## 2023-08-31 PROCEDURE — G0299 HHS/HOSPICE OF RN EA 15 MIN: HCPCS

## 2023-08-31 PROCEDURE — 10330081 VN NO-PAY CLAIM PROCEDURE

## 2023-08-31 NOTE — CASE MANAGEMENT
Team dc summary - pt made gains and returned home w/spouse and contd hhc services through [facility] for rn pt ot and speech. Pt received a transport chair prior to dc through [facility]. Spouse was present daily and for dc instructions, provided transport home.

## 2023-09-01 ENCOUNTER — OFFICE VISIT (OUTPATIENT)
Dept: NEUROSURGERY | Facility: CLINIC | Age: 68
End: 2023-09-01
Payer: MEDICARE

## 2023-09-01 ENCOUNTER — APPOINTMENT (OUTPATIENT)
Dept: LAB | Facility: AMBULARY SURGERY CENTER | Age: 68
End: 2023-09-01
Payer: MEDICARE

## 2023-09-01 VITALS
SYSTOLIC BLOOD PRESSURE: 136 MMHG | HEART RATE: 78 BPM | DIASTOLIC BLOOD PRESSURE: 70 MMHG | OXYGEN SATURATION: 99 % | TEMPERATURE: 97.9 F

## 2023-09-01 DIAGNOSIS — S22.41XA CLOSED FRACTURE OF MULTIPLE RIBS OF RIGHT SIDE, INITIAL ENCOUNTER: Primary | ICD-10-CM

## 2023-09-01 DIAGNOSIS — E87.1 HYPONATREMIA: ICD-10-CM

## 2023-09-01 DIAGNOSIS — S22.069A T8 VERTEBRAL FRACTURE (HCC): ICD-10-CM

## 2023-09-01 LAB
ALBUMIN SERPL BCP-MCNC: 4.1 G/DL (ref 3.5–5)
ALP SERPL-CCNC: 128 U/L (ref 34–104)
ALT SERPL W P-5'-P-CCNC: 43 U/L (ref 7–52)
ANION GAP SERPL CALCULATED.3IONS-SCNC: 11 MMOL/L
AST SERPL W P-5'-P-CCNC: 49 U/L (ref 13–39)
BILIRUB SERPL-MCNC: 0.85 MG/DL (ref 0.2–1)
BUN SERPL-MCNC: 14 MG/DL (ref 5–25)
CALCIUM SERPL-MCNC: 10.3 MG/DL (ref 8.4–10.2)
CHLORIDE SERPL-SCNC: 100 MMOL/L (ref 96–108)
CO2 SERPL-SCNC: 24 MMOL/L (ref 21–32)
CREAT SERPL-MCNC: 0.69 MG/DL (ref 0.6–1.3)
GFR SERPL CREATININE-BSD FRML MDRD: 89 ML/MIN/1.73SQ M
GLUCOSE P FAST SERPL-MCNC: 103 MG/DL (ref 65–99)
POTASSIUM SERPL-SCNC: 3.3 MMOL/L (ref 3.5–5.3)
PROT SERPL-MCNC: 8.1 G/DL (ref 6.4–8.4)
SODIUM SERPL-SCNC: 135 MMOL/L (ref 135–147)

## 2023-09-01 PROCEDURE — 36415 COLL VENOUS BLD VENIPUNCTURE: CPT

## 2023-09-01 PROCEDURE — 80053 COMPREHEN METABOLIC PANEL: CPT

## 2023-09-01 PROCEDURE — 99213 OFFICE O/P EST LOW 20 MIN: CPT | Performed by: PHYSICIAN ASSISTANT

## 2023-09-01 NOTE — PROGRESS NOTES
09/01/23 1442   Hello, [Guardian’s Name / Patient’s Tomasz Montanez, this is [Caller Tomasz Montanez from [facility], and our clinical care team wanted to check on you / your child after your recent visit to the hospital. It will only take 3-5 minutes. Is this a good time? Discharge Call Type/ Specific Diagnosis: General Call   General Discharge Phone Call   Were your/your child's discharge instructions clear and understandable? Please tell me in your own words how to care for yourself/your child now that you're home Yes;Patient understood instructions   Have you filled your/your child's new prescriptions yet? Yes   What questions do you have about those medications? No questions   Are you/your child having any unusual symptoms or problems? (Specific to problem- i.e., dressing, pain, bruising or swelling, procedure, etc.) No reported symptoms/problems   Do you have follow up appointment with your/your child's physician? Yes   Is there anything preventing you from keeping that appointment? No;Patient able to keep appointment   Are there any physicians, nurses, or hospital staff you would like us to recognize for doing a very good job? Nurse;PCA/Tech;PT/OT/RT/SLP   Thank you for taking the time to share with me about your care and recovery. Do you have any suggestions for us? No   This call resulted in: No interventions needed   Call Complete   Attempted Number of Calls 1   Discharge phone call complete?  Complete

## 2023-09-01 NOTE — PROGRESS NOTES
Neurosurgery Office Note  Olimpia Fine 70 y.o. female MRN: [id number]      Assessment/Plan      Patient is 70years old pleasant woman here today with her  for 2 weeks follow-up mild superior endplate T8 compression deformity following fall about 2 weeks ago. She had also multiple rib fractures  Patient reports is feeling better, only mild pain at night when she takes of brace, taking half dose of oxycodone 5 mg, she is wearing TLSO brace. No radicular symptoms, numbness, or weakness in the extremities. She has baseline gait issues and uses cane for ambulation. Patient has some mental fogginess in the setting of cognitive issues. Patient is doing physical therapy at home. Exam-patient alert and oriented x3. Moves all extremities. .  Strength is 5/5 bilaterally. Sensation to light touch intact throughout. DTR 2+ without clonus bilaterally. Nontender on palpation of T8 region. Gait unstable. Wearing TLSO brace. Hx, PEx, and images reviewed with th patient and her  , Mx plan discussed. Images shows a stable mild superior endplate compression deformity of T8 with her back pain. But still feeling pain at night. recommend continue follow-up with x-rays in 4 weeks. Continue physical therapy and wearing TLSO brace. Take Tylenol/low-dose oxy for pain. Fall precaution, avoid lifting heavy objects, excessive bending or twisting. Questions and concerns were answered and patient her  expressed their understanding's and agreed with the plan    Plan:  1. Upright thoracic spine x-rays in brace in 4 weeks to  2. Continue weaning TLSO brace as instructed  3. For precaution, avoid lifting heavy objects, excessive bending or twisting  4. Follow-up in 4 weeks  5.  Call with question or concern    I have spent a total time of 45 minutes on 09/01/23 in caring for this patient including Diagnostic results, Prognosis, Risks and benefits of tx options, Instructions for management, Patient and family education, Importance of tx compliance, Risk factor reductions, Impressions, Counseling / Coordination of care, Documenting in the medical record, Reviewing / ordering tests, medicine, procedures   and Obtaining or reviewing history  . Chief Complaint   Patient presents with   • Follow-up     T8 Comp Fx--T/S XR 8/30   S/p falling backwards on ground due to gait inbalance       C/C: " Two weeks follow up for T8 compression"      HPI     All patient's medical histories were reviewed and updated as appropriate: Allergies, current medication lists, past medical history, past surgical history, family history, social history, and current medical lists    Patient is a 70years old pleasant woman here today for 2 weeks follow-up of mild superior endplate compression deformity of T8. Patient with history of mechanical fall at home about 2 weeks ago. Patient has additional ribs fracture. She is wearing TLSO brace and had follow-up upright thoracic spine x-rays which demonstrate stable mild superior endplate compression deformity. Patient reports is feeling better with her back pain. Currently doing physical therapy at home. She takes low-dose oxygen at night for back and rib pain. Her  reports brace is helping him during the daytime and does not need much pain meds. Patient denies any radicular symptoms, numbness, weakness or paresthesia of the extremities, baseline gait issues uses cane for safety. No bowel/bladder issues. Denies fever, chills, rigors, cough or chest pain. REVIEW OF SYSTEMS  Review of system personally reviewed and updated as appropriate:  Review of Systems   Constitutional: Negative. HENT: Negative. Eyes: Negative. Respiratory: Negative. Cardiovascular: Negative. Gastrointestinal: Negative. Endocrine: Negative. Genitourinary: Negative. Musculoskeletal: Positive for back pain and gait problem.         T8 Comp Fx--T/S XR 8/30   S/p falling backwards on ground due to gait inbalance  ED on 8/18 for Cognitive Dysfunction   TLSO brace  No Pain   Meds: Oxycodone, Lidocaine Patch   Skin: Negative. Allergic/Immunologic: Negative. Hematological: Negative. Psychiatric/Behavioral: Negative. All other systems reviewed and are negative. ROS obtained by MA. Reviewed. See HPI.      Meds/Allergies     Current Outpatient Medications   Medication Sig Dispense Refill   • acetaminophen (TYLENOL) 325 mg tablet Take 2 tablets (650 mg total) by mouth every 6 (six) hours as needed for mild pain or moderate pain  0   • amLODIPine (NORVASC) 5 mg tablet Take 1 tablet (5 mg total) by mouth 2 (two) times a day 60 tablet 0   • atenolol (TENORMIN) 50 mg tablet Take 1 tablet (50 mg total) by mouth daily 30 tablet 0   • atorvastatin (LIPITOR) 10 mg tablet Take 10 mg by mouth daily  1   • calcium carbonate (OYSTER SHELL,OSCAL) 500 mg Take 1 tablet by mouth 2 (two) times a day with meals 60 tablet 0   • cholecalciferol (VITAMIN D3) 1,000 units tablet Take 2 tablets (2,000 Units total) by mouth daily Do not start before August 31, 2023. 60 tablet 0   • cyanocobalamin (VITAMIN B-12) 1000 MCG tablet Take 1 tablet (1,000 mcg total) by mouth daily Do not start before August 31, 2023. 30 tablet 0   • docusate sodium (COLACE) 100 mg capsule Take 1 capsule (100 mg total) by mouth 2 (two) times a day 60 capsule 0   • FLUoxetine (PROzac) 20 mg capsule Take 20 mg by mouth daily  1   • folic acid (FOLVITE) 1 mg tablet Take 1 tablet (1 mg total) by mouth daily 30 tablet 0   • levothyroxine 88 mcg tablet Take 1 tablet (88 mcg total) by mouth daily in the early morning Do not start before August 31, 2023. 30 tablet 0   • lidocaine (LIDODERM) 5 % Apply 1 patch topically over 12 hours daily Remove & Discard patch within 12 hours or as directed by MD Do not start before August 31, 2023.  0   • [START ON 9/6/2023] methotrexate 2.5 mg tablet Take 8 tablets (20 mg total) by mouth once a week Do not start before September 6, 2023. 32 tablet 0   • oxyCODONE (ROXICODONE) 5 immediate release tablet Take 0.5 tablets (2.5 mg total) by mouth 2 (two) times a day as needed for severe pain for up to 10 days Max Daily Amount: 5 mg 7 tablet 0   • polyethylene glycol (MIRALAX) 17 g packet Take 17 g by mouth daily Do not start before August 31, 2023. 30 each 0   • predniSONE 5 mg tablet Take 1 tablet (5 mg total) by mouth daily with dinner  0     No current facility-administered medications for this visit. Allergies   Allergen Reactions   • Codeine Vomiting   • Hydroxyquinoline Sulfate [Oxyquinoline] Vomiting   • Leucovorin Vomiting   • Tizanidine Vomiting       PAST HISTORY    Past Medical History:   Diagnosis Date   • Anemia 3/4/2021   • Closed nondisplaced fracture of anterior wall of right acetabulum ([address]) 2/23/2021   • Closed nondisplaced fracture of right pubis ([address]) 2/23/2021   • Fall 2/23/2021   • Lymphadenitis, chronic    • Multiple closed fractures of pelvis ([address]) 2/27/2021   • Sialadenitis    • Sjogren's disease ([address])    • Stomach problems    • Thyroid disorder    • Xerostomia        Past Surgical History:   Procedure Laterality Date   • BRAIN SURGERY  2019    tumor removed Dr. Hernan Cherry   • CHOLECYSTECTOMY         Social History     Tobacco Use   • Smoking status: Former   • Smokeless tobacco: Never   Substance Use Topics   • Alcohol use: Yes   • Drug use: Never       Family History   Problem Relation Age of Onset   • Diabetes Other    • Hypertension Other    • Heart disease Other    • Arthritis Other    • Heart disease Mother    • Kidney disease Mother    • Alcohol abuse Father          Above history personally reviewed. EXAM    Vitals:Last menstrual period 01/13/2005, not currently breastfeeding. ,There is no height or weight on file to calculate BMI. Physical Exam  Constitutional:       Appearance: Normal appearance. HENT:      Head: Normocephalic and atraumatic.    Eyes: Extraocular Movements: Extraocular movements intact. Cardiovascular:      Rate and Rhythm: Normal rate. Pulses: Normal pulses. Pulmonary:      Effort: Pulmonary effort is normal.   Musculoskeletal:         General: No tenderness. Cervical back: Normal range of motion. Neurological:      General: No focal deficit present. Mental Status: She is alert and oriented to person, place, and time. GCS: GCS eye subscore is 4. GCS verbal subscore is 5. GCS motor subscore is 6. Cranial Nerves: Cranial nerves 2-12 are intact. Sensory: Sensation is intact. Motor: Motor function is intact. Deep Tendon Reflexes: Reflexes are normal and symmetric. Reflex Scores:       Tricep reflexes are 2+ on the right side and 2+ on the left side. Bicep reflexes are 2+ on the right side and 2+ on the left side. Brachioradialis reflexes are 2+ on the right side and 2+ on the left side. Patellar reflexes are 2+ on the right side and 2+ on the left side. Achilles reflexes are 2+ on the right side and 2+ on the left side. Psychiatric:         Speech: Speech normal.         Neurologic Exam     Mental Status   Oriented to person, place, and time. Speech: speech is normal   Level of consciousness: alert    Cranial Nerves   Cranial nerves II through XII intact.      CN III, IV, VI   Nystagmus: none     CN XI   CN XI normal.     Motor Exam   Muscle bulk: normal  Overall muscle tone: normal  Right arm tone: normal  Left arm tone: normal  Right arm pronator drift: absent  Left arm pronator drift: absent  Right leg tone: normal  Left leg tone: normal    Sensory Exam   Light touch normal.     Gait, Coordination, and Reflexes     Reflexes   Right brachioradialis: 2+  Left brachioradialis: 2+  Right biceps: 2+  Left biceps: 2+  Right triceps: 2+  Left triceps: 2+  Right patellar: 2+  Left patellar: 2+  Right achilles: 2+  Left achilles: 2+  Right : 2+  Left : 2+  Right Pan: absent  Left Pan: absent  Right ankle clonus: absent  Left pendular knee jerk: absent        MEDICAL DECISION MAKING    Imaging Studies:     XR spine thoracic 3 vw    Result Date: 8/30/2023  Narrative: THORACIC SPINE INDICATION: Follow-up compression fracture. COMPARISON: 8/17/2023, MR thoracic spine 8/16/2023 VIEWS:  XR SPINE THORACIC 3 VW Images: 3 FINDINGS: Mild superior endplate compression fracture of T8, similar to previous studies. No appreciable bony retropulsion. Thoracic vertebral alignment is within normal limits. Age related degenerative changes are seen. The pedicles appear intact. Cholecystectomy clips. Impression: Stable appearance mild superior endplate compression fracture T8. Workstation performed: [id number]     XR spine thoracic 3 vw    Result Date: 8/18/2023  Narrative: THORACIC SPINE INDICATION:   Upright XRs for re-evaluation of T8 fx. COMPARISON: MRI thoracic spine 8/16/2023 VIEWS:  XR SPINE THORACIC 3 VW FINDINGS: Stable mild anterior wedging of T8. Otherwise stable alignment. Thoracic vertebral alignment is within normal limits. No significant degenerative changes. There is no displacement of the paraspinal line. The pedicles appear intact. Impression: Stable mild anterior wedging of the T8 vertebral body. Workstation performed: [id number]     MRI thoracic spine w wo contrast    Result Date: 8/17/2023  Narrative: MRI THORACIC SPINE WITH AND WITHOUT CONTRAST INDICATION: fall. COMPARISON:  12/5/12; 8/15/23. TECHNIQUE:  Multiplanar, multisequence imaging of the thoracic spine was performed before and after gadolinium administration. . IV Contrast:  6 mL of Gadobutrol injection (SINGLE-DOSE) IMAGE QUALITY:  Diagnostic. FINDINGS: ALIGNMENT: Slight exaggeration of the thoracic kyphosis at the T8 level secondary to an acute compression deformity with approximately 25% loss of vertebral body height, exemplified on series 23, image 7. No bony retropulsion or epidural hematoma.  MARROW SIGNAL: Mild anterior wedging of T8 with marrow edema indicative of an acute compression abnormality. Scattered spondylotic changes. No subluxation. Hemangioma in the T9 vertebra eccentrically on the left. Smaller hemangiomata elsewhere. THORACIC CORD:  Normal signal within the thoracic cord. PREVERTEBRAL AND PARASPINAL SOFT TISSUES:   There is an abnormal right-sided retrocrural lymph node adjacent to the descending thoracic aorta, series 25, image 30 measuring 2.2 cm, indeterminant. 1.5 cm T2 hyperintense lesion in the right kidney noted compatible with a cyst. Smaller cyst in the right kidney as well. THORACIC DEGENERATIVE CHANGE:  No disc herniation, canal stenosis or foraminal narrowing. No degenerative changes. POSTCONTRAST:  No abnormal enhancement. OTHER FINDINGS:  None. Impression: 1. Mild, acute compression fracture of T8 with approximately 25% loss of vertebral body height. No bony retropulsion or epidural hematoma. No cord compression or cord signal abnormality. 2. Mild spondylotic changes elsewhere. 3. Abnormal 2.2 cm right retrocrural lymph node adjacent to the descending thoracic aorta just proximal to the diaphragmatic hiatus. Both benign and malignant etiologies are in the differential diagnosis. Follow-up CT of the chest in 3 to 6 months is suggested to assess for stability. Further clinical assessment advised. Workstation performed: [id number]     MRI cervical spine w wo contrast    Result Date: 8/17/2023  Narrative: MRI CERVICAL SPINE WITH AND WITHOUT CONTRAST INDICATION: fall. Weakness. COMPARISON: 1/18/2018; 8/15/2023 TECHNIQUE:  Multiplanar, multisequence imaging of the cervical spine was performed before and after gadolinium administration. . IV Contrast:  6 mL of Gadobutrol injection (SINGLE-DOSE) IMAGE QUALITY:  Diagnostic. FINDINGS: ALIGNMENT: Reversal the cervical lordosis at the C5 level is stable. Mild grade 1 retrolisthesis of C5 on C6.  MARROW SIGNAL: Scattered degenerative endplate changes. No focally suspicious marrow lesions. No bone marrow edema or compression abnormality. CERVICAL AND VISUALIZED UPPER THORACIC CORD:  Normal signal within the visualized cord. Again demonstrated is a small sessile enhancing extra-axial lesion along the dorsal margin of the odontoid process in the upper cervical canal, series 28, image 8, measuring approximately 1.7 cm craniocaudal dimension by 0.3 cm maximal AP dimension by 1.5 cm maximal transverse dimension, series 29, image 4. PREVERTEBRAL AND PARASPINAL SOFT TISSUES: Bilateral cystic lesions right greater than left parotid gland noted, increased from the prior study on the right. VISUALIZED POSTERIOR FOSSA:  The visualized posterior fossa demonstrates no abnormal signal. CERVICAL DISC SPACES: C2-C3:  Normal. C3-C4:  Normal. C4-C5: There is uncovering the intervertebral disc space. Mild right neural foraminal narrowing. Central canal and left neural foramen patent. C5-C6: There is uncovering the intervertebral disc space. There is a left neural foraminal disc protrusion. Moderate left neural foraminal narrowing. Mild central canal narrowing. Mild right neural foraminal narrowing. This level is similar to the prior study. C6-C7: There is loss of disc height and signal. There is a right neural foraminal disc protrusion. Mild right neural foraminal narrowing. Central canal and left neural foramen patent. This level is similar to the prior study. C7-T1:  Normal. UPPER THORACIC DISC SPACES:  Normal. POSTCONTRAST IMAGING: Small sessile enhancing extra-axial lesion along the dorsum of the odontoid process as described above. OTHER FINDINGS:  None. Impression: 1. No cord compression or cord signal abnormality. No epidural hematoma or paraspinal edema. 2. Scattered spondylotic changes with reversal of the cervical lordosis at the C5 level are stable.  3. Sessile enhancing extra-axial lesion anterior to the upper cervical cord dorsal to the odontoid process is stable possibly meningioma and/or pannus, stable. No cord compression or mass effect. 4. Progressive cystic changes in both parotid glands right greater than left, correlating to the history of Sjogren's disease. Workstation performed: [id number]     MRI brain w wo contrast    Result Date: 8/17/2023  Narrative: MRI BRAIN WITH AND WITHOUT CONTRAST INDICATION: Weakness - Generalized. History of Sjogren's disease. COMPARISON: 12/22/2016; 7/12/2022 TECHNIQUE: Multiplanar, multisequence imaging of the brain was performed before and after gadolinium administration. IV Contrast:  6 mL of Gadobutrol injection (SINGLE-DOSE) IMAGE QUALITY:   Diagnostic. FINDINGS: BRAIN PARENCHYMA:  There is no discrete mass, mass effect or midline shift. There is no intracranial hemorrhage. Normal posterior fossa. Diffusion imaging is unremarkable. Small scattered hyperintensities on T2/FLAIR imaging are noted in the periventricular and subcortical white matter demonstrating an appearance that is statistically most likely to represent moderate microangiopathic change. White matter changes have progressed from the prior study significantly. Susceptibility weighted imaging demonstrates a few scattered foci of blooming artifact in both the supra and infratentorial brain. Postcontrast imaging of the brain demonstrates no abnormal enhancement. VENTRICLES: Enlargement of ventricles and extra-axial CSF spaces, out of proportion to the patient's age most consistent with cerebral and cerebellar atrophy. SELLA AND PITUITARY GLAND:  Normal. ORBITS:  Normal. PARANASAL SINUSES:  Normal. VASCULATURE:  Evaluation of the major intracranial vasculature demonstrates appropriate flow voids. CALVARIUM AND SKULL BASE:  Normal. EXTRACRANIAL SOFT TISSUES: Cystic expansion of the visualized portions of the right parotid gland noted. Milder cystic dilatation in the left parotid gland. Impression: 1.  Moderate, chronic microangiopathy is significantly increased from the prior study. 2. A few foci of blooming artifact implicate amyloid angiopathy. No acute intracranial hemorrhage. 3. No acute infarction, intracranial hemorrhage or mass. 4. Right greater than left parotid cystic changes similar to the prior ultrasound, correlating to the history of Sjogren's disease. Workstation performed: [id number]     CT recon only thoracolumbar    Result Date: 8/15/2023  Narrative: CT THORACIC AND LUMBAR SPINE INDICATION:   fall, pain along entirety of thoracic and lumbar spine. COMPARISON: MRI thoracic and lumbar spine from 12/5/2012 and CT scan from 3/4/2021. TECHNIQUE: Axial CT examination of the thoracic and lumbar spine was obtained utilizing reconstructed images from CT of the chest abdomen and pelvis performed the same day. Images were reformatted in the sagittal and coronal planes. This examination, like all CT scans performed in the [facility], was performed utilizing techniques to minimize radiation dose exposure, including the use of iterative reconstruction and automated exposure control. FINDINGS: ALIGNMENT: Stable alignment. No spondylolisthesis. No scoliosis. VERTEBRAE:  No fracture. No acute osseous abnormality. DEGENERATIVE CHANGES: Mild multilevel degenerative disc disease. PREVERTEBRAL AND PARASPINAL SOFT TISSUES: Normal.     Impression: No fracture or traumatic subluxation. Workstation performed: [id number]     CT chest abdomen pelvis w contrast    Result Date: 8/15/2023  Narrative: CT CHEST, ABDOMEN AND PELVIS WITH IV CONTRAST INDICATION:   R sided rib pain and abdominal pain after fall. COMPARISON: CT scan 3/4/2021. TECHNIQUE: CT examination of the chest, abdomen and pelvis was performed. Multiplanar 2D reformatted images were created from the source data.  This examination, like all CT scans performed in the [facility], was performed utilizing techniques to minimize radiation dose exposure, including the use of iterative reconstruction and automated exposure control. Radiation dose length product (DLP) for this visit:  331 mGy-cm IV Contrast:  100 mL of iohexol (OMNIPAQUE) Enteric Contrast: Enteric contrast was not administered. FINDINGS: CHEST LUNGS: Bibasilar dependent atelectasis. Minimal scarring in the right upper lobe. No nodule. No endotracheal or endobronchial lesion identified. PLEURA:  Unremarkable. HEART/GREAT VESSELS: Heart is unremarkable for patient's age. No thoracic aortic aneurysm. MEDIASTINUM AND ROBERTA: Stable paraesophageal lymph nodes distally. CHEST WALL AND LOWER NECK:  Unremarkable. ABDOMEN LIVER/BILIARY TREE: Stable hepatomegaly with focus of capsular enhancement in the right dome posteriorly. Stable cyst in the inferior right lobe. GALLBLADDER: Removed SPLEEN:  Unremarkable. PANCREAS:  Unremarkable. ADRENAL GLANDS:  Unremarkable. KIDNEYS/URETERS: Stable right renal cyst. No hydronephrosis. STOMACH AND BOWEL:  Unremarkable. APPENDIX: A normal appendix was visualized. ABDOMINOPELVIC CAVITY:  No ascites. No pneumoperitoneum. No lymphadenopathy. VESSELS: Atherosclerotic changes are present. No evidence of aneurysm. PELVIS REPRODUCTIVE ORGANS:  Unremarkable for patient's age. URINARY BLADDER:  Unremarkable. ABDOMINAL WALL/INGUINAL REGIONS:  Unremarkable. OSSEOUS STRUCTURES: Acute nondisplaced anterior right 3-5thrib fractures. Old right pubic ramus fracture. Impression: Acute nondisplaced anterior right 3rd-5th rib fractures. No pleural effusion or pneumothorax. Stable hepatomegaly. Stable right renal cyst. Workstation performed: [id number]     CT spine cervical without contrast    Result Date: 8/15/2023  Narrative: CT CERVICAL SPINE - WITHOUT CONTRAST INDICATION:   fall with headstrike. COMPARISON: June 22, 2012 TECHNIQUE:  CT examination of the cervical spine was performed without intravenous contrast.  Contiguous axial images were obtained.  Multiplanar 2D reformatted images were created from the source data. Radiation dose length product (DLP) for this visit:  331 mGy-cm . This examination, like all CT scans performed in the [facility], was performed utilizing techniques to minimize radiation dose exposure, including the use of iterative reconstruction and automated exposure control. IMAGE QUALITY:  Diagnostic. FINDINGS: ALIGNMENT: Anterolisthesis of C4 on C5 is seen. VERTEBRAE: Prior suboccipital anterior lateral craniectomy is visualized. Partial removal of the left posterior lateral ring of C1 is seen. DEGENERATIVE CHANGES: Moderate multilevel cervical degenerative changes are noted. No critical central canal stenosis. PREVERTEBRAL AND PARASPINAL SOFT TISSUES: Unremarkable THORACIC INLET:  Normal.     Impression: No cervical spine fracture or traumatic malalignment. Workstation performed: [id number]     CT head without contrast    Result Date: 8/15/2023  Narrative: CT BRAIN - WITHOUT CONTRAST INDICATION:   fall with headstrike, headache. COMPARISON:  None. TECHNIQUE:  CT examination of the brain was performed. Multiplanar 2D reformatted images were created from the source data. Radiation dose length product (DLP) for this visit:  1003 mGy-cm . This examination, like all CT scans performed in the [facility], was performed utilizing techniques to minimize radiation dose exposure, including the use of iterative reconstruction and automated exposure control. IMAGE QUALITY:  Diagnostic. FINDINGS: PARENCHYMA: Decreased attenuation is noted in periventricular and subcortical white matter demonstrating an appearance that is statistically most likely to represent moderate microangiopathic change. No CT signs of acute infarction. No intracranial mass, mass effect or midline shift. No acute parenchymal hemorrhage. VENTRICLES AND EXTRA-AXIAL SPACES:  Normal for the patient's age. VISUALIZED ORBITS: Normal visualized orbits.  PARANASAL SINUSES: Partial opacification of the sphenoid sinuses is seen. CALVARIUM AND EXTRACRANIAL SOFT TISSUES:  Normal.     Impression: No acute intracranial abnormality.  Sinus disease Workstation performed: [id number]       I have personally reviewed pertinent reports.  , I have personally reviewed pertinent films in PACS and I have personally reviewed pertinent films in PACS with a Radiologist.

## 2023-09-01 NOTE — CASE COMMUNICATION
[facility] has admitted your patient to [facility] service with the following disciplines:      SN, PT and OT  This report is informational only, no responses is needed  Primary focus of home health care- Musculoskeletal  Patient stated goals of care- Improve mobility and minimized pain  Anticipated visit pattern and next visit date- Next SN visit for 9/6 1w5  See medication list - meds in home differ from AVS  Significant clinical  findings- Pt is missing miralax in home will be getting from pharmacy today. Potential barriers to goal achievement- Fatigues easily       Thank you for allowing us to participate in the care of your patient.       [facility] Colon RN

## 2023-09-04 ENCOUNTER — TELEPHONE (OUTPATIENT)
Dept: PHYSICAL MEDICINE AND REHAB | Facility: HOSPITAL | Age: 68
End: 2023-09-04

## 2023-09-04 NOTE — TELEPHONE ENCOUNTER
Fallon Raya - she is doing much better. He has an appointment with her PCP tomorrow. He noticed her potassium was low on her most recent labs on 9/1 - she has 20mEq tablets, and has not had any symptoms (they hadn't been taking them since it wasn't on her list at discharge, and she hadn't required it in the hospital.). She tended to be high 3-low 4 in the hospital. I told him he will likely need to get her BMP checked with her primary care doctor to see if she needs to restart daily potassium. He gave her one 20mEq tablet which probably won't hurt her. He has an appointment tomorrow with her primary care doctor and trauma. Josh Amin feels she is getting sharper, and that functionally she is doing much better with her ambulation and even stairs at home.     Eric Cornejo MD  Physical Medicine and Rehabilitation

## 2023-09-05 ENCOUNTER — HOME CARE VISIT (OUTPATIENT)
Dept: HOME HEALTH SERVICES | Facility: HOME HEALTHCARE | Age: 68
End: 2023-09-05
Payer: MEDICARE

## 2023-09-05 ENCOUNTER — OFFICE VISIT (OUTPATIENT)
Dept: SURGERY | Facility: CLINIC | Age: 68
End: 2023-09-05
Payer: MEDICARE

## 2023-09-05 VITALS
DIASTOLIC BLOOD PRESSURE: 85 MMHG | RESPIRATION RATE: 16 BRPM | TEMPERATURE: 97.2 F | HEART RATE: 75 BPM | HEIGHT: 58 IN | OXYGEN SATURATION: 97 % | BODY MASS INDEX: 26.39 KG/M2 | SYSTOLIC BLOOD PRESSURE: 143 MMHG | WEIGHT: 125.7 LBS

## 2023-09-05 DIAGNOSIS — S22.41XD CLOSED FRACTURE OF MULTIPLE RIBS OF RIGHT SIDE WITH ROUTINE HEALING, SUBSEQUENT ENCOUNTER: Primary | ICD-10-CM

## 2023-09-05 PROCEDURE — 99212 OFFICE O/P EST SF 10 MIN: CPT | Performed by: EMERGENCY MEDICINE

## 2023-09-05 NOTE — PROGRESS NOTES
Office Visit - General Surgery  Herrera Silva MRN: [id number]  Encounter: [id number]    Assessment and Plan  Problem List Items Addressed This Visit        Musculoskeletal and Integument    Closed fracture of multiple ribs of right side - Primary     · Secondary to fall  · CT imaging showed: Acute nondisplaced anterior right 3rd-5th rib fractures  · Patient doing well since discharge. Asymptomatic. Continues to use incentive spirometry inspiring greater than 1.5 L. · Not requiring any pain medications. · Follow-up with PCP. Follow-up with trauma as needed. Chief Complaint:  Herrera Silva is a 70 y.o. female who presents for Follow-up (F/u rib fx. )    Subjective    This is a 78-year-old female who suffered multiple falls associated with weakness. She continues to follow with neurology for monitoring of the weakness. She was found to have multiple right-sided rib fractures and a T8 fracture. She is currently following with neurosurgery for T8 fracture and presents for reevaluation wearing TLSO brace. On my examination, patient denies any pain associated with the rib fractures. She denies any pain in general.  She does have some shortness of breath with significant exertion. Patient and  describes that she does have limited endurance, she is currently working with therapy. She denies any shortness of breath at rest.  She continues to use her incentive spirometer to her pulling greater than 1.5 L. She has not required any pain medication.     Past Medical History:   Diagnosis Date   • Anemia 3/4/2021   • Closed nondisplaced fracture of anterior wall of right acetabulum ([address]) 2/23/2021   • Closed nondisplaced fracture of right pubis ([address]) 2/23/2021   • Fall 2/23/2021   • Lymphadenitis, chronic    • Multiple closed fractures of pelvis ([address]) 2/27/2021   • Sialadenitis    • Sjogren's disease ([address])    • Stomach problems    • Thyroid disorder    • Xerostomia        Past Surgical History:   Procedure Laterality Date   • BRAIN SURGERY  2019    tumor removed Dr. Joavny Wright   • CHOLECYSTECTOMY         Family History   Problem Relation Age of Onset   • Diabetes Other    • Hypertension Other    • Heart disease Other    • Arthritis Other    • Heart disease Mother    • Kidney disease Mother    • Alcohol abuse Father        Social History     Tobacco Use   • Smoking status: Former   • Smokeless tobacco: Never   Substance Use Topics   • Alcohol use:  Yes   • Drug use: Never        Medications  Current Outpatient Medications on File Prior to Visit   Medication Sig Dispense Refill   • acetaminophen (TYLENOL) 325 mg tablet Take 2 tablets (650 mg total) by mouth every 6 (six) hours as needed for mild pain or moderate pain  0   • amLODIPine (NORVASC) 5 mg tablet Take 1 tablet (5 mg total) by mouth 2 (two) times a day 60 tablet 0   • atenolol (TENORMIN) 50 mg tablet Take 1 tablet (50 mg total) by mouth daily 30 tablet 0   • atorvastatin (LIPITOR) 10 mg tablet Take 10 mg by mouth daily  1   • calcium carbonate (OYSTER SHELL,OSCAL) 500 mg Take 1 tablet by mouth 2 (two) times a day with meals 60 tablet 0   • cholecalciferol (VITAMIN D3) 1,000 units tablet Take 2 tablets (2,000 Units total) by mouth daily Do not start before August 31, 2023. 60 tablet 0   • cyanocobalamin (VITAMIN B-12) 1000 MCG tablet Take 1 tablet (1,000 mcg total) by mouth daily Do not start before August 31, 2023. 30 tablet 0   • docusate sodium (COLACE) 100 mg capsule Take 1 capsule (100 mg total) by mouth 2 (two) times a day 60 capsule 0   • FLUoxetine (PROzac) 20 mg capsule Take 20 mg by mouth daily  1   • folic acid (FOLVITE) 1 mg tablet Take 1 tablet (1 mg total) by mouth daily 30 tablet 0   • levothyroxine 88 mcg tablet Take 1 tablet (88 mcg total) by mouth daily in the early morning Do not start before August 31, 2023. 30 tablet 0   • lidocaine (LIDODERM) 5 % Apply 1 patch topically over 12 hours daily Remove & Discard patch within 12 hours or as directed by MD Do not start before August 31, 2023.  0   • [START ON 9/6/2023] methotrexate 2.5 mg tablet Take 8 tablets (20 mg total) by mouth once a week Do not start before September 6, 2023. 32 tablet 0   • oxyCODONE (ROXICODONE) 5 immediate release tablet Take 0.5 tablets (2.5 mg total) by mouth 2 (two) times a day as needed for severe pain for up to 10 days Max Daily Amount: 5 mg 7 tablet 0   • polyethylene glycol (MIRALAX) 17 g packet Take 17 g by mouth daily Do not start before August 31, 2023. 30 each 0   • predniSONE 5 mg tablet Take 1 tablet (5 mg total) by mouth daily with dinner  0     No current facility-administered medications on file prior to visit. Allergies  Allergies   Allergen Reactions   • Codeine Vomiting   • Hydroxychloroquine GI Intolerance   • Hydroxyquinoline Sulfate [Oxyquinoline] Vomiting   • Leucovorin Vomiting   • Tizanidine Vomiting       Review of Systems   Respiratory: Positive for shortness of breath (with exertion ). Negative for chest tightness and wheezing. Cardiovascular: Negative for chest pain and palpitations. Gastrointestinal: Negative for abdominal distention and abdominal pain. Musculoskeletal: Negative for arthralgias, back pain, joint swelling, neck pain and neck stiffness. Skin: Negative for color change and wound. Objective  Vitals:    09/05/23 1343   BP: 143/85   Pulse: 75   Resp: 16   Temp: (!) 97.2 °F (36.2 °C)   SpO2: 97%       Physical Exam  Constitutional:       General: She is not in acute distress. Appearance: She is not ill-appearing. HENT:      Head: Normocephalic and atraumatic. Right Ear: External ear normal.      Left Ear: External ear normal.      Nose: Nose normal.      Mouth/Throat:      Mouth: Mucous membranes are moist.      Pharynx: Oropharynx is clear. Cardiovascular:      Rate and Rhythm: Normal rate and regular rhythm. Pulses: Normal pulses. Heart sounds: Normal heart sounds.    Pulmonary: Effort: Pulmonary effort is normal.      Breath sounds: Normal breath sounds. Chest:      Comments: Chest is nontender on palpation. No deformities. No swelling. No crepitus. No flail segment. No orthopnea. Abdominal:      General: Abdomen is flat. Bowel sounds are normal.      Palpations: Abdomen is soft. Musculoskeletal:      Cervical back: Normal range of motion and neck supple. Comments: Patient is wearing a TLSO brace. Skin:     General: Skin is warm and dry. Capillary Refill: Capillary refill takes less than 2 seconds. Neurological:      General: No focal deficit present. Mental Status: She is alert and oriented to person, place, and time. Sensory: No sensory deficit. Motor: No weakness. Psychiatric:         Behavior: Behavior normal.         Thought Content:  Thought content normal.

## 2023-09-05 NOTE — ASSESSMENT & PLAN NOTE
· Secondary to fall  · CT imaging showed: Acute nondisplaced anterior right 3rd-5th rib fractures  · Patient doing well since discharge. Asymptomatic. Continues to use incentive spirometry inspiring greater than 1.5 L. · Not requiring any pain medications. · Follow-up with PCP. Follow-up with trauma as needed.

## 2023-09-05 NOTE — CASE COMMUNICATION
Faxed Dr. Syed Colon regarding the delay in initiating PT within 7 days. Anticipated date to begin is 9/12/23 or sooner. PT to contact the patient to schedule the visit.

## 2023-09-06 ENCOUNTER — HOME CARE VISIT (OUTPATIENT)
Dept: HOME HEALTH SERVICES | Facility: HOME HEALTHCARE | Age: 68
End: 2023-09-06
Payer: MEDICARE

## 2023-09-06 VITALS
OXYGEN SATURATION: 94 % | RESPIRATION RATE: 20 BRPM | SYSTOLIC BLOOD PRESSURE: 130 MMHG | HEART RATE: 88 BPM | TEMPERATURE: 98.4 F | DIASTOLIC BLOOD PRESSURE: 62 MMHG

## 2023-09-06 PROCEDURE — G0152 HHCP-SERV OF OT,EA 15 MIN: HCPCS

## 2023-09-06 PROCEDURE — G0299 HHS/HOSPICE OF RN EA 15 MIN: HCPCS

## 2023-09-06 PROCEDURE — G0153 HHCP-SVS OF S/L PATH,EA 15MN: HCPCS

## 2023-09-06 NOTE — CASE COMMUNICATION
OT evaluation completed this date with patient and spouse present but no follow up intervention planned as patient/spouse refuse feeling there is no need for OT. Patient presents with elevated fall risk and history of multiple falls and it was recommended that any effort to prevent future falls should be considered. Patient and spouse agree that patient requires Supervision with all functional mobility and spouse states he is present  at all times and patient has remote alarm for when he is on another floor. Spouse reports all DME needs in place including recently acquired tub transfer bench and he refused offer of recommendation for placement. Edu provided to setup of curtain to prevent water to floor. Patient verbalized desire to pet the cats but she is limited by BLTs. She declined any intervention to establish compensatory strategies that would enable her to  participate with IADL/leisure pursuits while complying with back safety and fall prevention techniques. Patient states she has theraband but no specific HEP for UB and agrees to have PT address all strengthening/HEP goals as she has no functional goals at this time. Patient and spouse verbalized understanding that new orders could be requested should issues arise while [facility] case remains open. OT discharging at this time.

## 2023-09-07 ENCOUNTER — HOME CARE VISIT (OUTPATIENT)
Dept: HOME HEALTH SERVICES | Facility: HOME HEALTHCARE | Age: 68
End: 2023-09-07
Payer: MEDICARE

## 2023-09-07 PROCEDURE — G0153 HHCP-SVS OF S/L PATH,EA 15MN: HCPCS

## 2023-09-07 NOTE — CASE COMMUNICATION
Speech therapy evaluation completed 9/6/23. ST to f/u for external strategies/aids, modifications, and CG education to improve pt memory for safety info, orientation, and IADL tasks identified by pt/CG.

## 2023-09-08 ENCOUNTER — HOME CARE VISIT (OUTPATIENT)
Dept: HOME HEALTH SERVICES | Facility: HOME HEALTHCARE | Age: 68
End: 2023-09-08
Payer: MEDICARE

## 2023-09-08 VITALS — HEART RATE: 65 BPM | DIASTOLIC BLOOD PRESSURE: 64 MMHG | SYSTOLIC BLOOD PRESSURE: 118 MMHG | OXYGEN SATURATION: 98 %

## 2023-09-08 PROCEDURE — G0151 HHCP-SERV OF PT,EA 15 MIN: HCPCS

## 2023-09-08 NOTE — PHYSICAL THERAPY NOTE
PT [facility] DISCHARGE SUMMARY     Lizzy Duncan  presented to the [facility] following hospitalization after falling backwards and landing on the ground 6 days prior to admission. She had headaches and severe back pain and went to the hospital. She also seemed to have multiple more falls resulting in significant decline in functional mobility. Physical therapy plan of care focused on bed mobility, transfer training, gait training, stair navigation, standing balance training , neuromuscular re education , LE strengthening , activities to improve endurance/activity tolerance, pain management  and family training . Patient made good progress during their stay. Goals were met. On discharge, patient required Supervision for bed mobility and STS, CGA for SPT with RW, CGA for ambulation with RW and stair navigation. Pt discharge home [facility].

## 2023-09-08 NOTE — CASE COMMUNICATION
PT initial evaluation completed   no further skilled PT needs  patient and spouse agree with same   DC PT today

## 2023-09-11 ENCOUNTER — HOME CARE VISIT (OUTPATIENT)
Dept: HOME HEALTH SERVICES | Facility: HOME HEALTHCARE | Age: 68
End: 2023-09-11
Payer: MEDICARE

## 2023-09-11 PROCEDURE — G0299 HHS/HOSPICE OF RN EA 15 MIN: HCPCS

## 2023-09-12 ENCOUNTER — HOME CARE VISIT (OUTPATIENT)
Dept: HOME HEALTH SERVICES | Facility: HOME HEALTHCARE | Age: 68
End: 2023-09-12
Payer: MEDICARE

## 2023-09-12 VITALS
OXYGEN SATURATION: 97 % | HEART RATE: 62 BPM | SYSTOLIC BLOOD PRESSURE: 130 MMHG | RESPIRATION RATE: 16 BRPM | DIASTOLIC BLOOD PRESSURE: 70 MMHG | TEMPERATURE: 98.1 F

## 2023-09-12 PROCEDURE — G0153 HHCP-SVS OF S/L PATH,EA 15MN: HCPCS

## 2023-09-13 ENCOUNTER — HOME CARE VISIT (OUTPATIENT)
Dept: HOME HEALTH SERVICES | Facility: HOME HEALTHCARE | Age: 68
End: 2023-09-13
Payer: MEDICARE

## 2023-09-13 LAB
DME PARACHUTE DELIVERY DATE ACTUAL: NORMAL
DME PARACHUTE DELIVERY DATE REQUESTED: NORMAL
DME PARACHUTE DELIVERY NOTE: NORMAL
DME PARACHUTE ITEM DESCRIPTION: NORMAL
DME PARACHUTE ORDER STATUS: NORMAL
DME PARACHUTE SUPPLIER NAME: NORMAL
DME PARACHUTE SUPPLIER PHONE: NORMAL

## 2023-09-13 PROCEDURE — G0153 HHCP-SVS OF S/L PATH,EA 15MN: HCPCS

## 2023-09-18 ENCOUNTER — TELEPHONE (OUTPATIENT)
Age: 68
End: 2023-09-18

## 2023-09-18 NOTE — TELEPHONE ENCOUNTER
José Morrow [address], [address] [place] [address], [facility]    [phone]    Telephone Intake: Geriatric Assessment     - Chart Review  1. Has this patient been seen by our department in the last 3 years? No  2. Please route to provider for chart review prior to scheduling and let the caller know that this phone intake will be reviewed IF -  • Pt was recently hospitalized  • Pt is prescribed medications for behavior management or has a history of psychiatric hospitalization  • Pt plans to attend alone    Referral source: Lyssa Taylor    Caller who is scheduling/relationship to pt: Spouse, Andi Godwin  Caller's phone number: [phone]    Reason for referral: Patient concerns , Family member concerns and Provider concerns regarding memory concerns. If there are behavioral concerns, is the pt prescribed medications to manage these? no   If so, how many? none   Has the patient ever had an inpatient psychiatric hospitalization? No,   What is the goal of the visit? Initial Assessment and Diagnosis     Has the patient been seen by a Neurologist or Geriatrician? Yes, Neuro due to spinal injury. Dolly - No    If yes, is this appointment for a second opinion? No  Has the patient ever been diagnosed with dementia? No  Has the patient had an MRI NeuroQuant within the last 1 year? No (If so, please route to provider to determine if assessment + conference are needed or if only assessment should be scheduled)      Preferred language? English  Highest education level? 10th Grade  Does the patient wear glasses? Yes   Does the patient use hearing aids? No     Is there a living will/healthcare POA in place/If so, who? No,     Does the pt/caregiver have access for a virtual visit (computer/smart phone with audio/video)?  Yes     Caller was informed:   • Please make sure the pt is accompanied by someone who knows them well / caregiver / family member to participate in this appointment. Who will accompany the pt (name and relationship)? Tad Salazar, spouse  Phone number of person accompanying pt: [phone]  • Please make sure the pt attends all appointments, including the assessment, care conference, follow-up, whether in-person or virtual.  • For virtual visits, pt must be physically present in [facility]. Office packet mailed out to: [address] [place] [phone]  Added to wait list for sooner appointments/notified that calls can be short notice (same day/day prior)?  Yes

## 2023-09-21 DIAGNOSIS — M80.80XD OTHER OSTEOPOROSIS WITH CURRENT PATHOLOGICAL FRACTURE WITH ROUTINE HEALING, SUBSEQUENT ENCOUNTER: ICD-10-CM

## 2023-09-21 DIAGNOSIS — E53.8 VITAMIN B12 DEFICIENCY: ICD-10-CM

## 2023-09-21 DIAGNOSIS — F09 PROGRESSIVE COGNITIVE DYSFUNCTION: ICD-10-CM

## 2023-09-21 DIAGNOSIS — I10 PRIMARY HYPERTENSION: Chronic | ICD-10-CM

## 2023-09-21 RX ORDER — AMLODIPINE BESYLATE 5 MG/1
5 TABLET ORAL 2 TIMES DAILY
Qty: 180 TABLET | Refills: 1 | Status: SHIPPED | OUTPATIENT
Start: 2023-09-21

## 2023-09-21 RX ORDER — LANOLIN ALCOHOL/MO/W.PET/CERES
CREAM (GRAM) TOPICAL
Qty: 90 TABLET | Refills: 1 | Status: SHIPPED | OUTPATIENT
Start: 2023-09-21

## 2023-09-21 RX ORDER — CALCIUM CARBONATE 500(1250)
1 TABLET ORAL 2 TIMES DAILY WITH MEALS
Qty: 180 TABLET | Refills: 1 | Status: SHIPPED | OUTPATIENT
Start: 2023-09-21

## 2023-09-21 RX ORDER — MELATONIN
2000 DAILY
Qty: 180 TABLET | Refills: 1 | Status: SHIPPED | OUTPATIENT
Start: 2023-09-21

## 2023-09-22 ENCOUNTER — HOSPITAL ENCOUNTER (OUTPATIENT)
Dept: RADIOLOGY | Facility: HOSPITAL | Age: 68
Discharge: HOME | End: 2023-09-22
Payer: MEDICARE

## 2023-09-22 DIAGNOSIS — S22.060A CLOSED WEDGE COMPRESSION FRACTURE OF T8 VERTEBRA, INITIAL ENCOUNTER (HCC): ICD-10-CM

## 2023-09-22 DIAGNOSIS — S22.069A T8 VERTEBRAL FRACTURE (HCC): ICD-10-CM

## 2023-09-22 PROCEDURE — 72072 X-RAY EXAM THORAC SPINE 3VWS: CPT

## 2023-09-28 ENCOUNTER — TELEPHONE (OUTPATIENT)
Dept: NEUROSURGERY | Facility: CLINIC | Age: 68
End: 2023-09-28

## 2023-09-28 ENCOUNTER — HOSPITAL ENCOUNTER (OUTPATIENT)
Dept: BONE DENSITY | Facility: HOSPITAL | Age: 68
End: 2023-09-28
Payer: MEDICARE

## 2023-09-28 VITALS — BODY MASS INDEX: 27.3 KG/M2 | HEIGHT: 58 IN | WEIGHT: 130.07 LBS

## 2023-09-28 DIAGNOSIS — S22.41XA CLOSED FRACTURE OF MULTIPLE RIBS OF RIGHT SIDE, INITIAL ENCOUNTER: ICD-10-CM

## 2023-09-28 PROCEDURE — 77080 DXA BONE DENSITY AXIAL: CPT

## 2023-09-29 ENCOUNTER — OFFICE VISIT (OUTPATIENT)
Dept: NEUROSURGERY | Facility: CLINIC | Age: 68
End: 2023-09-29
Payer: MEDICARE

## 2023-09-29 VITALS
OXYGEN SATURATION: 98 % | HEIGHT: 58 IN | WEIGHT: 122 LBS | RESPIRATION RATE: 16 BRPM | BODY MASS INDEX: 25.61 KG/M2 | TEMPERATURE: 97.9 F | HEART RATE: 68 BPM | SYSTOLIC BLOOD PRESSURE: 118 MMHG | DIASTOLIC BLOOD PRESSURE: 78 MMHG

## 2023-09-29 DIAGNOSIS — S22.000D THORACIC COMPRESSION FRACTURE, WITH ROUTINE HEALING, SUBSEQUENT ENCOUNTER: Primary | ICD-10-CM

## 2023-09-29 PROCEDURE — 99213 OFFICE O/P EST LOW 20 MIN: CPT | Performed by: PHYSICIAN ASSISTANT

## 2023-09-29 NOTE — PROGRESS NOTES
Office Note - Neurosurgery   Yessi Mirta Fine 70 y.o. female MRN: [id number]      Assessment:  Patient is a 70years old pleasant woman here today with her  for 6 weeks of follow-up of mild superior endplate compression deformity of T8 following fall. She is wearing brace and had thoracic spine x-rays which demonstrated stable mild superior compression deformity of T8. Patient doing fine, denies any pain, weakness, numbness or paresthesia in the lower extremities. Completed physical therapy. She has some gait issues and uses cane for ambulation. Overall feeling much better. Exam-patient alert and oriented x3. Moves all extremities. Strength is 5/5 bilaterally. Sensation to light touch intact throughout. DTR 2+ without clonus bilaterally. Nontender on palpation of thoracic spine. Hx, PEx, and images reviewed with the patient and her . Management plan discussed. .  Image shows stable superior endplate compression of T8, nontender on palpation of thoracic spine. Recommend considering weaning of the brace over the next 2 weeks. Follow-up on as needed basis. All questions and concerns were answered to patient's satisfaction. Both patient and her  expressed their understanding's and agreed with the plan. Plan:  1. Follow-up image shows stable mild compression deformity of SEP T8  2. Patient without pain and exam nontender on palpation, neuro non focal  3. Consider weaning of brace over the next 2 weeks  4. No regular follow-up imaging is required  5. Fall precaution, avoid lifting heavy objects, excessive bending or twisting  6. Call with question or concern  7. Follow-up on apparent basis      Subjective/Objective     C/C: " 6 weeks follow up for mild SEP T8 compression, Fall"    HPI  All patient's medical histories were reviewed and updated as appropriate:     Allergies, current medication lists, past medical history, past surgical history, family history, social history, and current medical lists. Patient is 70years old pleasant woman with history of fall and had traumatic mild superior endplate compression deformity of T8 about 6 weeks ago and she is here today for follow-up with thoracic spine x-rays which demonstrated stable compression for 1 day of supra endplate of T8. Patient wearing TLSO brace. Reports his low back pain. She has baseline gait issues, completed her PT and using cane for safety. She denies any radicular pain down to extremities, weakness, numbness or paresthesia. No bowel or bladder issues. Denies taking any pain medication or Tylenol. ROS  Review of system personally reviewed and updated as follows:  Review of Systems   Constitutional: Negative. HENT: Negative. Eyes: Negative. Respiratory: Negative. Cardiovascular: Negative. Gastrointestinal: Negative. Endocrine: Negative. Genitourinary: Negative. Musculoskeletal: Positive for gait problem. Negative for back pain. 4 wk f/u w/ xrays DONE 9/22    Skin: Negative. Allergic/Immunologic: Negative. Hematological: Negative. Psychiatric/Behavioral: Negative. All other systems reviewed and are negative. Family History    Family History   Problem Relation Age of Onset   • Diabetes Other    • Hypertension Other    • Heart disease Other    • Arthritis Other    • Heart disease Mother    • Kidney disease Mother    • Alcohol abuse Father        Social History    Social History     Socioeconomic History   • Marital status: /Civil Union     Spouse name: Not on file   • Number of children: Not on file   • Years of education: Not on file   • Highest education level: Not on file   Occupational History   • Not on file   Tobacco Use   • Smoking status: Former   • Smokeless tobacco: Never   Substance and Sexual Activity   • Alcohol use:  Yes   • Drug use: Never   • Sexual activity: Not Currently     Partners: Male   Other Topics Concern   • Not on file   Social History Narrative   • Not on file     Social Determinants of Health     Financial Resource Strain: Not on file   Food Insecurity: No Food Insecurity (8/16/2023)    Hunger Vital Sign    • Worried About Running Out of Food in the Last Year: Never true    • Ran Out of Food in the Last Year: Never true   Transportation Needs: No Transportation Needs (8/16/2023)    PRAPARE - Transportation    • Lack of Transportation (Medical): No    • Lack of Transportation (Non-Medical):  No   Physical Activity: Not on file   Stress: Not on file   Social Connections: Not on file   Intimate Partner Violence: Not on file   Housing Stability: Low Risk  (8/16/2023)    Housing Stability Vital Sign    • Unable to Pay for Housing in the Last Year: No    • Number of Places Lived in the Last Year: 1    • Unstable Housing in the Last Year: No       Past Medical History    Past Medical History:   Diagnosis Date   • Anemia 3/4/2021   • Closed nondisplaced fracture of anterior wall of right acetabulum ([address]) 2/23/2021   • Closed nondisplaced fracture of right pubis ([address]) 2/23/2021   • Fall 2/23/2021   • Lymphadenitis, chronic    • Multiple closed fractures of pelvis ([address]) 2/27/2021   • Sialadenitis    • Sjogren's disease ([address])    • Stomach problems    • Thyroid disorder    • Xerostomia        Surgical History    Past Surgical History:   Procedure Laterality Date   • BRAIN SURGERY  2019    tumor removed Dr. Indira Sood   • CHOLECYSTECTOMY         Medications      Current Outpatient Medications:   •  acetaminophen (TYLENOL) 325 mg tablet, Take 2 tablets (650 mg total) by mouth every 6 (six) hours as needed for mild pain or moderate pain, Disp: , Rfl: 0  •  amLODIPine (NORVASC) 5 mg tablet, TAKE 1 TABLET BY MOUTH TWICE A DAY, Disp: 180 tablet, Rfl: 1  •  atenolol (TENORMIN) 50 mg tablet, Take 1 tablet (50 mg total) by mouth daily, Disp: 30 tablet, Rfl: 0  •  atorvastatin (LIPITOR) 10 mg tablet, Take 10 mg by mouth daily, Disp: , Rfl: 1  •  calcium carbonate (OYSTER SHELL,OSCAL) 500 mg, TAKE 1 TABLET BY MOUTH TWICE A DAY WITH MEALS, Disp: 180 tablet, Rfl: 1  •  cholecalciferol (VITAMIN D3) 1,000 units tablet, TAKE 2 TABLETS (2,000 UNITS TOTAL) BY MOUTH DAILY DO NOT START BEFORE AUGUST 31, 2023., Disp: 180 tablet, Rfl: 1  •  docusate sodium (COLACE) 100 mg capsule, Take 1 capsule (100 mg total) by mouth 2 (two) times a day, Disp: 60 capsule, Rfl: 0  •  FLUoxetine (PROzac) 20 mg capsule, Take 20 mg by mouth daily, Disp: , Rfl: 1  •  folic acid (FOLVITE) 1 mg tablet, Take 1 tablet (1 mg total) by mouth daily, Disp: 30 tablet, Rfl: 0  •  levothyroxine 88 mcg tablet, Take 1 tablet (88 mcg total) by mouth daily in the early morning Do not start before August 31, 2023., Disp: 30 tablet, Rfl: 0  •  lidocaine (LIDODERM) 5 %, Apply 1 patch topically over 12 hours daily Remove & Discard patch within 12 hours or as directed by MD Do not start before August 31, 2023., Disp: , Rfl: 0  •  Melatonin ER 10 MG TBCR, Take 10 mg by mouth daily at bedtime as needed (insomnia). Indications: as needed for sleep/insomnia, Disp: , Rfl:   •  methotrexate 2.5 mg tablet, Take 8 tablets (20 mg total) by mouth once a week Do not start before September 6, 2023., Disp: 32 tablet, Rfl: 0  •  polyethylene glycol (MIRALAX) 17 g packet, Take 17 g by mouth daily Do not start before August 31, 2023.  (Patient not taking: Reported on 9/6/2023), Disp: 30 each, Rfl: 0  •  predniSONE 5 mg tablet, Take 1 tablet (5 mg total) by mouth daily with dinner, Disp: , Rfl: 0  •  vitamin B-12 (VITAMIN B-12) 1,000 mcg tablet, TAKE 1 TABLET (1,000 MCG TOTAL) BY MOUTH DAILY DO NOT START BEFORE AUGUST 31, 2023., Disp: 90 tablet, Rfl: 1    Allergies    Allergies   Allergen Reactions   • Codeine Vomiting   • Hydroxychloroquine GI Intolerance   • Hydroxyquinoline Sulfate [Oxyquinoline] Vomiting   • Leucovorin Vomiting   • Tizanidine Vomiting       The following portions of the patient's history were reviewed and updated as appropriate: allergies, current medications, past family history, past medical history, past social history, past surgical history and problem list.    Investigations    I personally reviewed the XRAY results with the patient:        Physical Exam    There were no vitals filed for this visit. Physical Exam  Constitutional:       Appearance: Normal appearance. HENT:      Head: Normocephalic and atraumatic. Cardiovascular:      Pulses: Normal pulses. Pulmonary:      Effort: Pulmonary effort is normal.   Musculoskeletal:         General: Normal range of motion. Cervical back: Normal range of motion. Neurological:      General: No focal deficit present. Mental Status: She is alert and oriented to person, place, and time. GCS: GCS eye subscore is 4. GCS verbal subscore is 5. GCS motor subscore is 6. Cranial Nerves: Cranial nerves 2-12 are intact. Sensory: Sensation is intact. Motor: Motor function is intact. Coordination: Finger-Nose-Finger Test normal.      Deep Tendon Reflexes: Reflexes are normal and symmetric. Reflex Scores:       Tricep reflexes are 2+ on the right side and 2+ on the left side. Bicep reflexes are 2+ on the right side and 2+ on the left side. Brachioradialis reflexes are 2+ on the right side and 2+ on the left side. Patellar reflexes are 2+ on the right side and 2+ on the left side. Achilles reflexes are 2+ on the right side and 2+ on the left side. Psychiatric:         Speech: Speech normal.       Neurologic Exam     Mental Status   Oriented to person, place, and time. Speech: speech is normal   Level of consciousness: alert    Cranial Nerves   Cranial nerves II through XII intact.      CN III, IV, VI   Nystagmus: none     CN XI   CN XI normal.     Motor Exam   Muscle bulk: normal  Overall muscle tone: normal  Right arm tone: normal  Left arm tone: normal  Right arm pronator drift: absent  Left arm pronator drift: absent  Right leg tone: normal  Left leg tone: normal    Sensory Exam   Light touch normal.     Gait, Coordination, and Reflexes     Coordination   Finger to nose coordination: normal    Reflexes   Right brachioradialis: 2+  Left brachioradialis: 2+  Right biceps: 2+  Left biceps: 2+  Right triceps: 2+  Left triceps: 2+  Right patellar: 2+  Left patellar: 2+  Right achilles: 2+  Left achilles: 2+  Right : 2+  Left : 2+  Right Pan: absent  Left Pan: absent  Right ankle clonus: absent  Left pendular knee jerk: absent

## 2023-10-11 ENCOUNTER — OFFICE VISIT (OUTPATIENT)
Age: 68
End: 2023-10-11
Payer: MEDICARE

## 2023-10-11 VITALS
DIASTOLIC BLOOD PRESSURE: 68 MMHG | WEIGHT: 125.4 LBS | SYSTOLIC BLOOD PRESSURE: 118 MMHG | TEMPERATURE: 97.7 F | HEART RATE: 98 BPM | HEIGHT: 58 IN | OXYGEN SATURATION: 97 % | BODY MASS INDEX: 26.32 KG/M2

## 2023-10-11 DIAGNOSIS — F41.9 ANXIETY AND DEPRESSION: ICD-10-CM

## 2023-10-11 DIAGNOSIS — E03.4 HYPOTHYROIDISM DUE TO ACQUIRED ATROPHY OF THYROID: Chronic | ICD-10-CM

## 2023-10-11 DIAGNOSIS — M81.0 OSTEOPOROSIS, UNSPECIFIED OSTEOPOROSIS TYPE, UNSPECIFIED PATHOLOGICAL FRACTURE PRESENCE: ICD-10-CM

## 2023-10-11 DIAGNOSIS — I10 PRIMARY HYPERTENSION: Chronic | ICD-10-CM

## 2023-10-11 DIAGNOSIS — F32.A ANXIETY AND DEPRESSION: ICD-10-CM

## 2023-10-11 DIAGNOSIS — F09 PROGRESSIVE COGNITIVE DYSFUNCTION: Primary | ICD-10-CM

## 2023-10-11 DIAGNOSIS — R26.2 AMBULATORY DYSFUNCTION: ICD-10-CM

## 2023-10-11 PROCEDURE — 99214 OFFICE O/P EST MOD 30 MIN: CPT | Performed by: NURSE PRACTITIONER

## 2023-10-11 RX ORDER — UREA 10 %
1 LOTION (ML) TOPICAL 2 TIMES DAILY
COMMUNITY
Start: 2023-09-05

## 2023-10-11 RX ORDER — POTASSIUM CHLORIDE 1500 MG/1
TABLET, EXTENDED RELEASE ORAL
COMMUNITY
Start: 2023-10-02

## 2023-10-11 NOTE — ASSESSMENT & PLAN NOTE
Last TSH was decreased, levothyroxine dose was decreased.   Patient's  is now giving patient her medications  Continue to monitor through primary care

## 2023-10-11 NOTE — PATIENT INSTRUCTIONS
Global Cognition Activity Apps:  30/30- time management, set up lists of tasks to accomplish and time needed to complete them  Awesome Memory- cognition and memory  Bejeweled- clear the gems by matching the same colors  Brain HQ- tests your brain in the area of memory, speed, and attention  Brain Workout- test your brain in the area of memory, focus, reaction, and accuracy   Classic Mich- tests memory and attention as you repeat the colors and sounds in the correct order  Drawesome- copy the image without picking up the pencil  Fit Brains Trainer- addresses memory and recognition, concentration, attention span, processing speed, problem-solving, hand-eye coordination, and reaction time  Lumosity Brain Trainer- personalized brain workouts for memory, attention, and brain performance (can also be accessed online)  Mind Games (Pro)- abstraction, attention, memory, executive functioning, etc.  Rush Hour-sliding block traffic jam puzzle  Skill Game [place]/Skills Game- sustain attention to complete trail-making with numbers   Skill Training- visual motor activity suyapa requiring eye-to-hand coordination, foresight, and concentration  [employer]- four color memory game for cognition  Sudoku- puzzle game that exercises the brain, logical thinking, and memory  Molly Martínez of [address] [facility] [place] challenging attention, memory, and executive functioning  Unblock Me- get to the red block by sliding other blocks out of the way  Word Association!- scanning to find associated words  Wordsworth Pro- word-making game  [employer]- anagram word game    Attention Activities:  Catch Me-FlashPad- fast-paced, light-up game (touch all of the red lights, skip the green lights)  Metronome Beats- practice divided attention by performing a task with metronome running    Memory Activities:  Memory Block- test your memory and reflexes  Memory Trainer- memory training and games for visualization, chunking, focus, and spatial memory, etc.  My Personal Memory Trainer- activities to address working memory      Please access your [employer] Store (Apple devices) or [employer] (Android devices) to see if you can access these apps and if there is any cost associated with downloading the edilson.

## 2023-10-11 NOTE — ASSESSMENT & PLAN NOTE
Patient utilizing cane, had recent fall resulting in hospitalization  PT has signed off  Continue fall precautions
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
## 2023-10-11 NOTE — ASSESSMENT & PLAN NOTE
Osteoporosis. Based on the lumbar spine   Had recent spinal fracture, takes chronic prednisone. Pt taking ca/d3. Cont strength and balance exercises  Cont f/u with pcp/endo for complete osteoporotic treatment plan. Fall precautions.

## 2023-10-11 NOTE — ASSESSMENT & PLAN NOTE
BP stable without ha/dizziness  Patient remains on amlodipine and atenolol  Continue dietary and lifestyle interventions  Continue follow-up with PCP for chronic management

## 2023-10-11 NOTE — ASSESSMENT & PLAN NOTE
Patient has history of anxiety and depression. GDS 7-she denies SI/HI  She continues on Prozac, Xanax was stopped due to fall on Xanax and no memory of occurrence  Continue emotional support, relaxation techniques  Patient declines geriatric therapist at this time  Follow-up with PCP.   She is aware that if anxiety or depression escalates where she is unable to handle it, she is to go to emergency room for evaluation and assistance

## 2023-10-11 NOTE — ASSESSMENT & PLAN NOTE
Pt independent with adl, dependent for iadls. Memory loss:  STM progressive decline over years  [facility] 18/30. Deficits in executive function, attention, abstraction, delayed recall   MRI 2023:  moderate microangiopathic change which has progressed since last eval.  Imaging  consistent with cerebral and cerebellar atrophy. ? amyloid angiopathy   Gerilabs:  tsh 0.314 (levothyroxine decreased), b12 153 (started on b12 supp), folate >22.3  History, physical, and cognitive screening are most consistent with:  mild dementia  Contributing factors:  labs off, use of xanax, poor sleep  Further eval warrants the following:  none  Cont supportive cares lives with   Caregiver stress concerns:  none  SW needs this visit:  see intake note  Discussed safe environment  Wandering:  none. Does go to [employer] and occasionally separates from . At this time is able to call to find. Consider ID bracelet, gps , close monitoring. Home:  No concerns. OK home for short periods of time, pt's son often home also. Ensure pt has phone and reinforce to not use stove or shower while gone. Driving safety:  Does not drive, no concern  Fall preventions:  Fall history, PT completed. Fall precautions. Medication management:   does  Continue to engage in physical, cognitive, social activity. Cont doing games, puzzles, trivia, current event discussions  Cont daily walks or exercise video  Cont outings with friends and family. Avoid social isolation. Referral to cognitive therapy:  pt declines at this time. Optimize chronic and acute conditions  Cont to f/u with providers and ensure medication compliance  Vascular risk factors:  htn  Medication review:  seems appropriate for current conditions  Ensure good diet (ex Mediterranean diet) and adequate hydration  Monitor for changes in behavior/mood- if noted, notify provider for eval  Encourage mindfulness and positive socialization for general wellness.   Avoid medications that worsen cognition - check with provider or pharmacist before starting new or OTC meds  Do not overwhelm pt with info. Do one task at a time. Use slower pace. Keep to one conversation at a time. Do not multitask. Encourage independence as able.

## 2023-10-11 NOTE — PROGRESS NOTES
Yahir Comes [facility]  [address] [facility], [address] [facility], [address]  [phone]    Social Work [address] [facility] presents today for a geriatric assessment, accompanied by her  Marivel Gutierrez. Social/Family History   Marital status:                                        Spouse's Name:  Marivel Gutierrez     Does patient have children? Yes How Many? Both Marivel Gutierrez and SELIN [employer] each have 2 children from previous marriages  Shaan's daughter Nela in [place], [place], [place] son Sarath in [facility], Sandhya's son Reji Ball in [place], [place], and Sandhya's son Edin Thomas lives with pt and [address] [facility] members assisting patient at home: pt's son Edin Thomas and  Marivel Gutierrez assist pt with tasks as needed   Are any relationships causing problems right now: Yes  pt does not talk to 2 of her 3 sisters, SELIN [employer] only talks to one of the 3 per Marivel Gutierrez  Who is available to provide care in case of illness or crisis (please specify relation to patient / level of care able to provide)? Pt's  Marivel Gutierrez        Employment and Education   Education: Highest Level of Education: 10th grade    Currently Employed: No    Retired: Yes                        Date of jail? Around 30 years ago    Type of employment: worked at [employer] in [facility] Us: Yes      Last served? 9/11/71 discharged    Currently receiving benefits?  No pt's  Marivel Gutierrez is [place]   [employer] (if applicable): pt/ do not currently receive veterans benefits, Marivel Gutierrez states he has friends/people he was in service with who do and he knows how to access them if needed       [address] and Organizations: none   Major life events in past two years (ex: jail, death in family, major illness etc.): pt's recent hospitalization in August 2023, pt's brother passed in last year  Have you ever used a home care agency, meal delivery service, or respite care?: no      Financial   Total Monthly income: $900 something     Source of income: Social Security   Meet current needs? Yes pt's income goes into joint account and Raheel Jones pays bills for the couple  Funds available for home care? Yes  Raheel Jones has an [address] available for nursing home? Yes  equity in home and investments   Do you rent or own your home? Own 2 years until their home is paid off per [address] [place] [address]   Have wishes or desires for end-of-life care been discussed? No, has neither    Advanced directives: has NO advanced directive  - information provided to family (will be provided at next visit)      For all patients, we encourage seeing a  to establish a Financial Power of , a [employer] of [employer], and an Advanced Directive (living will). Particularly for patients experiencing memory concerns, we strongly recommend that this is completed as soon as possible. Safety Assessment   Is the patient still driving? No    Is the patient taking medications as prescribed? Yes     Is there a system in place for medication management? Raheel Jones sets up and administers pills to pt; pt's cognition and speech have improved since stopping Xanax in hospital per Raheel Jones  Are firearms or weapons in the home? Pt's son has a handgun in his bedroom somewhere (pt and her  stay out of his room, pt would not be able to access the handgun per Raheel Jones). Does the patient live alone? No, lives with her , son, and their two cats   What is the layout of the home? Small split level home; ground floor is garage/family room/1/2 bath, go up 7 steps to living room/kitchen/dining room, another 7 steps up to main bathroom and bedrooms; pt uses her cane while going up and down steps, uses handrails and cane opposite the handrail, one step at a time, pt's ambulation has been improving since hospital discharge per [place] you feel comfortable leaving the person home alone?  Yes  just for errands/short periods of time, 1 or 2 hours at max  Have you noticed any burned pans or other signs of issues with the stove or other appliances? No pt no longer cooks  Do you have any concerns about the person’s cooking or eating habits? No pt eats small quanitites due to her Sjogren's disease; appetite has been good,  prepares her meals  Are there working smoke detectors and fire extinguishers in the home? Yes      Stirling Cognitive Assessment (MoCA) Version 8.2  Education: 10th grade    Points Earned POSSIBLE Points   Visuospatial/Executive   Alternating [place] Making 0 1   Visuoconstructional skills 0 1   Visuoconstructional skills (clock) 1 3   Naming   Naming Animals 3 3   Attention   Digit Span 2 2   Vigilance (letters) 1 1   Serial 7 subtraction 0 3   Language   Sentence Repetition 2 2   Verbal fluency 1 1   Abstraction   Abstraction (word pairings) 1 2   Delayed recall   Delayed recall 0 5   Memory index score: 6/15   Orientation   Orientation 6 6   TOTAL SCORE: 18/30  (Normal ?26/30)   Additional notes:      Geriatric Depression Scale (GDS) completed today, 7/15.

## 2023-11-06 ENCOUNTER — CONSULT (OUTPATIENT)
Dept: ENDOCRINOLOGY | Facility: CLINIC | Age: 68
End: 2023-11-06
Payer: MEDICARE

## 2023-11-06 VITALS
DIASTOLIC BLOOD PRESSURE: 62 MMHG | OXYGEN SATURATION: 98 % | BODY MASS INDEX: 26.57 KG/M2 | WEIGHT: 126.6 LBS | SYSTOLIC BLOOD PRESSURE: 120 MMHG | TEMPERATURE: 98.3 F | HEIGHT: 58 IN | HEART RATE: 60 BPM

## 2023-11-06 DIAGNOSIS — M80.80XD OTHER OSTEOPOROSIS WITH CURRENT PATHOLOGICAL FRACTURE WITH ROUTINE HEALING, SUBSEQUENT ENCOUNTER: Primary | ICD-10-CM

## 2023-11-06 DIAGNOSIS — E55.9 VITAMIN D DEFICIENCY: ICD-10-CM

## 2023-11-06 DIAGNOSIS — E03.8 OTHER SPECIFIED HYPOTHYROIDISM: Chronic | ICD-10-CM

## 2023-11-06 PROCEDURE — 99204 OFFICE O/P NEW MOD 45 MIN: CPT | Performed by: INTERNAL MEDICINE

## 2023-11-06 PROCEDURE — 99214 OFFICE O/P EST MOD 30 MIN: CPT | Performed by: INTERNAL MEDICINE

## 2023-11-06 NOTE — PROGRESS NOTES
New Consult Note      CC: osteoporosis, hypothyroidism    History of Present Illness:   76yr female with RA(follows Dr. Brittney Pickard), Sjogrens, osteoporosis, hypothyroidism, HLD, vit D deficiency, T8 compression fracture 8/23(fall from her own height suspected 2" xanax/cyclobenzaprine), chronic glucocorticoid therapy, general frailty, HTN, HLD and depression. She has just finished rehabilitation. 9/28/23 DXA: Tscores -3.2LS, -1.3LTH, -1.7 LFN, -1.3RTH, -1.8 RFN. 10 yr FRAX score 4.6% hip and 21.5% major osteoporotic fracture. Thyroid: on levothyroxine 88mcg qd. Reduced dose from 100mcg recently.     Statin: lipitor    Patient Active Problem List   Diagnosis    Parotid gland enlargement    Sjogren's disease (HCC)    Hypertension    Anxiety and depression    Hypothyroidism    Gastroparesis    Oral dyskinesia    Pain    Abnormal ECG    Pleural effusion on right    Atelectasis of right lung    Low HDL (under 40)    Dermatitis associated with moisture    Onychomycosis    Adenopathy    Abnormal CT of the abdomen    Progressive cognitive dysfunction    Rheumatoid arteritis (HCC)    Closed fracture of multiple ribs of right side    Vitamin B12 deficiency    Compression fracture of T8 vertebra (HCC)    Osteoporosis    Moderate protein-calorie malnutrition (HCC)    Ambulatory dysfunction     Past Medical History:   Diagnosis Date    Anemia 3/4/2021    Closed nondisplaced fracture of anterior wall of right acetabulum ([address]) 2/23/2021    Closed nondisplaced fracture of right pubis ([address]) 2/23/2021    Fall 2/23/2021    Lymphadenitis, chronic     Multiple closed fractures of pelvis ([address]) 2/27/2021    Sialadenitis     Sjogren's disease ([address])     Stomach problems     Thyroid disorder     Xerostomia       Past Surgical History:   Procedure Laterality Date    BRAIN SURGERY  2019    tumor removed Dr. Harrison Carranza        Family History   Problem Relation Age of Onset    Diabetes Other     Hypertension Other Heart disease Other     Arthritis Other     Heart disease Mother     Kidney disease Mother     Alcohol abuse Father      Social History     Tobacco Use    Smoking status: Former    Smokeless tobacco: Never   Substance Use Topics    Alcohol use:  Yes     Allergies   Allergen Reactions    Codeine Vomiting    Hydroxychloroquine GI Intolerance    Hydroxyquinoline Sulfate [Oxyquinoline] Vomiting    Leucovorin Vomiting    Tizanidine Vomiting         Current Outpatient Medications:     acetaminophen (TYLENOL) 325 mg tablet, Take 2 tablets (650 mg total) by mouth every 6 (six) hours as needed for mild pain or moderate pain, Disp: , Rfl: 0    amLODIPine (NORVASC) 5 mg tablet, TAKE 1 TABLET BY MOUTH TWICE A DAY, Disp: 180 tablet, Rfl: 1    atenolol (TENORMIN) 50 mg tablet, Take 1 tablet (50 mg total) by mouth daily, Disp: 30 tablet, Rfl: 0    atorvastatin (LIPITOR) 10 mg tablet, Take 10 mg by mouth daily, Disp: , Rfl: 1    calcium carbonate (OS-DINESH) 1250 (500 Ca) MG chewable tablet, Chew 1 tablet 2 (two) times a day, Disp: , Rfl:     calcium carbonate (OYSTER SHELL,OSCAL) 500 mg, TAKE 1 TABLET BY MOUTH TWICE A DAY WITH MEALS, Disp: 180 tablet, Rfl: 1    cholecalciferol (VITAMIN D3) 1,000 units tablet, TAKE 2 TABLETS (2,000 UNITS TOTAL) BY MOUTH DAILY DO NOT START BEFORE AUGUST 31, 2023., Disp: 180 tablet, Rfl: 1    FLUoxetine (PROzac) 20 mg capsule, Take 20 mg by mouth daily, Disp: , Rfl: 1    folic acid (FOLVITE) 1 mg tablet, Take 1 tablet (1 mg total) by mouth daily, Disp: 30 tablet, Rfl: 0    levothyroxine 88 mcg tablet, Take 1 tablet (88 mcg total) by mouth daily in the early morning Do not start before August 31, 2023., Disp: 30 tablet, Rfl: 0    methotrexate 2.5 mg tablet, Take 8 tablets (20 mg total) by mouth once a week Do not start before September 6, 2023., Disp: 32 tablet, Rfl: 0    Potassium Chloride ER 20 MEQ TBCR, , Disp: , Rfl:     predniSONE 5 mg tablet, Take 1 tablet (5 mg total) by mouth daily with dinner, Disp: , Rfl: 0    vitamin B-12 (VITAMIN B-12) 1,000 mcg tablet, TAKE 1 TABLET (1,000 MCG TOTAL) BY MOUTH DAILY DO NOT START BEFORE AUGUST 31, 2023., Disp: 90 tablet, Rfl: 1    docusate sodium (COLACE) 100 mg capsule, Take 1 capsule (100 mg total) by mouth 2 (two) times a day, Disp: 60 capsule, Rfl: 0    lidocaine (LIDODERM) 5 %, Apply 1 patch topically over 12 hours daily Remove & Discard patch within 12 hours or as directed by MD Do not start before August 31, 2023. (Patient not taking: Reported on 9/29/2023), Disp: , Rfl: 0    Melatonin ER 10 MG TBCR, Take 10 mg by mouth daily at bedtime as needed (insomnia). Indications: as needed for sleep/insomnia (Patient not taking: Reported on 9/29/2023), Disp: , Rfl:     polyethylene glycol (MIRALAX) 17 g packet, Take 17 g by mouth daily Do not start before August 31, 2023. (Patient not taking: Reported on 9/6/2023), Disp: 30 each, Rfl: 0    Review of Systems   HENT: Negative. Eyes: Negative. Respiratory: Negative. Cardiovascular: Negative. Gastrointestinal: Negative. Endocrine: Negative. Musculoskeletal: Negative. Skin: Negative. Allergic/Immunologic: Negative. Neurological: Negative. Hematological: Negative. Psychiatric/Behavioral: Negative. Physical Exam:  Body mass index is 26.46 kg/m². /62 (BP Location: Left arm, Patient Position: Sitting, Cuff Size: Standard)   Pulse 60   Temp 98.3 °F (36.8 °C) (Tympanic)   Ht 4' 10" (1.473 m)   Wt 57.4 kg (126 lb 9.6 oz)   LMP 01/13/2005   SpO2 98%   BMI 26.46 kg/m²    Vitals:    11/06/23 1358   Weight: 57.4 kg (126 lb 9.6 oz)        Physical Exam  Constitutional:       General: She is not in acute distress. Appearance: She is well-developed. She is not ill-appearing. HENT:      Head: Normocephalic and atraumatic. Nose: Nose normal.      Mouth/Throat:      Pharynx: Oropharynx is clear. Eyes:      Extraocular Movements: Extraocular movements intact. Conjunctiva/sclera: Conjunctivae normal.   Neck:      Thyroid: No thyromegaly. Cardiovascular:      Rate and Rhythm: Normal rate. Pulmonary:      Effort: Pulmonary effort is normal.   Musculoskeletal:         General: No deformity. Cervical back: Normal range of motion. Skin:     Capillary Refill: Capillary refill takes less than 2 seconds. Coloration: Skin is not pale. Findings: No rash. Neurological:      Mental Status: She is alert and oriented to person, place, and time. Psychiatric:         Behavior: Behavior normal.       Labs:   Lab Results   Component Value Date    HGBA1C 5.6 08/17/2023       Lab Results   Component Value Date    UTJ8GOATQGTQ 0.314 (L) 08/17/2023       Lab Results   Component Value Date    CREATININE 0.69 09/01/2023    CREATININE 0.80 08/24/2023    CREATININE 0.76 08/21/2023    BUN 14 09/01/2023     01/08/2015    K 3.3 (L) 09/01/2023     09/01/2023    CO2 24 09/01/2023     eGFR   Date Value Ref Range Status   09/01/2023 89 ml/min/1.73sq m Final       Lab Results   Component Value Date    ALT 43 09/01/2023    AST 49 (H) 09/01/2023     (H) 08/24/2023    ALKPHOS 128 (H) 09/01/2023       Lab Results   Component Value Date    CHOLESTEROL 154 03/05/2021     Lab Results   Component Value Date    HDL 17 (L) 03/05/2021     Lab Results   Component Value Date    TRIG 263 (H) 03/05/2021     Lab Results   Component Value Date    NONHDLC 137 03/05/2021         Impression:  1. Other osteoporosis with current pathological fracture with routine healing, subsequent encounter    2. Other specified hypothyroidism    3. Vitamin D deficiency         Plan:    Aliya Mcgowan was seen today for consult and hypothyroidism. Diagnoses and all orders for this visit:    Other osteoporosis with current pathological fracture with routine healing, subsequent encounter.  Today we discussed all aspects of osteoporosis including pathophysiology, risk factors, complications, diet, calcium 1200mg/day, vitamin D supplementation to maintain goal >30ng/dL, lifestyle modifications, medical fitness training, goals of therapy, follow up needs and medications including Alendronate, zolendronate, denosumab, romosozumab, foreo and tymlos. She may be a candidate for bisphosphonates and/or prolia. Will avoid Evenity due to CV risk with known RA. Not a candidate  for PTH analogues due to mild hypercalcemia with a non suppressed PTH. After reviewing options, patient and spouse elected to use prolia. Will submit prior authorization. Follow up in 4-6 weeks to get first prolia injection and review labs. -     Ambulatory Referral to Endocrinology  -     Phosphorus; Future  -     Vitamin D 25 hydroxy; Future  -     Comprehensive metabolic panel; Future  -     PTH, intact; Future    Other specified hypothyroidism. Recent TSh was suppressed. Goal TSH is 3-5uIU/mL. Will repeat labs and titrate therapy. Continue levothyroxine 88mcg qdaily for now. -     Ambulatory Referral to Endocrinology  -     T4, free; Future  -     TSH, 3rd generation; Future    Vitamin D deficiency. Advised Vit D3 2000IIU daily OTC. I have spent 45 minutes with patient today in which greater than 50% of this time was spent in counseling/coordination of care. Discussed with the patient and all questioned fully answered. She will call me if any problems arise. Educated/ Counseled patient on diagnostic test results, prognosis, risk vs benefit of treatment options, importance of treatment compliance, healthy life and lifestyle choices.       [facility]

## 2023-11-10 ENCOUNTER — TELEPHONE (OUTPATIENT)
Dept: NEUROLOGY | Facility: CLINIC | Age: 68
End: 2023-11-10

## 2023-11-10 NOTE — TELEPHONE ENCOUNTER
1ST ATTEMPT,     Called pt no answer, LMOM. [facility] SERVICES MESSAGE ALSO SENT. Thank you,     Anu Wilkerson will need follow up in in 6 weeks with movement disorder and memory with Dr. Paul Thomas. She will not require outpatient neurological testing.          HFU/ BRAVO CHUNG/ FALL    DC- 8/18/2023, PT IS NOW HOME

## 2023-11-11 LAB
25(OH)D3 SERPL-MCNC: 47 NG/ML (ref 30–100)
ALBUMIN SERPL-MCNC: 4.1 G/DL (ref 3.6–5.1)
ALBUMIN/GLOB SERPL: 1.2 (CALC) (ref 1–2.5)
ALP SERPL-CCNC: 86 U/L (ref 37–153)
ALT SERPL-CCNC: 35 U/L (ref 6–29)
AST SERPL-CCNC: 40 U/L (ref 10–35)
BILIRUB SERPL-MCNC: 0.7 MG/DL (ref 0.2–1.2)
BUN SERPL-MCNC: 18 MG/DL (ref 7–25)
BUN/CREAT SERPL: ABNORMAL (CALC) (ref 6–22)
CALCIUM SERPL-MCNC: 9.7 MG/DL (ref 8.6–10.4)
CALCIUM SERPL-MCNC: 9.7 MG/DL (ref 8.6–10.4)
CHLORIDE SERPL-SCNC: 103 MMOL/L (ref 98–110)
CO2 SERPL-SCNC: 25 MMOL/L (ref 20–32)
CREAT SERPL-MCNC: 0.9 MG/DL (ref 0.5–1.05)
GFR/BSA.PRED SERPLBLD CYS-BASED-ARV: 69 ML/MIN/1.73M2
GLOBULIN SER CALC-MCNC: 3.3 G/DL (CALC) (ref 1.9–3.7)
GLUCOSE SERPL-MCNC: 94 MG/DL (ref 65–99)
PHOSPHATE SERPL-MCNC: 3.6 MG/DL (ref 2.1–4.3)
POTASSIUM SERPL-SCNC: 4.1 MMOL/L (ref 3.5–5.3)
PROT SERPL-MCNC: 7.4 G/DL (ref 6.1–8.1)
PTH-INTACT SERPL-MCNC: 98 PG/ML (ref 16–77)
SODIUM SERPL-SCNC: 137 MMOL/L (ref 135–146)
T4 FREE SERPL-MCNC: 1.2 NG/DL (ref 0.8–1.8)
TSH SERPL-ACNC: 1.26 MIU/L (ref 0.4–4.5)

## 2023-11-17 NOTE — TELEPHONE ENCOUNTER
Called patient and spoke with her regarding HFU. Patient declined to schedule. I did advise her that she can schedule an HFU up to 1 yr from her d/c date, anything after that would be a new patient appt. She verbalized understanding. Not scheduling HFU at this time     PER MAKI  PT IS NOT HAVING ANY ISSUES AND THEY DID NOT WANT TO WAIT SO LONG TO BE SEEN .      Thank you,     Giulia Wright

## 2023-12-04 ENCOUNTER — OFFICE VISIT (OUTPATIENT)
Dept: ENDOCRINOLOGY | Facility: CLINIC | Age: 68
End: 2023-12-04
Payer: MEDICARE

## 2023-12-04 DIAGNOSIS — M80.80XD OTHER OSTEOPOROSIS WITH CURRENT PATHOLOGICAL FRACTURE WITH ROUTINE HEALING, SUBSEQUENT ENCOUNTER: Primary | ICD-10-CM

## 2023-12-04 PROCEDURE — 96372 THER/PROPH/DIAG INJ SC/IM: CPT | Performed by: INTERNAL MEDICINE

## 2023-12-04 NOTE — PROGRESS NOTES
Assessment/Plan:    Sheila Fine came into the [facility] Endocrinology Office today 12/04/23 to receive Prolia injection.      Verbal consent obtained.  Consent given by: patient    patient states patient has been medically healthy with no underlining concerns/complications.      Sheila Fine presents with No symptoms today.       All insturctions were reviewed with the patient.    If the patient should have any questions/concerns, advised patient to contacted [facility].       Subjective:     History provided by: patient    Patient ID: Sheila Fine is a 68 y.o. female      Objective:    There were no vitals filed for this visit.    Patient tolerated the injection well without any complications.  Injection site/s left arm .  Medication was provided by office .    Patient signed consent form yes   Patient signed ABN form no (If no patient is not a medicare patient).   Patient waited 15 minutes after injection yes (This only applies to patient's receiving first time injection).       Last Visit: 11/6/2023  Next visit:1/15/2024

## 2024-01-15 ENCOUNTER — OFFICE VISIT (OUTPATIENT)
Dept: ENDOCRINOLOGY | Facility: CLINIC | Age: 69
End: 2024-01-15
Payer: MEDICARE

## 2024-01-15 VITALS
WEIGHT: 123 LBS | HEIGHT: 58 IN | OXYGEN SATURATION: 98 % | SYSTOLIC BLOOD PRESSURE: 118 MMHG | BODY MASS INDEX: 25.82 KG/M2 | DIASTOLIC BLOOD PRESSURE: 64 MMHG | HEART RATE: 64 BPM

## 2024-01-15 DIAGNOSIS — M81.0 OSTEOPOROSIS, UNSPECIFIED OSTEOPOROSIS TYPE, UNSPECIFIED PATHOLOGICAL FRACTURE PRESENCE: Primary | ICD-10-CM

## 2024-01-15 DIAGNOSIS — R74.01 TRANSAMINASEMIA: ICD-10-CM

## 2024-01-15 DIAGNOSIS — E55.9 VITAMIN D DEFICIENCY: ICD-10-CM

## 2024-01-15 DIAGNOSIS — E03.4 HYPOTHYROIDISM DUE TO ACQUIRED ATROPHY OF THYROID: Chronic | ICD-10-CM

## 2024-01-15 PROCEDURE — 99214 OFFICE O/P EST MOD 30 MIN: CPT | Performed by: INTERNAL MEDICINE

## 2024-01-15 RX ORDER — CEVIMELINE HYDROCHLORIDE 30 MG/1
30 CAPSULE ORAL 3 TIMES DAILY
COMMUNITY
Start: 2023-11-15 | End: 2024-11-14

## 2024-01-15 NOTE — PROGRESS NOTES
"  Follow-up Patient Progress Note      CC: osteoporosis, hypothyroidism     History of Present Illness:   68yr female with RA(follows Dr. Yee), Sjogrens, osteoporosis, hypothyroidism, HLD, vit D deficiency, T8 compression fracture 8/23(fall from her own height suspected 2\" xanax/cyclobenzaprine), chronic glucocorticoid therapy, general frailty, HTN, HLD and depression. Last visit was 11/6/23.     She has just finished rehabilitation. weight is same.     9/28/23 DXA: Tscores -3.2LS, -1.3LTH, -1.7 LFN, -1.3RTH, -1.8 RFN. 10 yr FRAX score 4.6% hip and 21.5% major osteoporotic fracture. Last dose prolia 12/4/23.     Thyroid: on levothyroxine 88mcg qd. Reduced dose from 100mcg recently.     Statin: lipitor    Patient Active Problem List   Diagnosis    Parotid gland enlargement    Sjogren's disease (HCC)    Hypertension    Anxiety and depression    Hypothyroidism    Gastroparesis    Oral dyskinesia    Pain    Abnormal ECG    Pleural effusion on right    Atelectasis of right lung    Low HDL (under 40)    Dermatitis associated with moisture    Onychomycosis    Adenopathy    Abnormal CT of the abdomen    Progressive cognitive dysfunction    Rheumatoid arteritis (HCC)    Closed fracture of multiple ribs of right side    Vitamin B12 deficiency    Compression fracture of T8 vertebra (HCC)    Osteoporosis    Moderate protein-calorie malnutrition (HCC)    Ambulatory dysfunction     Past Medical History:   Diagnosis Date    Anemia 3/4/2021    Closed nondisplaced fracture of anterior wall of right acetabulum (HCC) 2/23/2021    Closed nondisplaced fracture of right pubis (HCC) 2/23/2021    Fall 2/23/2021    Lymphadenitis, chronic     Multiple closed fractures of pelvis (HCC) 2/27/2021    Sialadenitis     Sjogren's disease (HCC)     Stomach problems     Thyroid disorder     Xerostomia       Past Surgical History:   Procedure Laterality Date    BRAIN SURGERY  2019    tumor removed Dr. Ogden    CHOLECYSTECTOMY        Family " History   Problem Relation Age of Onset    Diabetes Other     Hypertension Other     Heart disease Other     Arthritis Other     Heart disease Mother     Kidney disease Mother     Alcohol abuse Father      Social History     Tobacco Use    Smoking status: Former    Smokeless tobacco: Never   Substance Use Topics    Alcohol use: Yes     Allergies   Allergen Reactions    Codeine Vomiting    Hydroxychloroquine GI Intolerance    Hydroxyquinoline Sulfate [Oxyquinoline] Vomiting    Leucovorin Vomiting    Tizanidine Vomiting         Current Outpatient Medications:     acetaminophen (TYLENOL) 325 mg tablet, Take 2 tablets (650 mg total) by mouth every 6 (six) hours as needed for mild pain or moderate pain, Disp: , Rfl: 0    amLODIPine (NORVASC) 5 mg tablet, TAKE 1 TABLET BY MOUTH TWICE A DAY, Disp: 180 tablet, Rfl: 1    atenolol (TENORMIN) 50 mg tablet, Take 1 tablet (50 mg total) by mouth daily, Disp: 30 tablet, Rfl: 0    atorvastatin (LIPITOR) 10 mg tablet, Take 10 mg by mouth daily, Disp: , Rfl: 1    calcium carbonate (OYSTER SHELL,OSCAL) 500 mg, TAKE 1 TABLET BY MOUTH TWICE A DAY WITH MEALS, Disp: 180 tablet, Rfl: 1    cevimeline (EVOXAC) 30 MG capsule, Take 30 mg by mouth Three times a day, Disp: , Rfl:     cholecalciferol (VITAMIN D3) 1,000 units tablet, TAKE 2 TABLETS (2,000 UNITS TOTAL) BY MOUTH DAILY DO NOT START BEFORE AUGUST 31, 2023., Disp: 180 tablet, Rfl: 1    FLUoxetine (PROzac) 20 mg capsule, Take 20 mg by mouth daily, Disp: , Rfl: 1    folic acid (FOLVITE) 1 mg tablet, Take 1 tablet (1 mg total) by mouth daily, Disp: 30 tablet, Rfl: 0    levothyroxine 88 mcg tablet, Take 1 tablet (88 mcg total) by mouth daily in the early morning Do not start before August 31, 2023., Disp: 30 tablet, Rfl: 0    Potassium Chloride ER 20 MEQ TBCR, , Disp: , Rfl:     predniSONE 5 mg tablet, Take 1 tablet (5 mg total) by mouth daily with dinner, Disp: , Rfl: 0    vitamin B-12 (VITAMIN B-12) 1,000 mcg tablet, TAKE 1 TABLET (1,000  "MCG TOTAL) BY MOUTH DAILY DO NOT START BEFORE AUGUST 31, 2023., Disp: 90 tablet, Rfl: 1    Melatonin ER 10 MG TBCR, Take 10 mg by mouth daily at bedtime as needed (insomnia). Indications: as needed for sleep/insomnia (Patient not taking: Reported on 9/29/2023), Disp: , Rfl:     methotrexate 2.5 mg tablet, Take 8 tablets (20 mg total) by mouth once a week Do not start before September 6, 2023., Disp: 32 tablet, Rfl: 0    polyethylene glycol (MIRALAX) 17 g packet, Take 17 g by mouth daily Do not start before August 31, 2023. (Patient not taking: Reported on 9/6/2023), Disp: 30 each, Rfl: 0    Review of Systems   HENT: Negative.     Eyes: Negative.    Respiratory: Negative.     Cardiovascular: Negative.    Gastrointestinal: Negative.    Endocrine: Negative.    Musculoskeletal: Negative.    Skin: Negative.    Allergic/Immunologic: Negative.    Neurological: Negative.    Hematological: Negative.    Psychiatric/Behavioral: Negative.         Physical Exam:  Body mass index is 25.71 kg/m².  /64 (BP Location: Left arm, Patient Position: Sitting, Cuff Size: Standard)   Pulse 64   Ht 4' 10\" (1.473 m)   Wt 55.8 kg (123 lb)   LMP 01/13/2005   SpO2 98%   BMI 25.71 kg/m²    Vitals:    01/15/24 1237   Weight: 55.8 kg (123 lb)        Physical Exam  Constitutional:       General: She is not in acute distress.     Appearance: She is well-developed. She is not ill-appearing.   HENT:      Head: Normocephalic and atraumatic.      Nose: Nose normal.      Mouth/Throat:      Pharynx: Oropharynx is clear.   Eyes:      Extraocular Movements: Extraocular movements intact.      Conjunctiva/sclera: Conjunctivae normal.   Neck:      Thyroid: No thyromegaly.   Cardiovascular:      Rate and Rhythm: Normal rate.   Pulmonary:      Effort: Pulmonary effort is normal.   Musculoskeletal:         General: No deformity.      Cervical back: Normal range of motion.   Skin:     Capillary Refill: Capillary refill takes less than 2 seconds.      " Coloration: Skin is not pale.      Findings: No rash.   Neurological:      Mental Status: She is alert and oriented to person, place, and time.   Psychiatric:         Behavior: Behavior normal.         Labs:   Lab Results   Component Value Date    HGBA1C 5.6 08/17/2023       Lab Results   Component Value Date    KDS6IHVGCBUV 0.314 (L) 08/17/2023    TSH 1.26 11/10/2023       Lab Results   Component Value Date    CREATININE 0.90 11/10/2023    CREATININE 0.69 09/01/2023    CREATININE 0.80 08/24/2023    BUN 18 11/10/2023     01/08/2015    K 4.1 11/10/2023     11/10/2023    CO2 25 11/10/2023     eGFR   Date Value Ref Range Status   11/10/2023 69 > OR = 60 mL/min/1.73m2 Final   09/01/2023 89 ml/min/1.73sq m Final       Lab Results   Component Value Date    ALT 35 (H) 11/10/2023    AST 40 (H) 11/10/2023     (H) 08/24/2023    ALKPHOS 86 11/10/2023       Lab Results   Component Value Date    CHOLESTEROL 154 03/05/2021     Lab Results   Component Value Date    HDL 17 (L) 03/05/2021     Lab Results   Component Value Date    TRIG 263 (H) 03/05/2021     Lab Results   Component Value Date    NONHDLC 137 03/05/2021         Impression:  1. Osteoporosis, unspecified osteoporosis type, unspecified pathological fracture presence    2. Hypothyroidism due to acquired atrophy of thyroid    3. Vitamin D deficiency    4. Transaminasemia         Plan:    Diagnoses and all orders for this visit:    Osteoporosis, unspecified osteoporosis type, unspecified pathological fracture presence. She seems stable on prolia and tolerating it well.    Recent ALP/ calcium, phosphorus and vit D were normal. PTH was 98pg/mL.    We agreed to repeat labs prior to next visit and hold off on workup for primary hyperparathyroidism.  Follow up in 6 months.    -     PTH, intact; Future  -     Phosphorus; Future  -     Comprehensive metabolic panel; Future    Hypothyroidism due to acquired atrophy of thyroid. She seems clinically and  biochemically euthyroid. Continue levothyroxine 88mcg qdaily. Repeat labs prior to next visit.    -     T4, free; Future  -     TSH, 3rd generation; Future    Vitamin D deficiency  -     Vitamin D 25 hydroxy; Future    Transaminasemia. Improved from before. Suspect meds and rheumatologic etiology.        I have spent 32 minutes with patient today in which greater than 50% of this time was spent in counseling/coordination of care.      Discussed with the patient and all questioned fully answered. She will call me if any problems arise.    Educated/ Counseled patient on diagnostic test results, prognosis, risk vs benefit of treatment options, importance of treatment compliance, healthy life and lifestyle choices.      Justice Prabhakar

## 2024-03-12 ENCOUNTER — HOSPITAL ENCOUNTER (OUTPATIENT)
Dept: CT IMAGING | Facility: HOSPITAL | Age: 69
Discharge: HOME | End: 2024-03-12
Payer: MEDICARE

## 2024-03-12 DIAGNOSIS — R93.89 ABNORMAL FINDINGS ON DIAGNOSTIC IMAGING OF OTHER SPECIFIED BODY STRUCTURES: ICD-10-CM

## 2024-03-12 PROCEDURE — 71250 CT THORAX DX C-: CPT

## 2024-04-11 ENCOUNTER — OFFICE VISIT (OUTPATIENT)
Age: 69
End: 2024-04-11
Payer: MEDICARE

## 2024-04-11 VITALS
WEIGHT: 129.6 LBS | OXYGEN SATURATION: 98 % | BODY MASS INDEX: 27.21 KG/M2 | HEART RATE: 62 BPM | HEIGHT: 58 IN | TEMPERATURE: 97.9 F

## 2024-04-11 DIAGNOSIS — E44.0 MODERATE PROTEIN-CALORIE MALNUTRITION (HCC): ICD-10-CM

## 2024-04-11 DIAGNOSIS — I10 PRIMARY HYPERTENSION: Chronic | ICD-10-CM

## 2024-04-11 DIAGNOSIS — F41.9 ANXIETY AND DEPRESSION: ICD-10-CM

## 2024-04-11 DIAGNOSIS — F32.A ANXIETY AND DEPRESSION: ICD-10-CM

## 2024-04-11 DIAGNOSIS — R26.2 AMBULATORY DYSFUNCTION: ICD-10-CM

## 2024-04-11 DIAGNOSIS — E03.4 HYPOTHYROIDISM DUE TO ACQUIRED ATROPHY OF THYROID: Chronic | ICD-10-CM

## 2024-04-11 DIAGNOSIS — F09 PROGRESSIVE COGNITIVE DYSFUNCTION: Primary | ICD-10-CM

## 2024-04-11 PROCEDURE — 99214 OFFICE O/P EST MOD 30 MIN: CPT | Performed by: NURSE PRACTITIONER

## 2024-04-11 PROCEDURE — G2211 COMPLEX E/M VISIT ADD ON: HCPCS | Performed by: NURSE PRACTITIONER

## 2024-04-11 RX ORDER — FLUOXETINE HYDROCHLORIDE 40 MG/1
40 CAPSULE ORAL DAILY
COMMUNITY

## 2024-04-11 NOTE — ASSESSMENT & PLAN NOTE
MOCA: 20/30.  Deficits in: Executive function, attention (not good at math), recall  Pt is independent adl/dependent iadls.  Lives with   Pt's current cognitive level is consistent with mild dementia, fast stage IV  Memory medications: None at this time  Mobility: No AD, no recent falls  Continue to stay active.  Current activity level: Fair.  Participates in some social, cognitive, physical activity.  Monitor for changes in mood, sleep, pain control.  Notify provider if any concerns  Medication review: Seems appropriate for current conditions  Check with pharmacist/provider before starting any new OTC/prescription medications for potential cognitive side effects  Optimize all acute and chronic conditions.  Continue to follow-up with PCP and specialist as directed for management  Safety concerns: None  Caregiver stress: None.  No SW needs identified today  Ensure adequate hydration, good nutrition  Recommended next follow up: 1 year for continued evaluation of cognition and function.  Patient/ will call if needed before then

## 2024-04-11 NOTE — ASSESSMENT & PLAN NOTE
Last TSH wnl.  Levothyroxine has been adjusted and patient is doing well without signs of hypo or hyper active thyroid  Continue to monitor through PCP

## 2024-04-11 NOTE — PROGRESS NOTES
"  Assessment & Plan:   1. Progressive cognitive dysfunction  Assessment & Plan:  MOCA: 20/30.  Deficits in: Executive function, attention (not good at math), recall  Pt is independent adl/dependent iadls.  Lives with   Pt's current cognitive level is consistent with mild dementia, fast stage IV  Memory medications: None at this time  Mobility: No AD, no recent falls  Continue to stay active.  Current activity level: Fair.  Participates in some social, cognitive, physical activity.  Monitor for changes in mood, sleep, pain control.  Notify provider if any concerns  Medication review: Seems appropriate for current conditions  Check with pharmacist/provider before starting any new OTC/prescription medications for potential cognitive side effects  Optimize all acute and chronic conditions.  Continue to follow-up with PCP and specialist as directed for management  Safety concerns: None  Caregiver stress: None.  No SW needs identified today  Ensure adequate hydration, good nutrition  Recommended next follow up: 1 year for continued evaluation of cognition and function.  Patient/ will call if needed before then        2. Ambulatory dysfunction  Assessment & Plan:  Patient no longer using AD, no recent falls, feels good  Fall precautions      3. Anxiety and depression  Assessment & Plan:  Anxiety and depression are stable.  Patient remains on Prozac (she reports dose was inc to 40mg daily), she is no longer taking Xanax  GDS 3, she denies si/hi.  She clarified her negative answer  to \"do you think its wonderful to be alive right now\" in that with global fighting, she doesn't think this is the best time to be alive right now - not that she doesn't want to be alive right now.  Cont emotional support, relaxation techniques  Pt aware geriatric therapist is available should she ever need services      4. Moderate protein-calorie malnutrition (HCC)  Assessment & Plan:  Weight improved 4 pounds since last " visit  Continue small frequent meals and snacks that are nutritiously and calorically dense.  Continue foods that you enjoy.  Continue socialization during meals  If any continued weight loss, follow-up with PCP for continued evaluation      5. Primary hypertension  Assessment & Plan:  BP stable without any complaints of headache or dizziness  Patient remains on amlodipine  Continue dietary and lifestyle interventions      6. Hypothyroidism due to acquired atrophy of thyroid  Assessment & Plan:  Last TSH wnl.  Levothyroxine has been adjusted and patient is doing well without signs of hypo or hyper active thyroid  Continue to monitor through PCP      HPI:  We had the pleasure of evaluating Sheila Fine who is a 69 y.o. female   in Geriatric follow up today.  Previous MOCA:  18/30.  Comorbidities include progressive memory loss, ambulatory dysfunction, anxiety/depression  She lives with her   Ms. Fine is in the office with her     Memory update per patient:  Movement is good, no dizziness or headaches.  STM loss:  has some trouble saying the prayers, she says every night.  Her  cooks.  Independent with adls.  Sleep is ok, sometimes has hard time, gets 5 hours.  Doesn't sleep too much during the day.  Appetite is good, no problems.    Mood is improved- takes prozac 40mg, xanax was stopped.  She is concerned with the country, is happy to be alive, no si/hi.  Enjoys [employer] ([place], Monday nights 7-9pm).   She is a homebody, likes to be home, but finds things to do.   25 years this year, likes playing poker and slot machines.  Looking forward to [place] trip.    Current activities:  Cognitive:  not too much, she watches tv to learn new things.  Physical:  planning to walk   Social:  not too much.     She has difficulty finding the right word while speaking: Yes - minimal   Patient requires repeat information or ask the same question repeatedly: No  Do you do your own bathing,  dressing, feeding yourself Yes  Can you make your own meals and do light cleaning/chores Yes  Do you take care of your own medications No  Do you drive: No       Do you handle your own financial affairs such as balancing your checkbook, paying bills, investments: No  Have you or your family noted any change in your mood or personality:No  Are you currently or have you been treated in the past for depression or anxiety: Yes- prozac 40mg  Have you noticed any gait or balance disorder: Yes  Uses : No AD, no recent fall.  Does have fall history  Any hallucination or delusion: No    Sleep Issues: Yesocc  Urinary/Stool Incontinence: No  Hearing and vision issue: No  ADL/IADL: Independent/dependent  Appetite/swallow: Good/good  Pain:  doesn't complain - doesn't really even like tylenol.    Memory update per :  Pt doing ok.  Some stm, but stable.  Independent with self care - worries about bathtub fall (d/t physical, not cog) otherwise independent.   cooks/cleans.  She can do light cooking.  Doesn't help much with chores.  Makes her own salad for lunch.  No kitchen safety issues.   uses pill box - takes out and gives her.  No trouble with swallow.   does shopping/bill paying.  She no longer drives.  Appetite is ok, weight staying the same.  Eating habits have improved over the last year.  Sleep is ok, gets up for br, otherwise ok.  Movement is good- no Ad, no recent falls.  Plans to go out for walks in spring.  AC for anniversary end of April.  No concerns for traveling.  Mood is stable, no concerns. Hearing/vision good, wears glasses.  Taste has changed- likes more salty.  Son also lives there.   [place] to use streaming remote.  Does ok alone during the day, could pb do ok overnight.  Two cats, she helps somewhat taking care of them.  Doing ok picking them up.      Cognitive:  watches tv programs, screening.  Physical:  inc walking.  Social:  with   Family Review of Behavior [facility]:     pacing. No    agressive/combative behavior. No    agitated. No   wandering. No   resistance to care. No   hoarding/hiding objects.No    suspicious  No  withdrawn Yes - sometimes gets in the mood to go shopping, does stay in the house more.    rummaging/pillaging.No    misplacing/losing objects No  personal hygiene problems.No  forgetfulness of actions Yes - about the same, improvement over time (since hospitalization).      ROS: Review of Systems   Constitutional:  Negative for activity change, appetite change, chills and fatigue.   HENT:  Negative for congestion and hearing loss.    Eyes:  Negative for visual disturbance.   Respiratory:  Positive for shortness of breath (occ at rest-pending workup with PCP). Negative for cough.    Cardiovascular:  Negative for chest pain.   Gastrointestinal:  Positive for diarrhea. Negative for abdominal pain, constipation, nausea and vomiting.   Genitourinary:  Negative for difficulty urinating.   Musculoskeletal:  Positive for gait problem. Negative for arthralgias and back pain.   Neurological:  Negative for dizziness, light-headedness and headaches.   Psychiatric/Behavioral:  Positive for decreased concentration (forgetful). Negative for dysphoric mood and sleep disturbance. The patient is not nervous/anxious.        Past Medical, surgical, social, medication and allergy history and patients previous records reviewed.  Allergies:   Allergies   Allergen Reactions    Codeine Vomiting    Hydroxychloroquine GI Intolerance    Hydroxyquinoline Sulfate [Oxyquinoline] Vomiting    Leucovorin Vomiting    Tizanidine Vomiting       Medications:      Current Outpatient Medications:     acetaminophen (TYLENOL) 325 mg tablet, Take 2 tablets (650 mg total) by mouth every 6 (six) hours as needed for mild pain or moderate pain, Disp: , Rfl: 0    amLODIPine (NORVASC) 5 mg tablet, TAKE 1 TABLET BY MOUTH TWICE A DAY, Disp: 180 tablet, Rfl: 1    atenolol (TENORMIN) 50 mg tablet, Take 1 tablet (50 mg  total) by mouth daily, Disp: 30 tablet, Rfl: 0    atorvastatin (LIPITOR) 10 mg tablet, Take 10 mg by mouth daily, Disp: , Rfl: 1    calcium carbonate (OYSTER SHELL,OSCAL) 500 mg, TAKE 1 TABLET BY MOUTH TWICE A DAY WITH MEALS, Disp: 180 tablet, Rfl: 1    cholecalciferol (VITAMIN D3) 1,000 units tablet, TAKE 2 TABLETS (2,000 UNITS TOTAL) BY MOUTH DAILY DO NOT START BEFORE AUGUST 31, 2023., Disp: 180 tablet, Rfl: 1    FLUoxetine (PROzac) 40 MG capsule, Take 40 mg by mouth daily, Disp: , Rfl:     folic acid (FOLVITE) 1 mg tablet, Take 1 tablet (1 mg total) by mouth daily, Disp: 30 tablet, Rfl: 0    levothyroxine 88 mcg tablet, Take 1 tablet (88 mcg total) by mouth daily in the early morning Do not start before August 31, 2023., Disp: 30 tablet, Rfl: 0    Potassium Chloride ER 20 MEQ TBCR, , Disp: , Rfl:     predniSONE 5 mg tablet, Take 1 tablet (5 mg total) by mouth daily with dinner, Disp: , Rfl: 0    vitamin B-12 (VITAMIN B-12) 1,000 mcg tablet, TAKE 1 TABLET (1,000 MCG TOTAL) BY MOUTH DAILY DO NOT START BEFORE AUGUST 31, 2023., Disp: 90 tablet, Rfl: 1    methotrexate 2.5 mg tablet, Take 8 tablets (20 mg total) by mouth once a week Do not start before September 6, 2023., Disp: 32 tablet, Rfl: 0    Vitals:  Vitals:    04/11/24 1126   Pulse: 62   Temp: 97.9 °F (36.6 °C)   SpO2: 98%       History:  Past Medical History:   Diagnosis Date    Anemia 3/4/2021    Closed nondisplaced fracture of anterior wall of right acetabulum (HCC) 2/23/2021    Closed nondisplaced fracture of right pubis (HCC) 2/23/2021    Fall 2/23/2021    Lymphadenitis, chronic     Multiple closed fractures of pelvis (HCC) 2/27/2021    Sialadenitis     Sjogren's disease (HCC)     Stomach problems     Thyroid disorder     Xerostomia      Past Surgical History:   Procedure Laterality Date    BRAIN SURGERY  2019    tumor removed Dr. Ogden    CHOLECYSTECTOMY       Family History   Problem Relation Age of Onset    Diabetes Other     Hypertension Other      Heart disease Other     Arthritis Other     Heart disease Mother     Kidney disease Mother     Alcohol abuse Father      Social History     Socioeconomic History    Marital status: /Civil Union     Spouse name: Not on file    Number of children: Not on file    Years of education: Not on file    Highest education level: Not on file   Occupational History    Not on file   Tobacco Use    Smoking status: Former    Smokeless tobacco: Never   Substance and Sexual Activity    Alcohol use: Yes    Drug use: Never    Sexual activity: Not Currently     Partners: Male   Other Topics Concern    Not on file   Social History Narrative    Not on file     Social Determinants of Health     Financial Resource Strain: Not on file   Food Insecurity: No Food Insecurity (8/16/2023)    Hunger Vital Sign     Worried About Running Out of Food in the Last Year: Never true     Ran Out of Food in the Last Year: Never true   Transportation Needs: No Transportation Needs (8/16/2023)    PRAPARE - Transportation     Lack of Transportation (Medical): No     Lack of Transportation (Non-Medical): No   Physical Activity: Not on file   Stress: Not on file   Social Connections: Not on file   Intimate Partner Violence: Not on file   Housing Stability: Low Risk  (8/16/2023)    Housing Stability Vital Sign     Unable to Pay for Housing in the Last Year: No     Number of Places Lived in the Last Year: 1     Unstable Housing in the Last Year: No     Past Surgical History:   Procedure Laterality Date    BRAIN SURGERY  2019    tumor removed Dr. Ogden    CHOLECYSTECTOMY           Physical Exam:  Physical Exam  Vitals and nursing note reviewed.   Constitutional:       General: She is not in acute distress.     Appearance: Normal appearance. She is well-developed. She is not diaphoretic.   HENT:      Head: Normocephalic.   Cardiovascular:      Rate and Rhythm: Normal rate.      Heart sounds: No murmur heard.     No friction rub. No gallop.   Pulmonary:       Effort: Pulmonary effort is normal. No respiratory distress.      Breath sounds: Normal breath sounds. No wheezing or rales.   Abdominal:      General: Bowel sounds are normal. There is no distension.      Palpations: Abdomen is soft.      Tenderness: There is no abdominal tenderness. There is no rebound.   Musculoskeletal:         General: Normal range of motion.   Skin:     General: Skin is warm and dry.   Neurological:      General: No focal deficit present.      Mental Status: She is alert and oriented to person, place, and time. Mental status is at baseline.   Psychiatric:         Mood and Affect: Mood normal.         Behavior: Behavior normal.

## 2024-04-11 NOTE — ASSESSMENT & PLAN NOTE
BP stable without any complaints of headache or dizziness  Patient remains on amlodipine  Continue dietary and lifestyle interventions

## 2024-04-11 NOTE — ASSESSMENT & PLAN NOTE
Weight improved 4 pounds since last visit  Continue small frequent meals and snacks that are nutritiously and calorically dense.  Continue foods that you enjoy.  Continue socialization during meals  If any continued weight loss, follow-up with PCP for continued evaluation

## 2024-04-11 NOTE — ASSESSMENT & PLAN NOTE
"Anxiety and depression are stable.  Patient remains on Prozac (she reports dose was inc to 40mg daily), she is no longer taking Xanax  GDS 3, she denies si/hi.  She clarified her negative answer  to \"do you think its wonderful to be alive right now\" in that with global fighting, she doesn't think this is the best time to be alive right now - not that she doesn't want to be alive right now.  Cont emotional support, relaxation techniques  Pt aware geriatric therapist is available should she ever need services  "

## 2024-06-21 ENCOUNTER — HOSPITAL ENCOUNTER (OUTPATIENT)
Dept: CT IMAGING | Facility: HOSPITAL | Age: 69
Discharge: HOME | End: 2024-06-21
Payer: MEDICARE

## 2024-06-21 DIAGNOSIS — R91.8 OTHER NONSPECIFIC ABNORMAL FINDING OF LUNG FIELD: ICD-10-CM

## 2024-06-21 PROCEDURE — 71250 CT THORAX DX C-: CPT

## 2024-07-10 LAB
25(OH)D3 SERPL-MCNC: 46 NG/ML (ref 30–100)
ALBUMIN SERPL-MCNC: 4.2 G/DL (ref 3.6–5.1)
ALBUMIN/GLOB SERPL: 1.1 (CALC) (ref 1–2.5)
ALP SERPL-CCNC: 104 U/L (ref 37–153)
ALT SERPL-CCNC: 37 U/L (ref 6–29)
AST SERPL-CCNC: 42 U/L (ref 10–35)
BILIRUB SERPL-MCNC: 0.6 MG/DL (ref 0.2–1.2)
BUN SERPL-MCNC: 17 MG/DL (ref 7–25)
BUN/CREAT SERPL: ABNORMAL (CALC) (ref 6–22)
CALCIUM SERPL-MCNC: 9.8 MG/DL (ref 8.6–10.4)
CALCIUM SERPL-MCNC: 9.8 MG/DL (ref 8.6–10.4)
CHLORIDE SERPL-SCNC: 105 MMOL/L (ref 98–110)
CO2 SERPL-SCNC: 24 MMOL/L (ref 20–32)
CREAT SERPL-MCNC: 0.88 MG/DL (ref 0.6–1)
GFR/BSA.PRED SERPLBLD CYS-BASED-ARV: 71 ML/MIN/1.73M2
GLOBULIN SER CALC-MCNC: 3.7 G/DL (CALC) (ref 1.9–3.7)
GLUCOSE SERPL-MCNC: 92 MG/DL (ref 65–99)
PHOSPHATE SERPL-MCNC: 3.4 MG/DL (ref 2.1–4.3)
POTASSIUM SERPL-SCNC: 4.3 MMOL/L (ref 3.5–5.3)
PROT SERPL-MCNC: 7.9 G/DL (ref 6.1–8.1)
PTH-INTACT SERPL-MCNC: 87 PG/ML (ref 16–77)
SODIUM SERPL-SCNC: 138 MMOL/L (ref 135–146)
T4 FREE SERPL-MCNC: 1.1 NG/DL (ref 0.8–1.8)
TSH SERPL-ACNC: 28.26 MIU/L (ref 0.4–4.5)

## 2024-07-16 ENCOUNTER — OFFICE VISIT (OUTPATIENT)
Dept: ENDOCRINOLOGY | Facility: CLINIC | Age: 69
End: 2024-07-16
Payer: MEDICARE

## 2024-07-16 VITALS
HEIGHT: 58 IN | SYSTOLIC BLOOD PRESSURE: 98 MMHG | DIASTOLIC BLOOD PRESSURE: 64 MMHG | WEIGHT: 135 LBS | BODY MASS INDEX: 28.34 KG/M2 | TEMPERATURE: 98.8 F | OXYGEN SATURATION: 98 % | HEART RATE: 65 BPM

## 2024-07-16 DIAGNOSIS — M81.0 OSTEOPOROSIS, UNSPECIFIED OSTEOPOROSIS TYPE, UNSPECIFIED PATHOLOGICAL FRACTURE PRESENCE: ICD-10-CM

## 2024-07-16 DIAGNOSIS — E78.2 MIXED HYPERLIPIDEMIA: ICD-10-CM

## 2024-07-16 DIAGNOSIS — E03.8 OTHER SPECIFIED HYPOTHYROIDISM: Chronic | ICD-10-CM

## 2024-07-16 DIAGNOSIS — R74.01 TRANSAMINASEMIA: ICD-10-CM

## 2024-07-16 DIAGNOSIS — E55.9 VITAMIN D DEFICIENCY: ICD-10-CM

## 2024-07-16 DIAGNOSIS — E03.4 HYPOTHYROIDISM DUE TO ACQUIRED ATROPHY OF THYROID: Primary | Chronic | ICD-10-CM

## 2024-07-16 PROCEDURE — G2211 COMPLEX E/M VISIT ADD ON: HCPCS | Performed by: INTERNAL MEDICINE

## 2024-07-16 PROCEDURE — 99214 OFFICE O/P EST MOD 30 MIN: CPT | Performed by: INTERNAL MEDICINE

## 2024-07-16 RX ORDER — LEVOTHYROXINE SODIUM 88 UG/1
88 TABLET ORAL
Qty: 90 TABLET | Refills: 1 | Status: SHIPPED | OUTPATIENT
Start: 2024-07-16

## 2024-07-16 NOTE — PROGRESS NOTES
"    Follow-up Patient Progress Note      CC: osteoporosis, hypothyroidism     History of Present Illness:   68yr female with RA(follows Dr. Dominguez WOODRUFF), Sjogrens, osteoporosis, hypothyroidism, HLD, vit D deficiency, T8 compression fracture 8/23(fall from her own height suspected 2\" xanax/cyclobenzaprine), chronic glucocorticoid therapy, general frailty, HTN, HLD and depression. Last visit was 1/15/24.     She has just finished rehabilitation. weight is same.     9/28/23 DXA: Tscores -3.2LS, -1.3LTH, -1.7 LFN, -1.3RTH, -1.8 RFN. 10 yr FRAX score 4.6% hip and 21.5% major osteoporotic fracture. Last dose prolia 12/4/23.     Thyroid: on levothyroxine 88mcg qd.     Statin: lipitor     Physical Exam:  Body mass index is 28.22 kg/m².  BP 98/64 (BP Location: Left arm, Patient Position: Sitting, Cuff Size: Standard)   Pulse 65   Temp 98.8 °F (37.1 °C) (Tympanic)   Ht 4' 10\" (1.473 m)   Wt 61.2 kg (135 lb)   LMP 01/13/2005   SpO2 98%   BMI 28.22 kg/m²    Vitals:    07/16/24 0947   Weight: 61.2 kg (135 lb)        Physical Exam  Constitutional:       General: She is not in acute distress.     Appearance: She is well-developed.   HENT:      Head: Normocephalic and atraumatic.      Nose: Nose normal.   Eyes:      Conjunctiva/sclera: Conjunctivae normal.   Pulmonary:      Effort: Pulmonary effort is normal.   Abdominal:      General: There is no distension.   Musculoskeletal:      Cervical back: Normal range of motion and neck supple.   Skin:     Findings: No rash.      Comments: No icterus   Neurological:      Mental Status: She is alert and oriented to person, place, and time.         Labs:   Lab Results   Component Value Date    HGBA1C 5.6 08/17/2023       Lab Results   Component Value Date    WMW7MTWKGKPK 0.314 (L) 08/17/2023    TSH 28.26 (H) 07/09/2024       Lab Results   Component Value Date    CREATININE 0.88 07/09/2024    CREATININE 0.83 05/08/2024    CREATININE 0.90 11/10/2023    BUN 17 07/09/2024     " 01/08/2015    K 4.3 07/09/2024     07/09/2024    CO2 24 07/09/2024     eGFRcr   Date Value Ref Range Status   05/08/2024 76 > OR = 60 mL/min/1.73m2 Final     eGFR   Date Value Ref Range Status   07/09/2024 71 > OR = 60 mL/min/1.73m2 Final       Lab Results   Component Value Date    ALT 37 (H) 07/09/2024    AST 42 (H) 07/09/2024     (H) 08/24/2023    ALKPHOS 104 07/09/2024       Lab Results   Component Value Date    CHOLESTEROL 154 03/05/2021     Lab Results   Component Value Date    HDL 17 (L) 03/05/2021     Lab Results   Component Value Date    TRIG 263 (H) 03/05/2021     Lab Results   Component Value Date    NONHDLC 137 03/05/2021         Assessment/Plan:    1. Hypothyroidism due to acquired atrophy of thyroid  Assessment & Plan:  She seems slightly under-treated biochemically based on TSH elevation to 28uIU/mL but fT4 was normal. Suspect missed doses or malabsorption.    Orders:  -     T4, free; Future  -     TSH with HAMA Treatment; Future  2. Osteoporosis, unspecified osteoporosis type, unspecified pathological fracture presence  Assessment & Plan:  9/28/23 DXA: Tscores -3.2LS, -1.3LTH, -1.7 LFN, -1.3RTH, -1.8 RFN. 10 yr FRAX score 4.6% hip and 21.5% major osteoporotic fracture. Last dose prolia 12/4/23.    Calcium, phosphorus, vitamin D levels were normal in serum. PTH slightly elevated but probably prolia effect.  There is a small possibility of mild primary hyperparathyroidism but if calcium is stable, will not pursue workup.    Repeat DXA in 9/2025.  Prolia due today.  3. Vitamin D deficiency  4. Mixed hyperlipidemia  Assessment & Plan:  On lipitor.  5. Transaminasemia  -     Comprehensive metabolic panel; Future  -     Ambulatory Referral to Gastroenterology; Future  6. Other specified hypothyroidism  Assessment & Plan:  She seems slightly under-treated biochemically based on TSH elevation to 28uIU/mL but fT4 was normal. Suspect missed doses or malabsorption.    Orders:  -     levothyroxine 88  mcg tablet; Take 1 tablet (88 mcg total) by mouth daily in the early morning        I have spent a total time of 30 minutes on 07/16/24 in caring for this patient including greater than 50% of this time was spent in counseling/coordination of care as listed above.       Discussed with the patient and all questioned fully answered. She will contact me with concerns.    Justice Prabhakar

## 2024-07-16 NOTE — ASSESSMENT & PLAN NOTE
She seems slightly under-treated biochemically based on TSH elevation to 28uIU/mL but fT4 was normal. Suspect missed doses or malabsorption.

## 2024-07-16 NOTE — ASSESSMENT & PLAN NOTE
9/28/23 DXA: Tscores -3.2LS, -1.3LTH, -1.7 LFN, -1.3RTH, -1.8 RFN. 10 yr FRAX score 4.6% hip and 21.5% major osteoporotic fracture. Last dose prolia 12/4/23.    Calcium, phosphorus, vitamin D levels were normal in serum. PTH slightly elevated but probably prolia effect.  There is a small possibility of mild primary hyperparathyroidism but if calcium is stable, will not pursue workup.    Repeat DXA in 9/2025.  Prolia due today.

## 2024-08-09 LAB
T4 FREE SERPL-MCNC: 1 NG/DL (ref 0.8–1.8)
TSH SERPL-ACNC: 73.63 MIU/L (ref 0.4–4.5)
TSH SERPL-ACNC: 74.67 MIU/L

## 2024-11-12 NOTE — PROGRESS NOTES
[facility] PT EVAL- GOALS       08/19/23 1200   Rehab Team Goals   Transfer Team Goal Patient will require supervision with transfers with least restrictive device upon completion of rehab program   Locomotion Team Goal Patient will require supervision with locomotion with least restrictive device upon completion of rehab program   Rehab Team Interventions   PT Interventions Gait Training; Therapeutic Exercise;Neuromuscualr Reeducation;Transfer Training;Community Reintegration;Bed Mobility;Modalities; Patient/Family Education   Toileting Transfer Goal   Toilet transfer Goal 04. Supervision or touching assistance- [place] provides VERBAL CUES or supervision throughout activity. Assistive Device   (lrad)   Status Ongoing; Target goal - two weeks   Intervention Balance Work;Assistive Device   PT Transfer Goal   Roll left and right Goal 04. Supervision or touching assistance- [place] provides VERBAL CUES or supervision throughout activity. Sit to lying Goal 04. Supervision or touching assistance- [place] provides VERBAL CUES or supervision throughout activity. Lying to sitting on side of bed Goal 04. Supervision or touching assistance- [place] provides VERBAL CUES or supervision throughout activity. Sit to stand Goal 04. Supervision or touching assistance- [place] provides VERBAL CUES or supervision throughout activity. Chair/bed-to-chair transfer Goal 04. Supervision or touching assistance- [place] provides VERBAL CUES or supervision throughout activity. Car Transfer Goal 04. Supervision or touching assistance- [place] provides VERBAL CUES or supervision throughout activity. Assistive Device   (lrad)   Environment Level Surface;Tile Floor; Well Lit   Status Ongoing; Target goal - two weeks   Locomotion Goal   Primary discharge mode of locomotion Walking   Target Walk Distance 300 ft   Assist Device   (lrad)   Gait Pattern Improvement Ataxic   Environment Level Surface; Well Lit; Tile Floor   Walk 10 feet Goal 04.  Supervision [FreeTextEntry1] : History of breast augmentation. s/p right breast core biopsy reportedly benign. Clinical breast exam negative. I personally reviewed her imaging.  1. Annual mammogram and ultrasound due 8/2025. 2. Follow up visit as needed. 3. Advise monthly self breast examinations and to contact me with any concerns.  or touching assistance- [place] provides VERBAL CUES or supervision throughout activity. Walk 50 feet with 2 turns Goal 04. Supervision or touching assistance- [place] provides VERBAL CUES or supervision throughout activity. Walk 150 feet Goal 04. Supervision or touching assistance- [place] provides VERBAL CUES or supervision throughout activity. Walking 10 feet on uneven surface 04. Supervision or touching assistance- [place] provides VERBAL CUES or supervision throughout activity. Walking Goal Status Ongoing; Target goal - two weeks   Wheel 50 feet with 2 turns Goal 09. Not applicable   Wheel 321 feet Goal 09. Not applicable   Stairs Goal   1 step or curb goal 04. Supervision or touching assistance- [place] provides VERBAL CUES or supervision throughout activity. 4 steps Goal 04. Supervision or touching assistance- [place] provides VERBAL CUES or supervision throughout activity. 12 steps Goal 04. Supervision or touching assistance- [place] provides VERBAL CUES or supervision throughout activity. Number of Stairs 12   Status Ongoing; Target goal - two weeks   Object Retrieval Goal   Picking up object Goal 04. Supervision or touching assistance- [place] provides VERBAL CUES or supervision throughout activity.    Assistive Device  Reacher   Small Object Picked Up marker

## 2025-01-16 ENCOUNTER — CLINICAL SUPPORT (OUTPATIENT)
Dept: ENDOCRINOLOGY | Facility: CLINIC | Age: 70
End: 2025-01-16
Payer: MEDICARE

## 2025-01-16 VITALS
DIASTOLIC BLOOD PRESSURE: 71 MMHG | BODY MASS INDEX: 25.56 KG/M2 | WEIGHT: 126.8 LBS | SYSTOLIC BLOOD PRESSURE: 130 MMHG | HEIGHT: 59 IN

## 2025-01-16 DIAGNOSIS — M81.0 OSTEOPOROSIS, UNSPECIFIED OSTEOPOROSIS TYPE, UNSPECIFIED PATHOLOGICAL FRACTURE PRESENCE: Primary | ICD-10-CM

## 2025-01-16 PROCEDURE — 96372 THER/PROPH/DIAG INJ SC/IM: CPT

## 2025-01-16 NOTE — PROGRESS NOTES
After obtaining consent, and per orders of Dr. Kumar, injection of Prolia  given by Aditi Flores. Patient instructed to remain in clinic for 20 minutes afterwards, and to report any adverse reaction to me immediately. Patient got her injection on her right arm today. Patient tolerated her injection very well.

## 2025-02-13 NOTE — ASSESSMENT & PLAN NOTE
Detail Level: Detailed · Traumatic fall on stairs:  · Resultant right inferior pubic rami fracture, right pubic symphysis fracture, right anterior acetabular fracture, left anterior acetabular fracture  · Evaluated by Orthopedics - treat conservatively  · WBAT BLE  · Continue DVT ppx - lovenox   · Follow-up with Trauma/Orthopedics as outpatient   · R hip and pelvis pain increased today again - neurovascularly intact RLE   · R hip/pelvis x-ray 3/4 stable fractures Subtle right pelvic fractures as noted above  These correspond approximately to the CT findings  No new or displaced pelvic fractures are demonstrated  · Continue APAP TID; Gabapentin 100mg HS  · Change to oxy 5-7 5 mg Q4H PRN   · PRN Robaxin 500mg Q6H PRN   · Holding parameters on sedating meds      Acceptable participation; continue plan as outlined    Recommend acute comprehensive interdisciplinary inpatient rehabilitation to include intensive skilled therapies (PT, OT) as outlined with oversight and management by rehabilitation physician as well as inpatient rehab level nursing, case management and weekly interdisciplinary team meetings  Detail Level: Simple Nicotinamide Supplementation Recommendations: Recommended [employer] [facility]

## 2025-03-18 ENCOUNTER — TELEPHONE (OUTPATIENT)
Age: 70
End: 2025-03-18

## 2025-03-18 NOTE — TELEPHONE ENCOUNTER
Left message to contact office to reschedule 4/11/25 appointment for one year follow up with [facility] which was cancelled via [employer].

## 2025-03-20 ENCOUNTER — APPOINTMENT (OUTPATIENT)
Dept: LAB | Facility: HOSPITAL | Age: 70
End: 2025-03-20
Attending: INTERNAL MEDICINE
Payer: MEDICARE

## 2025-03-20 ENCOUNTER — OFFICE VISIT (OUTPATIENT)
Dept: ENDOCRINOLOGY | Facility: CLINIC | Age: 70
End: 2025-03-20
Payer: MEDICARE

## 2025-03-20 VITALS
HEIGHT: 58 IN | DIASTOLIC BLOOD PRESSURE: 66 MMHG | BODY MASS INDEX: 26.24 KG/M2 | WEIGHT: 125 LBS | RESPIRATION RATE: 14 BRPM | TEMPERATURE: 98 F | SYSTOLIC BLOOD PRESSURE: 114 MMHG | HEART RATE: 68 BPM | OXYGEN SATURATION: 98 %

## 2025-03-20 DIAGNOSIS — E03.8 OTHER SPECIFIED HYPOTHYROIDISM: Chronic | ICD-10-CM

## 2025-03-20 DIAGNOSIS — E78.2 MIXED HYPERLIPIDEMIA: ICD-10-CM

## 2025-03-20 DIAGNOSIS — M81.0 AGE-RELATED OSTEOPOROSIS WITHOUT CURRENT PATHOLOGICAL FRACTURE: ICD-10-CM

## 2025-03-20 DIAGNOSIS — E03.8 OTHER SPECIFIED HYPOTHYROIDISM: Primary | Chronic | ICD-10-CM

## 2025-03-20 LAB
T4 FREE SERPL-MCNC: 0.83 NG/DL (ref 0.61–1.12)
TSH SERPL DL<=0.05 MIU/L-ACNC: 89.87 UIU/ML (ref 0.45–4.5)

## 2025-03-20 PROCEDURE — 84439 ASSAY OF FREE THYROXINE: CPT

## 2025-03-20 PROCEDURE — 99214 OFFICE O/P EST MOD 30 MIN: CPT | Performed by: INTERNAL MEDICINE

## 2025-03-20 PROCEDURE — G2211 COMPLEX E/M VISIT ADD ON: HCPCS | Performed by: INTERNAL MEDICINE

## 2025-03-20 PROCEDURE — 36415 COLL VENOUS BLD VENIPUNCTURE: CPT

## 2025-03-20 PROCEDURE — 84443 ASSAY THYROID STIM HORMONE: CPT

## 2025-03-20 RX ORDER — LEVOTHYROXINE SODIUM 88 UG/1
88 TABLET ORAL
Qty: 90 TABLET | Refills: 1 | Status: SHIPPED | OUTPATIENT
Start: 2025-03-20

## 2025-03-20 NOTE — PROGRESS NOTES
"    Follow-up Patient Progress Note      CC: osteoporosis, hypothyroidism     History of Present Illness:   68yr female with RA(follows Dr. Dominguez WOODRUFF), Sjogrens, osteoporosis, hypothyroidism, HLD, vit D deficiency, T8 compression fracture 8/23(fall from her own height suspected 2\" xanax/cyclobenzaprine), chronic glucocorticoid therapy, general frailty, HTN, HLD and depression. Last visit was 7/16/24.     She has just finished rehabilitation. weight is down 10 lbs.     9/28/23 DXA: Tscores -3.2LS, -1.3LTH, -1.7 LFN, -1.3RTH, -1.8 RFN. 10 yr FRAX score 4.6% hip and 21.5% major osteoporotic fracture. Last dose prolia 12/4/23.     Thyroid: on levothyroxine 88mcg qd.  She is generally asymptomatic from a thyroid perspective but does have connective tissue symptoms.     Statin: lipitor      Physical Exam:  Body mass index is 26.13 kg/m².  /66 (BP Location: Left arm, Patient Position: Sitting)   Pulse 68   Temp 98 °F (36.7 °C) (Tympanic)   Resp 14   Ht 4' 10\" (1.473 m)   Wt 56.7 kg (125 lb)   LMP 01/13/2005   SpO2 98%   BMI 26.13 kg/m²    Vitals:    03/20/25 1121   Weight: 56.7 kg (125 lb)        Physical Exam  Constitutional:       General: She is not in acute distress.     Appearance: She is well-developed.   HENT:      Head: Normocephalic and atraumatic.      Nose: Nose normal.   Eyes:      Conjunctiva/sclera: Conjunctivae normal.   Pulmonary:      Effort: Pulmonary effort is normal.   Abdominal:      General: There is no distension.   Musculoskeletal:      Cervical back: Normal range of motion and neck supple.   Skin:     Findings: No rash.      Comments: No icterus   Neurological:      Mental Status: She is alert and oriented to person, place, and time.         Labs:   Lab Results   Component Value Date    HGBA1C 5.6 08/17/2023       Lab Results   Component Value Date    [id number] 0.314 (L) 08/17/2023    TSH 28.26 (H) 07/09/2024       Lab Results   Component Value Date    CREATININE 0.73 " 02/07/2025    CREATININE 0.81 11/07/2024    CREATININE 1.02 (H) 08/08/2024    BUN 16 02/07/2025     01/08/2015    K 4.3 02/07/2025     02/07/2025    CO2 25 02/07/2025     eGFRcr   Date Value Ref Range Status   02/07/2025 88 > OR = 60 mL/min/1.73m2 Final       Lab Results   Component Value Date    ALT 25 02/07/2025    AST 55 (H) 02/07/2025     (H) 08/24/2023    ALKPHOS 156 (H) 02/07/2025       Lab Results   Component Value Date    CHOLESTEROL 154 03/05/2021     Lab Results   Component Value Date    HDL 17 (L) 03/05/2021     Lab Results   Component Value Date    TRIG 263 (H) 03/05/2021     Lab Results   Component Value Date    NONHDLC 137 03/05/2021         Assessment/Plan:    1. Other specified hypothyroidism  Assessment & Plan:  She seems clinically stable but TSH was high and fT4 was normal.    Given multiple comorbidities and underlying autoimmunity, we agreed to manage based on fT4 levels.  Note TSH with HAMA treatment showed elevated TSH.  We may check macro TSH.    Continue levothyroxine 88mcg qdaily    Follow up in 3 months.  Orders:  -     levothyroxine 88 mcg tablet; Take 1 tablet (88 mcg total) by mouth daily in the early morning  -     T4, free; Future  -     TSH, 3rd generation; Future  -     MISCELLANEOUS LAB TEST; Future  2. Age-related osteoporosis without current pathological fracture  Assessment & Plan:  9/28/23 DXA: Tscores -3.2LS, -1.3LTH, -1.7 LFN, -1.3RTH, -1.8 RFN. 10 yr FRAX score 4.6% hip and 21.5% major osteoporotic fracture. Last dose prolia 12/4/23.    Calcium, phosphorus, vitamin D levels were normal in serum. PTH slightly elevated but probably prolia effect.  There is a small possibility of mild primary hyperparathyroidism but if calcium is stable, will not pursue workup.    Last prolia was 1/25.  Repeat DXA in 9/2025.    3. Mixed hyperlipidemia        I have spent a total time of  minutes on 03/20/25 in caring for this patient including greater than 50% of this time  was spent in counseling/coordination of care as listed above.       Discussed with the patient and all questioned fully answered. She will contact me with concerns.    Justice Prabhakar

## 2025-03-21 NOTE — ASSESSMENT & PLAN NOTE
She seems clinically stable but TSH was high and fT4 was normal.    Given multiple comorbidities and underlying autoimmunity, we agreed to manage based on fT4 levels.  Note TSH with HAMA treatment showed elevated TSH.  We may check macro TSH.    Continue levothyroxine 88mcg qdaily    Follow up in 3 months.

## 2025-03-21 NOTE — ASSESSMENT & PLAN NOTE
9/28/23 DXA: Tscores -3.2LS, -1.3LTH, -1.7 LFN, -1.3RTH, -1.8 RFN. 10 yr FRAX score 4.6% hip and 21.5% major osteoporotic fracture. Last dose prolia 12/4/23.    Calcium, phosphorus, vitamin D levels were normal in serum. PTH slightly elevated but probably prolia effect.  There is a small possibility of mild primary hyperparathyroidism but if calcium is stable, will not pursue workup.    Last prolia was 1/25.  Repeat DXA in 9/2025.

## 2025-04-04 DIAGNOSIS — E03.8 OTHER SPECIFIED HYPOTHYROIDISM: Primary | ICD-10-CM

## 2025-04-04 RX ORDER — LEVOTHYROXINE SODIUM 100 UG/1
100 TABLET ORAL DAILY
Qty: 90 TABLET | Refills: 0 | Status: SHIPPED | OUTPATIENT
Start: 2025-04-04

## 2025-07-02 ENCOUNTER — TELEPHONE (OUTPATIENT)
Dept: ENDOCRINOLOGY | Facility: CLINIC | Age: 70
End: 2025-07-02

## 2025-07-03 DIAGNOSIS — E03.8 OTHER SPECIFIED HYPOTHYROIDISM: ICD-10-CM

## 2025-07-07 DIAGNOSIS — E03.8 OTHER SPECIFIED HYPOTHYROIDISM: ICD-10-CM

## 2025-07-07 RX ORDER — LEVOTHYROXINE SODIUM 100 UG/1
100 TABLET ORAL DAILY
Qty: 90 TABLET | Refills: 0 | Status: SHIPPED | OUTPATIENT
Start: 2025-07-07

## 2025-07-08 RX ORDER — LEVOTHYROXINE SODIUM 100 UG/1
100 TABLET ORAL DAILY
Qty: 90 TABLET | Refills: 0 | OUTPATIENT
Start: 2025-07-08

## 2025-07-10 PROBLEM — D47.2 GAMMOPATHY: Status: ACTIVE | Noted: 2025-07-10

## 2025-07-17 ENCOUNTER — CLINICAL SUPPORT (OUTPATIENT)
Dept: ENDOCRINOLOGY | Facility: CLINIC | Age: 70
End: 2025-07-17
Payer: MEDICARE

## 2025-07-17 VITALS
TEMPERATURE: 98 F | WEIGHT: 125 LBS | OXYGEN SATURATION: 98 % | SYSTOLIC BLOOD PRESSURE: 120 MMHG | BODY MASS INDEX: 26.24 KG/M2 | HEIGHT: 58 IN | HEART RATE: 70 BPM | DIASTOLIC BLOOD PRESSURE: 68 MMHG

## 2025-07-17 DIAGNOSIS — M81.0 AGE-RELATED OSTEOPOROSIS WITHOUT CURRENT PATHOLOGICAL FRACTURE: Primary | ICD-10-CM

## 2025-07-17 PROCEDURE — PBNCHG PB NO CHARGE PLACEHOLDER

## 2025-07-17 PROCEDURE — 96372 THER/PROPH/DIAG INJ SC/IM: CPT
